# Patient Record
Sex: MALE | Race: WHITE | NOT HISPANIC OR LATINO | Employment: OTHER | URBAN - METROPOLITAN AREA
[De-identification: names, ages, dates, MRNs, and addresses within clinical notes are randomized per-mention and may not be internally consistent; named-entity substitution may affect disease eponyms.]

---

## 2024-07-12 ENCOUNTER — APPOINTMENT (INPATIENT)
Dept: RADIOLOGY | Facility: HOSPITAL | Age: 69
DRG: 020 | End: 2024-07-12
Payer: MEDICARE

## 2024-07-12 ENCOUNTER — APPOINTMENT (EMERGENCY)
Dept: CT IMAGING | Facility: HOSPITAL | Age: 69
End: 2024-07-12
Payer: MEDICARE

## 2024-07-12 ENCOUNTER — HOSPITAL ENCOUNTER (EMERGENCY)
Facility: HOSPITAL | Age: 69
End: 2024-07-12
Attending: EMERGENCY MEDICINE
Payer: MEDICARE

## 2024-07-12 ENCOUNTER — HOSPITAL ENCOUNTER (INPATIENT)
Facility: HOSPITAL | Age: 69
LOS: 29 days | DRG: 020 | End: 2024-08-10
Attending: INTERNAL MEDICINE | Admitting: INTERNAL MEDICINE
Payer: MEDICARE

## 2024-07-12 VITALS
RESPIRATION RATE: 14 BRPM | DIASTOLIC BLOOD PRESSURE: 75 MMHG | SYSTOLIC BLOOD PRESSURE: 124 MMHG | WEIGHT: 119.27 LBS | OXYGEN SATURATION: 100 % | HEART RATE: 102 BPM

## 2024-07-12 DIAGNOSIS — E43 SEVERE PROTEIN-CALORIE MALNUTRITION (HCC): ICD-10-CM

## 2024-07-12 DIAGNOSIS — I60.31: ICD-10-CM

## 2024-07-12 DIAGNOSIS — R29.3 DECORTICATE POSTURING: ICD-10-CM

## 2024-07-12 DIAGNOSIS — I69.991 DYSPHAGIA AS LATE EFFECT OF CEREBROVASCULAR DISEASE: ICD-10-CM

## 2024-07-12 DIAGNOSIS — I60.9 SUBARACHNOID HEMORRHAGE (HCC): Primary | ICD-10-CM

## 2024-07-12 DIAGNOSIS — R56.9 SEIZURES (HCC): ICD-10-CM

## 2024-07-12 DIAGNOSIS — I60.9 SAH (SUBARACHNOID HEMORRHAGE) (HCC): Primary | ICD-10-CM

## 2024-07-12 DIAGNOSIS — J96.01 ACUTE RESPIRATORY FAILURE WITH HYPOXIA (HCC): ICD-10-CM

## 2024-07-12 PROBLEM — R29.90 STROKE-LIKE SYMPTOMS: Status: ACTIVE | Noted: 2024-07-12

## 2024-07-12 LAB
2HR DELTA HS TROPONIN: 0 NG/L
ABO GROUP BLD: NORMAL
AMPHETAMINES SERPL QL SCN: NEGATIVE
ANION GAP SERPL CALCULATED.3IONS-SCNC: 12 MMOL/L (ref 4–13)
APPEARANCE CSF: NORMAL
APTT PPP: 23 SECONDS (ref 23–37)
BARBITURATES UR QL: NEGATIVE
BASE EXCESS BLDA CALC-SCNC: -4.2 MMOL/L
BASOPHILS NFR CSF MANUAL: 3 %
BENZODIAZ UR QL: POSITIVE
BLD GP AB SCN SERPL QL: NEGATIVE
BUN SERPL-MCNC: 10 MG/DL (ref 5–25)
CALCIUM SERPL-MCNC: 8.5 MG/DL (ref 8.4–10.2)
CARDIAC TROPONIN I PNL SERPL HS: 10 NG/L
CARDIAC TROPONIN I PNL SERPL HS: 10 NG/L
CHLORIDE SERPL-SCNC: 103 MMOL/L (ref 96–108)
CO2 SERPL-SCNC: 21 MMOL/L (ref 21–32)
COCAINE UR QL: NEGATIVE
CREAT SERPL-MCNC: 0.88 MG/DL (ref 0.6–1.3)
ERYTHROCYTE [DISTWIDTH] IN BLOOD BY AUTOMATED COUNT: 13.3 % (ref 11.6–15.1)
EST. AVERAGE GLUCOSE BLD GHB EST-MCNC: 123 MG/DL
ETHANOL SERPL-MCNC: <10 MG/DL
FENTANYL UR QL SCN: NEGATIVE
FLUAV RNA RESP QL NAA+PROBE: NEGATIVE
FLUBV RNA RESP QL NAA+PROBE: NEGATIVE
GFR SERPL CREATININE-BSD FRML MDRD: 87 ML/MIN/1.73SQ M
GLUCOSE CSF-MCNC: 102 MG/DL (ref 40–70)
GLUCOSE SERPL-MCNC: 125 MG/DL (ref 65–140)
GLUCOSE SERPL-MCNC: 131 MG/DL (ref 65–140)
GLUCOSE SERPL-MCNC: 222 MG/DL (ref 65–140)
HBA1C MFR BLD: 5.9 %
HCO3 BLDA-SCNC: 21.9 MMOL/L (ref 22–28)
HCT VFR BLD AUTO: 38.6 % (ref 36.5–49.3)
HGB BLD-MCNC: 12.9 G/DL (ref 12–17)
HYDROCODONE UR QL SCN: NEGATIVE
INR PPP: 1.07 (ref 0.84–1.19)
LYMPHOCYTES NFR CSF MANUAL: 49 %
MCH RBC QN AUTO: 31.3 PG (ref 26.8–34.3)
MCHC RBC AUTO-ENTMCNC: 33.4 G/DL (ref 31.4–37.4)
MCV RBC AUTO: 94 FL (ref 82–98)
METHADONE UR QL: NEGATIVE
MONOS+MACROS CSF MANUAL: 4 %
NEUTROPHILS NFR CSF MANUAL: 31 %
NEUTS BAND NFR CSF MANUAL: 13 %
O2 CT BLDA-SCNC: 18.3 ML/DL (ref 16–23)
OPIATES UR QL SCN: NEGATIVE
OXYCODONE+OXYMORPHONE UR QL SCN: NEGATIVE
OXYHGB MFR BLDA: 97.6 % (ref 94–97)
PCO2 BLDA: 44.1 MM HG (ref 36–44)
PCP UR QL: NEGATIVE
PH BLDA: 7.31 [PH] (ref 7.35–7.45)
PLATELET # BLD AUTO: 226 THOUSANDS/UL (ref 149–390)
PMV BLD AUTO: 9.3 FL (ref 8.9–12.7)
PO2 BLDA: 138.2 MM HG (ref 75–129)
POTASSIUM SERPL-SCNC: 4 MMOL/L (ref 3.5–5.3)
PROTHROMBIN TIME: 14.5 SECONDS (ref 11.6–14.5)
RBC # BLD AUTO: 4.12 MILLION/UL (ref 3.88–5.62)
RH BLD: POSITIVE
RSV RNA RESP QL NAA+PROBE: NEGATIVE
SARS-COV-2 RNA RESP QL NAA+PROBE: NEGATIVE
SODIUM SERPL-SCNC: 136 MMOL/L (ref 135–147)
SPECIMEN EXPIRATION DATE: NORMAL
SPECIMEN SOURCE: ABNORMAL
THC UR QL: NEGATIVE
TOTAL CELLS COUNTED BLD: NO
TOTAL CELLS COUNTED SPEC: 100
TUBE # CSF: 1
WBC # BLD AUTO: 17.17 THOUSAND/UL (ref 4.31–10.16)
WBC # CSF AUTO: 1 /UL (ref 0–5)

## 2024-07-12 PROCEDURE — 80307 DRUG TEST PRSMV CHEM ANLYZR: CPT | Performed by: NURSE PRACTITIONER

## 2024-07-12 PROCEDURE — 99291 CRITICAL CARE FIRST HOUR: CPT | Performed by: PSYCHIATRY & NEUROLOGY

## 2024-07-12 PROCEDURE — 84439 ASSAY OF FREE THYROXINE: CPT | Performed by: PSYCHIATRY & NEUROLOGY

## 2024-07-12 PROCEDURE — NC001 PR NO CHARGE: Performed by: NURSE PRACTITIONER

## 2024-07-12 PROCEDURE — 86900 BLOOD TYPING SEROLOGIC ABO: CPT | Performed by: EMERGENCY MEDICINE

## 2024-07-12 PROCEDURE — 82948 REAGENT STRIP/BLOOD GLUCOSE: CPT

## 2024-07-12 PROCEDURE — 96368 THER/DIAG CONCURRENT INF: CPT

## 2024-07-12 PROCEDURE — 02HV33Z INSERTION OF INFUSION DEVICE INTO SUPERIOR VENA CAVA, PERCUTANEOUS APPROACH: ICD-10-PCS | Performed by: PSYCHIATRY & NEUROLOGY

## 2024-07-12 PROCEDURE — 70450 CT HEAD/BRAIN W/O DYE: CPT

## 2024-07-12 PROCEDURE — 80048 BASIC METABOLIC PNL TOTAL CA: CPT | Performed by: NURSE PRACTITIONER

## 2024-07-12 PROCEDURE — 36556 INSERT NON-TUNNEL CV CATH: CPT | Performed by: NURSE PRACTITIONER

## 2024-07-12 PROCEDURE — 03HY32Z INSERTION OF MONITORING DEVICE INTO UPPER ARTERY, PERCUTANEOUS APPROACH: ICD-10-PCS | Performed by: INTERNAL MEDICINE

## 2024-07-12 PROCEDURE — 4A133B1 MONITORING OF ARTERIAL PRESSURE, PERIPHERAL, PERCUTANEOUS APPROACH: ICD-10-PCS | Performed by: INTERNAL MEDICINE

## 2024-07-12 PROCEDURE — 86901 BLOOD TYPING SEROLOGIC RH(D): CPT | Performed by: EMERGENCY MEDICINE

## 2024-07-12 PROCEDURE — 96376 TX/PRO/DX INJ SAME DRUG ADON: CPT

## 2024-07-12 PROCEDURE — 84443 ASSAY THYROID STIM HORMONE: CPT | Performed by: PSYCHIATRY & NEUROLOGY

## 2024-07-12 PROCEDURE — 99285 EMERGENCY DEPT VISIT HI MDM: CPT | Performed by: EMERGENCY MEDICINE

## 2024-07-12 PROCEDURE — 80048 BASIC METABOLIC PNL TOTAL CA: CPT | Performed by: EMERGENCY MEDICINE

## 2024-07-12 PROCEDURE — 70496 CT ANGIOGRAPHY HEAD: CPT

## 2024-07-12 PROCEDURE — 99291 CRITICAL CARE FIRST HOUR: CPT

## 2024-07-12 PROCEDURE — 94002 VENT MGMT INPAT INIT DAY: CPT

## 2024-07-12 PROCEDURE — 96367 TX/PROPH/DG ADDL SEQ IV INF: CPT

## 2024-07-12 PROCEDURE — 82077 ASSAY SPEC XCP UR&BREATH IA: CPT | Performed by: PSYCHIATRY & NEUROLOGY

## 2024-07-12 PROCEDURE — 83036 HEMOGLOBIN GLYCOSYLATED A1C: CPT | Performed by: NURSE PRACTITIONER

## 2024-07-12 PROCEDURE — 83930 ASSAY OF BLOOD OSMOLALITY: CPT | Performed by: NURSE PRACTITIONER

## 2024-07-12 PROCEDURE — 70498 CT ANGIOGRAPHY NECK: CPT

## 2024-07-12 PROCEDURE — 85730 THROMBOPLASTIN TIME PARTIAL: CPT | Performed by: EMERGENCY MEDICINE

## 2024-07-12 PROCEDURE — 5A1945Z RESPIRATORY VENTILATION, 24-96 CONSECUTIVE HOURS: ICD-10-PCS | Performed by: PSYCHIATRY & NEUROLOGY

## 2024-07-12 PROCEDURE — 0241U HB NFCT DS VIR RESP RNA 4 TRGT: CPT | Performed by: EMERGENCY MEDICINE

## 2024-07-12 PROCEDURE — 86850 RBC ANTIBODY SCREEN: CPT | Performed by: EMERGENCY MEDICINE

## 2024-07-12 PROCEDURE — 94760 N-INVAS EAR/PLS OXIMETRY 1: CPT

## 2024-07-12 PROCEDURE — 36415 COLL VENOUS BLD VENIPUNCTURE: CPT | Performed by: EMERGENCY MEDICINE

## 2024-07-12 PROCEDURE — 82805 BLOOD GASES W/O2 SATURATION: CPT | Performed by: NURSE PRACTITIONER

## 2024-07-12 PROCEDURE — 87070 CULTURE OTHR SPECIMN AEROBIC: CPT | Performed by: NURSE PRACTITIONER

## 2024-07-12 PROCEDURE — 85610 PROTHROMBIN TIME: CPT | Performed by: EMERGENCY MEDICINE

## 2024-07-12 PROCEDURE — 96375 TX/PRO/DX INJ NEW DRUG ADDON: CPT

## 2024-07-12 PROCEDURE — 31500 INSERT EMERGENCY AIRWAY: CPT

## 2024-07-12 PROCEDURE — 4A133J1 MONITORING OF ARTERIAL PULSE, PERIPHERAL, PERCUTANEOUS APPROACH: ICD-10-PCS | Performed by: INTERNAL MEDICINE

## 2024-07-12 PROCEDURE — 96365 THER/PROPH/DIAG IV INF INIT: CPT

## 2024-07-12 PROCEDURE — NC001 PR NO CHARGE: Performed by: PSYCHIATRY & NEUROLOGY

## 2024-07-12 PROCEDURE — 85027 COMPLETE CBC AUTOMATED: CPT | Performed by: EMERGENCY MEDICINE

## 2024-07-12 PROCEDURE — 89051 BODY FLUID CELL COUNT: CPT | Performed by: NURSE PRACTITIONER

## 2024-07-12 PROCEDURE — 99223 1ST HOSP IP/OBS HIGH 75: CPT | Performed by: RADIOLOGY

## 2024-07-12 PROCEDURE — 71045 X-RAY EXAM CHEST 1 VIEW: CPT

## 2024-07-12 PROCEDURE — 31500 INSERT EMERGENCY AIRWAY: CPT | Performed by: EMERGENCY MEDICINE

## 2024-07-12 PROCEDURE — 36620 INSERTION CATHETER ARTERY: CPT | Performed by: INTERNAL MEDICINE

## 2024-07-12 PROCEDURE — 84484 ASSAY OF TROPONIN QUANT: CPT | Performed by: EMERGENCY MEDICINE

## 2024-07-12 PROCEDURE — 82945 GLUCOSE OTHER FLUID: CPT | Performed by: NURSE PRACTITIONER

## 2024-07-12 RX ORDER — FENTANYL CITRATE 50 UG/ML
50 INJECTION, SOLUTION INTRAMUSCULAR; INTRAVENOUS ONCE
Status: COMPLETED | OUTPATIENT
Start: 2024-07-12 | End: 2024-07-12

## 2024-07-12 RX ORDER — NIMODIPINE 30 MG/1
60 CAPSULE, LIQUID FILLED ORAL
Status: CANCELLED | OUTPATIENT
Start: 2024-07-12 | End: 2024-08-02

## 2024-07-12 RX ORDER — SUCCINYLCHOLINE/SOD CL,ISO/PF 100 MG/5ML
SYRINGE (ML) INTRAVENOUS CODE/TRAUMA/SEDATION MEDICATION
Status: COMPLETED | OUTPATIENT
Start: 2024-07-12 | End: 2024-07-12

## 2024-07-12 RX ORDER — ETOMIDATE 2 MG/ML
INJECTION INTRAVENOUS CODE/TRAUMA/SEDATION MEDICATION
Status: COMPLETED | OUTPATIENT
Start: 2024-07-12 | End: 2024-07-12

## 2024-07-12 RX ORDER — ONDANSETRON 2 MG/ML
4 INJECTION INTRAMUSCULAR; INTRAVENOUS EVERY 6 HOURS PRN
Status: CANCELLED | OUTPATIENT
Start: 2024-07-12

## 2024-07-12 RX ORDER — SENNOSIDES 8.6 MG
1 TABLET ORAL DAILY
Status: CANCELLED | OUTPATIENT
Start: 2024-07-13

## 2024-07-12 RX ORDER — INSULIN LISPRO 100 [IU]/ML
1-5 INJECTION, SOLUTION INTRAVENOUS; SUBCUTANEOUS EVERY 6 HOURS SCHEDULED
Status: DISCONTINUED | OUTPATIENT
Start: 2024-07-12 | End: 2024-07-13

## 2024-07-12 RX ORDER — CHLORHEXIDINE GLUCONATE ORAL RINSE 1.2 MG/ML
15 SOLUTION DENTAL EVERY 12 HOURS SCHEDULED
Status: DISCONTINUED | OUTPATIENT
Start: 2024-07-12 | End: 2024-07-12

## 2024-07-12 RX ORDER — SODIUM CHLORIDE 9 MG/ML
125 INJECTION, SOLUTION INTRAVENOUS CONTINUOUS
Status: CANCELLED | OUTPATIENT
Start: 2024-07-12

## 2024-07-12 RX ORDER — ACETAMINOPHEN 160 MG/5ML
650 SUSPENSION ORAL EVERY 4 HOURS PRN
Status: DISCONTINUED | OUTPATIENT
Start: 2024-07-12 | End: 2024-07-15

## 2024-07-12 RX ORDER — NALOXONE HYDROCHLORIDE 0.4 MG/ML
0.2 INJECTION, SOLUTION INTRAMUSCULAR; INTRAVENOUS; SUBCUTANEOUS
Status: DISCONTINUED | OUTPATIENT
Start: 2024-07-12 | End: 2024-07-12

## 2024-07-12 RX ORDER — NIMODIPINE 30 MG/1
60 CAPSULE, LIQUID FILLED ORAL
Status: DISCONTINUED | OUTPATIENT
Start: 2024-07-12 | End: 2024-07-12

## 2024-07-12 RX ORDER — LEVETIRACETAM 500 MG/5ML
750 INJECTION, SOLUTION, CONCENTRATE INTRAVENOUS EVERY 12 HOURS SCHEDULED
Status: DISCONTINUED | OUTPATIENT
Start: 2024-07-13 | End: 2024-07-12

## 2024-07-12 RX ORDER — LEVETIRACETAM 500 MG/5ML
1000 INJECTION, SOLUTION, CONCENTRATE INTRAVENOUS EVERY 12 HOURS SCHEDULED
Status: DISCONTINUED | OUTPATIENT
Start: 2024-07-13 | End: 2024-07-14

## 2024-07-12 RX ORDER — FENTANYL CITRATE 50 UG/ML
50 INJECTION, SOLUTION INTRAMUSCULAR; INTRAVENOUS EVERY 2 HOUR PRN
Status: DISCONTINUED | OUTPATIENT
Start: 2024-07-12 | End: 2024-07-13

## 2024-07-12 RX ORDER — LORAZEPAM 2 MG/ML
INJECTION INTRAMUSCULAR
Status: COMPLETED
Start: 2024-07-12 | End: 2024-07-12

## 2024-07-12 RX ORDER — POLYETHYLENE GLYCOL 3350 17 G/17G
17 POWDER, FOR SOLUTION ORAL DAILY
Status: CANCELLED | OUTPATIENT
Start: 2024-07-13

## 2024-07-12 RX ORDER — FENTANYL CITRATE-0.9 % NACL/PF 10 MCG/ML
25 PLASTIC BAG, INJECTION (ML) INTRAVENOUS CONTINUOUS
Status: DISCONTINUED | OUTPATIENT
Start: 2024-07-12 | End: 2024-07-13

## 2024-07-12 RX ORDER — CHLORHEXIDINE GLUCONATE ORAL RINSE 1.2 MG/ML
15 SOLUTION DENTAL EVERY 12 HOURS SCHEDULED
Status: DISCONTINUED | OUTPATIENT
Start: 2024-07-12 | End: 2024-08-01

## 2024-07-12 RX ORDER — VECURONIUM BROMIDE 1 MG/ML
10 INJECTION, POWDER, LYOPHILIZED, FOR SOLUTION INTRAVENOUS ONCE
Status: DISCONTINUED | OUTPATIENT
Start: 2024-07-12 | End: 2024-07-12

## 2024-07-12 RX ORDER — LABETALOL HYDROCHLORIDE 5 MG/ML
10 INJECTION, SOLUTION INTRAVENOUS ONCE
Status: COMPLETED | OUTPATIENT
Start: 2024-07-12 | End: 2024-07-12

## 2024-07-12 RX ORDER — CEFAZOLIN SODIUM 2 G/50ML
2000 SOLUTION INTRAVENOUS ONCE
Status: COMPLETED | OUTPATIENT
Start: 2024-07-12 | End: 2024-07-12

## 2024-07-12 RX ORDER — SODIUM CHLORIDE 9 MG/ML
125 INJECTION, SOLUTION INTRAVENOUS CONTINUOUS
Status: DISCONTINUED | OUTPATIENT
Start: 2024-07-12 | End: 2024-07-12 | Stop reason: HOSPADM

## 2024-07-12 RX ORDER — FENTANYL CITRATE 50 UG/ML
25 INJECTION, SOLUTION INTRAMUSCULAR; INTRAVENOUS ONCE
Status: COMPLETED | OUTPATIENT
Start: 2024-07-12 | End: 2024-07-12

## 2024-07-12 RX ORDER — LEVETIRACETAM 500 MG/5ML
2000 INJECTION, SOLUTION, CONCENTRATE INTRAVENOUS ONCE
Status: COMPLETED | OUTPATIENT
Start: 2024-07-12 | End: 2024-07-12

## 2024-07-12 RX ORDER — 3% SODIUM CHLORIDE 3 G/100ML
30 INJECTION, SOLUTION INTRAVENOUS CONTINUOUS
Status: CANCELLED | OUTPATIENT
Start: 2024-07-12

## 2024-07-12 RX ORDER — SODIUM CHLORIDE 9 MG/ML
75 INJECTION, SOLUTION INTRAVENOUS CONTINUOUS
Status: DISCONTINUED | OUTPATIENT
Start: 2024-07-12 | End: 2024-07-16

## 2024-07-12 RX ORDER — FENTANYL CITRATE-0.9 % NACL/PF 10 MCG/ML
75 PLASTIC BAG, INJECTION (ML) INTRAVENOUS CONTINUOUS
Status: DISCONTINUED | OUTPATIENT
Start: 2024-07-12 | End: 2024-07-12 | Stop reason: HOSPADM

## 2024-07-12 RX ORDER — MANNITOL 250 MG/ML
25 INJECTION, SOLUTION INTRAVENOUS ONCE
Status: COMPLETED | OUTPATIENT
Start: 2024-07-12 | End: 2024-07-12

## 2024-07-12 RX ADMIN — SODIUM CHLORIDE 125 ML/HR: 0.9 INJECTION, SOLUTION INTRAVENOUS at 18:13

## 2024-07-12 RX ADMIN — FENTANYL CITRATE 50 MCG: 50 INJECTION INTRAMUSCULAR; INTRAVENOUS at 19:47

## 2024-07-12 RX ADMIN — Medication 25 MCG/HR: at 17:43

## 2024-07-12 RX ADMIN — Medication 75 MCG/HR: at 19:50

## 2024-07-12 RX ADMIN — LABETALOL HYDROCHLORIDE 10 MG: 5 INJECTION, SOLUTION INTRAVENOUS at 16:45

## 2024-07-12 RX ADMIN — FENTANYL CITRATE 25 MCG: 50 INJECTION INTRAMUSCULAR; INTRAVENOUS at 16:45

## 2024-07-12 RX ADMIN — FENTANYL CITRATE 50 MCG: 50 INJECTION INTRAMUSCULAR; INTRAVENOUS at 20:45

## 2024-07-12 RX ADMIN — LEVETIRACETAM 2000 MG: 100 INJECTION, SOLUTION INTRAVENOUS at 17:02

## 2024-07-12 RX ADMIN — ETOMIDATE 30 MG: 2 INJECTION, SOLUTION INTRAVENOUS at 16:06

## 2024-07-12 RX ADMIN — DESMOPRESSIN ACETATE 16.4 MCG: 4 INJECTION, SOLUTION INTRAVENOUS; SUBCUTANEOUS at 17:07

## 2024-07-12 RX ADMIN — IOHEXOL 85 ML: 350 INJECTION, SOLUTION INTRAVENOUS at 16:27

## 2024-07-12 RX ADMIN — FENTANYL CITRATE 50 MCG: 50 INJECTION INTRAMUSCULAR; INTRAVENOUS at 18:13

## 2024-07-12 RX ADMIN — Medication 100 MG: at 16:07

## 2024-07-12 RX ADMIN — SODIUM CHLORIDE 125 ML/HR: 0.9 INJECTION, SOLUTION INTRAVENOUS at 21:00

## 2024-07-12 RX ADMIN — SODIUM CHLORIDE 3 MG/HR: 0.9 INJECTION, SOLUTION INTRAVENOUS at 17:11

## 2024-07-12 RX ADMIN — SODIUM CHLORIDE 1000 ML: 0.9 INJECTION, SOLUTION INTRAVENOUS at 19:00

## 2024-07-12 RX ADMIN — CHLORHEXIDINE GLUCONATE 0.12% ORAL RINSE 15 ML: 1.2 LIQUID ORAL at 21:26

## 2024-07-12 RX ADMIN — CEFAZOLIN SODIUM 2000 MG: 2 SOLUTION INTRAVENOUS at 21:27

## 2024-07-12 RX ADMIN — MANNITOL 25 G: 12.5 INJECTION, SOLUTION INTRAVENOUS at 17:03

## 2024-07-12 RX ADMIN — LORAZEPAM 2 MG: 2 INJECTION INTRAMUSCULAR; INTRAVENOUS at 16:15

## 2024-07-12 RX ADMIN — FENTANYL CITRATE 50 MCG: 50 INJECTION INTRAMUSCULAR; INTRAVENOUS at 21:10

## 2024-07-12 NOTE — ED PROVIDER NOTES
History  Chief Complaint   Patient presents with    Seizure - New Onset     Pt arrived via EMS from home. EMS was called for right sided body pain. When EMS arrived, pt was c/o of headache and right leg pain. Hx of htn and migraines. In EMS pt had 2 witnessed seizures. No hx of seizures. 1st seizure 30 sec, 2nd seizure 6 seconds. Pt is unresponsive at this time and RT is being called.      HPI    None       No past medical history on file.    No past surgical history on file.    No family history on file.  I have reviewed and agree with the history as documented.    No existing history information found.  No existing history information found.       Review of Systems   Unable to perform ROS: Patient unresponsive       Physical Exam  Physical Exam  Vitals and nursing note reviewed.   Constitutional:       General: He is in acute distress.      Appearance: He is ill-appearing.      Comments: Patient unresponsive.   HENT:      Head: Normocephalic and atraumatic.   Eyes:      Comments: No corneal or pupillary reflexes.   Cardiovascular:      Rate and Rhythm: Normal rate and regular rhythm.   Pulmonary:      Comments: Patient appears to have Cheyne-Chacon respirations.  Musculoskeletal:      Cervical back: Full passive range of motion without pain and neck supple.   Skin:     General: Skin is warm and dry.   Neurological:      General: No focal deficit present.      Mental Status: He is unresponsive.      GCS: GCS eye subscore is 1. GCS verbal subscore is 1. GCS motor subscore is 1.      Comments: Patient appears to have decorticate posturing and is contracted in bilateral upper extremities. No response to painful stimulus.          Vital Signs  ED Triage Vitals   Temp Pulse Respirations Blood Pressure SpO2   -- 07/12/24 1600 07/12/24 1600 07/12/24 1600 07/12/24 1600    101 (!) 25 (!) 176/87 (!) 74 %      Temp src Heart Rate Source Patient Position - Orthostatic VS BP Location FiO2 (%)   -- -- -- -- --             Pain  Score       07/12/24 1645       Med Not Given for Pain - for MAR use only           Vitals:    07/12/24 1600 07/12/24 1604   BP: (!) 176/87 (!) 176/87   Pulse: 101 (!) 108         Visual Acuity  Visual Acuity      Flowsheet Row Most Recent Value   L Pupil Size (mm) 3   R Pupil Size (mm) 3            ED Medications  Medications   etomidate (AMIDATE) 2 mg/mL injection (30 mg Intravenous Given 7/12/24 1606)   Succinylcholine Chloride 100 mg/5 mL syringe (100 mg Intravenous Given 7/12/24 1607)   niCARdipine (CARDENE) 25 mg (STANDARD CONCENTRATION) in sodium chloride 0.9% 250 mL (3 mg/hr Intravenous New Bag 7/12/24 1711)   sodium chloride 0.9 % infusion (125 mL/hr Intravenous New Bag 7/12/24 1813)   fentaNYL 1000 mcg in sodium chloride 0.9% 100mL infusion (50 mcg/hr Intravenous Rate/Dose Change 7/12/24 1813)   LORazepam (ATIVAN) 2 mg/mL injection **ADS Override Pull** (2 mg Intravenous Given 7/12/24 1615)   iohexol (OMNIPAQUE) 350 MG/ML injection (MULTI-DOSE) 85 mL (85 mL Intravenous Given 7/12/24 1627)   labetalol (NORMODYNE) injection 10 mg (10 mg Intravenous Given 7/12/24 1645)   mannitol 25 % injection 25 g (25 g Intravenous Given 7/12/24 1703)   fentaNYL injection 25 mcg (25 mcg Intravenous Given 7/12/24 1645)   levETIRAcetam (KEPPRA) injection 2,000 mg (2,000 mg Intravenous Given 7/12/24 1702)   desmopressin (DDAVP) 16.4 mcg in sodium chloride 0.9 % 50 mL IVPB (0 mcg Intravenous Stopped 7/12/24 1746)   fentaNYL injection 50 mcg (50 mcg Intravenous Given 7/12/24 1813)       Diagnostic Studies  Results Reviewed       Procedure Component Value Units Date/Time    HS Troponin I 2hr [[id number]]  (Normal) Collected: 07/12/24 1734    Lab Status: Final result Specimen: Blood from Arm, Left Updated: 07/12/24 1809     hs TnI 2hr 10 ng/L      Delta 2hr hsTnI 0 ng/L     FLU/RSV/COVID - if FLU/RSV clinically relevant [[id number]]  (Normal) Collected: 07/12/24 1768    Lab Status: Final result Specimen: Nares from Nose Updated:  07/12/24 1704     SARS-CoV-2 Negative     INFLUENZA A PCR Negative     INFLUENZA B PCR Negative     RSV PCR Negative    Narrative:      FOR PEDIATRIC PATIENTS - copy/paste COVID Guidelines URL to browser: [email or web address]    SARS-CoV-2 assay is a Nucleic Acid Amplification assay intended for the  qualitative detection of nucleic acid from SARS-CoV-2 in nasopharyngeal  swabs. Results are for the presumptive identification of SARS-CoV-2 RNA.    Positive results are indicative of infection with SARS-CoV-2, the virus  causing COVID-19, but do not rule out bacterial infection or co-infection  with other viruses. Laboratories within the [place] and its  territories are required to report all positive results to the appropriate  public health authorities. Negative results do not preclude SARS-CoV-2  infection and should not be used as the sole basis for treatment or other  patient management decisions. Negative results must be combined with  clinical observations, patient history, and epidemiological information.  This test has not been FDA cleared or approved.    This test has been authorized by FDA under an Emergency Use Authorization  (EUA). This test is only authorized for the duration of time the  declaration that circumstances exist justifying the authorization of the  emergency use of an in vitro diagnostic tests for detection of SARS-CoV-2  virus and/or diagnosis of COVID-19 infection under section 564(b)(1) of  the Act, 21 U.S.C. 360bbb-3(b)(1), unless the authorization is terminated  or revoked sooner. The test has been validated but independent review by FDA  and CLIA is pending.    Test performed using [employer]: This RT-PCR assay targets N2,  a region unique to SARS-CoV-2. A conserved region in the E-gene was chosen  for pan-Sarbecovirus detection which includes SARS-CoV-2.    According to CMS-2020-01-R, this platform meets the definition  of highUpstream technology.    HS Troponin 0hr (reflex protocol) [[id number]]  (Normal) Collected: 07/12/24 1618    Lab Status: Final result Specimen: Blood from Arm, Right Updated: 07/12/24 1652     hs TnI 0hr 10 ng/L     Basic metabolic panel [[id number]]  (Abnormal) Collected: 07/12/24 1618    Lab Status: Final result Specimen: Blood from Arm, Right Updated: 07/12/24 1645     Sodium 136 mmol/L      Potassium 4.0 mmol/L      Chloride 103 mmol/L      CO2 21 mmol/L      ANION GAP 12 mmol/L      BUN 10 mg/dL      Creatinine 0.88 mg/dL      Glucose 222 mg/dL      Calcium 8.5 mg/dL      eGFR 87 ml/min/1.73sq m     Narrative:      National Kidney Disease Foundation guidelines for Chronic Kidney Disease (CKD):     Stage 1 with normal or high GFR (GFR > 90 mL/min/1.73 square meters)    Stage 2 Mild CKD (GFR = 60-89 mL/min/1.73 square meters)    Stage 3A Moderate CKD (GFR = 45-59 mL/min/1.73 square meters)    Stage 3B Moderate CKD (GFR = 30-44 mL/min/1.73 square meters)    Stage 4 Severe CKD (GFR = 15-29 mL/min/1.73 square meters)    Stage 5 End Stage CKD (GFR <15 mL/min/1.73 square meters)  Note: GFR calculation is accurate only with a steady state creatinine    Protime-INR [[id number]]  (Normal) Collected: 07/12/24 1618    Lab Status: Final result Specimen: Blood from Arm, Right Updated: 07/12/24 1644     Protime 14.5 seconds      INR 1.07    APTT [[id number]]  (Normal) Collected: 07/12/24 1618    Lab Status: Final result Specimen: Blood from Arm, Right Updated: 07/12/24 1644     PTT 23 seconds     CBC and Platelet [[id number]]  (Abnormal) Collected: 07/12/24 1618    Lab Status: Final result Specimen: Blood from Arm, Right Updated: 07/12/24 1626     WBC 17.17 Thousand/uL      RBC 4.12 Million/uL      Hemoglobin 12.9 g/dL      Hematocrit 38.6 %      MCV 94 fL      MCH 31.3 pg      MCHC 33.4 g/dL      RDW 13.3 %      Platelets 226 Thousands/uL      MPV 9.3 fL                    CTA stroke alert (head/neck)   Final Result by  George Hu MD (07/12 1651)      0.3 cm aneurysm in expected origin of right posterior communicating artery. Possible tiny aneurysm in left anterior communicating artery region. These could be potential sources of diffuse subarachnoid hemorrhage. Recommend neurovascular surgery    consultation for further evaluation.      Mild narrowing of bilateral MCA M1 and bilateral M2 segmental branch vessels, likely due to vasospasm.      Patent stent in left proximal subclavian artery.      Endotracheal tube in trachea.      Additional chronic/incidental findings as detailed above.      Findings were directly discussed with Emmanuel Lion at approximately 4:34 p.m. on 7/12/2024.      Workstation performed: [id number]         CT stroke alert brain   Final Result by George Hu MD (07/12 1633)      Acute diffuse thickened large-volume subarachnoid hemorrhage throughout the bilateral parafalcine regions, bilateral cerebral sulci (right worse than left), basilar cisterns, prepontine cistern, premedullary cistern, and visualized upper cervical spinal    canal.      Acute small volume intraventricular hemorrhage with mild hydrocephalus.      No acute infarction.         Findings were directly discussed with Emmanuel Lion at approximately 4:34 p.m. on 7/12/2024.      Workstation performed: [id number]                    Procedures  Procedures         ED Course                                               Medical Decision Making  Amount and/or Complexity of Data Reviewed  Labs: ordered.  Radiology: ordered.    Risk  Prescription drug management.                 Disposition  Final diagnoses:   Decorticate posturing   SAH (subarachnoid hemorrhage) (HCC)   Acute respiratory failure with hypoxia (HCC)     Time reflects when diagnosis was documented in both MDM as applicable and the Disposition within this note       Time User Action Codes Description Comment    7/12/2024  4:13 PM Laquita Flores Add [R29.3] Decorticate  posturing     7/12/2024  5:49 PM Laquita Flores Add [I60.9] SAH (subarachnoid hemorrhage) (HCC)     7/12/2024  5:49 PM Laquita Flores Modify [R29.3] Decorticate posturing     7/12/2024  5:49 PM Laquita Flores Modify [I60.9] SAH (subarachnoid hemorrhage) (HCC)     7/12/2024  5:49 PM Laquita Flores Add [J96.01] Acute respiratory failure with hypoxia ([facility])           ED Disposition       ED Disposition   Transfer to Another Facility-In Network    Condition   --    Date/Time   Fri Jul 12, 2024  5:49 PM    Comment   Federico Bowen should be transferred out to [facility].               MD Documentation      Flowsheet Row Most Recent Value   Patient Condition The patient has been stabilized such that within reasonable medical probability, no material deterioration of the patient condition or the condition of the unborn child(vickie) is likely to result from the transfer   Reason for Transfer Level of Care needed not available at this facility   Benefits of Transfer Specialized equipment and/or services available at the receiving facility (Include comment)________________________  [Neurosurgery]   Risks of Transfer Potential for delay in receiving treatment, Potential deterioration of medical condition, Increased discomfort during transfer, Possible worsening of condition or death during transfer   Accepting Physician Dr. Timmons   Accepting Facility Name, [facility]    (Name & Tel number) [facility]   Sending MD Dr. Flores   Provider Certification General risk, such as traffic hazards, adverse weather conditions, rough terrain or turbulence, possible failure of equipment (including vehicle or aircraft), or consequences of actions of persons outside the control of the transport personnel          RN Documentation      Flowsheet Row Most Recent Value   Accepting Facility Name, [facility]    (Name & Tel number) [facility]          Follow-up Information    None         Patient's Medications     No medications on file       No discharge procedures on file.    PDMP Review       None            ED Provider  Electronically Signed by           the unborn child(vickie) is likely to result from the transfer   Reason for Transfer Level of Care needed not available at this facility   Benefits of Transfer Specialized equipment and/or services available at the receiving facility (Include comment)________________________  [Neurosurgery]   Risks of Transfer Potential for delay in receiving treatment, Potential deterioration of medical condition, Increased discomfort during transfer, Possible worsening of condition or death during transfer   Accepting Physician Dr. Timmons   Accepting Facility Name, [facility]    (Name & Tel number) [facility]   Sending MD Dr. Flores   Provider Certification General risk, such as traffic hazards, adverse weather conditions, rough terrain or turbulence, possible failure of equipment (including vehicle or aircraft), or consequences of actions of persons outside the control of the transport personnel          RN Documentation      Flowsheet Row Most Recent Value   Accepting Facility Name, [facility]    (Name & Tel number) [facility]          Follow-up Information    None         Patient's Medications    No medications on file       No discharge procedures on file.    PDMP Review       None            ED Provider  Electronically Signed by             Laquita Flores DO  07/15/24 0602

## 2024-07-12 NOTE — CONSULTS
Consultation - Neurosurgery   Federico Bowen 68 y.o. 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 He requires urgent ventriculostomy insertion for symptomatic hydrocephalus, to be inserted by Dr. Goldberg. He will likely require endovascular embolization depending on his neurological exam given report of posturing and absent corneal reflexes however likely suspected to be due to seizure.    Keep SBP less than 140.  CT head in AM.      History of Present Illness     HPI: Federico Bowen is a 68 y.o. year old male who presents with subarachnoid hemorrhage. History provided by the EMR and daughter. He has a past medical history notable for CAD, HTN, HLD, tobacco use, unstable angina, COPD, history of cocaine use, left subclavian stent. EMS was called to his home for complaints of headache and right leg pain. Per EMS, patient had two witnessed seizures and ultimately required intubation. Upon arrival patient was reported to be posturing. CT/CTA revealed a large amount of SAH and a right PCOM aneurysm. He was given Keppra for seizures and DDAVP for aspirin use. Per his daughter he is independent at baseline with ADLs and is driving.           Inpatient consult to Neurosurgery  Consult performed by: Ruslan Mcneal MD  Consult ordered by: CHRISTINA Peterson          Review of Systems   Unable to perform ROS: Acuity of condition       Historical Information   No past medical history on file.  No past surgical history on file.  Social History     Substance and Sexual Activity   Alcohol Use Not on file     Social History     Substance and Sexual Activity   Drug Use Not on file     No existing history information found.  No existing history information found.  Social History     Tobacco Use   Smoking Status Not on file   Smokeless Tobacco Not on  file     No family history on file.    Meds/Allergies   all current active meds have been reviewed  Not on File    Objective   No intake or output data in the 24 hours ending 07/12/24 1923    Physical Exam  Constitutional:       Appearance: He is ill-appearing.   HENT:      Head: Normocephalic.   Cardiovascular:      Rate and Rhythm: Regular rhythm.      Pulses: Normal pulses.   Skin:     General: Skin is warm and dry.   Neurological:      Comments: Intubated, sedated  Right greater than left constricted pupils.  Not reactive.  No response to painful stimuli.       Neurologic Exam    Vitals:There were no vitals taken for this visit.,There is no height or weight on file to calculate BMI.     Lab Results: I have personally reviewed pertinent results.      Imaging Studies: I have personally reviewed pertinent reports.      EKG, Pathology, and Other Studies: I have personally reviewed pertinent reports.      VTE Prophylaxis: Sequential compression device (Venodyne)     Code Status: Level 1 - Full Code  Advance Directive and Living Will:      Power of :    POLST:      Counseling / Coordination of Care  None

## 2024-07-12 NOTE — ASSESSMENT & PLAN NOTE
"68 y.o.  male with CAD, HTN, HLD, tobacco use, unstable angina, COPD, history of cocaine use, who presents with stroke-like symptoms.    Workup:  -CT head 7/12/24:  \"Acute diffuse thickened large-volume subarachnoid hemorrhage throughout the bilateral parafalcine regions, bilateral cerebral sulci (right worse than left), basilar cisterns, prepontine cistern, premedullary cistern, and visualized upper cervical spinal canal. Acute small volume intraventricular hemorrhage with mild hydrocephalus. No acute infarction.\"  - CTA H/N wwo contrast 7/12/24:  \"0.3 cm aneurysm in expected origin of right posterior communicating artery. Possible tiny aneurysm in left anterior communicating artery region. These could be potential sources of diffuse subarachnoid hemorrhage. Recommend neurovascular surgery consultation for further evaluation. Mild narrowing of bilateral MCA M1 and bilateral M2 segmental branch vessels, likely due to vasospasm. Patent stent in left proximal subclavian artery. Endotracheal tube in trachea.\"    Plan:  -recommend emergent transfer to [facility]  - Recommend contacting neurosurgery and neurocritical care at [facility] for further recommendations.       "

## 2024-07-12 NOTE — EMTALA/ACUTE CARE TRANSFER
[facility] EMERGENCY DEPARTMENT  [address]  [place] [address]  Dept: [phone]      EMTALA TRANSFER CONSENT    NAME Federico Bowen                                         1955                              MRN [id number]    I have been informed of my rights regarding examination, treatment, and transfer   by Dr. Laquita Flores DO    Benefits: Specialized equipment and/or services available at the receiving facility (Include comment)________________________ (Neurosurgery)    Risks: Potential for delay in receiving treatment, Potential deterioration of medical condition, Increased discomfort during transfer, Possible worsening of condition or death during transfer      Consent for Transfer:  I acknowledge that my medical condition has been evaluated and explained to me by the emergency department physician or other qualified medical person and/or my attending physician, who has recommended that I be transferred to the service of  Accepting Physician: Dr. Timmons at Accepting Facility Name, City & State : [facility]. The above potential benefits of such transfer, the potential risks associated with such transfer, and the probable risks of not being transferred have been explained to me, and I fully understand them.  The doctor has explained that, in my case, the benefits of transfer outweigh the risks.  I agree to be transferred.    I authorize the performance of emergency medical procedures and treatments upon me in both transit and upon arrival at the receiving facility.  Additionally, I authorize the release of any and all medical records to the receiving facility and request they be transported with me, if possible.  I understand that the safest mode of transportation during a medical emergency is an ambulance and that the Hospital advocates the use of this mode of transport. Risks of traveling to the receiving facility by car, including absence of medical control, life  sustaining equipment, such as oxygen, and medical personnel has been explained to me and I fully understand them.    (MAR CORRECT BOX BELOW)  [  ]  I consent to the stated transfer and to be transported by ambulance/helicopter.  [  ]  I consent to the stated transfer, but refuse transportation by ambulance and accept full responsibility for my transportation by car.  I understand the risks of non-ambulance transfers and I exonerate the Hospital and its staff from any deterioration in my condition that results from this refusal.    X___________________________________________    DATE  24  TIME________  Signature of patient or legally responsible individual signing on patient behalf           RELATIONSHIP TO PATIENT_________________________          Provider Certification    NAME Federico Bowen                                         1955                              MRN [id number]    A medical screening exam was performed on the above named patient.  Based on the examination:    Condition Necessitating Transfer The primary encounter diagnosis was SAH (subarachnoid hemorrhage) (HCC). Diagnoses of Decorticate posturing and Acute respiratory failure with hypoxia (HCC) were also pertinent to this visit.    Patient Condition: The patient has been stabilized such that within reasonable medical probability, no material deterioration of the patient condition or the condition of the unborn child(vickie) is likely to result from the transfer    Reason for Transfer: Level of Care needed not available at this facility    Transfer Requirements: Facility [facility]   Space available and qualified personnel available for treatment as acknowledged by [facility]  Agreed to accept transfer and to provide appropriate medical treatment as acknowledged by       Dr. Timmons  Appropriate medical records of the examination and treatment of the patient are provided at the time of transfer   STAFF INITIAL WHEN COMPLETED  _______  Transfer will be performed by qualified personnel from    and appropriate transfer equipment as required, including the use of necessary and appropriate life support measures.    Provider Certification: I have examined the patient and explained the following risks and benefits of being transferred/refusing transfer to the patient/family:  General risk, such as traffic hazards, adverse weather conditions, rough terrain or turbulence, possible failure of equipment (including vehicle or aircraft), or consequences of actions of persons outside the control of the transport personnel      Based on these reasonable risks and benefits to the patient and/or the unborn child(vickie), and based upon the information available at the time of the patient’s examination, I certify that the medical benefits reasonably to be expected from the provision of appropriate medical treatments at another medical facility outweigh the increasing risks, if any, to the individual’s medical condition, and in the case of labor to the unborn child, from effecting the transfer.    X____________________________________________ DATE 07/12/24        TIME_______      ORIGINAL - SEND TO MEDICAL RECORDS   COPY - SEND WITH PATIENT DURING TRANSFER

## 2024-07-12 NOTE — ED NOTES
FROM: [facility]   ED 10-ED 10 (MO CT SCAN)  TO: [facility]--  --  DX: Decorticate posturing/  SAH  EMS Tx Reason: Neurosurgery  Transfer Priority Level (1,2,3): 1  Referring: Laquita Flores DO  Accepting: Dr Timmons  Transport (ALS/BLS, etc): CCT  - 54 kg  Reason for ALS/CCT if applicable (O2, tele, IVF, meds): vent/  cardene gtt/  fentanyl gtt/ cardiac monitor  P/U Time:  Number for Report: [phone]     Fozia Rincon, RN  07/12/24 3676

## 2024-07-12 NOTE — H&P
[facility]  H&P: Critical Care  Name: Federico Bowen 68 y.o. 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  Plan:   SAH Pathway   Nimidopine 60mg q4  Neurosurgery consulted emergently   Needs emergent EVD   Needs to go IR emergently aneurysm securing   SBP goal 110-160  S/p mannitol 07/12  Q6h BMP and Serum Osm   Na goal 145-155  Osm goal: 310-320  Consider HTS     Diagnosis: Seizure  Suspected from SAH   Plan   S/p 2g keppra at [place] 07/12  Continue maintenance 750mg BID   Start vEEG to ensure no further seizures     CV:   Diagnosis: Hypertension   Plan:   -160  Prn hydralazine and labetolol  Diagnosis: Hyperlipidemia   Continue home statin    Pulm:  Diagnosis: Acute Respiratory Failure  Secondary due to SAH    AC//16/50/6  Plan   SAT/SBT as able   Wean as able       Diagnosis: COPD   Plan: ***    GI:   No active issues    :   Diagnosis: ***  Plan: ***    F/E/N:    F:   E: K> 4.0, mag greater than 2 3, maintain magnesium > 2.0, maintain phosphate > 3.5  N: NPO at this time     Heme/Onc:   Diagnosis: Leukocytosis   WBC 17k  Suspected in setting of SAH   Plan:   Monitor fever curves   Am CB     Endo:   No active issues    ID:   No active issues    MSK/Skin:   PT/OT/Speech/PMR  Skin check     Disposition: Critical care       History of Present Illness     HPI: Federico Bowen is a 68 y.o. w/ a PMH of smoking, HTN, CAD, HLD, Unstable Angina and migraines coming in for R sided pain which EMS was called for. Patient was complaining of headache and R leg pain. En route, patient had 2 witnessed seizures with first being 30 seconds and 2nd seizure being 6 seconds. Patient was unresponsive when arrived to the ED and was a stroke alert for  unresponsiveness, headache, and decorticate posturing . CTH showed a large SAH thru bilateral parafalcine, basilar, prepontine, and premedullary cisterns. Patient was intubated and given DDAVP for home ASA usage and also given mannitol. He was started on cardene gtt.   Given keppra 2g at [place] and transferred to [facility] for SAH management.     History obtained from chart review.  Review of Systems: See HPI for Review of Systems    Historical Information   No past medical history on file. No past surgical history on file.   No current outpatient medications Not on File     No family history on file.       Objective                            Vitals I/O      Most Recent Min/Max in 24hrs   Temp   No data recorded   Pulse   Pulse  Min: 101  Max: 108   Resp   Resp  Min: 20  Max: 25   BP   BP  Min: 176/87  Max: 176/87   O2 Sat   SpO2  Min: 74 %  Max: 100 %    No intake or output data in the 24 hours ending 07/12/24 1723    No diet orders on file    Invasive Monitoring   {Invasive Device (Optional):25141}        Physical Exam   Critical Care Physical Exam *** (fill out SmartBlock)       Diagnostic Studies    {IDisappearing Data Review I RadiologyI Cardiology:78854}  EKG: ***  Imaging: *** {Results Review Statement:96343}     Medications:  Scheduled PRN        Continuous    No current facility-administered medications for this encounter.       Labs:  { I Disappearing Data Review I Results Review I Micro :79826}  CBC    Recent Labs     07/12/24  1618   WBC 17.17*   HGB 12.9   HCT 38.6        BMP    Recent Labs     07/12/24  1618   SODIUM 136   K 4.0      CO2 21   AGAP 12   BUN 10   CREATININE 0.88   CALCIUM 8.5       Coags    Recent Labs     07/12/24  1618   INR 1.07   PTT 23        Additional Electrolytes  No recent results       Blood Gas    No recent results  No recent results LFTs  No recent results    Infectious  No recent results  Glucose  Recent Labs     07/12/24  1618   GLUC 222*               Hadley Nunes  [place], DO

## 2024-07-12 NOTE — CONSULTS
"Consultation - Stroke   Federico Bowen 68 y.o. male MRN: [id number]  Unit/Bed#: ED 10 Encounter: [id number]      Assessment & Plan     Subarachnoid hemorrhage (HCC)  Assessment & Plan  68 y.o.  male with CAD, HTN, HLD, tobacco use, unstable angina, COPD, history of cocaine use, who presents with stroke-like symptoms.    Workup:  -CT head 7/12/24:  \"Acute diffuse thickened large-volume subarachnoid hemorrhage throughout the bilateral parafalcine regions, bilateral cerebral sulci (right worse than left), basilar cisterns, prepontine cistern, premedullary cistern, and visualized upper cervical spinal canal. Acute small volume intraventricular hemorrhage with mild hydrocephalus. No acute infarction.\"  - CTA H/N wwo contrast 7/12/24:  \"0.3 cm aneurysm in expected origin of right posterior communicating artery. Possible tiny aneurysm in left anterior communicating artery region. These could be potential sources of diffuse subarachnoid hemorrhage. Recommend neurovascular surgery consultation for further evaluation. Mild narrowing of bilateral MCA M1 and bilateral M2 segmental branch vessels, likely due to vasospasm. Patent stent in left proximal subclavian artery. Endotracheal tube in trachea.\"    Plan:  -recommend emergent transfer to [facility]  - Recommend contacting neurosurgery and neurocritical care at [facility] for further recommendations.             Thrombolytic Decision: Patient not a candidate. SAH on CTH    Case and plan discussed with attending neurologist, Dr. Lion, throughout entire duration of stroke alert.  Please see attending attestation for any further recommendations and/or changes to plan.      Recommendations for outpatient neurological follow up have yet to be determined.    History of Present Illness     Reason for Consult / Principal Problem: Stroke-like symptoms  Hx and PE limited by: pt intubated, family not in room  Patient last known well: unclear, at least prior to 2:34 PM 7/12/24  Stroke alert " "called: 1604 PM 7/12/24  Neurology time of arrival: 1604 PM 7/12/24  HPI: Federico Bowen is a 68 y.o.  male with CAD, HTN, HLD, tobacco use, unstable angina, COPD, history of cocaine use, who presents with stroke-like symptoms.    Patient presented to the ED on 7/12 as a stroke alert for unresponsiveness, headache, and decorticate posturing. BP on presentation 176/87. Per ED clinical summary, \"Patient arrived via EMS from home. EMS was called for right sided body pain. When EMS arrived, patient was complaining of headache and right leg pain. History of HTN and migraines. In EMS, patient had two witnessed seizures. No history of seizures. First seizure 30 seconds, second seizures 6 seconds. Patient unresponsive at this time\".    Patient unable to provide history due to intubation.  History obtained from EMS who was in room.  EMS reports that they were dispatched at 2:34 PM on 7/12/2024 due to a fall with right-sided pain.  This reports that when they presented to the patient, patient was screaming in pain and sitting on the side of the bed dry heaving.  Patient complained of headache at that time.  Patient reported that his right leg hurt from the fall but that he did not hit his head with the fall.  EMS reports that when they were about [facility] to the [facility], patient screamed that his head hurt, suddenly had decorticate posturing and became rigid.  Patient was unresponsive.  SBP at the time in the 140s-150s.  Patient was ultimately intubated.    On neurology arrival, patient had already received etomidate, succinylcholine chloride, and Ativan and was intubated; therefore, a true neurologic exam was unable to be performed and pt was unable to provide history.     Consult to Neurology  Consult performed by: Polina Lemus PA-C  Consult ordered by: Laquita Flores,           Review of Systems   Unable to perform ROS: Intubated       Historical Information   No past medical history on file.  No past surgical " history on file.  Social History   Social History     Substance and Sexual Activity   Alcohol Use Not on file     Social History     Substance and Sexual Activity   Drug Use Not on file     No existing history information found.  No existing history information found.  Social History     Tobacco Use   Smoking Status Not on file   Smokeless Tobacco Not on file     Family History: No family history on file.    Review of previous medical records was completed.     Meds/Allergies   all current active meds have been reviewed, current meds:   Current Facility-Administered Medications   Medication Dose Route Frequency    desmopressin (DDAVP) 16.4 mcg in sodium chloride 0.9 % 50 mL IVPB  0.3 mcg/kg Intravenous Once    etomidate (AMIDATE) 2 mg/mL injection    Code/Trauma/Sedation Med    fentaNYL 1000 mcg in sodium chloride 0.9% 100mL infusion  25 mcg/hr Intravenous Continuous    levETIRAcetam (KEPPRA) injection 2,000 mg  2,000 mg Intravenous Once    mannitol 25 % injection 25 g  25 g Intravenous Once    niCARdipine (CARDENE) 25 mg (STANDARD CONCENTRATION) in sodium chloride 0.9% 250 mL  1-15 mg/hr Intravenous Titrated    sodium chloride 0.9 % infusion  125 mL/hr Intravenous Continuous    Succinylcholine Chloride 100 mg/5 mL syringe    Code/Trauma/Sedation Med   , and PTA meds:   None       Not on File    Objective   Vitals:Blood pressure (!) 176/87, pulse (!) 108, resp. rate 20, weight 54.1 kg (119 lb 4.3 oz), SpO2 100%.,There is no height or weight on file to calculate BMI.  No intake or output data in the 24 hours ending 07/12/24 1700    Invasive Devices:   Invasive Devices       Peripheral Intravenous Line  Duration             Peripheral IV 07/12/24 Distal;Right;Upper;Ventral (anterior) Arm <1 day    Peripheral IV 07/12/24 Left Antecubital <1 day              Airway  Duration             ETT  Oral 8 mm <1 day                    Physical Exam  Vitals and nursing note reviewed.   Constitutional:       Comments: Frail  appearing older adult male lying in bed   Pulmonary:      Comments: Intubated  Neurological:      Coordination: Finger-nose-finger test: Unable to test, patient unable to follow commands. Heel-to-shin test: Unable to test, patient unable to follow commands.       Neurologic Exam     Mental Status   Level of consciousness: unresponsive to painful stimuli  - Unable to assess recent and remote memory, attention span and concentration, speech, language, orientation, knowledge due to sedation and intubation     Cranial Nerves     -Per ED attending, negative corneal reflexes on arrival  -Unable to reliably assess cranial nerves II through XII due to sedation, paralytic, and intubation     Motor Exam   -Unable to test strength to confrontation testing due to sedation, paralytic,  and intubation     Sensory Exam     -Unable to test sensation testing due to sedation, paralytic, and intubation     Gait, Coordination, and Reflexes     Coordination   Finger-nose-finger test: Unable to test, patient unable to follow commands.  Heel-to-shin test: Unable to test, patient unable to follow commands.      NIHSS:  1a.Level of Consciousness: 3 = Coma   1b. LOC Questions: 2 = Answers neither correctly   1c. LOC Commands: 2 = Obeys neither correctly   2. Best Gaze: 0 = Normal   3. Visual: 3 = Bilateral hemianopia   4. Facial Palsy: 0=Normal symmetric movement; unable to test due to pt unable to smile (paralytic, sedation)   5a. Motor Right Arm: 4=No movement   5b. Motor Left Arm: 4=No movement   6a. Motor Right Le=No movement   6b. Motor Left Le=No movement   7. Limb Ataxia:  0=Absent   8. Sensory: 2=Severe to total sensory loss; patient is not aware of being touched in face, arm, leg   9. Best Language:  3=Mute, global aphasia; no usable speech or auditory comprehension   10. Dysarthria: UN = Intubated or other physical barrier   11. Extinction and Inattention (formerly Neglect): 2=Profound nesha-inattention or nesha-inattention  "to more than one modality. Does not recognize own hand or orients only to one side of space   Total Score: 33     Time NIHSS was completed: 1620 PM 7/12/24    Modified Leonel Score:  Unable to determine currently, will gather additional data    Lab Results: I have personally reviewed pertinent reports.  , CBC:   Results from last 7 days   Lab Units 07/12/24  1618   WBC Thousand/uL 17.17*   RBC Million/uL 4.12   HEMOGLOBIN g/dL 12.9   HEMATOCRIT % 38.6   MCV fL 94   PLATELETS Thousands/uL 226   , BMP/CMP:   Results from last 7 days   Lab Units 07/12/24  1618   SODIUM mmol/L 136   POTASSIUM mmol/L 4.0   CHLORIDE mmol/L 103   CO2 mmol/L 21   BUN mg/dL 10   CREATININE mg/dL 0.88   CALCIUM mg/dL 8.5   EGFR ml/min/1.73sq m 87   , Vitamin B12:   , HgBA1C:   , TSH:   , Coagulation:   Results from last 7 days   Lab Units 07/12/24  1618   INR  1.07   , Lipid Profile:   , Ammonia:   , Urinalysis:       Invalid input(s): \"URIBILINOGEN\", Drug Screen:   , Medication Drug Levels:       Invalid input(s): \"CARBAMAZEPINE\", \"OXCARBAZEPINE\"    Imaging Studies: I have personally reviewed pertinent reports.   and I have personally reviewed pertinent films in PACS CTH wo contrast 7/12/24, CTA H/N wwo contrast 7/12/24  EKG, Pathology, and Other Studies: I have personally reviewed pertinent reports.        Code Status: No Order  Advance Directive and Living Will:      Power of :    POLST:      Counseling / Coordination of Care  Total Critical Care time spent 25 minutes excluding procedures, teaching and family updates.      This note was completed in part utilizing Dragon Software.  Grammatical errors, random word insertions, spelling mistakes, and incomplete sentences may be an occasional consequence of this system secondary to software limitations, ambient noise, and hardware issues.  If you have any questions or concerns about the content, text, or information contained within the body of this dictation, please contact the " provider for clarification.

## 2024-07-12 NOTE — LETTER
[facility] INTENSIVE CARE UNIT  [address] [address]  Dept: [phone]    July 19, 2024     Patient: Federico Bowen   YOB: 1955   Date of Visit: 7/12/2024       To Whom it May Concern:    Federico Bowen is under my professional care. He was seen in the hospital from 7/12/2024 to 07/19/24. His daughter Candice Bowen has been visiting for this week and should be excused from work    If you have any questions or concerns, please don't hesitate to call.         Sincerely,          Hadley Eric, DO

## 2024-07-12 NOTE — LETTER
[facility] INTENSIVE CARE UNIT  [address] [address]  Dept: [phone]    July 17, 2024     Patient: Federico Bowen   YOB: 1955   Date of Visit: 7/12/2024       To Whom it May Concern:    Federico Bowen is under my professional care. He has been in the hospital from 7/12/2024 and still undergoing care in our intensive care unit. Please excuse his family, Maria Esther Magana to visit. Please do not devote her residential accommodations to another while she assists with his recovery.     If you have any questions or concerns, please don't hesitate to call.         Sincerely,          Clarice Stein MD

## 2024-07-12 NOTE — RESPIRATORY THERAPY NOTE
RT Ventilator Management Note  Federico Bowen 68 y.o. male MRN: [id number]  Unit/Bed#: ED 10 Encounter: [id number]      Daily Screen         7/12/2024  1643             Patient safety screen outcome:: Failed    Not Ready for Weaning due to:: Underline problem not resolved              Physical Exam:   Assessment Type: Assess only  General Appearance: Unresponsive  Respiratory Pattern: Cheyne-Chacon, Assisted  O2 Device: vent  Subjective Data: unresponsive      Resp Comments: pt intubated for airway protection s/p stroke  ETT placement verified by cxr, chest auscaltatin and end tidal  Pt is currently on full mechanical support  skin integrity intact       07/12/24 1643   Respiratory Assessment   Assessment Type Assess only   General Appearance Unresponsive   Respiratory Pattern Cheyne-Chacon;Assisted   Resp Comments pt intubated for airway protection s/p stroke  ETT placement verified by cxr, chest auscaltatin and end tidal  Pt is currently on full mechanical support  skin integrity intact   O2 Device vent   Subjective Data unresponsive   Vent Information   Vent ID Moore   Vent type Drager   Drager Vent Mode AC/VC+   $ Vent Charge-INITIAL Yes   Ventilator Start Yes   $ Pulse Oximetry Spot Check Charge Completed   SpO2 100 %   AC/VC+ Settings   Resp Rate (BPM) 16 BPM   VT (mL) 400 mL   Insp Time (S) 1 S   FIO2 (%) 50 %   PEEP (cmH2O) 6 cmH2O   Rise Time (%) 20 %   AC/VC+ Actuals   Resp Rate (BPM) 18 BPM   VT (mL) 1262 mL   MV (Obs) 15.4   MAP (cmH2O) 7.1 cmH2O   Peak Pressure (cmH2O) 7.7 cmH2O   I:E Ratio (Obs) 1:2.8   AC/VC+ ALARMS   High Peak Pressure (cmH2O) 45 cmH2O   High Resp Rate (BPM) 30 BPM   High MV (L/min) 17 L/min   Low MV (L/min) 3 L/min   High VT (mL) 1500 mL   Maintenance   Alarm (pink) cable attached No   Resuscitation bag with peep valve at bedside Yes   Water bag changed No   Daily Screen   Patient safety screen outcome: Failed   Not Ready for Weaning due to: Underline problem not resolved    IHI Ventilator Associated Pneumonia Bundle   Daily Assessment of Readiness to Extubate Yes   Head of Bed Elevated HOB Flat   ETT  Oral 8 mm   Placement Date/Time: 07/12/24 1608   Mask Ventilation: Ventilated by mask (1)  Preoxygenated: Yes  Technique: guided  Type: Oral  Tube Size: 8 mm  Laryngoscope: Chicho  Location: Oral  Insertion attempts: 1  Secured at (cm): 24 cm at lip  Placed By: ...   Secured at (cm) 23   Measured from Teeth   Secured Location Center   Secured by Commercial tube campbell   Site Condition Cool   Cuff Pressure (color) Green   Respiratory Charges    $ Intubation/Intubation Assist Yes

## 2024-07-13 ENCOUNTER — APPOINTMENT (INPATIENT)
Dept: NON INVASIVE DIAGNOSTICS | Facility: HOSPITAL | Age: 69
DRG: 020 | End: 2024-07-13
Payer: MEDICARE

## 2024-07-13 ENCOUNTER — ANESTHESIA (INPATIENT)
Dept: RADIOLOGY | Facility: HOSPITAL | Age: 69
End: 2024-07-13
Payer: MEDICARE

## 2024-07-13 ENCOUNTER — APPOINTMENT (INPATIENT)
Dept: RADIOLOGY | Facility: HOSPITAL | Age: 69
DRG: 020 | End: 2024-07-13
Payer: MEDICARE

## 2024-07-13 ENCOUNTER — APPOINTMENT (INPATIENT)
Dept: NEUROLOGY | Facility: CLINIC | Age: 69
DRG: 020 | End: 2024-07-13
Payer: MEDICARE

## 2024-07-13 ENCOUNTER — ANESTHESIA EVENT (INPATIENT)
Dept: RADIOLOGY | Facility: HOSPITAL | Age: 69
End: 2024-07-13
Payer: MEDICARE

## 2024-07-13 PROBLEM — I10 HTN (HYPERTENSION): Status: ACTIVE | Noted: 2024-07-13

## 2024-07-13 PROBLEM — E78.5 HYPERLIPIDEMIA: Status: ACTIVE | Noted: 2024-07-13

## 2024-07-13 PROBLEM — R56.9 SEIZURES (HCC): Status: ACTIVE | Noted: 2024-07-13

## 2024-07-13 LAB
ANION GAP SERPL CALCULATED.3IONS-SCNC: 6 MMOL/L (ref 4–13)
ANION GAP SERPL CALCULATED.3IONS-SCNC: 8 MMOL/L (ref 4–13)
AORTIC ROOT: 4.1 CM
APICAL FOUR CHAMBER EJECTION FRACTION: 58 %
BASOPHILS # BLD AUTO: 0.02 THOUSANDS/ÂΜL (ref 0–0.1)
BASOPHILS NFR BLD AUTO: 0 % (ref 0–1)
BUN SERPL-MCNC: 9 MG/DL (ref 5–25)
BUN SERPL-MCNC: 9 MG/DL (ref 5–25)
CALCIUM SERPL-MCNC: 7.7 MG/DL (ref 8.4–10.2)
CALCIUM SERPL-MCNC: 8.2 MG/DL (ref 8.4–10.2)
CHLORIDE SERPL-SCNC: 106 MMOL/L (ref 96–108)
CHLORIDE SERPL-SCNC: 108 MMOL/L (ref 96–108)
CO2 SERPL-SCNC: 21 MMOL/L (ref 21–32)
CO2 SERPL-SCNC: 22 MMOL/L (ref 21–32)
CREAT SERPL-MCNC: 0.67 MG/DL (ref 0.6–1.3)
CREAT SERPL-MCNC: 0.78 MG/DL (ref 0.6–1.3)
E WAVE DECELERATION TIME: 131 MS
E/A RATIO: 1.08
EOSINOPHIL # BLD AUTO: 0.01 THOUSAND/ÂΜL (ref 0–0.61)
EOSINOPHIL NFR BLD AUTO: 0 % (ref 0–6)
ERYTHROCYTE [DISTWIDTH] IN BLOOD BY AUTOMATED COUNT: 13.7 % (ref 11.6–15.1)
FRACTIONAL SHORTENING: 28 (ref 28–44)
GFR SERPL CREATININE-BSD FRML MDRD: 92 ML/MIN/1.73SQ M
GFR SERPL CREATININE-BSD FRML MDRD: 98 ML/MIN/1.73SQ M
GLUCOSE SERPL-MCNC: 126 MG/DL (ref 65–140)
GLUCOSE SERPL-MCNC: 128 MG/DL (ref 65–140)
GLUCOSE SERPL-MCNC: 131 MG/DL (ref 65–140)
GLUCOSE SERPL-MCNC: 137 MG/DL (ref 65–140)
GLUCOSE SERPL-MCNC: 139 MG/DL (ref 65–140)
GLUCOSE SERPL-MCNC: 178 MG/DL (ref 65–140)
HCT VFR BLD AUTO: 33.3 % (ref 36.5–49.3)
HGB BLD-MCNC: 11.4 G/DL (ref 12–17)
IMM GRANULOCYTES # BLD AUTO: 0.03 THOUSAND/UL (ref 0–0.2)
IMM GRANULOCYTES NFR BLD AUTO: 0 % (ref 0–2)
INTERVENTRICULAR SEPTUM IN DIASTOLE (PARASTERNAL SHORT AXIS VIEW): 1 CM
INTERVENTRICULAR SEPTUM: 1 CM (ref 0.6–1.1)
LAAS-AP2: 6.7 CM2
LAAS-AP4: 9.6 CM2
LEFT ATRIUM AREA SYSTOLE SINGLE PLANE A4C: 9.2 CM2
LEFT ATRIUM SIZE: 2.8 CM
LEFT ATRIUM VOLUME (MOD BIPLANE): 14 ML
LEFT INTERNAL DIMENSION IN SYSTOLE: 2.9 CM (ref 2.1–4)
LEFT VENTRICULAR INTERNAL DIMENSION IN DIASTOLE: 4 CM (ref 3.5–6)
LEFT VENTRICULAR POSTERIOR WALL IN END DIASTOLE: 0.9 CM
LEFT VENTRICULAR STROKE VOLUME: 37 ML
LVSV (TEICH): 37 ML
LYMPHOCYTES # BLD AUTO: 0.93 THOUSANDS/ÂΜL (ref 0.6–4.47)
LYMPHOCYTES NFR BLD AUTO: 10 % (ref 14–44)
MAGNESIUM SERPL-MCNC: 1.7 MG/DL (ref 1.9–2.7)
MCH RBC QN AUTO: 31.5 PG (ref 26.8–34.3)
MCHC RBC AUTO-ENTMCNC: 34.2 G/DL (ref 31.4–37.4)
MCV RBC AUTO: 92 FL (ref 82–98)
MONOCYTES # BLD AUTO: 0.53 THOUSAND/ÂΜL (ref 0.17–1.22)
MONOCYTES NFR BLD AUTO: 6 % (ref 4–12)
MV E'TISSUE VEL-LAT: 14 CM/S
MV E'TISSUE VEL-SEP: 10 CM/S
MV PEAK A VEL: 0.6 M/S
MV PEAK E VEL: 65 CM/S
MV STENOSIS PRESSURE HALF TIME: 38 MS
MV VALVE AREA P 1/2 METHOD: 5.79
NEUTROPHILS # BLD AUTO: 7.77 THOUSANDS/ÂΜL (ref 1.85–7.62)
NEUTS SEG NFR BLD AUTO: 84 % (ref 43–75)
NRBC BLD AUTO-RTO: 0 /100 WBCS
OSMOLALITY UR/SERPL-RTO: 297 MMOL/KG (ref 282–298)
PHOSPHATE SERPL-MCNC: 2.8 MG/DL (ref 2.3–4.1)
PLATELET # BLD AUTO: 174 THOUSANDS/UL (ref 149–390)
PMV BLD AUTO: 9.5 FL (ref 8.9–12.7)
POTASSIUM SERPL-SCNC: 3.8 MMOL/L (ref 3.5–5.3)
POTASSIUM SERPL-SCNC: 4.7 MMOL/L (ref 3.5–5.3)
RBC # BLD AUTO: 3.62 MILLION/UL (ref 3.88–5.62)
RIGHT ATRIUM AREA SYSTOLE A4C: 9.8 CM2
RIGHT VENTRICLE ID DIMENSION: 3.2 CM
SL CV LEFT ATRIUM LENGTH A2C: 3.3 CM
SL CV LV EF: 55
SL CV PED ECHO LEFT VENTRICLE DIASTOLIC VOLUME (MOD BIPLANE) 2D: 70 ML
SL CV PED ECHO LEFT VENTRICLE SYSTOLIC VOLUME (MOD BIPLANE) 2D: 33 ML
SODIUM SERPL-SCNC: 135 MMOL/L (ref 135–147)
SODIUM SERPL-SCNC: 136 MMOL/L (ref 135–147)
T4 FREE SERPL-MCNC: 0.74 NG/DL (ref 0.61–1.12)
TRICUSPID ANNULAR PLANE SYSTOLIC EXCURSION: 1.9 CM
TRICUSPID VALVE PEAK REGURGITATION VELOCITY: 2.26 M/S
TSH SERPL DL<=0.05 MIU/L-ACNC: 0.72 UIU/ML (ref 0.45–4.5)
WBC # BLD AUTO: 9.29 THOUSAND/UL (ref 4.31–10.16)

## 2024-07-13 PROCEDURE — C1887 CATHETER, GUIDING: HCPCS

## 2024-07-13 PROCEDURE — 03VG3DZ RESTRICTION OF INTRACRANIAL ARTERY WITH INTRALUMINAL DEVICE, PERCUTANEOUS APPROACH: ICD-10-PCS | Performed by: RADIOLOGY

## 2024-07-13 PROCEDURE — B315YZZ FLUOROSCOPY OF BILATERAL COMMON CAROTID ARTERIES USING OTHER CONTRAST: ICD-10-PCS | Performed by: RADIOLOGY

## 2024-07-13 PROCEDURE — 94664 DEMO&/EVAL PT USE INHALER: CPT

## 2024-07-13 PROCEDURE — 36224 PLACE CATH CAROTD ART: CPT

## 2024-07-13 PROCEDURE — 36226 PLACE CATH VERTEBRAL ART: CPT

## 2024-07-13 PROCEDURE — 95700 EEG CONT REC W/VID EEG TECH: CPT

## 2024-07-13 PROCEDURE — 99291 CRITICAL CARE FIRST HOUR: CPT | Performed by: INTERNAL MEDICINE

## 2024-07-13 PROCEDURE — C2628 CATHETER, OCCLUSION: HCPCS

## 2024-07-13 PROCEDURE — 4A103BD MONITORING OF INTRACRANIAL PRESSURE, PERCUTANEOUS APPROACH: ICD-10-PCS | Performed by: NEUROLOGICAL SURGERY

## 2024-07-13 PROCEDURE — 76937 US GUIDE VASCULAR ACCESS: CPT | Performed by: RADIOLOGY

## 2024-07-13 PROCEDURE — B316YZZ FLUOROSCOPY OF RIGHT INTERNAL CAROTID ARTERY USING OTHER CONTRAST: ICD-10-PCS | Performed by: RADIOLOGY

## 2024-07-13 PROCEDURE — B31HYZZ FLUOROSCOPY OF RIGHT UPPER EXTREMITY ARTERIES USING OTHER CONTRAST: ICD-10-PCS | Performed by: RADIOLOGY

## 2024-07-13 PROCEDURE — 99233 SBSQ HOSP IP/OBS HIGH 50: CPT | Performed by: PSYCHIATRY & NEUROLOGY

## 2024-07-13 PROCEDURE — 61107 TDH PNXR IMPLT VENTR CATH: CPT | Performed by: NEUROLOGICAL SURGERY

## 2024-07-13 PROCEDURE — 94003 VENT MGMT INPAT SUBQ DAY: CPT

## 2024-07-13 PROCEDURE — C1769 GUIDE WIRE: HCPCS

## 2024-07-13 PROCEDURE — NC001 PR NO CHARGE: Performed by: NURSE PRACTITIONER

## 2024-07-13 PROCEDURE — 76377 3D RENDER W/INTRP POSTPROCES: CPT | Performed by: RADIOLOGY

## 2024-07-13 PROCEDURE — 75898 FOLLOW-UP ANGIOGRAPHY: CPT | Performed by: RADIOLOGY

## 2024-07-13 PROCEDURE — 75894 X-RAYS TRANSCATH THERAPY: CPT | Performed by: RADIOLOGY

## 2024-07-13 PROCEDURE — 94640 AIRWAY INHALATION TREATMENT: CPT

## 2024-07-13 PROCEDURE — 009630Z DRAINAGE OF CEREBRAL VENTRICLE WITH DRAINAGE DEVICE, PERCUTANEOUS APPROACH: ICD-10-PCS | Performed by: NEUROLOGICAL SURGERY

## 2024-07-13 PROCEDURE — 36226 PLACE CATH VERTEBRAL ART: CPT | Performed by: RADIOLOGY

## 2024-07-13 PROCEDURE — 82948 REAGENT STRIP/BLOOD GLUCOSE: CPT

## 2024-07-13 PROCEDURE — 84100 ASSAY OF PHOSPHORUS: CPT | Performed by: NURSE PRACTITIONER

## 2024-07-13 PROCEDURE — 61624 TCAT PERM OCCLS/EMBOLJ CNS: CPT | Performed by: RADIOLOGY

## 2024-07-13 PROCEDURE — 93306 TTE W/DOPPLER COMPLETE: CPT

## 2024-07-13 PROCEDURE — 83735 ASSAY OF MAGNESIUM: CPT | Performed by: NURSE PRACTITIONER

## 2024-07-13 PROCEDURE — B312YZZ FLUOROSCOPY OF LEFT SUBCLAVIAN ARTERY USING OTHER CONTRAST: ICD-10-PCS | Performed by: RADIOLOGY

## 2024-07-13 PROCEDURE — 85025 COMPLETE CBC W/AUTO DIFF WBC: CPT | Performed by: NURSE PRACTITIONER

## 2024-07-13 PROCEDURE — 95715 VEEG EA 12-26HR INTMT MNTR: CPT

## 2024-07-13 PROCEDURE — 36225 PLACE CATH SUBCLAVIAN ART: CPT | Performed by: RADIOLOGY

## 2024-07-13 PROCEDURE — 93306 TTE W/DOPPLER COMPLETE: CPT | Performed by: INTERNAL MEDICINE

## 2024-07-13 PROCEDURE — 36224 PLACE CATH CAROTD ART: CPT | Performed by: RADIOLOGY

## 2024-07-13 PROCEDURE — 94760 N-INVAS EAR/PLS OXIMETRY 1: CPT

## 2024-07-13 PROCEDURE — 80048 BASIC METABOLIC PNL TOTAL CA: CPT

## 2024-07-13 RX ORDER — NITROGLYCERIN 20 MG/100ML
INJECTION INTRAVENOUS AS NEEDED
Status: COMPLETED | OUTPATIENT
Start: 2024-07-13 | End: 2024-07-13

## 2024-07-13 RX ORDER — VERAPAMIL HYDROCHLORIDE 2.5 MG/ML
INJECTION, SOLUTION INTRAVENOUS AS NEEDED
Status: COMPLETED | OUTPATIENT
Start: 2024-07-13 | End: 2024-07-13

## 2024-07-13 RX ORDER — HEPARIN SODIUM 1000 [USP'U]/ML
INJECTION, SOLUTION INTRAVENOUS; SUBCUTANEOUS AS NEEDED
Status: COMPLETED | OUTPATIENT
Start: 2024-07-13 | End: 2024-07-13

## 2024-07-13 RX ORDER — SODIUM CHLORIDE 9 MG/ML
INJECTION, SOLUTION INTRAVENOUS CONTINUOUS PRN
Status: DISCONTINUED | OUTPATIENT
Start: 2024-07-13 | End: 2024-07-13

## 2024-07-13 RX ORDER — HYDRALAZINE HYDROCHLORIDE 20 MG/ML
5 INJECTION INTRAMUSCULAR; INTRAVENOUS EVERY 6 HOURS PRN
Status: DISCONTINUED | OUTPATIENT
Start: 2024-07-13 | End: 2024-07-13

## 2024-07-13 RX ORDER — DEXMEDETOMIDINE HYDROCHLORIDE 4 UG/ML
.1-.7 INJECTION, SOLUTION INTRAVENOUS
Status: DISCONTINUED | OUTPATIENT
Start: 2024-07-13 | End: 2024-07-13

## 2024-07-13 RX ORDER — ROCURONIUM BROMIDE 10 MG/ML
INJECTION, SOLUTION INTRAVENOUS AS NEEDED
Status: DISCONTINUED | OUTPATIENT
Start: 2024-07-13 | End: 2024-07-13

## 2024-07-13 RX ORDER — SODIUM CHLORIDE, SODIUM LACTATE, POTASSIUM CHLORIDE, CALCIUM CHLORIDE 600; 310; 30; 20 MG/100ML; MG/100ML; MG/100ML; MG/100ML
INJECTION, SOLUTION INTRAVENOUS CONTINUOUS PRN
Status: DISCONTINUED | OUTPATIENT
Start: 2024-07-13 | End: 2024-07-13

## 2024-07-13 RX ORDER — MAGNESIUM SULFATE HEPTAHYDRATE 40 MG/ML
2 INJECTION, SOLUTION INTRAVENOUS ONCE
Status: COMPLETED | OUTPATIENT
Start: 2024-07-13 | End: 2024-07-13

## 2024-07-13 RX ORDER — FENTANYL CITRATE 50 UG/ML
INJECTION, SOLUTION INTRAMUSCULAR; INTRAVENOUS AS NEEDED
Status: DISCONTINUED | OUTPATIENT
Start: 2024-07-13 | End: 2024-07-13

## 2024-07-13 RX ORDER — HEPARIN SODIUM 1000 [USP'U]/ML
INJECTION, SOLUTION INTRAVENOUS; SUBCUTANEOUS AS NEEDED
Status: DISCONTINUED | OUTPATIENT
Start: 2024-07-13 | End: 2024-07-13

## 2024-07-13 RX ORDER — HYDRALAZINE HYDROCHLORIDE 20 MG/ML
10 INJECTION INTRAMUSCULAR; INTRAVENOUS EVERY 6 HOURS PRN
Status: DISCONTINUED | OUTPATIENT
Start: 2024-07-13 | End: 2024-07-14

## 2024-07-13 RX ORDER — PROPOFOL 10 MG/ML
INJECTION, EMULSION INTRAVENOUS AS NEEDED
Status: DISCONTINUED | OUTPATIENT
Start: 2024-07-13 | End: 2024-07-13

## 2024-07-13 RX ORDER — DEXAMETHASONE SODIUM PHOSPHATE 10 MG/ML
INJECTION, SOLUTION INTRAMUSCULAR; INTRAVENOUS AS NEEDED
Status: DISCONTINUED | OUTPATIENT
Start: 2024-07-13 | End: 2024-07-13

## 2024-07-13 RX ORDER — ASPIRIN 325 MG
325 TABLET ORAL ONCE
Status: COMPLETED | OUTPATIENT
Start: 2024-07-13 | End: 2024-07-13

## 2024-07-13 RX ORDER — LIDOCAINE HYDROCHLORIDE 10 MG/ML
INJECTION, SOLUTION EPIDURAL; INFILTRATION; INTRACAUDAL; PERINEURAL AS NEEDED
Status: COMPLETED | OUTPATIENT
Start: 2024-07-13 | End: 2024-07-13

## 2024-07-13 RX ORDER — ASPIRIN 81 MG/1
81 TABLET, CHEWABLE ORAL DAILY
Status: DISCONTINUED | OUTPATIENT
Start: 2024-07-14 | End: 2024-07-25

## 2024-07-13 RX ORDER — IODIXANOL 320 MG/ML
300 INJECTION, SOLUTION INTRAVASCULAR
Status: COMPLETED | OUTPATIENT
Start: 2024-07-13 | End: 2024-07-13

## 2024-07-13 RX ORDER — LABETALOL HYDROCHLORIDE 5 MG/ML
10 INJECTION, SOLUTION INTRAVENOUS EVERY 6 HOURS PRN
Status: DISCONTINUED | OUTPATIENT
Start: 2024-07-13 | End: 2024-07-13

## 2024-07-13 RX ORDER — LEVALBUTEROL INHALATION SOLUTION 1.25 MG/3ML
1.25 SOLUTION RESPIRATORY (INHALATION)
Status: DISCONTINUED | OUTPATIENT
Start: 2024-07-13 | End: 2024-07-13

## 2024-07-13 RX ORDER — POTASSIUM CHLORIDE 20MEQ/15ML
40 LIQUID (ML) ORAL ONCE
Status: COMPLETED | OUTPATIENT
Start: 2024-07-13 | End: 2024-07-13

## 2024-07-13 RX ORDER — CEFAZOLIN SODIUM 2 G/50ML
2000 SOLUTION INTRAVENOUS EVERY 8 HOURS
Status: DISCONTINUED | OUTPATIENT
Start: 2024-07-13 | End: 2024-07-13

## 2024-07-13 RX ORDER — LABETALOL HYDROCHLORIDE 5 MG/ML
10 INJECTION, SOLUTION INTRAVENOUS EVERY 6 HOURS PRN
Status: DISCONTINUED | OUTPATIENT
Start: 2024-07-13 | End: 2024-07-14

## 2024-07-13 RX ORDER — ONDANSETRON 2 MG/ML
INJECTION INTRAMUSCULAR; INTRAVENOUS AS NEEDED
Status: DISCONTINUED | OUTPATIENT
Start: 2024-07-13 | End: 2024-07-13

## 2024-07-13 RX ADMIN — Medication 75 MCG/HR: at 02:47

## 2024-07-13 RX ADMIN — CHLORHEXIDINE GLUCONATE 0.12% ORAL RINSE 15 ML: 1.2 LIQUID ORAL at 09:15

## 2024-07-13 RX ADMIN — FENTANYL CITRATE 50 MCG: 50 INJECTION INTRAMUSCULAR; INTRAVENOUS at 15:06

## 2024-07-13 RX ADMIN — Medication 60 MG: at 13:04

## 2024-07-13 RX ADMIN — Medication 60 MG: at 00:42

## 2024-07-13 RX ADMIN — ROCURONIUM BROMIDE 20 MG: 50 INJECTION, SOLUTION INTRAVENOUS at 11:54

## 2024-07-13 RX ADMIN — POTASSIUM CHLORIDE 40 MEQ: 1.5 SOLUTION ORAL at 07:35

## 2024-07-13 RX ADMIN — SODIUM CHLORIDE: 0.9 INJECTION, SOLUTION INTRAVENOUS at 09:46

## 2024-07-13 RX ADMIN — SODIUM CHLORIDE 125 ML/HR: 0.9 INJECTION, SOLUTION INTRAVENOUS at 05:58

## 2024-07-13 RX ADMIN — Medication 60 MG: at 04:02

## 2024-07-13 RX ADMIN — PHENYLEPHRINE HYDROCHLORIDE 200 MCG: 10 INJECTION INTRAVENOUS at 12:24

## 2024-07-13 RX ADMIN — PROPOFOL 100 MG: 10 INJECTION, EMULSION INTRAVENOUS at 09:41

## 2024-07-13 RX ADMIN — HYDRALAZINE HYDROCHLORIDE 10 MG: 20 INJECTION, SOLUTION INTRAMUSCULAR; INTRAVENOUS at 14:50

## 2024-07-13 RX ADMIN — IPRATROPIUM BROMIDE 0.5 MG: 0.5 SOLUTION RESPIRATORY (INHALATION) at 20:39

## 2024-07-13 RX ADMIN — ASPIRIN 325 MG ORAL TABLET 325 MG: 325 PILL ORAL at 14:28

## 2024-07-13 RX ADMIN — PROPOFOL 20 MCG/KG/MIN: 10 INJECTION, EMULSION INTRAVENOUS at 12:24

## 2024-07-13 RX ADMIN — SODIUM CHLORIDE 75 ML/HR: 0.9 INJECTION, SOLUTION INTRAVENOUS at 16:52

## 2024-07-13 RX ADMIN — LEVALBUTEROL HYDROCHLORIDE 1.25 MG: 1.25 SOLUTION RESPIRATORY (INHALATION) at 20:39

## 2024-07-13 RX ADMIN — PHENYLEPHRINE HYDROCHLORIDE 200 MCG: 10 INJECTION INTRAVENOUS at 11:43

## 2024-07-13 RX ADMIN — Medication 400 MCG: at 10:38

## 2024-07-13 RX ADMIN — PHENYLEPHRINE HYDROCHLORIDE 200 MCG: 10 INJECTION INTRAVENOUS at 09:50

## 2024-07-13 RX ADMIN — ONDANSETRON 4 MG: 2 INJECTION INTRAMUSCULAR; INTRAVENOUS at 12:15

## 2024-07-13 RX ADMIN — IPRATROPIUM BROMIDE 0.5 MG: 0.5 SOLUTION RESPIRATORY (INHALATION) at 07:18

## 2024-07-13 RX ADMIN — HEPARIN SODIUM 2000 UNITS: 1000 INJECTION INTRAVENOUS; SUBCUTANEOUS at 10:38

## 2024-07-13 RX ADMIN — FENTANYL CITRATE 50 MCG: 50 INJECTION INTRAMUSCULAR; INTRAVENOUS at 08:03

## 2024-07-13 RX ADMIN — Medication 60 MG: at 07:35

## 2024-07-13 RX ADMIN — LIDOCAINE HYDROCHLORIDE 1 ML: 10 INJECTION, SOLUTION EPIDURAL; INFILTRATION; INTRACAUDAL; PERINEURAL at 10:38

## 2024-07-13 RX ADMIN — CHLORHEXIDINE GLUCONATE 0.12% ORAL RINSE 15 ML: 1.2 LIQUID ORAL at 21:13

## 2024-07-13 RX ADMIN — ROCURONIUM BROMIDE 30 MG: 50 INJECTION, SOLUTION INTRAVENOUS at 10:35

## 2024-07-13 RX ADMIN — FENTANYL CITRATE 100 MCG: 50 INJECTION INTRAMUSCULAR; INTRAVENOUS at 09:41

## 2024-07-13 RX ADMIN — PHENYLEPHRINE HYDROCHLORIDE 200 MCG: 10 INJECTION INTRAVENOUS at 11:47

## 2024-07-13 RX ADMIN — IODIXANOL 140 ML: 320 INJECTION, SOLUTION INTRAVASCULAR at 13:50

## 2024-07-13 RX ADMIN — LEVETIRACETAM 1000 MG: 100 INJECTION, SOLUTION INTRAVENOUS at 21:13

## 2024-07-13 RX ADMIN — LEVALBUTEROL HYDROCHLORIDE 1.25 MG: 1.25 SOLUTION RESPIRATORY (INHALATION) at 07:18

## 2024-07-13 RX ADMIN — PHENYLEPHRINE HYDROCHLORIDE 100 MCG: 10 INJECTION INTRAVENOUS at 09:53

## 2024-07-13 RX ADMIN — VERAPAMIL HYDROCHLORIDE 5 MG: 2.5 INJECTION INTRAVENOUS at 10:38

## 2024-07-13 RX ADMIN — NOREPINEPHRINE BITARTRATE 2 MCG/MIN: 1 INJECTION, SOLUTION, CONCENTRATE INTRAVENOUS at 10:31

## 2024-07-13 RX ADMIN — ROCURONIUM BROMIDE 50 MG: 50 INJECTION, SOLUTION INTRAVENOUS at 09:41

## 2024-07-13 RX ADMIN — SODIUM CHLORIDE, SODIUM LACTATE, POTASSIUM CHLORIDE, AND CALCIUM CHLORIDE: .6; .31; .03; .02 INJECTION, SOLUTION INTRAVENOUS at 09:38

## 2024-07-13 RX ADMIN — PHENYLEPHRINE HYDROCHLORIDE 40 MCG/MIN: 10 INJECTION INTRAVENOUS at 09:51

## 2024-07-13 RX ADMIN — DEXAMETHASONE SODIUM PHOSPHATE 10 MG: 10 INJECTION, SOLUTION INTRAMUSCULAR; INTRAVENOUS at 10:10

## 2024-07-13 RX ADMIN — Medication 60 MG: at 21:13

## 2024-07-13 RX ADMIN — LEVETIRACETAM 1000 MG: 100 INJECTION, SOLUTION INTRAVENOUS at 05:57

## 2024-07-13 RX ADMIN — VERAPAMIL HYDROCHLORIDE 5 MG: 2.5 INJECTION INTRAVENOUS at 11:43

## 2024-07-13 RX ADMIN — FENTANYL CITRATE 50 MCG: 50 INJECTION INTRAMUSCULAR; INTRAVENOUS at 05:57

## 2024-07-13 RX ADMIN — IPRATROPIUM BROMIDE 0.5 MG: 0.5 SOLUTION RESPIRATORY (INHALATION) at 13:49

## 2024-07-13 RX ADMIN — LEVALBUTEROL HYDROCHLORIDE 1.25 MG: 1.25 SOLUTION RESPIRATORY (INHALATION) at 13:49

## 2024-07-13 RX ADMIN — PHENYLEPHRINE HYDROCHLORIDE 200 MCG: 10 INJECTION INTRAVENOUS at 12:28

## 2024-07-13 RX ADMIN — HEPARIN SODIUM 3000 UNITS: 1000 INJECTION INTRAVENOUS; SUBCUTANEOUS at 12:10

## 2024-07-13 RX ADMIN — Medication 60 MG: at 15:45

## 2024-07-13 RX ADMIN — MAGNESIUM SULFATE HEPTAHYDRATE 2 G: 40 INJECTION, SOLUTION INTRAVENOUS at 06:27

## 2024-07-13 RX ADMIN — PHENYLEPHRINE HYDROCHLORIDE 200 MCG: 10 INJECTION INTRAVENOUS at 10:41

## 2024-07-13 NOTE — PROGRESS NOTES
[facility]  Progress Note: Critical Care  Name: Federico Bowen 68 y.o. male I MRN: [id number]  Unit/Bed#: ICU 07 I Date of Admission: 7/12/2024   Date of Service: 7/13/2024 I Hospital Day: 1    Assessment & Plan   Neuro:   Diagnosis: SAH with IVH and mild hydro with R PCA aneurysm  BD1, HH5, MF 4  CTAH/N-0.3 cm aneurysm in expected origin of right posterior communicating artery. Possible tiny aneurysm in left anterior communicating artery region. Mild narrowing of bilateral MCA M1 and bilateral M2 segmental branch vessels, likely due to vasospasm.   CTH-Acute diffuse thickened large-volume SAH throughout the b/l parafalcine regions, b/l cerebral sulci (R worse than L), basilar cisterns, prepontine cistern, premedullary cistern, and visualized upper cervical spinal canal. Acute small volume IVH with mild hydrocephalus.   DDAVP to reverse ASA  7/11 EVD placed  7/11 CTH-CTH post EVD  Plan:   Neuro/Nsx consulted  EVD placed at bedside to 10. Goal ICP <20   SBP <140  Keppra ppx  Nimodipine   Daily TCD  No dvt ppx  Euvolemia  Q6 hr bmp/osm for goal 145-155 and 310-320  MRI  Will need angio  Stat CTH with any change in GCS > 2 pts     Diagnosis: Seizure  2/2 above  Loaded with 2gm keppra  Plan:   Neuro cx  vEEG  Keppra 1gm BID  CV:   Diagnosis: Hx CAD/HTN/HLD  Plan:   Cont home statin  Hold ASA     Pulm:  Diagnosis: Acute respiratory failure 2/2 mental status/Hx COPD without AE  Plan:   Maintain MV AC/VC 16/400/6/40  Xopenex/atrovent nebs  Holding home inhalers     GI:   No active issues NP. NSS 125cc/hr     :   No active issues maintain anderson     F/E/N:    No active issues     Heme/Onc:   No active issues     Endo:   Diagnosis: Hyperglycemia  Plan:   Check A1C  SSI     ID:   Diagnosis: Leukocytosis likely reactionary  Plan:   Monitor fever curve and cbc     MSK/Skin:   Diagnosis: Penile lesion  Plan: note from outpt derm inflamed seborrheic keratosis    Disposition: Critical  care    ICU Core Measures     Vented Patient  VAP Bundle  VAP bundle ordered     A: Assess, Prevent, and Manage Pain Has pain been assessed? Yes  Need for changes to pain regimen? No   B: Both Spontaneous Awakening Trials (SATs) and Spontaneous Breathing Trials (SBTs) Plan to perform spontaneous awakening trial today? Yes   Plan to perform spontaneous breathing trial today? Yes   Obvious barriers to extubation? Yes   C: Choice of Sedation RASS Goal: 0 Alert and Calm  Need for changes to sedation or analgesia regimen? No   D: Delirium CAM-ICU: Unable to perform secondary to Acute cognitive dysfunction   E: Early Mobility  Plan for early mobility? No   F: Family Engagement Plan for family engagement today? Yes       Review of Invasive Devices:    [place] Plan: Continue for accurate I/O monitoring for 48 hours  Central access plan: Medications requiring central line  [place] Plan: Keep arterial line for hemodynamic monitoring    Prophylaxis:  VTE Contraindicated secondary to: sah   Stress Ulcer  not ordered        Significant 24hr Events     24hr events: vEEG hooked up. EVD placed and CTH done following     Subjective     Review of Systems: Review of Systems   Unable to perform ROS: Intubated        Objective                            Vitals I/O      Most Recent Min/Max in 24hrs   Temp 97.8 °F (36.6 °C) Temp  Min: 97.8 °F (36.6 °C)  Max: 97.8 °F (36.6 °C)   Pulse 92 Pulse  Min: 49  Max: 121   Resp 16 Resp  Min: 13  Max: 28   /67 BP  Min: 105/67  Max: 176/87   O2 Sat 100 % SpO2  Min: 74 %  Max: 100 %      Intake/Output Summary (Last 24 hours) at 7/13/2024 0248  Last data filed at 7/12/2024 2200  Gross per 24 hour   Intake 1191.25 ml   Output 1050 ml   Net 141.25 ml       Diet NPO    Invasive Monitoring   Arterial Line  [place] /46  Arterial Line BP  Min: 116/44  Max: 146/60   MAP 66 mmHg  Arterial Line MAP (mmHg)  Min: 64 mmHg  Max: 88 mmHg           Physical Exam   Physical Exam  Eyes:      Comments: R 3 NR L 2  NR   Skin:     General: Skin is warm and dry.   HENT:      Head: Normocephalic and atraumatic.   Neck:      Vascular: Central line present.   Cardiovascular:      Rate and Rhythm: Normal rate and regular rhythm.      Pulses: Normal pulses.      Heart sounds: Normal heart sounds.   Musculoskeletal:         General: No swelling.      Right lower leg: No edema.      Left lower leg: No edema.   Abdominal: General: Bowel sounds are normal. There is no distension.      Palpations: Abdomen is soft.      Tenderness: There is no abdominal tenderness.   Constitutional:       General: He is not in acute distress.     Interventions: He is sedated, intubated and restrained.   Pulmonary:      Effort: Pulmonary effort is normal. No respiratory distress. He is intubated.      Breath sounds: Normal breath sounds.   Neurological:      GCS: GCS eye subscore is 1. GCS verbal subscore is 1. GCS motor subscore is 1.   Genitourinary/Anorectal:  Levine present.          Diagnostic Studies      EKG: pending  Imaging:  I have personally reviewed pertinent reports.   and I have personally reviewed pertinent films in PACS     Medications:  Scheduled PRN   chlorhexidine, 15 mL, Q12H MATTY  insulin lispro, 1-5 Units, Q6H MATTY  ipratropium, 0.5 mg, TID  levalbuterol, 1.25 mg, TID  levETIRAcetam, 1,000 mg, Q12H MATTY  niMODipine, 60 mg, Q4H MATTY      acetaminophen, 650 mg, Q4H PRN  fentaNYL, 50 mcg, Q2H PRN       Continuous    fentaNYL, 75 mcg/hr, Last Rate: 75 mcg/hr (07/13/24 0247)  niCARdipine, 1-15 mg/hr  sodium chloride, 125 mL/hr, Last Rate: 125 mL/hr (07/12/24 2100)         Labs:    CBC    Recent Labs     07/12/24  1618   WBC 17.17*   HGB 12.9   HCT 38.6        BMP    Recent Labs     07/12/24  1618 07/12/24  2350   SODIUM 136 136   K 4.0 3.8    106   CO2 21 22   AGAP 12 8   BUN 10 9   CREATININE 0.88 0.78   CALCIUM 8.5 8.2*       Coags    Recent Labs     07/12/24  1618   INR 1.07   PTT 23        Additional Electrolytes  No recent  results       Blood Gas    Recent Labs     07/12/24 2014   PHART 7.314*   [id number] 44.1*   PO2ART 138.2*   YQU7CAW 21.9*   BEART -4.2   SOURCE Line, Arterial     Recent Labs     07/12/24 2014   SOURCE Line, Arterial    LFTs  No recent results    Infectious  No recent results  Glucose  Recent Labs     07/12/24  1618 07/12/24  2350   GLUC 222* 126               CHRISTINA Peterson

## 2024-07-13 NOTE — CASE MANAGEMENT
Case Management Assessment & Discharge Planning Note    Patient name Federico Bowen  Location ICU /ICU 07 MRN [id number]  : 1955 Date 2024       Current Admission Date: 2024  Current Admission Diagnosis:HTN (hypertension)   Patient Active Problem List    Diagnosis Date Noted Date Diagnosed    HTN (hypertension) 2024     Hyperlipidemia 2024     Subarachnoid hemorrhage (HCC) 2024       LOS (days): 1  Geometric Mean LOS (GMLOS) (days):   Days to GMLOS:     OBJECTIVE:    Risk of Unplanned Readmission Score: 13.19         Current admission status: Inpatient       Preferred Pharmacy:   [id number] - [place] - [address]  [address]  [place] [address]  Phone: [phone] Fax: [phone]    Primary Care Provider: No primary care provider on file.    Primary Insurance:   Secondary Insurance:     ASSESSMENT:  Active Health Care Proxies    There are no active Health Care Proxies on file.       Readmission Root Cause  30 Day Readmission: No    Patient Information  Admitted from:: Home  Mental Status: Sedated, Intubated  During Assessment patient was accompanied by: Not accompanied during assessment  Assessment information provided by:: Daughter  Primary Caregiver: Self  Support Systems: Self, Daughter, Family members  County [place] Residence: [place]  What city do you live in?: [place]  Home entry access options. Select all that apply.: Stairs  Number of steps to enter home.: One Flight (20 steps)  Do the steps have railings?: Yes  Type of Current Residence: 2 story home  Upon entering residence, is there a bedroom on the main floor (no further steps)?: No  A bedroom is located on the following floor levels of residence (select all that apply):: 2nd Floor  Upon entering residence, is there a bathroom on the main floor (no further steps)?: No  Indicate which floors of current residence have a bathroom (select all the apply):: 2nd Floor  Number of steps  to 2nd floor from main floor: One Flight (20 steps)  Living Arrangements: Lives w/ Daughter  Is patient a ?: No    Activities of Daily Living Prior to Admission  Functional Status: Independent  Completes ADLs independently?: Yes  Ambulates independently?: Yes  Does patient use assisted devices?: No  Does patient currently own DME?: No  Does patient have a history of Outpatient Therapy (PT/OT)?: Yes  Does the patient have a history of Short-Term Rehab?: No  Does patient have a history of HHC?: No  Does patient currently have HHC?: No    Patient Information Continued  Income Source: SSI/SSD  Does patient have prescription coverage?: Yes  Does patient receive dialysis treatments?: No  Does patient have a history of substance abuse?: No  Does patient have a history of Mental Health Diagnosis?: No    Means of Transportation  Means of Transport to Appts:: Drives Self    Social Determinants of Health (SDOH)      Flowsheet Row Most Recent Value   Housing Stability    In the last 12 months, was there a time when you were not able to pay the mortgage or rent on time? N   In the past 12 months, how many times have you moved where you were living? 0   At any time in the past 12 months, were you homeless or living in a shelter (including now)? N   Transportation Needs    In the past 12 months, has lack of transportation kept you from medical appointments or from getting medications? no   In the past 12 months, has lack of transportation kept you from meetings, work, or from getting things needed for daily living? No   Food Insecurity    Within the past 12 months, you worried that your food would run out before you got the money to buy more. Never true   Within the past 12 months, the food you bought just didn't last and you didn't have money to get more. Never true   Utilities    In the past 12 months has the electric, gas, oil, or water company threatened to shut off services in your home? No          DISCHARGE  DETAILS:    Discharge planning discussed with:: Aleena Harding (Daughter) [phone]  Freedom of Choice: Yes     CM contacted family/caregiver?: Yes  Were Treatment Team discharge recommendations reviewed with patient/caregiver?: Yes  Did patient/caregiver verbalize understanding of patient care needs?: N/A- going to facility  Were patient/caregiver advised of the risks associated with not following Treatment Team discharge recommendations?: Yes    Contacts  Patient Contacts: Aleena Harding (Daughter) [phone]  Relationship to Patient:: Family  Contact Method: Phone  Phone Number: [phone]  Reason/Outcome: Discharge Planning, Emergency Contact, Continuity of Care    Requested Home Health Care         Is the patient interested in HHC at discharge?: No    DME Referral Provided  Referral made for DME?: No    CM spoke to pt's dtr, to discuss the role of CM  Pt lives with his dtr in a 2 story home which has 20STE and 20 steps inside.  Pt's bedroom and bathroom (tub/shower and standard toilet) are on the 2nd floor.   Pt drives. Pt is independent for all ADL/iADLs. Pt is retired.  Pt owns no DME. Pt's had 1 fall. Pt enjoys gardening and playing with his grandchildren.   Pt [facility]. Pt doesn't have a living will. Pt has no documented hx of mental health or substance abuse. Pt's used OP therapy  Pt's dtr states the pt has Medicare Primary and [employer] as secondary (nothing populating on the pt's chart).    CM reviewed d/c planning process including the following: identifying help at home, patient preference for d/c planning needs, Discharge Lounge, [facility] Meds to Bed program, availability of treatment team to discuss questions or concerns patient and/or family may have regarding understanding medications and recognizing signs and symptoms once discharged.  CM also encouraged patient to follow up with all recommended appointments after discharge. Patient advised of importance for patient and family  to participate in managing patient’s medical well being.

## 2024-07-13 NOTE — PROCEDURES
Clinical Note    The goals and alternatives to insertion of an external ventricular drain were discussed with family. The risks were discussed in detail.     1. Risk of neurological injury with new pain, weakness or numbness in the arms and legs, difficulties with speech, language and vision. The risk of seizures was described. Risk of hemorrhage requiring additional surgery was described.  2. Risk of ventriculostomy malfunction requiring replacement.  3. Risk of infection and meningitis.    Once all questions were answered to their satisfaction, they asked to proceed with surgery.    Procedure Note    A full surgical timeout was undertaken identifying the site, side and type of surgery. Care was taken to insure that the neck was in a neutral position. The right side of the head was shaved. An incision was planned in the mid pupillary line at the coronal suture. The skin was then prepped and draped in usual sterile fashion.The incision was infiltrated with 1% lidocaine with 100,000 of epinephrine.    The incision over the coronal suture was incised with a 15 blade. A twist drill was used to drill a birdie hole at the coronal suture. The underlying dura was perforated.  A ventriculostomy catheter was placed perpendicular to the outer table of the skull and advanced in the mid pupillary line to 6.5 cm from the inner table of the skull. There was egress of blood tinged CSF, which was collected for lab testing. The opening pressure was measured in the tubing and was found to be less than 20cm of H20.  The ventriculostomy catheter was then tunneled approximately 5 cm using a trocar to an exit site in the temporal parietal area.    The incision was irrigated copiously. Nylon sutures were used to approximate the skin edges and silk was used as a drain stitch at the exit site. The hub was attached to the distal end of the ventriculostomy catheter and secured with a tie. This was connected to a Buretrol and left open at 10  mmHg above the external auditory meatus. Dressings were applied.    All sharps were discarded at the end of the case and no complications. The patient will remain in ICU for ongoing observation.     I spoke with the family after the procedure.

## 2024-07-13 NOTE — ANESTHESIA PREPROCEDURE EVALUATION
Procedure:  IR CEREBRAL ANGIOGRAPHY / INTERVENTION    Relevant Problems   ANESTHESIA (within normal limits)      CARDIO   (+) HTN (hypertension)   (+) Hyperlipidemia      Neurology/Sleep   (+) Subarachnoid hemorrhage (HCC)        Physical Exam    Airway  Comment: Intubated from ICU           Dental       Cardiovascular  Rhythm: regular, Rate: normal, Cardiovascular exam normal    Pulmonary  Pulmonary exam normal Breath sounds clear to auscultation    Other Findings        Anesthesia Plan  ASA Score- 4     Anesthesia Type- general with ASA Monitors.         Additional Monitors:     Airway Plan: ETT.           Plan Factors-    Chart reviewed. EKG reviewed.  Existing labs reviewed. Patient summary reviewed.                  Induction- intravenous.    Postoperative Plan-     Perioperative Resuscitation Plan - Level 1 - Full Code.       Informed Consent- Anesthetic plan and risks discussed with healthcare power of  and daughter.  I personally reviewed this patient with the CRNA. Discussed and agreed on the Anesthesia Plan with the CRNA..

## 2024-07-13 NOTE — RESPIRATORY THERAPY NOTE
07/13/24 0326   Respiratory Assessment   Resp Comments FiO2 weaned to 30%   Vent Information   Vent type     Vent Mode AC/VC   $ Pulse Oximetry Spot Check Charge Completed   AC/VC Settings   Resp Rate (BPM) 16 BPM   Vt (mL) 400 mL   FIO2 (%) 30 %   PEEP (cmH2O) 6 cmH2O   Flow Pattern (LPM) 45 L/min   Trigger Sensitivity Flow (lpm) 3 %   Humidification Heater   Heater Temperature (Set) 98.6 °F (37 °C)   AC/VC Actuals   Resp Rate (BPM) 16 BPM   VT (mL) 401   MV 6.4   MAP (cmH2O) 9.1 cmH2O   Peak Pressure (cmH2O) 15 cmH2O   AC/VC Alarms   High Peak Pressure (cmH2O) 40   High Resp Rate (BPM) 40 BPM   High MV (L/min) 20 L/min   Low MV (L/min) 5 L/min   Vt Low (mL) 250 mL   AC/VC Apnea Settings   Resp Rate (BPM) 14 BPM   VT (mL) 400 mL   FIO2 (%) 100 %   Apnea Time (s) 20 S   Apnea Flow (L/min) 45 L/min

## 2024-07-13 NOTE — UTILIZATION REVIEW
Initial Clinical Review    TRANSFER FROM [place]  ED    Admission: Date/Time/Statement:   Admission Orders (From admission, onward)       Ordered        07/12/24 1844  INPATIENT ADMISSION  Once                          Orders Placed This Encounter   Procedures    INPATIENT ADMISSION     Standing Status:   Standing     Number of Occurrences:   1     Order Specific Question:   Level of Care     Answer:   Critical Care [15]     Order Specific Question:   Estimated length of stay     Answer:   More than 2 Midnights     Order Specific Question:   Certification     Answer:   I certify that inpatient services are medically necessary for this patient for a duration of greater than two midnights. See H&P and MD Progress Notes for additional information about the patient's course of treatment.       Initial Presentation: 68 y.o. male initially  presented to ED at  [facility]  with right leg pain  and  headache.  En route to ED had 2 seizures  per EMS report.   Intubated in ED.  Ct head showed SAH,  IVH and  hydrocephalus.  CTA showed  aneurysm.   Transferred to [facility] for further care.  PMH is  HTN,  CAD  and COPD.  Admit  IP ICU LOC  with  Acute  SAH,  Coma, Hydrocephalus.  Acute respiratory failure  and plan is  vent support, neuro/neurosurgery consults, angiogram,  IV  keppra, nebulizers, IVF, MRI  and close monitoring.    Neurosurgery  consult  Intubated/sedated.  Constricted pupils,  R >  L.  Not reactive.  No response to painful stimuli.   He requires urgent ventriculostomy insertion for symptomatic hydrocephalus.     7/13  Procedures   ICP MONITORING   VEEG  hooked up.       ED Triage Vitals   Temperature Pulse Respirations Blood Pressure SpO2 Pain Score   07/12/24 2000 07/12/24 1900 07/12/24 1900 07/12/24 1900 07/12/24 1900 07/13/24 0803   97.8 °F (36.6 °C) 94 13 129/74 98 % Med Not Given for Pain - for MAR use only         Vital Signs (last 3 days)       Date/Time Temp Pulse Resp BP MAP (mmHg) Arterial Line BP MAP  SpO2 FiO2 (%) O2 Device ICP Mean (mmHg) CPP Cuff-Calculated (mmHg) CPP Tatum-Calculated (mmHg) [place] Coma Scale Score Pain    07/13/24 0900 -- 70 16 -- -- 142/48 72 mmHg 96 % -- -- 11 mmHg -- 61 -- --    07/13/24 0803 -- -- -- -- -- -- -- -- -- -- -- -- -- -- Med Not Given for Pain - for MAR use only    07/13/24 0800 -- 84 13 -- -- 132/46 66 mmHg 98 % -- -- 7 mmHg -- 59 -- --    07/13/24 0735 -- 96 13 130/48 -- 142/52 76 mmHg 98 % -- -- 7 mmHg -- 69 -- --    07/13/24 0600 -- 88 12 -- -- 118/36 56 mmHg 97 % -- -- 6 mmHg -- 50 10 --    07/13/24 0500 -- 78 18 -- -- 106/34 52 mmHg 97 % -- -- 7 mmHg -- 45 6 --    07/13/24 0400 -- 88 16 -- -- 124/42 62 mmHg 98 % -- -- 7 mmHg -- 55 6 --    07/13/24 0300 -- 88 18 -- -- 118/40 60 mmHg 99 % -- -- 5 mmHg -- 55 6 --    07/13/24 0200 -- 92 16 -- -- 112/40 58 mmHg 99 % -- -- 7 mmHg -- 51 6 --    07/13/24 0100 -- 92 16 -- -- 122/46 66 mmHg 100 % -- -- 10 mmHg -- 56 6 --    07/13/24 0000 -- 92 16 -- -- 120/54 74 mmHg 100 % -- -- 11 mmHg -- 63 6 --    07/12/24 2324 -- 92 15 -- -- 124/56 74 mmHg 100 % -- -- 9 mmHg -- 65 -- --    07/12/24 2300 -- -- -- -- -- -- -- -- -- -- -- -- -- 7 --    07/12/24 2200 -- 94 16 105/67 80 116/44 64 mmHg 99 % -- -- 7 mmHg 73 57 7 --    07/12/24 2100 -- 96 14 121/72 88 144/60 84 mmHg 99 % -- -- 5 mmHg 83 79 6 --    07/12/24 2000 97.8 °F (36.6 °C) 92 13 133/74 93 146/60 88 mmHg 99 % -- -- -- -- -- 6 --    07/12/24 1904 -- -- -- -- -- -- -- -- 35 Ventilator -- -- -- -- --    07/12/24 1900 -- 94 13 129/74 89 -- -- 98 % -- -- -- -- -- 6 --              Pertinent Labs/Diagnostic Test Results:   Radiology:  CT head wo contrast   Final Interpretation by Vahid Rodriguez DO (07/13 0855)      Interval placement of right-sided shunt catheter. Stable ventricular size with slight interval increase in intraventricular hemorrhage layering in the occipital horns bilaterally. Diffuse subarachnoid hemorrhage again noted.                  Workstation performed:  [id number]         XR chest portable ICU   Final Interpretation by Mamta Jaeger MD (07/12 2217)      No acute cardiopulmonary disease.      ET tube 6 cm above the landon.      Workstation performed: [id number]         VAS transcranial doppler, complete study    (Results Pending)   VAS transcranial doppler, complete study    (Results Pending)   VAS transcranial doppler, complete study    (Results Pending)   IR cerebral angiography / intervention    (Results Pending)   VAS transcranial doppler, complete study    (Results Pending)         Results from last 7 days   Lab Units 07/12/24  1618   SARS-COV-2  Negative     Results from last 7 days   Lab Units 07/13/24  0534 07/12/24  1618   WBC Thousand/uL 9.29 17.17*   HEMOGLOBIN g/dL 11.4* 12.9   HEMATOCRIT % 33.3* 38.6   PLATELETS Thousands/uL 174 226   TOTAL NEUT ABS Thousands/µL 7.77*  --          Results from last 7 days   Lab Units 07/13/24  0534 07/12/24  2350 07/12/24  1618   SODIUM mmol/L  --  136 136   POTASSIUM mmol/L  --  3.8 4.0   CHLORIDE mmol/L  --  106 103   CO2 mmol/L  --  22 21   ANION GAP mmol/L  --  8 12   BUN mg/dL  --  9 10   CREATININE mg/dL  --  0.78 0.88   EGFR ml/min/1.73sq m  --  92 87   CALCIUM mg/dL  --  8.2* 8.5   MAGNESIUM mg/dL 1.7*  --   --    PHOSPHORUS mg/dL 2.8  --   --          Results from last 7 days   Lab Units 07/13/24  0605 07/12/24  2349 07/12/24  2132   POC GLUCOSE mg/dl 128 131 125     Results from last 7 days   Lab Units 07/12/24  2350 07/12/24  1618   GLUCOSE RANDOM mg/dL 126 222*     Results from last 7 days   Lab Units 07/12/24  2350   OSMOLALITY, SERUM mmol/     Results from last 7 days   Lab Units 07/12/24  1618   HEMOGLOBIN A1C % 5.9*   EAG mg/dl 123     Results from last 7 days   Lab Units 07/12/24 2014   PH ART  7.314*   PCO2 ART mm Hg 44.1*   PO2 ART mm Hg 138.2*   HCO3 ART mmol/L 21.9*   BASE EXC ART mmol/L -4.2   O2 CONTENT ART mL/dL 18.3   O2 HGB, ARTERIAL % 97.6*   ABG SOURCE  Line, Arterial                  Results from last 7 days   Lab Units 07/12/24  1734 07/12/24  1618   HS TNI 0HR ng/L  --  10   HS TNI 2HR ng/L 10  --    HSTNI D2 ng/L 0  --          Results from last 7 days   Lab Units 07/12/24  1618   PROTIME seconds 14.5   INR  1.07   PTT seconds 23     Results from last 7 days   Lab Units 07/12/24  2350   TSH 3RD GENERATON uIU/mL 0.718           Results from last 7 days   Lab Units 07/12/24  2350   OSMOLALITY, SERUM mmol/         Results from last 7 days   Lab Units 07/12/24  1618   INFLUENZA A PCR  Negative   INFLUENZA B PCR  Negative   RSV PCR  Negative         Results from last 7 days   Lab Units 07/12/24 2015   AMPH/METH  Negative   BARBITURATE UR  Negative   BENZODIAZEPINE UR  Positive*   COCAINE UR  Negative   METHADONE URINE  Negative   OPIATE UR  Negative   PCP UR  Negative   THC UR  Negative     Results from last 7 days   Lab Units 07/12/24  2145   ETHANOL LVL mg/dL <10                     Results from last 7 days   Lab Units 07/12/24 2015   TOTAL COUNTED  100     Results from last 7 days   Lab Units 07/12/24 2015   APPEARANCE CSF  Slightly bloody, Cloudy   TUBE NUM CSF  1   WBC CSF /uL 1   XANTHOCHROMIA  No   NEUTROPHILS % (CSF) % 31   LYMPHS % (CSF) % 49   MONOCYTES % (CSF) % 4   GLUCOSE CSF mg/dL 102*           Admitting Diagnosis: Subarachnoid hemorrhage (HCC) [I60.9]  Age/Sex: 68 y.o. male  Admission Orders:  Scheduled Medications:  chlorhexidine, 15 mL, Mouth/Throat, Q12H MATTY  insulin lispro, 1-5 Units, Subcutaneous, Q6H MATTY  ipratropium, 0.5 mg, Nebulization, TID  levalbuterol, 1.25 mg, Nebulization, TID  levETIRAcetam, 1,000 mg, Intravenous, Q12H MATTY  niMODipine, 60 mg, Per NG Tube, Q4H [facility]      Continuous IV Infusions:  fentaNYL, 50 mcg/hr, Intravenous, Continuous  sodium chloride, 125 mL/hr, Intravenous, Continuous      PRN Meds:  acetaminophen, 650 mg, Oral, Q4H PRN  fentaNYL, 50 mcg, Intravenous, Q2H PRN        IP CONSULT TO NEUROSURGERY  IP CONSULT TO NEUROLOGY  IP CONSULT  TO PHYSICAL MEDICINE REHAB  IP CONSULT TO CASE MANAGEMENT    Network Utilization Review Department  ATTENTION: Please call with any questions or concerns to [phone] and carefully listen to the prompts so that you are directed to the right person. All voicemails are confidential.   For Discharge needs, contact Care Management DC Support Team at [phone] opt. [phone]  Send all requests for admission clinical reviews, approved or denied determinations and any other requests to dedicated fax number below belonging to the campus where the patient is receiving treatment. List of dedicated fax numbers for the Facilities:  FACILITY NAME UR FAX NUMBER   ADMISSION DENIALS (Administrative/Medical Necessity) [phone]   DISCHARGE SUPPORT TEAM (NETWORK) [phone]   PARENT CHILD HEALTH (Maternity/NICU/Pediatrics) [phone]   [facility] [phone]   [facility] [phone]   [facility] [phone]   [facility] [phone]   [facility] [phone]   [facility] [phone]   [facility] [phone]   [facility] [phone]   [facility] [phone]   [facility] [phone]   [facility] [phone]   [facility] [phone]

## 2024-07-13 NOTE — DISCHARGE INSTRUCTIONS
Today, you underwent a cerebral angiogram under the care of Dr. Ruslan Mcneal for evaluation of SAH.  ?  The following instructions will help you care for yourself, or be cared for upon your return home today. These are guidelines for your care right after your surgery only.   ?  Notify Your Doctor or Nurse if you have any of the following:  ?  SYMPTOMS OF WOUND INFECTION--   Increased pain in or around the incision   Swelling around the incision  Any drainage from the incision  Incision separates or opens up  Warmth in the tissues around the incision  Redness or tenderness on the skin near the incision   Fever (temperature greater than 101 degrees F)   ?  NEUROLOGICAL CHANGES--  Change in alertness  Increased sleepiness   Nausea and vomiting   New onset of numbness or weakness in arms or legs   New problems with your bowels or bladder  New or worse problems with balance or walking  Seizures, new or worsening  ?  UNRELIEVED HEADACHE PAIN--  New or increased pain unrelieved with pain medications   Pain associated with nausea and vomiting   Pain associated with other symptoms  ?  QUESTIONS OR PROBLEMS--  Any questions or problems that you are unsure about  Wound Care:  Keep Incision Clean and Dry   You may shower daily, but do not soak incision. Pat dry after showering.   No tub baths, soaking, swimming for 1 week after angiogram.   You do not need to cover the incision. Mild to moderate bruising and tenderness to the site is expected and may last up to 1-2 weeks after your procedure.   ?  A closure device was placed at the catheter insertion site. This is MRI compatible. Remove the dressing 24 hours after your procedure.   If your groin site is bleeding, apply firm pressure for 10 minutes. Reinforce dressing rather than removing and checking frequently. If continues to bleed through the dressing after 1 hour, contact your neurosurgeon's office.   Anticipatory Education:  ?  PAIN MED W/ Acetaminophen (Tylenol)  --IF  a prescription for pain medicine has been sent home with you:  --Narcotic pain medication may cause constipation. Be sure to take stool softeners or laxatives while you are on narcotic pain medication.   --Do not drive after taking prescription pain medicine.   ?  If this medicine is too strong, or no longer necessary, or we did NOT recommend/prescribe oral narcotics, you may take:   - Tylenol Extra-strength/Acetaminophen, 2 tablets every 4-6 hours as needed for mild pain. DO NOT TAKE MORE THAN 4000MG PER DAY from combined sources. NOTE: Remember to eat when taking pain medicines in order to avoid nausea. Watch for constipation. Eat plenty of fruits, vegetables, juices, and drink 6-8 glasses of water each day.   Constipation: Stay active and drink at least 6-8 cups of fluid each day to prevent constipation. If you need a laxative or stool softener follow the package directions or consult with your local pharmacists if you have questions.  ?  After anesthesia, rest for 24 hours. Do not drive, drink alcohol beverages or make any important decisions during this time. General anesthesia may cause sore throat, jaw discomfort or muscle aches. These symptoms can last for one or two days.  Activity: Please follow these instructions:  Advance your activity as you can tolerate. You may do light house work; nothing strenuous   You may walk all you want. You may go up and down the steps. Use the railing for support  Do not do excessive bending, straining or heavy lifting for 48 hours after your procedure  Do not drive or return to work until you are instructed   It is normal for your energy level and sleep patterns to change after surgery.   Get extra sleep at night and take naps during the day to help you feel less tired.   Take rest periods during the day.   Complete recovery may take several weeks.  ?  You may resume driving after 24-48 hours recovery.   You may return to work after 48 hours of recovery.   ?  Diet:  Your  doctor has recommended that you follow these diet instructions at home. Refer to the patient education materials you received during your hospital stay. If you would like more nutrition counseling, ask your doctor about making an appointment with an outpatient dietitian.  Resume your home diet  ?  Medications:  Please resume your home medications as instructed.   ?  Home Supplies and Equipment:  none  Additional Contacts:  ?  CONTACTS FOR NEUROSURGERY: You may call your neurosurgeon’s office if you have questions between 8:30 am and 4:30 pm. You may request to speak to the nurse practitioner who is available Monday through Friday.   ?  For off hours or the weekend you may call your neurosurgeon's office to leave a message.

## 2024-07-13 NOTE — ANESTHESIA POSTPROCEDURE EVALUATION
Post-Op Assessment Note    CV Status:  Stable  Pain Score: 0    Pain management: adequate       Hydration Status:  Euvolemic   PONV Controlled:  Controlled   Airway Patency:  Patent and adequate     Post Op Vitals Reviewed: Yes    No anethesia notable event occurred.    Staff: Anesthesiologist, CRNA               BP   117/52   Temp      Pulse  76   Resp   12   SpO2   100%

## 2024-07-13 NOTE — PLAN OF CARE
Problem: Prexisting or High Potential for Compromised Skin Integrity  Goal: Skin integrity is maintained or improved  Description: INTERVENTIONS:  - Identify patients at risk for skin breakdown  - Assess and monitor skin integrity  - Assess and monitor nutrition and hydration status  - Monitor labs   - Assess for incontinence   - Turn and reposition patient  - Assist with mobility/ambulation  - Relieve pressure over bony prominences  - Avoid friction and shearing  - Provide appropriate hygiene as needed including keeping skin clean and dry  - Evaluate need for skin moisturizer/barrier cream  - Collaborate with interdisciplinary team   - Patient/family teaching  - Consider wound care consult   Outcome: Progressing     Problem: Neurological Deficit  Goal: Neurological status is stable or improving  Description: Interventions:  - Monitor and assess patient's level of consciousness, motor function, sensory function, and level of assistance needed for ADLs.   - Monitor and report changes from baseline. Collaborate with interdisciplinary team to initiate plan and implement interventions as ordered.   - Provide and maintain a safe environment.  - Consider seizure precautions.  - Consider fall precautions.  - Consider aspiration precautions.  - Consider bleeding precautions.  Outcome: Progressing     Problem: Activity Intolerance/Impaired Mobility  Goal: Mobility/activity is maintained at optimum level for patient  Description: Interventions:  - Assess and monitor patient  barriers to mobility and need for assistive/adaptive devices.  - Assess patient's emotional response to limitations.  - Collaborate with interdisciplinary team and initiate plans and interventions as ordered.  - Encourage independent activity per ability.  - Maintain proper body alignment.  - Perform active/passive rom as tolerated/ordered.  - Plan activities to conserve energy.  - Turn patient as appropriate  Outcome: Progressing     Problem:  Communication Impairment  Goal: Ability to express needs and understand communication  Description: Assess patient's communication skills and ability to understand information.  Patient will demonstrate use of effective communication techniques, alternative methods of communication and understanding even if not able to speak.     - Encourage communication and provide alternate methods of communication as needed.  - Collaborate with case management/ for discharge needs.  - Include patient/family/caregiver in decisions related to communication.  Outcome: Progressing     Problem: Potential for Aspiration  Goal: Non-ventilated patient's risk of aspiration is minimized  Description: Assess and monitor vital signs, respiratory status, and labs (WBC).  Monitor for signs of aspiration (tachypnea, cough, rales, wheezing, cyanosis, fever).    - Assess and monitor patient's ability to swallow.  - Place patient up in chair to eat if possible.  - HOB up at 90 degrees to eat if unable to get patient up into chair.  - Supervise patient during oral intake.   - Instruct patient/ family to take small bites.  - Instruct patient/ family to take small single sips when taking liquids.  - Follow patient-specific strategies generated by speech pathologist.  Outcome: Progressing  Goal: Ventilated patient's risk of aspiration is minimized  Description: Assess and monitor vital signs, respiratory status, airway cuff pressure, and labs (WBC).  Monitor for signs of aspiration (tachypnea, cough, rales, wheezing, cyanosis, fever).    - Elevate head of bed 30 degrees if patient has tube feeding.  - Monitor tube feeding.  Outcome: Progressing     Problem: Nutrition  Goal: Nutrition/Hydration status is improving  Description: Monitor and assess patient's nutrition/hydration status for malnutrition (ex- brittle hair, bruises, dry skin, pale skin and conjunctiva, muscle wasting, smooth red tongue, and disorientation). Collaborate with  interdisciplinary team and initiate plan and interventions as ordered.  Monitor patient's weight and dietary intake as ordered or per policy. Utilize nutrition screening tool and intervene per policy. Determine patient's food preferences and provide high-protein, high-caloric foods as appropriate.     - Assist patient with eating.  - Allow adequate time for meals.  - Encourage patient to take dietary supplement as ordered.  - Collaborate with clinical nutritionist.  - Include patient/family/caregiver in decisions related to nutrition.  Outcome: Progressing     Problem: PAIN - ADULT  Goal: Verbalizes/displays adequate comfort level or baseline comfort level  Description: Interventions:  - Encourage patient to monitor pain and request assistance  - Assess pain using appropriate pain scale  - Administer analgesics based on type and severity of pain and evaluate response  - Implement non-pharmacological measures as appropriate and evaluate response  - Consider cultural and social influences on pain and pain management  - Notify physician/advanced practitioner if interventions unsuccessful or patient reports new pain  Outcome: Progressing     Problem: INFECTION - ADULT  Goal: Absence or prevention of progression during hospitalization  Description: INTERVENTIONS:  - Assess and monitor for signs and symptoms of infection  - Monitor lab/diagnostic results  - Monitor all insertion sites, i.e. indwelling lines, tubes, and drains  - Monitor endotracheal if appropriate and nasal secretions for changes in amount and color  - [place] appropriate cooling/warming therapies per order  - Administer medications as ordered  - Instruct and encourage patient and family to use good hand hygiene technique  - Identify and instruct in appropriate isolation precautions for identified infection/condition  Outcome: Progressing  Goal: Absence of fever/infection during neutropenic period  Description: INTERVENTIONS:  - Monitor WBC    Outcome:  Progressing     Problem: SAFETY ADULT  Goal: Patient will remain free of falls  Description: INTERVENTIONS:  - Educate patient/family on patient safety including physical limitations  - Instruct patient to call for assistance with activity   - Consult OT/PT to assist with strengthening/mobility   - Keep Call bell within reach  - Keep bed low and locked with side rails adjusted as appropriate  - Keep care items and personal belongings within reach  - Initiate and maintain comfort rounds  - Make Fall Risk Sign visible to staff  - Offer Toileting every  Hours, in advance of need  - Initiate/Maintain alarm  - Obtain necessary fall risk management equipment:   - Apply yellow socks and bracelet for high fall risk patients  - Consider moving patient to room near nurses station  Outcome: Progressing  Goal: Maintain or return to baseline ADL function  Description: INTERVENTIONS:  -  Assess patient's ability to carry out ADLs; assess patient's baseline for ADL function and identify physical deficits which impact ability to perform ADLs (bathing, care of mouth/teeth, toileting, grooming, dressing, etc.)  - Assess/evaluate cause of self-care deficits   - Assess range of motion  - Assess patient's mobility; develop plan if impaired  - Assess patient's need for assistive devices and provide as appropriate  - Encourage maximum independence but intervene and supervise when necessary  - Involve family in performance of ADLs  - Assess for home care needs following discharge   - Consider OT consult to assist with ADL evaluation and planning for discharge  - Provide patient education as appropriate  Outcome: Progressing  Goal: Maintains/Returns to pre admission functional level  Description: INTERVENTIONS:  - Perform AM-PAC 6 Click Basic Mobility/ Daily Activity assessment daily.  - Set and communicate daily mobility goal to care team and patient/family/caregiver.   - Collaborate with rehabilitation services on mobility goals if  consulted  - Perform Range of Motion 3 times a day.  - Reposition patient every 2 hours.  - Dangle patient 3 times a day  - Stand patient 3 times a day  - Ambulate patient 3 times a day  - Out of bed to chair 3 times a day   - Out of bed for meals 3 times a day  - Out of bed for toileting  - Record patient progress and toleration of activity level   Outcome: Progressing     Problem: DISCHARGE PLANNING  Goal: Discharge to home or other facility with appropriate resources  Description: INTERVENTIONS:  - Identify barriers to discharge w/patient and caregiver  - Arrange for needed discharge resources and transportation as appropriate  - Identify discharge learning needs (meds, wound care, etc.)  - Arrange for interpretive services to assist at discharge as needed  - Refer to Case Management Department for coordinating discharge planning if the patient needs post-hospital services based on physician/advanced practitioner order or complex needs related to functional status, cognitive ability, or social support system  Outcome: Progressing     Problem: Knowledge Deficit  Goal: Patient/family/caregiver demonstrates understanding of disease process, treatment plan, medications, and discharge instructions  Description: Complete learning assessment and assess knowledge base.  Interventions:  - Provide teaching at level of understanding  - Provide teaching via preferred learning methods  Outcome: Progressing

## 2024-07-13 NOTE — BRIEF OP NOTE (RAD/CATH)
IR CEREBRAL ANGIOGRAPHY / INTERVENTION Procedure Note    PATIENT NAME: Federico Bowen  : 1955  MRN: [id number]    Pre-op Diagnosis:   1. Subarachnoid hemorrhage (HCC)      Post-op Diagnosis:   1. 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     Right radial sheath removed, closure device applied.     Specimens: none    Complications:  none    Anesthesia: general    Ruslan Mcneal MD     Date: 2024  Time: 12:15 PM

## 2024-07-13 NOTE — PROCEDURES
Central Line Insertion    Date/Time: 7/12/2024 8:48 PM    Performed by: CHRISTINA Peterson  Authorized by: CHRISTINA Peterson    Patient location:  ICU  Other Assisting Provider: No    Consent:     Consent obtained:  Emergent situation  Universal protocol:     Immediately prior to procedure, a time out was called: yes      Patient identity confirmed:  Arm band  Pre-procedure details:     Hand hygiene: Hand hygiene performed prior to insertion      Sterile barrier technique: All elements of maximal sterile technique followed      Skin preparation:  2% chlorhexidine    Skin preparation agent: Skin preparation agent completely dried prior to procedure    Indications:     Central line indications: medications requiring central line    Anesthesia (see MAR for exact dosages):     Anesthesia method:  Local infiltration    Local anesthetic:  Lidocaine 1% w/o epi  Procedure details:     Location:  Right internal jugular    Vessel type: vein      Laterality:  Right    Approach: percutaneous technique used      Patient position:  Reverse Trendelenburg    Catheter type:  Triple lumen 16cm    Catheter size:  7.5 Fr    Landmarks identified: yes      Ultrasound guidance: yes      Ultrasound image availability:  Images available in PACS    Sterile ultrasound techniques: Sterile gel and sterile probe covers were used      Manometry confirmation: yes      Number of attempts:  1    Successful placement: yes      Catheter tip vessel location: atriocaval junction    Post-procedure details:     Post-procedure:  Dressing applied and line sutured    Assessment:  Blood return through all ports, no pneumothorax on x-ray, placement verified by x-ray and free fluid flow    Post-procedure complications: none      Patient tolerance of procedure:  Tolerated well, no immediate complications    Observer: Yes      Observer name:  River MORTENSEN

## 2024-07-13 NOTE — PROGRESS NOTES
.Interventional Radiology Procedure Nursing Note    Procedure: Cerebral angiogram with intervention  Preforming Provider: Dr. Ruslan Mcneal  Anesthesia provider: Dr. Jojo Jones and  Tor Winn CRNA     Pre procedure medication given: n/a    Medication given: See Anesthesia MAR for drug administration.        Access site: Right Radial artery   Closure: TR band in place with 15 mL of air   Bedrest start time: 1213    Events:  Pt assisted to supine position with assistance.Report called to Nory Urias.

## 2024-07-13 NOTE — H&P
[facility]  H&P: Critical Care  Name: Federico Bowen 68 y.o. male I MRN: [id number]  Unit/Bed#: ICU 07 I Date of Admission: 7/12/2024   Date of Service: 7/12/2024 I Hospital Day: 0      Assessment & Plan   Neuro:   Diagnosis: SAH with IVH and mild hydro with R PCA aneurysm  BD1, HH5, MF 4  CTAH/N-0.3 cm aneurysm in expected origin of right posterior communicating artery. Possible tiny aneurysm in left anterior communicating artery region. Mild narrowing of bilateral MCA M1 and bilateral M2 segmental branch vessels, likely due to vasospasm.   CTH-Acute diffuse thickened large-volume SAH throughout the b/l parafalcine regions, b/l cerebral sulci (R worse than L), basilar cisterns, prepontine cistern, premedullary cistern, and visualized upper cervical spinal canal. Acute small volume IVH with mild hydrocephalus.   DDAVP to reverse ASA  Plan:   Neuro/Nsx consulted  EVD placed at bedside to 10. Goal ICP <20   SBP <140  Keppra ppx  Nimodipine   Daily TCD  No dvt ppx  Euvolemia  Q6 hr bmp/osm for goal 145-155 and 310-320  CTH post EVD  MRI  Will need angio  Stat CTH with any change in GCS > 2 pts    Diagnosis: Seizure  2/2 above  Loaded with 2gm keppra cont 1gm BID  Plan:   Neuro cx  vEEG  CV:   Diagnosis: Hx CAD/HTN/HLD  Plan:   Cont home statin  Hold ASA    Pulm:  Diagnosis: Acute respiratory failure 2/2 mental status/Hx COPD without AE  Plan:   Maintain MV AC/VC 14/400/6/40. Adjust RR for mild resp acidosis to 16  Xopenex/atrovent nebs  Holding home inhalers    GI:   No active issues NP. NSS 125cc/hr    :   No active issues maintain anderson    F/E/N:    No active issues    Heme/Onc:   No active issues    Endo:   Diagnosis: Hyperglycemia  Plan:   Check A1C  SSI    ID:   Diagnosis: Leukocytosis likely reactionary  Plan:   Monitor fever curve and cbc    MSK/Skin:   Diagnosis: Penile lesion  Plan: note from outpt derm inflamed seborrheic keratosis    Disposition: Critical  care       History of Present Illness     HPI: Federico Bowen is a 68 y.o. with PMH HTN, HLD, CAD, COPD, cocaine use who presents with H/A and R leg pain to [facility]. In EMS pt had 2 witnessed seizures (30 sec & 6 seconds).     On arrival to ER pt required intubation 2/2 mental status. Stroke alert called. CTH showed SAH with IVH and mild hydro with R PCA aneurysm. Given Ativan, Keppra, DDVAP. Tx to [facility] for Neuro surgery evaluation    History obtained from chart review and unobtainable from patient due to mental status.  Review of Systems: Review of Systems   Unable to perform ROS: Intubated       Historical Information   No past medical history on file. No past surgical history on file.   No current outpatient medications Not on File     No family history on file.       Objective                            Vitals I/O      Most Recent Min/Max in 24hrs   Temp   No data recorded   Pulse   Pulse  Min: 49  Max: 121   Resp   Resp  Min: 14  Max: 28   BP   BP  Min: 124/75  Max: 176/87   O2 Sat   SpO2  Min: 74 %  Max: 100 %    No intake or output data in the 24 hours ending 07/12/24 2054    Diet NPO    Invasive Monitoring           Physical Exam   Physical Exam  Eyes:      Comments: R3 NR L 2 NR   Skin:     General: Skin is warm, dry and mottled extremities.   HENT:      Head: Normocephalic and atraumatic.      Mouth/Throat:      Mouth: Mucous membranes are moist.   Cardiovascular:      Rate and Rhythm: Normal rate and regular rhythm.      Pulses: Normal pulses.      Heart sounds: Normal heart sounds.   Musculoskeletal:         General: No swelling.      Right lower leg: No edema.      Left lower leg: No edema.   Abdominal: General: Bowel sounds are normal. There is no distension.      Palpations: Abdomen is soft.   Constitutional:       General: He is not in acute distress.     Appearance: He is cachectic.      Interventions: He is sedated, intubated and restrained.   Pulmonary:      Effort: Pulmonary effort is normal. No  respiratory distress. He is intubated.      Breath sounds: Normal breath sounds.   Neurological:      GCS: GCS eye subscore is 1. GCS verbal subscore is 1. GCS motor subscore is 2.      Comments: LUE decerebrate posturing to b/l le painful stimuli. +cough            Diagnostic Studies      EKG: pending  Imaging:  I have personally reviewed pertinent reports.   and I have personally reviewed pertinent films in PACS     Medications:  Scheduled PRN   cefazolin, 2,000 mg, Once  chlorhexidine, 15 mL, Q12H MATTY  insulin lispro, 1-5 Units, Q6H MATTY  niMODipine, 60 mg, Q4H MATTY  sodium chloride, 1,000 mL, Once  vecuronium, 10 mg, Once      fentaNYL, 50 mcg, Q2H PRN       Continuous    fentaNYL, 75 mcg/hr  niCARdipine, 1-15 mg/hr  sodium chloride, 125 mL/hr         Labs:    CBC    Recent Labs     07/12/24  1618   WBC 17.17*   HGB 12.9   HCT 38.6        BMP    Recent Labs     07/12/24  1618   SODIUM 136   K 4.0      CO2 21   AGAP 12   BUN 10   CREATININE 0.88   CALCIUM 8.5       Coags    Recent Labs     07/12/24  1618   INR 1.07   PTT 23        Additional Electrolytes  No recent results       Blood Gas    Recent Labs     07/12/24 2014   PHART 7.314*   SBE9BVB 44.1*   PO2ART 138.2*   UYI8UHY 21.9*   BEART -4.2   SOURCE Line, Arterial     Recent Labs     07/12/24 2014   SOURCE Line, Arterial    LFTs  No recent results    Infectious  No recent results  Glucose  Recent Labs     07/12/24  1618   GLUC 222*               CHRISTINA Peterson

## 2024-07-13 NOTE — ASSESSMENT & PLAN NOTE
"68 y.o.  male with CAD, HTN, HLD, tobacco use, unstable angina, COPD, history of cocaine use, presented to [facility] on 7/12 with reported acute headache followed by unresponsiveness and 2 witnessed seizures.  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 He is also s/p EVD.  He was transferred to [facility] for video EEG monitoring and neurosurgical consultation.  He was bolused with Keppra 2 g IV x 1 and continued on 1 g twice daily.       Workup:  - CT head 7/12/24:  \"Acute diffuse thickened large-volume subarachnoid hemorrhage throughout the bilateral parafalcine regions, bilateral cerebral sulci (right worse than left), basilar cisterns, prepontine cistern, premedullary cistern, and visualized upper cervical spinal canal. Acute small volume intraventricular hemorrhage with mild hydrocephalus. No acute infarction.\"  - CTA H/N wwo contrast 7/12/24:  \"0.3 cm aneurysm in expected origin of right posterior communicating artery. Possible tiny aneurysm in left anterior communicating artery region. These could be potential sources of diffuse subarachnoid hemorrhage. Recommend neurovascular surgery consultation for further evaluation. Mild narrowing of bilateral MCA M1 and bilateral M2 segmental branch vessels, likely due to vasospasm. Patent stent in left proximal subclavian artery. Endotracheal tube in trachea.\"  - Repeat head CT on 7/14/2024 demonstrated stable appearing diffuse SAH/IVH.  - UDS (+) for benzos.  - No seizures or epileptiform discharges noted on vEEG.    Patient has been extubated and is alert and following commands, without lateralized findings.    Plan:  - EEG to be discontinued.  - Continue Keppra 1 g twice daily.  - Seizure precautions.  - Driving status will need to be clarified and [place] form completed and filed if patient is actively driving.  - Management of SAH as per " NSG/NCC. Plan is for continuing nimodipine, daily TCDs, and SBP goal less than 160 for now. Hold AP/AC.  - Medical management as per NCC.

## 2024-07-13 NOTE — PROGRESS NOTES
"Progress Note - Neurology   Federico Bowen 68 y.o. male MRN: [id number]  Unit/Bed#: ICU 07 Encounter: [id number]      Assessment & Plan     Seizures (HCC)  Assessment & Plan  68 y.o.  male with CAD, HTN, HLD, tobacco use, unstable angina, COPD, history of cocaine use, presented to [facility] on 7/12 with reported acute headache followed by unresponsiveness and 2 witnessed seizures.  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 He is also s/p EVD.  He was [facility] to [facility] for video EEG monitoring and neurosurgical consultation.  He was bolused with Keppra 2 g IV x 1 and continued on 1 g twice daily.       Workup:  - CT head 7/12/24:  \"Acute diffuse thickened large-volume subarachnoid hemorrhage throughout the bilateral parafalcine regions, bilateral cerebral sulci (right worse than left), basilar cisterns, prepontine cistern, premedullary cistern, and visualized upper cervical spinal canal. Acute small volume intraventricular hemorrhage with mild hydrocephalus. No acute infarction.\"  - CTA H/N wwo contrast 7/12/24:  \"0.3 cm aneurysm in expected origin of right posterior communicating artery. Possible tiny aneurysm in left anterior communicating artery region. These could be potential sources of diffuse subarachnoid hemorrhage. Recommend neurovascular surgery consultation for further evaluation. Mild narrowing of bilateral MCA M1 and bilateral M2 segmental branch vessels, likely due to vasospasm. Patent stent in left proximal subclavian artery. Endotracheal tube in trachea.\"  - UDS (+) for benzos.    Plan:  -Continue vEEG. Can consider d/c after 24 hours if no ictal discharges/seizures noted.  -Continue Keppra 1 g twice daily.  -Seizure precautions.  -Management of SAH as per NSG/NCC. Plan is for continuing nimodipine, daily TCDs, and SBP goal less than 160 for now. Hold " AP/AC.  -Medical management as per NCC.          Federico Bowen will need follow up in in 6 weeks with epilepsy attending. He will not require outpatient neurological testing.    Subjective:   Patient transferred from [facility] for neurosurgery consultation given diffuse subarachnoid hemorrhage in the setting of presumed P-comm aneurysm rupture.  Patient suffered 2 witnessed seizures en route to [facility] and was started on Keppra.  Currently on video EEG.  Per discussion with critical care team this morning, patient was following commands and attempting to sit up in bed prior to his cerebral angiogram.  Examined by myself and attending neurologist after angiogram while still sedated.    ROS: unable to query 2/2 sedation.    Vitals: Blood pressure (!) 130/48, pulse 70, temperature 97.8 °F (36.6 °C), temperature source Rectal, resp. rate 16, weight 54 kg (119 lb), SpO2 96%.,There is no height or weight on file to calculate BMI.    Physical Exam: AP acting as scribe for attending exam. Patient examined while on sedation after just returning from angio.  General:  Patient is well-developed, well-nourished, and in no acute distress.  HEENT:  Head normocephalic.  Eyes anicteric.  Evidence of BL lens replacements.  Cardiovascular:  With regular rhythm.  Lungs:  Normal effort. Nonlabored breathing.  Extremities:  With no significant edema.    Skin: No rashes.  Neurologic:  Patient is alert, pleasantly interactive, and appropriately conversational.  No obvious symbolic language difficulty or dysarthria, and the patient is fully oriented.  Correctly identified the current president.    Gait deferred for safety.    Cranial Nerves:   II: No blink to threat. R pupil 3mm irregular and sluggishly reactive. L pupil 1mm and reactive. Cannot visualize optic fundi.  III,IV,VI: no VORs.  VII: No obvious weakness within the confines of intubation.    No response to noxious stim in any extremity.    Toe responses bilaterally  upgoing.    Lab, Imaging and other studies: CBC:   Results from last 7 days   Lab Units 07/13/24  0534 07/12/24  1618   WBC Thousand/uL 9.29 17.17*   RBC Million/uL 3.62* 4.12   HEMOGLOBIN g/dL 11.4* 12.9   HEMATOCRIT % 33.3* 38.6   MCV fL 92 94   PLATELETS Thousands/uL 174 226   , BMP/CMP:   Results from last 7 days   Lab Units 07/13/24  1428 07/12/24  2350 07/12/24  1618   SODIUM mmol/L 135 136 136   POTASSIUM mmol/L 4.7 3.8 4.0   CHLORIDE mmol/L 108 106 103   CO2 mmol/L 21 22 21   BUN mg/dL 9 9 10   CREATININE mg/dL 0.67 0.78 0.88   CALCIUM mg/dL 7.7* 8.2* 8.5   EGFR ml/min/1.73sq m 98 92 87   , Coagulation:   Results from last 7 days   Lab Units 07/12/24  1618   INR  1.07     VTE Prophylaxis: Sequential compression device (Venodyne)     Counseling / Coordination of Care  I have spent a total time of 45 minutes in caring for this patient on the day of the visit/encounter including Diagnostic results, Risk factor reductions, Impressions, Counseling / Coordination of care, Documenting in the medical record, Reviewing / ordering tests, medicine, procedures  , Obtaining or reviewing history  , and Communicating with other healthcare professionals .

## 2024-07-14 ENCOUNTER — APPOINTMENT (INPATIENT)
Dept: RADIOLOGY | Facility: HOSPITAL | Age: 69
DRG: 020 | End: 2024-07-14
Payer: MEDICARE

## 2024-07-14 ENCOUNTER — APPOINTMENT (INPATIENT)
Dept: NON INVASIVE DIAGNOSTICS | Facility: HOSPITAL | Age: 69
DRG: 020 | End: 2024-07-14
Payer: MEDICARE

## 2024-07-14 ENCOUNTER — APPOINTMENT (INPATIENT)
Dept: NEUROLOGY | Facility: CLINIC | Age: 69
DRG: 020 | End: 2024-07-14
Payer: MEDICARE

## 2024-07-14 LAB
ANION GAP SERPL CALCULATED.3IONS-SCNC: 9 MMOL/L (ref 4–13)
APTT PPP: 34 SECONDS (ref 23–37)
BASOPHILS # BLD AUTO: 0.01 THOUSANDS/ÂΜL (ref 0–0.1)
BASOPHILS NFR BLD AUTO: 0 % (ref 0–1)
BUN SERPL-MCNC: 10 MG/DL (ref 5–25)
CA-I BLD-SCNC: 1.11 MMOL/L (ref 1.12–1.32)
CALCIUM SERPL-MCNC: 8.4 MG/DL (ref 8.4–10.2)
CHLORIDE SERPL-SCNC: 105 MMOL/L (ref 96–108)
CO2 SERPL-SCNC: 22 MMOL/L (ref 21–32)
CREAT SERPL-MCNC: 0.55 MG/DL (ref 0.6–1.3)
EOSINOPHIL # BLD AUTO: 0 THOUSAND/ÂΜL (ref 0–0.61)
EOSINOPHIL NFR BLD AUTO: 0 % (ref 0–6)
ERYTHROCYTE [DISTWIDTH] IN BLOOD BY AUTOMATED COUNT: 13.7 % (ref 11.6–15.1)
GFR SERPL CREATININE-BSD FRML MDRD: 106 ML/MIN/1.73SQ M
GLUCOSE SERPL-MCNC: 110 MG/DL (ref 65–140)
GLUCOSE SERPL-MCNC: 117 MG/DL (ref 65–140)
GLUCOSE SERPL-MCNC: 128 MG/DL (ref 65–140)
HCT VFR BLD AUTO: 30.5 % (ref 36.5–49.3)
HGB BLD-MCNC: 10.4 G/DL (ref 12–17)
IMM GRANULOCYTES # BLD AUTO: 0.07 THOUSAND/UL (ref 0–0.2)
IMM GRANULOCYTES NFR BLD AUTO: 1 % (ref 0–2)
INR PPP: 1.21 (ref 0.84–1.19)
LYMPHOCYTES # BLD AUTO: 1.1 THOUSANDS/ÂΜL (ref 0.6–4.47)
LYMPHOCYTES NFR BLD AUTO: 7 % (ref 14–44)
MAGNESIUM SERPL-MCNC: 1.9 MG/DL (ref 1.9–2.7)
MCH RBC QN AUTO: 31.5 PG (ref 26.8–34.3)
MCHC RBC AUTO-ENTMCNC: 34.1 G/DL (ref 31.4–37.4)
MCV RBC AUTO: 92 FL (ref 82–98)
MONOCYTES # BLD AUTO: 0.68 THOUSAND/ÂΜL (ref 0.17–1.22)
MONOCYTES NFR BLD AUTO: 5 % (ref 4–12)
NEUTROPHILS # BLD AUTO: 13.31 THOUSANDS/ÂΜL (ref 1.85–7.62)
NEUTS SEG NFR BLD AUTO: 87 % (ref 43–75)
NRBC BLD AUTO-RTO: 0 /100 WBCS
PHOSPHATE SERPL-MCNC: 1.8 MG/DL (ref 2.3–4.1)
PLATELET # BLD AUTO: 150 THOUSANDS/UL (ref 149–390)
PMV BLD AUTO: 9.4 FL (ref 8.9–12.7)
POTASSIUM SERPL-SCNC: 3.8 MMOL/L (ref 3.5–5.3)
PROTHROMBIN TIME: 15.2 SECONDS (ref 11.6–14.5)
RBC # BLD AUTO: 3.3 MILLION/UL (ref 3.88–5.62)
SODIUM SERPL-SCNC: 136 MMOL/L (ref 135–147)
WBC # BLD AUTO: 15.17 THOUSAND/UL (ref 4.31–10.16)

## 2024-07-14 PROCEDURE — 84100 ASSAY OF PHOSPHORUS: CPT

## 2024-07-14 PROCEDURE — 82948 REAGENT STRIP/BLOOD GLUCOSE: CPT

## 2024-07-14 PROCEDURE — 83735 ASSAY OF MAGNESIUM: CPT

## 2024-07-14 PROCEDURE — 80048 BASIC METABOLIC PNL TOTAL CA: CPT

## 2024-07-14 PROCEDURE — 95712 VEEG 2-12 HR INTMT MNTR: CPT

## 2024-07-14 PROCEDURE — 99232 SBSQ HOSP IP/OBS MODERATE 35: CPT | Performed by: RADIOLOGY

## 2024-07-14 PROCEDURE — 93886 INTRACRANIAL COMPLETE STUDY: CPT | Performed by: SURGERY

## 2024-07-14 PROCEDURE — 99233 SBSQ HOSP IP/OBS HIGH 50: CPT | Performed by: PSYCHIATRY & NEUROLOGY

## 2024-07-14 PROCEDURE — 93886 INTRACRANIAL COMPLETE STUDY: CPT

## 2024-07-14 PROCEDURE — 70450 CT HEAD/BRAIN W/O DYE: CPT

## 2024-07-14 PROCEDURE — 94760 N-INVAS EAR/PLS OXIMETRY 1: CPT

## 2024-07-14 PROCEDURE — 99291 CRITICAL CARE FIRST HOUR: CPT | Performed by: INTERNAL MEDICINE

## 2024-07-14 PROCEDURE — 82330 ASSAY OF CALCIUM: CPT

## 2024-07-14 PROCEDURE — 95720 EEG PHY/QHP EA INCR W/VEEG: CPT | Performed by: PSYCHIATRY & NEUROLOGY

## 2024-07-14 PROCEDURE — 85730 THROMBOPLASTIN TIME PARTIAL: CPT

## 2024-07-14 PROCEDURE — 92610 EVALUATE SWALLOWING FUNCTION: CPT

## 2024-07-14 PROCEDURE — 93005 ELECTROCARDIOGRAM TRACING: CPT

## 2024-07-14 PROCEDURE — 85025 COMPLETE CBC W/AUTO DIFF WBC: CPT

## 2024-07-14 PROCEDURE — 85610 PROTHROMBIN TIME: CPT

## 2024-07-14 RX ORDER — HEPARIN SODIUM 10000 [USP'U]/100ML
12 INJECTION, SOLUTION INTRAVENOUS
Status: DISPENSED | OUTPATIENT
Start: 2024-07-14 | End: 2024-07-26

## 2024-07-14 RX ORDER — HYDROMORPHONE HCL IN WATER/PF 6 MG/30 ML
0.2 PATIENT CONTROLLED ANALGESIA SYRINGE INTRAVENOUS EVERY 4 HOURS PRN
Status: DISCONTINUED | OUTPATIENT
Start: 2024-07-14 | End: 2024-07-14

## 2024-07-14 RX ORDER — OXYCODONE HCL 5 MG/5 ML
2.5 SOLUTION, ORAL ORAL EVERY 4 HOURS PRN
Status: DISCONTINUED | OUTPATIENT
Start: 2024-07-14 | End: 2024-07-24

## 2024-07-14 RX ORDER — LEVETIRACETAM 500 MG/5ML
1000 INJECTION, SOLUTION, CONCENTRATE INTRAVENOUS EVERY 12 HOURS SCHEDULED
Status: DISCONTINUED | OUTPATIENT
Start: 2024-07-14 | End: 2024-07-14

## 2024-07-14 RX ORDER — HYDROMORPHONE HCL/PF 1 MG/ML
0.5 SYRINGE (ML) INJECTION EVERY 4 HOURS PRN
Status: DISCONTINUED | OUTPATIENT
Start: 2024-07-14 | End: 2024-07-14

## 2024-07-14 RX ORDER — LEVETIRACETAM 100 MG/ML
1000 SOLUTION ORAL EVERY 12 HOURS SCHEDULED
Status: DISCONTINUED | OUTPATIENT
Start: 2024-07-14 | End: 2024-07-19

## 2024-07-14 RX ORDER — OXYCODONE HCL 5 MG/5 ML
5 SOLUTION, ORAL ORAL EVERY 4 HOURS PRN
Status: DISCONTINUED | OUTPATIENT
Start: 2024-07-14 | End: 2024-07-24

## 2024-07-14 RX ORDER — ONDANSETRON 2 MG/ML
4 INJECTION INTRAMUSCULAR; INTRAVENOUS EVERY 6 HOURS PRN
Status: DISCONTINUED | OUTPATIENT
Start: 2024-07-14 | End: 2024-08-10 | Stop reason: HOSPADM

## 2024-07-14 RX ORDER — HEPARIN SODIUM 10000 [USP'U]/100ML
8 INJECTION, SOLUTION INTRAVENOUS
Status: DISCONTINUED | OUTPATIENT
Start: 2024-07-14 | End: 2024-07-14

## 2024-07-14 RX ORDER — GABAPENTIN 100 MG/1
100 CAPSULE ORAL 3 TIMES DAILY
Status: DISCONTINUED | OUTPATIENT
Start: 2024-07-14 | End: 2024-07-15

## 2024-07-14 RX ORDER — LABETALOL HYDROCHLORIDE 5 MG/ML
10 INJECTION, SOLUTION INTRAVENOUS EVERY 6 HOURS PRN
Status: DISCONTINUED | OUTPATIENT
Start: 2024-07-14 | End: 2024-07-24

## 2024-07-14 RX ORDER — MAGNESIUM SULFATE 1 G/100ML
1 INJECTION INTRAVENOUS ONCE
Status: COMPLETED | OUTPATIENT
Start: 2024-07-14 | End: 2024-07-14

## 2024-07-14 RX ORDER — BUTALBITAL, ACETAMINOPHEN AND CAFFEINE 50; 325; 40 MG/1; MG/1; MG/1
1 TABLET ORAL EVERY 4 HOURS PRN
Status: DISCONTINUED | OUTPATIENT
Start: 2024-07-14 | End: 2024-07-17

## 2024-07-14 RX ORDER — CALCIUM GLUCONATE 20 MG/ML
2 INJECTION, SOLUTION INTRAVENOUS ONCE
Status: COMPLETED | OUTPATIENT
Start: 2024-07-14 | End: 2024-07-14

## 2024-07-14 RX ORDER — HYDRALAZINE HYDROCHLORIDE 20 MG/ML
10 INJECTION INTRAMUSCULAR; INTRAVENOUS EVERY 6 HOURS PRN
Status: DISCONTINUED | OUTPATIENT
Start: 2024-07-14 | End: 2024-07-24

## 2024-07-14 RX ORDER — HYDROMORPHONE HCL IN WATER/PF 6 MG/30 ML
0.2 PATIENT CONTROLLED ANALGESIA SYRINGE INTRAVENOUS EVERY 4 HOURS PRN
Status: DISCONTINUED | OUTPATIENT
Start: 2024-07-14 | End: 2024-07-24

## 2024-07-14 RX ORDER — OXYCODONE HCL 5 MG/5 ML
5 SOLUTION, ORAL ORAL EVERY 4 HOURS PRN
Status: DISCONTINUED | OUTPATIENT
Start: 2024-07-14 | End: 2024-07-14

## 2024-07-14 RX ORDER — SODIUM CHLORIDE 1 G/1
1 TABLET ORAL
Status: DISCONTINUED | OUTPATIENT
Start: 2024-07-14 | End: 2024-07-16

## 2024-07-14 RX ORDER — METHOCARBAMOL 500 MG/1
500 TABLET, FILM COATED ORAL EVERY 6 HOURS PRN
Status: DISCONTINUED | OUTPATIENT
Start: 2024-07-14 | End: 2024-07-24

## 2024-07-14 RX ORDER — LIDOCAINE 50 MG/G
1 PATCH TOPICAL DAILY
Status: DISCONTINUED | OUTPATIENT
Start: 2024-07-14 | End: 2024-08-10 | Stop reason: HOSPADM

## 2024-07-14 RX ADMIN — LEVETIRACETAM 1000 MG: 100 SOLUTION ORAL at 20:57

## 2024-07-14 RX ADMIN — ASPIRIN 81 MG CHEWABLE TABLET 81 MG: 81 TABLET CHEWABLE at 09:07

## 2024-07-14 RX ADMIN — MAGNESIUM SULFATE HEPTAHYDRATE 1 G: 1 INJECTION, SOLUTION INTRAVENOUS at 15:55

## 2024-07-14 RX ADMIN — CALCIUM GLUCONATE 2 G: 20 INJECTION, SOLUTION INTRAVENOUS at 07:56

## 2024-07-14 RX ADMIN — GABAPENTIN 100 MG: 100 CAPSULE ORAL at 15:56

## 2024-07-14 RX ADMIN — METHOCARBAMOL 500 MG: 500 TABLET ORAL at 15:56

## 2024-07-14 RX ADMIN — BUTALBITAL, ACETAMINOPHEN AND CAFFEINE 1 TABLET: 325; 50; 40 TABLET ORAL at 23:43

## 2024-07-14 RX ADMIN — HYDROMORPHONE HYDROCHLORIDE 0.5 MG: 1 INJECTION, SOLUTION INTRAMUSCULAR; INTRAVENOUS; SUBCUTANEOUS at 13:05

## 2024-07-14 RX ADMIN — OXYCODONE HYDROCHLORIDE 5 MG: 5 SOLUTION ORAL at 07:57

## 2024-07-14 RX ADMIN — Medication 60 MG: at 00:26

## 2024-07-14 RX ADMIN — HYDROMORPHONE HYDROCHLORIDE 0.5 MG: 1 INJECTION, SOLUTION INTRAMUSCULAR; INTRAVENOUS; SUBCUTANEOUS at 05:33

## 2024-07-14 RX ADMIN — SODIUM CHLORIDE 1 G: 1 TABLET ORAL at 11:41

## 2024-07-14 RX ADMIN — OXYCODONE HYDROCHLORIDE 5 MG: 5 SOLUTION ORAL at 15:55

## 2024-07-14 RX ADMIN — Medication 60 MG: at 20:57

## 2024-07-14 RX ADMIN — Medication 60 MG: at 04:07

## 2024-07-14 RX ADMIN — Medication 60 MG: at 11:42

## 2024-07-14 RX ADMIN — UMECLIDINIUM 1 PUFF: 62.5 AEROSOL, POWDER ORAL at 09:20

## 2024-07-14 RX ADMIN — GABAPENTIN 100 MG: 100 CAPSULE ORAL at 20:56

## 2024-07-14 RX ADMIN — ONDANSETRON 4 MG: 2 INJECTION INTRAMUSCULAR; INTRAVENOUS at 17:33

## 2024-07-14 RX ADMIN — OXYCODONE HYDROCHLORIDE 5 MG: 5 SOLUTION ORAL at 20:57

## 2024-07-14 RX ADMIN — Medication 60 MG: at 15:57

## 2024-07-14 RX ADMIN — POTASSIUM PHOSPHATE, MONOBASIC POTASSIUM PHOSPHATE, DIBASIC 21 MMOL: 224; 236 INJECTION, SOLUTION, CONCENTRATE INTRAVENOUS at 07:56

## 2024-07-14 RX ADMIN — Medication 60 MG: at 23:43

## 2024-07-14 RX ADMIN — SODIUM CHLORIDE 75 ML/HR: 0.9 INJECTION, SOLUTION INTRAVENOUS at 23:09

## 2024-07-14 RX ADMIN — Medication 60 MG: at 08:00

## 2024-07-14 RX ADMIN — CHLORHEXIDINE GLUCONATE 0.12% ORAL RINSE 15 ML: 1.2 LIQUID ORAL at 09:07

## 2024-07-14 RX ADMIN — METHOCARBAMOL 500 MG: 500 TABLET ORAL at 07:57

## 2024-07-14 RX ADMIN — BUTALBITAL, ACETAMINOPHEN AND CAFFEINE 1 TABLET: 325; 50; 40 TABLET ORAL at 17:35

## 2024-07-14 RX ADMIN — OXYCODONE HYDROCHLORIDE 5 MG: 5 SOLUTION ORAL at 11:41

## 2024-07-14 RX ADMIN — SODIUM CHLORIDE 75 ML/HR: 0.9 INJECTION, SOLUTION INTRAVENOUS at 08:00

## 2024-07-14 RX ADMIN — ACETAMINOPHEN 650 MG: 650 SUSPENSION ORAL at 05:09

## 2024-07-14 RX ADMIN — HYDROMORPHONE HYDROCHLORIDE 0.5 MG: 1 INJECTION, SOLUTION INTRAMUSCULAR; INTRAVENOUS; SUBCUTANEOUS at 09:07

## 2024-07-14 RX ADMIN — LIDOCAINE 5% 1 PATCH: 700 PATCH TOPICAL at 09:07

## 2024-07-14 RX ADMIN — CHLORHEXIDINE GLUCONATE 0.12% ORAL RINSE 15 ML: 1.2 LIQUID ORAL at 20:56

## 2024-07-14 RX ADMIN — HEPARIN SODIUM 8 UNITS/KG/HR: 10000 INJECTION, SOLUTION INTRAVENOUS at 13:11

## 2024-07-14 RX ADMIN — SODIUM CHLORIDE 1 G: 1 TABLET ORAL at 15:56

## 2024-07-14 RX ADMIN — ACETAMINOPHEN 650 MG: 650 SUSPENSION ORAL at 11:39

## 2024-07-14 RX ADMIN — HYDROMORPHONE HYDROCHLORIDE 0.5 MG: 1 INJECTION, SOLUTION INTRAMUSCULAR; INTRAVENOUS; SUBCUTANEOUS at 17:33

## 2024-07-14 RX ADMIN — ACETAMINOPHEN 650 MG: 650 SUSPENSION ORAL at 15:55

## 2024-07-14 RX ADMIN — LEVETIRACETAM 1000 MG: 100 INJECTION, SOLUTION INTRAVENOUS at 09:20

## 2024-07-14 NOTE — SPEECH THERAPY NOTE
Speech-Language Pathology Bedside Swallow Evaluation    Patient Name: Federico Bowen    Today's Date: 7/14/2024     Problem List  Active Problems:    HTN (hypertension)    Hyperlipidemia    Seizures (HCC)      Past Medical History  No past medical history on file.    Past Surgical History  No past surgical history on file.    Summary  Pt presented with s/s suggestive of mild-moderate oropharyngeal dysphagia. Symptoms or concerns included reduced coordination of bolus retrieval, manipulation, and transfer, suspected reduced control of thin liquids, mild pharyngeal swallow delay and suspected decreased hyolaryngeal elevation upon palpation. Multiple swallows noted with trials, as well as throat clearing and belching as trials progressed. Pt currently has NGT. Recommend meds and nutrition via NG now, and allow ice chips sparingly. ST will f/u tomorrow to re assess PO tolerance and determine potential need for MBS.     Risk/s for Aspiration: mod    Recommended Diet: NPO except ice chips   Recommended Form of Meds: non-oral if possible   Aspiration precautions and swallowing strategies: upright posture  Other Recommendations: Continue frequent oral care      Current Medical Status per H&P 7/12/24  Federico Bowen is a 67 y/o male with of HTN, HLD, CAD, COPD. Presented with complain of headache and right leg pain. ON route to the ED with EMS he has 2 seizures per report. Pt intubated in the ED. CT head showed large diffuse SAH, IVH, hydrocephalus. CTA  showed   0.3 cm aneurysm in expected origin of right posterior communicating artery. Possible tiny aneurysm in left anterior communicating artery region.  S/p EVD, post EVD CT head stable with pneumocephalus.     Special Studies:  CT head 7/14/24 IMPRESSION:  No significant interval change in extensive bilateral subarachnoid hemorrhage and intraventricular hemorrhage.  CXR 7/12/24 IMPRESSION:  No acute cardiopulmonary disease.  ET tube 6 cm above the  landon.    Social/Education/Vocational Hx:  Pt lives with family    Swallow Information   Current Risks for Dysphagia & Aspiration: CVA and recent surgery  Current Diet: NPO   Baseline Diet: regular diet and thin liquids      Baseline Assessment   Behavior/Cognition: waxing and waning arousal level and decreased attention  Speech/Language Status: able to follow commands inconsistently, confused speech  Patient Positioning: upright in bed, head hyperextended  Pain Status/Interventions/Response to Interventions: No report of or nonverbal indications of pain.       Swallow Mechanism Exam  Facial: symmetrical  Labial: WFL  Lingual: decreased ROM and bilateral decreased strength  Velum: symmetrical  Mandible: adequate ROM  Dentition: adequate  Vocal quality:weak   Volitional Cough: weak   Respiratory Status: on 4L O2     Consistencies Assessed and Performance   Consistencies Administered: ice chips, thin liquids, puree, and hard solids    Oral Stage: mild-moderate, decreased bolus acceptance, decreased mastication, decreased bolus formation, suspected decreased control of liquids , and oral residue with zach cracker     Pharyngeal Stage: mild-moderate, suspected pharyngeal swallow delay, and suspected decreased hyolaryngeal elevation upon palpation    Esophageal Concerns:  s/o reports hx of gerd      Summary and Recommendations (see above)    Results Reviewed with: patient, RN, and MD     Treatment Recommended: yes     Frequency of treatment: 3-5x/week    Dysphagia LTG  -Patient will demonstrate safe and effective oral intake (without overt s/s significant oral/pharyngeal dysphagia including s/s penetration or aspiration) for the highest appropriate diet level.     Short Term Goals:  -Pt will tolerate the least restrictive diet with the least restrictive liquid consistency without s/s of aspiration x72hrs.    -Patient will tolerate trials of upgraded food and/or liquid texture with no significant s/s of oral or  pharyngeal dysphagia including aspiration across 1-3 diagnostic sessions.     -Patient will comply with a Video/Modified Barium Swallow study for more complete assessment of swallowing anatomy/physiology/aspiration risk and to assess efficacy of treatment techniques so as to best guide treatment plan.

## 2024-07-14 NOTE — PROGRESS NOTES
[facility]  Progress Note: Critical Care  Name: Federico Bowen 68 y.o. male I MRN: [id number]  Unit/Bed#: ICU 07 I Date of Admission: 7/12/2024   Date of Service: 7/14/2024 I Hospital Day: 2    Assessment & Plan   Neuro:   Diagnosis: SAH with IVH and mild hydro s/p coil embolization of R PCOM aneurysm  BD 2, HH5, MF 4  CTAH/N-0.3 cm aneurysm in expected origin of right posterior communicating artery. Possible tiny aneurysm in left anterior communicating artery region. Mild narrowing of bilateral MCA M1 and bilateral M2 segmental branch vessels, likely due to vasospasm.   CTH-Acute diffuse thickened large-volume SAH throughout the b/l parafalcine regions, b/l cerebral sulci (R worse than L), basilar cisterns, prepontine cistern, premedullary cistern, and visualized upper cervical spinal canal. Acute small volume IVH with mild hydrocephalus.   DDAVP to reverse ASA  7/11 EVD placed  7/11 CTH-CTH post EVD  7/13 Angio-coil embolization of a 3.5 mm right PCOM aneurysm.   Plan:   Neuro/Nsx consulted  EVD 10. Goal ICP <20   SBP <140  Keppra ppx x 7 days unless seizure on eeg  Nimodipine x 21days  Daily TCD  No dvt ppx until cleared by neurosx  Euvolemia  MRI  F/u am CTH  Stat CTH with any change in GCS > 2 pts     Diagnosis: Seizure  2/2 above  vEEG no seizures  Plan:   Neuro cx  vEEG  Keppra 1gm BID  CV:   Diagnosis: Hx CAD/HTN/HLD  Plan:   Cont home statin  Hold ASA     Pulm:  Diagnosis: Acute respiratory failure RESOLVED/Hx COPD without AE  Plan:   IS  Resume home inhalers     GI:   Diagnosis: Dysphagia  Plan:   Speech eval  If passes d/c IVF pending I/O for euvolemia     :   No active issues D/c anderson     F/E/N:    No active issues     Heme/Onc:   Diagnosis: Anemia  Plan:   Unclear baseline no s/s bleeding monitor     Endo:   Diagnosis: Hyperglycemia  Plan:   A1C 5.9  D/c SSI     ID:   Diagnosis: Leukocytosis RESOLVED       MSK/Skin:   Diagnosis: Penile lesion  Plan: note  from outpt derm inflamed seborrheic keratosis       Disposition: Critical care    ICU Core Measures     A: Assess, Prevent, and Manage Pain Has pain been assessed? Yes  Need for changes to pain regimen? No   B: Both SAT/SAT  N/A   C: Choice of Sedation RASS Goal: 0 Alert and Calm  Need for changes to sedation or analgesia regimen? No   D: Delirium CAM-ICU: Negative   E: Early Mobility  Plan for early mobility? Yes   F: Family Engagement Plan for family engagement today? Yes       Review of Invasive Devices:    Levine Plan: Levine to be removed. Order has been placed  Central access plan: No peripheral access able to be obtained.  Plan t/c midline  Esme Plan: Keep arterial line for hemodynamic monitoring    Prophylaxis:  VTE Contraindicated secondary to: sah   Stress Ulcer  not ordered        Significant 24hr Events     24hr events: extubated to RA.      Subjective     Review of Systems: Review of Systems   Eyes:  Negative for visual disturbance.   Respiratory: Negative.     Cardiovascular: Negative.    Neurological:  Negative for headaches.        Objective                            Vitals I/O      Most Recent Min/Max in 24hrs   Temp 97.9 °F (36.6 °C) Temp  Min: 97.6 °F (36.4 °C)  Max: 97.9 °F (36.6 °C)   Pulse 98 Pulse  Min: 66  Max: 98   Resp 16 Resp  Min: 6  Max: 22   /78 BP  Min: 94/62  Max: 155/85   O2 Sat 97 % SpO2  Min: 96 %  Max: 100 %      Intake/Output Summary (Last 24 hours) at 7/14/2024 0006  Last data filed at 7/13/2024 2200  Gross per 24 hour   Intake 3195.06 ml   Output 2316 ml   Net 879.06 ml       Diet NPO    Invasive Monitoring   Arterial Line  Esme /52  Arterial Line BP  Min: 106/34  Max: 204/64   MAP 82 mmHg  Arterial Line MAP (mmHg)  Min: 52 mmHg  Max: 104 mmHg           Physical Exam   Physical Exam  Eyes:      Comments: R 4S L 2S   Skin:     General: Skin is warm and dry.   HENT:      Head: Normocephalic and atraumatic.      Mouth/Throat:      Mouth: Mucous membranes are moist.    Cardiovascular:      Rate and Rhythm: Normal rate and regular rhythm.      Pulses: Normal pulses.      Heart sounds: Normal heart sounds.   Musculoskeletal:         General: No swelling. Normal range of motion.      Right lower leg: No edema.      Left lower leg: No edema.   Abdominal: General: Bowel sounds are normal. There is no distension.      Palpations: Abdomen is soft.      Tenderness: There is no abdominal tenderness.   Constitutional:       General: He is not in acute distress.  Pulmonary:      Effort: Pulmonary effort is normal. No respiratory distress.      Breath sounds: Normal breath sounds.   Neurological:      General: No focal deficit present.      Mental Status: He is lethargic, disoriented to place, disoriented to time and disoriented to situation.      GCS: GCS eye subscore is 4. GCS verbal subscore is 5. GCS motor subscore is 6.      Motor: Strength full and intact in all extremities. No motor deficit.            Diagnostic Studies      EKG: tele  Imaging:  I have personally reviewed pertinent reports.   and I have personally reviewed pertinent films in PACS     Medications:  Scheduled PRN   aspirin, 81 mg, Daily  chlorhexidine, 15 mL, Q12H MATTY  levETIRAcetam, 1,000 mg, Q12H MATTY  niMODipine, 60 mg, Q4H MATTY  umeclidinium, 1 puff, Daily      acetaminophen, 650 mg, Q4H PRN  hydrALAZINE, 10 mg, Q6H PRN  labetalol, 10 mg, Q6H PRN       Continuous    sodium chloride, 75 mL/hr, Last Rate: 75 mL/hr (07/13/24 1652)         Labs:    CBC    Recent Labs     07/12/24  1618 07/13/24  0534   WBC 17.17* 9.29   HGB 12.9 11.4*   HCT 38.6 33.3*    174     BMP    Recent Labs     07/12/24  2350 07/13/24  1428   SODIUM 136 135   K 3.8 4.7    108   CO2 22 21   AGAP 8 6   BUN 9 9   CREATININE 0.78 0.67   CALCIUM 8.2* 7.7*       Coags    Recent Labs     07/12/24  1618   INR 1.07   PTT 23        Additional Electrolytes  Recent Labs     07/13/24  0534   MG 1.7*   PHOS 2.8          Blood Gas    Recent Labs      07/12/24 2014   PHART 7.314*   BAO2YFT 44.1*   PO2ART 138.2*   PIJ7GRM 21.9*   BEART -4.2   SOURCE Line, Arterial     Recent Labs     07/12/24 2014   SOURCE Line, Arterial    LFTs  No recent results    Infectious  No recent results  Glucose  Recent Labs     07/12/24  1618 07/12/24  2350 07/13/24  1428   GLUC 222* 126 178*               CHRISTINA Peterson

## 2024-07-14 NOTE — PROGRESS NOTES
[facility]  Progress Note  Name: Federico Bowen I  MRN: [id number]  Unit/Bed#: ICU 07 I Date of Admission: 7/12/2024   Date of Service: 7/14/2024 I Hospital Day: 2    Assessment & Plan   * Subarachnoid hemorrhage (HCC)  Assessment & Plan  HH5 MF4 SAH. BD 3    Doing well. However impulsive and at risk of removing lines and drains. Consider 1:1 or utilize sedation.     May liberalize SBP to < 220.  EVD @ 10  Start heparin gtt @ 8  ASA 81 mg daily  SAH pathway.                Subjective/Objective     Subjective: Reports having severe headache. Wants to get out of bed.     Objective:         Invasive Devices       Central Venous Catheter Line  Duration             CVC Central Lines 07/12/24 Triple 16cm 1 day              Peripheral Intravenous Line  Duration             Peripheral IV 07/12/24 Left Antecubital 1 day    Peripheral IV 07/12/24 Left;Proximal;Ventral (anterior) Forearm 1 day              Arterial Line  Duration             Arterial Line 07/12/24 Radial 1 day              Drain  Duration             Ventriculostomy/Subdural Ventricular drainage catheter Occipital region 1 day    External Urinary Catheter Small <1 day    NG/OG/Enteral Tube Nasogastric 12 Fr Left nare <1 day                    Physical Exam:   In mittens, yelling about pain but not in severe distress. Answering appropriately otherwise.   AAO to person.   Appears to have full strength throughout.     Vitals: Blood pressure 122/68, pulse 74, temperature 97.9 °F (36.6 °C), temperature source Oral, resp. rate 12, weight 54 kg (119 lb), SpO2 98%.,There is no height or weight on file to calculate BMI.    Hemodynamic Monitoring: MAP: Arterial Line MAP (mmHg): 76 mmHg    Lab Results: I have personally reviewed pertinent results.      Imaging Studies: I have personally reviewed pertinent reports.      EKG, Pathology, and Other Studies: I have personally reviewed pertinent reports.      VTE Pharmacologic  Prophylaxis: Sequential compression device (Venodyne)     VTE Mechanical Prophylaxis: sequential compression device

## 2024-07-14 NOTE — RESPIRATORY THERAPY NOTE
07/13/24 1957   Respiratory Assessment   Resp Comments Patient extubated to low-flow oxygen without complications. Airway intact post extubation.   O2 Device NC   Oxygen Therapy/Pulse Ox   O2 Device Nasal cannula   O2 Therapy Oxygen   Nasal Cannula O2 Flow Rate (L/min) 4 L/min   Calculated FIO2 (%) - Nasal Cannula 36   SpO2 Activity At Rest   $ Pulse Oximetry Spot Check Charge Completed

## 2024-07-14 NOTE — PROGRESS NOTES
"Progress Note - Neurology   Federico Bowen 68 y.o. male MRN: [id number]  Unit/Bed#: ICU 07 Encounter: [id number]      Assessment & Plan     Seizures (HCC)  Assessment & Plan  68 y.o.  male with CAD, HTN, HLD, tobacco use, unstable angina, COPD, history of cocaine use, presented to [facility] on 7/12 with reported acute headache followed by unresponsiveness and 2 witnessed seizures.  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 He is also s/p EVD.  He was transferred to [facility] for video EEG monitoring and neurosurgical consultation.  He was bolused with Keppra 2 g IV x 1 and continued on 1 g twice daily.       Workup:  - CT head 7/12/24:  \"Acute diffuse thickened large-volume subarachnoid hemorrhage throughout the bilateral parafalcine regions, bilateral cerebral sulci (right worse than left), basilar cisterns, prepontine cistern, premedullary cistern, and visualized upper cervical spinal canal. Acute small volume intraventricular hemorrhage with mild hydrocephalus. No acute infarction.\"  - CTA H/N wwo contrast 7/12/24:  \"0.3 cm aneurysm in expected origin of right posterior communicating artery. Possible tiny aneurysm in left anterior communicating artery region. These could be potential sources of diffuse subarachnoid hemorrhage. Recommend neurovascular surgery consultation for further evaluation. Mild narrowing of bilateral MCA M1 and bilateral M2 segmental branch vessels, likely due to vasospasm. Patent stent in left proximal subclavian artery. Endotracheal tube in trachea.\"  - Repeat head CT on 7/14/2024 demonstrated stable appearing diffuse SAH/IVH.  - UDS (+) for benzos.  - No seizures or epileptiform discharges noted on vEEG.    Patient has been extubated and is alert and following commands, without lateralized findings.    Plan:  -EEG to be discontinued.  -Continue Keppra 1 g " "twice daily.  -Seizure precautions.  -Management of SAH as per NSG/NCC. Plan is for continuing nimodipine, daily TCDs, and SBP goal less than 160 for now. Hold AP/AC.  -Medical management as per NCC.          Federico Bowen will need follow up in in 6 weeks with epilepsy attending or advance practitioner. He will not require outpatient neurological testing.    Subjective:   Patient extubated.  Currently with bilateral mitts in place, which he is requesting to have removed.  Somewhat agitated.  Daughters at bedside pleased that patient is conversant and recognizes them, but state he does not understand what has happened to him.  Patient complaining of \"big pain\" in his left upper extremity and head.  No seizures on video EEG overnight per epileptologist.    ROS: 12 point review of systems performed and negative except as indicated above.    Vitals: Blood pressure 122/68, pulse 74, temperature 97.9 °F (36.6 °C), temperature source Oral, resp. rate 12, weight 54 kg (119 lb), SpO2 98%.,There is no height or weight on file to calculate BMI.    Physical Exam:   General:  Patient is well-developed, frail-appearing, and in no acute distress.  HEENT:  Head normocephalic.  Eyes anicteric.  Right EVD.  NG tube in place.  Bilateral lens replacements noted.  Cardiovascular:  With regular rhythm.  Lungs:  Normal effort. Nonlabored breathing.  Extremities:  With no significant edema.  Bilateral mitts in place.  Skin: No rashes.  Neurologic:  Patient is sleeping upon approach but easily alerts to voice.  Perhaps mildly dysarthric but no symbolic language difficulty.  Patient is able to follow simple midline and appendicular commands.    Gait deferred for safety.    Cranial Nerves:   II: Right pupil 2 mm and reactive; left pupil 1 mm and reactive.  Cannot visualize optic fundi.  III,IV,VI: extraocular movements intact with no nystagmus.   V: Sensation in the V1 through V3 distributions intact to light touch bilaterally.   VII: " Face is symmetric with no weakness noted.   XII: Tongue midline with no atrophy or fasciculations with appropriate movement.     Full strength to confrontation throughout.    Patient reports equal light touch appreciation of all 4 extremities.    Toes bilaterally upgoing.    Lab, Imaging and other studies: CBC:   Results from last 7 days   Lab Units 07/14/24  0517 07/13/24  0534 07/12/24  1618   WBC Thousand/uL 15.17* 9.29 17.17*   RBC Million/uL 3.30* 3.62* 4.12   HEMOGLOBIN g/dL 10.4* 11.4* 12.9   HEMATOCRIT % 30.5* 33.3* 38.6   MCV fL 92 92 94   PLATELETS Thousands/uL 150 174 226   , BMP/CMP:   Results from last 7 days   Lab Units 07/14/24  0517 07/13/24  1428 07/12/24  2350   SODIUM mmol/L 136 135 136   POTASSIUM mmol/L 3.8 4.7 3.8   CHLORIDE mmol/L 105 108 106   CO2 mmol/L 22 21 22   BUN mg/dL 10 9 9   CREATININE mg/dL 0.55* 0.67 0.78   CALCIUM mg/dL 8.4 7.7* 8.2*   EGFR ml/min/1.73sq m 106 98 92   , Coagulation:   Results from last 7 days   Lab Units 07/12/24  1618   INR  1.07     VTE Prophylaxis: Sequential compression device (Venodyne)     Counseling / Coordination of Care  I have spent a total time of 45 minutes in caring for this patient on the day of the visit/encounter including Diagnostic results, Risks and benefits of tx options, Instructions for management, Patient and family education, Importance of tx compliance, Risk factor reductions, Impressions, Counseling / Coordination of care, Documenting in the medical record, Reviewing / ordering tests, medicine, procedures  , Obtaining or reviewing history  , and Communicating with other healthcare professionals .

## 2024-07-14 NOTE — ASSESSMENT & PLAN NOTE
HH5 MF4 SAH. BD 3    Doing well. However impulsive and at risk of removing lines and drains. Consider 1:1 or utilize sedation.     May liberalize SBP to < 220.  EVD @ 10  Start heparin gtt @ 8  ASA 81 mg daily  SAH pathway.

## 2024-07-14 NOTE — PROGRESS NOTES
[facility]  Interval Progress Note: Critical Care  Name: Federico Bowen I  MRN: [id number]  Unit/Bed#: ICU 07 I Date of Admission: 7/12/2024   Date of Service: 7/13/2024 I Hospital Day: 1    Interval Events:        Called by RN pt sitting up in bed. On PS 6/6 40%. RSBI 16 -800 RR 10-12. Sats 97%. Fentanyl gtt d/c'd. Discussed with dr brown will extubate     Pertinent New Data:   blood pressure, pulse, temperature, respirations, and pulse oximetry      CBC:   Lab Results   Component Value Date    WBC 9.29 07/13/2024    HGB 11.4 (L) 07/13/2024    HCT 33.3 (L) 07/13/2024    MCV 92 07/13/2024     07/13/2024    RBC 3.62 (L) 07/13/2024    MCH 31.5 07/13/2024    MCHC 34.2 07/13/2024    RDW 13.7 07/13/2024    MPV 9.5 07/13/2024    NRBC 0 07/13/2024   , CMP:   Lab Results   Component Value Date    SODIUM 135 07/13/2024    K 4.7 07/13/2024     07/13/2024    CO2 21 07/13/2024    BUN 9 07/13/2024    CREATININE 0.67 07/13/2024    CALCIUM 7.7 (L) 07/13/2024    EGFR 98 07/13/2024     CT headI have personally reviewed pertinent reports.   and I have personally reviewed pertinent films in PACS      Assessment and Plan  Diagnosis: Acute respiratory failure 2/2 mental status  Plan: extubated to RA, IS respiratory protocol, speech eval in am, place NGT for oral meds    Billing Level:  Critical Care Time Statement: Upon my evaluation, this patient had a high probability of imminent or life-threatening deterioration due to resp failure, which required my direct attention, intervention, and personal management.  I spent a total of 10 minutes directly providing critical care services, including complex medical decision making (to support/prevent further life-threatening deterioration). and ventilator management. This time is exclusive of procedures, teaching, family meetings, and any prior time recorded by providers other than myself.      SIGNATURE: Kayleigh Stezenko, CRNP

## 2024-07-14 NOTE — PLAN OF CARE
Problem: Prexisting or High Potential for Compromised Skin Integrity  Goal: Skin integrity is maintained or improved  Description: INTERVENTIONS:  - Identify patients at risk for skin breakdown  - Assess and monitor skin integrity  - Assess and monitor nutrition and hydration status  - Monitor labs   - Assess for incontinence   - Turn and reposition patient  - Assist with mobility/ambulation  - Relieve pressure over bony prominences  - Avoid friction and shearing  - Provide appropriate hygiene as needed including keeping skin clean and dry  - Evaluate need for skin moisturizer/barrier cream  - Collaborate with interdisciplinary team   - Patient/family teaching  - Consider wound care consult   Outcome: Progressing     Problem: Neurological Deficit  Goal: Neurological status is stable or improving  Description: Interventions:  - Monitor and assess patient's level of consciousness, motor function, sensory function, and level of assistance needed for ADLs.   - Monitor and report changes from baseline. Collaborate with interdisciplinary team to initiate plan and implement interventions as ordered.   - Provide and maintain a safe environment.  - Consider seizure precautions.  - Consider fall precautions.  - Consider aspiration precautions.  - Consider bleeding precautions.  Outcome: Progressing     Problem: Activity Intolerance/Impaired Mobility  Goal: Mobility/activity is maintained at optimum level for patient  Description: Interventions:  - Assess and monitor patient  barriers to mobility and need for assistive/adaptive devices.  - Assess patient's emotional response to limitations.  - Collaborate with interdisciplinary team and initiate plans and interventions as ordered.  - Encourage independent activity per ability.  - Maintain proper body alignment.  - Perform active/passive rom as tolerated/ordered.  - Plan activities to conserve energy.  - Turn patient as appropriate  Outcome: Progressing     Problem:  Communication Impairment  Goal: Ability to express needs and understand communication  Description: Assess patient's communication skills and ability to understand information.  Patient will demonstrate use of effective communication techniques, alternative methods of communication and understanding even if not able to speak.     - Encourage communication and provide alternate methods of communication as needed.  - Collaborate with case management/ for discharge needs.  - Include patient/family/caregiver in decisions related to communication.  Outcome: Progressing     Problem: Potential for Aspiration  Goal: Non-ventilated patient's risk of aspiration is minimized  Description: Assess and monitor vital signs, respiratory status, and labs (WBC).  Monitor for signs of aspiration (tachypnea, cough, rales, wheezing, cyanosis, fever).    - Assess and monitor patient's ability to swallow.  - Place patient up in chair to eat if possible.  - HOB up at 90 degrees to eat if unable to get patient up into chair.  - Supervise patient during oral intake.   - Instruct patient/ family to take small bites.  - Instruct patient/ family to take small single sips when taking liquids.  - Follow patient-specific strategies generated by speech pathologist.  Outcome: Progressing  Goal: Ventilated patient's risk of aspiration is minimized  Description: Assess and monitor vital signs, respiratory status, airway cuff pressure, and labs (WBC).  Monitor for signs of aspiration (tachypnea, cough, rales, wheezing, cyanosis, fever).    - Elevate head of bed 30 degrees if patient has tube feeding.  - Monitor tube feeding.  Outcome: Progressing     Problem: Nutrition  Goal: Nutrition/Hydration status is improving  Description: Monitor and assess patient's nutrition/hydration status for malnutrition (ex- brittle hair, bruises, dry skin, pale skin and conjunctiva, muscle wasting, smooth red tongue, and disorientation). Collaborate with  interdisciplinary team and initiate plan and interventions as ordered.  Monitor patient's weight and dietary intake as ordered or per policy. Utilize nutrition screening tool and intervene per policy. Determine patient's food preferences and provide high-protein, high-caloric foods as appropriate.     - Assist patient with eating.  - Allow adequate time for meals.  - Encourage patient to take dietary supplement as ordered.  - Collaborate with clinical nutritionist.  - Include patient/family/caregiver in decisions related to nutrition.  Outcome: Progressing     Problem: PAIN - ADULT  Goal: Verbalizes/displays adequate comfort level or baseline comfort level  Description: Interventions:  - Encourage patient to monitor pain and request assistance  - Assess pain using appropriate pain scale  - Administer analgesics based on type and severity of pain and evaluate response  - Implement non-pharmacological measures as appropriate and evaluate response  - Consider cultural and social influences on pain and pain management  - Notify physician/advanced practitioner if interventions unsuccessful or patient reports new pain  Outcome: Progressing     Problem: INFECTION - ADULT  Goal: Absence or prevention of progression during hospitalization  Description: INTERVENTIONS:  - Assess and monitor for signs and symptoms of infection  - Monitor lab/diagnostic results  - Monitor all insertion sites, i.e. indwelling lines, tubes, and drains  - Monitor endotracheal if appropriate and nasal secretions for changes in amount and color  - [place] appropriate cooling/warming therapies per order  - Administer medications as ordered  - Instruct and encourage patient and family to use good hand hygiene technique  - Identify and instruct in appropriate isolation precautions for identified infection/condition  Outcome: Progressing  Goal: Absence of fever/infection during neutropenic period  Description: INTERVENTIONS:  - Monitor WBC    Outcome:  Progressing     Problem: SAFETY ADULT  Goal: Patient will remain free of falls  Description: INTERVENTIONS:  - Educate patient/family on patient safety including physical limitations  - Instruct patient to call for assistance with activity   - Consult OT/PT to assist with strengthening/mobility   - Keep Call bell within reach  - Keep bed low and locked with side rails adjusted as appropriate  - Keep care items and personal belongings within reach  - Initiate and maintain comfort rounds  - Make Fall Risk Sign visible to staff  - Offer Toileting every Hours, in advance of need  - Initiate/Maintain alarm  - Obtain necessary fall risk management equipment:   - Apply yellow socks and bracelet for high fall risk patients  - Consider moving patient to room near nurses station  Outcome: Progressing  Goal: Maintain or return to baseline ADL function  Description: INTERVENTIONS:  -  Assess patient's ability to carry out ADLs; assess patient's baseline for ADL function and identify physical deficits which impact ability to perform ADLs (bathing, care of mouth/teeth, toileting, grooming, dressing, etc.)  - Assess/evaluate cause of self-care deficits   - Assess range of motion  - Assess patient's mobility; develop plan if impaired  - Assess patient's need for assistive devices and provide as appropriate  - Encourage maximum independence but intervene and supervise when necessary  - Involve family in performance of ADLs  - Assess for home care needs following discharge   - Consider OT consult to assist with ADL evaluation and planning for discharge  - Provide patient education as appropriate  Outcome: Progressing  Goal: Maintains/Returns to pre admission functional level  Description: INTERVENTIONS:  - Perform AM-PAC 6 Click Basic Mobility/ Daily Activity assessment daily.  - Set and communicate daily mobility goal to care team and patient/family/caregiver.   - Collaborate with rehabilitation services on mobility goals if  consulted  - Perform Range of Motion 3 times a day.  - Reposition patient every 2 hours.  - Dangle patient 3 times a day  - Stand patient 3 times a day  - Ambulate patient 3 times a day  - Out of bed to chair 3 times a day   - Out of bed for meals 3 times a day  - Out of bed for toileting  - Record patient progress and toleration of activity level   Outcome: Progressing     Problem: DISCHARGE PLANNING  Goal: Discharge to home or other facility with appropriate resources  Description: INTERVENTIONS:  - Identify barriers to discharge w/patient and caregiver  - Arrange for needed discharge resources and transportation as appropriate  - Identify discharge learning needs (meds, wound care, etc.)  - Arrange for interpretive services to assist at discharge as needed  - Refer to Case Management Department for coordinating discharge planning if the patient needs post-hospital services based on physician/advanced practitioner order or complex needs related to functional status, cognitive ability, or social support system  Outcome: Progressing     Problem: Knowledge Deficit  Goal: Patient/family/caregiver demonstrates understanding of disease process, treatment plan, medications, and discharge instructions  Description: Complete learning assessment and assess knowledge base.  Interventions:  - Provide teaching at level of understanding  - Provide teaching via preferred learning methods  Outcome: Progressing     Problem: SAFETY,RESTRAINT: NV/NON-SELF DESTRUCTIVE BEHAVIOR  Goal: Remains free of harm/injury (restraint for non violent/non self-detsructive behavior)  Description: INTERVENTIONS:  - Instruct patient/family regarding restraint use   - Assess and monitor physiologic and psychological status   - Provide interventions and comfort measures to meet assessed patient needs   - Identify and implement measures to help patient regain control  - Assess readiness for release of restraint   Outcome: Progressing  Goal: Returns to  optimal restraint-free functioning  Description: INTERVENTIONS:  - Assess the patient's behavior and symptoms that indicate continued need for restraint  - Identify and implement measures to help patient regain control  - Assess readiness for release of restraint   Outcome: Progressing

## 2024-07-15 ENCOUNTER — APPOINTMENT (INPATIENT)
Dept: NON INVASIVE DIAGNOSTICS | Facility: HOSPITAL | Age: 69
DRG: 020 | End: 2024-07-15
Payer: MEDICARE

## 2024-07-15 LAB
ANION GAP SERPL CALCULATED.3IONS-SCNC: 7 MMOL/L (ref 4–13)
APTT PPP: 34 SECONDS (ref 23–37)
ATRIAL RATE: 72 BPM
BACTERIA CSF CULT: NO GROWTH
BASOPHILS # BLD AUTO: 0.02 THOUSANDS/ÂΜL (ref 0–0.1)
BASOPHILS NFR BLD AUTO: 0 % (ref 0–1)
BUN SERPL-MCNC: 9 MG/DL (ref 5–25)
CA-I BLD-SCNC: 1.16 MMOL/L (ref 1.12–1.32)
CALCIUM SERPL-MCNC: 8.5 MG/DL (ref 8.4–10.2)
CHLORIDE SERPL-SCNC: 107 MMOL/L (ref 96–108)
CO2 SERPL-SCNC: 25 MMOL/L (ref 21–32)
CREAT SERPL-MCNC: 0.55 MG/DL (ref 0.6–1.3)
EOSINOPHIL # BLD AUTO: 0.02 THOUSAND/ÂΜL (ref 0–0.61)
EOSINOPHIL NFR BLD AUTO: 0 % (ref 0–6)
ERYTHROCYTE [DISTWIDTH] IN BLOOD BY AUTOMATED COUNT: 13.6 % (ref 11.6–15.1)
GFR SERPL CREATININE-BSD FRML MDRD: 106 ML/MIN/1.73SQ M
GLUCOSE SERPL-MCNC: 107 MG/DL (ref 65–140)
GLUCOSE SERPL-MCNC: 110 MG/DL (ref 65–140)
GLUCOSE SERPL-MCNC: 118 MG/DL (ref 65–140)
GLUCOSE SERPL-MCNC: 99 MG/DL (ref 65–140)
HCT VFR BLD AUTO: 32.3 % (ref 36.5–49.3)
HGB BLD-MCNC: 10.8 G/DL (ref 12–17)
IMM GRANULOCYTES # BLD AUTO: 0.04 THOUSAND/UL (ref 0–0.2)
IMM GRANULOCYTES NFR BLD AUTO: 1 % (ref 0–2)
LYMPHOCYTES # BLD AUTO: 1.61 THOUSANDS/ÂΜL (ref 0.6–4.47)
LYMPHOCYTES NFR BLD AUTO: 18 % (ref 14–44)
MAGNESIUM SERPL-MCNC: 2 MG/DL (ref 1.9–2.7)
MCH RBC QN AUTO: 31.2 PG (ref 26.8–34.3)
MCHC RBC AUTO-ENTMCNC: 33.4 G/DL (ref 31.4–37.4)
MCV RBC AUTO: 93 FL (ref 82–98)
MONOCYTES # BLD AUTO: 0.55 THOUSAND/ÂΜL (ref 0.17–1.22)
MONOCYTES NFR BLD AUTO: 6 % (ref 4–12)
NEUTROPHILS # BLD AUTO: 6.54 THOUSANDS/ÂΜL (ref 1.85–7.62)
NEUTS SEG NFR BLD AUTO: 75 % (ref 43–75)
NRBC BLD AUTO-RTO: 0 /100 WBCS
P AXIS: 110 DEGREES
PHOSPHATE SERPL-MCNC: 1.5 MG/DL (ref 2.3–4.1)
PLATELET # BLD AUTO: 182 THOUSANDS/UL (ref 149–390)
PMV BLD AUTO: 10.1 FL (ref 8.9–12.7)
POTASSIUM SERPL-SCNC: 3.6 MMOL/L (ref 3.5–5.3)
PR INTERVAL: 171 MS
QRS AXIS: 57 DEGREES
QRSD INTERVAL: 83 MS
QT INTERVAL: 383 MS
QTC INTERVAL: 420 MS
RBC # BLD AUTO: 3.46 MILLION/UL (ref 3.88–5.62)
SODIUM SERPL-SCNC: 139 MMOL/L (ref 135–147)
T WAVE AXIS: 12 DEGREES
VENTRICULAR RATE: 72 BPM
WBC # BLD AUTO: 8.78 THOUSAND/UL (ref 4.31–10.16)

## 2024-07-15 PROCEDURE — 80048 BASIC METABOLIC PNL TOTAL CA: CPT

## 2024-07-15 PROCEDURE — 93886 INTRACRANIAL COMPLETE STUDY: CPT

## 2024-07-15 PROCEDURE — 83735 ASSAY OF MAGNESIUM: CPT

## 2024-07-15 PROCEDURE — 93010 ELECTROCARDIOGRAM REPORT: CPT | Performed by: INTERNAL MEDICINE

## 2024-07-15 PROCEDURE — 82330 ASSAY OF CALCIUM: CPT

## 2024-07-15 PROCEDURE — 82948 REAGENT STRIP/BLOOD GLUCOSE: CPT

## 2024-07-15 PROCEDURE — 97167 OT EVAL HIGH COMPLEX 60 MIN: CPT

## 2024-07-15 PROCEDURE — 99291 CRITICAL CARE FIRST HOUR: CPT | Performed by: INTERNAL MEDICINE

## 2024-07-15 PROCEDURE — 85730 THROMBOPLASTIN TIME PARTIAL: CPT

## 2024-07-15 PROCEDURE — 99233 SBSQ HOSP IP/OBS HIGH 50: CPT | Performed by: STUDENT IN AN ORGANIZED HEALTH CARE EDUCATION/TRAINING PROGRAM

## 2024-07-15 PROCEDURE — 99223 1ST HOSP IP/OBS HIGH 75: CPT | Performed by: NURSE PRACTITIONER

## 2024-07-15 PROCEDURE — 97163 PT EVAL HIGH COMPLEX 45 MIN: CPT

## 2024-07-15 PROCEDURE — 85025 COMPLETE CBC W/AUTO DIFF WBC: CPT

## 2024-07-15 PROCEDURE — 99232 SBSQ HOSP IP/OBS MODERATE 35: CPT | Performed by: NURSE PRACTITIONER

## 2024-07-15 PROCEDURE — 93886 INTRACRANIAL COMPLETE STUDY: CPT | Performed by: SURGERY

## 2024-07-15 PROCEDURE — 95718 EEG PHYS/QHP 2-12 HR W/VEEG: CPT | Performed by: PSYCHIATRY & NEUROLOGY

## 2024-07-15 PROCEDURE — 84100 ASSAY OF PHOSPHORUS: CPT

## 2024-07-15 RX ORDER — ACETAMINOPHEN 160 MG/5ML
650 SUSPENSION ORAL EVERY 6 HOURS
Status: DISCONTINUED | OUTPATIENT
Start: 2024-07-15 | End: 2024-07-24

## 2024-07-15 RX ORDER — GABAPENTIN 300 MG/1
300 CAPSULE ORAL 3 TIMES DAILY
Status: DISCONTINUED | OUTPATIENT
Start: 2024-07-15 | End: 2024-07-17

## 2024-07-15 RX ADMIN — CHLORHEXIDINE GLUCONATE 0.12% ORAL RINSE 15 ML: 1.2 LIQUID ORAL at 20:09

## 2024-07-15 RX ADMIN — OXYCODONE HYDROCHLORIDE 5 MG: 5 SOLUTION ORAL at 22:05

## 2024-07-15 RX ADMIN — ACETAMINOPHEN 650 MG: 650 SUSPENSION ORAL at 02:18

## 2024-07-15 RX ADMIN — SODIUM CHLORIDE 1 G: 1 TABLET ORAL at 08:10

## 2024-07-15 RX ADMIN — Medication 60 MG: at 12:07

## 2024-07-15 RX ADMIN — Medication 60 MG: at 04:25

## 2024-07-15 RX ADMIN — ACETAMINOPHEN 650 MG: 650 SUSPENSION ORAL at 16:00

## 2024-07-15 RX ADMIN — HYDROMORPHONE HYDROCHLORIDE 0.2 MG: 0.2 INJECTION, SOLUTION INTRAMUSCULAR; INTRAVENOUS; SUBCUTANEOUS at 02:35

## 2024-07-15 RX ADMIN — ACETAMINOPHEN 650 MG: 650 SUSPENSION ORAL at 22:05

## 2024-07-15 RX ADMIN — Medication 60 MG: at 16:00

## 2024-07-15 RX ADMIN — METHOCARBAMOL 500 MG: 500 TABLET ORAL at 12:07

## 2024-07-15 RX ADMIN — OXYCODONE HYDROCHLORIDE 5 MG: 5 SOLUTION ORAL at 09:05

## 2024-07-15 RX ADMIN — Medication 60 MG: at 08:10

## 2024-07-15 RX ADMIN — UMECLIDINIUM 1 PUFF: 62.5 AEROSOL, POWDER ORAL at 08:11

## 2024-07-15 RX ADMIN — SODIUM CHLORIDE 1 G: 1 TABLET ORAL at 16:01

## 2024-07-15 RX ADMIN — ACETAMINOPHEN 650 MG: 650 SUSPENSION ORAL at 12:07

## 2024-07-15 RX ADMIN — GABAPENTIN 100 MG: 100 CAPSULE ORAL at 08:10

## 2024-07-15 RX ADMIN — GABAPENTIN 300 MG: 300 CAPSULE ORAL at 20:14

## 2024-07-15 RX ADMIN — CHLORHEXIDINE GLUCONATE 0.12% ORAL RINSE 15 ML: 1.2 LIQUID ORAL at 08:10

## 2024-07-15 RX ADMIN — POTASSIUM PHOSPHATE, MONOBASIC POTASSIUM PHOSPHATE, DIBASIC 30 MMOL: 224; 236 INJECTION, SOLUTION, CONCENTRATE INTRAVENOUS at 08:10

## 2024-07-15 RX ADMIN — BUTALBITAL, ACETAMINOPHEN AND CAFFEINE 1 TABLET: 325; 50; 40 TABLET ORAL at 14:17

## 2024-07-15 RX ADMIN — BUTALBITAL, ACETAMINOPHEN AND CAFFEINE 1 TABLET: 325; 50; 40 TABLET ORAL at 22:05

## 2024-07-15 RX ADMIN — ASPIRIN 81 MG CHEWABLE TABLET 81 MG: 81 TABLET CHEWABLE at 08:10

## 2024-07-15 RX ADMIN — LEVETIRACETAM 1000 MG: 100 SOLUTION ORAL at 08:10

## 2024-07-15 RX ADMIN — LEVETIRACETAM 1000 MG: 100 SOLUTION ORAL at 20:05

## 2024-07-15 RX ADMIN — BUTALBITAL, ACETAMINOPHEN AND CAFFEINE 1 TABLET: 325; 50; 40 TABLET ORAL at 05:44

## 2024-07-15 RX ADMIN — Medication 60 MG: at 20:05

## 2024-07-15 RX ADMIN — LIDOCAINE 5% 1 PATCH: 700 PATCH TOPICAL at 08:11

## 2024-07-15 RX ADMIN — SODIUM CHLORIDE 1 G: 1 TABLET ORAL at 12:07

## 2024-07-15 RX ADMIN — GABAPENTIN 300 MG: 300 CAPSULE ORAL at 16:01

## 2024-07-15 RX ADMIN — OXYCODONE HYDROCHLORIDE 5 MG: 5 SOLUTION ORAL at 17:46

## 2024-07-15 NOTE — ASSESSMENT & PLAN NOTE
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  Upon arrival to the ED patient was posturing.  Imaging revealed a large amount of SAH and a right PCOM aneurysm.  Per chart review he was previously on aspirin reversed with DDAVP.    Imaging:    CT head without 7/14/2024:No significant interval change in extensive bilateral subarachnoid hemorrhage and intraventricular hemorrhage.     Plan:  Continue frequent neurological checks  Repeat CTA/CT head w/wo STAT if GCS declines more than 2 points in 1 hour  Continue SAH pathway/spasm watch:  Daily TCD's: R 2.26 L 1.33  Continue Nimodipine 60mg Q4hrs  Continue Keppra 1000 mg twice daily per neurology  Normonatremia (139 today) currently on salt tabs 1 g 3 times daily  Euvolemia (-744.7)  Hemoglobin >8 (10.8 today)  EVD placed on 7/13/2024  Continue EVD at 10mmhg  Monitor output and ICP  ICPs 1-10, in room 8  Output 62 ml/24hrs  Maintain ICP goal <20  SBP <220  Continue aspirin 81 mg daily  Will increase heparin drip to 10 units/kilogram/hour  Last PTT 34  Medical management and pain control per primary team  DVT PPX: SCDs and heparin drip    Neurosurgery will continue to follow closely, call with any further questions or concerns.

## 2024-07-15 NOTE — PLAN OF CARE
Problem: Prexisting or High Potential for Compromised Skin Integrity  Goal: Skin integrity is maintained or improved  Description: INTERVENTIONS:  - Identify patients at risk for skin breakdown  - Assess and monitor skin integrity  - Assess and monitor nutrition and hydration status  - Monitor labs   - Assess for incontinence   - Turn and reposition patient  - Assist with mobility/ambulation  - Relieve pressure over bony prominences  - Avoid friction and shearing  - Provide appropriate hygiene as needed including keeping skin clean and dry  - Evaluate need for skin moisturizer/barrier cream  - Collaborate with interdisciplinary team   - Patient/family teaching  - Consider wound care consult   Outcome: Progressing     Problem: Neurological Deficit  Goal: Neurological status is stable or improving  Description: Interventions:  - Monitor and assess patient's level of consciousness, motor function, sensory function, and level of assistance needed for ADLs.   - Monitor and report changes from baseline. Collaborate with interdisciplinary team to initiate plan and implement interventions as ordered.   - Provide and maintain a safe environment.  - Consider seizure precautions.  - Consider fall precautions.  - Consider aspiration precautions.  - Consider bleeding precautions.  Outcome: Progressing     Problem: Activity Intolerance/Impaired Mobility  Goal: Mobility/activity is maintained at optimum level for patient  Description: Interventions:  - Assess and monitor patient  barriers to mobility and need for assistive/adaptive devices.  - Assess patient's emotional response to limitations.  - Collaborate with interdisciplinary team and initiate plans and interventions as ordered.  - Encourage independent activity per ability.  - Maintain proper body alignment.  - Perform active/passive rom as tolerated/ordered.  - Plan activities to conserve energy.  - Turn patient as appropriate  Outcome: Progressing     Problem:  Communication Impairment  Goal: Ability to express needs and understand communication  Description: Assess patient's communication skills and ability to understand information.  Patient will demonstrate use of effective communication techniques, alternative methods of communication and understanding even if not able to speak.     - Encourage communication and provide alternate methods of communication as needed.  - Collaborate with case management/ for discharge needs.  - Include patient/family/caregiver in decisions related to communication.  Outcome: Progressing     Problem: Potential for Aspiration  Goal: Non-ventilated patient's risk of aspiration is minimized  Description: Assess and monitor vital signs, respiratory status, and labs (WBC).  Monitor for signs of aspiration (tachypnea, cough, rales, wheezing, cyanosis, fever).    - Assess and monitor patient's ability to swallow.  - Place patient up in chair to eat if possible.  - HOB up at 90 degrees to eat if unable to get patient up into chair.  - Supervise patient during oral intake.   - Instruct patient/ family to take small bites.  - Instruct patient/ family to take small single sips when taking liquids.  - Follow patient-specific strategies generated by speech pathologist.  Outcome: Progressing  Goal: Ventilated patient's risk of aspiration is minimized  Description: Assess and monitor vital signs, respiratory status, airway cuff pressure, and labs (WBC).  Monitor for signs of aspiration (tachypnea, cough, rales, wheezing, cyanosis, fever).    - Elevate head of bed 30 degrees if patient has tube feeding.  - Monitor tube feeding.  Outcome: Progressing     Problem: Nutrition  Goal: Nutrition/Hydration status is improving  Description: Monitor and assess patient's nutrition/hydration status for malnutrition (ex- brittle hair, bruises, dry skin, pale skin and conjunctiva, muscle wasting, smooth red tongue, and disorientation). Collaborate with  interdisciplinary team and initiate plan and interventions as ordered.  Monitor patient's weight and dietary intake as ordered or per policy. Utilize nutrition screening tool and intervene per policy. Determine patient's food preferences and provide high-protein, high-caloric foods as appropriate.     - Assist patient with eating.  - Allow adequate time for meals.  - Encourage patient to take dietary supplement as ordered.  - Collaborate with clinical nutritionist.  - Include patient/family/caregiver in decisions related to nutrition.  Outcome: Progressing     Problem: PAIN - ADULT  Goal: Verbalizes/displays adequate comfort level or baseline comfort level  Description: Interventions:  - Encourage patient to monitor pain and request assistance  - Assess pain using appropriate pain scale  - Administer analgesics based on type and severity of pain and evaluate response  - Implement non-pharmacological measures as appropriate and evaluate response  - Consider cultural and social influences on pain and pain management  - Notify physician/advanced practitioner if interventions unsuccessful or patient reports new pain  Outcome: Progressing     Problem: INFECTION - ADULT  Goal: Absence or prevention of progression during hospitalization  Description: INTERVENTIONS:  - Assess and monitor for signs and symptoms of infection  - Monitor lab/diagnostic results  - Monitor all insertion sites, i.e. indwelling lines, tubes, and drains  - Monitor endotracheal if appropriate and nasal secretions for changes in amount and color  - [place] appropriate cooling/warming therapies per order  - Administer medications as ordered  - Instruct and encourage patient and family to use good hand hygiene technique  - Identify and instruct in appropriate isolation precautions for identified infection/condition  Outcome: Progressing  Goal: Absence of fever/infection during neutropenic period  Description: INTERVENTIONS:  - Monitor WBC    Outcome:  Progressing     Problem: SAFETY ADULT  Goal: Patient will remain free of falls  Description: INTERVENTIONS:  - Educate patient/family on patient safety including physical limitations  - Instruct patient to call for assistance with activity   - Consult OT/PT to assist with strengthening/mobility   - Keep Call bell within reach  - Keep bed low and locked with side rails adjusted as appropriate  - Keep care items and personal belongings within reach  - Initiate and maintain comfort rounds  - Make Fall Risk Sign visible to staff  - Offer Toileting every  Hours, in advance of need  - Initiate/Maintain alarm  - Obtain necessary fall risk management equipment:   - Apply yellow socks and bracelet for high fall risk patients  - Consider moving patient to room near nurses station  Outcome: Progressing  Goal: Maintain or return to baseline ADL function  Description: INTERVENTIONS:  -  Assess patient's ability to carry out ADLs; assess patient's baseline for ADL function and identify physical deficits which impact ability to perform ADLs (bathing, care of mouth/teeth, toileting, grooming, dressing, etc.)  - Assess/evaluate cause of self-care deficits   - Assess range of motion  - Assess patient's mobility; develop plan if impaired  - Assess patient's need for assistive devices and provide as appropriate  - Encourage maximum independence but intervene and supervise when necessary  - Involve family in performance of ADLs  - Assess for home care needs following discharge   - Consider OT consult to assist with ADL evaluation and planning for discharge  - Provide patient education as appropriate  Outcome: Progressing  Goal: Maintains/Returns to pre admission functional level  Description: INTERVENTIONS:  - Perform AM-PAC 6 Click Basic Mobility/ Daily Activity assessment daily.  - Set and communicate daily mobility goal to care team and patient/family/caregiver.   - Collaborate with rehabilitation services on mobility goals if  consulted  - Perform Range of Motion  times a day.  - Reposition patient every  hours.  - Dangle patient  times a day  - Stand patient  times a day  - Ambulate patient  times a day  - Out of bed to chair times a day   - Out of bed for meals times a day  - Out of bed for toileting  - Record patient progress and toleration of activity level   Outcome: Progressing     Problem: DISCHARGE PLANNING  Goal: Discharge to home or other facility with appropriate resources  Description: INTERVENTIONS:  - Identify barriers to discharge w/patient and caregiver  - Arrange for needed discharge resources and transportation as appropriate  - Identify discharge learning needs (meds, wound care, etc.)  - Arrange for interpretive services to assist at discharge as needed  - Refer to Case Management Department for coordinating discharge planning if the patient needs post-hospital services based on physician/advanced practitioner order or complex needs related to functional status, cognitive ability, or social support system  Outcome: Progressing     Problem: Knowledge Deficit  Goal: Patient/family/caregiver demonstrates understanding of disease process, treatment plan, medications, and discharge instructions  Description: Complete learning assessment and assess knowledge base.  Interventions:  - Provide teaching at level of understanding  - Provide teaching via preferred learning methods  Outcome: Progressing     Problem: SAFETY,RESTRAINT: NV/NON-SELF DESTRUCTIVE BEHAVIOR  Goal: Remains free of harm/injury (restraint for non violent/non self-detsructive behavior)  Description: INTERVENTIONS:  - Instruct patient/family regarding restraint use   - Assess and monitor physiologic and psychological status   - Provide interventions and comfort measures to meet assessed patient needs   - Identify and implement measures to help patient regain control  - Assess readiness for release of restraint   Outcome: Progressing  Goal: Returns to optimal  restraint-free functioning  Description: INTERVENTIONS:  - Assess the patient's behavior and symptoms that indicate continued need for restraint  - Identify and implement measures to help patient regain control  - Assess readiness for release of restraint   Outcome: Progressing

## 2024-07-15 NOTE — PLAN OF CARE
Problem: Prexisting or High Potential for Compromised Skin Integrity  Goal: Skin integrity is maintained or improved  Description: INTERVENTIONS:  - Identify patients at risk for skin breakdown  - Assess and monitor skin integrity  - Assess and monitor nutrition and hydration status  - Monitor labs   - Assess for incontinence   - Turn and reposition patient  - Assist with mobility/ambulation  - Relieve pressure over bony prominences  - Avoid friction and shearing  - Provide appropriate hygiene as needed including keeping skin clean and dry  - Evaluate need for skin moisturizer/barrier cream  - Collaborate with interdisciplinary team   - Patient/family teaching  - Consider wound care consult   Outcome: Progressing     Problem: Neurological Deficit  Goal: Neurological status is stable or improving  Description: Interventions:  - Monitor and assess patient's level of consciousness, motor function, sensory function, and level of assistance needed for ADLs.   - Monitor and report changes from baseline. Collaborate with interdisciplinary team to initiate plan and implement interventions as ordered.   - Provide and maintain a safe environment.  - Consider seizure precautions.  - Consider fall precautions.  - Consider aspiration precautions.  - Consider bleeding precautions.  Outcome: Progressing     Problem: Activity Intolerance/Impaired Mobility  Goal: Mobility/activity is maintained at optimum level for patient  Description: Interventions:  - Assess and monitor patient  barriers to mobility and need for assistive/adaptive devices.  - Assess patient's emotional response to limitations.  - Collaborate with interdisciplinary team and initiate plans and interventions as ordered.  - Encourage independent activity per ability.  - Maintain proper body alignment.  - Perform active/passive rom as tolerated/ordered.  - Plan activities to conserve energy.  - Turn patient as appropriate  Outcome: Progressing     Problem:  Communication Impairment  Goal: Ability to express needs and understand communication  Description: Assess patient's communication skills and ability to understand information.  Patient will demonstrate use of effective communication techniques, alternative methods of communication and understanding even if not able to speak.     - Encourage communication and provide alternate methods of communication as needed.  - Collaborate with case management/ for discharge needs.  - Include patient/family/caregiver in decisions related to communication.  Outcome: Progressing     Problem: Potential for Aspiration  Goal: Non-ventilated patient's risk of aspiration is minimized  Description: Assess and monitor vital signs, respiratory status, and labs (WBC).  Monitor for signs of aspiration (tachypnea, cough, rales, wheezing, cyanosis, fever).    - Assess and monitor patient's ability to swallow.  - Place patient up in chair to eat if possible.  - HOB up at 90 degrees to eat if unable to get patient up into chair.  - Supervise patient during oral intake.   - Instruct patient/ family to take small bites.  - Instruct patient/ family to take small single sips when taking liquids.  - Follow patient-specific strategies generated by speech pathologist.  Outcome: Progressing  Goal: Ventilated patient's risk of aspiration is minimized  Description: Assess and monitor vital signs, respiratory status, airway cuff pressure, and labs (WBC).  Monitor for signs of aspiration (tachypnea, cough, rales, wheezing, cyanosis, fever).    - Elevate head of bed 30 degrees if patient has tube feeding.  - Monitor tube feeding.  Outcome: Progressing     Problem: Nutrition  Goal: Nutrition/Hydration status is improving  Description: Monitor and assess patient's nutrition/hydration status for malnutrition (ex- brittle hair, bruises, dry skin, pale skin and conjunctiva, muscle wasting, smooth red tongue, and disorientation). Collaborate with  interdisciplinary team and initiate plan and interventions as ordered.  Monitor patient's weight and dietary intake as ordered or per policy. Utilize nutrition screening tool and intervene per policy. Determine patient's food preferences and provide high-protein, high-caloric foods as appropriate.     - Assist patient with eating.  - Allow adequate time for meals.  - Encourage patient to take dietary supplement as ordered.  - Collaborate with clinical nutritionist.  - Include patient/family/caregiver in decisions related to nutrition.  Outcome: Progressing     Problem: PAIN - ADULT  Goal: Verbalizes/displays adequate comfort level or baseline comfort level  Description: Interventions:  - Encourage patient to monitor pain and request assistance  - Assess pain using appropriate pain scale  - Administer analgesics based on type and severity of pain and evaluate response  - Implement non-pharmacological measures as appropriate and evaluate response  - Consider cultural and social influences on pain and pain management  - Notify physician/advanced practitioner if interventions unsuccessful or patient reports new pain  Outcome: Progressing     Problem: INFECTION - ADULT  Goal: Absence or prevention of progression during hospitalization  Description: INTERVENTIONS:  - Assess and monitor for signs and symptoms of infection  - Monitor lab/diagnostic results  - Monitor all insertion sites, i.e. indwelling lines, tubes, and drains  - Monitor endotracheal if appropriate and nasal secretions for changes in amount and color  - [place] appropriate cooling/warming therapies per order  - Administer medications as ordered  - Instruct and encourage patient and family to use good hand hygiene technique  - Identify and instruct in appropriate isolation precautions for identified infection/condition  Outcome: Progressing  Goal: Absence of fever/infection during neutropenic period  Description: INTERVENTIONS:  - Monitor WBC    Outcome:  Progressing     Problem: SAFETY ADULT  Goal: Patient will remain free of falls  Description: INTERVENTIONS:  - Educate patient/family on patient safety including physical limitations  - Instruct patient to call for assistance with activity   - Consult OT/PT to assist with strengthening/mobility   - Keep Call bell within reach  - Keep bed low and locked with side rails adjusted as appropriate  - Keep care items and personal belongings within reach  - Initiate and maintain comfort rounds  - Make Fall Risk Sign visible to staff  - Offer Toileting every  Hours, in advance of need  - Initiate/Maintain alarm  - Obtain necessary fall risk management equipment:   - Apply yellow socks and bracelet for high fall risk patients  - Consider moving patient to room near nurses station  Outcome: Progressing  Goal: Maintain or return to baseline ADL function  Description: INTERVENTIONS:  -  Assess patient's ability to carry out ADLs; assess patient's baseline for ADL function and identify physical deficits which impact ability to perform ADLs (bathing, care of mouth/teeth, toileting, grooming, dressing, etc.)  - Assess/evaluate cause of self-care deficits   - Assess range of motion  - Assess patient's mobility; develop plan if impaired  - Assess patient's need for assistive devices and provide as appropriate  - Encourage maximum independence but intervene and supervise when necessary  - Involve family in performance of ADLs  - Assess for home care needs following discharge   - Consider OT consult to assist with ADL evaluation and planning for discharge  - Provide patient education as appropriate  Outcome: Progressing  Goal: Maintains/Returns to pre admission functional level  Description: INTERVENTIONS:  - Perform AM-PAC 6 Click Basic Mobility/ Daily Activity assessment daily.  - Set and communicate daily mobility goal to care team and patient/family/caregiver.   - Collaborate with rehabilitation services on mobility goals if  consulted  - Perform Range of Motion  times a day.  - Reposition patient every  hours.  - Dangle patient  times a day  - Stand patient  times a day  - Ambulate patient  times a day  - Out of bed to chair  times a day   - Out of bed for jr times a day  - Out of bed for toileting  - Record patient progress and toleration of activity level   Outcome: Progressing     Problem: DISCHARGE PLANNING  Goal: Discharge to home or other facility with appropriate resources  Description: INTERVENTIONS:  - Identify barriers to discharge w/patient and caregiver  - Arrange for needed discharge resources and transportation as appropriate  - Identify discharge learning needs (meds, wound care, etc.)  - Arrange for interpretive services to assist at discharge as needed  - Refer to Case Management Department for coordinating discharge planning if the patient needs post-hospital services based on physician/advanced practitioner order or complex needs related to functional status, cognitive ability, or social support system  Outcome: Progressing     Problem: Knowledge Deficit  Goal: Patient/family/caregiver demonstrates understanding of disease process, treatment plan, medications, and discharge instructions  Description: Complete learning assessment and assess knowledge base.  Interventions:  - Provide teaching at level of understanding  - Provide teaching via preferred learning methods  Outcome: Progressing     Problem: SAFETY,RESTRAINT: NV/NON-SELF DESTRUCTIVE BEHAVIOR  Goal: Remains free of harm/injury (restraint for non violent/non self-detsructive behavior)  Description: INTERVENTIONS:  - Instruct patient/family regarding restraint use   - Assess and monitor physiologic and psychological status   - Provide interventions and comfort measures to meet assessed patient needs   - Identify and implement measures to help patient regain control  - Assess readiness for release of restraint   Outcome: Progressing  Goal: Returns to optimal  restraint-free functioning  Description: INTERVENTIONS:  - Assess the patient's behavior and symptoms that indicate continued need for restraint  - Identify and implement measures to help patient regain control  - Assess readiness for release of restraint   Outcome: Progressing

## 2024-07-15 NOTE — PLAN OF CARE
Problem: PHYSICAL THERAPY ADULT  Goal: Performs mobility at highest level of function for planned discharge setting.  See evaluation for individualized goals.  Description: Treatment/Interventions: OT, Spoke to case management, Gait training, Bed mobility, Patient/family training, Endurance training, LE strengthening/ROM, Functional transfer training          See flowsheet documentation for full assessment, interventions and recommendations.  Note: Prognosis: Fair  Problem List: Decreased strength, Decreased range of motion, Decreased endurance, Decreased mobility, Decreased coordination, Decreased cognition, Impaired balance, Impaired judgement, Decreased safety awareness, Orthopedic restrictions, Pain, Impaired sensation  Assessment: Pt is 68 y.o. male seen for PT evaluation s/p admit to [facility] on 7/12/2024 w/ Subarachnoid hemorrhage (HCC). PT consulted to assess pt's functional mobility and d/c needs. Order placed for PT eval and tx, w/ up w/ A order. Comorbidities affecting pt's physical performance at time of assessment include: No past medical history on file. PTA, pt was requiring A for mobility, ambulates community distances and elevations, and lives in multi-level home. Personal factors affecting pt at time of IE include: stairs to enter home and inability to ambulate household distances. Please find objective findings from PT assessment regarding body systems outlined above with impairments and limitations including weakness, impaired balance, decreased endurance, impaired coordination, gait deviations, pain, decreased activity tolerance, decreased functional mobility tolerance, decreased safety awareness, impaired judgement, and fall risk. The following objective measures performed on IE also reveal limitations: The patient's AM-PAC Basic Mobility Inpatient Short Form Raw Score is 12, Standardized Score is 32.23. A standardized score less than 42.9 suggests the patient may benefit from  discharge to post-acute rehabilitation services. Please also refer to the recommendation of the Physical Therapist for safe discharge planning. Pt's clinical presentation is currently unstable/unpredictable seen in pt's presentation of critical care monitoring. Pt to benefit from continued PT tx to address deficits as defined above and maximize level of functional independent mobility and consistency. From PT/mobility standpoint, recommendation at time of d/c would be level I pending progress in order to facilitate return to PLOF.  Barriers to Discharge: Decreased caregiver support, Inaccessible home environment     Rehab Resource Intensity Level, PT: I (Maximum Resource Intensity)    See flowsheet documentation for full assessment.

## 2024-07-15 NOTE — OCCUPATIONAL THERAPY NOTE
Occupational Therapy Evaluation     Patient Name: Federico Bowen  Today's Date: 7/15/2024  Problem List  Principal Problem:    Subarachnoid hemorrhage (HCC)  Active Problems:    HTN (hypertension)    Hyperlipidemia    Seizures (HCC)    Past Medical History  No past medical history on file.  Past Surgical History  Past Surgical History:   Procedure Laterality Date    IR CEREBRAL ANGIOGRAPHY / INTERVENTION  7/13/2024           07/15/24 0948   OT Last Visit   OT Visit Date 07/15/24   Note Type   Note type Evaluation   Pain Assessment   Pain Assessment Tool FLACC   Pain Location/Orientation Location: Generalized   Pain Onset/Description Onset: Ongoing;Descriptor: Aching;Descriptor: Discomfort   Patient's Stated Pain Goal No pain   Hospital Pain Intervention(s) Repositioned;Ambulation/increased activity;Emotional support   Multiple Pain Sites Yes   Restrictions/Precautions   Weight Bearing Precautions Per Order No   Other Precautions Cognitive;Impulsive;Chair Alarm;Bed Alarm;Multiple lines;Telemetry;Fall Risk;Pain;Seizure  (poset belt; B/L hand mitts; EVD;  male purewick; NGT)   Home Living   Type of Home House   Home Layout Two level  (20 MARCUS)   Bathroom Shower/Tub Tub/shower unit   Bathroom Toilet Standard   Bathroom Equipment Other (Comment)  (denies any)   Home Equipment Other (Comment)  (denies any)   Additional Comments Pt reports living in 2 SH, 20 MARCUS; 20 steps to 2nd floor   Prior Function   Level of [place] Independent with ADLs;Independent with functional mobility;Independent with IADLS   Lives With Daughter   Receives Help From Family   IADLs Independent with driving;Independent with meal prep;Independent with medication management   Falls in the last 6 months 1 to 4   Vocational Retired   Comments (+)    Lifestyle   Autonomy I w/ ADLS/IADLS, transfers and functional mobility PTA   Reciprocal Relationships Pt lives w/ his dght   Service to Others retired   Intrinsic Gratification gardening  and spending time w/ his grandkids   General   Family/Caregiver Present   (spouse and 2 dghts)   ADL   Eating Assistance 4  Minimal Assistance   Grooming Assistance 4  Minimal Assistance   UB Bathing Assistance 4  Minimal Assistance   LB Bathing Assistance 3  Moderate Assistance   UB Dressing Assistance 4  Minimal Assistance   LB Dressing Assistance 3  Moderate Assistance   Toileting Assistance  3  Moderate Assistance   Functional Assistance 3  Moderate Assistance   Functional Deficit Steadying;Supervision/safety;Verbal cueing;Increased time to complete  (Ax2)   Bed Mobility   Supine to Sit 3  Moderate assistance   Additional items Assist x 1;HOB elevated;Increased time required;Verbal cues;LE management   Sit to Supine Unable to assess   Additional Comments pt sat EOB w/ Min-Mod A for sitting balance/trunk control   Transfers   Sit to Stand 3  Moderate assistance   Additional items Assist x 2;Increased time required;Verbal cues   Stand to Sit 3  Moderate assistance   Additional items Assist x 2;Increased time required;Verbal cues   Additional Comments w/B/L HHA used   Functional Mobility   Functional Mobility 3  Moderate assistance   Additional Comments pt took few small steps from EOB to recliner w/ Mod A x2; B/L HHA used   Additional items Hand hold assistance   Balance   Static Sitting Poor +   Dynamic Sitting Poor   Static Standing Poor   Dynamic Standing Poor -   Ambulatory Poor -   Activity Tolerance   Activity Tolerance Patient limited by fatigue;Patient limited by pain   Medical Staff Made Aware PT, RN   Nurse Made Aware yes, Emil WASHINGTON Assessment   RUE Assessment WFL   LUE Assessment   LUE Assessment WFL   Hand Function   Gross Motor Coordination Functional   Fine Motor Coordination Functional   Psychosocial   Psychosocial (WDL) X   Patient Behaviors/Mood Cooperative   Cognition   Overall Cognitive Status Impaired   Arousal/Participation Responsive;Cooperative   Attention Attends with cues to redirect    Orientation Level Oriented to person;Disoriented to place;Disoriented to time;Disoriented to situation   Memory Decreased recall of precautions;Decreased recall of recent events   Following Commands Follows one step commands with increased time or repetition   Comments pt is cooperative; impulsive; needs cues for safety; has decreased understanding of deficits; needs frequent redirection to task.   Assessment   Limitation Decreased ADL status;Decreased UE ROM;Decreased UE strength;Decreased Safe judgement during ADL;Decreased cognition;Decreased endurance;Decreased self-care trans;Decreased high-level ADLs   Prognosis Fair   Assessment Pt is a 67 y/o male seen for OT eval s/p adm to [facility] w/ H/A and R leg pain. Transferred from [facility], EMS witnessed 2 seizures. Stroke alert called; CTH revealed SAH w/ IVH w/ R PCA Pt  has no past medical history on file. Pt with active OT orders and activity as tolerated orders. Pt lives with his dght in 2 SH, 20 MARCUS, 20 steps inside. Pt was I w/  ADLS and IADLS, drove, & required no use of DME PTA. Pt is currently demonstrating the following occupational deficits: Min A UB ADLS, Mod A LB ADLS, Mod A bed mobility, Mod A x2 transfers and functional mobility w/ B/L HHA. These deficits that are impacting pt's baseline areas of occupation are a result of the following impairments: pain, endurance, activity tolerance, functional mobility, forward functional reach, balance, trunk control, functional standing tolerance, decreased I w/ ADLS/IADLS, strength, ROM, cognitive impairments, decreased safety awareness, and decreased insight into deficits.The following Occupational Performance Areas to address include: eating, grooming, bathing/shower, toilet hygiene, dressing, medication management, socialization, health maintenance, functional mobility, community mobility, clothing management, and household maintenance. Recommend inpatient rehab  upon D/C. Pt to continue to benefit from acute  immediate OT services to address the following goals 3-5x/week to  w/in 10-14 days:   Goals   Patient Goals to rest   LTG Time Frame 10-14   Long Term Goal #1 see below listed goals   Plan   Treatment Interventions ADL retraining;Functional transfer training;UE strengthening/ROM;Endurance training;Cognitive reorientation;Patient/family training;Equipment evaluation/education;Compensatory technique education;Continued evaluation;Energy conservation;Activityengagement   Goal Expiration Date 24   OT Frequency 3-5x/wk   Discharge Recommendation   Recommendation Physiatry Consult   Rehab Resource Intensity Level, OT I (Maximum Resource Intensity)   Additional Comments  The patient's raw score on the AM-PAC Daily Activity Inpatient Short Form is 14. A raw score of less than 19 suggests the patient may benefit from discharge to post-acute rehabilitation services. Please refer to the recommendation of the Occupational Therapist for safe discharge planning.   Additional Comments 2 Pt seen as a co-session due to the patient's co-morbidities, clinically unstable presentation, and present impairments which are a regression from the patient's baseline.   AM-PAC Daily Activity Inpatient   Lower Body Dressing 2   Bathing 2   Toileting 2   Upper Body Dressing 2   Grooming 3   Eating 3   Daily Activity Raw Score 14   Daily Activity Standardized Score (Calc for Raw Score >=11) 33.39   AM-PAC Applied Cognition Inpatient   Following a Speech/Presentation 2   Understanding Ordinary Conversation 2   Taking Medications 2   Remembering Where Things Are Placed or Put Away 2   Remembering List of 4-5 Errands 2   Taking Care of Complicated Tasks 1   Applied Cognition Raw Score 11   Applied Cognition Standardized Score 27.03   End of Consult   Education Provided Yes;Family or social support of family present for education by provider   Patient Position at End of Consult Bedside chair;Bed/Chair alarm activated;All needs within reach    Nurse Communication Nurse aware of consult        GOALS    1) Pt will improve activity tolerance to G for 30 min txment sessions for increase engagement in functional tasks  2) Pt will complete UB/LB dressing/self care w/ mod I using adaptive device and DME as needed  3) Pt will complete bathing w/ Mod I w/ use of AE and DME as needed  4) Pt will complete toileting w/ mod I w/ G hygiene/thoroughness using DME as needed  5) Pt will improve functional transfers to Mod I on/off all surfaces using DME as needed w/ G balance/safety   6) Pt will improve functional mobility during ADL/IADL/leisure tasks to Mod I using DME as needed w/ G balance/safety   7) Pt will improve bed mobility to Mod I and sit EOB w/ G balance/trunk control as a prerequisite for further engagement in meaningful tasks  8) Pt will be attentive 100% of the time during ongoing cognitive assessment w/ G participation to assist w/ safe d/c planning/recommendations  9) Pt will demonstrate G carryover of pt/caregiver education and training as appropriate w/o cues w/ good tolerance to increase safety during functional tasks  10) Pt will demonstrate 100% carryover of energy conservation techniques t/o functional I/ADL/leisure tasks w/o cues s/p skilled education to increase endurance during functional tasks  11) Pt will follow 100% simple one step verbal commands and be A/Ox4 consistently t/o use of external environmental cues to increase awareness for functional tasks     Missy Nunez MS, OTR/L

## 2024-07-15 NOTE — ASSESSMENT & PLAN NOTE
Neurology following, input appreciated  Patient currently on Keppra 1000 mg twice daily  vEEG pending

## 2024-07-15 NOTE — MALNUTRITION/BMI
This medical record reflects one or more clinical indicators suggestive of malnutrition and/or morbid obesity.    Malnutrition Findings:   Adult Malnutrition type: Acute illness  Adult Degree of Malnutrition: Malnutrition of moderate degree  Malnutrition Characteristics: Muscle loss, Fat loss, Inadequate energy                  360 Statement: Moderate pro, vinny malnutrition d/t condition, decreased appetite, as evidence by mild muscle/fat loss at clavicles, temples, orbitals, <75% energy intake 7 days, initially treated with TF and now transitioned to pureed diet, will add Ensure Plus BID to aid with pro, vinny intake.    BMI Findings:           There is no height or weight on file to calculate BMI.     See Nutrition note dated /15/24 for additional details.  Completed nutrition assessment is viewable in the nutrition documentation.

## 2024-07-15 NOTE — CONSULTS
PHYSICAL MEDICINE AND REHABILITATION CONSULT NOTE  Federico Bowen 68 y.o. 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 Monitor for urinary retention.   Therapy is pending however anticipate that the patient will require inpatient rehabilitation. He may require dedicated brain injury pending progression. Currently he is on spasm watch with daily TCDs. EVD remains in place at this time.     Medical Co-morbidities Plan:  CAD  Hypertension   Hyperlipidemia  COPD  Anemia   Leukocytosis   Bowel plan: LBM unknown   Bladder plan: condom catheter   DVT ppx:  heparin gtt and SCD    Thank you for this consultation.  Do not hesitate to contact service with further questions.      CHRISTINA Cali  PM&R    I have spent a total time of 30 minutes on 07/15/24 in caring for this patient including Patient and family education, Counseling / Coordination of care, Documenting in the medical record, Reviewing / ordering tests, medicine, procedures  , Obtaining or reviewing history  , and Communicating with other healthcare professionals .        History of Present Illness:  Federico Bowen is a 68 y.o. male with a PMH of HTN, HLD, CAD, COPD who [facility] the [facility] with headache and right leg pain. EMS reported two seizure en route and he was intubated in the ER. CT head showed a large diffuse SAH, IVH, hydrocephalus and CTA showed an aneurysm in right posterior communicating artery. Possible tiny aneurysm in left anterior communicating artery  region. EVD was placed. He was loaded with keppra and placed on vEEG. He underwent angio-coil embolization of the right PCOM aneurysm. UDS was positive for benzos. PM&R are consulted for rehabilitation recommendations.     The patient was seen in the ICU. He was OOB to the recliner. He was able to follow some simple commands. He reports significant headache currently and neck pain. He is requiring posey belt and bilateral mitts.     Review of Systems: 10 point ROS negative except for what is noted in HPI    Function:  Prior level of function and living situation: The patient lives in a two story home with his daughter. There are 20 steps to the 2nd floor. There are 20 MARCUS and he was independent.       Current level of function:  Physical Therapy: Pending   Occupational Therapy: Pending   Speech Therapy: NPO       Physical Exam:  /62   Pulse 68   Temp 98 °F (36.7 °C) (Oral)   Resp 14   Wt 54 kg (119 lb)   SpO2 96%        Intake/Output Summary (Last 24 hours) at 7/15/2024 0957  Last data filed at 7/15/2024 0800  Gross per 24 hour   Intake 2395.27 ml   Output 3562 ml   Net -1166.73 ml       There is no height or weight on file to calculate BMI.      Physical Exam  Constitutional:       General: He is not in acute distress.     Appearance: He is not toxic-appearing.      Interventions: He is restrained.      Comments: Falls asleep easily on exam    HENT:      Head:      Comments: EVD in place      Right Ear: External ear normal.      Left Ear: External ear normal.      Nose:      Comments: NG tube     Mouth/Throat:      Mouth: Mucous membranes are moist.      Pharynx: Oropharynx is clear.   Pulmonary:      Effort: Pulmonary effort is normal. No respiratory distress.   Abdominal:      General: There is no distension.   Musculoskeletal:         General: Normal range of motion.   Skin:     General: Skin is warm and dry.   Neurological:      Mental Status: He is alert. He is disoriented.   Psychiatric:          Cognition and Memory: Cognition is impaired. Memory is impaired.          Social History:    Social History     Socioeconomic History    Marital status: /Civil Union     Spouse name: Not on file    Number of children: Not on file    Years of education: Not on file    Highest education level: Not on file   Occupational History    Not on file   Tobacco Use    Smoking status: Not on file    Smokeless tobacco: Not on file   Substance and Sexual Activity    Alcohol use: Not on file    Drug use: Not on file    Sexual activity: Not on file   Other Topics Concern    Not on file   Social History Narrative    Not on file     Social Determinants of Health     Financial Resource Strain: Not on file   Food Insecurity: No Food Insecurity (7/13/2024)    Hunger Vital Sign     Worried About Running Out of Food in the Last Year: Never true     Ran Out of Food in the Last Year: Never true   Transportation Needs: No Transportation Needs (7/13/2024)    PRAPARE - Transportation     Lack of Transportation (Medical): No     Lack of Transportation (Non-Medical): No   Physical Activity: Not on file   Stress: Not on file   Social Connections: Not on file   Intimate Partner Violence: Not on file   Housing Stability: Low Risk  (7/13/2024)    Housing Stability Vital Sign     Unable to Pay for Housing in the Last Year: No     Number of Times Moved in the Last Year: 0     Homeless in the Last Year: No        Family History:    No family history on file.      Medications:     Current Facility-Administered Medications:     acetaminophen (TYLENOL) oral suspension 650 mg, 650 mg, Oral, Q4H PRN, CHRISTINA Peterson, 650 mg at 07/15/24 0218    aspirin chewable tablet 81 mg, 81 mg, Per NG Tube, Daily, Ruslan Mcneal MD, 81 mg at 07/15/24 0810    butalbital-acetaminophen-caffeine (FIORICET,ESGIC) -40 mg per tablet 1 tablet, 1 tablet, Oral, Q4H PRN, Hadley Eric DO, 1 tablet at 07/15/24 0544    chlorhexidine (PERIDEX) 0.12 % oral  rinse 15 mL, 15 mL, Mouth/Throat, Q12H MATTY, CHRISTINA Peterson, 15 mL at 07/15/24 0810    gabapentin (NEURONTIN) capsule 100 mg, 100 mg, Oral, TID, Hadley Eric DO, 100 mg at 07/15/24 0810    heparin (porcine) 25,000 units in 0.45% NaCl 250 mL infusion (premix), 8 Units/kg/hr (Order-Specific), Intravenous, Titrated, Hadley Eric DO, Last Rate: 4 mL/hr at 07/14/24 1311, 8 Units/kg/hr at 07/14/24 1311    hydrALAZINE (APRESOLINE) injection 10 mg, 10 mg, Intravenous, Q6H PRN, Vanesa Timmons MD    HYDROmorphone HCl (DILAUDID) injection 0.2 mg, 0.2 mg, Intravenous, Q4H PRN, 0.2 mg at 07/15/24 0235 **OR** [DISCONTINUED] HYDROmorphone (DILAUDID) injection 0.5 mg, 0.5 mg, Intravenous, Q4H PRN, CHRISTINA Peterson    labetalol (NORMODYNE) injection 10 mg, 10 mg, Intravenous, Q6H PRN, Vanesa Timmons MD    levETIRAcetam (KEPPRA) oral solution 1,000 mg, 1,000 mg, Oral, Q12H MATTY, Hadley Eric DO, 1,000 mg at 07/15/24 0810    lidocaine (LIDODERM) 5 % patch 1 patch, 1 patch, Topical, Daily, Hadley Eric DO, 1 patch at 07/15/24 0811    methocarbamol (ROBAXIN) tablet 500 mg, 500 mg, Oral, Q6H PRN, Hadley Eric DO, 500 mg at 07/14/24 1556    niMODipine (NIMOTOP) oral solution 60 mg, 60 mg, Per NG Tube, Q4H MATTY, CHRISTINA Peterson, 60 mg at 07/15/24 0810    ondansetron (ZOFRAN) injection 4 mg, 4 mg, Intravenous, Q6H PRN, Hadley Eric DO, 4 mg at 07/14/24 1733    oxyCODONE (ROXICODONE) oral solution 2.5 mg, 2.5 mg, Oral, Q4H PRN, CHRISTINA Peterson    oxyCODONE (ROXICODONE) oral solution 5 mg, 5 mg, Oral, Q4H PRN, CHRISTINA Peterson, 5 mg at 07/15/24 0905    potassium phosphates 30 mmol in sodium chloride 0.9 % 250 mL infusion, 30 mmol, Intravenous, Once, Oliva Zheng MD, Last Rate: 41.7 mL/hr at 07/15/24 0810, 30 mmol at 07/15/24 0810    sodium chloride 0.9 % infusion, 75 mL/hr, Intravenous, Continuous, Hadley Eric DO, Last Rate: 75 mL/hr  at 07/14/24 2309, 75 mL/hr at 07/14/24 2309    sodium chloride tablet 1 g, 1 g, Oral, TID With Meals, Adam Walker MD, 1 g at 07/15/24 0810    umeclidinium 62.5 mcg/actuation inhaler AEPB 1 puff, 1 puff, Inhalation, Daily, Kayleigh Mast, CHRISTINA, 1 puff at 07/15/24 0811    Past Medical History:     No past medical history on file.     Past Surgical History:     No past surgical history on file.      Allergies:     Not on File        LABORATORY RESULTS:      Lab Results   Component Value Date    HGB 10.8 (L) 07/15/2024    HCT 32.3 (L) 07/15/2024    WBC 8.78 07/15/2024     Lab Results   Component Value Date    BUN 9 07/15/2024    K 3.6 07/15/2024     07/15/2024    CREATININE 0.55 (L) 07/15/2024     Lab Results   Component Value Date    PROTIME 15.2 (H) 07/14/2024    INR 1.21 (H) 07/14/2024        DIAGNOSTIC STUDIES: Reviewed  CT head wo contrast    Result Date: 7/14/2024  Impression: No significant interval change in extensive bilateral subarachnoid hemorrhage and intraventricular hemorrhage. Workstation performed: [id number]     CT head wo contrast    Result Date: 7/13/2024  Impression: Interval placement of right-sided shunt catheter. Stable ventricular size with slight interval increase in intraventricular hemorrhage layering in the occipital horns bilaterally. Diffuse subarachnoid hemorrhage again noted. Workstation performed: [id number]     XR chest portable ICU    Result Date: 7/12/2024  Impression: No acute cardiopulmonary disease. ET tube 6 cm above the landon. Workstation performed: [id number]     CTA stroke alert (head/neck)    Result Date: 7/12/2024  Impression: 0.3 cm aneurysm in expected origin of right posterior communicating artery. Possible tiny aneurysm in left anterior communicating artery region. These could be potential sources of diffuse subarachnoid hemorrhage. Recommend neurovascular surgery consultation for further evaluation. Mild narrowing of bilateral MCA M1 and bilateral M2  segmental branch vessels, likely due to vasospasm. Patent stent in left proximal subclavian artery. Endotracheal tube in trachea. Additional chronic/incidental findings as detailed above. Findings were directly discussed with Emmanuel Lion at approximately 4:34 p.m. on 7/12/2024. Workstation performed: [id number]     CT stroke alert brain    Result Date: 7/12/2024  Impression: Acute diffuse thickened large-volume subarachnoid hemorrhage throughout the bilateral parafalcine regions, bilateral cerebral sulci (right worse than left), basilar cisterns, prepontine cistern, premedullary cistern, and visualized upper cervical spinal canal. Acute small volume intraventricular hemorrhage with mild hydrocephalus. No acute infarction. Findings were directly discussed with Emmanuel Lion at approximately 4:34 p.m. on 7/12/2024. Workstation performed: [id number]

## 2024-07-15 NOTE — PROGRESS NOTES
"[facility]  Progress Note  Name: Federico Bowen I  MRN: [id number]  Unit/Bed#: ICU 07 I Date of Admission: 7/12/2024   Date of Service: 7/15/2024 I Hospital Day: 3    Assessment & Plan   * Subarachnoid hemorrhage (HCC)  Assessment & Plan  aSAH likely from R PCOM aneurysm rupture  BD 4 - HH 5, MF 4  PPD 2 right PCOM coil embolization  P/w headache and right leg pain, he had 2 witnessed seizures by EMS was ultimately intubated.  Upon arrival to the ED patient was posturing.  Imaging revealed a large amount of SAH and a right PCOM aneurysm.  Per chart review he was previously on aspirin reversed with DDAVP.    Imaging:    CT head without 7/14/2024:No significant interval change in extensive bilateral subarachnoid hemorrhage and intraventricular hemorrhage.     Plan:  Continue frequent neurological checks  Repeat CTA/CT head w/wo STAT if GCS declines more than 2 points in 1 hour  Continue SAH pathway/spasm watch:  Daily TCD's: R 2.26 L 1.33  Continue Nimodipine 60mg Q4hrs  Continue Keppra 1000 mg twice daily per neurology  Normonatremia (139 today) currently on salt tabs 1 g 3 times daily  Euvolemia (-744.7)  Hemoglobin >8 (10.8 today)  EVD placed on 7/13/2024  Continue EVD at 10mmhg  Monitor output and ICP  ICPs 1-10, in room 8  Output 62 ml/24hrs  Maintain ICP goal <20  SBP <220  Continue aspirin 81 mg daily  Will increase heparin drip to 10 units/kilogram/hour  Last PTT 34  Medical management and pain control per primary team  DVT PPX: SCDs and heparin drip    Neurosurgery will continue to follow closely, call with any further questions or concerns.    Seizures (HCC)  Assessment & Plan  Neurology following, input appreciated  Patient currently on Keppra 1000 mg twice daily  vEEG pending             Subjective/Objective     Chief Complaint: \"My head hurts\"    Subjective: Patient complaining of a 10/10 headache.  Otherwise he offers no complaints.  He has bilateral mitts in " place.    Objective: Patient comfortably laying in bed, NAD.    I/O         07/13 0701  07/14 0700 07/14 0701  07/15 0700 07/15 0701 07/16 0700    I.V. (mL/kg) 2900.1 (53.7) 1867.3 (34.6) 158 (2.9)    NG/ 50     IV Piggyback 50 300     Feedings   20    Total Intake(mL/kg) 3100.1 (57.4) 2217.3 (41.1) 178 (3.3)    Urine (mL/kg/hr) 2945 (2.3) 2900 (2.2) 600 (3.4)    Drains 71 62     Total Output 3016 2962 600    Net +84.1 -744.7 -422           Unmeasured Urine Occurrence  1 x             Invasive Devices       Central Venous Catheter Line  Duration             CVC Central Lines 07/12/24 Triple 16cm 2 days              Peripheral Intravenous Line  Duration             Peripheral IV 07/12/24 Left;Proximal;Ventral (anterior) Forearm 2 days    Peripheral IV 07/15/24 Right;Ventral (anterior) Forearm <1 day              Drain  Duration             Ventriculostomy/Subdural Ventricular drainage catheter Occipital region 2 days    External Urinary Catheter Small 1 day    NG/OG/Enteral Tube Nasogastric 12 Fr Left nare 1 day                    Physical Exam:  Vitals: Blood pressure 106/62, pulse 68, temperature 98 °F (36.7 °C), temperature source Oral, resp. rate 14, weight 54 kg (119 lb), SpO2 96%.,There is no height or weight on file to calculate BMI.    Hemodynamic Monitoring: MAP: Arterial Line MAP (mmHg): 104 mmHg, CPP: CPP Cuff-Calculated (mmHg): 64, CVP:  , ICP Mean: ICP Mean (mmHg): 10 mmHg    General appearance: alert, appears stated age, cooperative and no distress  Head: Normocephalic, right frontal EVD in place  Eyes: EOMI, conjugate gaze, right pupil 3 mm and irregular and left pupil 1 mm both reactive, prior cataract surgery  Neck: supple, symmetrical, trachea midline   Lungs: non labored breathing  Heart: regular heart rate  Neurologic:   Mental status: GCS 14, speech is clear, following commands  Cranial nerves: grossly intact (Cranial nerves II-XII) except as noted above  Sensory: normal to light touch in  "all extremities x 4  Motor: moving all extremities without focal weakness, strength 5/5 throughout  Reflexes: 2+ and symmetric, no Anabella's or clonus appreciated      Lab Results:  Results from last 7 days   Lab Units 07/15/24  0520 07/14/24  0517 07/13/24  0534   WBC Thousand/uL 8.78 15.17* 9.29   HEMOGLOBIN g/dL 10.8* 10.4* 11.4*   HEMATOCRIT % 32.3* 30.5* 33.3*   PLATELETS Thousands/uL 182 150 174   SEGS PCT % 75 87* 84*   MONO PCT % 6 5 6   EOS PCT % 0 0 0     Results from last 7 days   Lab Units 07/15/24  0520 07/14/24  0517 07/13/24  1428   SODIUM mmol/L 139 136 135   POTASSIUM mmol/L 3.6 3.8 4.7   CHLORIDE mmol/L 107 105 108   CO2 mmol/L 25 22 21   BUN mg/dL 9 10 9   CREATININE mg/dL 0.55* 0.55* 0.67   CALCIUM mg/dL 8.5 8.4 7.7*     Results from last 7 days   Lab Units 07/15/24  0520 07/14/24  0517 07/13/24  0534   MAGNESIUM mg/dL 2.0 1.9 1.7*     Results from last 7 days   Lab Units 07/15/24  0520 07/14/24  0517 07/13/24  0534   PHOSPHORUS mg/dL 1.5* 1.8* 2.8     Results from last 7 days   Lab Units 07/15/24  0520 07/14/24  1311 07/12/24  1618   INR   --  1.21* 1.07   PTT seconds 34 34 23     No results found for: \"TROPONINT\"  ABG:  Lab Results   Component Value Date    PHART 7.314 (L) 07/12/2024    DSE5QBQ 44.1 (H) 07/12/2024    PO2ART 138.2 (H) 07/12/2024    VWQ2MYL 21.9 (L) 07/12/2024    BEART -4.2 07/12/2024    SOURCE Line, Arterial 07/12/2024       Imaging Studies: I have personally reviewed pertinent reports.   and I have personally reviewed pertinent films in PACS    CT head wo contrast    Result Date: 7/14/2024  Impression: No significant interval change in extensive bilateral subarachnoid hemorrhage and intraventricular hemorrhage. Workstation performed: [id number]     CT head wo contrast    Result Date: 7/13/2024  Impression: Interval placement of right-sided shunt catheter. Stable ventricular size with slight interval increase in intraventricular hemorrhage layering in the occipital horns " bilaterally. Diffuse subarachnoid hemorrhage again noted. Workstation performed: [id number]     XR chest portable ICU    Result Date: 7/12/2024  Impression: No acute cardiopulmonary disease. ET tube 6 cm above the landon. Workstation performed: [id number]     CTA stroke alert (head/neck)    Result Date: 7/12/2024  Impression: 0.3 cm aneurysm in expected origin of right posterior communicating artery. Possible tiny aneurysm in left anterior communicating artery region. These could be potential sources of diffuse subarachnoid hemorrhage. Recommend neurovascular surgery consultation for further evaluation. Mild narrowing of bilateral MCA M1 and bilateral M2 segmental branch vessels, likely due to vasospasm. Patent stent in left proximal subclavian artery. Endotracheal tube in trachea. Additional chronic/incidental findings as detailed above. Findings were directly discussed with Emmanuel Lion at approximately 4:34 p.m. on 7/12/2024. Workstation performed: [id number]     CT stroke alert brain    Result Date: 7/12/2024  Impression: Acute diffuse thickened large-volume subarachnoid hemorrhage throughout the bilateral parafalcine regions, bilateral cerebral sulci (right worse than left), basilar cisterns, prepontine cistern, premedullary cistern, and visualized upper cervical spinal canal. Acute small volume intraventricular hemorrhage with mild hydrocephalus. No acute infarction. Findings were directly discussed with Emmanuel Lion at approximately 4:34 p.m. on 7/12/2024. Workstation performed: [id number]       EKG, Pathology, and Other Studies: I have personally reviewed pertinent reports.      VTE Pharmacologic Prophylaxis: Heparin    VTE Mechanical Prophylaxis: sequential compression device

## 2024-07-15 NOTE — PHYSICAL THERAPY NOTE
Physical Therapy Evaluation     Patient's Name: Federico Bowen    Admitting Diagnosis  Subarachnoid hemorrhage (HCC) [I60.9]    Problem List  Patient Active Problem List   Diagnosis    Subarachnoid hemorrhage (HCC)    HTN (hypertension)    Hyperlipidemia    Seizures (HCC)       Past Medical History  No past medical history on file.    Past Surgical History  Past Surgical History:   Procedure Laterality Date    IR CEREBRAL ANGIOGRAPHY / INTERVENTION  7/13/2024        07/15/24 0947   PT Last Visit   PT Visit Date 07/15/24   Note Type   Note type Evaluation   Pain Assessment   Pain Assessment Tool 0-10   Pain Score 8   Pain Location/Orientation Location: Head   Hospital Pain Intervention(s) Repositioned;Ambulation/increased activity   Restrictions/Precautions   Weight Bearing Precautions Per Order No   Other Precautions Pain;Fall Risk;Multiple lines;Bed Alarm;Chair Alarm;Cognitive;Impulsive;Telemetry   Home Living   Type of Home House   Home Layout Two level;Stairs to enter with rails  (20 MARCUS)   Bathroom Shower/Tub Tub/shower unit   Bathroom Toilet Standard   Prior Function   Level of [place] Independent with functional mobility   Lives With Daughter   Receives Help From Family   Falls in the last 6 months 1 to 4   Vocational Retired   Cognition   Orientation Level Oriented to person   Subjective   Subjective Pt willing and agreeable to PT session   RLE Assessment   RLE Assessment WFL   LLE Assessment   LLE Assessment WFL   Coordination   Movements are Fluid and Coordinated 0   Coordination and Movement Description slow and guarded   Bed Mobility   Supine to Sit 3  Moderate assistance   Additional items Assist x 1;HOB elevated   Sit to Supine Unable to assess   Transfers   Sit to Stand 3  Moderate assistance   Additional items Assist x 2;Increased time required   Stand to Sit 3  Moderate assistance   Additional items Assist x 2;Increased time required   Ambulation/Elevation   Gait pattern Short  stride;Excessively slow;Shuffling;Decreased foot clearance   Gait Assistance 3  Moderate assist   Additional items Assist x 2   Assistive Device Other (Comment)  (HHA)   Distance 3   Balance   Static Sitting Poor +   Dynamic Sitting Poor   Static Standing Poor   Dynamic Standing Poor -   Ambulatory Poor -   Endurance Deficit   Endurance Deficit Yes   Endurance Deficit Description limited by fatigue, weakness, pain, confusion   Activity Tolerance   Activity Tolerance Patient limited by fatigue;Patient limited by pain   Medical Staff Made Aware OT   Nurse Made Aware yes   Assessment   Prognosis Fair   Problem List Decreased strength;Decreased range of motion;Decreased endurance;Decreased mobility;Decreased coordination;Decreased cognition;Impaired balance;Impaired judgement;Decreased safety awareness;Orthopedic restrictions;Pain;Impaired sensation   Assessment Pt is 68 y.o. male seen for PT evaluation s/p admit to [facility] on 7/12/2024 w/ Subarachnoid hemorrhage (HCC). PT consulted to assess pt's functional mobility and d/c needs. Order placed for PT eval and tx, w/ up w/ A order. Comorbidities affecting pt's physical performance at time of assessment include: No past medical history on file. PTA, pt was requiring A for mobility, ambulates community distances and elevations, and lives in multi-level home. Personal factors affecting pt at time of IE include: stairs to enter home and inability to ambulate household distances. Please find objective findings from PT assessment regarding body systems outlined above with impairments and limitations including weakness, impaired balance, decreased endurance, impaired coordination, gait deviations, pain, decreased activity tolerance, decreased functional mobility tolerance, decreased safety awareness, impaired judgement, and fall risk. The following objective measures performed on IE also reveal limitations: The patient's AM-PAC Basic Mobility Inpatient Short Form  Raw Score is 12, Standardized Score is 32.23. A standardized score less than 42.9 suggests the patient may benefit from discharge to post-acute rehabilitation services. Please also refer to the recommendation of the Physical Therapist for safe discharge planning. Pt's clinical presentation is currently unstable/unpredictable seen in pt's presentation of critical care monitoring. Pt to benefit from continued PT tx to address deficits as defined above and maximize level of functional independent mobility and consistency. From PT/mobility standpoint, recommendation at time of d/c would be level I pending progress in order to facilitate return to PLOF.   Barriers to Discharge Decreased caregiver support;Inaccessible home environment   Goals   Patient Goals To decrease pain   STG Expiration Date 07/27/24   Short Term Goal #1 1. Complete bed mobility and transfers I to decrease need for caregiver in home. 2. Ambulate 300' I to complete household and community mobility without A. 3. Improve dynamic balance to good to decrease need for UE support during ambulation. 4. Be educated & demonstate 20 steps to be able to enter home without A.   Plan   Treatment/Interventions OT;Spoke to case management;Gait training;Bed mobility;Patient/family training;Endurance training;LE strengthening/ROM;Functional transfer training   PT Frequency 3-5x/wk   Discharge Recommendation   Rehab Resource Intensity Level, PT I (Maximum Resource Intensity)   AM-PAC Basic Mobility Inpatient   Turning in Flat Bed Without Bedrails 2   Lying on Back to Sitting on Edge of Flat Bed Without Bedrails 2   Moving Bed to Chair 2   Standing Up From Chair Using Arms 2   Walk in Room 2   Climb 3-5 Stairs With Railing 2   Basic Mobility Inpatient Raw Score 12   Basic Mobility Standardized Score 32.23   [place] Highest Level Of Mobility   -M Goal 4: Move to chair/commode   -HLM Achieved 4: Move to chair/commode           Alexandra Fishman, PT

## 2024-07-15 NOTE — PROGRESS NOTES
"Progress Note - Neurology   Federico Bowen 68 y.o. male MRN: [id number]  Unit/Bed#: ICU 07 Encounter: [id number]      Assessment & Plan     Seizures (HCC)  Assessment & Plan  68 y.o.  male with CAD, HTN, HLD, tobacco use, unstable angina, COPD, history of cocaine use, presented to [facility] on 7/12 with reported acute headache followed by unresponsiveness and 2 witnessed seizures.  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 He is also s/p EVD.  He was transferred to [facility] for video EEG monitoring and neurosurgical consultation.  He was bolused with Keppra 2 g IV x 1 and continued on 1 g twice daily.       Workup:  - CT head 7/12/24:  \"Acute diffuse thickened large-volume subarachnoid hemorrhage throughout the bilateral parafalcine regions, bilateral cerebral sulci (right worse than left), basilar cisterns, prepontine cistern, premedullary cistern, and visualized upper cervical spinal canal. Acute small volume intraventricular hemorrhage with mild hydrocephalus. No acute infarction.\"  - CTA H/N wwo contrast 7/12/24:  \"0.3 cm aneurysm in expected origin of right posterior communicating artery. Possible tiny aneurysm in left anterior communicating artery region. These could be potential sources of diffuse subarachnoid hemorrhage. Recommend neurovascular surgery consultation for further evaluation. Mild narrowing of bilateral MCA M1 and bilateral M2 segmental branch vessels, likely due to vasospasm. Patent stent in left proximal subclavian artery. Endotracheal tube in trachea.\"  - Repeat head CT on 7/14/2024 demonstrated stable appearing diffuse SAH/IVH.  - UDS (+) for benzos.  - No seizures or epileptiform discharges noted on vEEG.    Patient has been extubated and is alert and following commands, without lateralized findings.    Plan:  - EEG to be discontinued.  - Continue Keppra 1 g " twice daily.  - Seizure precautions.  - Driving status will need to be clarified and [employer] form completed and filed if patient is actively driving.  - Management of SAH as per NSG/NCC. Plan is for continuing nimodipine, daily TCDs, and SBP goal less than 160 for now. Hold AP/AC.  - Medical management as per NCC.    * Subarachnoid hemorrhage (HCC)  Assessment & Plan  - s/p coil-embolization of R PCOM aneursym  - Management as per critical care and neurosurgery teams.          Federico Bowen will need follow up in in 6 weeks with epilepsy attending. He will not require outpatient neurological testing.    Subjective:   Patient seen and examined at bedside. He is awake and alert, remains impulsive and requiring B/L hand mitts for safety as he was pulling at lines. Requesting frequently for mitts to be removed. He is not oriented to situation and is not able to state why he is in the hospital.    ROS:  Unable to reliably assess secondary to mental status    Medications  Scheduled Meds:  Current Facility-Administered Medications   Medication Dose Route Frequency Provider Last Rate    acetaminophen  650 mg Oral Q4H PRN CHRISTINA Peterson      aspirin  81 mg Per NG Tube Daily Ruslan Mcneal MD      butalbital-acetaminophen-caffeine  1 tablet Oral Q4H PRN Hadley Eric, DO      chlorhexidine  15 mL Mouth/Throat Q12H [facility] CHRISTINA Peterson      gabapentin  100 mg Oral TID Hadley Eric DO      heparin (porcine)  8 Units/kg/hr (Order-Specific) Intravenous Titrated Hadley Eric DO 8 Units/kg/hr (07/14/24 1311)    hydrALAZINE  10 mg Intravenous Q6H PRN Vanesa Timmons MD      HYDROmorphone  0.2 mg Intravenous Q4H PRN CHRISTINA Peterson      labetalol  10 mg Intravenous Q6H PRN Vanesa Timmons MD      levETIRAcetam  1,000 mg Oral Q12H [facility] Hadley Eric DO      lidocaine  1 patch Topical Daily Hadley Eric DO      methocarbamol  500 mg Oral Q6H PRN Hadley Nunes  [place], DO      niMODipine  60 mg Per NG Tube Q4H [facility] CHRISTINA Peterson      ondansetron  4 mg Intravenous Q6H PRN Hadley Eric, DO      oxyCODONE  2.5 mg Oral Q4H PRN Kayleigh Gillespiezenreyna, CRNP      oxyCODONE  5 mg Oral Q4H PRN Kayleigh Mast, MARY ANNNP      potassium phosphate  30 mmol Intravenous Once Oliva Zheng MD      sodium chloride  75 mL/hr Intravenous Continuous Hadley Eric, DO 75 mL/hr (07/14/24 2309)    sodium chloride  1 g Oral TID With Meals Adam Walker MD      umeclidinium  1 puff Inhalation Daily CHRISTINA Peterson       Continuous Infusions:heparin (porcine), 8 Units/kg/hr (Order-Specific), Last Rate: 8 Units/kg/hr (07/14/24 1311)  sodium chloride, 75 mL/hr, Last Rate: 75 mL/hr (07/14/24 2309)      PRN Meds:.  acetaminophen    butalbital-acetaminophen-caffeine    hydrALAZINE    HYDROmorphone **OR** [DISCONTINUED] HYDROmorphone    labetalol    methocarbamol    ondansetron    oxyCODONE    oxyCODONE    Vitals: Blood pressure 119/67, pulse 68, temperature 98 °F (36.7 °C), temperature source Oral, resp. rate 13, weight 54 kg (119 lb), SpO2 96%.,There is no height or weight on file to calculate BMI.    Physical Exam: /67   Pulse 68   Temp 98 °F (36.7 °C) (Oral)   Resp 13   Wt 54 kg (119 lb)   SpO2 96%   General appearance:  Awake and alert, oriented to person, place, month. Not oriented to year. Speech is clear without evidence of dysarthria.  Head: Normocephalic, without obvious abnormality, EVD in place  Eyes: negative findings: lids and lashes normal, conjunctivae and sclerae normal, and R pupil 3 mm and reactive, L pupil 2 mm and reactive, blink to threat intact B/L  Lungs: clear to auscultation bilaterally  Heart: regular rate and rhythm  Abdomen:  Soft, not distended  Extremities: extremities normal, warm and well-perfused; no cyanosis, clubbing, or edema  Skin: Skin color, texture, turgor normal. No rashes or lesions  Neurologic: Mental status: Awake and  alert, oriented to person, place, month. Not oriented to year or situation.  Cranial nerves: II: pupils R pupil 3 mm and L pupil 2 mm and reactive B/L, III,VII: ptosis no ptosis noted, III,IV,VI: extraocular muscles extra-ocular motions intact, VII: upper facial muscle function normal bilaterally, VII: lower facial muscle function normal bilaterally, XII: tongue strength normal  Sensory: Grossly intact to crude touch B/L  Motor: Moving all extremities antigravity and following commands x 4.     Labs  Recent Results (from the past 24 hour(s))   Fingerstick Glucose (POCT)    Collection Time: 07/14/24 11:48 AM   Result Value Ref Range    POC Glucose 117 65 - 140 mg/dl   APTT    Collection Time: 07/14/24  1:11 PM   Result Value Ref Range    PTT 34 23 - 37 seconds   Protime-INR    Collection Time: 07/14/24  1:11 PM   Result Value Ref Range    Protime 15.2 (H) 11.6 - 14.5 seconds    INR 1.21 (H) 0.84 - 1.19   Fingerstick Glucose (POCT)    Collection Time: 07/14/24  5:47 PM   Result Value Ref Range    POC Glucose 128 65 - 140 mg/dl   ECG 12 lead    Collection Time: 07/14/24  5:50 PM   Result Value Ref Range    Ventricular Rate 72 BPM    Atrial Rate 72 BPM    MA Interval 171 ms    QRSD Interval 83 ms    QT Interval 383 ms    QTC Interval 420 ms    P Axis 110 degrees    QRS Axis 57 degrees    T Wave Axis 12 degrees   Fingerstick Glucose (POCT)    Collection Time: 07/15/24 12:09 AM   Result Value Ref Range    POC Glucose 107 65 - 140 mg/dl   Phosphorus    Collection Time: 07/15/24  5:20 AM   Result Value Ref Range    Phosphorus 1.5 (L) 2.3 - 4.1 mg/dL   Magnesium    Collection Time: 07/15/24  5:20 AM   Result Value Ref Range    Magnesium 2.0 1.9 - 2.7 mg/dL   Calcium, ionized    Collection Time: 07/15/24  5:20 AM   Result Value Ref Range    Calcium, Ionized 1.16 1.12 - 1.32 mmol/L   CBC and differential    Collection Time: 07/15/24  5:20 AM   Result Value Ref Range    WBC 8.78 4.31 - 10.16 Thousand/uL    RBC 3.46 (L) 3.88 -  5.62 Million/uL    Hemoglobin 10.8 (L) 12.0 - 17.0 g/dL    Hematocrit 32.3 (L) 36.5 - 49.3 %    MCV 93 82 - 98 fL    MCH 31.2 26.8 - 34.3 pg    MCHC 33.4 31.4 - 37.4 g/dL    RDW 13.6 11.6 - 15.1 %    MPV 10.1 8.9 - 12.7 fL    Platelets 182 149 - 390 Thousands/uL    nRBC 0 /100 WBCs    Segmented % 75 43 - 75 %    Immature Grans % 1 0 - 2 %    Lymphocytes % 18 14 - 44 %    Monocytes % 6 4 - 12 %    Eosinophils Relative 0 0 - 6 %    Basophils Relative 0 0 - 1 %    Absolute Neutrophils 6.54 1.85 - 7.62 Thousands/µL    Absolute Immature Grans 0.04 0.00 - 0.20 Thousand/uL    Absolute Lymphocytes 1.61 0.60 - 4.47 Thousands/µL    Absolute Monocytes 0.55 0.17 - 1.22 Thousand/µL    Eosinophils Absolute 0.02 0.00 - 0.61 Thousand/µL    Basophils Absolute 0.02 0.00 - 0.10 Thousands/µL   Basic metabolic panel    Collection Time: 07/15/24  5:20 AM   Result Value Ref Range    Sodium 139 135 - 147 mmol/L    Potassium 3.6 3.5 - 5.3 mmol/L    Chloride 107 96 - 108 mmol/L    CO2 25 21 - 32 mmol/L    ANION GAP 7 4 - 13 mmol/L    BUN 9 5 - 25 mg/dL    Creatinine 0.55 (L) 0.60 - 1.30 mg/dL    Glucose 99 65 - 140 mg/dL    Calcium 8.5 8.4 - 10.2 mg/dL    eGFR 106 ml/min/1.73sq m   APTT    Collection Time: 07/15/24  5:20 AM   Result Value Ref Range    PTT 34 23 - 37 seconds   Fingerstick Glucose (POCT)    Collection Time: 07/15/24  5:40 AM   Result Value Ref Range    POC Glucose 110 65 - 140 mg/dl     Imaging   Personally reviewed images and reports in PACs. CTH- No significant interval change in extensive B/L SAH and IVH.     VTE Prophylaxis: Heparin

## 2024-07-15 NOTE — SPEECH THERAPY NOTE
Speech-Language Pathology Progress Note    Patient Name: Federico Bowen    Today's Date: 7/15/2024    Subjective:  Pt was awake and alert. He was sitting up in chair at bedside.     Objective:  Pt was seen today for dysphagia therapy. He is currently NPO with NGT. Pt was on room air. Focus of today's session was to maximize PO intake safety and determine potential to begin a diet . Textures offered today included puree, ice chips via tsp, and thin liquid via straw.  Swallow function:   Bolus retrieval was adequate. Manipulation and AP transfer were reduced but functional. Pharyngeal swallow initiation was min delayed. Hyolaryngeal excursion was adequate. No overt s/s aspiration occurred during session today. Occasional mild belch/throat clear after swallow.    Assessment:  Swallow function is improving with current diet.   Okay to begin oral diet.    Plan:  Begin  puree and thin liquids. Continue ST follow up. Subsequent sessions to focus on maximizing PO intake safety and determining potential for diet texture advancement. Close supervision for meals.  Meds crushed in puree.

## 2024-07-15 NOTE — ASSESSMENT & PLAN NOTE
- s/p coil-embolization of R PCOM aneursym  - Management as per critical care and neurosurgery teams.

## 2024-07-15 NOTE — PROGRESS NOTES
[facility] [place] [facility]  Progress Note: Critical Care  Name: Federico Bowen 68 y.o. male I MRN: [id number]  Unit/Bed#: ICU 07 I Date of Admission: 7/12/2024   Date of Service: 7/15/2024 I Hospital Day: 3    Assessment & Plan   Neuro:   Diagnosis: SAH with IVH and mild hydro s/p coil embolization of R PCOM aneurysm  BD 3 HH5, MF 4  7/11 CTAH/N-0.3 cm aneurysm in expected origin of right posterior communicating artery. Possible tiny aneurysm in left anterior communicating artery region. Mild narrowing of bilateral MCA M1 and bilateral M2 segmental branch vessels, likely due to vasospasm.   7/11 CTH-Acute diffuse thickened large-volume SAH throughout the b/l parafalcine regions, b/l cerebral sulci (R worse than L), basilar cisterns, prepontine cistern, premedullary cistern, and visualized upper cervical spinal canal. Acute small volume IVH with mild hydrocephalus.   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   7/14 CTH -No significant interval change in extensive bilateral SAH and IVH.   7/14 vEEG no seizures  Plan:   Neuro/Nsx consulted  EVD 10. Goal ICP <20   ICP 1-9  35cc/24hrs--consider raising 15  SBP <220  Keppra ppx x 7 days unless seizure on eeg  Nimodipine x 21days  ASA s/p coil  Daily TCD  SR Hep gtt   Euvolemia (-132)  Normonatremia (136)  MRI  Stat CTH with any change in GCS > 2 pts     Diagnosis: Seizure  2/2 above  vEEG no seizures  Plan:   Neuro cx  Keppra 1gm BID--t/c decreasing to 500mg  CV:   Diagnosis: Hx CAD/HTN/HLD  Plan:   Cont home statin  Hold ASA     Pulm:  Diagnosis: Hx COPD without AE  Plan:   home inhalers     GI:   Diagnosis: Dysphagia  Plan:   Speech eval  If passes d/c IVF pending I/O for euvolemia     :   No active issues D/c anderson     F/E/N:    No active issues     Heme/Onc:   Diagnosis: Anemia  Plan:   Unclear baseline no s/s bleeding monitor     Endo:   No issues     ID:   Diagnosis: Leukocytosis  Afebrile monitor         MSK/Skin:   Diagnosis: Penile lesion  Plan: note from outpt derm inflamed seborrheic keratosis          Disposition: Critical care    ICU Core Measures     A: Assess, Prevent, and Manage Pain Has pain been assessed? Yes  Need for changes to pain regimen? No   B: Both SAT/SAT  N/A   C: Choice of Sedation RASS Goal: 0 Alert and Calm  Need for changes to sedation or analgesia regimen? No   D: Delirium CAM-ICU: Negative   E: Early Mobility  Plan for early mobility? Yes   F: Family Engagement Plan for family engagement today? Yes       Review of Invasive Devices:      Central access plan: Will obtain peripheral access and discontinue prior to transfer  [place] Plan: Keep arterial line for hemodynamic monitoring    Prophylaxis:  VTE VTE covered by:  heparin (porcine), Intravenous, 8 Units/kg/hr at 07/14/24 1311       Stress Ulcer  not ordered        Significant 24hr Events     24hr events: SR hep gtt started     Subjective     Review of Systems: Review of Systems   Respiratory: Negative.     Cardiovascular: Negative.    Neurological:  Positive for headaches.        Objective                            Vitals I/O      Most Recent Min/Max in 24hrs   Temp 98 °F (36.7 °C) Temp  Min: 97.7 °F (36.5 °C)  Max: 98 °F (36.7 °C)   Pulse 98 Pulse  Min: 64  Max: 98   Resp 22 Resp  Min: 11  Max: 22   /68 BP  Min: 119/72  Max: 127/70   O2 Sat 98 % SpO2  Min: 92 %  Max: 100 %      Intake/Output Summary (Last 24 hours) at 7/15/2024 0207  Last data filed at 7/14/2024 2000  Gross per 24 hour   Intake 1827.27 ml   Output 1990 ml   Net -162.73 ml       Diet Enteral/Parenteral; Tube Feeding No Oral Diet; Jevity 1.2 Josh; Continuous; 10    Invasive Monitoring   Arterial Line  [place] /66  Arterial Line BP  Min: 136/52  Max: 160/60    mmHg  Arterial Line MAP (mmHg)  Min: 74 mmHg  Max: 100 mmHg           Physical Exam   Physical Exam  Eyes:      Comments: R4B L 3B   Skin:     General: Skin is warm and dry.   HENT:      Head:  Normocephalic and atraumatic.      Comments: EVD     Mouth/Throat:      Mouth: Mucous membranes are moist.   Cardiovascular:      Rate and Rhythm: Normal rate and regular rhythm.      Pulses: Normal pulses.      Heart sounds: Normal heart sounds.   Musculoskeletal:         General: No swelling. Normal range of motion.      Right lower leg: No edema.      Left lower leg: No edema.   Abdominal: General: Bowel sounds are normal. There is no distension.      Palpations: Abdomen is soft.      Tenderness: There is no abdominal tenderness.   Constitutional:       General: He is not in acute distress.  Pulmonary:      Effort: Pulmonary effort is normal. No respiratory distress.      Breath sounds: Normal breath sounds.   Neurological:      General: No focal deficit present.      Mental Status: He is disoriented to place, disoriented to time and disoriented to situation.      GCS: GCS eye subscore is 4. GCS verbal subscore is 5. GCS motor subscore is 6.      Motor: Strength full and intact in all extremities. No motor deficit.            Diagnostic Studies      EKG: tele  Imaging:  I have personally reviewed pertinent reports.   and I have personally reviewed pertinent films in PACS     Medications:  Scheduled PRN   aspirin, 81 mg, Daily  chlorhexidine, 15 mL, Q12H MATTY  gabapentin, 100 mg, TID  levETIRAcetam, 1,000 mg, Q12H MATTY  lidocaine, 1 patch, Daily  niMODipine, 60 mg, Q4H MATTY  sodium chloride, 1 g, TID With Meals  umeclidinium, 1 puff, Daily      acetaminophen, 650 mg, Q4H PRN  butalbital-acetaminophen-caffeine, 1 tablet, Q4H PRN  hydrALAZINE, 10 mg, Q6H PRN  HYDROmorphone, 0.2 mg, Q4H PRN  labetalol, 10 mg, Q6H PRN  methocarbamol, 500 mg, Q6H PRN  ondansetron, 4 mg, Q6H PRN  oxyCODONE, 2.5 mg, Q4H PRN  oxyCODONE, 5 mg, Q4H PRN       Continuous    heparin (porcine), 8 Units/kg/hr (Order-Specific), Last Rate: 8 Units/kg/hr (07/14/24 1311)  sodium chloride, 75 mL/hr, Last Rate: 75 mL/hr (07/14/24 6359)          Labs:    CBC    Recent Labs     07/13/24  0534 07/14/24  0517   WBC 9.29 15.17*   HGB 11.4* 10.4*   HCT 33.3* 30.5*    150     BMP    Recent Labs     07/13/24  1428 07/14/24  0517   SODIUM 135 136   K 4.7 3.8    105   CO2 21 22   AGAP 6 9   BUN 9 10   CREATININE 0.67 0.55*   CALCIUM 7.7* 8.4       Coags    Recent Labs     07/14/24  1311   INR 1.21*   PTT 34        Additional Electrolytes  Recent Labs     07/13/24  0534 07/14/24  0517   MG 1.7* 1.9   PHOS 2.8 1.8*   CAIONIZED  --  1.11*          Blood Gas    No recent results  No recent results LFTs  No recent results    Infectious  No recent results  Glucose  Recent Labs     07/13/24  1428 07/14/24  0517   GLUC 178* 110               CHRISTINA Peterson

## 2024-07-15 NOTE — PLAN OF CARE
Problem: OCCUPATIONAL THERAPY ADULT  Goal: Performs self-care activities at highest level of function for planned discharge setting.  See evaluation for individualized goals.  Description: Treatment Interventions: ADL retraining, Functional transfer training, UE strengthening/ROM, Endurance training, Cognitive reorientation, Patient/family training, Equipment evaluation/education, Compensatory technique education, Continued evaluation, Energy conservation, Activityengagement          See flowsheet documentation for full assessment, interventions and recommendations.   Note: Limitation: Decreased ADL status, Decreased UE ROM, Decreased UE strength, Decreased Safe judgement during ADL, Decreased cognition, Decreased endurance, Decreased self-care trans, Decreased high-level ADLs  Prognosis: Fair  Assessment: Pt is a 69 y/o male seen for OT eval s/p adm to [facility] w/ H/A and R leg pain. Transferred from [facility], EMS witnessed 2 seizures. Stroke alert called; CTH revealed SAH w/ IVH w/ R PCA Pt  has no past medical history on file. Pt with active OT orders and activity as tolerated orders. Pt lives with his dght in 2 SH, 20 MARCUS, 20 steps inside. Pt was I w/  ADLS and IADLS, drove, & required no use of DME PTA. Pt is currently demonstrating the following occupational deficits: Min A UB ADLS, Mod A LB ADLS, Mod A bed mobility, Mod A x2 transfers and functional mobility w/ B/L HHA. These deficits that are impacting pt's baseline areas of occupation are a result of the following impairments: pain, endurance, activity tolerance, functional mobility, forward functional reach, balance, trunk control, functional standing tolerance, decreased I w/ ADLS/IADLS, strength, ROM, cognitive impairments, decreased safety awareness, and decreased insight into deficits.The following Occupational Performance Areas to address include: eating, grooming, bathing/shower, toilet hygiene, dressing, medication management, socialization, health  maintenance, functional mobility, community mobility, clothing management, and household maintenance. Recommend inpatient rehab  upon D/C. Pt to continue to benefit from acute immediate OT services to address the following goals 3-5x/week to  w/in 10-14 days:  Recommendation: Physiatry Consult  Rehab Resource Intensity Level, OT: I (Maximum Resource Intensity)     Missy Nunez MS, OTR/L

## 2024-07-15 NOTE — PLAN OF CARE
Problem: Prexisting or High Potential for Compromised Skin Integrity  Goal: Skin integrity is maintained or improved  Description: INTERVENTIONS:  - Identify patients at risk for skin breakdown  - Assess and monitor skin integrity  - Assess and monitor nutrition and hydration status  - Monitor labs   - Assess for incontinence   - Turn and reposition patient  - Assist with mobility/ambulation  - Relieve pressure over bony prominences  - Avoid friction and shearing  - Provide appropriate hygiene as needed including keeping skin clean and dry  - Evaluate need for skin moisturizer/barrier cream  - Collaborate with interdisciplinary team   - Patient/family teaching  - Consider wound care consult   7/14/2024 2008 by Nory Oneill RN  Outcome: Progressing  Problem: Activity Intolerance/Impaired Mobility  Goal: Mobility/activity is maintained at optimum level for patient  Description: Interventions:  - Assess and monitor patient  barriers to mobility and need for assistive/adaptive devices.  - Assess patient's emotional response to limitations.  - Collaborate with interdisciplinary team and initiate plans and interventions as ordered.  - Encourage independent activity per ability.  - Maintain proper body alignment.  - Perform active/passive rom as tolerated/ordered.  - Plan activities to conserve energy.  - Turn patient as appropriate  7/14/2024 2008 by Nory Oneill RN  Outcome: Progressing  7/14/2024 2007 by Nory Oneill RN  Outcome: Progressing     Problem: Communication Impairment  Goal: Ability to express needs and understand communication  Description: Assess patient's communication skills and ability to understand information.  Patient will demonstrate use of effective communication techniques, alternative methods of communication and understanding even if not able to speak.     - Encourage communication and provide alternate methods of communication as needed.  - Collaborate with case management/social  services for discharge needs.  - Include patient/family/caregiver in decisions related to communication.  7/14/2024 2008 by Nory Oneill RN  Outcome: Progressing  7/14/2024 2007 by Nory Oneill RN  Outcome: Progressing     Problem: Potential for Aspiration  Goal: Non-ventilated patient's risk of aspiration is minimized  Description: Assess and monitor vital signs, respiratory status, and labs (WBC).  Monitor for signs of aspiration (tachypnea, cough, rales, wheezing, cyanosis, fever).    - Assess and monitor patient's ability to swallow.  - Place patient up in chair to eat if possible.  - HOB up at 90 degrees to eat if unable to get patient up into chair.  - Supervise patient during oral intake.   - Instruct patient/ family to take small bites.  - Instruct patient/ family to take small single sips when taking liquids.  - Follow patient-specific strategies generated by speech pathologist.  7/14/2024 2008 by Nory Oneill RN  Outcome: Progressing  7/14/2024 2007 by Nory Oneill RN  Outcome: Progressing  Goal: Ventilated patient's risk of aspiration is minimized  Description: Assess and monitor vital signs, respiratory status, airway cuff pressure, and labs (WBC).  Monitor for signs of aspiration (tachypnea, cough, rales, wheezing, cyanosis, fever).    - Elevate head of bed 30 degrees if patient has tube feeding.  - Monitor tube feeding.  7/14/2024 2008 by Nory Oneill RN  Outcome: Progressing  7/14/2024 2007 by Nory Oneill RN  Outcome: Progressing     Problem: Nutrition  Goal: Nutrition/Hydration status is improving  Description: Monitor and assess patient's nutrition/hydration status for malnutrition (ex- brittle hair, bruises, dry skin, pale skin and conjunctiva, muscle wasting, smooth red tongue, and disorientation). Collaborate with interdisciplinary team and initiate plan and interventions as ordered.  Monitor patient's weight and dietary intake as ordered or per policy. Utilize nutrition screening  tool and intervene per policy. Determine patient's food preferences and provide high-protein, high-caloric foods as appropriate.     - Assist patient with eating.  - Allow adequate time for meals.  - Encourage patient to take dietary supplement as ordered.  - Collaborate with clinical nutritionist.  - Include patient/family/caregiver in decisions related to nutrition.  7/14/2024 2008 by Nory Oneill RN  Outcome: Progressing  7/14/2024 2007 by Nory Oneill RN  Outcome: Progressing     Problem: PAIN - ADULT  Goal: Verbalizes/displays adequate comfort level or baseline comfort level  Description: Interventions:  - Encourage patient to monitor pain and request assistance  - Assess pain using appropriate pain scale  - Administer analgesics based on type and severity of pain and evaluate response  - Implement non-pharmacological measures as appropriate and evaluate response  - Consider cultural and social influences on pain and pain management  - Notify physician/advanced practitioner if interventions unsuccessful or patient reports new pain  7/14/2024 2008 by Nory Oneill RN  Outcome: Progressing  7/14/2024 2007 by Nory Oneill RN  Outcome: Progressing     Problem: INFECTION - ADULT  Goal: Absence or prevention of progression during hospitalization  Description: INTERVENTIONS:  - Assess and monitor for signs and symptoms of infection  - Monitor lab/diagnostic results  - Monitor all insertion sites, i.e. indwelling lines, tubes, and drains  - Monitor endotracheal if appropriate and nasal secretions for changes in amount and color  - [place] appropriate cooling/warming therapies per order  - Administer medications as ordered  - Instruct and encourage patient and family to use good hand hygiene technique  - Identify and instruct in appropriate isolation precautions for identified infection/condition  7/14/2024 2008 by Nory Oneill RN  Outcome: Progressing  7/14/2024 2007 by Nory Oneill RN  Outcome:  Progressing  Goal: Absence of fever/infection during neutropenic period  Description: INTERVENTIONS:  - Monitor WBC    7/14/2024 2008 by Nory Oneill RN  Outcome: Progressing  7/14/2024 2007 by Nory Oneill RN  Outcome: Progressing     Problem: SAFETY ADULT  Goal: Patient will remain free of falls  Description: INTERVENTIONS:  - Educate patient/family on patient safety including physical limitations  - Instruct patient to call for assistance with activity   - Consult OT/PT to assist with strengthening/mobility   - Keep Call bell within reach  - Keep bed low and locked with side rails adjusted as appropriate  - Keep care items and personal belongings within reach  - Initiate and maintain comfort rounds  - Make Fall Risk Sign visible to staff  - Offer Toileting every  Hours, in advance of need  - Initiate/Maintain alarm  - Obtain necessary fall risk management equipment:   - Apply yellow socks and bracelet for high fall risk patients  - Consider moving patient to room near nurses station  7/14/2024 2008 by Nory Oneill RN  Outcome: Progressing  7/14/2024 2007 by Nory Oneill RN  Outcome: Progressing  Goal: Maintain or return to baseline ADL function  Description: INTERVENTIONS:  -  Assess patient's ability to carry out ADLs; assess patient's baseline for ADL function and identify physical deficits which impact ability to perform ADLs (bathing, care of mouth/teeth, toileting, grooming, dressing, etc.)  - Assess/evaluate cause of self-care deficits   - Assess range of motion  - Assess patient's mobility; develop plan if impaired  - Assess patient's need for assistive devices and provide as appropriate  - Encourage maximum independence but intervene and supervise when necessary  - Involve family in performance of ADLs  - Assess for home care needs following discharge   - Consider OT consult to assist with ADL evaluation and planning for discharge  - Provide patient education as appropriate  7/14/2024 2008 by Nory  FESTUS Oneill  Outcome: Progressing  7/14/2024 2007 by Nory Oneill RN  Outcome: Progressing  Goal: Maintains/Returns to pre admission functional level  Description: INTERVENTIONS:  - Perform AM-PAC 6 Click Basic Mobility/ Daily Activity assessment daily.  - Set and communicate daily mobility goal to care team and patient/family/caregiver.   - Collaborate with rehabilitation services on mobility goals if consulted  - Perform Range of Motion  times a day.  - Reposition patient every  hours.  - Dangle patient  times a day  - Stand patient  times a day  - Ambulate patient  times a day  - Out of bed to chair  times a day   - Out of bed for m times a day  - Out of bed for toileting  - Record patient progress and toleration of activity level   7/14/2024 2008 by Nory Oneill RN  Outcome: Progressing  7/14/2024 2007 by Nory Oneill RN  Outcome: Progressing     Problem: DISCHARGE PLANNING  Goal: Discharge to home or other facility with appropriate resources  Description: INTERVENTIONS:  - Identify barriers to discharge w/patient and caregiver  - Arrange for needed discharge resources and transportation as appropriate  - Identify discharge learning needs (meds, wound care, etc.)  - Arrange for interpretive services to assist at discharge as needed  - Refer to Case Management Department for coordinating discharge planning if the patient needs post-hospital services based on physician/advanced practitioner order or complex needs related to functional status, cognitive ability, or social support system  7/14/2024 2008 by Nory Oneill RN  Outcome: Progressing  7/14/2024 2007 by Nory Oneill RN  Outcome: Progressing     Problem: Knowledge Deficit  Goal: Patient/family/caregiver demonstrates understanding of disease process, treatment plan, medications, and discharge instructions  Description: Complete learning assessment and assess knowledge base.  Interventions:  - Provide teaching at level of understanding  - Provide  teaching via preferred learning methods  7/14/2024 2008 by Nory Oneill RN  Outcome: Progressing  7/14/2024 2007 by Nory Oneill RN  Outcome: Progressing     Problem: SAFETY,RESTRAINT: NV/NON-SELF DESTRUCTIVE BEHAVIOR  Goal: Remains free of harm/injury (restraint for non violent/non self-detsructive behavior)  Description: INTERVENTIONS:  - Instruct patient/family regarding restraint use   - Assess and monitor physiologic and psychological status   - Provide interventions and comfort measures to meet assessed patient needs   - Identify and implement measures to help patient regain control  - Assess readiness for release of restraint   7/14/2024 2008 by Nory Oneill RN  Outcome: Progressing  7/14/2024 2007 by Nory Oneill RN  Outcome: Progressing  Goal: Returns to optimal restraint-free functioning  Description: INTERVENTIONS:  - Assess the patient's behavior and symptoms that indicate continued need for restraint  - Identify and implement measures to help patient regain control  - Assess readiness for release of restraint   7/14/2024 2008 by Nory Oneill RN  Outcome: Progressing  7/14/2024 2007 by Nory Oneill RN  Outcome: Progressing    7/14/2024 2007 by Nory Oneill RN  Outcome: Progressing     Problem: Activity Intolerance/Impaired Mobility  Goal: Mobility/activity is maintained at optimum level for patient  Description: Interventions:  - Assess and monitor patient  barriers to mobility and need for assistive/adaptive devices.  - Assess patient's emotional response to limitations.  - Collaborate with interdisciplinary team and initiate plans and interventions as ordered.  - Encourage independent activity per ability.  - Maintain proper body alignment.  - Perform active/passive rom as tolerated/ordered.  - Plan activities to conserve energy.  - Turn patient as appropriate  7/14/2024 2008 by Nory Oneill RN  Outcome: Progressing  7/14/2024 2007 by Nory Oneill RN  Outcome: Progressing

## 2024-07-16 ENCOUNTER — APPOINTMENT (INPATIENT)
Dept: RADIOLOGY | Facility: HOSPITAL | Age: 69
DRG: 020 | End: 2024-07-16
Payer: MEDICARE

## 2024-07-16 ENCOUNTER — APPOINTMENT (INPATIENT)
Dept: NON INVASIVE DIAGNOSTICS | Facility: HOSPITAL | Age: 69
DRG: 020 | End: 2024-07-16
Payer: MEDICARE

## 2024-07-16 PROBLEM — E44.0 MODERATE PROTEIN-CALORIE MALNUTRITION (HCC): Status: ACTIVE | Noted: 2024-07-16

## 2024-07-16 LAB
ANION GAP SERPL CALCULATED.3IONS-SCNC: 10 MMOL/L (ref 4–13)
APTT PPP: 32 SECONDS (ref 23–37)
BUN SERPL-MCNC: 9 MG/DL (ref 5–25)
CALCIUM SERPL-MCNC: 9.1 MG/DL (ref 8.4–10.2)
CHLORIDE SERPL-SCNC: 106 MMOL/L (ref 96–108)
CO2 SERPL-SCNC: 21 MMOL/L (ref 21–32)
CREAT SERPL-MCNC: 0.62 MG/DL (ref 0.6–1.3)
ERYTHROCYTE [DISTWIDTH] IN BLOOD BY AUTOMATED COUNT: 13.3 % (ref 11.6–15.1)
GFR SERPL CREATININE-BSD FRML MDRD: 101 ML/MIN/1.73SQ M
GLUCOSE SERPL-MCNC: 124 MG/DL (ref 65–140)
GLUCOSE SERPL-MCNC: 128 MG/DL (ref 65–140)
GLUCOSE SERPL-MCNC: 134 MG/DL (ref 65–140)
GLUCOSE SERPL-MCNC: 138 MG/DL (ref 65–140)
GLUCOSE SERPL-MCNC: 144 MG/DL (ref 65–140)
GLUCOSE SERPL-MCNC: 161 MG/DL (ref 65–140)
HCT VFR BLD AUTO: 36.6 % (ref 36.5–49.3)
HGB BLD-MCNC: 12.7 G/DL (ref 12–17)
MAGNESIUM SERPL-MCNC: 1.9 MG/DL (ref 1.9–2.7)
MCH RBC QN AUTO: 31.2 PG (ref 26.8–34.3)
MCHC RBC AUTO-ENTMCNC: 34.7 G/DL (ref 31.4–37.4)
MCV RBC AUTO: 90 FL (ref 82–98)
PHOSPHATE SERPL-MCNC: 2.4 MG/DL (ref 2.3–4.1)
PLATELET # BLD AUTO: 222 THOUSANDS/UL (ref 149–390)
PMV BLD AUTO: 9.4 FL (ref 8.9–12.7)
POTASSIUM SERPL-SCNC: 3.4 MMOL/L (ref 3.5–5.3)
RBC # BLD AUTO: 4.07 MILLION/UL (ref 3.88–5.62)
SODIUM SERPL-SCNC: 137 MMOL/L (ref 135–147)
WBC # BLD AUTO: 6.36 THOUSAND/UL (ref 4.31–10.16)

## 2024-07-16 PROCEDURE — 83735 ASSAY OF MAGNESIUM: CPT | Performed by: INTERNAL MEDICINE

## 2024-07-16 PROCEDURE — NC001 PR NO CHARGE

## 2024-07-16 PROCEDURE — 70498 CT ANGIOGRAPHY NECK: CPT

## 2024-07-16 PROCEDURE — 80048 BASIC METABOLIC PNL TOTAL CA: CPT | Performed by: INTERNAL MEDICINE

## 2024-07-16 PROCEDURE — 85730 THROMBOPLASTIN TIME PARTIAL: CPT | Performed by: INTERNAL MEDICINE

## 2024-07-16 PROCEDURE — 93886 INTRACRANIAL COMPLETE STUDY: CPT

## 2024-07-16 PROCEDURE — 85027 COMPLETE CBC AUTOMATED: CPT | Performed by: INTERNAL MEDICINE

## 2024-07-16 PROCEDURE — 99291 CRITICAL CARE FIRST HOUR: CPT | Performed by: INTERNAL MEDICINE

## 2024-07-16 PROCEDURE — 92526 ORAL FUNCTION THERAPY: CPT

## 2024-07-16 PROCEDURE — 70496 CT ANGIOGRAPHY HEAD: CPT

## 2024-07-16 PROCEDURE — 99232 SBSQ HOSP IP/OBS MODERATE 35: CPT | Performed by: NURSE PRACTITIONER

## 2024-07-16 PROCEDURE — 93886 INTRACRANIAL COMPLETE STUDY: CPT | Performed by: SURGERY

## 2024-07-16 PROCEDURE — NC001 PR NO CHARGE: Performed by: ANESTHESIOLOGY

## 2024-07-16 PROCEDURE — 82948 REAGENT STRIP/BLOOD GLUCOSE: CPT

## 2024-07-16 PROCEDURE — 84100 ASSAY OF PHOSPHORUS: CPT | Performed by: INTERNAL MEDICINE

## 2024-07-16 RX ORDER — SODIUM CHLORIDE 1 G/1
1 TABLET ORAL
Status: DISCONTINUED | OUTPATIENT
Start: 2024-07-16 | End: 2024-07-19

## 2024-07-16 RX ORDER — SENNOSIDES 8.6 MG
1 TABLET ORAL
Status: DISCONTINUED | OUTPATIENT
Start: 2024-07-16 | End: 2024-07-23

## 2024-07-16 RX ORDER — BISACODYL 10 MG
10 SUPPOSITORY, RECTAL RECTAL DAILY PRN
Status: DISCONTINUED | OUTPATIENT
Start: 2024-07-16 | End: 2024-08-10 | Stop reason: HOSPADM

## 2024-07-16 RX ORDER — POTASSIUM CHLORIDE 20MEQ/15ML
40 LIQUID (ML) ORAL
Status: COMPLETED | OUTPATIENT
Start: 2024-07-16 | End: 2024-07-16

## 2024-07-16 RX ORDER — POTASSIUM CHLORIDE 1500 MG/1
40 TABLET, EXTENDED RELEASE ORAL ONCE
Status: DISCONTINUED | OUTPATIENT
Start: 2024-07-16 | End: 2024-07-16

## 2024-07-16 RX ORDER — SODIUM CHLORIDE 9 MG/ML
75 INJECTION, SOLUTION INTRAVENOUS CONTINUOUS
Status: DISCONTINUED | OUTPATIENT
Start: 2024-07-16 | End: 2024-07-16

## 2024-07-16 RX ORDER — POTASSIUM CHLORIDE 20MEQ/15ML
40 LIQUID (ML) ORAL ONCE
Status: DISCONTINUED | OUTPATIENT
Start: 2024-07-16 | End: 2024-07-16

## 2024-07-16 RX ORDER — POLYETHYLENE GLYCOL 3350 17 G/17G
17 POWDER, FOR SOLUTION ORAL DAILY
Status: DISCONTINUED | OUTPATIENT
Start: 2024-07-16 | End: 2024-07-23

## 2024-07-16 RX ADMIN — POLYETHYLENE GLYCOL 3350 17 G: 17 POWDER, FOR SOLUTION ORAL at 08:07

## 2024-07-16 RX ADMIN — Medication 60 MG: at 23:49

## 2024-07-16 RX ADMIN — ACETAMINOPHEN 650 MG: 650 SUSPENSION ORAL at 21:49

## 2024-07-16 RX ADMIN — POTASSIUM CHLORIDE 40 MEQ: 1.5 SOLUTION ORAL at 08:07

## 2024-07-16 RX ADMIN — Medication 60 MG: at 19:56

## 2024-07-16 RX ADMIN — IOHEXOL 85 ML: 350 INJECTION, SOLUTION INTRAVENOUS at 21:39

## 2024-07-16 RX ADMIN — SODIUM CHLORIDE 1 G: 1 TABLET ORAL at 15:55

## 2024-07-16 RX ADMIN — UMECLIDINIUM 1 PUFF: 62.5 AEROSOL, POWDER ORAL at 08:08

## 2024-07-16 RX ADMIN — SODIUM CHLORIDE 75 ML/HR: 0.9 INJECTION, SOLUTION INTRAVENOUS at 21:48

## 2024-07-16 RX ADMIN — Medication 60 MG: at 12:05

## 2024-07-16 RX ADMIN — GABAPENTIN 300 MG: 300 CAPSULE ORAL at 20:05

## 2024-07-16 RX ADMIN — CHLORHEXIDINE GLUCONATE 0.12% ORAL RINSE 15 ML: 1.2 LIQUID ORAL at 08:07

## 2024-07-16 RX ADMIN — LEVETIRACETAM 1000 MG: 100 SOLUTION ORAL at 08:08

## 2024-07-16 RX ADMIN — ACETAMINOPHEN 650 MG: 650 SUSPENSION ORAL at 11:00

## 2024-07-16 RX ADMIN — BUTALBITAL, ACETAMINOPHEN AND CAFFEINE 1 TABLET: 325; 50; 40 TABLET ORAL at 08:07

## 2024-07-16 RX ADMIN — Medication 60 MG: at 00:11

## 2024-07-16 RX ADMIN — GABAPENTIN 300 MG: 300 CAPSULE ORAL at 15:55

## 2024-07-16 RX ADMIN — LEVETIRACETAM 1000 MG: 100 SOLUTION ORAL at 20:05

## 2024-07-16 RX ADMIN — ACETAMINOPHEN 650 MG: 650 SUSPENSION ORAL at 03:59

## 2024-07-16 RX ADMIN — Medication 60 MG: at 15:55

## 2024-07-16 RX ADMIN — GABAPENTIN 300 MG: 300 CAPSULE ORAL at 08:07

## 2024-07-16 RX ADMIN — SENNOSIDES 8.6 MG: 8.6 TABLET, FILM COATED ORAL at 08:07

## 2024-07-16 RX ADMIN — Medication 60 MG: at 03:58

## 2024-07-16 RX ADMIN — LIDOCAINE 5% 1 PATCH: 700 PATCH TOPICAL at 08:07

## 2024-07-16 RX ADMIN — HEPARIN SODIUM 12 UNITS/KG/HR: 10000 INJECTION, SOLUTION INTRAVENOUS at 18:32

## 2024-07-16 RX ADMIN — Medication 60 MG: at 08:08

## 2024-07-16 RX ADMIN — POTASSIUM CHLORIDE 40 MEQ: 1.5 SOLUTION ORAL at 11:00

## 2024-07-16 RX ADMIN — ASPIRIN 81 MG CHEWABLE TABLET 81 MG: 81 TABLET CHEWABLE at 08:07

## 2024-07-16 RX ADMIN — SODIUM CHLORIDE 1 G: 1 TABLET ORAL at 08:07

## 2024-07-16 RX ADMIN — SODIUM CHLORIDE 75 ML/HR: 0.9 INJECTION, SOLUTION INTRAVENOUS at 00:10

## 2024-07-16 RX ADMIN — SODIUM CHLORIDE 500 ML: 0.9 INJECTION, SOLUTION INTRAVENOUS at 21:49

## 2024-07-16 RX ADMIN — SODIUM CHLORIDE 1 G: 1 TABLET ORAL at 12:04

## 2024-07-16 RX ADMIN — CHLORHEXIDINE GLUCONATE 0.12% ORAL RINSE 15 ML: 1.2 LIQUID ORAL at 20:05

## 2024-07-16 RX ADMIN — ACETAMINOPHEN 650 MG: 650 SUSPENSION ORAL at 15:55

## 2024-07-16 NOTE — PROGRESS NOTES
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  Upon arrival to the ED patient was posturing.  Imaging revealed a large amount of SAH and a right PCOM aneurysm.  Per chart review he was previously on aspirin reversed with DDAVP.    Imaging:    CT head without 7/14/2024:No significant interval change in extensive bilateral subarachnoid hemorrhage and intraventricular hemorrhage.     Plan:  Continue frequent neurological checks  Repeat CTA/CT head w/wo STAT if GCS declines more than 2 points in 1 hour  Continue SAH pathway/spasm watch:  Daily TCD's: R 1.31 L 1.59  Continue Nimodipine 60mg Q4hrs  Continue Keppra 1000 mg twice daily per neurology  Normonatremia (137 today) currently on salt tabs 1 g 3 times daily  Euvolemia (-613)  Hemoglobin >8 (12.7 today)  EVD placed on 7/13/2024  Continue EVD at 10mmhg  Monitor output and ICP  ICPs 4-10, in room 6  Output 49 ml/24hrs  Maintain ICP goal <20  SBP <220  Continue aspirin 81 mg daily  Will increase heparin drip to 12 units/kilogram/hour  Last PTT 32  Medical management and pain control per primary team  DVT PPX: SCDs and heparin drip    Neurosurgery will continue to follow closely, call with any further questions or concerns.    Seizures (HCC)  Assessment & Plan  Neurology following, input appreciated  Patient currently on Keppra 1000 mg twice daily  vEEG no seizure activity           Subjective/Objective     Chief Complaint: \"Hi\"    Subjective: Patient complaining of a headache but otherwise offers no complaints.    Objective: Comfortably laying in bed, " NAD.    I/O         07/14 0701  07/15 0700 07/15 0701  07/16 0700 07/16 0701  07/17 0700    I.V. (mL/kg) 1867.3 (34.6) 1916 (35.5) 160 (3)    NG/GT 50      IV Piggyback 300      Feedings  20     Total Intake(mL/kg) 2217.3 (41.1) 1936 (35.9) 160 (3)    Urine (mL/kg/hr) 2900 (2.2) 2500 (1.9)     Drains 62 49 10    Total Output 2962 2549 10    Net -744.7 -613 +150           Unmeasured Urine Occurrence 1 x 1 x             Invasive Devices       Central Venous Catheter Line  Duration             CVC Central Lines 07/12/24 Triple 16cm 3 days              Peripheral Intravenous Line  Duration             Peripheral IV 07/12/24 Left;Proximal;Ventral (anterior) Forearm 3 days    Peripheral IV 07/15/24 Right;Ventral (anterior) Forearm 1 day              Drain  Duration             Ventriculostomy/Subdural Ventricular drainage catheter Occipital region 3 days    External Urinary Catheter Small 2 days              Airway  Duration             ETT  Cuffed 8 mm 3 days                    Physical Exam:  Vitals: Blood pressure 141/86, pulse 102, temperature 98.5 °F (36.9 °C), temperature source Oral, resp. rate 18, weight 54 kg (119 lb), SpO2 98%.,There is no height or weight on file to calculate BMI.    Hemodynamic Monitoring: MAP: Arterial Line MAP (mmHg): 104 mmHg, CPP: CPP Cuff-Calculated (mmHg): 100, CVP:  , ICP Mean: ICP Mean (mmHg): 6 mmHg    General appearance: alert, appears stated age, cooperative and no distress  Head: Normocephalic, right frontal EVD in place  Eyes: EOMI, conjugate gaze, right pupil 3 mm and irregular and left pupil 2 mm both reactive, prior cataract surgery  Neck: supple, symmetrical, trachea midline   Lungs: non labored breathing  Heart: regular heart rate  Neurologic:   Mental status: GCS 15, speech is clear, following commands  Cranial nerves: grossly intact (Cranial nerves II-XII) except as noted above  Sensory: normal to light touch in all extremities x 4  Motor: moving all extremities without  "focal weakness, strength 5/5 throughout  Reflexes: 2+ and symmetric, no Anabella's or clonus appreciated      Lab Results:  Results from last 7 days   Lab Units 07/16/24  0455 07/15/24  0520 07/14/24  0517 07/13/24  0534   WBC Thousand/uL 6.36 8.78 15.17* 9.29   HEMOGLOBIN g/dL 12.7 10.8* 10.4* 11.4*   HEMATOCRIT % 36.6 32.3* 30.5* 33.3*   PLATELETS Thousands/uL 222 182 150 174   SEGS PCT %  --  75 87* 84*   MONO PCT %  --  6 5 6   EOS PCT %  --  0 0 0     Results from last 7 days   Lab Units 07/16/24  0455 07/15/24  0520 07/14/24  0517   SODIUM mmol/L 137 139 136   POTASSIUM mmol/L 3.4* 3.6 3.8   CHLORIDE mmol/L 106 107 105   CO2 mmol/L 21 25 22   BUN mg/dL 9 9 10   CREATININE mg/dL 0.62 0.55* 0.55*   CALCIUM mg/dL 9.1 8.5 8.4     Results from last 7 days   Lab Units 07/16/24  0455 07/15/24  0520 07/14/24  0517   MAGNESIUM mg/dL 1.9 2.0 1.9     Results from last 7 days   Lab Units 07/16/24  0455 07/15/24  0520 07/14/24  0517   PHOSPHORUS mg/dL 2.4 1.5* 1.8*     Results from last 7 days   Lab Units 07/16/24  0458 07/15/24  0520 07/14/24  1311 07/12/24  1618   INR   --   --  1.21* 1.07   PTT seconds 32 34 34 23     No results found for: \"TROPONINT\"  ABG:  Lab Results   Component Value Date    PHART 7.314 (L) 07/12/2024    TWS4SAA 44.1 (H) 07/12/2024    PO2ART 138.2 (H) 07/12/2024    TSF2KWG 21.9 (L) 07/12/2024    BEART -4.2 07/12/2024    SOURCE Line, Arterial 07/12/2024       Imaging Studies: I have personally reviewed pertinent reports.   and I have personally reviewed pertinent films in PACS    IR cerebral angiography / intervention    Result Date: 7/15/2024  Impression: Successful balloon assisted coil embolization of a 4.5 mm right P-comm aneurysm. Workstation performed: [id number]     CT head wo contrast    Result Date: 7/14/2024  Impression: No significant interval change in extensive bilateral subarachnoid hemorrhage and intraventricular hemorrhage. Workstation performed: [id number]     CT head wo " contrast    Result Date: 7/13/2024  Impression: Interval placement of right-sided shunt catheter. Stable ventricular size with slight interval increase in intraventricular hemorrhage layering in the occipital horns bilaterally. Diffuse subarachnoid hemorrhage again noted. Workstation performed: [id number]     XR chest portable ICU    Result Date: 7/12/2024  Impression: No acute cardiopulmonary disease. ET tube 6 cm above the landon. Workstation performed: [id number]     CTA stroke alert (head/neck)    Result Date: 7/12/2024  Impression: 0.3 cm aneurysm in expected origin of right posterior communicating artery. Possible tiny aneurysm in left anterior communicating artery region. These could be potential sources of diffuse subarachnoid hemorrhage. Recommend neurovascular surgery consultation for further evaluation. Mild narrowing of bilateral MCA M1 and bilateral M2 segmental branch vessels, likely due to vasospasm. Patent stent in left proximal subclavian artery. Endotracheal tube in trachea. Additional chronic/incidental findings as detailed above. Findings were directly discussed with Emmanuel Lion at approximately 4:34 p.m. on 7/12/2024. Workstation performed: [id number]     CT stroke alert brain    Result Date: 7/12/2024  Impression: Acute diffuse thickened large-volume subarachnoid hemorrhage throughout the bilateral parafalcine regions, bilateral cerebral sulci (right worse than left), basilar cisterns, prepontine cistern, premedullary cistern, and visualized upper cervical spinal canal. Acute small volume intraventricular hemorrhage with mild hydrocephalus. No acute infarction. Findings were directly discussed with Emmanuel Lion at approximately 4:34 p.m. on 7/12/2024. Workstation performed: [id number]       EKG, Pathology, and Other Studies: I have personally reviewed pertinent reports.      VTE Pharmacologic Prophylaxis: Heparin    VTE Mechanical Prophylaxis: sequential compression device

## 2024-07-16 NOTE — PLAN OF CARE
Problem: Prexisting or High Potential for Compromised Skin Integrity  Goal: Skin integrity is maintained or improved  Description: INTERVENTIONS:  - Identify patients at risk for skin breakdown  - Assess and monitor skin integrity  - Assess and monitor nutrition and hydration status  - Monitor labs   - Assess for incontinence   - Turn and reposition patient  - Assist with mobility/ambulation  - Relieve pressure over bony prominences  - Avoid friction and shearing  - Provide appropriate hygiene as needed including keeping skin clean and dry  - Evaluate need for skin moisturizer/barrier cream  - Collaborate with interdisciplinary team   - Patient/family teaching  - Consider wound care consult   Outcome: Progressing     Problem: Neurological Deficit  Goal: Neurological status is stable or improving  Description: Interventions:  - Monitor and assess patient's level of consciousness, motor function, sensory function, and level of assistance needed for ADLs.   - Monitor and report changes from baseline. Collaborate with interdisciplinary team to initiate plan and implement interventions as ordered.   - Provide and maintain a safe environment.  - Consider seizure precautions.  - Consider fall precautions.  - Consider aspiration precautions.  - Consider bleeding precautions.  Outcome: Progressing     Problem: Activity Intolerance/Impaired Mobility  Goal: Mobility/activity is maintained at optimum level for patient  Description: Interventions:  - Assess and monitor patient  barriers to mobility and need for assistive/adaptive devices.  - Assess patient's emotional response to limitations.  - Collaborate with interdisciplinary team and initiate plans and interventions as ordered.  - Encourage independent activity per ability.  - Maintain proper body alignment.  - Perform active/passive rom as tolerated/ordered.  - Plan activities to conserve energy.  - Turn patient as appropriate  Outcome: Progressing     Problem:  Communication Impairment  Goal: Ability to express needs and understand communication  Description: Assess patient's communication skills and ability to understand information.  Patient will demonstrate use of effective communication techniques, alternative methods of communication and understanding even if not able to speak.     - Encourage communication and provide alternate methods of communication as needed.  - Collaborate with case management/ for discharge needs.  - Include patient/family/caregiver in decisions related to communication.  Outcome: Progressing     Problem: Potential for Aspiration  Goal: Non-ventilated patient's risk of aspiration is minimized  Description: Assess and monitor vital signs, respiratory status, and labs (WBC).  Monitor for signs of aspiration (tachypnea, cough, rales, wheezing, cyanosis, fever).    - Assess and monitor patient's ability to swallow.  - Place patient up in chair to eat if possible.  - HOB up at 90 degrees to eat if unable to get patient up into chair.  - Supervise patient during oral intake.   - Instruct patient/ family to take small bites.  - Instruct patient/ family to take small single sips when taking liquids.  - Follow patient-specific strategies generated by speech pathologist.  Outcome: Progressing  Goal: Ventilated patient's risk of aspiration is minimized  Description: Assess and monitor vital signs, respiratory status, airway cuff pressure, and labs (WBC).  Monitor for signs of aspiration (tachypnea, cough, rales, wheezing, cyanosis, fever).    - Elevate head of bed 30 degrees if patient has tube feeding.  - Monitor tube feeding.  Outcome: Progressing     Problem: Nutrition  Goal: Nutrition/Hydration status is improving  Description: Monitor and assess patient's nutrition/hydration status for malnutrition (ex- brittle hair, bruises, dry skin, pale skin and conjunctiva, muscle wasting, smooth red tongue, and disorientation). Collaborate with  interdisciplinary team and initiate plan and interventions as ordered.  Monitor patient's weight and dietary intake as ordered or per policy. Utilize nutrition screening tool and intervene per policy. Determine patient's food preferences and provide high-protein, high-caloric foods as appropriate.     - Assist patient with eating.  - Allow adequate time for meals.  - Encourage patient to take dietary supplement as ordered.  - Collaborate with clinical nutritionist.  - Include patient/family/caregiver in decisions related to nutrition.  Outcome: Progressing     Problem: PAIN - ADULT  Goal: Verbalizes/displays adequate comfort level or baseline comfort level  Description: Interventions:  - Encourage patient to monitor pain and request assistance  - Assess pain using appropriate pain scale  - Administer analgesics based on type and severity of pain and evaluate response  - Implement non-pharmacological measures as appropriate and evaluate response  - Consider cultural and social influences on pain and pain management  - Notify physician/advanced practitioner if interventions unsuccessful or patient reports new pain  Outcome: Progressing     Problem: INFECTION - ADULT  Goal: Absence or prevention of progression during hospitalization  Description: INTERVENTIONS:  - Assess and monitor for signs and symptoms of infection  - Monitor lab/diagnostic results  - Monitor all insertion sites, i.e. indwelling lines, tubes, and drains  - Monitor endotracheal if appropriate and nasal secretions for changes in amount and color  - [place] appropriate cooling/warming therapies per order  - Administer medications as ordered  - Instruct and encourage patient and family to use good hand hygiene technique  - Identify and instruct in appropriate isolation precautions for identified infection/condition  Outcome: Progressing  Goal: Absence of fever/infection during neutropenic period  Description: INTERVENTIONS:  - Monitor WBC    Outcome:  Progressing     Problem: SAFETY ADULT  Goal: Patient will remain free of falls  Description: INTERVENTIONS:  - Educate patient/family on patient safety including physical limitations  - Instruct patient to call for assistance with activity   - Consult OT/PT to assist with strengthening/mobility   - Keep Call bell within reach  - Keep bed low and locked with side rails adjusted as appropriate  - Keep care items and personal belongings within reach  - Initiate and maintain comfort rounds  - Make Fall Risk Sign visible to staff  - Offer Toileting every  Hours, in advance of need  - Initiate/Maintain alarm  - Obtain necessary fall risk management equipment:   - Apply yellow socks and bracelet for high fall risk patients  - Consider moving patient to room near nurses station  Outcome: Progressing  Goal: Maintain or return to baseline ADL function  Description: INTERVENTIONS:  -  Assess patient's ability to carry out ADLs; assess patient's baseline for ADL function and identify physical deficits which impact ability to perform ADLs (bathing, care of mouth/teeth, toileting, grooming, dressing, etc.)  - Assess/evaluate cause of self-care deficits   - Assess range of motion  - Assess patient's mobility; develop plan if impaired  - Assess patient's need for assistive devices and provide as appropriate  - Encourage maximum independence but intervene and supervise when necessary  - Involve family in performance of ADLs  - Assess for home care needs following discharge   - Consider OT consult to assist with ADL evaluation and planning for discharge  - Provide patient education as appropriate  Outcome: Progressing  Goal: Maintains/Returns to pre admission functional level  Description: INTERVENTIONS:  - Perform AM-PAC 6 Click Basic Mobility/ Daily Activity assessment daily.  - Set and communicate daily mobility goal to care team and patient/family/caregiver.   - Collaborate with rehabilitation services on mobility goals if  consulted  - Perform Range of Motion    times a day.  - Reposition patient every hours.  - Dangle patient  times a day  - Stand patient Times a day  - Ambulate patient  times a day  - Out of bed to chair  times a day   - Out of bed for meals    times a day  - Out of bed for toileting  - Record patient progress and toleration of activity level   Outcome: Progressing     Problem: DISCHARGE PLANNING  Goal: Discharge to home or other facility with appropriate resources  Description: INTERVENTIONS:  - Identify barriers to discharge w/patient and caregiver  - Arrange for needed discharge resources and transportation as appropriate  - Identify discharge learning needs (meds, wound care, etc.)  - Arrange for interpretive services to assist at discharge as needed  - Refer to Case Management Department for coordinating discharge planning if the patient needs post-hospital services based on physician/advanced practitioner order or complex needs related to functional status, cognitive ability, or social support system  Outcome: Progressing     Problem: Knowledge Deficit  Goal: Patient/family/caregiver demonstrates understanding of disease process, treatment plan, medications, and discharge instructions  Description: Complete learning assessment and assess knowledge base.  Interventions:  - Provide teaching at level of understanding  - Provide teaching via preferred learning methods  Outcome: Progressing     Problem: SAFETY,RESTRAINT: NV/NON-SELF DESTRUCTIVE BEHAVIOR  Goal: Remains free of harm/injury (restraint for non violent/non self-detsructive behavior)  Description: INTERVENTIONS:  - Instruct patient/family regarding restraint use   - Assess and monitor physiologic and psychological status   - Provide interventions and comfort measures to meet assessed patient needs   - Identify and implement measures to help patient regain control  - Assess readiness for release of restraint   Outcome: Progressing  Goal: Returns to  optimal restraint-free functioning  Description: INTERVENTIONS:  - Assess the patient's behavior and symptoms that indicate continued need for restraint  - Identify and implement measures to help patient regain control  - Assess readiness for release of restraint   Outcome: Progressing     Problem: Nutrition/Hydration-ADULT  Goal: Nutrient/Hydration intake appropriate for improving, restoring or maintaining nutritional needs  Description: Monitor and assess patient's nutrition/hydration status for malnutrition. Collaborate with interdisciplinary team and initiate plan and interventions as ordered.  Monitor patient's weight and dietary intake as ordered or per policy. Utilize nutrition screening tool and intervene as necessary. Determine patient's food preferences and provide high-protein, high-caloric foods as appropriate.     INTERVENTIONS:  - Monitor oral intake, urinary output, labs, and treatment plans  - Assess nutrition and hydration status and recommend course of action  - Evaluate amount of meals eaten  - Assist patient with eating if necessary   - Allow adequate time for meals  - Recommend/ encourage appropriate diets, oral nutritional supplements, and vitamin/mineral supplements  - Order, calculate, and assess calorie counts as needed  - Recommend, monitor, and adjust tube feedings and TPN/PPN based on assessed needs  - Assess need for intravenous fluids  - Provide specific nutrition/hydration education as appropriate  - Include patient/family/caregiver in decisions related to nutrition  Outcome: Progressing

## 2024-07-16 NOTE — UTILIZATION REVIEW
SEE INITIAL REVIEW AT BOTTOM    Continued Stay Review    Date: 7/13, 7/14                          Current Patient Class: Inpatient  Current Level of Care: Critical Care    HPI:68 y.o. male initially admitted on 7/12     Assessment/Plan:   7/13  Remains critically ill in ICU. Still Intubated, on Mechanical vent. SBT when more awake.  Plan for Angio for Coil Embolization of right PCOM aneurysm  Nimodipine. Keppra given seizures. Continue vEEG  EVD@ 10 above. Daily TCD. Watch volume status. Continue bronchodilators.  Keep SBP < 160 today with plan to liberalize in am. CT head in am. Echo.    7/13 S/p IR; Findings:   Successful coil embolization of a 3.5 mm right PCOM aneurysm.        Date: 7/14  Day 3: Has surpassed a 2nd midnight with active treatments and services.  Remains critically ill in ICU. Extubated overnight. NGT. EVD @ 10 above. Contin ue Nimodipine. Daily TCD. Start LDIVH protocol.  Keppra. Start salt tabs. Headache management. Permissive hypertension up to 220 systolic. Bronchodilators  Supplemental O2 as needed. Start tube feeds. Video EEG d/c,   Per Neurosurgery; Start Iv Heparin drip. Aspirin. SAH treatment. EVD @ 10. May liberalize SBP to <220.   Pt impulsive and at risk of removing lines and drains. Consider 1:1 or utilize sedation.       Vital Signs (last 3 days)       Date/Time Temp Pulse Resp BP MAP (mmHg) Arterial Line BP MAP SpO2 Calculated FIO2 (%) - Nasal Cannula Nasal Cannula O2 Flow Rate (L/min) O2 Device ICP Mean (mmHg) CPP Cuff-Calculated (mmHg) CPP Lowpoint-Calculated (mmHg) [place] Coma Scale Score Pain    07/14/24 2343 -- 64 14 -- -- 146/58 86 mmHg 97 % -- -- -- 8 mmHg -- 78 -- 8    07/14/24 2300 -- 74 20 -- -- 158/56 88 mmHg -- -- -- -- 7 mmHg -- 81 14 --    07/14/24 2200 -- 92 18 -- -- 158/64 96 mmHg 96 % -- -- -- 7 mmHg -- 89 14 --    07/14/24 2100 -- 68 12 -- -- 142/56 84 mmHg 97 % -- -- -- 3 mmHg -- 81 14 --    07/14/24 2057 -- -- -- -- -- -- -- -- -- -- -- -- -- -- -- 8    07/14/24  2000 98 °F (36.7 °C) 86 12 -- -- 156/54 86 mmHg 92 % -- -- -- 4 mmHg -- 82 14 --    07/14/24 1953 -- -- -- -- -- -- -- -- -- -- None (Room air) -- -- -- -- --    07/14/24 1900 -- 64 12 -- -- 136/52 78 mmHg 94 % -- -- -- 4 mmHg -- 74 -- --    07/14/24 1800 -- 64 12 -- -- 140/52 80 mmHg 95 % -- -- -- 4 mmHg -- 76 14 --    07/14/24 1700 -- 66 12 -- -- 140/46 74 mmHg 95 % -- -- -- 10 mmHg -- 64 -- --    07/14/24 1600 -- 64 13 -- -- 140/46 74 mmHg 99 % -- -- -- 5 mmHg -- 69 14 --    07/14/24 1500 -- 84 12 -- -- 144/50 82 mmHg 99 % -- -- -- 1 mmHg -- 81 -- --    07/14/24 1400 -- 68 12 -- -- 138/46 74 mmHg 99 % -- -- -- 1 mmHg -- 73 14 --    07/14/24 1300 -- 74 18 -- -- 146/52 80 mmHg 99 % -- -- -- 10 mmHg -- 70 -- --    07/14/24 1200 -- 70 12 -- -- 138/48 76 mmHg 100 % -- -- -- 12 mmHg -- 64 14 --    07/14/24 1100 -- 74 12 -- -- 148/54 84 mmHg -- -- -- -- 7 mmHg -- 77 -- --    07/14/24 1030 -- 76 15 -- -- 146/52 82 mmHg -- -- -- -- 7 mmHg -- 75 -- --    07/14/24 1000 -- 88 12 -- -- 160/60 92 mmHg -- -- -- -- 7 mmHg -- 85 14 --    07/14/24 0930 -- 64 11 -- -- 142/48 76 mmHg -- -- -- -- 7 mmHg -- 69 -- --    07/14/24 0900 -- 64 12 -- -- 150/54 82 mmHg -- -- -- -- 7 mmHg -- 75 -- --    07/14/24 0830 -- 70 13 -- -- 146/48 76 mmHg -- -- -- -- 8 mmHg -- 68 -- --    07/14/24 0800 97.7 °F (36.5 °C) 70 12 -- -- 144/50 78 mmHg -- -- -- -- 9 mmHg -- 69 14 --    07/14/24 0757 -- -- -- -- -- -- -- -- -- -- -- -- -- -- -- 10 - Worst Possible Pain    07/14/24 0730 -- 82 17 -- -- 144/52 80 mmHg -- -- -- -- 8 mmHg -- 72 -- --    07/14/24 0700 -- 74 12 -- -- 142/50 76 mmHg 98 % -- -- -- 8 mmHg -- 68 14 --    07/14/24 0600 -- 78 12 -- -- 144/48 76 mmHg 98 % -- -- -- 8 mmHg -- 68 14 --    07/14/24 0533 -- 76 15 -- -- 146/48 76 mmHg 98 % -- -- -- 11 mmHg -- 65 -- 7    07/14/24 0509 -- -- -- -- -- -- -- -- -- -- -- -- -- -- -- 6    07/14/24 0500 -- 88 20 122/68 85 -- -- -- -- -- -- -- -- -- 14 --    07/14/24 0400 -- 94 20 119/72 87 142/50 82  mmHg -- -- -- -- 11 mmHg 76 71 14 --    07/14/24 0300 -- 92 19 127/70 89 148/52 84 mmHg 98 % -- -- -- 7 mmHg 82 77 13 --    07/14/24 0200 -- 82 18 120/70 86 148/48 78 mmHg 99 % -- -- -- 6 mmHg 80 72 13 --    07/14/24 0100 -- 88 21 132/68 93 138/50 78 mmHg 94 % -- -- -- 4 mmHg 89 74 13 --    07/14/24 0000 -- 84 16 119/72 90 142/50 78 mmHg 97 % -- -- -- 7 mmHg 83 71 13 --    07/13/24 2300 -- 80 13 121/68 82 150/50 76 mmHg 97 % -- -- -- 5 mmHg 77 71 14 --    07/13/24 2200 -- 98 16 132/78 94 156/52 82 mmHg 97 % -- -- -- 8 mmHg 86 74 14 --    07/13/24 2100 -- 82 12 128/69 90 162/52 82 mmHg 100 % -- -- -- 3 mmHg 87 79 14 --    07/13/24 2030 -- 90 19 -- -- 170/56 88 mmHg 100 % -- -- -- 2 mmHg -- 86 14 --    07/13/24 2000 97.9 °F (36.6 °C) 82 22 127/68 84 160/50 80 mmHg 99 % -- -- -- 4 mmHg 80 76 14 No Pain    07/13/24 1957 -- -- -- -- -- -- -- -- 36 4 L/min Nasal cannula -- -- -- -- --    07/13/24 1930 -- 80 12 -- -- 152/48 74 mmHg 97 % -- -- -- -- -- -- 10 --    07/13/24 1900 -- 92 15 128/75 97 150/52 78 mmHg -- -- -- -- -- -- -- 10 --    07/13/24 1830 -- 78 12 -- -- 138/48 70 mmHg -- -- -- -- -- -- -- 10 --    07/13/24 1800 -- 96 16 120/70 87 150/52 82 mmHg -- -- -- -- -- -- -- 10 --    07/13/24 1730 -- 78 10 -- -- 130/42 64 mmHg -- -- -- -- -- -- -- 10 --    07/13/24 1700 -- 78 11 94/62 69 132/44 66 mmHg -- -- -- -- -- -- -- 10 --    07/13/24 1630 -- 72 12 -- -- 152/48 74 mmHg -- -- -- -- -- -- -- 10 --    07/13/24 1600 -- 72 6 100/62 71 126/42 64 mmHg -- -- -- -- -- -- -- 10 --    07/13/24 1530 -- 72 15 -- -- 130/44 66 mmHg -- -- -- -- -- -- -- 10 --    07/13/24 1515 -- 72 16 128/67 90 142/44 70 mmHg -- -- -- -- -- -- -- -- --    07/13/24 1506 -- -- -- -- -- -- -- -- -- -- -- -- -- -- -- Med Not Given for Pain - for MAR use only    07/13/24 1500 -- 92 19 155/85 117 204/64 104 mmHg -- -- -- -- -- -- -- 10 --    07/13/24 1430 -- 82 15 -- -- 186/62 98 mmHg -- -- -- -- -- -- -- 3 --    07/13/24 1415 -- 66 15 -- -- 152/48  78 mmHg -- -- -- -- -- -- -- 3 --    07/13/24 1400 -- 68 16 -- -- 152/52 80 mmHg -- -- -- -- -- -- -- 3 --    07/13/24 1345 -- 70 16 -- -- 146/50 78 mmHg -- -- -- -- -- -- -- 3 --    07/13/24 1331 -- 70 -- 130/48 -- -- -- -- -- -- -- -- -- -- -- --    07/13/24 1330 -- 74 16 -- -- 134/48 74 mmHg -- -- -- -- -- -- -- 3 Med Not Given for Pain - for MAR use only    07/13/24 1315 -- 72 15 -- -- 124/46 72 mmHg -- -- -- -- -- -- -- 3 --    07/13/24 1300 -- 72 16 -- -- 126/48 74 mmHg -- -- -- -- -- -- -- 3 --    07/13/24 1245 97.6 °F (36.4 °C) 70 15 -- -- 118/52 76 mmHg -- -- -- -- -- -- -- 3 --    07/13/24 0900 -- 70 16 -- -- 142/48 72 mmHg 96 % -- -- -- 11 mmHg -- 61 -- --    07/13/24 0803 -- -- -- -- -- -- -- -- -- -- -- -- -- -- -- Med Not Given for Pain - for MAR use only    07/13/24 0800 -- 84 13 -- -- 132/46 66 mmHg 98 % -- -- -- 7 mmHg -- 59 10 --    07/13/24 0735 -- 96 13 130/48 -- 142/52 76 mmHg 98 % -- -- -- 7 mmHg -- 69 -- --    07/13/24 0600 -- 88 12 -- -- 118/36 56 mmHg 97 % -- -- -- 6 mmHg -- 50 10 --    07/13/24 0500 -- 78 18 -- -- 106/34 52 mmHg 97 % -- -- -- 7 mmHg -- 45 6 --    07/13/24 0400 -- 88 16 -- -- 124/42 62 mmHg 98 % -- -- -- 7 mmHg -- 55 6 --    07/13/24 0300 -- 88 18 -- -- 118/40 60 mmHg 99 % -- -- -- 5 mmHg -- 55 6 --    07/13/24 0200 -- 92 16 -- -- 112/40 58 mmHg 99 % -- -- -- 7 mmHg -- 51 6 --    07/13/24 0100 -- 92 16 -- -- 122/46 66 mmHg 100 % -- -- -- 10 mmHg -- 56 6 --    07/13/24 0000 -- 92 16 -- -- 120/54 74 mmHg 100 % -- -- -- 11 mmHg -- 63 6 --          Weight (last 2 days)       None              Pertinent Labs/Diagnostic Results:   Radiology:  VAS transcranial doppler, complete study   Final Interpretation by Angelina Kennedy MD (07/15 9196)      VAS transcranial doppler, complete study   Final Interpretation by Mick Last MD (07/14 0264)      CT head wo contrast   Final Interpretation by Gm Huff MD (07/14 8790)      No significant interval change in  extensive bilateral subarachnoid hemorrhage and intraventricular hemorrhage.               Workstation performed: [id number]         IR cerebral angiography / intervention   Final Interpretation by Ruslan Mcneal MD (07/15 1436)      Successful balloon assisted coil embolization of a 4.5 mm right P-comm aneurysm.               Workstation performed: [id number]         CT head wo contrast   Final Interpretation by Vahid Rodriguez DO (07/13 0855)      Interval placement of right-sided shunt catheter. Stable ventricular size with slight interval increase in intraventricular hemorrhage layering in the occipital horns bilaterally. Diffuse subarachnoid hemorrhage again noted.                  Workstation performed: [id number]         XR chest portable ICU   Final Interpretation by Mamta Jaeger MD (07/12 2217)      No acute cardiopulmonary disease.      ET tube 6 cm above the landon.      Workstation performed: [id number]           Cardiology:  ECG 12 lead   Final Result by Mat Mobley MD (07/15 6003)   Normal sinus rhythm   Poor anterior R wave progression is noted   Abnormal ECG   No previous ECGs available   Confirmed by Mat Mobley ([id number]) on 7/15/2024 11:08:37 PM      Echo complete w/ contrast if indicated   Final Result by Dmitry Sanchez MD (07/13 2084)        Technically difficult study     Left Ventricle: Left ventricular cavity size is normal. Wall thickness    is normal. The left ventricular ejection fraction is 55% by visual    estimation. Systolic function is normal.     Right Ventricle: Right ventricular cavity size is normal. Systolic    function is normal.     Mitral Valve: There is mild regurgitation.           GI:  No orders to display       Results from last 7 days   Lab Units 07/12/24  1618   SARS-COV-2  Negative     Results from last 7 days   Lab Units 07/14/24  0517 07/13/24  0534 07/12/24  1618   WBC Thousand/uL 15.17* 9.29 17.17*   HEMOGLOBIN g/dL 10.4* 11.4* 12.9  "  HEMATOCRIT % 30.5* 33.3* 38.6   PLATELETS Thousands/uL 150 174 226   TOTAL NEUT ABS Thousands/µL 13.31* 7.77*  --          Results from last 7 days   Lab Units 07/14/24  0517 07/13/24  1428 07/13/24  0534 07/12/24  2350   SODIUM mmol/L 136 135  --  136   POTASSIUM mmol/L 3.8 4.7  --  3.8   CHLORIDE mmol/L 105 108  --  106   CO2 mmol/L 22 21  --  22   ANION GAP mmol/L 9 6  --  8   BUN mg/dL 10 9  --  9   CREATININE mg/dL 0.55* 0.67  --  0.78   EGFR ml/min/1.73sq m 106 98  --  92   CALCIUM mg/dL 8.4 7.7*  --  8.2*   CALCIUM, IONIZED mmol/L 1.11*  --   --   --    MAGNESIUM mg/dL 1.9  --  1.7*  --    PHOSPHORUS mg/dL 1.8*  --  2.8  --          Results from last 7 days   Lab Units 07/14/24  1747 07/14/24  1148 07/13/24  2342 07/13/24  1804 07/13/24  1309 07/13/24  0605 07/12/24  2349   POC GLUCOSE mg/dl 128 117 131 137 139 128 131     Results from last 7 days   Lab Units 07/14/24  0517 07/13/24  1428 07/12/24  2350 07/12/24  1618   GLUCOSE RANDOM mg/dL 110 178* 126 222*     Results from last 7 days   Lab Units 07/12/24  2350   OSMOLALITY, SERUM mmol/     Results from last 7 days   Lab Units 07/12/24  1618   HEMOGLOBIN A1C % 5.9*   EAG mg/dl 123     No results found for: \"BETA-HYDROXYBUTYRATE\"   Results from last 7 days   Lab Units 07/12/24 2014   PH ART  7.314*   PCO2 ART mm Hg 44.1*   PO2 ART mm Hg 138.2*   HCO3 ART mmol/L 21.9*   BASE EXC ART mmol/L -4.2   O2 CONTENT ART mL/dL 18.3   O2 HGB, ARTERIAL % 97.6*   ABG SOURCE  Line, Arterial                 Results from last 7 days   Lab Units 07/12/24  1734 07/12/24  1618   HS TNI 0HR ng/L  --  10   HS TNI 2HR ng/L 10  --    HSTNI D2 ng/L 0  --          Results from last 7 days   Lab Units 07/14/24  1311 07/12/24  1618   PROTIME seconds 15.2* 14.5   INR  1.21* 1.07   PTT seconds 34 23     Results from last 7 days   Lab Units 07/12/24  2350   TSH 3RD GENERATON uIU/mL 0.718       Results from last 7 days   Lab Units 07/12/24  2350   OSMOLALITY, SERUM mmol/ "         Results from last 7 days   Lab Units 07/12/24  1618   INFLUENZA A PCR  Negative   INFLUENZA B PCR  Negative   RSV PCR  Negative         Results from last 7 days   Lab Units 07/12/24 2015   AMPH/METH  Negative   BARBITURATE UR  Negative   BENZODIAZEPINE UR  Positive*   COCAINE UR  Negative   METHADONE URINE  Negative   OPIATE UR  Negative   PCP UR  Negative   THC UR  Negative     Results from last 7 days   Lab Units 07/12/24  2145   ETHANOL LVL mg/dL <10                     Results from last 7 days   Lab Units 07/12/24 2015   TOTAL COUNTED  100     Results from last 7 days   Lab Units 07/12/24 2016 07/12/24 2015   APPEARANCE CSF   --  Slightly bloody, Cloudy   TUBE NUM CSF   --  1   WBC CSF /uL  --  1   XANTHOCHROMIA   --  No   NEUTROPHILS % (CSF) %  --  31   LYMPHS % (CSF) %  --  49   MONOCYTES % (CSF) %  --  4   GLUCOSE CSF mg/dL  --  102*   CSF CULTURE  No growth  --            Medications:   Scheduled Medications:  acetaminophen, 650 mg, Oral, Q6H  aspirin, 81 mg, Per NG Tube, Daily  chlorhexidine, 15 mL, Mouth/Throat, Q12H MATTY  gabapentin, 300 mg, Oral, TID  levETIRAcetam, 1,000 mg, Oral, Q12H MATTY  lidocaine, 1 patch, Topical, Daily  niMODipine, 60 mg, Per NG Tube, Q4H MATTY  polyethylene glycol, 17 g, Oral, Daily  potassium chloride, 40 mEq, Oral, Q2H  senna, 1 tablet, Oral, HS  sodium chloride, 1 g, Oral, TID With Meals  umeclidinium, 1 puff, Inhalation, Daily      Continuous IV Infusions:  heparin (porcine), 12 Units/kg/hr (Order-Specific), Intravenous, Titrated      PRN Meds:  bisacodyl, 10 mg, Rectal, Daily PRN  butalbital-acetaminophen-caffeine, 1 tablet, Oral, Q4H PRN  hydrALAZINE, 10 mg, Intravenous, Q6H PRN  HYDROmorphone, 0.2 mg, Intravenous, Q4H PRN  labetalol, 10 mg, Intravenous, Q6H PRN  methocarbamol, 500 mg, Oral, Q6H PRN  ondansetron, 4 mg, Intravenous, Q6H PRN  oxyCODONE, 2.5 mg, Oral, Q4H PRN  oxyCODONE, 5 mg, Oral, Q4H PRN        Discharge Plan: TBD    Network Utilization Review  Department  ATTENTION: Please call with any questions or concerns to [phone] and carefully listen to the prompts so that you are directed to the right person. All voicemails are confidential.   For Discharge needs, contact Care Management DC Support Team at [phone] opt. 2  Send all requests for admission clinical reviews, approved or denied determinations and any other requests to dedicated fax number below belonging to the campus where the patient is receiving treatment. List of dedicated fax numbers for the Facilities:  FACILITY NAME UR FAX NUMBER   ADMISSION DENIALS (Administrative/Medical Necessity) [phone]   DISCHARGE SUPPORT TEAM (NETWORK) [phone]   PARENT CHILD HEALTH (Maternity/NICU/Pediatrics) [phone]   [facility] [phone]   [facility] [phone]   [facility] [phone]   [facility] [phone]   [facility] [phone]   [facility] [phone]   [facility] [phone]   [facility] [phone]   [facility] [phone]   [facility] [phone]   [facility] [phone]   [facility] [phone]

## 2024-07-16 NOTE — CASE MANAGEMENT
Case Management Discharge Planning Note    Patient name Federico Bowen  Location ICU 07/ICU 07 MRN [id number]  : 1955 Date 2024       Current Admission Date: 2024  Current Admission Diagnosis:Subarachnoid hemorrhage (HCC)   Patient Active Problem List    Diagnosis Date Noted Date Diagnosed    Moderate protein-calorie malnutrition (HCC) 2024     HTN (hypertension) 2024     Hyperlipidemia 2024     Seizures (HCC) 2024     Subarachnoid hemorrhage (HCC) 2024       LOS (days): 4  Geometric Mean LOS (GMLOS) (days):   Days to GMLOS:     OBJECTIVE:  Risk of Unplanned Readmission Score: 12.65         Current admission status: Inpatient   Preferred Pharmacy:   [id number] - [place] - [address]  [address]  [place] [address]  Phone: [phone] Fax: [phone]    Primary Care Provider: No primary care provider on file.    Primary Insurance: MEDICARE  Secondary Insurance:     DISCHARGE DETAILS:    Discharge planning discussed with:: Aleena Troncoso  Freedom of Choice: Yes  Comments - Freedom of Choice: Discussed FOC  CM contacted family/caregiver?: Yes  Were Treatment Team discharge recommendations reviewed with patient/caregiver?: Yes  Did patient/caregiver verbalize understanding of patient care needs?: N/A- going to facility  Were patient/caregiver advised of the risks associated with not following Treatment Team discharge recommendations?: Yes    Other Referral/Resources/Interventions Provided:  Interventions: Short Term Rehab  Referral Comments: This CM discussed levels of STR with Aleena troncoso Melody agreeable to referrals to [facility] (in the event patient would be recommended for brain injury program), and requesting referral to  [facility] (as this location is closer to where family resides), both referrals entered in [facility]

## 2024-07-16 NOTE — PROGRESS NOTES
[facility]  Progress Note: Critical Care  Name: Federico Bowen 68 y.o. male I MRN: [id number]  Unit/Bed#: ICU 07 I Date of Admission: 7/12/2024   Date of Service: 7/16/2024 I Hospital Day: 4    Assessment & Plan     Neuro:   Diagnosis: SAH with IVH and mild hydro s/p coil embolization of R PCOM aneurysm  BD 5 HH5, MF 4  7/11 CTAH/N-0.3 cm aneurysm in expected origin of right posterior communicating artery. Possible tiny aneurysm in left anterior communicating artery region. Mild narrowing of bilateral MCA M1 and bilateral M2 segmental branch vessels, likely due to vasospasm.   7/11 CTH-Acute diffuse thickened large-volume SAH throughout the b/l parafalcine regions, b/l cerebral sulci (R worse than L), basilar cisterns, prepontine cistern, premedullary cistern, and visualized upper cervical spinal canal. Acute small volume IVH with mild hydrocephalus.   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   7/14 CTH -No significant interval change in extensive bilateral SAH and IVH.   7/14 vEEG no seizures  Plan:   Nsx following  ASA 81mg   EVD 10. Goal ICP <20   ICP 1-10  49cc/24hrs  -220  Keppra ppx x 7 days unless seizure on eeg  Nimodipine x 21days  ASA s/p coil  Low dose IV Heparin protocol @ 10 units  Increase 12 units  PTT < 45  Daily TCD  Euvolemia   I/Os: +150cc  Normonatremia, Na goal > 140  Na: 137  Continue salt tabs to 1g TID  Encourage increase salt intake in diet   Increase gatorade intake  Stat CTH with any change in GCS > 2 pts     Diagnosis: Seizure  2/2 above  vEEG no seizures  Plan:   Neuro following   Keppra 1gm BID  - Diagnosis: Headache    Plan    Continue 300mg TID gabapentin   Lidoderm patch for neck   Can give IV mag and reglan if needed   PRN fioricet, robaxin, oxy      CV:   Diagnosis: Hx CAD/HTN/HLD  Plan:   Cont home statin  Continue ASA 81mg     Pulm:  Diagnosis: Hx COPD without AE  Plan:   IS  Imecldinum  inhaler   Respiratory protocol     GI:   Diagnosis: Dysphagia  Plan:   Passed speech an on Dysphagia 1 pureed/thin liquids     BM: none noted  Bowel regimen: sennokot and miralax   Give relistor tomorrow if no bowel movement      :   Euvolemia   I/Os: +150cc  If doesn't normalize, will give additional NS bolus          F/E/N:    F: none  E: K> 4.0,  maintain magnesium > 2.0, maintain phosphate > 3.5  N: dysphagia diet     Heme/Onc:   Diagnosis: Anemia  Plan:   Unclear baseline no s/s bleeding monitor  Possibly macrocytic anemia      Endo:   No issues     ID:   Diagnosis: Leukocytosis (resolved)  Afebrile monitor        MSK/Skin:   PT/OT/PMR  Diagnosis: Penile lesion  Plan: note from outpt derm inflamed seborrheic keratosis        Line: R IJ      Disposition: Critical care    ICU Core Measures     Vented Patient  VAP Bundle  VAP bundle ordered     A: Assess, Prevent, and Manage Pain Has pain been assessed? Yes  Need for changes to pain regimen? No   B: Both Spontaneous Awakening Trials (SATs) and Spontaneous Breathing Trials (SBTs) Plan to perform spontaneous awakening trial today? Yes   Plan to perform spontaneous breathing trial today? Yes   Obvious barriers to extubation? No   C: Choice of Sedation RASS Goal: 0 Alert and Calm  Need for changes to sedation or analgesia regimen? Yes   D: Delirium CAM-ICU: Negative   E: Early Mobility  Plan for early mobility? Yes   F: Family Engagement Plan for family engagement today? Yes       Review of Invasive Devices:      Central access plan: Patient has multiple central venous catheters.  Medications requiring central line Hemodynamic monitoring      Prophylaxis:  VTE VTE covered by:  heparin (porcine), Intravenous, 12 Units/kg/hr at 07/16/24 1047       Stress Ulcer  not ordered        Significant 24hr Events     24hr events: Patient was still noted to be weak on left side but more alert and oriented x 3, urine output was a 600 cc     Subjective     Review of Systems: See  [facility] for Review of Systems     Objective                            Vitals I/O      Most Recent Min/Max in 24hrs   Temp 98.5 °F (36.9 °C) Temp  Min: 97.6 °F (36.4 °C)  Max: 98.5 °F (36.9 °C)   Pulse 102 Pulse  Min: 70  Max: 108   Resp 18 Resp  Min: 12  Max: 73   /86 BP  Min: 118/69  Max: 154/85   O2 Sat 98 % SpO2  Min: 98 %  Max: 100 %      Intake/Output Summary (Last 24 hours) at 7/16/2024 1301  Last data filed at 7/16/2024 0800  Gross per 24 hour   Intake 1600 ml   Output 1459 ml   Net 141 ml       Diet Dysphagia/Modified Consistency; Dysphagia 1-Pureed; Thin Liquid    Invasive Monitoring   Arterial Line  Esme /72  No data recorded    mmHg  No data recorded           Physical Exam   Physical Exam  Vitals reviewed.   Eyes:      General: No visual field deficit.  Cardiovascular:      Rate and Rhythm: Normal rate.   Abdominal:      Palpations: Abdomen is soft.   Constitutional:       Appearance: He is well-developed and well-nourished.   Pulmonary:      Effort: Pulmonary effort is normal.   Neurological:      Mental Status: He is alert and oriented to person, place, and time.      Cranial Nerves: Cranial nerve deficit present. No dysarthria or facial asymmetry.      Sensory: Sensation is intact.      Motor: Weakness and pronator drift. No tremor or abnormal muscle tone.      Coordination: Coordination is intact.      Comments:   Pupils reactive (R more dilated, L more pinpoint)       LUE weakness appreciated 4/5 in biceps, triceps, and deltoids            Diagnostic Studies      EKG: reviewed  Imaging:  I have personally reviewed pertinent reports.   and I have personally reviewed pertinent films in PACS     Medications:  Scheduled PRN   acetaminophen, 650 mg, Q6H  aspirin, 81 mg, Daily  chlorhexidine, 15 mL, Q12H MATTY  gabapentin, 300 mg, TID  levETIRAcetam, 1,000 mg, Q12H MATTY  lidocaine, 1 patch, Daily  niMODipine, 60 mg, Q4H MATTY  polyethylene glycol, 17 g, Daily  senna, 1 tablet, HS  sodium  chloride, 1 g, TID With Meals  umeclidinium, 1 puff, Daily      bisacodyl, 10 mg, Daily PRN  butalbital-acetaminophen-caffeine, 1 tablet, Q4H PRN  hydrALAZINE, 10 mg, Q6H PRN  HYDROmorphone, 0.2 mg, Q4H PRN  labetalol, 10 mg, Q6H PRN  methocarbamol, 500 mg, Q6H PRN  ondansetron, 4 mg, Q6H PRN  oxyCODONE, 2.5 mg, Q4H PRN  oxyCODONE, 5 mg, Q4H PRN       Continuous    heparin (porcine), 12 Units/kg/hr (Order-Specific), Last Rate: 12 Units/kg/hr (07/16/24 1047)         Labs:    CBC    Recent Labs     07/15/24  0520 07/16/24  0455   WBC 8.78 6.36   HGB 10.8* 12.7   HCT 32.3* 36.6    222     BMP    Recent Labs     07/15/24  0520 07/16/24  0455   SODIUM 139 137   K 3.6 3.4*    106   CO2 25 21   AGAP 7 10   BUN 9 9   CREATININE 0.55* 0.62   CALCIUM 8.5 9.1       Coags    Recent Labs     07/14/24  1311 07/15/24  0520 07/16/24  0458   INR 1.21*  --   --    PTT 34 34 32        Additional Electrolytes  Recent Labs     07/15/24  0520 07/16/24  0455   MG 2.0 1.9   PHOS 1.5* 2.4   CAIONIZED 1.16  --           Blood Gas    No recent results  No recent results LFTs  No recent results    Infectious  No recent results  Glucose  Recent Labs     07/15/24  0520 07/16/24  0455   GLUC 99 144*               Hadley Eric, DO

## 2024-07-16 NOTE — SPEECH THERAPY NOTE
"Speech-Language Pathology Progress Note    Patient Name: Federico Bowen    Today's Date: 7/16/2024    Subjective:  Pt was lethargic and slow to respond. He was sitting up in chair at bedside. Patient stated \"My head hurts.\"    Objective:  Pt was seen today for dysphagia therapy. Current diet is puree with thin liquids, however NGT was placed and is being used d/t reports of increased coughing today. Pt was on room air. Oral care had already been completed. Focus of today's session was to maximize PO intake safety and re assess PO tolerance . Textures offered today included puree and honey thick liquid via tsp.  Swallow function:   Bolus retrieval and manipulation were  weak . Formation and AP transfer were slow. Pharyngeal swallow initiation was sluggish and delayed. Hyolaryngeal excursion was reduced. No s/s aspiration initially, however weak cough developed with progression of puree (pudding trials).    Assessment:  Concern for aspiration based on increased coughing with PO intake today. Will f/u with MBS tomorrow for further assessment.     Plan:  NPO now, use NGT. Continue ST follow up. Subsequent sessions to focus on maximizing PO intake safety, determining potential for diet texture advancement, determining potential for liquid consistency advancement, improving use of strategies to minimize risk of aspiration, and educating pt/family on results of MBS.  MBS tentatively tomorrow.     "

## 2024-07-16 NOTE — ASSESSMENT & PLAN NOTE
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  Upon arrival to the ED patient was posturing.  Imaging revealed a large amount of SAH and a right PCOM aneurysm.  Per chart review he was previously on aspirin reversed with DDAVP.    Imaging:    CT head without 7/14/2024:No significant interval change in extensive bilateral subarachnoid hemorrhage and intraventricular hemorrhage.     Plan:  Continue frequent neurological checks  Repeat CTA/CT head w/wo STAT if GCS declines more than 2 points in 1 hour  Continue SAH pathway/spasm watch:  Daily TCD's: R 1.31 L 1.59  Continue Nimodipine 60mg Q4hrs  Continue Keppra 1000 mg twice daily per neurology  Normonatremia (137 today) currently on salt tabs 1 g 3 times daily  Euvolemia (-613)  Hemoglobin >8 (12.7 today)  EVD placed on 7/13/2024  Continue EVD at 10mmhg  Monitor output and ICP  ICPs 4-10, in room 6  Output 49 ml/24hrs  Maintain ICP goal <20  SBP <220  Continue aspirin 81 mg daily  Will increase heparin drip to 12 units/kilogram/hour  Last PTT 32  Medical management and pain control per primary team  DVT PPX: SCDs and heparin drip    Neurosurgery will continue to follow closely, call with any further questions or concerns.

## 2024-07-16 NOTE — RESPIRATORY THERAPY NOTE
07/16/24 0751   Respiratory Protocol   Protocol Initiated? Yes   Protocol Selection Respiratory   Language Barrier? No   Medical & Social History Reviewed? Yes   Diagnostic Studies Reviewed? Yes   Physical Assessment Performed? Yes   Respiratory Plan No distress/Pulmonary history;Discontinue Protocol   Respiratory Assessment   Resp Comments No prior hx of pulmonary disease. D/C protocol due to pt not needing intervention at this time, pt on RA SpO2 99%.

## 2024-07-16 NOTE — UTILIZATION REVIEW
NOTIFICATION OF INPATIENT ADMISSION   AUTHORIZATION REQUEST   SERVICING FACILITY:   [facility]  Address: [address] [place] [address]  Tax ID: [id number]  NPI: [id number] ATTENDING PROVIDER:  Attending Name and NPI#: Vanesa Timmons Md [[id number]]  Address: [address] [place] [address]  Phone: [phone]   ADMISSION INFORMATION:  Place of Service: Inpatient [facility]  Place of Service Code: 21  Inpatient Admission Date/Time: 7/12/24  6:43 PM  Discharge Date/Time: No discharge date for patient encounter.  Admitting Diagnosis Code/Description:  Subarachnoid hemorrhage (HCC) [I60.9]     UTILIZATION REVIEW CONTACT:  Logan Cutler Utilization   Network Utilization Review Department  Phone: [phone]  Fax: [phone]  Email: [email or web address]  Contact for approvals/pending authorizations, clinical reviews, and discharge.     PHYSICIAN ADVISORY SERVICES:  Medical Necessity Denial & Fypd-dd-Zxsi Review  Phone: [phone]  Fax: [phone]  Email: [email or web address]     DISCHARGE SUPPORT TEAM:  For Patients Discharge Needs & Updates  Phone: [phone] opt. 2 Fax: [phone]  Email: [email or web address]

## 2024-07-16 NOTE — ASSESSMENT & PLAN NOTE
Neurology following, input appreciated  Patient currently on Keppra 1000 mg twice daily  vEEG no seizure activity

## 2024-07-17 ENCOUNTER — APPOINTMENT (INPATIENT)
Dept: RADIOLOGY | Facility: HOSPITAL | Age: 69
DRG: 020 | End: 2024-07-17
Payer: MEDICARE

## 2024-07-17 ENCOUNTER — APPOINTMENT (INPATIENT)
Dept: NON INVASIVE DIAGNOSTICS | Facility: HOSPITAL | Age: 69
DRG: 020 | End: 2024-07-17
Payer: MEDICARE

## 2024-07-17 LAB
ALBUMIN SERPL BCG-MCNC: 4.1 G/DL (ref 3.5–5)
ALP SERPL-CCNC: 128 U/L (ref 34–104)
ALT SERPL W P-5'-P-CCNC: 13 U/L (ref 7–52)
ANION GAP SERPL CALCULATED.3IONS-SCNC: 10 MMOL/L (ref 4–13)
APTT PPP: 31 SECONDS (ref 23–37)
AST SERPL W P-5'-P-CCNC: 13 U/L (ref 13–39)
BACTERIA UR QL AUTO: ABNORMAL /HPF
BASOPHILS # BLD AUTO: 0.02 THOUSANDS/ÂΜL (ref 0–0.1)
BASOPHILS NFR BLD AUTO: 0 % (ref 0–1)
BILIRUB SERPL-MCNC: 0.83 MG/DL (ref 0.2–1)
BILIRUB UR QL STRIP: NEGATIVE
BUN SERPL-MCNC: 13 MG/DL (ref 5–25)
CA-I BLD-SCNC: 1.23 MMOL/L (ref 1.12–1.32)
CALCIUM SERPL-MCNC: 9.7 MG/DL (ref 8.4–10.2)
CHLORIDE SERPL-SCNC: 105 MMOL/L (ref 96–108)
CLARITY UR: CLEAR
CO2 SERPL-SCNC: 24 MMOL/L (ref 21–32)
COLOR UR: ABNORMAL
CREAT SERPL-MCNC: 0.68 MG/DL (ref 0.6–1.3)
EOSINOPHIL # BLD AUTO: 0.01 THOUSAND/ÂΜL (ref 0–0.61)
EOSINOPHIL NFR BLD AUTO: 0 % (ref 0–6)
ERYTHROCYTE [DISTWIDTH] IN BLOOD BY AUTOMATED COUNT: 13.4 % (ref 11.6–15.1)
GFR SERPL CREATININE-BSD FRML MDRD: 97 ML/MIN/1.73SQ M
GLUCOSE SERPL-MCNC: 146 MG/DL (ref 65–140)
GLUCOSE SERPL-MCNC: 161 MG/DL (ref 65–140)
GLUCOSE SERPL-MCNC: 162 MG/DL (ref 65–140)
GLUCOSE SERPL-MCNC: 162 MG/DL (ref 65–140)
GLUCOSE SERPL-MCNC: 208 MG/DL (ref 65–140)
GLUCOSE UR STRIP-MCNC: NEGATIVE MG/DL
HCT VFR BLD AUTO: 38.8 % (ref 36.5–49.3)
HGB BLD-MCNC: 13.6 G/DL (ref 12–17)
HGB UR QL STRIP.AUTO: ABNORMAL
IMM GRANULOCYTES # BLD AUTO: 0.08 THOUSAND/UL (ref 0–0.2)
IMM GRANULOCYTES NFR BLD AUTO: 1 % (ref 0–2)
KETONES UR STRIP-MCNC: NEGATIVE MG/DL
LEUKOCYTE ESTERASE UR QL STRIP: NEGATIVE
LYMPHOCYTES # BLD AUTO: 1.38 THOUSANDS/ÂΜL (ref 0.6–4.47)
LYMPHOCYTES NFR BLD AUTO: 10 % (ref 14–44)
MAGNESIUM SERPL-MCNC: 2 MG/DL (ref 1.9–2.7)
MCH RBC QN AUTO: 31.2 PG (ref 26.8–34.3)
MCHC RBC AUTO-ENTMCNC: 35.1 G/DL (ref 31.4–37.4)
MCV RBC AUTO: 89 FL (ref 82–98)
MONOCYTES # BLD AUTO: 0.67 THOUSAND/ÂΜL (ref 0.17–1.22)
MONOCYTES NFR BLD AUTO: 5 % (ref 4–12)
NEUTROPHILS # BLD AUTO: 11.31 THOUSANDS/ÂΜL (ref 1.85–7.62)
NEUTS SEG NFR BLD AUTO: 84 % (ref 43–75)
NITRITE UR QL STRIP: NEGATIVE
NON-SQ EPI CELLS URNS QL MICRO: ABNORMAL /HPF
NRBC BLD AUTO-RTO: 0 /100 WBCS
PH UR STRIP.AUTO: 6.5 [PH]
PHOSPHATE SERPL-MCNC: 2.4 MG/DL (ref 2.3–4.1)
PLATELET # BLD AUTO: 263 THOUSANDS/UL (ref 149–390)
PMV BLD AUTO: 9.2 FL (ref 8.9–12.7)
POTASSIUM SERPL-SCNC: 3.6 MMOL/L (ref 3.5–5.3)
PROT SERPL-MCNC: 7.3 G/DL (ref 6.4–8.4)
PROT UR STRIP-MCNC: ABNORMAL MG/DL
RBC # BLD AUTO: 4.36 MILLION/UL (ref 3.88–5.62)
RBC #/AREA URNS AUTO: ABNORMAL /HPF
SODIUM SERPL-SCNC: 139 MMOL/L (ref 135–147)
SP GR UR STRIP.AUTO: 1.02 (ref 1–1.03)
UROBILINOGEN UR STRIP-ACNC: <2 MG/DL
WBC # BLD AUTO: 13.47 THOUSAND/UL (ref 4.31–10.16)
WBC #/AREA URNS AUTO: ABNORMAL /HPF

## 2024-07-17 PROCEDURE — 83735 ASSAY OF MAGNESIUM: CPT

## 2024-07-17 PROCEDURE — 97535 SELF CARE MNGMENT TRAINING: CPT

## 2024-07-17 PROCEDURE — 82330 ASSAY OF CALCIUM: CPT

## 2024-07-17 PROCEDURE — 87040 BLOOD CULTURE FOR BACTERIA: CPT

## 2024-07-17 PROCEDURE — 97530 THERAPEUTIC ACTIVITIES: CPT

## 2024-07-17 PROCEDURE — 81001 URINALYSIS AUTO W/SCOPE: CPT

## 2024-07-17 PROCEDURE — 99291 CRITICAL CARE FIRST HOUR: CPT | Performed by: PSYCHIATRY & NEUROLOGY

## 2024-07-17 PROCEDURE — 82948 REAGENT STRIP/BLOOD GLUCOSE: CPT

## 2024-07-17 PROCEDURE — 85730 THROMBOPLASTIN TIME PARTIAL: CPT | Performed by: INTERNAL MEDICINE

## 2024-07-17 PROCEDURE — 99232 SBSQ HOSP IP/OBS MODERATE 35: CPT | Performed by: NURSE PRACTITIONER

## 2024-07-17 PROCEDURE — 85025 COMPLETE CBC W/AUTO DIFF WBC: CPT

## 2024-07-17 PROCEDURE — 80053 COMPREHEN METABOLIC PANEL: CPT

## 2024-07-17 PROCEDURE — 93886 INTRACRANIAL COMPLETE STUDY: CPT

## 2024-07-17 PROCEDURE — 74230 X-RAY XM SWLNG FUNCJ C+: CPT

## 2024-07-17 PROCEDURE — 84100 ASSAY OF PHOSPHORUS: CPT

## 2024-07-17 PROCEDURE — 97112 NEUROMUSCULAR REEDUCATION: CPT

## 2024-07-17 PROCEDURE — 92611 MOTION FLUOROSCOPY/SWALLOW: CPT

## 2024-07-17 RX ORDER — POTASSIUM CHLORIDE 20MEQ/15ML
40 LIQUID (ML) ORAL ONCE
Status: COMPLETED | OUTPATIENT
Start: 2024-07-17 | End: 2024-07-17

## 2024-07-17 RX ORDER — IBUPROFEN 100 MG/5ML
400 SUSPENSION, ORAL (FINAL DOSE FORM) ORAL ONCE
Status: COMPLETED | OUTPATIENT
Start: 2024-07-17 | End: 2024-07-17

## 2024-07-17 RX ORDER — INSULIN LISPRO 100 [IU]/ML
1-5 INJECTION, SOLUTION INTRAVENOUS; SUBCUTANEOUS EVERY 6 HOURS SCHEDULED
Status: DISCONTINUED | OUTPATIENT
Start: 2024-07-17 | End: 2024-08-03

## 2024-07-17 RX ORDER — GABAPENTIN 300 MG/1
600 CAPSULE ORAL 3 TIMES DAILY
Status: DISCONTINUED | OUTPATIENT
Start: 2024-07-17 | End: 2024-07-31

## 2024-07-17 RX ORDER — GABAPENTIN 400 MG/1
400 CAPSULE ORAL 3 TIMES DAILY
Status: DISCONTINUED | OUTPATIENT
Start: 2024-07-17 | End: 2024-07-17

## 2024-07-17 RX ADMIN — SODIUM CHLORIDE 500 ML: 0.9 INJECTION, SOLUTION INTRAVENOUS at 08:49

## 2024-07-17 RX ADMIN — ACETAMINOPHEN 650 MG: 650 SUSPENSION ORAL at 10:52

## 2024-07-17 RX ADMIN — POTASSIUM CHLORIDE 40 MEQ: 1.5 SOLUTION ORAL at 09:21

## 2024-07-17 RX ADMIN — OXYCODONE HYDROCHLORIDE 2.5 MG: 5 SOLUTION ORAL at 00:10

## 2024-07-17 RX ADMIN — IBUPROFEN 400 MG: 100 SUSPENSION ORAL at 23:44

## 2024-07-17 RX ADMIN — Medication 60 MG: at 16:50

## 2024-07-17 RX ADMIN — GABAPENTIN 300 MG: 300 CAPSULE ORAL at 09:22

## 2024-07-17 RX ADMIN — SODIUM CHLORIDE 1 G: 1 TABLET ORAL at 16:51

## 2024-07-17 RX ADMIN — ACETAMINOPHEN 650 MG: 650 SUSPENSION ORAL at 22:14

## 2024-07-17 RX ADMIN — GABAPENTIN 600 MG: 300 CAPSULE ORAL at 22:14

## 2024-07-17 RX ADMIN — Medication 60 MG: at 03:48

## 2024-07-17 RX ADMIN — UMECLIDINIUM 1 PUFF: 62.5 AEROSOL, POWDER ORAL at 10:49

## 2024-07-17 RX ADMIN — CHLORHEXIDINE GLUCONATE 0.12% ORAL RINSE 15 ML: 1.2 LIQUID ORAL at 09:42

## 2024-07-17 RX ADMIN — LEVETIRACETAM 1000 MG: 100 SOLUTION ORAL at 09:22

## 2024-07-17 RX ADMIN — Medication 60 MG: at 23:44

## 2024-07-17 RX ADMIN — ASPIRIN 81 MG CHEWABLE TABLET 81 MG: 81 TABLET CHEWABLE at 09:22

## 2024-07-17 RX ADMIN — OXYCODONE HYDROCHLORIDE 5 MG: 5 SOLUTION ORAL at 13:34

## 2024-07-17 RX ADMIN — Medication 60 MG: at 08:52

## 2024-07-17 RX ADMIN — Medication 60 MG: at 20:01

## 2024-07-17 RX ADMIN — LIDOCAINE 5% 1 PATCH: 700 PATCH TOPICAL at 09:22

## 2024-07-17 RX ADMIN — ACETAMINOPHEN 650 MG: 650 SUSPENSION ORAL at 16:51

## 2024-07-17 RX ADMIN — GABAPENTIN 600 MG: 300 CAPSULE ORAL at 16:51

## 2024-07-17 RX ADMIN — INSULIN LISPRO 1 UNITS: 100 INJECTION, SOLUTION INTRAVENOUS; SUBCUTANEOUS at 20:09

## 2024-07-17 RX ADMIN — SODIUM CHLORIDE 1 G: 1 TABLET ORAL at 13:34

## 2024-07-17 RX ADMIN — POLYETHYLENE GLYCOL 3350 17 G: 17 POWDER, FOR SOLUTION ORAL at 09:22

## 2024-07-17 RX ADMIN — ACETAMINOPHEN 650 MG: 650 SUSPENSION ORAL at 03:56

## 2024-07-17 RX ADMIN — SODIUM CHLORIDE 1 G: 1 TABLET ORAL at 08:52

## 2024-07-17 RX ADMIN — METHOCARBAMOL 500 MG: 500 TABLET ORAL at 13:34

## 2024-07-17 RX ADMIN — METHOCARBAMOL 500 MG: 500 TABLET ORAL at 00:10

## 2024-07-17 RX ADMIN — CHLORHEXIDINE GLUCONATE 0.12% ORAL RINSE 15 ML: 1.2 LIQUID ORAL at 20:07

## 2024-07-17 RX ADMIN — LEVETIRACETAM 1000 MG: 100 SOLUTION ORAL at 20:08

## 2024-07-17 RX ADMIN — Medication 60 MG: at 13:34

## 2024-07-17 NOTE — PROGRESS NOTES
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  Upon arrival to the ED patient was posturing.  Imaging revealed a large amount of SAH and a right PCOM aneurysm.  Per chart review he was previously on aspirin reversed with DDAVP.  Overnight on 7/16, patient noted to be more lethargic complaining of severe headache was sent down for CTA.    Imaging:    CT head without 7/14/2024:No significant interval change in extensive bilateral subarachnoid hemorrhage and intraventricular hemorrhage.     CTA head w/wo 7/16/24:CT Brain: Redemonstrated subarachnoid hemorrhage overlying the right greater than left convexities and concentrated within the right sylvian cistern and fissure. 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    Plan:  Continue frequent neurological checks  Repeat CTA/CT head w/wo STAT if GCS declines more than 2 points in 1 hour  Continue SAH pathway/spasm watch:  Daily TCD's: Pending today  Continue Nimodipine 60mg Q4hrs  Continue Keppra 1000 mg twice daily per neurology  Normonatremia (139 today) currently on salt tabs 1 g 3 times daily  Euvolemia (-875.8) was given 500 mL NSS bolus x 2  Hemoglobin >8 (13.6 today)  EVD placed on 7/13/2024  Continue EVD at 10mmhg  Monitor output and ICP  ICPs -5-15, in room 3  Output 64 ml/24hrs  Maintain ICP goal <20  SBP <220  Continue aspirin 81 mg  "daily  Will increase heparin drip to 12 units/kilogram/hour  Last PTT 31  Medical management and pain control per primary team  DVT PPX: SCDs and heparin drip    Neurosurgery will continue to follow closely, call with any further questions or concerns.    Seizures (HCC)  Assessment & Plan  Neurology following, input appreciated  Patient currently on Keppra 1000 mg twice daily  vEEG no seizure activity             Subjective/Objective     Chief Complaint: \"I want coffee\"    Subjective: Patient complains of neck pain.  He offers no other complaints.    Objective: Patient comfortably lying in bed on his left side, NAD.    I/O         07/15 0701  07/16 0700 07/16 0701  07/17 0700 07/17 0701  07/18 0700    P.O.  480     I.V. (mL/kg) 1916 (35.5) 339.2 (6.3)     NG/GT  90     IV Piggyback       Feedings 20 200     Total Intake(mL/kg) 1936 (35.9) 1109.2 (20.5)     Urine (mL/kg/hr) 2500 (1.9) 1921 (1.5)     Drains 49 64 6    Stool  0     Total Output 2549 1985 6    Net -613 -875.8 -6           Unmeasured Urine Occurrence 1 x 1 x     Unmeasured Stool Occurrence  1 x             Invasive Devices       Central Venous Catheter Line  Duration             CVC Central Lines 07/12/24 Triple 16cm 4 days              Peripheral Intravenous Line  Duration             Peripheral IV 07/12/24 Left;Proximal;Ventral (anterior) Forearm 4 days    Peripheral IV 07/15/24 Right;Ventral (anterior) Forearm 2 days              Drain  Duration             Ventriculostomy/Subdural Ventricular drainage catheter Occipital region 4 days    External Urinary Catheter Small 3 days    NG/OG/Enteral Tube 18 Fr Right nare <1 day              Airway  Duration             ETT  Cuffed 8 mm 4 days                    Physical Exam:  Vitals: Blood pressure 157/95, pulse 104, temperature 99.7 °F (37.6 °C), temperature source Oral, resp. rate 21, weight 54 kg (119 lb), SpO2 99%.,There is no height or weight on file to calculate BMI.    Hemodynamic Monitoring: MAP: " Arterial Line MAP (mmHg): 104 mmHg, CPP: CPP Cuff-Calculated (mmHg): 110, CVP:  , ICP Mean: ICP Mean (mmHg): 6 mmHg    General appearance: alert, appears stated age, cooperative and no distress  Head: Normocephalic, right frontal EVD in place  Eyes: EOMI, conjugate gaze, right pupil 4 mm and irregular and left pupil 2 mm both reactive, prior cataract surgery  Neck: supple, symmetrical, trachea midline   Lungs: non labored breathing  Heart: regular heart rate  Neurologic:   Mental status: GCS 15, speech is clear, following commands  Cranial nerves: grossly intact (Cranial nerves II-XII) except as noted above  Sensory: normal to light touch in all extremities x 4  Motor: moving all extremities without focal weakness, strength 5/5 throughout  Reflexes: 2+ and symmetric, no Anabella's or clonus appreciated      Lab Results:  Results from last 7 days   Lab Units 07/17/24  0550 07/16/24  0455 07/15/24  0520 07/14/24  0517   WBC Thousand/uL 13.47* 6.36 8.78 15.17*   HEMOGLOBIN g/dL 13.6 12.7 10.8* 10.4*   HEMATOCRIT % 38.8 36.6 32.3* 30.5*   PLATELETS Thousands/uL 263 222 182 150   SEGS PCT % 84*  --  75 87*   MONO PCT % 5  --  6 5   EOS PCT % 0  --  0 0     Results from last 7 days   Lab Units 07/17/24  0550 07/16/24  0455 07/15/24  0520   SODIUM mmol/L 139 137 139   POTASSIUM mmol/L 3.6 3.4* 3.6   CHLORIDE mmol/L 105 106 107   CO2 mmol/L 24 21 25   BUN mg/dL 13 9 9   CREATININE mg/dL 0.68 0.62 0.55*   CALCIUM mg/dL 9.7 9.1 8.5   ALK PHOS U/L 128*  --   --    ALT U/L 13  --   --    AST U/L 13  --   --      Results from last 7 days   Lab Units 07/17/24  0550 07/16/24  0455 07/15/24  0520   MAGNESIUM mg/dL 2.0 1.9 2.0     Results from last 7 days   Lab Units 07/17/24  0550 07/16/24  0455 07/15/24  0520   PHOSPHORUS mg/dL 2.4 2.4 1.5*     Results from last 7 days   Lab Units 07/17/24  0550 07/16/24  0458 07/15/24  0520 07/14/24  1311 07/12/24  1618   INR   --   --   --  1.21* 1.07   PTT seconds 31 32 34 34 23     No results  "found for: \"TROPONINT\"  ABG:  Lab Results   Component Value Date    PHART 7.314 (L) 07/12/2024    LPH7FXG 44.1 (H) 07/12/2024    PO2ART 138.2 (H) 07/12/2024    ACG5MMA 21.9 (L) 07/12/2024    BEART -4.2 07/12/2024    SOURCE Line, Arterial 07/12/2024       Imaging Studies: I have personally reviewed pertinent reports.   and I have personally reviewed pertinent films in PACS    CTA head and neck w wo contrast    Result Date: 7/16/2024  Impression: CT Brain: Redemonstrated subarachnoid hemorrhage overlying the right greater than left convexities and concentrated within the right sylvian cistern and fissure. . Subacute infarct involving the right anterior temporal region and medial occipital region.. 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 Workstation performed: [id number]     IR cerebral angiography / intervention    Result Date: 7/15/2024  Impression: Successful balloon assisted coil embolization of a 4.5 mm right P-comm aneurysm. Workstation performed: [id number]     CT head wo contrast    Result Date: 7/14/2024  Impression: No significant interval change in extensive bilateral subarachnoid hemorrhage and intraventricular hemorrhage. Workstation performed: [id number]     CT head wo contrast    Result Date: 7/13/2024  Impression: Interval placement of right-sided shunt catheter. Stable ventricular size with slight interval increase in intraventricular hemorrhage layering in the occipital horns bilaterally. Diffuse subarachnoid hemorrhage again noted. Workstation performed: [id number]     XR chest portable ICU    Result Date: 7/12/2024  Impression: No acute cardiopulmonary disease. ET tube 6 cm above the landon. Workstation performed: [id number]     CTA stroke alert (head/neck)    Result Date: 7/12/2024  Impression: 0.3 cm aneurysm in expected origin of right posterior communicating artery. " Possible tiny aneurysm in left anterior communicating artery region. These could be potential sources of diffuse subarachnoid hemorrhage. Recommend neurovascular surgery consultation for further evaluation. Mild narrowing of bilateral MCA M1 and bilateral M2 segmental branch vessels, likely due to vasospasm. Patent stent in left proximal subclavian artery. Endotracheal tube in trachea. Additional chronic/incidental findings as detailed above. Findings were directly discussed with Emmanuel Lion at approximately 4:34 p.m. on 7/12/2024. Workstation performed: [id number]     CT stroke alert brain    Result Date: 7/12/2024  Impression: Acute diffuse thickened large-volume subarachnoid hemorrhage throughout the bilateral parafalcine regions, bilateral cerebral sulci (right worse than left), basilar cisterns, prepontine cistern, premedullary cistern, and visualized upper cervical spinal canal. Acute small volume intraventricular hemorrhage with mild hydrocephalus. No acute infarction. Findings were directly discussed with Emmanuel Lion at approximately 4:34 p.m. on 7/12/2024. Workstation performed: [id number]       EKG, Pathology, and Other Studies: I have personally reviewed pertinent reports.      VTE Pharmacologic Prophylaxis: Heparin    VTE Mechanical Prophylaxis: sequential compression device

## 2024-07-17 NOTE — PLAN OF CARE
Problem: PHYSICAL THERAPY ADULT  Goal: Performs mobility at highest level of function for planned discharge setting.  See evaluation for individualized goals.  Description: Treatment/Interventions: OT, Spoke to case management, Gait training, Bed mobility, Patient/family training, Endurance training, LE strengthening/ROM, Functional transfer training          See flowsheet documentation for full assessment, interventions and recommendations.  Outcome: Progressing  Note: Prognosis: Fair  Problem List: Decreased strength, Decreased range of motion, Decreased endurance, Impaired balance, Decreased mobility, Decreased coordination, Decreased cognition, Impaired judgement, Decreased safety awareness, Decreased skin integrity, Pain  Assessment: ptcontinues to require modA x1-2 for safety  and completion of all phases of mobility+ VC and TC for postural control during functional tasks at EOB. limtied tolerance to 12/13 mins w/ pain in neck and back limiting. Pt continues to requrie skilled PT to address deficits. pt functioning well below baseline.  Barriers to Discharge: Inaccessible home environment, Decreased caregiver support     Rehab Resource Intensity Level, PT: I (Maximum Resource Intensity)    See flowsheet documentation for full assessment.

## 2024-07-17 NOTE — CONSULTS
Nutrition Follow-Up/Recommendations:    Recommend advancing Jevity 1.2 by 10mL/hr q4 hrs as tolerated to goal rate of 60mL/hr continuous.   This goal rate would provide 1728 kcals, 80g protein, 1162mL water in TF formula.     Additional free water flushes per critical care; suggest at least 30mL q4 hrs to help maintain tube patency.     Recommend to discontinue oral nutrition supplements for now.     Please obtain/document height as able

## 2024-07-17 NOTE — PROGRESS NOTES
I saw and examined Federico at about 8:45 pm     When I entered room he was laying in bed curled up on his left side. He did open his eyes when I said his name. He complained of a 10/10 HA he was aware of person, place and time but spoke only in short phrases. This was different form the level of interaction I had with him last night as he was more interactive. He was briskly moving the right on commands but on the left he was slightly delayed to follow commands and was not quite as brisk.     He was sent for a stat CTA head. I reviewed images and there was no areas of focal stenosis suggestive of vasospasm. I also discussed with Dr. Cottrell who also reviewed scans.     At this time we will continue supportive care. He was slightly negative today will give saline bolus and due to lack of PO intake from failing SLP will also be placed on maintenance NS. Can place NG/Keofeed for enteral access. Ideally Na closer to 140 but will give NS at this time and monitor response.     I spent 25 minutes examining, reviewing imaging and discussing plan of care.

## 2024-07-17 NOTE — PROGRESS NOTES
[facility]  Interval Progress Note: Critical Care  Name: Federico Bowen I  MRN: [id number]  Unit/Bed#: ICU 07 I Date of Admission: 7/12/2024   Date of Service: 7/16/2024 I Hospital Day: 4    Interval Events:  68 y.o SAH POD #3 coiling right PCOM aneurysm  Patient with waxing/waning lethargy, 10/10/headache  Last CTH 07/14 - No significant interval change in extensive bilateral subarachnoid hemorrhage and intraventricular hemorrhage     Pertinent New Data:   Vitals:   HR - 80, O2 98% on RA  Arterial Line  McDonald /72  No data recorded    mmHg  No data recorded   Physical Exam  Vitals and nursing note reviewed.   Eyes:      Pupils: Pupils are equal, round, and reactive to light.   Skin:     General: Skin is warm and dry.      Capillary Refill: Capillary refill takes less than 2 seconds.   HENT:      Head:      Comments: EVD      Nose: No rhinorrhea.      Mouth/Throat:      Mouth: Mucous membranes are dry.   Neck:      Vascular: Central line present.   Cardiovascular:      Rate and Rhythm: Normal rate and regular rhythm.      Pulses: Normal pulses.      Heart sounds: Normal heart sounds.   Musculoskeletal:      Right lower leg: No edema.      Left lower leg: No edema.   Abdominal:      Palpations: Abdomen is soft.   Constitutional:       General: He is not in acute distress.     Appearance: He is underweight.   Pulmonary:      Effort: Pulmonary effort is normal.      Breath sounds: Normal breath sounds.   Neurological:      General: No focal deficit present.      Mental Status: He is alert, easily aroused and oriented to person, place and time. He is calm.      Cranial Nerves: No facial asymmetry.      Sensory: No sensory deficit.      Motor: Strength full and intact in all extremities.      Comments: Slower to engage on the LUE        I have personally reviewed pertinent lab results., CBC:   Lab Results   Component Value Date    WBC 6.36 07/16/2024    HGB 12.7  "07/16/2024    HCT 36.6 07/16/2024    MCV 90 07/16/2024     07/16/2024    RBC 4.07 07/16/2024    MCH 31.2 07/16/2024    MCHC 34.7 07/16/2024    RDW 13.3 07/16/2024    MPV 9.4 07/16/2024   , CMP:   Lab Results   Component Value Date    SODIUM 137 07/16/2024    K 3.4 (L) 07/16/2024     07/16/2024    CO2 21 07/16/2024    BUN 9 07/16/2024    CREATININE 0.62 07/16/2024    CALCIUM 9.1 07/16/2024    EGFR 101 07/16/2024   , Magnesium: No components found for: \"MAG\", Phosphorous:   Lab Results   Component Value Date    PHOS 2.4 07/16/2024     Assessment and Plan  Diagnosis: New onset headache, increasing lethargy   Plan:   STAT CTA - Redemonstrated SAH overlying the right > left convexities and concentrated within the right sylvian cistern and fissure,subacute infarct involving the right anterior temporal region and medial occipital region.CT Angiography:  Expected postoperative appearance following embolization of R PCOM otherwise unremarkable CTA neck and brain  I/O goal euvolemic, fluid resuscitation as indicated  Normonatremia   SBP goal <220  Frequent neuro exams  Daily TCDs  Nimodipine 60mg Q4 hrs  Keppra 1g BID per neurology   On call neurosurgeon notified    Billing Level:  Critical Care Time Statement: Upon my evaluation, this patient had a high probability of imminent or life-threatening deterioration due to SAH POD #3 coiling right PCOM aneurysm, which required my direct attention, intervention, and personal management.  I spent a total of 15 minutes directly providing critical care services, including interpretation of complex medical databases, evaluating for the presence of life-threatening injuries or illnesses, complex medical decision making (to support/prevent further life-threatening deterioration)., and interpretation of hemodynamic data. This time is exclusive of procedures, teaching, family meetings, and any prior time recorded by providers other than myself.      SIGNATURE: Bret Ho " CHECO Gray

## 2024-07-17 NOTE — PROGRESS NOTES
The patient is now being transported to the Radiology department to have a Video Barium Swallowing test done.

## 2024-07-17 NOTE — SPEECH THERAPY NOTE
Speech Pathology - Modified Barium Swallow Study    Patient Name: Federico Bowen    Today's Date: 7/17/2024     Problem List  Principal Problem:    Subarachnoid hemorrhage (HCC)  Active Problems:    HTN (hypertension)    Hyperlipidemia    Seizures (HCC)    Moderate protein-calorie malnutrition (HCC)    Past Medical History  No past medical history on file.    Past Surgical History  Past Surgical History:   Procedure Laterality Date    IR CEREBRAL ANGIOGRAPHY / INTERVENTION  7/13/2024       Assessment Summary:    Pt presents with moderate-severe oropharyngeal dysphagia characterized by weak bolus manipulation, control, and transfer, delayed/mistimed swallow initiation, reduced base of tongue retraction and pharyngeal stripping wave, and  insufficiency. There is significant narrowing through the PES, likely exacerbated by presence of NGT. Airway closure/protection is reduced with trace aspiration of thin, honey thick, and puree, as well as deep penetration of NTL. Overall aspiration was mild, however pt is at increased risk d/t minimal pharyngeal clearance. Note: Images are available for review in PACS as desired.    Recommendations:   Recommended Diet: NPO with tube feeds   Recommended Form of Medications: non-oral if possible   Aspiration precautions and compensatory swallowing strategies: PO trials with ST only  SLP Dysphagia therapy recommended: Continue ST f/u with repeat MBS when appropriate    Results Reviewed with: patient and RN       General Information;  Pt is a 68 y.o. male with a PMH remarkable for SAH, seizures, and dysphagia. MBS was recommended to assess oropharyngeal stage swallowing skills at this time.     Pt was viewed sitting upright in the lateral and AP positions. Trials administered were consistent with MBSImP Validated Protocol, except for solid trial which was not given: Pt was given 5-mL thin liquid x2, 20-mL cup sip thin, 40-mL sequential swallow thin, 5-mL nectar thick, 20-mL cup sip  nectar thick, 40-mL sequential swallow nectar thick, 5-mL honey thick, 5-mL pudding, 5-mL nectar thick in the AP position, and 5-mL pudding in the AP position. Pt was also given thin liquids by straw.     Initial view observations/comments: Clear view of the upper airway and NGT in place, head in hyperextension      8-Point Penetration-Aspiration Scale   Thin liquid 7 - Material enters the airway, passes below the vocal folds, and is not ejected from the trachea despite effort     Nectar thick liquid 4 - Material enters the airway, contacts the vocal folds, and is ejected from the airway    Honey thick liquid 6 - Material enters the airway, passes below the vocal folds and is ejected into the larynx or out of the airway     Puree (pudding) 6 - Material enters the airway, passes below the vocal folds and is ejected into the larynx or out of the airway     Solid N/a     Aspiration Response and Efficacy: Positive cough response to aspiration. Cough is weak.    MBS IMP Rating    ORAL Impairment  Compinent 1--Lip Closure  Judged at any point during the swallow.  2 - Escape from interlabial space or lateral juncture; no extension beyond vermilion border    Component 2--Tongue Control During Bolus Hold  Judged on held liquid boluses only and prior to productive tongue movement.   2 - Posterior escape of less than half of bolus    Component 3--Bolus Preparation/Mastication  Judged only during presentation of 1/2 shortbread cookie coated in pudding.   N/a    Component 4--Bolus Transport/Lingual Motion  Judged after first productive tongue movement for oral bolus transport.  1 - Delayed initiation of tongue motion    Component 5--Oral Residue  Judged after first swallow or after the last swallow of the sequential swallow task.  2 - Residue collection on oral structures   Location   B - Palate and C - Tongue    Component 6--Initiation of Pharyngeal Swallow  Judged at first movement of the brisk superior-anterior hyoid  trajectory.  3 - Bolus head in pyriforms      PHARYNGEAL Impairment  Component 7--Soft Palate Elevation  Judged during maximum displacement of soft palate.  3 - Escape to the nasal cavity    Component 8--Laryngeal Elevation  Judged when epiglottis is in its most horizontal position.  0 - Complete superior movement of thyroid cartilage with complete approximation of arytenoids to epiglottic petiole    Component 9--Anterior Hyoid Excursion  Judged at height of swallow/maximal anterior hyoid displacement.  0 - Complete anterior movement    Component 10--Epiglottic Movement  Judged at height of swallow/maximal anterior hyoid displacement.  1 - Partial inversion    Component 11--Laryngeal Vestibular Closure  Judged at height of swallow/maximal anterior hyoid displacement.  1 - Incomplete; narrow column air/contrast in laryngeal vestibule    Component 12--Pharyngeal Stripping Wave  Judged during the full duration of the pharyngeal swallow.  1 - Present - diminished    Component 13--Pharyngeal Contraction  Judged in AP view at rest and throughout maximum movement of structures.  2 - Unilateral Bulging    Component 14--Pharyngoesophageal Segment Opening  Judged during maximum distension of PES and throughout opening and closure.  2 - Minimal distension/minimal duration; marked obstruction of flow    Component 15--Tongue Base (TB) Retraction  Judged during maximum retraction of the tongue base.  3 - Wide column of contrast or air between TB and PW    Component 16--Pharyngeal Residue  Judged after first swallow or after the last swallow of the sequential swallow task.  3 - Majority of contrast within or on pharyngeal structures   Location   F - Diffuse (>3 areas)      ESOPHAGEAL Impairment  Component 17--Esophageal Clearance Upright Position  Judged in AP view during bolus transit through the oral cavity to the LES  1 - Esophageal retention

## 2024-07-17 NOTE — OCCUPATIONAL THERAPY NOTE
Occupational Therapy Treatment Note      Federico Bowen    7/17/2024    Principal Problem:    Subarachnoid hemorrhage (HCC)  Active Problems:    HTN (hypertension)    Hyperlipidemia    Seizures (HCC)    Moderate protein-calorie malnutrition (HCC)      No past medical history on file.    Past Surgical History:   Procedure Laterality Date    IR CEREBRAL ANGIOGRAPHY / INTERVENTION  7/13/2024 07/17/24 1037   OT Last Visit   OT Visit Date 07/17/24   Note Type   Note Type Treatment   Pain Assessment   Pain Assessment Tool FLACC   Pain Location/Orientation Location: Neck   Pain Onset/Description Onset: Ongoing;Descriptor: Aching;Descriptor: Discomfort   Patient's Stated Pain Goal No pain   Hospital Pain Intervention(s) Repositioned;Ambulation/increased activity;Emotional support   Restrictions/Precautions   Weight Bearing Precautions Per Order No   Other Precautions Cognitive;Chair Alarm;Bed Alarm;Multiple lines;Telemetry;Fall Risk;Pain  (EVD; anderson)   Lifestyle   Autonomy I w/ ADLS/IADLS, transfers and functional mobility PTA   Reciprocal Relationships Pt lives w/ his dght   Service to Others retired   Intrinsic Gratification gardening and spending time w/ his grandkids   ADL   LB Dressing Assistance 3  Moderate Assistance   LB Dressing Deficit Don/doff R sock;Don/doff L sock   LB Dressing Comments pt able to don R sock w/ CGA for forward functional reach. Needed assist to thread L sock over toes.   Bed Mobility   Supine to Sit 3  Moderate assistance   Additional items Assist x 1;Increased time required;Verbal cues;LE management   Sit to Supine Unable to assess   Additional Comments pt sat EOB w/ Min A for sitting balance/trunk control   Transfers   Sit to Stand 3  Moderate assistance   Additional items Assist x 2;Increased time required;Verbal cues   Stand to Sit 3  Moderate assistance   Additional items Assist x 2;Increased time required;Verbal cues   Additional Comments B/L HHA used   Functional Mobility    Functional Mobility 3  Moderate assistance   Additional Comments pt took few small steps from EOB to recliner w/ Mod A x2; B/L HHA used   Additional items Hand hold assistance   Cognition   Overall Cognitive Status Impaired   Arousal/Participation Responsive;Cooperative   Attention Attends with cues to redirect   Orientation Level Oriented to person;Oriented to time;Disoriented to situation   Memory Decreased recall of precautions;Decreased recall of recent events;Decreased short term memory   Following Commands Follows one step commands with increased time or repetition   Comments pt is cooperative; lethargic; needs cues for safety/redirection to task   Activity Tolerance   Activity Tolerance Patient limited by fatigue;Patient limited by pain   Medical Staff Made Aware PT, RN   Assessment   Assessment Patient participated in Skilled OT session 7/17/2024 with interventions consisting of ADL re training with the use of correct body mechnaics, Energy Conservation techniques, safety awareness and fall prevention techniques, therapeutic exercise to: increase functional use of BUEs, increase BUE muscle strength ,  therapeutic activities to: increase activity tolerance, increase postural control, increase trunk control, and increase OOB/ sitting tolerance . Patient agreeable to OT treatment session, upon arrival patient was found supine in bed.  In comparison to previous session, patient with improvements in EOB sitting tolerance, OOB transfers and LB ADLS. Patient requiring verbal cues for safety, verbal cues for correct technique, and one step directives. Patient continues to be functioning below baseline level, occupational performance remains limited secondary to factors listed above and increased risk for falls and injury.   From OT standpoint, recommendation at time of d/c would be inpt rehab, when medically stable.   Patient to benefit from continued Occupational Therapy treatment while in the hospital to address  deficits as defined above and maximize level of functional independence with ADLs and functional mobility.   Plan   Treatment Interventions ADL retraining;Functional transfer training;UE strengthening/ROM;Endurance training;Cognitive reorientation;Patient/family training;Equipment evaluation/education;Compensatory technique education;Continued evaluation;Energy conservation;Activityengagement   Goal Expiration Date 07/29/24   OT Treatment Day 1   OT Frequency 3-5x/wk   Discharge Recommendation   Rehab Resource Intensity Level, OT I (Maximum Resource Intensity)   Additional Comments  The patient's raw score on the AM-PAC Daily Activity Inpatient Short Form is 15. A raw score of less than 19 suggests the patient may benefit from discharge to post-acute rehabilitation services. Please refer to the recommendation of the Occupational Therapist for safe discharge planning.   Additional Comments 2 Pt seen as a co-session due to the patient's co-morbidities, clinically unstable presentation, and present impairments which are a regression from the patient's baseline.   AM-PAC Daily Activity Inpatient   Lower Body Dressing 2   Bathing 2   Toileting 2   Upper Body Dressing 3   Grooming 3   Eating 3   Daily Activity Raw Score 15   Daily Activity Standardized Score (Calc for Raw Score >=11) 34.69   AM-PAC Applied Cognition Inpatient   Following a Speech/Presentation 1   Understanding Ordinary Conversation 2   Taking Medications 2   Remembering Where Things Are Placed or Put Away 2   Remembering List of 4-5 Errands 2   Taking Care of Complicated Tasks 1   Applied Cognition Raw Score 10   Applied Cognition Standardized Score 24.98   End of Consult   Education Provided Yes;Family or social support of family present for education by provider   Patient Position at End of Consult Bedside chair;Bed/Chair alarm activated;All needs within reach   Nurse Communication Nurse aware of consult       Missy Nunez MS, OTR/L

## 2024-07-17 NOTE — PHYSICAL THERAPY NOTE
PHYSICAL THERAPY EVALUATION  NAME:  Federico Bowen  DATE: 07/17/24    AGE:   68 y.o.  Mrn:   [id number]  ADMIT DX:  Subarachnoid hemorrhage (HCC) [I60.9]    No past medical history on file.  Past Surgical History:   Procedure Laterality Date    IR CEREBRAL ANGIOGRAPHY / INTERVENTION  7/13/2024       Length Of Stay: 5  PHYSICAL THERAPY EVALUATION :    07/17/24 1036   PT Last Visit   PT Visit Date 07/17/24   Note Type   Note Type Treatment   Pain Assessment   Pain Assessment Tool FLACC   Pain Rating: FLACC (Rest) - Face 0   Pain Rating: FLACC (Rest) - Legs 0   Pain Rating: FLACC (Rest) - Activity 0   Pain Rating: FLACC (Rest) - Cry 0   Pain Rating: FLACC (Rest) - Consolability 0   Score: FLACC (Rest) 0   Pain Rating: FLACC (Activity) - Face 1   Pain Rating: FLACC (Activity) - Legs 0   Pain Rating: FLACC (Activity) - Activity 0   Pain Rating: FLACC (Activity) - Cry 1   Pain Rating: FLACC (Activity) - Consolability 1   Score: FLACC (Activity) 3   Restrictions/Precautions   Weight Bearing Precautions Per Order No   Other Precautions Cognitive;Chair Alarm;Bed Alarm;Multiple lines;Telemetry;Fall Risk;Pain  (PEPE anderson)   General   Chart Reviewed Yes   Response to Previous Treatment Patient unable to report, no changes reported from family or staff   Family/Caregiver Present No   Cognition   Overall Cognitive Status Impaired   Arousal/Participation Responsive;Cooperative   Attention Attends with cues to redirect   Orientation Level Oriented to person;Oriented to place   Memory Decreased recall of precautions;Decreased recall of recent events;Decreased short term memory   Following Commands Follows one step commands with increased time or repetition   Comments coopeartive w/ time for processing. required cues for safety. limited insight. impulsive   Subjective   Subjective pt agreeable to partiicpate in session and OOB to recliner   Bed Mobility   Supine to Sit 3  Moderate assistance   Additional items Assist x  1;Increased time required;Verbal cues   Additional Comments pt tolerates sitting EOB w/ varied level of assist min> occasional close S while performing functional tasks ~12-13 mins- limited by onset of LBP   Transfers   Sit to Stand 3  Moderate assistance   Additional items Assist x 2   Stand to Sit 3  Moderate assistance   Additional items Assist x 2   Ambulation/Elevation   Gait pattern Excessively slow;Short stride;Foward flexed;Shuffling;Decreased foot clearance   Gait Assistance 3  Moderate assist   Additional items Assist x 2   Assistive Device Other (Comment)  (HHA x2)   Distance 4' bed> chair w/ pt unable to tolerate further 2* c/o back pain and LE fatigue/ instability   Stair Management Assistance Not tested   Balance   Static Sitting Fair -   Dynamic Sitting Poor +   Static Standing Poor   Dynamic Standing Poor -   Ambulatory Poor -   Endurance Deficit   Endurance Deficit Yes   Endurance Deficit Description fatigue; pain adn LE instability   Activity Tolerance   Activity Tolerance Patient limited by fatigue;Patient limited by pain   Medical Staff Made Aware OT and RN  Co- tx was performed w/ OT today. This pt's participation in skilled therapies requires skilled Ax2 2*medical complexity; decreased activity tolerance; pain; limited endurance and for safety. For these reasons co- tx was indicated to ensure patient could safely and optimally participate in therapy services and maximize their rehabilitation during treatment time.  PT and OT disciplines addressed separate goals of patient's individualized care plans throughout session.     Nurse Made Aware yes- Rober   Exercises   Knee AROM Long Arc Quad Sitting;10 reps;Right;Left  (x2)   Ankle Pumps Sitting;25 reps   Marching Sitting;15 reps   Balance training  seated balance EOB 12-13 mins   Assessment   Prognosis Fair   Problem List Decreased strength;Decreased range of motion;Decreased endurance;Impaired balance;Decreased mobility;Decreased  coordination;Decreased cognition;Impaired judgement;Decreased safety awareness;Decreased skin integrity;Pain   Assessment ptcontinues to require modA x1-2 for safety  and completion of all phases of mobility+ VC and TC for postural control during functional tasks at EOB. limtied tolerance to 12/13 mins w/ pain in neck and back limiting. Pt continues to requrie skilled PT to address deficits. pt functioning well below baseline.   Barriers to Discharge Inaccessible home environment;Decreased caregiver support   Goals   Patient Goals to rest and have less pain   STG Expiration Date 07/27/24   PT Treatment Day 1   Plan   Treatment/Interventions ADL retraining;Functional transfer training;LE strengthening/ROM;Elevations;Therapeutic exercise;Endurance training;Cognitive reorientation;Patient/family training;Equipment eval/education;Bed mobility;Gait training;Compensatory technique education;Spoke to nursing;Spoke to case management;Spoke to advanced practitioner;OT   Progress Slow progress, decreased activity tolerance   PT Frequency 3-5x/wk   Discharge Recommendation   Rehab Resource Intensity Level, PT I (Maximum Resource Intensity)   AM-PAC Basic Mobility Inpatient   Turning in Flat Bed Without Bedrails 2   Lying on Back to Sitting on Edge of Flat Bed Without Bedrails 2   Moving Bed to Chair 2   Standing Up From Chair Using Arms 2   Walk in Room 2   Climb 3-5 Stairs With Railing 1   Basic Mobility Inpatient Raw Score 11   Basic Mobility Standardized Score 30.25   [facility] Highest Level Of Mobility   -[facility] Goal 4: Move to chair/commode   -[facility] Achieved 4: Move to chair/commode   End of Consult   Patient Position at End of Consult Bedside chair;Bed/Chair alarm activated;All needs within reach     The patient's AM-PAC Basic Mobility Inpatient Short Form Raw Score is 11. A Raw score of less than or equal to 16 suggests the patient may benefit from discharge to post-acute rehabilitation services. Please also refer to  the recommendation of the Physical Therapist for safe discharge planning.

## 2024-07-17 NOTE — PROGRESS NOTES
[facility]  Progress Note: Critical Care  Name: Federico Bowen 68 y.o. male I MRN: [id number]  Unit/Bed#: ICU 07 I Date of Admission: 7/12/2024   Date of Service: 7/17/2024 I Hospital Day: 5    Assessment & Plan     Neuro:   Diagnosis: SAH with IVH and mild hydro s/p coil embolization of R PCOM aneurysm  BD 6 HH5, MF 4  7/11 CTAH/N-0.3 cm aneurysm in expected origin of right posterior communicating artery. Possible tiny aneurysm in left anterior communicating artery region. Mild narrowing of bilateral MCA M1 and bilateral M2 segmental branch vessels, likely due to vasospasm.   7/11 CTH-Acute diffuse thickened large-volume SAH throughout the b/l parafalcine regions, b/l cerebral sulci (R worse than L), basilar cisterns, prepontine cistern, premedullary cistern, and visualized upper cervical spinal canal. Acute small volume IVH with mild hydrocephalus.   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   7/14 CTH -No significant interval change in extensive bilateral SAH and IVH.   7/14 vEEG no seizures  7/16 CTA/CTH: Expected postoperative appearance following embolization of right posterior communicating artery aneurysm . Redemonstrated subarachnoid hemorrhage overlying the right greater than left convexities and concentrated within the right sylvian cistern and fissure. . Subacute infarct involving the right anterior temporal region and medial occipital region.    Plan:   Nsx following  ASA 81mg   EVD 10. Goal ICP <20   ICP 1-15  64 cc/24hrs  -220  Keppra ppx x 7 days unless seizure on eeg  Nimodipine x 21days  ASA s/p coil  Low dose IV Heparin protocol @ 10 units  Continue 12 units  PTT < 45  Daily PTT  Daily TCD  Euvolemia   I/Os: -875cc  Was given 500cc NS bolus 07/16 9pm   Will give additional NS bolus this AM 500cc  Normonatremia, Na goal > 140  Na: 139  Continue salt tabs to 1g TID  Stat CTH with any change in GCS > 2 pts      Diagnosis: Seizure  2/2 above  vEEG no seizures  Plan:   Neuro following   Keppra 1gm BID    - Diagnosis: Headache               Plan               Continue 300mg TID gabapentin   Lidoderm patch for neck   Can give IV mag and reglan if needed   PRN fioricet, robaxin, oxy        CV:   Diagnosis: Hx CAD/HTN/HLD  Plan:   Cont home statin  Continue ASA 81mg     Pulm:  Diagnosis: Hx COPD without AE  Plan:   IS  Imecldinum inhaler   Respiratory protocol     GI:   Diagnosis: Dysphagia  Plan:   Was choking on feeds 07/16 and placed on TF   Expected MBS w/ speech 07/17     BM: 07/16  Bowel regimen: sennokot and miralax        :   Euvolemia   I/Os: -800cc this AM   Given additional 500cc NS bolus   Continue to monitor           F/E/N:    F: none  E: K> 4.0,  maintain magnesium > 2.0, maintain phosphate > 3.5  N: TF Jevity 1.2 vinny 50cc/hr    - expected VBS w/ speech today 07/17     Heme/Onc:   Diagnosis: Anemia  Plan:   Unclear baseline no s/s bleeding monitor  Possibly macrocytic anemia   Continue to monitor      Endo:   No issues     ID:   Diagnosis: Leukocytosis (resolved)  Afebrile monitor        MSK/Skin:   PT/OT/PMR  Diagnosis: Penile lesion  Plan: note from outpt derm inflamed seborrheic keratosis        Line: R IJ         Disposition: Critical care    ICU Core Measures     Vented Patient  VAP Bundle  VAP bundle ordered     A: Assess, Prevent, and Manage Pain Has pain been assessed? Yes  Need for changes to pain regimen? Yes   B: Both Spontaneous Awakening Trials (SATs) and Spontaneous Breathing Trials (SBTs) Plan to perform spontaneous awakening trial today? Yes   Plan to perform spontaneous breathing trial today? Yes   Obvious barriers to extubation? Yes   C: Choice of Sedation RASS Goal: 0 Alert and Calm  Need for changes to sedation or analgesia regimen? No   D: Delirium CAM-ICU: Positive   E: Early Mobility  Plan for early mobility? Yes   F: Family Engagement Plan for family engagement today? Yes       Review  of Invasive Devices:      Central access plan: Patient has multiple central venous catheters.  Medications requiring central line Hemodynamic monitoring      Prophylaxis:  VTE VTE covered by:  heparin (porcine), Intravenous, 12 Units/kg/hr at 07/16/24 1832       Stress Ulcer  not ordered        Significant 24hr Events     24hr events: was waxing and waning and 10/10 headache overnight and STAT CTA was done and showed a subacute evolving infarct in R temporal lobe near patient's SAH but no signs of vasospasm   Was given 500cc NS bolus      Subjective     Review of Systems: See HPI for Review of Systems     Objective                            Vitals I/O      Most Recent Min/Max in 24hrs   Temp 99.7 °F (37.6 °C) Temp  Min: 98.2 °F (36.8 °C)  Max: 99.7 °F (37.6 °C)   Pulse 100 Pulse  Min: 88  Max: 112   Resp 13 Resp  Min: 12  Max: 31   /85 BP  Min: 119/70  Max: 166/102   O2 Sat 98 % SpO2  Min: 97 %  Max: 99 %      Intake/Output Summary (Last 24 hours) at 7/17/2024 0651  Last data filed at 7/17/2024 0600  Gross per 24 hour   Intake 1109.22 ml   Output 1985 ml   Net -875.78 ml       Diet Enteral/Parenteral; Tube Feeding No Oral Diet; Jevity 1.2 Josh; Continuous; 50    Invasive Monitoring   Arterial Line  [place] /72  No data recorded    mmHg  No data recorded           Physical Exam   Physical Exam  Vitals and nursing note reviewed.   HENT:      Mouth/Throat:      Comments: NGT in place  Cardiovascular:      Rate and Rhythm: Normal rate.   Abdominal:      Palpations: Abdomen is soft.   Pulmonary:      Effort: Pulmonary effort is normal.   Neurological:      Mental Status: He is alert and oriented to person, place, and time.      Sensory: Sensation is intact.      Motor: Pronator drift.      Coordination: Coordination is intact.        Reflexes are normal and symmetric.      Comments:   Pupils reactive (R more dilated, L more pinpoint)      LUE weakness appreciated 4/5 in biceps, triceps, and deltoids                Diagnostic Studies      EKG: reviewed  Imaging:  I have personally reviewed pertinent reports.       Medications:  Scheduled PRN   acetaminophen, 650 mg, Q6H  aspirin, 81 mg, Daily  chlorhexidine, 15 mL, Q12H MATTY  gabapentin, 300 mg, TID  levETIRAcetam, 1,000 mg, Q12H MATTY  lidocaine, 1 patch, Daily  niMODipine, 60 mg, Q4H MATTY  polyethylene glycol, 17 g, Daily  potassium chloride, 40 mEq, Once  senna, 1 tablet, HS  sodium chloride, 1 g, TID With Meals  umeclidinium, 1 puff, Daily      bisacodyl, 10 mg, Daily PRN  butalbital-acetaminophen-caffeine, 1 tablet, Q4H PRN  hydrALAZINE, 10 mg, Q6H PRN  HYDROmorphone, 0.2 mg, Q4H PRN  labetalol, 10 mg, Q6H PRN  methocarbamol, 500 mg, Q6H PRN  ondansetron, 4 mg, Q6H PRN  oxyCODONE, 2.5 mg, Q4H PRN  oxyCODONE, 5 mg, Q4H PRN       Continuous    heparin (porcine), 12 Units/kg/hr (Order-Specific), Last Rate: 12 Units/kg/hr (07/16/24 1832)         Labs:    CBC    Recent Labs     07/16/24  0455 07/17/24  0550   WBC 6.36 13.47*   HGB 12.7 13.6   HCT 36.6 38.8    263     BMP    Recent Labs     07/16/24  0455 07/17/24  0550   SODIUM 137 139   K 3.4* 3.6    105   CO2 21 24   AGAP 10 10   BUN 9 13   CREATININE 0.62 0.68   CALCIUM 9.1 9.7       Coags    Recent Labs     07/16/24  0458 07/17/24  0550   PTT 32 31        Additional Electrolytes  Recent Labs     07/16/24  0455 07/17/24  0550   MG 1.9 2.0   PHOS 2.4 2.4   CAIONIZED  --  1.23          Blood Gas    No recent results  No recent results LFTs  Recent Labs     07/17/24  0550   ALT 13   AST 13   ALKPHOS 128*   ALB 4.1   TBILI 0.83       Infectious  No recent results  Glucose  Recent Labs     07/16/24  0455 07/17/24  0550   GLUC 144* 146*               Hadley Des [place], DO

## 2024-07-17 NOTE — ASSESSMENT & PLAN NOTE
aSAH from R PCOM aneurysm rupture  BD 6 - HH 5, MF 4  PPD 4 right PCOM coil embolization  P/w headache and right leg pain, he had 2 witnessed seizures by EMS was ultimately intubated.  Upon arrival to the ED patient was posturing.  Imaging revealed a large amount of SAH and a right PCOM aneurysm.  Per chart review he was previously on aspirin reversed with DDAVP.  Overnight on 7/16, patient noted to be more lethargic complaining of severe headache was sent down for CTA.    Imaging:    CT head without 7/14/2024:No significant interval change in extensive bilateral subarachnoid hemorrhage and intraventricular hemorrhage.     CTA head w/wo 7/16/24:CT Brain: Redemonstrated subarachnoid hemorrhage overlying the right greater than left convexities and concentrated within the right sylvian cistern and fissure. 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    Plan:  Continue frequent neurological checks  Repeat CTA/CT head w/wo STAT if GCS declines more than 2 points in 1 hour  Continue SAH pathway/spasm watch:  Daily TCD's: Pending today  Continue Nimodipine 60mg Q4hrs  Continue Keppra 1000 mg twice daily per neurology  Normonatremia (139 today) currently on salt tabs 1 g 3 times daily  Euvolemia (-875.8) was given 500 mL NSS bolus x 2  Hemoglobin >8 (13.6 today)  EVD placed on 7/13/2024  Continue EVD at 10mmhg  Monitor output and ICP  ICPs -5-15, in room 3  Output 64 ml/24hrs  Maintain ICP goal <20  SBP <220  Continue aspirin 81 mg daily  Will increase heparin drip to 12 units/kilogram/hour  Last PTT 31  Medical management and pain control per primary team  DVT PPX: SCDs and heparin drip    Neurosurgery will continue to follow closely, call with any further questions or concerns.

## 2024-07-17 NOTE — PLAN OF CARE
Problem: Prexisting or High Potential for Compromised Skin Integrity  Goal: Skin integrity is maintained or improved  Description: INTERVENTIONS:  - Identify patients at risk for skin breakdown  - Assess and monitor skin integrity  - Assess and monitor nutrition and hydration status  - Monitor labs   - Assess for incontinence   - Turn and reposition patient  - Assist with mobility/ambulation  - Relieve pressure over bony prominences  - Avoid friction and shearing  - Provide appropriate hygiene as needed including keeping skin clean and dry  - Evaluate need for skin moisturizer/barrier cream  - Collaborate with interdisciplinary team   - Patient/family teaching  - Consider wound care consult   Outcome: Progressing     Problem: Neurological Deficit  Goal: Neurological status is stable or improving  Description: Interventions:  - Monitor and assess patient's level of consciousness, motor function, sensory function, and level of assistance needed for ADLs.   - Monitor and report changes from baseline. Collaborate with interdisciplinary team to initiate plan and implement interventions as ordered.   - Provide and maintain a safe environment.  - Consider seizure precautions.  - Consider fall precautions.  - Consider aspiration precautions.  - Consider bleeding precautions.  Outcome: Progressing     Problem: Activity Intolerance/Impaired Mobility  Goal: Mobility/activity is maintained at optimum level for patient  Description: Interventions:  - Assess and monitor patient  barriers to mobility and need for assistive/adaptive devices.  - Assess patient's emotional response to limitations.  - Collaborate with interdisciplinary team and initiate plans and interventions as ordered.  - Encourage independent activity per ability.  - Maintain proper body alignment.  - Perform active/passive rom as tolerated/ordered.  - Plan activities to conserve energy.  - Turn patient as appropriate  Outcome: Progressing     Problem:  Communication Impairment  Goal: Ability to express needs and understand communication  Description: Assess patient's communication skills and ability to understand information.  Patient will demonstrate use of effective communication techniques, alternative methods of communication and understanding even if not able to speak.     - Encourage communication and provide alternate methods of communication as needed.  - Collaborate with case management/ for discharge needs.  - Include patient/family/caregiver in decisions related to communication.  Outcome: Progressing     Problem: Potential for Aspiration  Goal: Non-ventilated patient's risk of aspiration is minimized  Description: Assess and monitor vital signs, respiratory status, and labs (WBC).  Monitor for signs of aspiration (tachypnea, cough, rales, wheezing, cyanosis, fever).    - Assess and monitor patient's ability to swallow.  - Place patient up in chair to eat if possible.  - HOB up at 90 degrees to eat if unable to get patient up into chair.  - Supervise patient during oral intake.   - Instruct patient/ family to take small bites.  - Instruct patient/ family to take small single sips when taking liquids.  - Follow patient-specific strategies generated by speech pathologist.  Outcome: Progressing  Goal: Ventilated patient's risk of aspiration is minimized  Description: Assess and monitor vital signs, respiratory status, airway cuff pressure, and labs (WBC).  Monitor for signs of aspiration (tachypnea, cough, rales, wheezing, cyanosis, fever).    - Elevate head of bed 30 degrees if patient has tube feeding.  - Monitor tube feeding.  Outcome: Progressing     Problem: Nutrition  Goal: Nutrition/Hydration status is improving  Description: Monitor and assess patient's nutrition/hydration status for malnutrition (ex- brittle hair, bruises, dry skin, pale skin and conjunctiva, muscle wasting, smooth red tongue, and disorientation). Collaborate with  interdisciplinary team and initiate plan and interventions as ordered.  Monitor patient's weight and dietary intake as ordered or per policy. Utilize nutrition screening tool and intervene per policy. Determine patient's food preferences and provide high-protein, high-caloric foods as appropriate.     - Assist patient with eating.  - Allow adequate time for meals.  - Encourage patient to take dietary supplement as ordered.  - Collaborate with clinical nutritionist.  - Include patient/family/caregiver in decisions related to nutrition.  Outcome: Progressing     Problem: PAIN - ADULT  Goal: Verbalizes/displays adequate comfort level or baseline comfort level  Description: Interventions:  - Encourage patient to monitor pain and request assistance  - Assess pain using appropriate pain scale  - Administer analgesics based on type and severity of pain and evaluate response  - Implement non-pharmacological measures as appropriate and evaluate response  - Consider cultural and social influences on pain and pain management  - Notify physician/advanced practitioner if interventions unsuccessful or patient reports new pain  Outcome: Progressing     Problem: INFECTION - ADULT  Goal: Absence or prevention of progression during hospitalization  Description: INTERVENTIONS:  - Assess and monitor for signs and symptoms of infection  - Monitor lab/diagnostic results  - Monitor all insertion sites, i.e. indwelling lines, tubes, and drains  - Monitor endotracheal if appropriate and nasal secretions for changes in amount and color  - [place] appropriate cooling/warming therapies per order  - Administer medications as ordered  - Instruct and encourage patient and family to use good hand hygiene technique  - Identify and instruct in appropriate isolation precautions for identified infection/condition  Outcome: Progressing  Goal: Absence of fever/infection during neutropenic period  Description: INTERVENTIONS:  - Monitor WBC    Outcome:  Progressing     Problem: SAFETY ADULT  Goal: Patient will remain free of falls  Description: INTERVENTIONS:  - Educate patient/family on patient safety including physical limitations  - Instruct patient to call for assistance with activity   - Consult OT/PT to assist with strengthening/mobility   - Keep Call bell within reach  - Keep bed low and locked with side rails adjusted as appropriate  - Keep care items and personal belongings within reach  - Initiate and maintain comfort rounds  - Make Fall Risk Sign visible to staff  - Offer Toileting every  Hours, in advance of need  - Initiate/Maintain alarm  - Obtain necessary fall risk management equipment:   - Apply yellow socks and bracelet for high fall risk patients  - Consider moving patient to room near nurses station  Outcome: Progressing  Goal: Maintain or return to baseline ADL function  Description: INTERVENTIONS:  -  Assess patient's ability to carry out ADLs; assess patient's baseline for ADL function and identify physical deficits which impact ability to perform ADLs (bathing, care of mouth/teeth, toileting, grooming, dressing, etc.)  - Assess/evaluate cause of self-care deficits   - Assess range of motion  - Assess patient's mobility; develop plan if impaired  - Assess patient's need for assistive devices and provide as appropriate  - Encourage maximum independence but intervene and supervise when necessary  - Involve family in performance of ADLs  - Assess for home care needs following discharge   - Consider OT consult to assist with ADL evaluation and planning for discharge  - Provide patient education as appropriate  Outcome: Progressing  Goal: Maintains/Returns to pre admission functional level  Description: INTERVENTIONS:  - Perform AM-PAC 6 Click Basic Mobility/ Daily Activity assessment daily.  - Set and communicate daily mobility goal to care team and patient/family/caregiver.   - Collaborate with rehabilitation services on mobility goals if  consulted  - Perform Range of Motion  times a day.  - Reposition patient every  hours.  - Dangle patient  times a day  - Stand patient  times a day  - Ambulate patient  times a day  - Out of bed to chair  times a day   - Out of bed for meals  times a day  - Out of bed for toileting  - Record patient progress and toleration of activity level   Outcome: Progressing     Problem: DISCHARGE PLANNING  Goal: Discharge to home or other facility with appropriate resources  Description: INTERVENTIONS:  - Identify barriers to discharge w/patient and caregiver  - Arrange for needed discharge resources and transportation as appropriate  - Identify discharge learning needs (meds, wound care, etc.)  - Arrange for interpretive services to assist at discharge as needed  - Refer to Case Management Department for coordinating discharge planning if the patient needs post-hospital services based on physician/advanced practitioner order or complex needs related to functional status, cognitive ability, or social support system  Outcome: Progressing     Problem: Knowledge Deficit  Goal: Patient/family/caregiver demonstrates understanding of disease process, treatment plan, medications, and discharge instructions  Description: Complete learning assessment and assess knowledge base.  Interventions:  - Provide teaching at level of understanding  - Provide teaching via preferred learning methods  Outcome: Progressing     Problem: SAFETY,RESTRAINT: NV/NON-SELF DESTRUCTIVE BEHAVIOR  Goal: Remains free of harm/injury (restraint for non violent/non self-detsructive behavior)  Description: INTERVENTIONS:  - Instruct patient/family regarding restraint use   - Assess and monitor physiologic and psychological status   - Provide interventions and comfort measures to meet assessed patient needs   - Identify and implement measures to help patient regain control  - Assess readiness for release of restraint   Outcome: Progressing  Goal: Returns to optimal  restraint-free functioning  Description: INTERVENTIONS:  - Assess the patient's behavior and symptoms that indicate continued need for restraint  - Identify and implement measures to help patient regain control  - Assess readiness for release of restraint   Outcome: Progressing     Problem: Nutrition/Hydration-ADULT  Goal: Nutrient/Hydration intake appropriate for improving, restoring or maintaining nutritional needs  Description: Monitor and assess patient's nutrition/hydration status for malnutrition. Collaborate with interdisciplinary team and initiate plan and interventions as ordered.  Monitor patient's weight and dietary intake as ordered or per policy. Utilize nutrition screening tool and intervene as necessary. Determine patient's food preferences and provide high-protein, high-caloric foods as appropriate.     INTERVENTIONS:  - Monitor oral intake, urinary output, labs, and treatment plans  - Assess nutrition and hydration status and recommend course of action  - Evaluate amount of meals eaten  - Assist patient with eating if necessary   - Allow adequate time for meals  - Recommend/ encourage appropriate diets, oral nutritional supplements, and vitamin/mineral supplements  - Order, calculate, and assess calorie counts as needed  - Recommend, monitor, and adjust tube feedings and TPN/PPN based on assessed needs  - Assess need for intravenous fluids  - Provide specific nutrition/hydration education as appropriate  - Include patient/family/caregiver in decisions related to nutrition  Outcome: Progressing

## 2024-07-17 NOTE — PLAN OF CARE
Problem: OCCUPATIONAL THERAPY ADULT  Goal: Performs self-care activities at highest level of function for planned discharge setting.  See evaluation for individualized goals.  Description: Treatment Interventions: ADL retraining, Functional transfer training, UE strengthening/ROM, Endurance training, Cognitive reorientation, Patient/family training, Equipment evaluation/education, Compensatory technique education, Continued evaluation, Energy conservation, Activityengagement          See flowsheet documentation for full assessment, interventions and recommendations.   Outcome: Progressing  Note: Limitation: Decreased ADL status, Decreased UE ROM, Decreased UE strength, Decreased Safe judgement during ADL, Decreased cognition, Decreased endurance, Decreased self-care trans, Decreased high-level ADLs  Prognosis: Fair  Assessment: Patient participated in Skilled OT session 7/17/2024 with interventions consisting of ADL re training with the use of correct body mechnaics, Energy Conservation techniques, safety awareness and fall prevention techniques, therapeutic exercise to: increase functional use of BUEs, increase BUE muscle strength ,  therapeutic activities to: increase activity tolerance, increase postural control, increase trunk control, and increase OOB/ sitting tolerance . Patient agreeable to OT treatment session, upon arrival patient was found supine in bed.  In comparison to previous session, patient with improvements in EOB sitting tolerance, OOB transfers and LB ADLS. Patient requiring verbal cues for safety, verbal cues for correct technique, and one step directives. Patient continues to be functioning below baseline level, occupational performance remains limited secondary to factors listed above and increased risk for falls and injury.   From OT standpoint, recommendation at time of d/c would be inpt rehab, when medically stable.   Patient to benefit from continued Occupational Therapy treatment while  in the hospital to address deficits as defined above and maximize level of functional independence with ADLs and functional mobility.  Recommendation: Physiatry Consult  Rehab Resource Intensity Level, OT: I (Maximum Resource Intensity)        Missy Nunez MS, OTR/L

## 2024-07-17 NOTE — PLAN OF CARE
Problem: Prexisting or High Potential for Compromised Skin Integrity  Goal: Skin integrity is maintained or improved  Description: INTERVENTIONS:  - Identify patients at risk for skin breakdown  - Assess and monitor skin integrity  - Assess and monitor nutrition and hydration status  - Monitor labs   - Assess for incontinence   - Turn and reposition patient  - Assist with mobility/ambulation  - Relieve pressure over bony prominences  - Avoid friction and shearing  - Provide appropriate hygiene as needed including keeping skin clean and dry  - Evaluate need for skin moisturizer/barrier cream  - Collaborate with interdisciplinary team   - Patient/family teaching  - Consider wound care consult   Outcome: Progressing     Problem: Neurological Deficit  Goal: Neurological status is stable or improving  Description: Interventions:  - Monitor and assess patient's level of consciousness, motor function, sensory function, and level of assistance needed for ADLs.   - Monitor and report changes from baseline. Collaborate with interdisciplinary team to initiate plan and implement interventions as ordered.   - Provide and maintain a safe environment.  - Consider seizure precautions.  - Consider fall precautions.  - Consider aspiration precautions.  - Consider bleeding precautions.  Outcome: Progressing     Problem: Activity Intolerance/Impaired Mobility  Goal: Mobility/activity is maintained at optimum level for patient  Description: Interventions:  - Assess and monitor patient  barriers to mobility and need for assistive/adaptive devices.  - Assess patient's emotional response to limitations.  - Collaborate with interdisciplinary team and initiate plans and interventions as ordered.  - Encourage independent activity per ability.  - Maintain proper body alignment.  - Perform active/passive rom as tolerated/ordered.  - Plan activities to conserve energy.  - Turn patient as appropriate  Outcome: Progressing     Problem:  Communication Impairment  Goal: Ability to express needs and understand communication  Description: Assess patient's communication skills and ability to understand information.  Patient will demonstrate use of effective communication techniques, alternative methods of communication and understanding even if not able to speak.     - Encourage communication and provide alternate methods of communication as needed.  - Collaborate with case management/ for discharge needs.  - Include patient/family/caregiver in decisions related to communication.  Outcome: Progressing     Problem: Potential for Aspiration  Goal: Non-ventilated patient's risk of aspiration is minimized  Description: Assess and monitor vital signs, respiratory status, and labs (WBC).  Monitor for signs of aspiration (tachypnea, cough, rales, wheezing, cyanosis, fever).    - Assess and monitor patient's ability to swallow.  - Place patient up in chair to eat if possible.  - HOB up at 90 degrees to eat if unable to get patient up into chair.  - Supervise patient during oral intake.   - Instruct patient/ family to take small bites.  - Instruct patient/ family to take small single sips when taking liquids.  - Follow patient-specific strategies generated by speech pathologist.  Outcome: Progressing     Problem: Nutrition  Goal: Nutrition/Hydration status is improving  Description: Monitor and assess patient's nutrition/hydration status for malnutrition (ex- brittle hair, bruises, dry skin, pale skin and conjunctiva, muscle wasting, smooth red tongue, and disorientation). Collaborate with interdisciplinary team and initiate plan and interventions as ordered.  Monitor patient's weight and dietary intake as ordered or per policy. Utilize nutrition screening tool and intervene per policy. Determine patient's food preferences and provide high-protein, high-caloric foods as appropriate.     - Assist patient with eating.  - Allow adequate time for  meals.  - Encourage patient to take dietary supplement as ordered.  - Collaborate with clinical nutritionist.  - Include patient/family/caregiver in decisions related to nutrition.  Outcome: Progressing     Problem: PAIN - ADULT  Goal: Verbalizes/displays adequate comfort level or baseline comfort level  Description: Interventions:  - Encourage patient to monitor pain and request assistance  - Assess pain using appropriate pain scale  - Administer analgesics based on type and severity of pain and evaluate response  - Implement non-pharmacological measures as appropriate and evaluate response  - Consider cultural and social influences on pain and pain management  - Notify physician/advanced practitioner if interventions unsuccessful or patient reports new pain  Outcome: Progressing     Problem: INFECTION - ADULT  Goal: Absence or prevention of progression during hospitalization  Description: INTERVENTIONS:  - Assess and monitor for signs and symptoms of infection  - Monitor lab/diagnostic results  - Monitor all insertion sites, i.e. indwelling lines, tubes, and drains  - Monitor endotracheal if appropriate and nasal secretions for changes in amount and color  - [place] appropriate cooling/warming therapies per order  - Administer medications as ordered  - Instruct and encourage patient and family to use good hand hygiene technique  - Identify and instruct in appropriate isolation precautions for identified infection/condition  Outcome: Progressing  Goal: Absence of fever/infection during neutropenic period  Description: INTERVENTIONS:  - Monitor WBC    Outcome: Progressing     Problem: SAFETY ADULT  Goal: Patient will remain free of falls  Description: INTERVENTIONS:  - Educate patient/family on patient safety including physical limitations  - Instruct patient to call for assistance with activity   - Consult OT/PT to assist with strengthening/mobility   - Keep Call bell within reach  - Keep bed low and locked with  side rails adjusted as appropriate  - Keep care items and personal belongings within reach  - Initiate and maintain comfort rounds  - Make Fall Risk Sign visible to staff  - Offer Toileting every 2 Hours, in advance of need  - Initiate/Maintain Bed/Chair alarm  - Obtain necessary fall risk management equipment  - Apply yellow socks and bracelet for high fall risk patients  - Consider moving patient to room near nurses station  Outcome: Progressing  Goal: Maintain or return to baseline ADL function  Description: INTERVENTIONS:  -  Assess patient's ability to carry out ADLs; assess patient's baseline for ADL function and identify physical deficits which impact ability to perform ADLs (bathing, care of mouth/teeth, toileting, grooming, dressing, etc.)  - Assess/evaluate cause of self-care deficits   - Assess range of motion  - Assess patient's mobility; develop plan if impaired  - Assess patient's need for assistive devices and provide as appropriate  - Encourage maximum independence but intervene and supervise when necessary  - Involve family in performance of ADLs  - Assess for home care needs following discharge   - Consider OT consult to assist with ADL evaluation and planning for discharge  - Provide patient education as appropriate  Outcome: Progressing  Goal: Maintains/Returns to pre admission functional level  Description: INTERVENTIONS:  - Perform AM-PAC 6 Click Basic Mobility/ Daily Activity assessment daily.  - Set and communicate daily mobility goal to care team and patient/family/caregiver.   - Collaborate with rehabilitation services on mobility goals if consulted  - Perform Range of Motion 8 times a day.  - Reposition patient every 2 hours.  - Dangle patient 2 times a day  - Stand patient 2 times a day  - Ambulate patient 2 times a day  - Out of bed to chair 2 times a day   - Out of bed for meals 2 times a day  - Out of bed for toileting  - Record patient progress and toleration of activity level    Outcome: Progressing     Problem: DISCHARGE PLANNING  Goal: Discharge to home or other facility with appropriate resources  Description: INTERVENTIONS:  - Identify barriers to discharge w/patient and caregiver  - Arrange for needed discharge resources and transportation as appropriate  - Identify discharge learning needs (meds, wound care, etc.)  - Arrange for interpretive services to assist at discharge as needed  - Refer to Case Management Department for coordinating discharge planning if the patient needs post-hospital services based on physician/advanced practitioner order or complex needs related to functional status, cognitive ability, or social support system  Outcome: Progressing     Problem: Knowledge Deficit  Goal: Patient/family/caregiver demonstrates understanding of disease process, treatment plan, medications, and discharge instructions  Description: Complete learning assessment and assess knowledge base.  Interventions:  - Provide teaching at level of understanding  - Provide teaching via preferred learning methods  Outcome: Progressing     Problem: SAFETY,RESTRAINT: NV/NON-SELF DESTRUCTIVE BEHAVIOR  Goal: Remains free of harm/injury (restraint for non violent/non self-detsructive behavior)  Description: INTERVENTIONS:  - Instruct patient/family regarding restraint use   - Assess and monitor physiologic and psychological status   - Provide interventions and comfort measures to meet assessed patient needs   - Identify and implement measures to help patient regain control  - Assess readiness for release of restraint   Outcome: Progressing  Goal: Returns to optimal restraint-free functioning  Description: INTERVENTIONS:  - Assess the patient's behavior and symptoms that indicate continued need for restraint  - Identify and implement measures to help patient regain control  - Assess readiness for release of restraint   Outcome: Progressing     Problem: Nutrition/Hydration-ADULT  Goal: Nutrient/Hydration  intake appropriate for improving, restoring or maintaining nutritional needs  Description: Monitor and assess patient's nutrition/hydration status for malnutrition. Collaborate with interdisciplinary team and initiate plan and interventions as ordered.  Monitor patient's weight and dietary intake as ordered or per policy. Utilize nutrition screening tool and intervene as necessary. Determine patient's food preferences and provide high-protein, high-caloric foods as appropriate.     INTERVENTIONS:  - Monitor oral intake, urinary output, labs, and treatment plans  - Assess nutrition and hydration status and recommend course of action  - Evaluate amount of meals eaten  - Assist patient with eating if necessary   - Allow adequate time for meals  - Recommend/ encourage appropriate diets, oral nutritional supplements, and vitamin/mineral supplements  - Order, calculate, and assess calorie counts as needed  - Recommend, monitor, and adjust tube feedings and TPN/PPN based on assessed needs  - Assess need for intravenous fluids  - Provide specific nutrition/hydration education as appropriate  - Include patient/family/caregiver in decisions related to nutrition  Outcome: Progressing     Problem: NEUROSENSORY - ADULT  Goal: Achieves stable or improved neurological status  Description: INTERVENTIONS  - Monitor and report changes in neurological status  - Monitor vital signs such as temperature, blood pressure, glucose, and any other labs ordered   - Initiate measures to prevent increased intracranial pressure  - Monitor for seizure activity and implement precautions if appropriate      Outcome: Progressing  Goal: Remains free of injury related to seizures activity  Description: INTERVENTIONS  - Maintain airway, patient safety  and administer oxygen as ordered  - Monitor patient for seizure activity, document and report duration and description of seizure to physician/advanced practitioner  - If seizure occurs,  ensure patient  safety during seizure  - Reorient patient post seizure  - Seizure pads on all 4 side rails  - Instruct patient/family to notify RN of any seizure activity including if an aura is experienced  - Instruct patient/family to call for assistance with activity based on nursing assessment  - Administer anti-seizure medications if ordered    Outcome: Progressing  Goal: Achieves maximal functionality and self care  Description: INTERVENTIONS  - Monitor swallowing and airway patency with patient fatigue and changes in neurological status  - Encourage and assist patient to increase activity and self care.   - Encourage visually impaired, hearing impaired and aphasic patients to use assistive/communication devices  Outcome: Progressing     Problem: CARDIOVASCULAR - ADULT  Goal: Maintains optimal cardiac output and hemodynamic stability  Description: INTERVENTIONS:  - Monitor I/O, vital signs and rhythm  - Monitor for S/S and trends of decreased cardiac output  - Administer and titrate ordered vasoactive medications to optimize hemodynamic stability  - Assess quality of pulses, skin color and temperature  - Assess for signs of decreased coronary artery perfusion  - Instruct patient to report change in severity of symptoms  Outcome: Progressing  Goal: Absence of cardiac dysrhythmias or at baseline rhythm  Description: INTERVENTIONS:  - Continuous cardiac monitoring, vital signs, obtain 12 lead EKG if ordered  - Administer antiarrhythmic and heart rate control medications as ordered  - Monitor electrolytes and administer replacement therapy as ordered  Outcome: Progressing     Problem: RESPIRATORY - ADULT  Goal: Achieves optimal ventilation and oxygenation  Description: INTERVENTIONS:  - Assess for changes in respiratory status  - Assess for changes in mentation and behavior  - Position to facilitate oxygenation and minimize respiratory effort  - Oxygen administered by appropriate delivery if ordered  - Initiate smoking cessation  education as indicated  - Encourage broncho-pulmonary hygiene including cough, deep breathe, Incentive Spirometry  - Assess the need for suctioning and aspirate as needed  - Assess and instruct to report SOB or any respiratory difficulty  - Respiratory Therapy support as indicated  Outcome: Progressing     Problem: GASTROINTESTINAL - ADULT  Goal: Maintains or returns to baseline bowel function  Description: INTERVENTIONS:  - Assess bowel function  - Encourage oral fluids to ensure adequate hydration  - Administer IV fluids if ordered to ensure adequate hydration  - Administer ordered medications as needed  - Encourage mobilization and activity  - Consider nutritional services referral to assist patient with adequate nutrition and appropriate food choices  Outcome: Progressing  Goal: Maintains adequate nutritional intake  Description: INTERVENTIONS:  - Monitor percentage of each meal consumed  - Identify factors contributing to decreased intake, treat as appropriate  - Assist with meals as needed  - Monitor I&O, weight, and lab values if indicated  - Obtain nutrition services referral as needed  Outcome: Progressing     Problem: GENITOURINARY - ADULT  Goal: Maintains or returns to baseline urinary function  Description: INTERVENTIONS:  - Assess urinary function  - Encourage oral fluids to ensure adequate hydration if ordered  - Administer IV fluids as ordered to ensure adequate hydration  - Administer ordered medications as needed  - Offer frequent toileting  - Follow urinary retention protocol if ordered  Outcome: Progressing  Goal: Absence of urinary retention  Description: INTERVENTIONS:  - Assess patient’s ability to void and empty bladder  - Monitor I/O  - Bladder scan as needed  - Discuss with physician/AP medications to alleviate retention as needed  - Discuss catheterization for long term situations as appropriate  Outcome: Progressing     Problem: METABOLIC, FLUID AND ELECTROLYTES - ADULT  Goal: Electrolytes  maintained within normal limits  Description: INTERVENTIONS:  - Monitor labs and assess patient for signs and symptoms of electrolyte imbalances  - Administer electrolyte replacement as ordered  - Monitor response to electrolyte replacements, including repeat lab results as appropriate  - Instruct patient on fluid and nutrition as appropriate  Outcome: Progressing  Goal: Fluid balance maintained  Description: INTERVENTIONS:  - Monitor labs   - Monitor I/O and WT  - Instruct patient on fluid and nutrition as appropriate  - Assess for signs & symptoms of volume excess or deficit  Outcome: Progressing  Goal: Glucose maintained within target range  Description: INTERVENTIONS:  - Monitor Blood Glucose as ordered  - Assess for signs and symptoms of hyperglycemia and hypoglycemia  - Administer ordered medications to maintain glucose within target range  - Assess nutritional intake and initiate nutrition service referral as needed  Outcome: Progressing     Problem: SKIN/TISSUE INTEGRITY - ADULT  Goal: Skin Integrity remains intact(Skin Breakdown Prevention)  Description: Assess:  -Perform Martin assessment every shift  -Clean and moisturize skin every shift  -Inspect skin when repositioning, toileting, and assisting with ADLS  -Assess under medical devices every shift  -Assess extremities for adequate circulation and sensation     Bed Management:  -Have minimal linens on bed & keep smooth, unwrinkled  -Change linens as needed when moist or perspiring  -Avoid sitting or lying in one position for more than 2 hours while in bed  -Keep HOB at 30 degrees     Toileting:  -Offer bedside commode  -Assess for incontinence every 2 hours  -Use incontinent care products after each incontinent episode     Activity:  -Mobilize patient 2 times a day  -Encourage activity and walks on unit  -Encourage or provide ROM exercises   -Turn and reposition patient every 2 Hours  -Use appropriate equipment to lift or move patient in bed  -Instruct/  Assist with weight shifting every 30 minutes when out of bed in chair  -Consider limitation of chair time 3 hour intervals    Skin Care:  -Avoid use of baby powder, tape, friction and shearing, hot water or constrictive clothing  -Relieve pressure over bony prominences   -Do not massage red bony areas    Next Steps:  -Teach patient strategies to minimize risks    -Consider consults to  interdisciplinary teams   Outcome: Progressing  Goal: Incision(s), wounds(s) or drain site(s) healing without S/S of infection  Description: INTERVENTIONS  - Assess and document dressing, incision, wound bed, drain sites and surrounding tissue  - Provide patient and family education  - Perform skin care/dressing changes every shift  Outcome: Progressing     Problem: HEMATOLOGIC - ADULT  Goal: Maintains hematologic stability  Description: INTERVENTIONS  - Assess for signs and symptoms of bleeding or hemorrhage  - Monitor labs  - Administer supportive blood products/factors as ordered and appropriate  Outcome: Progressing     Problem: MUSCULOSKELETAL - ADULT  Goal: Maintain or return mobility to safest level of function  Description: INTERVENTIONS:  - Assess patient's ability to carry out ADLs; assess patient's baseline for ADL function and identify physical deficits which impact ability to perform ADLs (bathing, care of mouth/teeth, toileting, grooming, dressing, etc.)  - Assess/evaluate cause of self-care deficits   - Assess range of motion  - Assess patient's mobility  - Assess patient's need for assistive devices and provide as appropriate  - Encourage maximum independence but intervene and supervise when necessary  - Involve family in performance of ADLs  - Assess for home care needs following discharge   - Consider OT consult to assist with ADL evaluation and planning for discharge  - Provide patient education as appropriate  Outcome: Progressing     Problem: COPING  Goal: Pt/Family able to verbalize concerns and demonstrate  effective coping strategies  Description: INTERVENTIONS:  - Assist patient/family to identify coping skills, available support systems and cultural and spiritual values  - Provide emotional support, including active listening and acknowledgement of concerns of patient and caregivers  - Reduce environmental stimuli, as able  - Provide patient education  - Assess for spiritual pain/suffering and initiate spiritual care, including notification of Pastoral Care or halie based community as needed  - Assess effectiveness of coping strategies  Outcome: Progressing  Goal: Will report anxiety at manageable levels  Description: INTERVENTIONS:  - Administer medication as ordered  - Teach and encourage coping skills  - Provide emotional support  - Assess patient/family for anxiety and ability to cope  Outcome: Progressing     Problem: Potential for Falls  Goal: Patient will remain free of falls  Description: INTERVENTIONS:  - Educate patient/family on patient safety including physical limitations  - Instruct patient to call for assistance with activity   - Consult OT/PT to assist with strengthening/mobility   - Keep Call bell within reach  - Keep bed low and locked with side rails adjusted as appropriate  - Keep care items and personal belongings within reach  - Initiate and maintain comfort rounds  - Make Fall Risk Sign visible to staff  - Offer Toileting every 2 Hours, in advance of need  - Initiate/Maintain Bed/Chair alarm  - Obtain necessary fall risk management equipment  - Apply yellow socks and bracelet for high fall risk patients  - Consider moving patient to room near nurses station  Outcome: Progressing

## 2024-07-18 ENCOUNTER — APPOINTMENT (INPATIENT)
Dept: NON INVASIVE DIAGNOSTICS | Facility: HOSPITAL | Age: 69
DRG: 020 | End: 2024-07-18
Payer: MEDICARE

## 2024-07-18 PROBLEM — R50.9 FEVER: Status: ACTIVE | Noted: 2024-07-18

## 2024-07-18 LAB
2HR DELTA HS TROPONIN: 0 NG/L
ALBUMIN SERPL BCG-MCNC: 3.8 G/DL (ref 3.5–5)
ALP SERPL-CCNC: 123 U/L (ref 34–104)
ALT SERPL W P-5'-P-CCNC: 13 U/L (ref 7–52)
ANION GAP SERPL CALCULATED.3IONS-SCNC: 11 MMOL/L (ref 4–13)
APPEARANCE CSF: ABNORMAL
APTT PPP: 30 SECONDS (ref 23–37)
AST SERPL W P-5'-P-CCNC: 13 U/L (ref 13–39)
ATRIAL RATE: 102 BPM
BASOPHILS # BLD AUTO: 0.02 THOUSANDS/ÂΜL (ref 0–0.1)
BASOPHILS NFR BLD AUTO: 0 % (ref 0–1)
BILIRUB SERPL-MCNC: 0.63 MG/DL (ref 0.2–1)
BUN SERPL-MCNC: 20 MG/DL (ref 5–25)
CA-I BLD-SCNC: 1.25 MMOL/L (ref 1.12–1.32)
CALCIUM SERPL-MCNC: 9.9 MG/DL (ref 8.4–10.2)
CARDIAC TROPONIN I PNL SERPL HS: 13 NG/L
CARDIAC TROPONIN I PNL SERPL HS: 13 NG/L
CHLORIDE SERPL-SCNC: 106 MMOL/L (ref 96–108)
CO2 SERPL-SCNC: 24 MMOL/L (ref 21–32)
CREAT SERPL-MCNC: 0.82 MG/DL (ref 0.6–1.3)
EOSINOPHIL # BLD AUTO: 0.12 THOUSAND/ÂΜL (ref 0–0.61)
EOSINOPHIL NFR BLD AUTO: 1 % (ref 0–6)
ERYTHROCYTE [DISTWIDTH] IN BLOOD BY AUTOMATED COUNT: 13.5 % (ref 11.6–15.1)
GFR SERPL CREATININE-BSD FRML MDRD: 90 ML/MIN/1.73SQ M
GLUCOSE CSF-MCNC: 70 MG/DL (ref 40–70)
GLUCOSE SERPL-MCNC: 140 MG/DL (ref 65–140)
GLUCOSE SERPL-MCNC: 141 MG/DL (ref 65–140)
GLUCOSE SERPL-MCNC: 180 MG/DL (ref 65–140)
GLUCOSE SERPL-MCNC: 208 MG/DL (ref 65–140)
GRAM STN SPEC: NORMAL
GRAM STN SPEC: NORMAL
HCT VFR BLD AUTO: 37.2 % (ref 36.5–49.3)
HGB BLD-MCNC: 12.4 G/DL (ref 12–17)
IMM GRANULOCYTES # BLD AUTO: 0.04 THOUSAND/UL (ref 0–0.2)
IMM GRANULOCYTES NFR BLD AUTO: 0 % (ref 0–2)
LYMPHOCYTES # BLD AUTO: 1.41 THOUSANDS/ÂΜL (ref 0.6–4.47)
LYMPHOCYTES NFR BLD AUTO: 14 % (ref 14–44)
LYMPHOCYTES NFR CSF MANUAL: 12 %
MAGNESIUM SERPL-MCNC: 2 MG/DL (ref 1.9–2.7)
MCH RBC QN AUTO: 31.2 PG (ref 26.8–34.3)
MCHC RBC AUTO-ENTMCNC: 33.3 G/DL (ref 31.4–37.4)
MCV RBC AUTO: 94 FL (ref 82–98)
MONOCYTES # BLD AUTO: 0.87 THOUSAND/ÂΜL (ref 0.17–1.22)
MONOCYTES NFR BLD AUTO: 8 % (ref 4–12)
MONOS+MACROS CSF MANUAL: 7 %
NEUTROPHILS # BLD AUTO: 7.87 THOUSANDS/ÂΜL (ref 1.85–7.62)
NEUTROPHILS NFR CSF MANUAL: 81 %
NEUTS SEG NFR BLD AUTO: 77 % (ref 43–75)
NRBC BLD AUTO-RTO: 0 /100 WBCS
P AXIS: 80 DEGREES
PHOSPHATE SERPL-MCNC: 4.1 MG/DL (ref 2.3–4.1)
PLATELET # BLD AUTO: 257 THOUSANDS/UL (ref 149–390)
PMV BLD AUTO: 10 FL (ref 8.9–12.7)
POTASSIUM SERPL-SCNC: 4 MMOL/L (ref 3.5–5.3)
PR INTERVAL: 133 MS
PROT CSF-MCNC: 89 MG/DL (ref 15–45)
PROT SERPL-MCNC: 6.6 G/DL (ref 6.4–8.4)
QRS AXIS: 40 DEGREES
QRSD INTERVAL: 79 MS
QT INTERVAL: 313 MS
QTC INTERVAL: 408 MS
RBC # BLD AUTO: 3.98 MILLION/UL (ref 3.88–5.62)
RBC # CSF MANUAL: ABNORMAL UL (ref 0–10)
SODIUM SERPL-SCNC: 141 MMOL/L (ref 135–147)
T WAVE AXIS: 59 DEGREES
TOTAL CELLS COUNTED BLD: YES
TOTAL CELLS COUNTED SPEC: 100
VENTRICULAR RATE: 102 BPM
WBC # BLD AUTO: 10.33 THOUSAND/UL (ref 4.31–10.16)
WBC # CSF AUTO: 275 /UL (ref 0–5)

## 2024-07-18 PROCEDURE — 80053 COMPREHEN METABOLIC PANEL: CPT

## 2024-07-18 PROCEDURE — 84484 ASSAY OF TROPONIN QUANT: CPT

## 2024-07-18 PROCEDURE — 84157 ASSAY OF PROTEIN OTHER: CPT | Performed by: NURSE PRACTITIONER

## 2024-07-18 PROCEDURE — 93886 INTRACRANIAL COMPLETE STUDY: CPT

## 2024-07-18 PROCEDURE — 82945 GLUCOSE OTHER FLUID: CPT | Performed by: NURSE PRACTITIONER

## 2024-07-18 PROCEDURE — 89051 BODY FLUID CELL COUNT: CPT | Performed by: NURSE PRACTITIONER

## 2024-07-18 PROCEDURE — 93010 ELECTROCARDIOGRAM REPORT: CPT | Performed by: INTERNAL MEDICINE

## 2024-07-18 PROCEDURE — 93005 ELECTROCARDIOGRAM TRACING: CPT

## 2024-07-18 PROCEDURE — 85730 THROMBOPLASTIN TIME PARTIAL: CPT

## 2024-07-18 PROCEDURE — 82330 ASSAY OF CALCIUM: CPT

## 2024-07-18 PROCEDURE — 93886 INTRACRANIAL COMPLETE STUDY: CPT | Performed by: SURGERY

## 2024-07-18 PROCEDURE — 87070 CULTURE OTHR SPECIMN AEROBIC: CPT | Performed by: NURSE PRACTITIONER

## 2024-07-18 PROCEDURE — 85025 COMPLETE CBC W/AUTO DIFF WBC: CPT

## 2024-07-18 PROCEDURE — 89050 BODY FLUID CELL COUNT: CPT | Performed by: NURSE PRACTITIONER

## 2024-07-18 PROCEDURE — 99291 CRITICAL CARE FIRST HOUR: CPT | Performed by: PSYCHIATRY & NEUROLOGY

## 2024-07-18 PROCEDURE — 84100 ASSAY OF PHOSPHORUS: CPT

## 2024-07-18 PROCEDURE — 82948 REAGENT STRIP/BLOOD GLUCOSE: CPT

## 2024-07-18 PROCEDURE — 83735 ASSAY OF MAGNESIUM: CPT

## 2024-07-18 PROCEDURE — 99232 SBSQ HOSP IP/OBS MODERATE 35: CPT | Performed by: NURSE PRACTITIONER

## 2024-07-18 RX ORDER — METOPROLOL SUCCINATE 50 MG/1
50 TABLET, EXTENDED RELEASE ORAL
COMMUNITY
Start: 2024-06-25

## 2024-07-18 RX ORDER — ATORVASTATIN CALCIUM 80 MG/1
80 TABLET, FILM COATED ORAL DAILY
Status: DISCONTINUED | OUTPATIENT
Start: 2024-07-18 | End: 2024-08-10 | Stop reason: HOSPADM

## 2024-07-18 RX ORDER — LANOLIN ALCOHOL/MO/W.PET/CERES
100 CREAM (GRAM) TOPICAL DAILY
Status: DISCONTINUED | OUTPATIENT
Start: 2024-07-19 | End: 2024-07-31

## 2024-07-18 RX ORDER — EZETIMIBE 10 MG/1
10 TABLET ORAL EVERY MORNING
COMMUNITY
Start: 2024-06-25

## 2024-07-18 RX ORDER — ASPIRIN 81 MG/1
81 TABLET, CHEWABLE ORAL DAILY
COMMUNITY

## 2024-07-18 RX ORDER — CALCIUM CARB/VITAMIN D3/VIT K1 500-500-40
15 TABLET,CHEWABLE ORAL DAILY
Status: DISCONTINUED | OUTPATIENT
Start: 2024-07-18 | End: 2024-07-18

## 2024-07-18 RX ORDER — MIRTAZAPINE 15 MG/1
15 TABLET, FILM COATED ORAL
COMMUNITY
Start: 2024-06-03

## 2024-07-18 RX ORDER — LANOLIN ALCOHOL/MO/W.PET/CERES
100 CREAM (GRAM) TOPICAL DAILY
Status: DISCONTINUED | OUTPATIENT
Start: 2024-07-18 | End: 2024-07-18

## 2024-07-18 RX ORDER — EZETIMIBE 10 MG/1
10 TABLET ORAL EVERY MORNING
Status: DISCONTINUED | OUTPATIENT
Start: 2024-07-19 | End: 2024-08-10 | Stop reason: HOSPADM

## 2024-07-18 RX ORDER — ALBUTEROL SULFATE 90 UG/1
2 AEROSOL, METERED RESPIRATORY (INHALATION) EVERY 4 HOURS PRN
COMMUNITY
Start: 2024-06-25

## 2024-07-18 RX ORDER — OMEPRAZOLE 40 MG/1
40 CAPSULE, DELAYED RELEASE ORAL EVERY MORNING
COMMUNITY
Start: 2024-06-25

## 2024-07-18 RX ORDER — PANTOPRAZOLE SODIUM 40 MG/1
40 TABLET, DELAYED RELEASE ORAL
Status: DISCONTINUED | OUTPATIENT
Start: 2024-07-19 | End: 2024-07-18

## 2024-07-18 RX ORDER — LANOLIN ALCOHOL/MO/W.PET/CERES
400 CREAM (GRAM) TOPICAL DAILY
Status: DISCONTINUED | OUTPATIENT
Start: 2024-07-18 | End: 2024-08-10 | Stop reason: HOSPADM

## 2024-07-18 RX ORDER — PANTOPRAZOLE SODIUM 40 MG/10ML
40 INJECTION, POWDER, LYOPHILIZED, FOR SOLUTION INTRAVENOUS
Status: DISCONTINUED | OUTPATIENT
Start: 2024-07-19 | End: 2024-07-19

## 2024-07-18 RX ORDER — MIRTAZAPINE 15 MG/1
15 TABLET, FILM COATED ORAL
Status: DISCONTINUED | OUTPATIENT
Start: 2024-07-18 | End: 2024-07-19

## 2024-07-18 RX ORDER — ATORVASTATIN CALCIUM 80 MG/1
80 TABLET, FILM COATED ORAL DAILY
COMMUNITY
Start: 2024-06-25

## 2024-07-18 RX ORDER — NITROGLYCERIN 0.4 MG/1
0.4 TABLET SUBLINGUAL
COMMUNITY

## 2024-07-18 RX ADMIN — SODIUM CHLORIDE 500 ML: 0.9 INJECTION, SOLUTION INTRAVENOUS at 18:22

## 2024-07-18 RX ADMIN — Medication 60 MG: at 11:15

## 2024-07-18 RX ADMIN — LEVETIRACETAM 1000 MG: 100 SOLUTION ORAL at 09:13

## 2024-07-18 RX ADMIN — OXYCODONE HYDROCHLORIDE 5 MG: 5 SOLUTION ORAL at 04:10

## 2024-07-18 RX ADMIN — ACETAMINOPHEN 650 MG: 650 SUSPENSION ORAL at 23:54

## 2024-07-18 RX ADMIN — LEVETIRACETAM 1000 MG: 100 SOLUTION ORAL at 20:51

## 2024-07-18 RX ADMIN — METHOCARBAMOL 500 MG: 500 TABLET ORAL at 09:12

## 2024-07-18 RX ADMIN — SODIUM CHLORIDE 1 G: 1 TABLET ORAL at 06:53

## 2024-07-18 RX ADMIN — ACETAMINOPHEN 650 MG: 650 SUSPENSION ORAL at 03:58

## 2024-07-18 RX ADMIN — Medication 60 MG: at 16:51

## 2024-07-18 RX ADMIN — FOLIC ACID TAB 400 MCG 400 MCG: 400 TAB at 14:13

## 2024-07-18 RX ADMIN — Medication 60 MG: at 23:54

## 2024-07-18 RX ADMIN — UMECLIDINIUM 1 PUFF: 62.5 AEROSOL, POWDER ORAL at 14:49

## 2024-07-18 RX ADMIN — METHOCARBAMOL 500 MG: 500 TABLET ORAL at 18:20

## 2024-07-18 RX ADMIN — LIDOCAINE 5% 1 PATCH: 700 PATCH TOPICAL at 14:04

## 2024-07-18 RX ADMIN — CHLORHEXIDINE GLUCONATE 0.12% ORAL RINSE 15 ML: 1.2 LIQUID ORAL at 11:14

## 2024-07-18 RX ADMIN — Medication 60 MG: at 09:13

## 2024-07-18 RX ADMIN — GABAPENTIN 600 MG: 300 CAPSULE ORAL at 16:49

## 2024-07-18 RX ADMIN — SODIUM CHLORIDE 1 G: 1 TABLET ORAL at 11:15

## 2024-07-18 RX ADMIN — ATORVASTATIN CALCIUM 80 MG: 80 TABLET, FILM COATED ORAL at 16:51

## 2024-07-18 RX ADMIN — ACETAMINOPHEN 650 MG: 650 SUSPENSION ORAL at 11:15

## 2024-07-18 RX ADMIN — CHLORHEXIDINE GLUCONATE 0.12% ORAL RINSE 15 ML: 1.2 LIQUID ORAL at 20:51

## 2024-07-18 RX ADMIN — OXYCODONE HYDROCHLORIDE 5 MG: 5 SOLUTION ORAL at 20:52

## 2024-07-18 RX ADMIN — Medication 60 MG: at 03:58

## 2024-07-18 RX ADMIN — INSULIN LISPRO 1 UNITS: 100 INJECTION, SOLUTION INTRAVENOUS; SUBCUTANEOUS at 18:26

## 2024-07-18 RX ADMIN — INSULIN LISPRO 1 UNITS: 100 INJECTION, SOLUTION INTRAVENOUS; SUBCUTANEOUS at 13:28

## 2024-07-18 RX ADMIN — GABAPENTIN 600 MG: 300 CAPSULE ORAL at 20:51

## 2024-07-18 RX ADMIN — HEPARIN SODIUM 12 UNITS/KG/HR: 10000 INJECTION, SOLUTION INTRAVENOUS at 06:20

## 2024-07-18 RX ADMIN — INSULIN LISPRO 1 UNITS: 100 INJECTION, SOLUTION INTRAVENOUS; SUBCUTANEOUS at 00:17

## 2024-07-18 RX ADMIN — SODIUM CHLORIDE 1 G: 1 TABLET ORAL at 16:49

## 2024-07-18 RX ADMIN — Medication 60 MG: at 20:51

## 2024-07-18 RX ADMIN — OXYCODONE HYDROCHLORIDE 5 MG: 5 SOLUTION ORAL at 16:50

## 2024-07-18 RX ADMIN — GABAPENTIN 600 MG: 300 CAPSULE ORAL at 09:12

## 2024-07-18 RX ADMIN — SENNOSIDES 8.6 MG: 8.6 TABLET, FILM COATED ORAL at 23:54

## 2024-07-18 RX ADMIN — MIRTAZAPINE 15 MG: 15 TABLET, FILM COATED ORAL at 23:54

## 2024-07-18 RX ADMIN — OXYCODONE HYDROCHLORIDE 5 MG: 5 SOLUTION ORAL at 09:12

## 2024-07-18 RX ADMIN — ACETAMINOPHEN 650 MG: 650 SUSPENSION ORAL at 16:50

## 2024-07-18 RX ADMIN — ASPIRIN 81 MG CHEWABLE TABLET 81 MG: 81 TABLET CHEWABLE at 09:11

## 2024-07-18 NOTE — ASSESSMENT & PLAN NOTE
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  Upon arrival to the ED patient was posturing.  Imaging revealed a large amount of SAH and a right PCOM aneurysm.  Per chart review he was previously on aspirin reversed with DDAVP.  Overnight on 7/16, patient noted to be more lethargic complaining of severe headache was sent down for CTA.    Imaging:    CT head without 7/14/2024:No significant interval change in extensive bilateral subarachnoid hemorrhage and intraventricular hemorrhage.     CTA head w/wo 7/16/24:CT Brain: Redemonstrated subarachnoid hemorrhage overlying the right greater than left convexities and concentrated within the right sylvian cistern and fissure. 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    Plan:  Continue frequent neurological checks  Repeat CTA/CT head w/wo STAT if GCS declines more than 2 points in 1 hour  Continue SAH pathway/spasm watch:  Daily TCD's: Pending today  Continue Nimodipine 60mg Q4hrs  Continue Keppra 1000 mg twice daily per neurology  Normonatremia (141 today) currently on salt tabs 1 g 3 times daily  Euvolemia (+936)   Hemoglobin >8 (12.4 today)  EVD placed on 7/13/2024  Continue EVD at 10mmhg  Monitor output and ICP  ICPs 1-13, in room 4  Output 51 ml/24hrs  Maintain ICP goal <20  SBP <220  Continue aspirin 81 mg daily  Heparin drip at 12 units/kilogram/hour  Last PTT 30  Medical management and pain control per primary team  DVT PPX: SCDs and heparin drip    Neurosurgery will continue to follow closely, call with any further questions or concerns.

## 2024-07-18 NOTE — CONSULTS
Increase goal rate to Jevity 1.2 60mL/hr continuous via NGT.  Rec would provide 1728 kcals, 80g protein, 1162mL water in TF formula. Additional free water flushes per critical care; suggest at least 30mL q4 hrs to help maintain tube patency. Recommend to discontinue oral nutrition supplements for now.   Please obtain/document height and current weight as able.

## 2024-07-18 NOTE — PROGRESS NOTES
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  Upon arrival to the ED patient was posturing.  Imaging revealed a large amount of SAH and a right PCOM aneurysm.  Per chart review he was previously on aspirin reversed with DDAVP.  Overnight on 7/16, patient noted to be more lethargic complaining of severe headache was sent down for CTA.    Imaging:    CT head without 7/14/2024:No significant interval change in extensive bilateral subarachnoid hemorrhage and intraventricular hemorrhage.     CTA head w/wo 7/16/24:CT Brain: Redemonstrated subarachnoid hemorrhage overlying the right greater than left convexities and concentrated within the right sylvian cistern and fissure. 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    Plan:  Continue frequent neurological checks  Repeat CTA/CT head w/wo STAT if GCS declines more than 2 points in 1 hour  Continue SAH pathway/spasm watch:  Daily TCD's: Pending today  Continue Nimodipine 60mg Q4hrs  Continue Keppra 1000 mg twice daily per neurology  Normonatremia (141 today) currently on salt tabs 1 g 3 times daily  Euvolemia (+936)   Hemoglobin >8 (12.4 today)  EVD placed on 7/13/2024  Continue EVD at 10mmhg  Monitor output and ICP  ICPs 1-13, in room 4  Output 51 ml/24hrs  Maintain ICP goal <20  SBP <220  Continue aspirin 81 mg daily  Heparin drip at 12  "units/kilogram/hour  Last PTT 30  Medical management and pain control per primary team  DVT PPX: SCDs and heparin drip    Neurosurgery will continue to follow closely, call with any further questions or concerns.    Fever  Assessment & Plan  Tmax 100.9  WBCs 10.33  Blood cultures x 2 from 717 pending  CSF sent today    Seizures (HCC)  Assessment & Plan  Neurology following, input appreciated  Patient currently on Keppra 1000 mg twice daily  vEEG no seizure activity           Subjective/Objective     Chief Complaint: \"I want coffee\"    Subjective: Overnight patient complained of some chest pain, EKG and troponins done.  He also complains of a headache and ongoing chest pain, otherwise offers no complaints.    Objective: Patient comfortably laying in bed, NAD.    I/O         07/16 0701  07/17 0700 07/17 0701  07/18 0700 07/18 0701  07/19 0700    P.O. 480      I.V. (mL/kg) 339.2 (6.3) 144 (2.7)     NG/GT 90 685     IV Piggyback  500     Feedings 200 1058     Total Intake(mL/kg) 1109.2 (20.5) 2387 (44.2)     Urine (mL/kg/hr) 1921 (1.5) 1400 (1.1) 425 (2.4)    Drains 64 51 12    Stool 0 0     Total Output 1985 1451 437    Net -875.8 +936 -437           Unmeasured Urine Occurrence 1 x      Unmeasured Stool Occurrence 1 x 1 x             Invasive Devices       Central Venous Catheter Line  Duration             CVC Central Lines 07/12/24 Triple 16cm 5 days              Peripheral Intravenous Line  Duration             Peripheral IV 07/15/24 Right;Ventral (anterior) Forearm 3 days              Drain  Duration             Ventriculostomy/Subdural Ventricular drainage catheter Occipital region 5 days    External Urinary Catheter Small 4 days    NG/OG/Enteral Tube 18 Fr Right nare 1 day                    Physical Exam:  Vitals: Blood pressure 158/83, pulse 98, temperature 99.8 °F (37.7 °C), temperature source Rectal, resp. rate 20, weight 54 kg (119 lb), SpO2 98%.,There is no height or weight on file to calculate " BMI.    Hemodynamic Monitoring: MAP: Arterial Line MAP (mmHg): 104 mmHg, CPP: CPP Cuff-Calculated (mmHg): 105, CVP:  , ICP Mean: ICP Mean (mmHg): 6 mmHg    General appearance: alert, appears stated age, cooperative and no distress  Head: Normocephalic, right frontal EVD in place  Eyes: EOMI, conjugate gaze, right pupil 4 mm and irregular and left pupil 3 mm both reactive, prior cataract surgery  Neck: supple, symmetrical, trachea midline   Lungs: non labored breathing  Heart: regular heart rate  Neurologic:   Mental status: GCS 15, speech is clear, following commands  Cranial nerves: grossly intact (Cranial nerves II-XII) except as noted above  Sensory: normal to light touch in all extremities x 4  Motor: moving all extremities without focal weakness, strength 5/5 throughout  Reflexes: 2+ and symmetric, no Anabella's or clonus appreciated      Lab Results:  Results from last 7 days   Lab Units 07/18/24 0520 07/17/24  0550 07/16/24  0455 07/15/24  0520   WBC Thousand/uL 10.33* 13.47* 6.36 8.78   HEMOGLOBIN g/dL 12.4 13.6 12.7 10.8*   HEMATOCRIT % 37.2 38.8 36.6 32.3*   PLATELETS Thousands/uL 257 263 222 182   SEGS PCT % 77* 84*  --  75   MONO PCT % 8 5  --  6   EOS PCT % 1 0  --  0     Results from last 7 days   Lab Units 07/18/24 0520 07/17/24  0550 07/16/24  0455   SODIUM mmol/L 141 139 137   POTASSIUM mmol/L 4.0 3.6 3.4*   CHLORIDE mmol/L 106 105 106   CO2 mmol/L 24 24 21   BUN mg/dL 20 13 9   CREATININE mg/dL 0.82 0.68 0.62   CALCIUM mg/dL 9.9 9.7 9.1   ALK PHOS U/L 123* 128*  --    ALT U/L 13 13  --    AST U/L 13 13  --      Results from last 7 days   Lab Units 07/18/24 0520 07/17/24  0550 07/16/24  0455   MAGNESIUM mg/dL 2.0 2.0 1.9     Results from last 7 days   Lab Units 07/18/24 0520 07/17/24  0550 07/16/24  0455   PHOSPHORUS mg/dL 4.1 2.4 2.4     Results from last 7 days   Lab Units 07/18/24  0520 07/17/24  0550 07/16/24  0458 07/15/24  0520 07/14/24  1311 07/12/24  1618   INR   --   --   --   --  1.21*  "1.07   PTT seconds 30 31 32   < > 34 23    < > = values in this interval not displayed.     No results found for: \"TROPONINT\"  ABG:  Lab Results   Component Value Date    PHART 7.314 (L) 07/12/2024    BXA3MPK 44.1 (H) 07/12/2024    PO2ART 138.2 (H) 07/12/2024    BUT6JLY 21.9 (L) 07/12/2024    BEART -4.2 07/12/2024    SOURCE Line, Arterial 07/12/2024       Imaging Studies: I have personally reviewed pertinent reports.   and I have personally reviewed pertinent films in PACS    CTA head and neck w wo contrast    Result Date: 7/16/2024  Impression: CT Brain: Redemonstrated subarachnoid hemorrhage overlying the right greater than left convexities and concentrated within the right sylvian cistern and fissure. . Subacute infarct involving the right anterior temporal region and medial occipital region.. 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 Workstation performed: [id number]     IR cerebral angiography / intervention    Result Date: 7/15/2024  Impression: Successful balloon assisted coil embolization of a 4.5 mm right P-comm aneurysm. Workstation performed: [id number]     CT head wo contrast    Result Date: 7/14/2024  Impression: No significant interval change in extensive bilateral subarachnoid hemorrhage and intraventricular hemorrhage. Workstation performed: [id number]     CT head wo contrast    Result Date: 7/13/2024  Impression: Interval placement of right-sided shunt catheter. Stable ventricular size with slight interval increase in intraventricular hemorrhage layering in the occipital horns bilaterally. Diffuse subarachnoid hemorrhage again noted. Workstation performed: [id number]     XR chest portable ICU    Result Date: 7/12/2024  Impression: No acute cardiopulmonary disease. ET tube 6 cm above the landon. Workstation performed: [id number]     CTA stroke alert (head/neck)    Result " Date: 7/12/2024  Impression: 0.3 cm aneurysm in expected origin of right posterior communicating artery. Possible tiny aneurysm in left anterior communicating artery region. These could be potential sources of diffuse subarachnoid hemorrhage. Recommend neurovascular surgery consultation for further evaluation. Mild narrowing of bilateral MCA M1 and bilateral M2 segmental branch vessels, likely due to vasospasm. Patent stent in left proximal subclavian artery. Endotracheal tube in trachea. Additional chronic/incidental findings as detailed above. Findings were directly discussed with Emmanuel Lion at approximately 4:34 p.m. on 7/12/2024. Workstation performed: [id number]     CT stroke alert brain    Result Date: 7/12/2024  Impression: Acute diffuse thickened large-volume subarachnoid hemorrhage throughout the bilateral parafalcine regions, bilateral cerebral sulci (right worse than left), basilar cisterns, prepontine cistern, premedullary cistern, and visualized upper cervical spinal canal. Acute small volume intraventricular hemorrhage with mild hydrocephalus. No acute infarction. Findings were directly discussed with Emmanuel Lion at approximately 4:34 p.m. on 7/12/2024. Workstation performed: [id number]       EKG, Pathology, and Other Studies: I have personally reviewed pertinent reports.      VTE Pharmacologic Prophylaxis: Heparin    VTE Mechanical Prophylaxis: sequential compression device

## 2024-07-18 NOTE — PLAN OF CARE
Problem: Prexisting or High Potential for Compromised Skin Integrity  Goal: Skin integrity is maintained or improved  Description: INTERVENTIONS:  - Identify patients at risk for skin breakdown  - Assess and monitor skin integrity  - Assess and monitor nutrition and hydration status  - Monitor labs   - Assess for incontinence   - Turn and reposition patient  - Assist with mobility/ambulation  - Relieve pressure over bony prominences  - Avoid friction and shearing  - Provide appropriate hygiene as needed including keeping skin clean and dry  - Evaluate need for skin moisturizer/barrier cream  - Collaborate with interdisciplinary team   - Patient/family teaching  - Consider wound care consult   Outcome: Progressing     Problem: Neurological Deficit  Goal: Neurological status is stable or improving  Description: Interventions:  - Monitor and assess patient's level of consciousness, motor function, sensory function, and level of assistance needed for ADLs.   - Monitor and report changes from baseline. Collaborate with interdisciplinary team to initiate plan and implement interventions as ordered.   - Provide and maintain a safe environment.  - Consider seizure precautions.  - Consider fall precautions.  - Consider aspiration precautions.  - Consider bleeding precautions.  Outcome: Progressing     Problem: Activity Intolerance/Impaired Mobility  Goal: Mobility/activity is maintained at optimum level for patient  Description: Interventions:  - Assess and monitor patient  barriers to mobility and need for assistive/adaptive devices.  - Assess patient's emotional response to limitations.  - Collaborate with interdisciplinary team and initiate plans and interventions as ordered.  - Encourage independent activity per ability.  - Maintain proper body alignment.  - Perform active/passive rom as tolerated/ordered.  - Plan activities to conserve energy.  - Turn patient as appropriate  Outcome: Progressing     Problem:  Communication Impairment  Goal: Ability to express needs and understand communication  Description: Assess patient's communication skills and ability to understand information.  Patient will demonstrate use of effective communication techniques, alternative methods of communication and understanding even if not able to speak.     - Encourage communication and provide alternate methods of communication as needed.  - Collaborate with case management/ for discharge needs.  - Include patient/family/caregiver in decisions related to communication.  Outcome: Progressing     Problem: Potential for Aspiration  Goal: Non-ventilated patient's risk of aspiration is minimized  Description: Assess and monitor vital signs, respiratory status, and labs (WBC).  Monitor for signs of aspiration (tachypnea, cough, rales, wheezing, cyanosis, fever).    - Assess and monitor patient's ability to swallow.  - Place patient up in chair to eat if possible.  - HOB up at 90 degrees to eat if unable to get patient up into chair.  - Supervise patient during oral intake.   - Instruct patient/ family to take small bites.  - Instruct patient/ family to take small single sips when taking liquids.  - Follow patient-specific strategies generated by speech pathologist.  Outcome: Progressing     Problem: Nutrition  Goal: Nutrition/Hydration status is improving  Description: Monitor and assess patient's nutrition/hydration status for malnutrition (ex- brittle hair, bruises, dry skin, pale skin and conjunctiva, muscle wasting, smooth red tongue, and disorientation). Collaborate with interdisciplinary team and initiate plan and interventions as ordered.  Monitor patient's weight and dietary intake as ordered or per policy. Utilize nutrition screening tool and intervene per policy. Determine patient's food preferences and provide high-protein, high-caloric foods as appropriate.   - Monitor nutrition parameters, recommending adjustments to  enteral nutrition orders at indicated.   - Assist patient with eating.  - Allow adequate time for meals.  - Encourage patient to take dietary supplement as ordered.  - Collaborate with interdisciplinary team.   - Include patient/family/caregiver in decisions related to nutrition.  Outcome: Progressing     Problem: PAIN - ADULT  Goal: Verbalizes/displays adequate comfort level or baseline comfort level  Description: Interventions:  - Encourage patient to monitor pain and request assistance  - Assess pain using appropriate pain scale  - Administer analgesics based on type and severity of pain and evaluate response  - Implement non-pharmacological measures as appropriate and evaluate response  - Consider cultural and social influences on pain and pain management  - Notify physician/advanced practitioner if interventions unsuccessful or patient reports new pain  Outcome: Progressing     Problem: INFECTION - ADULT  Goal: Absence or prevention of progression during hospitalization  Description: INTERVENTIONS:  - Assess and monitor for signs and symptoms of infection  - Monitor lab/diagnostic results  - Monitor all insertion sites, i.e. indwelling lines, tubes, and drains  - Monitor endotracheal if appropriate and nasal secretions for changes in amount and color  - [place] appropriate cooling/warming therapies per order  - Administer medications as ordered  - Instruct and encourage patient and family to use good hand hygiene technique  - Identify and instruct in appropriate isolation precautions for identified infection/condition  Outcome: Progressing  Goal: Absence of fever/infection during neutropenic period  Description: INTERVENTIONS:  - Monitor WBC    Outcome: Progressing     Problem: SAFETY ADULT  Goal: Patient will remain free of falls  Description: INTERVENTIONS:  - Educate patient/family on patient safety including physical limitations  - Instruct patient to call for assistance with activity   - Consult OT/PT to  assist with strengthening/mobility   - Keep Call bell within reach  - Keep bed low and locked with side rails adjusted as appropriate  - Keep care items and personal belongings within reach  - Initiate and maintain comfort rounds  - Make Fall Risk Sign visible to staff  - Offer Toileting every  Hours, in advance of need  - Initiate/Maintain alarm  - Obtain necessary fall risk management equipment:   - Apply yellow socks and bracelet for high fall risk patients  - Consider moving patient to room near nurses station  Outcome: Progressing  Goal: Maintain or return to baseline ADL function  Description: INTERVENTIONS:  -  Assess patient's ability to carry out ADLs; assess patient's baseline for ADL function and identify physical deficits which impact ability to perform ADLs (bathing, care of mouth/teeth, toileting, grooming, dressing, etc.)  - Assess/evaluate cause of self-care deficits   - Assess range of motion  - Assess patient's mobility; develop plan if impaired  - Assess patient's need for assistive devices and provide as appropriate  - Encourage maximum independence but intervene and supervise when necessary  - Involve family in performance of ADLs  - Assess for home care needs following discharge   - Consider OT consult to assist with ADL evaluation and planning for discharge  - Provide patient education as appropriate  Outcome: Progressing  Goal: Maintains/Returns to pre admission functional level  Description: INTERVENTIONS:  - Perform AM-PAC 6 Click Basic Mobility/ Daily Activity assessment daily.  - Set and communicate daily mobility goal to care team and patient/family/caregiver.   - Collaborate with rehabilitation services on mobility goals if consulted  - Perform Range of Motion  times a day.  - Reposition patient every  hours.  - Dangle patient  times a day  - Stand patient  times a day  - Ambulate patient  times a day  - Out of bed to chair  times a day   - Out of bed for meals  times a day  - Out of  bed for toileting  - Record patient progress and toleration of activity level   Outcome: Progressing     Problem: DISCHARGE PLANNING  Goal: Discharge to home or other facility with appropriate resources  Description: INTERVENTIONS:  - Identify barriers to discharge w/patient and caregiver  - Arrange for needed discharge resources and transportation as appropriate  - Identify discharge learning needs (meds, wound care, etc.)  - Arrange for interpretive services to assist at discharge as needed  - Refer to Case Management Department for coordinating discharge planning if the patient needs post-hospital services based on physician/advanced practitioner order or complex needs related to functional status, cognitive ability, or social support system  Outcome: Progressing     Problem: Knowledge Deficit  Goal: Patient/family/caregiver demonstrates understanding of disease process, treatment plan, medications, and discharge instructions  Description: Complete learning assessment and assess knowledge base.  Interventions:  - Provide teaching at level of understanding  - Provide teaching via preferred learning methods  Outcome: Progressing     Problem: SAFETY,RESTRAINT: NV/NON-SELF DESTRUCTIVE BEHAVIOR  Goal: Remains free of harm/injury (restraint for non violent/non self-detsructive behavior)  Description: INTERVENTIONS:  - Instruct patient/family regarding restraint use   - Assess and monitor physiologic and psychological status   - Provide interventions and comfort measures to meet assessed patient needs   - Identify and implement measures to help patient regain control  - Assess readiness for release of restraint   Outcome: Progressing  Goal: Returns to optimal restraint-free functioning  Description: INTERVENTIONS:  - Assess the patient's behavior and symptoms that indicate continued need for restraint  - Identify and implement measures to help patient regain control  - Assess readiness for release of restraint    Outcome: Progressing     Problem: NEUROSENSORY - ADULT  Goal: Achieves stable or improved neurological status  Description: INTERVENTIONS  - Monitor and report changes in neurological status  - Monitor vital signs such as temperature, blood pressure, glucose, and any other labs ordered   - Initiate measures to prevent increased intracranial pressure  - Monitor for seizure activity and implement precautions if appropriate      Outcome: Progressing  Goal: Remains free of injury related to seizures activity  Description: INTERVENTIONS  - Maintain airway, patient safety  and administer oxygen as ordered  - Monitor patient for seizure activity, document and report duration and description of seizure to physician/advanced practitioner  - If seizure occurs,  ensure patient safety during seizure  - Reorient patient post seizure  - Seizure pads on all 4 side rails  - Instruct patient/family to notify RN of any seizure activity including if an aura is experienced  - Instruct patient/family to call for assistance with activity based on nursing assessment  - Administer anti-seizure medications if ordered    Outcome: Progressing  Goal: Achieves maximal functionality and self care  Description: INTERVENTIONS  - Monitor swallowing and airway patency with patient fatigue and changes in neurological status  - Encourage and assist patient to increase activity and self care.   - Encourage visually impaired, hearing impaired and aphasic patients to use assistive/communication devices  Outcome: Progressing     Problem: CARDIOVASCULAR - ADULT  Goal: Maintains optimal cardiac output and hemodynamic stability  Description: INTERVENTIONS:  - Monitor I/O, vital signs and rhythm  - Monitor for S/S and trends of decreased cardiac output  - Administer and titrate ordered vasoactive medications to optimize hemodynamic stability  - Assess quality of pulses, skin color and temperature  - Assess for signs of decreased coronary artery perfusion  -  Instruct patient to report change in severity of symptoms  Outcome: Progressing  Goal: Absence of cardiac dysrhythmias or at baseline rhythm  Description: INTERVENTIONS:  - Continuous cardiac monitoring, vital signs, obtain 12 lead EKG if ordered  - Administer antiarrhythmic and heart rate control medications as ordered  - Monitor electrolytes and administer replacement therapy as ordered  Outcome: Progressing     Problem: GASTROINTESTINAL - ADULT  Goal: Maintains or returns to baseline bowel function  Description: INTERVENTIONS:  - Assess bowel function  - Encourage oral fluids to ensure adequate hydration  - Administer IV fluids if ordered to ensure adequate hydration  - Administer ordered medications as needed  - Encourage mobilization and activity  - Consider nutritional services referral to assist patient with adequate nutrition and appropriate food choices  Outcome: Progressing  Goal: Maintains adequate nutritional intake  Description: INTERVENTIONS:  - Monitor percentage of each meal consumed  - Identify factors contributing to decreased intake, treat as appropriate  - Assist with meals as needed  - Monitor I&O, weight, and lab values if indicated  - Obtain nutrition services referral as needed  Outcome: Progressing     Problem: GENITOURINARY - ADULT  Goal: Maintains or returns to baseline urinary function  Description: INTERVENTIONS:  - Assess urinary function  - Encourage oral fluids to ensure adequate hydration if ordered  - Administer IV fluids as ordered to ensure adequate hydration  - Administer ordered medications as needed  - Offer frequent toileting  - Follow urinary retention protocol if ordered  Outcome: Progressing  Goal: Absence of urinary retention  Description: INTERVENTIONS:  - Assess patient’s ability to void and empty bladder  - Monitor I/O  - Bladder scan as needed  - Discuss with physician/AP medications to alleviate retention as needed  - Discuss catheterization for long term situations  as appropriate  Outcome: Progressing     Problem: METABOLIC, FLUID AND ELECTROLYTES - ADULT  Goal: Electrolytes maintained within normal limits  Description: INTERVENTIONS:  - Monitor labs and assess patient for signs and symptoms of electrolyte imbalances  - Administer electrolyte replacement as ordered  - Monitor response to electrolyte replacements, including repeat lab results as appropriate  - Instruct patient on fluid and nutrition as appropriate  Outcome: Progressing  Goal: Fluid balance maintained  Description: INTERVENTIONS:  - Monitor labs   - Monitor I/O and WT  - Instruct patient on fluid and nutrition as appropriate  - Assess for signs & symptoms of volume excess or deficit  Outcome: Progressing  Goal: Glucose maintained within target range  Description: INTERVENTIONS:  - Monitor Blood Glucose as ordered  - Assess for signs and symptoms of hyperglycemia and hypoglycemia  - Administer ordered medications to maintain glucose within target range  - Assess nutritional intake and initiate nutrition service referral as needed  Outcome: Progressing     Problem: SKIN/TISSUE INTEGRITY - ADULT  Goal: Skin Integrity remains intact(Skin Breakdown Prevention)  Description: Assess:  -Perform Martin assessment every   -Clean and moisturize skin every   -Inspect skin when repositioning, toileting, and assisting with ADLS  -Assess under medical devices such as  every   -Assess extremities for adequate circulation and sensation     Bed Management:  -Have minimal linens on bed & keep smooth, unwrinkled  -Change linens as needed when moist or perspiring  -Avoid sitting or lying in one position for more than  hours while in bed  -Keep HOB at degrees     Toileting:  -Offer bedside commode  -Assess for incontinence every   -Use incontinent care products after each incontinent episode such as     Activity:  -Mobilize patient  times a day  -Encourage activity and walks on unit  -Encourage or provide ROM exercises   -Turn and  reposition patient every  Hours  -Use appropriate equipment to lift or move patient in bed  -Instruct/ Assist with weight shifting every  when out of bed in chair  -Consider limitation of chair time  hour intervals    Skin Care:  -Avoid use of baby powder, tape, friction and shearing, hot water or constrictive clothing  -Relieve pressure over bony prominences using   -Do not massage red bony areas    Next Steps:  -Teach patient strategies to minimize risks such as    -Consider consults to  interdisciplinary teams such as   Outcome: Progressing  Goal: Incision(s), wounds(s) or drain site(s) healing without S/S of infection  Description: INTERVENTIONS  - Assess and document dressing, incision, wound bed, drain sites and surrounding tissue  - Provide patient and family education  - Perform skin care/dressing changes  Outcome: Progressing     Problem: HEMATOLOGIC - ADULT  Goal: Maintains hematologic stability  Description: INTERVENTIONS  - Assess for signs and symptoms of bleeding or hemorrhage  - Monitor labs  - Administer supportive blood products/factors as ordered and appropriate  Outcome: Progressing     Problem: MUSCULOSKELETAL - ADULT  Goal: Maintain or return mobility to safest level of function  Description: INTERVENTIONS:  - Assess patient's ability to carry out ADLs; assess patient's baseline for ADL function and identify physical deficits which impact ability to perform ADLs (bathing, care of mouth/teeth, toileting, grooming, dressing, etc.)  - Assess/evaluate cause of self-care deficits   - Assess range of motion  - Assess patient's mobility  - Assess patient's need for assistive devices and provide as appropriate  - Encourage maximum independence but intervene and supervise when necessary  - Involve family in performance of ADLs  - Assess for home care needs following discharge   - Consider OT consult to assist with ADL evaluation and planning for discharge  - Provide patient education as  appropriate  Outcome: Progressing

## 2024-07-18 NOTE — PROGRESS NOTES
[facility]  Progress Note: Critical Care  Name: Federico Bowen 68 y.o. male I MRN: [id number]  Unit/Bed#: ICU 07 I Date of Admission: 7/12/2024   Date of Service: 7/18/2024 I Hospital Day: 6    Assessment & Plan   Neuro:   Diagnosis: SAH with IVH and mild hydro s/p coil embolization of R PCOM aneurysm  BD 7 HH5, MF 4  7/11 CTAH/N-0.3 cm aneurysm in expected origin of right posterior communicating artery. Possible tiny aneurysm in left anterior communicating artery region. Mild narrowing of bilateral MCA M1 and bilateral M2 segmental branch vessels, likely due to vasospasm.   7/11 CTH-Acute diffuse thickened large-volume SAH throughout the b/l parafalcine regions, b/l cerebral sulci (R worse than L), basilar cisterns, prepontine cistern, premedullary cistern, and visualized upper cervical spinal canal. Acute small volume IVH with mild hydrocephalus.   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   7/14 CTH -No significant interval change in extensive bilateral SAH and IVH.   7/14 vEEG no seizures  7/16 CTA/CTH: Expected postoperative appearance following embolization of right posterior communicating artery aneurysm . Redemonstrated subarachnoid hemorrhage overlying the right greater than left convexities and concentrated within the right sylvian cistern and fissure. . Subacute infarct involving the right anterior temporal region and medial occipital region.     Plan:   Nsx following  ASA 81mg   EVD 10. Goal ICP <20   ICP 3-13  CPP   51 cc/24hrs  -220  Keppra 1000mg BID  Nimodipine x 21 days  Low dose IV Heparin protocol @ 10 units  Continue 12 units  PTT < 45  Daily PTT  Daily TCD  No appreciable vasopasm 07/17  Pending 07/18  Euvolemia   I/Os: +273cc  Was given 500cc NS bolus 07/16 9pm   Will give additional NS bolus this AM 500cc  Normonatremia, Na goal > 140  Na: 141  Continue salt tabs to 1g TID  Stat CTH with any change in  GCS > 2 pts     Diagnosis: Seizure  2/2 above  vEEG no seizures  Plan:   Neuro following   Keppra 1gm BID     - Diagnosis: Headache               Plan               Continue 600mg TID gabapentin   Lidoderm patch for neck   Coffee via NGT given drank several cups per day  Can give IV mag and reglan if needed   PRN fioricet, robaxin, oxy    - Diagnosis; Insomnia    Plan    Continue mirtazapine 15mg qhs             CV:   Diagnosis: Hx CAD HTN/HLD  Plan:     Continue ASA 81mg  Suspected only on monotherapy with ASA as had stent > 1 year ago and was on brilinta and asa for 1 year  Confirming with medical records   Would hold metoprolol 50mg qhs and Hold entresto 24-26 mg BID   Given allowing for increased -220  Takes prn nitroglycerin of his angina    - Diagnosis: HLD   Plan    Cont lipitor 80m daily and zetia 10mg qhs     Pulm:  Diagnosis: Hx COPD and  without AE  Plan:   IS  Imecldinum/albuterol inhaler   Respiratory protocol     GI:   Diagnosis: Dysphagia  Plan:   Failed MBS 07/17   Consider retrying in the future   Continue TF for now     - Diagnosis: GERD   Plan    Continue home omeprazoel 40mg     BM: 07/17  Bowel regimen: sennokot and miralax             :   Euvolemia   I/Os: +273 cc this AM   Continue to monitor           F/E/N:    F: none  E: K> 4.0,  maintain magnesium > 2.0, maintain phosphate > 3.5  N: TF Jevity 1.2 vinny 50cc/hr          - thiamine, folic acid, and multivitamin     Heme/Onc:   Diagnosis: Anemia  Plan:   Unclear baseline no s/s bleeding monitor  Possibly macrocytic anemia   Continue to monitor     Endo:   SSI started     ID:   Diagnosis: Leukocytosis  and febrile   Seems to be intermittent   Infectious vs central  CNS cause  Got UA that was bland  Plan   Blood cultures pending   Neurosurg to get CSF cultures from EVD   Monitor off antibx        MSK/Skin:   PT/OT/PMR  Diagnosis: Penile lesion  Plan: note from outpt derm inflamed seborrheic keratosis    Line: R IJ       Disposition:  Critical care    ICU Core Measures     A: Assess, Prevent, and Manage Pain Has pain been assessed? Yes  Need for changes to pain regimen? No   B: Both SAT/SAT  N/A   C: Choice of Sedation RASS Goal: 0 Alert and Calm  Need for changes to sedation or analgesia regimen? Yes   D: Delirium CAM-ICU: Positive   E: Early Mobility  Plan for early mobility? Yes   F: Family Engagement Plan for family engagement today? Yes       Review of Invasive Devices:      Central access plan: Patient has multiple central venous catheters.  Medications requiring central line Hemodynamic monitoring      Prophylaxis:  VTE VTE covered by:  heparin (porcine), Intravenous, 12 Units/kg/hr at 07/18/24 0620       Stress Ulcer  not ordered        Significant 24hr Events     24hr events: SSI started overnight  Noted some chest pain, got EKG and trops     Subjective     Review of Systems: See HPI for Review of Systems     Objective                            Vitals I/O      Most Recent Min/Max in 24hrs   Temp 98 °F (36.7 °C) Temp  Min: 98 °F (36.7 °C)  Max: 100.9 °F (38.3 °C)   Pulse 96 Pulse  Min: 96  Max: 120   Resp 20 Resp  Min: 14  Max: 34   /74 BP  Min: 101/70  Max: 163/91   O2 Sat 97 % SpO2  Min: 96 %  Max: 100 %      Intake/Output Summary (Last 24 hours) at 7/18/2024 0652  Last data filed at 7/18/2024 0600  Gross per 24 hour   Intake 2387 ml   Output 1451 ml   Net 936 ml       Diet Enteral/Parenteral; Tube Feeding No Oral Diet; Jevity 1.2 Josh; Continuous; 50; 40; Saline; Every 4 hours    Invasive Monitoring   Arterial Line  Esme /72  No data recorded    mmHg  No data recorded           Physical Exam   Physical Exam  Vitals and nursing note reviewed.   HENT:      Nose:      Comments: NGT in place  Cardiovascular:      Rate and Rhythm: Normal rate.   Abdominal:      Palpations: Abdomen is soft.   Pulmonary:      Effort: Pulmonary effort is normal.   Neurological:      Comments:   Pupils reactive (R more dilated, L more  pinpoint)      LUE weakness appreciated 4/5 in biceps, triceps, and deltoids             Diagnostic Studies      EKG: reviewed  Imaging:  I have personally reviewed pertinent reports.       Medications:  Scheduled PRN   acetaminophen, 650 mg, Q6H  aspirin, 81 mg, Daily  chlorhexidine, 15 mL, Q12H MATTY  gabapentin, 600 mg, TID  insulin lispro, 1-5 Units, Q6H MATTY  levETIRAcetam, 1,000 mg, Q12H MATTY  lidocaine, 1 patch, Daily  niMODipine, 60 mg, Q4H MATTY  polyethylene glycol, 17 g, Daily  senna, 1 tablet, HS  sodium chloride, 1 g, TID With Meals  umeclidinium, 1 puff, Daily      bisacodyl, 10 mg, Daily PRN  hydrALAZINE, 10 mg, Q6H PRN  HYDROmorphone, 0.2 mg, Q4H PRN  labetalol, 10 mg, Q6H PRN  methocarbamol, 500 mg, Q6H PRN  ondansetron, 4 mg, Q6H PRN  oxyCODONE, 2.5 mg, Q4H PRN  oxyCODONE, 5 mg, Q4H PRN       Continuous    heparin (porcine), 12 Units/kg/hr (Order-Specific), Last Rate: 12 Units/kg/hr (07/18/24 0620)         Labs:    CBC    Recent Labs     07/17/24  0550 07/18/24  0520   WBC 13.47* 10.33*   HGB 13.6 12.4   HCT 38.8 37.2    257     BMP    Recent Labs     07/17/24  0550 07/18/24  0520   SODIUM 139 141   K 3.6 4.0    106   CO2 24 24   AGAP 10 11   BUN 13 20   CREATININE 0.68 0.82   CALCIUM 9.7 9.9       Coags    Recent Labs     07/17/24  0550 07/18/24  0520   PTT 31 30        Additional Electrolytes  Recent Labs     07/17/24  0550 07/18/24  0520   MG 2.0 2.0   PHOS 2.4 4.1   CAIONIZED 1.23 1.25          Blood Gas    No recent results  No recent results LFTs  Recent Labs     07/17/24  0550 07/18/24  0520   ALT 13 13   AST 13 13   ALKPHOS 128* 123*   ALB 4.1 3.8   TBILI 0.83 0.63       Infectious  No recent results  Glucose  Recent Labs     07/17/24  0550 07/18/24  0520   GLUC 146* 140               Hadley Eric, DO

## 2024-07-19 ENCOUNTER — APPOINTMENT (INPATIENT)
Dept: RADIOLOGY | Facility: HOSPITAL | Age: 69
DRG: 020 | End: 2024-07-19
Payer: MEDICARE

## 2024-07-19 ENCOUNTER — APPOINTMENT (INPATIENT)
Dept: NON INVASIVE DIAGNOSTICS | Facility: HOSPITAL | Age: 69
DRG: 020 | End: 2024-07-19
Payer: MEDICARE

## 2024-07-19 LAB
ALBUMIN SERPL BCG-MCNC: 3.8 G/DL (ref 3.5–5)
ALP SERPL-CCNC: 119 U/L (ref 34–104)
ALT SERPL W P-5'-P-CCNC: 17 U/L (ref 7–52)
ANION GAP SERPL CALCULATED.3IONS-SCNC: 10 MMOL/L (ref 4–13)
APTT PPP: 29 SECONDS (ref 23–37)
AST SERPL W P-5'-P-CCNC: 17 U/L (ref 13–39)
ATRIAL RATE: 128 BPM
BASOPHILS # BLD AUTO: 0.02 THOUSANDS/ÂΜL (ref 0–0.1)
BASOPHILS NFR BLD AUTO: 0 % (ref 0–1)
BILIRUB SERPL-MCNC: 0.49 MG/DL (ref 0.2–1)
BUN SERPL-MCNC: 19 MG/DL (ref 5–25)
CA-I BLD-SCNC: 1.19 MMOL/L (ref 1.12–1.32)
CALCIUM SERPL-MCNC: 9.3 MG/DL (ref 8.4–10.2)
CHLORIDE SERPL-SCNC: 106 MMOL/L (ref 96–108)
CO2 SERPL-SCNC: 26 MMOL/L (ref 21–32)
CREAT SERPL-MCNC: 0.71 MG/DL (ref 0.6–1.3)
EOSINOPHIL # BLD AUTO: 0.26 THOUSAND/ÂΜL (ref 0–0.61)
EOSINOPHIL NFR BLD AUTO: 3 % (ref 0–6)
ERYTHROCYTE [DISTWIDTH] IN BLOOD BY AUTOMATED COUNT: 13.7 % (ref 11.6–15.1)
GFR SERPL CREATININE-BSD FRML MDRD: 95 ML/MIN/1.73SQ M
GLUCOSE SERPL-MCNC: 129 MG/DL (ref 65–140)
GLUCOSE SERPL-MCNC: 137 MG/DL (ref 65–140)
GLUCOSE SERPL-MCNC: 144 MG/DL (ref 65–140)
GLUCOSE SERPL-MCNC: 171 MG/DL (ref 65–140)
GLUCOSE SERPL-MCNC: 178 MG/DL (ref 65–140)
GLUCOSE SERPL-MCNC: 178 MG/DL (ref 65–140)
HCT VFR BLD AUTO: 36.8 % (ref 36.5–49.3)
HGB BLD-MCNC: 12.4 G/DL (ref 12–17)
IMM GRANULOCYTES # BLD AUTO: 0.05 THOUSAND/UL (ref 0–0.2)
IMM GRANULOCYTES NFR BLD AUTO: 1 % (ref 0–2)
LEVETIRACETAM SERPL-MCNC: 30.9 UG/ML (ref 12–46)
LYMPHOCYTES # BLD AUTO: 1.65 THOUSANDS/ÂΜL (ref 0.6–4.47)
LYMPHOCYTES NFR BLD AUTO: 16 % (ref 14–44)
MAGNESIUM SERPL-MCNC: 2.2 MG/DL (ref 1.9–2.7)
MCH RBC QN AUTO: 31.5 PG (ref 26.8–34.3)
MCHC RBC AUTO-ENTMCNC: 33.7 G/DL (ref 31.4–37.4)
MCV RBC AUTO: 93 FL (ref 82–98)
MONOCYTES # BLD AUTO: 0.8 THOUSAND/ÂΜL (ref 0.17–1.22)
MONOCYTES NFR BLD AUTO: 8 % (ref 4–12)
NEUTROPHILS # BLD AUTO: 7.76 THOUSANDS/ÂΜL (ref 1.85–7.62)
NEUTS SEG NFR BLD AUTO: 72 % (ref 43–75)
NRBC BLD AUTO-RTO: 0 /100 WBCS
P AXIS: 81 DEGREES
PHOSPHATE SERPL-MCNC: 3.9 MG/DL (ref 2.3–4.1)
PLATELET # BLD AUTO: 283 THOUSANDS/UL (ref 149–390)
PMV BLD AUTO: 9.8 FL (ref 8.9–12.7)
POTASSIUM SERPL-SCNC: 3.9 MMOL/L (ref 3.5–5.3)
PR INTERVAL: 129 MS
PROT SERPL-MCNC: 6.9 G/DL (ref 6.4–8.4)
QRS AXIS: 24 DEGREES
QRSD INTERVAL: 79 MS
QT INTERVAL: 283 MS
QTC INTERVAL: 413 MS
RBC # BLD AUTO: 3.94 MILLION/UL (ref 3.88–5.62)
SODIUM SERPL-SCNC: 142 MMOL/L (ref 135–147)
T WAVE AXIS: 70 DEGREES
VENTRICULAR RATE: 128 BPM
WBC # BLD AUTO: 10.54 THOUSAND/UL (ref 4.31–10.16)

## 2024-07-19 PROCEDURE — 94664 DEMO&/EVAL PT USE INHALER: CPT

## 2024-07-19 PROCEDURE — 92526 ORAL FUNCTION THERAPY: CPT

## 2024-07-19 PROCEDURE — 82330 ASSAY OF CALCIUM: CPT

## 2024-07-19 PROCEDURE — 94760 N-INVAS EAR/PLS OXIMETRY 1: CPT

## 2024-07-19 PROCEDURE — 99232 SBSQ HOSP IP/OBS MODERATE 35: CPT | Performed by: NURSE PRACTITIONER

## 2024-07-19 PROCEDURE — 93010 ELECTROCARDIOGRAM REPORT: CPT | Performed by: INTERNAL MEDICINE

## 2024-07-19 PROCEDURE — 85025 COMPLETE CBC W/AUTO DIFF WBC: CPT

## 2024-07-19 PROCEDURE — 83520 IMMUNOASSAY QUANT NOS NONAB: CPT | Performed by: PSYCHIATRY & NEUROLOGY

## 2024-07-19 PROCEDURE — 93886 INTRACRANIAL COMPLETE STUDY: CPT

## 2024-07-19 PROCEDURE — 94640 AIRWAY INHALATION TREATMENT: CPT

## 2024-07-19 PROCEDURE — 85730 THROMBOPLASTIN TIME PARTIAL: CPT

## 2024-07-19 PROCEDURE — 93005 ELECTROCARDIOGRAM TRACING: CPT

## 2024-07-19 PROCEDURE — 83735 ASSAY OF MAGNESIUM: CPT

## 2024-07-19 PROCEDURE — 71045 X-RAY EXAM CHEST 1 VIEW: CPT

## 2024-07-19 PROCEDURE — 82948 REAGENT STRIP/BLOOD GLUCOSE: CPT

## 2024-07-19 PROCEDURE — 99291 CRITICAL CARE FIRST HOUR: CPT | Performed by: PSYCHIATRY & NEUROLOGY

## 2024-07-19 PROCEDURE — 80053 COMPREHEN METABOLIC PANEL: CPT

## 2024-07-19 PROCEDURE — 84100 ASSAY OF PHOSPHORUS: CPT

## 2024-07-19 RX ORDER — BROMOCRIPTINE MESYLATE 2.5 MG/1
5 TABLET ORAL 3 TIMES DAILY
Status: DISCONTINUED | OUTPATIENT
Start: 2024-07-19 | End: 2024-07-19

## 2024-07-19 RX ORDER — XYLITOL/YERBA SANTA
5 AEROSOL, SPRAY WITH PUMP (ML) MUCOUS MEMBRANE 4 TIMES DAILY PRN
Status: DISCONTINUED | OUTPATIENT
Start: 2024-07-19 | End: 2024-08-10 | Stop reason: HOSPADM

## 2024-07-19 RX ORDER — LEVETIRACETAM 100 MG/ML
750 SOLUTION ORAL EVERY 12 HOURS SCHEDULED
Status: DISCONTINUED | OUTPATIENT
Start: 2024-07-19 | End: 2024-08-10 | Stop reason: HOSPADM

## 2024-07-19 RX ORDER — BROMOCRIPTINE MESYLATE 2.5 MG/1
10 TABLET ORAL 3 TIMES DAILY
Status: DISCONTINUED | OUTPATIENT
Start: 2024-07-19 | End: 2024-07-22

## 2024-07-19 RX ORDER — BROMOCRIPTINE MESYLATE 2.5 MG/1
5 TABLET ORAL DAILY
Status: CANCELLED | OUTPATIENT
Start: 2024-07-19

## 2024-07-19 RX ORDER — LEVALBUTEROL INHALATION SOLUTION 0.63 MG/3ML
0.31 SOLUTION RESPIRATORY (INHALATION)
Status: DISCONTINUED | OUTPATIENT
Start: 2024-07-19 | End: 2024-07-19

## 2024-07-19 RX ORDER — ALBUTEROL SULFATE 0.83 MG/ML
2.5 SOLUTION RESPIRATORY (INHALATION) EVERY 4 HOURS PRN
Status: DISCONTINUED | OUTPATIENT
Start: 2024-07-19 | End: 2024-07-25

## 2024-07-19 RX ORDER — METOPROLOL SUCCINATE 50 MG/1
50 TABLET, EXTENDED RELEASE ORAL
Status: DISCONTINUED | OUTPATIENT
Start: 2024-07-19 | End: 2024-07-24

## 2024-07-19 RX ADMIN — UMECLIDINIUM 1 PUFF: 62.5 AEROSOL, POWDER ORAL at 08:46

## 2024-07-19 RX ADMIN — LEVETIRACETAM 750 MG: 100 SOLUTION ORAL at 22:00

## 2024-07-19 RX ADMIN — THIAMINE HCL TAB 100 MG 100 MG: 100 TAB at 08:19

## 2024-07-19 RX ADMIN — Medication 5 SPRAY: at 11:50

## 2024-07-19 RX ADMIN — LIDOCAINE 5% 1 PATCH: 700 PATCH TOPICAL at 08:21

## 2024-07-19 RX ADMIN — ACETAMINOPHEN 650 MG: 650 SUSPENSION ORAL at 04:00

## 2024-07-19 RX ADMIN — ATORVASTATIN CALCIUM 80 MG: 80 TABLET, FILM COATED ORAL at 08:19

## 2024-07-19 RX ADMIN — BROMOCRIPTINE MESYLATE 5 MG: 2.5 TABLET ORAL at 16:10

## 2024-07-19 RX ADMIN — METOPROLOL SUCCINATE 50 MG: 50 TABLET, EXTENDED RELEASE ORAL at 22:00

## 2024-07-19 RX ADMIN — ALBUTEROL SULFATE 2.5 MG: 2.5 SOLUTION RESPIRATORY (INHALATION) at 17:00

## 2024-07-19 RX ADMIN — CHLORHEXIDINE GLUCONATE 0.12% ORAL RINSE 15 ML: 1.2 LIQUID ORAL at 08:19

## 2024-07-19 RX ADMIN — FOLIC ACID TAB 400 MCG 400 MCG: 400 TAB at 08:19

## 2024-07-19 RX ADMIN — Medication 20 MG: at 08:21

## 2024-07-19 RX ADMIN — Medication 60 MG: at 11:48

## 2024-07-19 RX ADMIN — ACETAMINOPHEN 650 MG: 650 SUSPENSION ORAL at 22:00

## 2024-07-19 RX ADMIN — GABAPENTIN 600 MG: 300 CAPSULE ORAL at 16:10

## 2024-07-19 RX ADMIN — Medication 60 MG: at 08:19

## 2024-07-19 RX ADMIN — SODIUM CHLORIDE 1 G: 1 TABLET ORAL at 06:33

## 2024-07-19 RX ADMIN — Medication 60 MG: at 19:49

## 2024-07-19 RX ADMIN — CHLORHEXIDINE GLUCONATE 0.12% ORAL RINSE 15 ML: 1.2 LIQUID ORAL at 22:00

## 2024-07-19 RX ADMIN — EZETIMIBE 10 MG: 10 TABLET ORAL at 08:21

## 2024-07-19 RX ADMIN — LEVETIRACETAM 1000 MG: 100 SOLUTION ORAL at 08:19

## 2024-07-19 RX ADMIN — Medication 60 MG: at 03:37

## 2024-07-19 RX ADMIN — GABAPENTIN 600 MG: 300 CAPSULE ORAL at 08:20

## 2024-07-19 RX ADMIN — ASPIRIN 81 MG CHEWABLE TABLET 81 MG: 81 TABLET CHEWABLE at 08:20

## 2024-07-19 RX ADMIN — INSULIN LISPRO 1 UNITS: 100 INJECTION, SOLUTION INTRAVENOUS; SUBCUTANEOUS at 17:58

## 2024-07-19 RX ADMIN — GABAPENTIN 600 MG: 300 CAPSULE ORAL at 22:00

## 2024-07-19 RX ADMIN — ACETAMINOPHEN 650 MG: 650 SUSPENSION ORAL at 16:10

## 2024-07-19 RX ADMIN — BROMOCRIPTINE MESYLATE 10 MG: 2.5 TABLET ORAL at 22:00

## 2024-07-19 RX ADMIN — INSULIN LISPRO 1 UNITS: 100 INJECTION, SOLUTION INTRAVENOUS; SUBCUTANEOUS at 11:48

## 2024-07-19 RX ADMIN — Medication 60 MG: at 16:10

## 2024-07-19 RX ADMIN — ACETAMINOPHEN 650 MG: 650 SUSPENSION ORAL at 10:25

## 2024-07-19 RX ADMIN — INSULIN LISPRO 1 UNITS: 100 INJECTION, SOLUTION INTRAVENOUS; SUBCUTANEOUS at 01:17

## 2024-07-19 RX ADMIN — BROMOCRIPTINE MESYLATE 5 MG: 2.5 TABLET ORAL at 11:48

## 2024-07-19 NOTE — RESPIRATORY THERAPY NOTE
RT Protocol Note  Federico Bowen 68 y.o. male MRN: [id number]  Unit/Bed#: ICU 07 Encounter: [id number]    Assessment    Principal Problem:    Subarachnoid hemorrhage (HCC)  Active Problems:    HTN (hypertension)    Hyperlipidemia    Seizures (HCC)    Moderate protein-calorie malnutrition (HCC)    Fever      Home Pulmonary Medications:  Albuterol mdi       No past medical history on file.  Social History     Socioeconomic History    Marital status: /Civil Union     Spouse name: Not on file    Number of children: Not on file    Years of education: Not on file    Highest education level: Not on file   Occupational History    Not on file   Tobacco Use    Smoking status: Not on file    Smokeless tobacco: Not on file   Substance and Sexual Activity    Alcohol use: Not on file    Drug use: Not on file    Sexual activity: Not on file   Other Topics Concern    Not on file   Social History Narrative    Not on file     Social Determinants of Health     Financial Resource Strain: Not on file   Food Insecurity: No Food Insecurity (7/13/2024)    Hunger Vital Sign     Worried About Running Out of Food in the Last Year: Never true     Ran Out of Food in the Last Year: Never true   Transportation Needs: No Transportation Needs (7/13/2024)    PRAPARE - Transportation     Lack of Transportation (Medical): No     Lack of Transportation (Non-Medical): No   Physical Activity: Not on file   Stress: Not on file   Social Connections: Not on file   Intimate Partner Violence: Not on file   Housing Stability: Low Risk  (7/13/2024)    Housing Stability Vital Sign     Unable to Pay for Housing in the Last Year: No     Number of Times Moved in the Last Year: 0     Homeless in the Last Year: No       Subjective         Objective    Physical Exam:   Assessment Type: Assess only  General Appearance: Sleeping  Respiratory Pattern: Normal  Chest Assessment: Chest expansion symmetrical  Bilateral Breath Sounds: Diminished, Clear  O2 Device:  room air    Vitals:  Blood pressure 143/81, pulse (!) 106, temperature 100 °F (37.8 °C), temperature source Rectal, resp. rate 14, weight 49 kg (108 lb 0.4 oz), SpO2 99%.    Results from last 7 days   Lab Units 07/12/24 2014   PH ART  7.314*   PCO2 ART mm Hg 44.1*   PO2 ART mm Hg 138.2*   HCO3 ART mmol/L 21.9*   BASE EXC ART mmol/L -4.2   O2 CONTENT ART mL/dL 18.3   O2 HGB, ARTERIAL % 97.6*   ABG SOURCE  Line, Arterial       Imaging and other studies:     O2 Device: room air     Plan    Respiratory Plan: (P) No distress/Pulmonary history, Home Bronchodilator Patient pathway        Resp Comments: Pt assessed per respiratory protocol. Pt admitted  with SAH. Pt currently on room air no distress noted at this time. BS bilat equal dim but clear. X-ray on 7/19/24 shows no active cardiopulmonary disease. Pt has hx of Copd and takes Albuterol inhaler at home. Will order Albuterol nebulizer Q4prn for SOB/wheezing.

## 2024-07-19 NOTE — ASSESSMENT & PLAN NOTE
Tmax 101.5  WBCs 10.54  Blood cultures x 2 from 7/17 no growth 24hrs  CSF sent 7/18: protein 89, STAT gram stain negative, culture pending, , gulcose 70, RBC 31,075

## 2024-07-19 NOTE — PROGRESS NOTES
[facility]  Progress Note  Name: Federico Bowen I  MRN: [id number]  Unit/Bed#: ICU 07 I Date of Admission: 7/12/2024   Date of Service: 7/19/2024 I Hospital Day: 7    Assessment & Plan   * Subarachnoid hemorrhage (HCC)  Assessment & Plan  aSAH from R PCOM aneurysm rupture  BD 8 - HH 5, MF 4  PPD 6 right PCOM coil embolization  P/w headache and right leg pain, he had 2 witnessed seizures by EMS was ultimately intubated.  Upon arrival to the ED patient was posturing.  Imaging revealed a large amount of SAH and a right PCOM aneurysm.  Per chart review he was previously on aspirin reversed with DDAVP.  Overnight on 7/16, patient noted to be more lethargic complaining of severe headache was sent down for CTA.    Imaging:    CT head without 7/14/2024:No significant interval change in extensive bilateral subarachnoid hemorrhage and intraventricular hemorrhage.     CTA head w/wo 7/16/24:CT Brain: Redemonstrated subarachnoid hemorrhage overlying the right greater than left convexities and concentrated within the right sylvian cistern and fissure. 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    Plan:  Continue frequent neurological checks  Repeat CTA/CT head w/wo STAT if GCS declines more than 2 points in 1 hour  Continue SAH pathway/spasm watch:  Daily TCD's: Pending today  Continue Nimodipine 60mg Q4hrs  Continue Keppra 1000 mg twice daily per neurology  Normonatremia (142 today) salt tabs stopped  Euvolemia (+812)   Hemoglobin >8 (12.4 today)  EVD placed on 7/13/2024  Continue EVD at 10mmhg  Monitor output and ICP  ICPs 4-22, 7 in room   Output 66 ml/24hrs  Maintain ICP goal <20  SBP <220  Continue aspirin 81 mg daily  Heparin drip at 12 units/kilogram/hour  Last PTT  "29  Medical management and pain control per primary team  DVT PPX: SCDs and heparin drip    Neurosurgery will continue to follow closely, call with any further questions or concerns.    Fever  Assessment & Plan  Tmax 101.5  WBCs 10.54  Blood cultures x 2 from 7/17 no growth 24hrs  CSF sent 7/18: protein 89, STAT gram stain negative, culture pending, , gulcose 70, RBC 31,075    Seizures (HCC)  Assessment & Plan  Neurology following, input appreciated  Patient currently on Keppra 1000 mg twice daily  vEEG no seizure activity           Subjective/Objective     Chief Complaint: \"Water\"    Subjective: Patient more drowsy today, but wakes up to voice, Ax3. Follows commends. Complains of sore throat otherwise no complaints.    Objective: Patient comfortably laying in bed, NAD     I/O         07/17 0701 07/18 0700 07/18 0701  07/19 0700 07/19 0701 07/20 0700    P.O.  0     I.V. (mL/kg) 144 (2.7) 144 (2.9) 12 (0.2)    NG/ 960     IV Piggyback 500 500     Feedings 1058 1292 120    Total Intake(mL/kg) 2387 (44.2) 2896 (59.1) 132 (2.7)    Urine (mL/kg/hr) 1400 (1.1) 2018 (1.7)     Emesis/NG output  0     Drains 51 66     Stool 0 0     Total Output 1451 2084     Net +936 +812 +132           Unmeasured Urine Occurrence  1 x     Unmeasured Stool Occurrence 1 x 1 x 1 x            Invasive Devices       Central Venous Catheter Line  Duration             CVC Central Lines 07/12/24 Triple 16cm Right Internal jugular 6 days              Drain  Duration             Ventriculostomy/Subdural Ventricular drainage catheter Occipital region 6 days    External Urinary Catheter Small 5 days    NG/OG/Enteral Tube 18 Fr Right nare 2 days                    Physical Exam:  Vitals: Blood pressure 150/85, pulse (!) 114, temperature (!) 101.2 °F (38.4 °C), temperature source Rectal, resp. rate 14, weight 49 kg (108 lb 0.4 oz), SpO2 98%.,There is no height or weight on file to calculate BMI.    Hemodynamic Monitoring: MAP: Arterial " Line MAP (mmHg): 104 mmHg, CPP: CPP Cuff-Calculated (mmHg): 98, CVP:  , ICP Mean: ICP Mean (mmHg): 9 mmHg      General appearance: more drowsy today, appears stated age, cooperative and no distress  Head: Normocephalic, right frontal EVD in place  Eyes: EOMI, conjugate gaze, right pupil 4 mm and irregular and left pupil 3 mm both reactive, prior cataract surgery  Neck: supple, symmetrical, trachea midline   Lungs: non labored breathing  Heart: regular heart rate  Neurologic:   Mental status: GCS 15, speech is clear, following commands  Cranial nerves: grossly intact (Cranial nerves II-XII) except as noted above  Sensory: normal to light touch in all extremities x 4  Motor: moving all extremities without focal weakness, strength 5/5 throughout except HF can lift legs off bed  Reflexes: 2+ and symmetric, no Anabella's or clonus appreciated      Lab Results:  Results from last 7 days   Lab Units 07/19/24 0611 07/18/24  0520 07/17/24  0550   WBC Thousand/uL 10.54* 10.33* 13.47*   HEMOGLOBIN g/dL 12.4 12.4 13.6   HEMATOCRIT % 36.8 37.2 38.8   PLATELETS Thousands/uL 283 257 263   SEGS PCT % 72 77* 84*   MONO PCT % 8 8 5   EOS PCT % 3 1 0     Results from last 7 days   Lab Units 07/19/24  0611 07/18/24  0520 07/17/24  0550   SODIUM mmol/L 142 141 139   POTASSIUM mmol/L 3.9 4.0 3.6   CHLORIDE mmol/L 106 106 105   CO2 mmol/L 26 24 24   BUN mg/dL 19 20 13   CREATININE mg/dL 0.71 0.82 0.68   CALCIUM mg/dL 9.3 9.9 9.7   ALK PHOS U/L 119* 123* 128*   ALT U/L 17 13 13   AST U/L 17 13 13     Results from last 7 days   Lab Units 07/19/24  0611 07/18/24  0520 07/17/24  0550   MAGNESIUM mg/dL 2.2 2.0 2.0     Results from last 7 days   Lab Units 07/19/24  0611 07/18/24  0520 07/17/24  0550   PHOSPHORUS mg/dL 3.9 4.1 2.4     Results from last 7 days   Lab Units 07/19/24  0611 07/18/24  0520 07/17/24  0550 07/15/24  0520 07/14/24  1311 07/12/24  1618   INR   --   --   --   --  1.21* 1.07   PTT seconds 29 30 31   < > 34 23    < > =  "values in this interval not displayed.     No results found for: \"TROPONINT\"  ABG:  Lab Results   Component Value Date    PHART 7.314 (L) 07/12/2024    SSS6TMG 44.1 (H) 07/12/2024    PO2ART 138.2 (H) 07/12/2024    CEU3SVF 21.9 (L) 07/12/2024    BEART -4.2 07/12/2024    SOURCE Line, Arterial 07/12/2024       Imaging Studies: I have personally reviewed pertinent reports.   and I have personally reviewed pertinent films in PACS    CTA head and neck w wo contrast    Result Date: 7/16/2024  Impression: CT Brain: Redemonstrated subarachnoid hemorrhage overlying the right greater than left convexities and concentrated within the right sylvian cistern and fissure. . Subacute infarct involving the right anterior temporal region and medial occipital region.. 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 Workstation performed: [id number]     IR cerebral angiography / intervention    Result Date: 7/15/2024  Impression: Successful balloon assisted coil embolization of a 4.5 mm right P-comm aneurysm. Workstation performed: [id number]     CT head wo contrast    Result Date: 7/14/2024  Impression: No significant interval change in extensive bilateral subarachnoid hemorrhage and intraventricular hemorrhage. Workstation performed: [id number]     CT head wo contrast    Result Date: 7/13/2024  Impression: Interval placement of right-sided shunt catheter. Stable ventricular size with slight interval increase in intraventricular hemorrhage layering in the occipital horns bilaterally. Diffuse subarachnoid hemorrhage again noted. Workstation performed: [id number]     XR chest portable ICU    Result Date: 7/12/2024  Impression: No acute cardiopulmonary disease. ET tube 6 cm above the landon. Workstation performed: [id number]     CTA stroke alert (head/neck)    Result Date: 7/12/2024  Impression: 0.3 cm aneurysm in " expected origin of right posterior communicating artery. Possible tiny aneurysm in left anterior communicating artery region. These could be potential sources of diffuse subarachnoid hemorrhage. Recommend neurovascular surgery consultation for further evaluation. Mild narrowing of bilateral MCA M1 and bilateral M2 segmental branch vessels, likely due to vasospasm. Patent stent in left proximal subclavian artery. Endotracheal tube in trachea. Additional chronic/incidental findings as detailed above. Findings were directly discussed with Emmanuel Lion at approximately 4:34 p.m. on 7/12/2024. Workstation performed: [id number]     CT stroke alert brain    Result Date: 7/12/2024  Impression: Acute diffuse thickened large-volume subarachnoid hemorrhage throughout the bilateral parafalcine regions, bilateral cerebral sulci (right worse than left), basilar cisterns, prepontine cistern, premedullary cistern, and visualized upper cervical spinal canal. Acute small volume intraventricular hemorrhage with mild hydrocephalus. No acute infarction. Findings were directly discussed with Emmanuel Lion at approximately 4:34 p.m. on 7/12/2024. Workstation performed: [id number]       EKG, Pathology, and Other Studies: I have personally reviewed pertinent reports.      VTE Pharmacologic Prophylaxis: Heparin    VTE Mechanical Prophylaxis: sequential compression device

## 2024-07-19 NOTE — PLAN OF CARE
Problem: Prexisting or High Potential for Compromised Skin Integrity  Goal: Skin integrity is maintained or improved  Description: INTERVENTIONS:  - Identify patients at risk for skin breakdown  - Assess and monitor skin integrity  - Assess and monitor nutrition and hydration status  - Monitor labs   - Assess for incontinence   - Turn and reposition patient  - Assist with mobility/ambulation  - Relieve pressure over bony prominences  - Avoid friction and shearing  - Provide appropriate hygiene as needed including keeping skin clean and dry  - Evaluate need for skin moisturizer/barrier cream  - Collaborate with interdisciplinary team   - Patient/family teaching  - Consider wound care consult   Outcome: Progressing     Problem: Neurological Deficit  Goal: Neurological status is stable or improving  Description: Interventions:  - Monitor and assess patient's level of consciousness, motor function, sensory function, and level of assistance needed for ADLs.   - Monitor and report changes from baseline. Collaborate with interdisciplinary team to initiate plan and implement interventions as ordered.   - Provide and maintain a safe environment.  - Consider seizure precautions.  - Consider fall precautions.  - Consider aspiration precautions.  - Consider bleeding precautions.  Outcome: Progressing     Problem: Activity Intolerance/Impaired Mobility  Goal: Mobility/activity is maintained at optimum level for patient  Description: Interventions:  - Assess and monitor patient  barriers to mobility and need for assistive/adaptive devices.  - Assess patient's emotional response to limitations.  - Collaborate with interdisciplinary team and initiate plans and interventions as ordered.  - Encourage independent activity per ability.  - Maintain proper body alignment.  - Perform active/passive rom as tolerated/ordered.  - Plan activities to conserve energy.  - Turn patient as appropriate  Outcome: Progressing     Problem:  Communication Impairment  Goal: Ability to express needs and understand communication  Description: Assess patient's communication skills and ability to understand information.  Patient will demonstrate use of effective communication techniques, alternative methods of communication and understanding even if not able to speak.     - Encourage communication and provide alternate methods of communication as needed.  - Collaborate with case management/ for discharge needs.  - Include patient/family/caregiver in decisions related to communication.  Outcome: Progressing     Problem: Potential for Aspiration  Goal: Non-ventilated patient's risk of aspiration is minimized  Description: Assess and monitor vital signs, respiratory status, and labs (WBC).  Monitor for signs of aspiration (tachypnea, cough, rales, wheezing, cyanosis, fever).    - Assess and monitor patient's ability to swallow.  - Place patient up in chair to eat if possible.  - HOB up at 90 degrees to eat if unable to get patient up into chair.  - Supervise patient during oral intake.   - Instruct patient/ family to take small bites.  - Instruct patient/ family to take small single sips when taking liquids.  - Follow patient-specific strategies generated by speech pathologist.  Outcome: Progressing     Problem: Nutrition  Goal: Nutrition/Hydration status is improving  Description: Monitor and assess patient's nutrition/hydration status for malnutrition (ex- brittle hair, bruises, dry skin, pale skin and conjunctiva, muscle wasting, smooth red tongue, and disorientation). Collaborate with interdisciplinary team and initiate plan and interventions as ordered.  Monitor patient's weight and dietary intake as ordered or per policy. Utilize nutrition screening tool and intervene per policy. Determine patient's food preferences and provide high-protein, high-caloric foods as appropriate.   - Monitor nutrition parameters, recommending adjustments to  enteral nutrition orders at indicated.   - Assist patient with eating.  - Allow adequate time for meals.  - Encourage patient to take dietary supplement as ordered.  - Collaborate with interdisciplinary team.   - Include patient/family/caregiver in decisions related to nutrition.  Outcome: Progressing     Problem: PAIN - ADULT  Goal: Verbalizes/displays adequate comfort level or baseline comfort level  Description: Interventions:  - Encourage patient to monitor pain and request assistance  - Assess pain using appropriate pain scale  - Administer analgesics based on type and severity of pain and evaluate response  - Implement non-pharmacological measures as appropriate and evaluate response  - Consider cultural and social influences on pain and pain management  - Notify physician/advanced practitioner if interventions unsuccessful or patient reports new pain  Outcome: Progressing     Problem: INFECTION - ADULT  Goal: Absence or prevention of progression during hospitalization  Description: INTERVENTIONS:  - Assess and monitor for signs and symptoms of infection  - Monitor lab/diagnostic results  - Monitor all insertion sites, i.e. indwelling lines, tubes, and drains  - Monitor endotracheal if appropriate and nasal secretions for changes in amount and color  - [place] appropriate cooling/warming therapies per order  - Administer medications as ordered  - Instruct and encourage patient and family to use good hand hygiene technique  - Identify and instruct in appropriate isolation precautions for identified infection/condition  Outcome: Progressing  Goal: Absence of fever/infection during neutropenic period  Description: INTERVENTIONS:  - Monitor WBC    Outcome: Progressing     Problem: SAFETY ADULT  Goal: Patient will remain free of falls  Description: INTERVENTIONS:  - Educate patient/family on patient safety including physical limitations  - Instruct patient to call for assistance with activity   - Consult OT/PT to  assist with strengthening/mobility   - Keep Call bell within reach  - Keep bed low and locked with side rails adjusted as appropriate  - Keep care items and personal belongings within reach  - Initiate and maintain comfort rounds  - Make Fall Risk Sign visible to staff  - Offer Toileting every  Hours, in advance of need  - Initiate/Maintain alarm  - Obtain necessary fall risk management equipment:   - Apply yellow socks and bracelet for high fall risk patients  - Consider moving patient to room near nurses station  Outcome: Progressing  Goal: Maintain or return to baseline ADL function  Description: INTERVENTIONS:  -  Assess patient's ability to carry out ADLs; assess patient's baseline for ADL function and identify physical deficits which impact ability to perform ADLs (bathing, care of mouth/teeth, toileting, grooming, dressing, etc.)  - Assess/evaluate cause of self-care deficits   - Assess range of motion  - Assess patient's mobility; develop plan if impaired  - Assess patient's need for assistive devices and provide as appropriate  - Encourage maximum independence but intervene and supervise when necessary  - Involve family in performance of ADLs  - Assess for home care needs following discharge   - Consider OT consult to assist with ADL evaluation and planning for discharge  - Provide patient education as appropriate  Outcome: Progressing  Goal: Maintains/Returns to pre admission functional level  Description: INTERVENTIONS:  - Perform AM-PAC 6 Click Basic Mobility/ Daily Activity assessment daily.  - Set and communicate daily mobility goal to care team and patient/family/caregiver.   - Collaborate with rehabilitation services on mobility goals if consulted  - Perform Range of Motion  times a day.  - Reposition patient every hours.  - Dangle patient  times a day  - Stand patient  times a day  - Ambulate patient  times a day  - Out of bed to chair  times a day   - Out of bed for meals  times a day  - Out of bed  for toileting  - Record patient progress and toleration of activity level   Outcome: Progressing     Problem: DISCHARGE PLANNING  Goal: Discharge to home or other facility with appropriate resources  Description: INTERVENTIONS:  - Identify barriers to discharge w/patient and caregiver  - Arrange for needed discharge resources and transportation as appropriate  - Identify discharge learning needs (meds, wound care, etc.)  - Arrange for interpretive services to assist at discharge as needed  - Refer to Case Management Department for coordinating discharge planning if the patient needs post-hospital services based on physician/advanced practitioner order or complex needs related to functional status, cognitive ability, or social support system  Outcome: Progressing     Problem: Knowledge Deficit  Goal: Patient/family/caregiver demonstrates understanding of disease process, treatment plan, medications, and discharge instructions  Description: Complete learning assessment and assess knowledge base.  Interventions:  - Provide teaching at level of understanding  - Provide teaching via preferred learning methods  Outcome: Progressing     Problem: Nutrition/Hydration-ADULT  Goal: Nutrient/Hydration intake appropriate for improving, restoring or maintaining nutritional needs  Description: Monitor and assess patient's nutrition/hydration status for malnutrition. Collaborate with interdisciplinary team and initiate plan and interventions as ordered.  Monitor patient's weight and dietary intake as ordered or per policy. Utilize nutrition screening tool and intervene as necessary. Determine patient's food preferences and provide high-protein, high-caloric foods as appropriate.     INTERVENTIONS:  - Monitor oral intake, urinary output, labs, and treatment plans  - Assess nutrition and hydration status and recommend course of action  - Evaluate amount of meals eaten  - Assist patient with eating if necessary   - Allow adequate time  for meals  - Recommend/ encourage appropriate diets, oral nutritional supplements, and vitamin/mineral supplements  - Order, calculate, and assess calorie counts as needed  - Recommend, monitor, and adjust tube feeding based on assessed needs  - Assess need for intravenous fluids  - Provide nutrition/hydration education as appropriate  - Include patient/family/caregiver in decisions related to nutrition  Outcome: Progressing     Problem: NEUROSENSORY - ADULT  Goal: Achieves stable or improved neurological status  Description: INTERVENTIONS  - Monitor and report changes in neurological status  - Monitor vital signs such as temperature, blood pressure, glucose, and any other labs ordered   - Initiate measures to prevent increased intracranial pressure  - Monitor for seizure activity and implement precautions if appropriate      Outcome: Progressing  Goal: Remains free of injury related to seizures activity  Description: INTERVENTIONS  - Maintain airway, patient safety  and administer oxygen as ordered  - Monitor patient for seizure activity, document and report duration and description of seizure to physician/advanced practitioner  - If seizure occurs,  ensure patient safety during seizure  - Reorient patient post seizure  - Seizure pads on all 4 side rails  - Instruct patient/family to notify RN of any seizure activity including if an aura is experienced  - Instruct patient/family to call for assistance with activity based on nursing assessment  - Administer anti-seizure medications if ordered    Outcome: Progressing  Goal: Achieves maximal functionality and self care  Description: INTERVENTIONS  - Monitor swallowing and airway patency with patient fatigue and changes in neurological status  - Encourage and assist patient to increase activity and self care.   - Encourage visually impaired, hearing impaired and aphasic patients to use assistive/communication devices  Outcome: Progressing     Problem: CARDIOVASCULAR -  ADULT  Goal: Maintains optimal cardiac output and hemodynamic stability  Description: INTERVENTIONS:  - Monitor I/O, vital signs and rhythm  - Monitor for S/S and trends of decreased cardiac output  - Administer and titrate ordered vasoactive medications to optimize hemodynamic stability  - Assess quality of pulses, skin color and temperature  - Assess for signs of decreased coronary artery perfusion  - Instruct patient to report change in severity of symptoms  Outcome: Progressing  Goal: Absence of cardiac dysrhythmias or at baseline rhythm  Description: INTERVENTIONS:  - Continuous cardiac monitoring, vital signs, obtain 12 lead EKG if ordered  - Administer antiarrhythmic and heart rate control medications as ordered  - Monitor electrolytes and administer replacement therapy as ordered  Outcome: Progressing     Problem: RESPIRATORY - ADULT  Goal: Achieves optimal ventilation and oxygenation  Description: INTERVENTIONS:  - Assess for changes in respiratory status  - Assess for changes in mentation and behavior  - Position to facilitate oxygenation and minimize respiratory effort  - Oxygen administered by appropriate delivery if ordered  - Initiate smoking cessation education as indicated  - Encourage broncho-pulmonary hygiene including cough, deep breathe, Incentive Spirometry  - Assess the need for suctioning and aspirate as needed  - Assess and instruct to report SOB or any respiratory difficulty  - Respiratory Therapy support as indicated  Outcome: Progressing     Problem: GASTROINTESTINAL - ADULT  Goal: Maintains or returns to baseline bowel function  Description: INTERVENTIONS:  - Assess bowel function  - Encourage oral fluids to ensure adequate hydration  - Administer IV fluids if ordered to ensure adequate hydration  - Administer ordered medications as needed  - Encourage mobilization and activity  - Consider nutritional services referral to assist patient with adequate nutrition and appropriate food  choices  Outcome: Progressing  Goal: Maintains adequate nutritional intake  Description: INTERVENTIONS:  - Monitor percentage of each meal consumed  - Identify factors contributing to decreased intake, treat as appropriate  - Assist with meals as needed  - Monitor I&O, weight, and lab values if indicated  - Obtain nutrition services referral as needed  Outcome: Progressing     Problem: GENITOURINARY - ADULT  Goal: Maintains or returns to baseline urinary function  Description: INTERVENTIONS:  - Assess urinary function  - Encourage oral fluids to ensure adequate hydration if ordered  - Administer IV fluids as ordered to ensure adequate hydration  - Administer ordered medications as needed  - Offer frequent toileting  - Follow urinary retention protocol if ordered  Outcome: Progressing  Goal: Absence of urinary retention  Description: INTERVENTIONS:  - Assess patient’s ability to void and empty bladder  - Monitor I/O  - Bladder scan as needed  - Discuss with physician/AP medications to alleviate retention as needed  - Discuss catheterization for long term situations as appropriate  Outcome: Progressing     Problem: METABOLIC, FLUID AND ELECTROLYTES - ADULT  Goal: Electrolytes maintained within normal limits  Description: INTERVENTIONS:  - Monitor labs and assess patient for signs and symptoms of electrolyte imbalances  - Administer electrolyte replacement as ordered  - Monitor response to electrolyte replacements, including repeat lab results as appropriate  - Instruct patient on fluid and nutrition as appropriate  Outcome: Progressing  Goal: Fluid balance maintained  Description: INTERVENTIONS:  - Monitor labs   - Monitor I/O and WT  - Instruct patient on fluid and nutrition as appropriate  - Assess for signs & symptoms of volume excess or deficit  Outcome: Progressing  Goal: Glucose maintained within target range  Description: INTERVENTIONS:  - Monitor Blood Glucose as ordered  - Assess for signs and symptoms of  hyperglycemia and hypoglycemia  - Administer ordered medications to maintain glucose within target range  - Assess nutritional intake and initiate nutrition service referral as needed  Outcome: Progressing     Problem: SKIN/TISSUE INTEGRITY - ADULT  Goal: Skin Integrity remains intact(Skin Breakdown Prevention)  Description: Assess:  -Perform [place] assessment every   -Clean and moisturize skin every   -Inspect skin when repositioning, toileting, and assisting with ADLS  -Assess under medical devices such as  every  -Assess extremities for adequate circulation and sensation     Bed Management:  -Have minimal linens on bed & keep smooth, unwrinkled  -Change linens as needed when moist or perspiring  -Avoid sitting or lying in one position for more than  hours while in bed  -Keep HOB at degrees     Toileting:  -Offer bedside commode  -Assess for incontinence every   -Use incontinent care products after each incontinent episode such as     Activity:  -Mobilize patient  times a day  -Encourage activity and walks on unit  -Encourage or provide ROM exercises   -Turn and reposition patient every  Hours  -Use appropriate equipment to lift or move patient in bed  -Instruct/ Assist with weight shifting every  when out of bed in chair  -Consider limitation of chair time  hour intervals    Skin Care:  -Avoid use of baby powder, tape, friction and shearing, hot water or constrictive clothing  -Relieve pressure over bony prominences using   -Do not massage red bony areas    Next Steps:  -Teach patient strategies to minimize risks such as    -Consider consults to  interdisciplinary teams such as   Outcome: Progressing  Goal: Incision(s), wounds(s) or drain site(s) healing without S/S of infection  Description: INTERVENTIONS  - Assess and document dressing, incision, wound bed, drain sites and surrounding tissue  - Provide patient and family education  - Perform skin care/dressing changes   Outcome: Progressing     Problem:  HEMATOLOGIC - ADULT  Goal: Maintains hematologic stability  Description: INTERVENTIONS  - Assess for signs and symptoms of bleeding or hemorrhage  - Monitor labs  - Administer supportive blood products/factors as ordered and appropriate  Outcome: Progressing     Problem: MUSCULOSKELETAL - ADULT  Goal: Maintain or return mobility to safest level of function  Description: INTERVENTIONS:  - Assess patient's ability to carry out ADLs; assess patient's baseline for ADL function and identify physical deficits which impact ability to perform ADLs (bathing, care of mouth/teeth, toileting, grooming, dressing, etc.)  - Assess/evaluate cause of self-care deficits   - Assess range of motion  - Assess patient's mobility  - Assess patient's need for assistive devices and provide as appropriate  - Encourage maximum independence but intervene and supervise when necessary  - Involve family in performance of ADLs  - Assess for home care needs following discharge   - Consider OT consult to assist with ADL evaluation and planning for discharge  - Provide patient education as appropriate  Outcome: Progressing     Problem: COPING  Goal: Pt/Family able to verbalize concerns and demonstrate effective coping strategies  Description: INTERVENTIONS:  - Assist patient/family to identify coping skills, available support systems and cultural and spiritual values  - Provide emotional support, including active listening and acknowledgement of concerns of patient and caregivers  - Reduce environmental stimuli, as able  - Provide patient education  - Assess for spiritual pain/suffering and initiate spiritual care, including notification of Pastoral Care or hal[place] based community as needed  - Assess effectiveness of coping strategies  Outcome: Progressing  Goal: Will report anxiety at manageable levels  Description: INTERVENTIONS:  - Administer medication as ordered  - Teach and encourage coping skills  - Provide emotional support  - Assess  patient/family for anxiety and ability to cope  Outcome: Progressing     Problem: Potential for Falls  Goal: Patient will remain free of falls  Description: INTERVENTIONS:  - Educate patient/family on patient safety including physical limitations  - Instruct patient to call for assistance with activity   - Consult OT/PT to assist with strengthening/mobility   - Keep Call bell within reach  - Keep bed low and locked with side rails adjusted as appropriate  - Keep care items and personal belongings within reach  - Initiate and maintain comfort rounds  - Make Fall Risk Sign visible to staff  - Offer Toileting every  Hours, in advance of need  - Initiate/Maintain alarm  - Obtain necessary fall risk management equipment:   - Apply yellow socks and bracelet for high fall risk patients  - Consider moving patient to room near nurses station  Outcome: Progressing

## 2024-07-19 NOTE — PROGRESS NOTES
[facility]  Progress Note: Critical Care  Name: Federico Bowen 68 y.o. male I MRN: [id number]  Unit/Bed#: ICU 07 I Date of Admission: 7/12/2024   Date of Service: 7/19/2024 I Hospital Day: 7    Assessment & Plan   Neuro:   Diagnosis: SAH with IVH and mild hydro s/p coil embolization of R PCOM aneurysm  BD 7 HH5, MF 4  7/11 CTAH/N-0.3 cm aneurysm in expected origin of right posterior communicating artery. Possible tiny aneurysm in left anterior communicating artery region. Mild narrowing of bilateral MCA M1 and bilateral M2 segmental branch vessels, likely due to vasospasm.   7/11 CTH-Acute diffuse thickened large-volume SAH throughout the b/l parafalcine regions, b/l cerebral sulci (R worse than L), basilar cisterns, prepontine cistern, premedullary cistern, and visualized upper cervical spinal canal. Acute small volume IVH with mild hydrocephalus.   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   7/14 CTH -No significant interval change in extensive bilateral SAH and IVH.   7/14 vEEG no seizures  7/16 CTA-Redemonstrated SAH overlying the right greater than left convexities and concentrated within the right sylvian cistern and fissure. Subacute infarct involving the right anterior temporal region and medial occipital region. Expected postoperative appearance following embolization of right posterior communicating artery aneurysm   Plan:   Neuro/Nsx consulted  EVD 10. Goal ICP <20   ICP 5-12  56cc/24hrs  SBP <220  Keppra ppx x 7 days   Nimodipine x 21days  ASA s/p coil  Daily TCD  R1.4 L1.29  SR Hep gtt   Euvolemia (+931)  Normonatremia (141)--salt tabs  Stat CTH with any change in GCS > 2 pts     Diagnosis: Seizure  2/2 above  vEEG no seizures  Plan:   Neuro cx  Keppra 1gm BID--t/c decreasing to 500mg  CV:   Diagnosis: Hx CAD/HTN/HLD  Plan:   Cont home statin/ASA  Holding home entresto/brilanta     Pulm:  Diagnosis: Hx COPD without  AE  Plan:   home inhalers     GI:   Diagnosis: Dysphagia/GERD  Plan:   Speech eval  NGT for now pending reeval  Cont home PPI     :   No active issues      F/E/N:    No active issues     Heme/Onc:   Diagnosis: Anemia  Plan:   Unclear baseline no s/s bleeding monitor     Endo:   Hyperglycemia  A1C 5.9  Cont ssi     ID:   Diagnosis: Leukocytosis  Afebrile monitor        MSK/Skin:   Diagnosis: Penile lesion  Plan: note from outpt derm inflamed seborrheic keratosis       Disposition: Critical care    ICU Core Measures     A: Assess, Prevent, and Manage Pain Has pain been assessed? Yes  Need for changes to pain regimen? No   B: Both SAT/SAT  N/A   C: Choice of Sedation RASS Goal: 0 Alert and Calm  Need for changes to sedation or analgesia regimen? No   D: Delirium CAM-ICU: Negative   E: Early Mobility  Plan for early mobility? Yes   F: Family Engagement Plan for family engagement today? Yes       Review of Invasive Devices:      Central access plan:  consult vasc access for midline      Prophylaxis:  VTE VTE covered by:  heparin (porcine), Intravenous, 12 Units/kg/hr at 07/18/24 0620       Stress Ulcer  covered byomeprazole (PriLOSEC) 40 MG capsule [[id number]] (Long-Term Med), pantoprazole (PROTONIX) injection 40 mg [[id number]]        Significant 24hr Events     24hr events: No events     Subjective     Review of Systems: Review of Systems   Constitutional:  Positive for fatigue.   Respiratory: Negative.     Cardiovascular: Negative.    Neurological:  Positive for headaches.        Objective                            Vitals I/O      Most Recent Min/Max in 24hrs   Temp 100.2 °F (37.9 °C) Temp  Min: 98 °F (36.7 °C)  Max: 101.5 °F (38.6 °C)   Pulse (!) 118 Pulse  Min: 96  Max: 124   Resp 14 Resp  Min: 13  Max: 25   BP (!) 151/111 BP  Min: 114/69  Max: 163/91   O2 Sat 98 % SpO2  Min: 97 %  Max: 99 %      Intake/Output Summary (Last 24 hours) at 7/19/2024 0358  Last data filed at 7/19/2024 0200  Gross per 24 hour   Intake  2910 ml   Output 1632 ml   Net 1278 ml       Diet Enteral/Parenteral; Tube Feeding No Oral Diet; Jevity 1.2 Josh; Continuous; 60; 40; Saline; Every 4 hours    Invasive Monitoring             Physical Exam   Physical Exam  Eyes:      Pupils: Pupils are equal, round, and reactive to light.   Skin:     General: Skin is warm and dry.   HENT:      Head: Normocephalic and atraumatic. Head contusion: EVD.      Mouth/Throat:      Mouth: Mucous membranes are moist.   Cardiovascular:      Rate and Rhythm: Normal rate and regular rhythm.      Pulses: Normal pulses.      Heart sounds: Normal heart sounds.   Musculoskeletal:         General: No swelling. Normal range of motion.      Right lower leg: No edema.      Left lower leg: No edema.   Abdominal: General: Bowel sounds are normal. There is no distension.      Palpations: Abdomen is soft.      Tenderness: There is no abdominal tenderness.   Constitutional:       General: He is not in acute distress.  Pulmonary:      Effort: Pulmonary effort is normal. No respiratory distress.      Breath sounds: Normal breath sounds.   Neurological:      General: No focal deficit present.      Mental Status: He is oriented to person, place and time. He is lethargic.      GCS: GCS eye subscore is 4. GCS verbal subscore is 5. GCS motor subscore is 6.      Motor: Strength full and intact in all extremities. No motor deficit.            Diagnostic Studies      EKG: tele  Imaging:  I have personally reviewed pertinent reports.   and I have personally reviewed pertinent films in PACS     Medications:  Scheduled PRN   acetaminophen, 650 mg, Q6H  aspirin, 81 mg, Daily  atorvastatin, 80 mg, Daily  chlorhexidine, 15 mL, Q12H MATTY  ezetimibe, 10 mg, QAM  folic acid, 400 mcg, Daily  gabapentin, 600 mg, TID  insulin lispro, 1-5 Units, Q6H MATTY  levETIRAcetam, 1,000 mg, Q12H MATTY  lidocaine, 1 patch, Daily  mirtazapine, 15 mg, HS  niMODipine, 60 mg, Q4H MATTY  omeprazole (PRILOSEC) suspension 2 mg/mL, 20 mg,  Daily  polyethylene glycol, 17 g, Daily  senna, 1 tablet, HS  sodium chloride, 1 g, TID With Meals  thiamine, 100 mg, Daily  umeclidinium, 1 puff, Daily      bisacodyl, 10 mg, Daily PRN  hydrALAZINE, 10 mg, Q6H PRN  HYDROmorphone, 0.2 mg, Q4H PRN  labetalol, 10 mg, Q6H PRN  methocarbamol, 500 mg, Q6H PRN  ondansetron, 4 mg, Q6H PRN  oxyCODONE, 2.5 mg, Q4H PRN  oxyCODONE, 5 mg, Q4H PRN       Continuous    heparin (porcine), 12 Units/kg/hr (Order-Specific), Last Rate: 12 Units/kg/hr (07/18/24 0620)         Labs:    CBC    Recent Labs     07/17/24  0550 07/18/24  0520   WBC 13.47* 10.33*   HGB 13.6 12.4   HCT 38.8 37.2    257     BMP    Recent Labs     07/17/24  0550 07/18/24  0520   SODIUM 139 141   K 3.6 4.0    106   CO2 24 24   AGAP 10 11   BUN 13 20   CREATININE 0.68 0.82   CALCIUM 9.7 9.9       Coags    Recent Labs     07/17/24  0550 07/18/24  0520   PTT 31 30        Additional Electrolytes  Recent Labs     07/17/24  0550 07/18/24  0520   MG 2.0 2.0   PHOS 2.4 4.1   CAIONIZED 1.23 1.25          Blood Gas    No recent results  No recent results LFTs  Recent Labs     07/17/24  0550 07/18/24  0520   ALT 13 13   AST 13 13   ALKPHOS 128* 123*   ALB 4.1 3.8   TBILI 0.83 0.63       Infectious  No recent results  Glucose  Recent Labs     07/17/24  0550 07/18/24  0520   GLUC 146* 140               CHRISTINA Peterson

## 2024-07-19 NOTE — ASSESSMENT & PLAN NOTE
aSAH from R PCOM aneurysm rupture  BD 8 - HH 5, MF 4  PPD 6 right PCOM coil embolization  P/w headache and right leg pain, he had 2 witnessed seizures by EMS was ultimately intubated.  Upon arrival to the ED patient was posturing.  Imaging revealed a large amount of SAH and a right PCOM aneurysm.  Per chart review he was previously on aspirin reversed with DDAVP.  Overnight on 7/16, patient noted to be more lethargic complaining of severe headache was sent down for CTA.    Imaging:    CT head without 7/14/2024:No significant interval change in extensive bilateral subarachnoid hemorrhage and intraventricular hemorrhage.     CTA head w/wo 7/16/24:CT Brain: Redemonstrated subarachnoid hemorrhage overlying the right greater than left convexities and concentrated within the right sylvian cistern and fissure. 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    Plan:  Continue frequent neurological checks  Repeat CTA/CT head w/wo STAT if GCS declines more than 2 points in 1 hour  Continue SAH pathway/spasm watch:  Daily TCD's: Pending today  Continue Nimodipine 60mg Q4hrs  Continue Keppra 1000 mg twice daily per neurology  Normonatremia (142 today) salt tabs stopped  Euvolemia (+812)   Hemoglobin >8 (12.4 today)  EVD placed on 7/13/2024  Continue EVD at 10mmhg  Monitor output and ICP  ICPs 4-22, 7 in room   Output 66 ml/24hrs  Maintain ICP goal <20  SBP <220  Continue aspirin 81 mg daily  Heparin drip at 12 units/kilogram/hour  Last PTT 29  Medical management and pain control per primary team  DVT PPX: SCDs and heparin drip    Neurosurgery will continue to follow closely, call with any further questions or concerns.

## 2024-07-19 NOTE — SPEECH THERAPY NOTE
Speech-Language Pathology Progress Note    Patient Name: Federico Bowen    Today's Date: 7/19/2024    Subjective:  Pt was lethargic. He was partially reclined. Limited responsiveness.    Objective:  Pt was seen today for dysphagia therapy. He is currently NPO with NGT feeds. Pt was on room air. Oral care was completed with use of oral care kits. Focus of today's session was to assess swallow function with conservative trials of ice chips.  Swallow function:   Bolus retrieval was  weak . Control and manipulation were  minimal . Pharyngeal swallow initiation was sluggish and delayed. Hyolaryngeal excursion was reduced. Cough occurred with ice chips. Cough was weak and non productive. Pt resisted oral suctioning.     Assessment:  Concern remains for aspiration, min trials offered today d/t weakness and s/s aspiration.     Plan:  Continue NPO with NGT, PO trials with SLP only. Continue ST follow up.

## 2024-07-20 ENCOUNTER — APPOINTMENT (INPATIENT)
Dept: NON INVASIVE DIAGNOSTICS | Facility: HOSPITAL | Age: 69
DRG: 020 | End: 2024-07-20
Payer: MEDICARE

## 2024-07-20 LAB
ANION GAP SERPL CALCULATED.3IONS-SCNC: 6 MMOL/L (ref 4–13)
APTT PPP: 29 SECONDS (ref 23–37)
BASOPHILS # BLD AUTO: 0.03 THOUSANDS/ÂΜL (ref 0–0.1)
BASOPHILS NFR BLD AUTO: 0 % (ref 0–1)
BUN SERPL-MCNC: 23 MG/DL (ref 5–25)
CA-I BLD-SCNC: 1.25 MMOL/L (ref 1.12–1.32)
CALCIUM SERPL-MCNC: 9.5 MG/DL (ref 8.4–10.2)
CHLORIDE SERPL-SCNC: 106 MMOL/L (ref 96–108)
CO2 SERPL-SCNC: 30 MMOL/L (ref 21–32)
CREAT SERPL-MCNC: 0.78 MG/DL (ref 0.6–1.3)
EOSINOPHIL # BLD AUTO: 0.13 THOUSAND/ÂΜL (ref 0–0.61)
EOSINOPHIL NFR BLD AUTO: 1 % (ref 0–6)
ERYTHROCYTE [DISTWIDTH] IN BLOOD BY AUTOMATED COUNT: 13.9 % (ref 11.6–15.1)
GFR SERPL CREATININE-BSD FRML MDRD: 92 ML/MIN/1.73SQ M
GLUCOSE SERPL-MCNC: 143 MG/DL (ref 65–140)
GLUCOSE SERPL-MCNC: 153 MG/DL (ref 65–140)
GLUCOSE SERPL-MCNC: 164 MG/DL (ref 65–140)
GLUCOSE SERPL-MCNC: 166 MG/DL (ref 65–140)
GLUCOSE SERPL-MCNC: 170 MG/DL (ref 65–140)
HCT VFR BLD AUTO: 34.2 % (ref 36.5–49.3)
HGB BLD-MCNC: 11.5 G/DL (ref 12–17)
IMM GRANULOCYTES # BLD AUTO: 0.04 THOUSAND/UL (ref 0–0.2)
IMM GRANULOCYTES NFR BLD AUTO: 0 % (ref 0–2)
LYMPHOCYTES # BLD AUTO: 1.39 THOUSANDS/ÂΜL (ref 0.6–4.47)
LYMPHOCYTES NFR BLD AUTO: 12 % (ref 14–44)
MAGNESIUM SERPL-MCNC: 2.1 MG/DL (ref 1.9–2.7)
MCH RBC QN AUTO: 31.5 PG (ref 26.8–34.3)
MCHC RBC AUTO-ENTMCNC: 33.6 G/DL (ref 31.4–37.4)
MCV RBC AUTO: 94 FL (ref 82–98)
MONOCYTES # BLD AUTO: 0.82 THOUSAND/ÂΜL (ref 0.17–1.22)
MONOCYTES NFR BLD AUTO: 7 % (ref 4–12)
NEUTROPHILS # BLD AUTO: 9.4 THOUSANDS/ÂΜL (ref 1.85–7.62)
NEUTS SEG NFR BLD AUTO: 80 % (ref 43–75)
NRBC BLD AUTO-RTO: 0 /100 WBCS
PHOSPHATE SERPL-MCNC: 3.6 MG/DL (ref 2.3–4.1)
PLATELET # BLD AUTO: 308 THOUSANDS/UL (ref 149–390)
PMV BLD AUTO: 9.9 FL (ref 8.9–12.7)
POTASSIUM SERPL-SCNC: 4.1 MMOL/L (ref 3.5–5.3)
RBC # BLD AUTO: 3.65 MILLION/UL (ref 3.88–5.62)
SODIUM SERPL-SCNC: 142 MMOL/L (ref 135–147)
WBC # BLD AUTO: 11.81 THOUSAND/UL (ref 4.31–10.16)

## 2024-07-20 PROCEDURE — 94640 AIRWAY INHALATION TREATMENT: CPT

## 2024-07-20 PROCEDURE — 94760 N-INVAS EAR/PLS OXIMETRY 1: CPT

## 2024-07-20 PROCEDURE — 93886 INTRACRANIAL COMPLETE STUDY: CPT | Performed by: SURGERY

## 2024-07-20 PROCEDURE — 93886 INTRACRANIAL COMPLETE STUDY: CPT

## 2024-07-20 PROCEDURE — 85025 COMPLETE CBC W/AUTO DIFF WBC: CPT

## 2024-07-20 PROCEDURE — 84100 ASSAY OF PHOSPHORUS: CPT

## 2024-07-20 PROCEDURE — 83735 ASSAY OF MAGNESIUM: CPT

## 2024-07-20 PROCEDURE — 82948 REAGENT STRIP/BLOOD GLUCOSE: CPT

## 2024-07-20 PROCEDURE — 94664 DEMO&/EVAL PT USE INHALER: CPT

## 2024-07-20 PROCEDURE — 82330 ASSAY OF CALCIUM: CPT

## 2024-07-20 PROCEDURE — 92526 ORAL FUNCTION THERAPY: CPT

## 2024-07-20 PROCEDURE — 85730 THROMBOPLASTIN TIME PARTIAL: CPT

## 2024-07-20 PROCEDURE — 99232 SBSQ HOSP IP/OBS MODERATE 35: CPT | Performed by: STUDENT IN AN ORGANIZED HEALTH CARE EDUCATION/TRAINING PROGRAM

## 2024-07-20 PROCEDURE — 99291 CRITICAL CARE FIRST HOUR: CPT | Performed by: PSYCHIATRY & NEUROLOGY

## 2024-07-20 PROCEDURE — 80048 BASIC METABOLIC PNL TOTAL CA: CPT

## 2024-07-20 RX ORDER — SODIUM CHLORIDE FOR INHALATION 3 %
4 VIAL, NEBULIZER (ML) INHALATION
Status: DISCONTINUED | OUTPATIENT
Start: 2024-07-20 | End: 2024-07-20

## 2024-07-20 RX ORDER — GUAIFENESIN 100 MG/5ML
400 SOLUTION ORAL EVERY 6 HOURS
Status: DISCONTINUED | OUTPATIENT
Start: 2024-07-20 | End: 2024-07-23

## 2024-07-20 RX ADMIN — Medication 20 MG: at 10:27

## 2024-07-20 RX ADMIN — CHLORHEXIDINE GLUCONATE 0.12% ORAL RINSE 15 ML: 1.2 LIQUID ORAL at 08:07

## 2024-07-20 RX ADMIN — METOPROLOL SUCCINATE 50 MG: 50 TABLET, EXTENDED RELEASE ORAL at 21:22

## 2024-07-20 RX ADMIN — FOLIC ACID TAB 400 MCG 400 MCG: 400 TAB at 08:07

## 2024-07-20 RX ADMIN — CHLORHEXIDINE GLUCONATE 0.12% ORAL RINSE 15 ML: 1.2 LIQUID ORAL at 20:12

## 2024-07-20 RX ADMIN — SODIUM CHLORIDE SOLN NEBU 3% 4 ML: 3 NEBU SOLN at 13:28

## 2024-07-20 RX ADMIN — INSULIN LISPRO 1 UNITS: 100 INJECTION, SOLUTION INTRAVENOUS; SUBCUTANEOUS at 18:09

## 2024-07-20 RX ADMIN — Medication 60 MG: at 23:31

## 2024-07-20 RX ADMIN — INSULIN LISPRO 1 UNITS: 100 INJECTION, SOLUTION INTRAVENOUS; SUBCUTANEOUS at 06:13

## 2024-07-20 RX ADMIN — GUAIFENESIN 400 MG: 200 SOLUTION ORAL at 12:02

## 2024-07-20 RX ADMIN — GABAPENTIN 600 MG: 300 CAPSULE ORAL at 21:22

## 2024-07-20 RX ADMIN — Medication 5 SPRAY: at 18:08

## 2024-07-20 RX ADMIN — LEVETIRACETAM 750 MG: 100 SOLUTION ORAL at 21:22

## 2024-07-20 RX ADMIN — GUAIFENESIN 400 MG: 200 SOLUTION ORAL at 18:09

## 2024-07-20 RX ADMIN — HEPARIN SODIUM 12 UNITS/KG/HR: 10000 INJECTION, SOLUTION INTRAVENOUS at 00:13

## 2024-07-20 RX ADMIN — ACETAMINOPHEN 650 MG: 650 SUSPENSION ORAL at 16:03

## 2024-07-20 RX ADMIN — LEVETIRACETAM 750 MG: 100 SOLUTION ORAL at 10:27

## 2024-07-20 RX ADMIN — ACETAMINOPHEN 650 MG: 650 SUSPENSION ORAL at 03:56

## 2024-07-20 RX ADMIN — ALBUTEROL SULFATE 2.5 MG: 2.5 SOLUTION RESPIRATORY (INHALATION) at 11:06

## 2024-07-20 RX ADMIN — GABAPENTIN 600 MG: 300 CAPSULE ORAL at 08:07

## 2024-07-20 RX ADMIN — GUAIFENESIN 400 MG: 200 SOLUTION ORAL at 23:31

## 2024-07-20 RX ADMIN — Medication 60 MG: at 12:03

## 2024-07-20 RX ADMIN — Medication 60 MG: at 00:13

## 2024-07-20 RX ADMIN — Medication 60 MG: at 03:56

## 2024-07-20 RX ADMIN — EZETIMIBE 10 MG: 10 TABLET ORAL at 08:07

## 2024-07-20 RX ADMIN — Medication 60 MG: at 20:13

## 2024-07-20 RX ADMIN — ASPIRIN 81 MG CHEWABLE TABLET 81 MG: 81 TABLET CHEWABLE at 08:07

## 2024-07-20 RX ADMIN — Medication 60 MG: at 16:03

## 2024-07-20 RX ADMIN — BROMOCRIPTINE MESYLATE 10 MG: 2.5 TABLET ORAL at 21:22

## 2024-07-20 RX ADMIN — ATORVASTATIN CALCIUM 80 MG: 80 TABLET, FILM COATED ORAL at 08:07

## 2024-07-20 RX ADMIN — ACETAMINOPHEN 650 MG: 650 SUSPENSION ORAL at 21:22

## 2024-07-20 RX ADMIN — GABAPENTIN 600 MG: 300 CAPSULE ORAL at 16:03

## 2024-07-20 RX ADMIN — BROMOCRIPTINE MESYLATE 10 MG: 2.5 TABLET ORAL at 16:03

## 2024-07-20 RX ADMIN — LIDOCAINE 5% 1 PATCH: 700 PATCH TOPICAL at 08:07

## 2024-07-20 RX ADMIN — THIAMINE HCL TAB 100 MG 100 MG: 100 TAB at 08:07

## 2024-07-20 RX ADMIN — ACETAMINOPHEN 650 MG: 650 SUSPENSION ORAL at 10:27

## 2024-07-20 RX ADMIN — BROMOCRIPTINE MESYLATE 10 MG: 2.5 TABLET ORAL at 08:07

## 2024-07-20 RX ADMIN — INSULIN LISPRO 1 UNITS: 100 INJECTION, SOLUTION INTRAVENOUS; SUBCUTANEOUS at 12:02

## 2024-07-20 RX ADMIN — UMECLIDINIUM 1 PUFF: 62.5 AEROSOL, POWDER ORAL at 08:08

## 2024-07-20 RX ADMIN — Medication 60 MG: at 08:07

## 2024-07-20 NOTE — SPEECH THERAPY NOTE
Speech-Language Pathology Progress Note    Patient Name: Federico Bowen    Today's Date: 7/20/2024    Subjective:  Pt was more awake today, still benefits from cues to remain alert. He was sitting up in chair at bedside. Family members present (daughters).     Objective:  Pt was seen today for dysphagia therapy. Pt is NPO with NGT at this time. Currently on room air. Focus of today's session was  to re assess PO tolerance . Textures offered today included ice chips via tsp.  Swallow function:   Bolus retrieval and manipulation were  weak and reduced . Suspect reduced oral control. Pharyngeal swallow initiation was delayed. Hyolaryngeal excursion was reduced and weak. Cough and throat clearing occurred with ice chips.  Reviewed images of MBS with family members to further educate on concerns for dysphagia. They are eager for pt to take more fluids, however cautioned against the risks of aspiration. Will continue to reinforce and educate.     Assessment:  Slight improvement in alertness, still sleepy overall. Pt remains symptomatic with ice chips. Will need to repeat MBS next week prior to initiating PO.     Plan:  Continue primarily NPO now. Ice chips at discretion or RN only, and to be given only by RN with close monitoring for s/s aspiration. Nutrition and meds via tube. Repeat MBS next week.

## 2024-07-20 NOTE — PROGRESS NOTES
Progress Note - Neurosurgery   Federico Bowen 68 y.o. male MRN: [id number]  Unit/Bed#: ICU 07 Encounter: [id number]    Assessment:  68M s/p R pcom aneurysm rupture, R pcomm aneurysm coil embo 7/13    Plan:  -neuro checks  -evd @ 10  -tcds  -sbp < 220  -keppra  -nimotop  -icu care    Subjective/Objective     Subjective: gaviota    Objective:     I/O         07/18 0701 07/19 0700 07/19 0701 07/20 0700 07/20 0701 07/21 0700    P.O. 0      I.V. (mL/kg) 144 (2.9) 144 (2.9) 12 (0.2)    NG/ 270     IV Piggyback 500      Feedings 1292 840 120    Total Intake(mL/kg) 2896 (59.1) 1254 (25.6) 132 (2.7)    Urine (mL/kg/hr) 2018 (1.7) 1075 (0.9)     Emesis/NG output 0      Drains 66 14     Stool 0 0     Total Output 2084 1089     Net +812 +165 +132           Unmeasured Urine Occurrence 1 x 1 x     Unmeasured Stool Occurrence 1 x 1 x             Invasive Devices       Central Venous Catheter Line  Duration             CVC Central Lines 07/12/24 Triple 16cm Right Internal jugular 7 days              Drain  Duration             Ventriculostomy/Subdural Ventricular drainage catheter Occipital region 7 days    External Urinary Catheter Small 6 days    NG/OG/Enteral Tube 18 Fr Right nare 3 days                    Physical Exam:  Vitals: Blood pressure 115/72, pulse 88, temperature 100.4 °F (38 °C), resp. rate 14, weight 49 kg (108 lb 0.4 oz), SpO2 96%.,There is no height or weight on file to calculate BMI.    Lying in bed  A&Ox3  Gaze conjugate  Eomi  Fs  Tm  Fcx4, squeezes hands, wiggles toes      Lab Results:  Results from last 7 days   Lab Units 07/20/24  0543 07/19/24  0611 07/18/24  0520   WBC Thousand/uL 11.81* 10.54* 10.33*   HEMOGLOBIN g/dL 11.5* 12.4 12.4   HEMATOCRIT % 34.2* 36.8 37.2   PLATELETS Thousands/uL 308 283 257   SEGS PCT % 80* 72 77*   MONO PCT % 7 8 8   EOS PCT % 1 3 1     Results from last 7 days   Lab Units 07/20/24  0543 07/19/24  0611 07/18/24  0520 07/17/24  0550   POTASSIUM mmol/L 4.1 3.9 4.0 3.6  "  CHLORIDE mmol/L 106 106 106 105   CO2 mmol/L 30 26 24 24   BUN mg/dL 23 19 20 13   CREATININE mg/dL 0.78 0.71 0.82 0.68   CALCIUM mg/dL 9.5 9.3 9.9 9.7   ALK PHOS U/L  --  119* 123* 128*   ALT U/L  --  17 13 13   AST U/L  --  17 13 13     Results from last 7 days   Lab Units 07/20/24  0543 07/19/24  0611 07/18/24  0520   MAGNESIUM mg/dL 2.1 2.2 2.0     Results from last 7 days   Lab Units 07/20/24  0543 07/19/24  0611 07/18/24  0520   PHOSPHORUS mg/dL 3.6 3.9 4.1     Results from last 7 days   Lab Units 07/20/24  0543 07/19/24  0611 07/18/24  0520 07/15/24  0520 07/14/24  1311   INR   --   --   --   --  1.21*   PTT seconds 29 29 30   < > 34    < > = values in this interval not displayed.     No results found for: \"TROPONINT\"  ABG:  Lab Results   Component Value Date    PHART 7.314 (L) 07/12/2024    HTB7FWI 44.1 (H) 07/12/2024    PO2ART 138.2 (H) 07/12/2024    JSX7BDV 21.9 (L) 07/12/2024    BEART -4.2 07/12/2024    SOURCE Line, Arterial 07/12/2024       Imaging Studies: I have personally reviewed pertinent reports.   and I have personally reviewed pertinent films in PACS    EKG, Pathology, and Other Studies: I have personally reviewed pertinent reports.            "

## 2024-07-20 NOTE — PLAN OF CARE
Problem: Prexisting or High Potential for Compromised Skin Integrity  Goal: Skin integrity is maintained or improved  Description: INTERVENTIONS:  - Identify patients at risk for skin breakdown  - Assess and monitor skin integrity  - Assess and monitor nutrition and hydration status  - Monitor labs   - Assess for incontinence   - Turn and reposition patient  - Assist with mobility/ambulation  - Relieve pressure over bony prominences  - Avoid friction and shearing  - Provide appropriate hygiene as needed including keeping skin clean and dry  - Evaluate need for skin moisturizer/barrier cream  - Collaborate with interdisciplinary team   - Patient/family teaching  - Consider wound care consult   Outcome: Progressing     Problem: Neurological Deficit  Goal: Neurological status is stable or improving  Description: Interventions:  - Monitor and assess patient's level of consciousness, motor function, sensory function, and level of assistance needed for ADLs.   - Monitor and report changes from baseline. Collaborate with interdisciplinary team to initiate plan and implement interventions as ordered.   - Provide and maintain a safe environment.  - Consider seizure precautions.  - Consider fall precautions.  - Consider aspiration precautions.  - Consider bleeding precautions.  Outcome: Progressing     Problem: Activity Intolerance/Impaired Mobility  Goal: Mobility/activity is maintained at optimum level for patient  Description: Interventions:  - Assess and monitor patient  barriers to mobility and need for assistive/adaptive devices.  - Assess patient's emotional response to limitations.  - Collaborate with interdisciplinary team and initiate plans and interventions as ordered.  - Encourage independent activity per ability.  - Maintain proper body alignment.  - Perform active/passive rom as tolerated/ordered.  - Plan activities to conserve energy.  - Turn patient as appropriate  Outcome: Progressing     Problem:  Communication Impairment  Goal: Ability to express needs and understand communication  Description: Assess patient's communication skills and ability to understand information.  Patient will demonstrate use of effective communication techniques, alternative methods of communication and understanding even if not able to speak.     - Encourage communication and provide alternate methods of communication as needed.  - Collaborate with case management/ for discharge needs.  - Include patient/family/caregiver in decisions related to communication.  Outcome: Progressing     Problem: Potential for Aspiration  Goal: Non-ventilated patient's risk of aspiration is minimized  Description: Assess and monitor vital signs, respiratory status, and labs (WBC).  Monitor for signs of aspiration (tachypnea, cough, rales, wheezing, cyanosis, fever).    - Assess and monitor patient's ability to swallow.  - Place patient up in chair to eat if possible.  - HOB up at 90 degrees to eat if unable to get patient up into chair.  - Supervise patient during oral intake.   - Instruct patient/ family to take small bites.  - Instruct patient/ family to take small single sips when taking liquids.  - Follow patient-specific strategies generated by speech pathologist.  Outcome: Progressing     Problem: Nutrition  Goal: Nutrition/Hydration status is improving  Description: Monitor and assess patient's nutrition/hydration status for malnutrition (ex- brittle hair, bruises, dry skin, pale skin and conjunctiva, muscle wasting, smooth red tongue, and disorientation). Collaborate with interdisciplinary team and initiate plan and interventions as ordered.  Monitor patient's weight and dietary intake as ordered or per policy. Utilize nutrition screening tool and intervene per policy. Determine patient's food preferences and provide high-protein, high-caloric foods as appropriate.   - Monitor nutrition parameters, recommending adjustments to  enteral nutrition orders at indicated.   - Assist patient with eating.  - Allow adequate time for meals.  - Encourage patient to take dietary supplement as ordered.  - Collaborate with interdisciplinary team.   - Include patient/family/caregiver in decisions related to nutrition.  Outcome: Progressing     Problem: PAIN - ADULT  Goal: Verbalizes/displays adequate comfort level or baseline comfort level  Description: Interventions:  - Encourage patient to monitor pain and request assistance  - Assess pain using appropriate pain scale  - Administer analgesics based on type and severity of pain and evaluate response  - Implement non-pharmacological measures as appropriate and evaluate response  - Consider cultural and social influences on pain and pain management  - Notify physician/advanced practitioner if interventions unsuccessful or patient reports new pain  Outcome: Progressing     Problem: INFECTION - ADULT  Goal: Absence or prevention of progression during hospitalization  Description: INTERVENTIONS:  - Assess and monitor for signs and symptoms of infection  - Monitor lab/diagnostic results  - Monitor all insertion sites, i.e. indwelling lines, tubes, and drains  - Monitor endotracheal if appropriate and nasal secretions for changes in amount and color  - [place] appropriate cooling/warming therapies per order  - Administer medications as ordered  - Instruct and encourage patient and family to use good hand hygiene technique  - Identify and instruct in appropriate isolation precautions for identified infection/condition  Outcome: Progressing  Goal: Absence of fever/infection during neutropenic period  Description: INTERVENTIONS:  - Monitor WBC    Outcome: Progressing     Problem: SAFETY ADULT  Goal: Patient will remain free of falls  Description: INTERVENTIONS:  - Educate patient/family on patient safety including physical limitations  - Instruct patient to call for assistance with activity   - Consult OT/PT to  assist with strengthening/mobility   - Keep Call bell within reach  - Keep bed low and locked with side rails adjusted as appropriate  - Keep care items and personal belongings within reach  - Initiate and maintain comfort rounds  - Make Fall Risk Sign visible to staff  - Offer Toileting every  Hours, in advance of need  - Initiate/Maintain alarm  - Obtain necessary fall risk management equipment:   - Apply yellow socks and bracelet for high fall risk patients  - Consider moving patient to room near nurses station  Outcome: Progressing  Goal: Maintain or return to baseline ADL function  Description: INTERVENTIONS:  -  Assess patient's ability to carry out ADLs; assess patient's baseline for ADL function and identify physical deficits which impact ability to perform ADLs (bathing, care of mouth/teeth, toileting, grooming, dressing, etc.)  - Assess/evaluate cause of self-care deficits   - Assess range of motion  - Assess patient's mobility; develop plan if impaired  - Assess patient's need for assistive devices and provide as appropriate  - Encourage maximum independence but intervene and supervise when necessary  - Involve family in performance of ADLs  - Assess for home care needs following discharge   - Consider OT consult to assist with ADL evaluation and planning for discharge  - Provide patient education as appropriate  Outcome: Progressing  Goal: Maintains/Returns to pre admission functional level  Description: INTERVENTIONS:  - Perform AM-PAC 6 Click Basic Mobility/ Daily Activity assessment daily.  - Set and communicate daily mobility goal to care team and patient/family/caregiver.   - Collaborate with rehabilitation services on mobility goals if consulted  - Perform Range of Motion 3 times a day.  - Reposition patient every 2 hours.  - Dangle patient 3 times a day  - Stand patient 3 times a day  - Ambulate patient 3 times a day  - Out of bed to chair 3 times a day   - Out of bed for meals 3 times a day  -  Out of bed for toileting  - Record patient progress and toleration of activity level   Outcome: Progressing     Problem: DISCHARGE PLANNING  Goal: Discharge to home or other facility with appropriate resources  Description: INTERVENTIONS:  - Identify barriers to discharge w/patient and caregiver  - Arrange for needed discharge resources and transportation as appropriate  - Identify discharge learning needs (meds, wound care, etc.)  - Arrange for interpretive services to assist at discharge as needed  - Refer to Case Management Department for coordinating discharge planning if the patient needs post-hospital services based on physician/advanced practitioner order or complex needs related to functional status, cognitive ability, or social support system  Outcome: Progressing     Problem: Knowledge Deficit  Goal: Patient/family/caregiver demonstrates understanding of disease process, treatment plan, medications, and discharge instructions  Description: Complete learning assessment and assess knowledge base.  Interventions:  - Provide teaching at level of understanding  - Provide teaching via preferred learning methods  Outcome: Progressing     Problem: Nutrition/Hydration-ADULT  Goal: Nutrient/Hydration intake appropriate for improving, restoring or maintaining nutritional needs  Description: Monitor and assess patient's nutrition/hydration status for malnutrition. Collaborate with interdisciplinary team and initiate plan and interventions as ordered.  Monitor patient's weight and dietary intake as ordered or per policy. Utilize nutrition screening tool and intervene as necessary. Determine patient's food preferences and provide high-protein, high-caloric foods as appropriate.     INTERVENTIONS:  - Monitor oral intake, urinary output, labs, and treatment plans  - Assess nutrition and hydration status and recommend course of action  - Evaluate amount of meals eaten  - Assist patient with eating if necessary   - Allow  adequate time for meals  - Recommend/ encourage appropriate diets, oral nutritional supplements, and vitamin/mineral supplements  - Order, calculate, and assess calorie counts as needed  - Recommend, monitor, and adjust tube feeding based on assessed needs  - Assess need for intravenous fluids  - Provide nutrition/hydration education as appropriate  - Include patient/family/caregiver in decisions related to nutrition  Outcome: Progressing     Problem: NEUROSENSORY - ADULT  Goal: Achieves stable or improved neurological status  Description: INTERVENTIONS  - Monitor and report changes in neurological status  - Monitor vital signs such as temperature, blood pressure, glucose, and any other labs ordered   - Initiate measures to prevent increased intracranial pressure  - Monitor for seizure activity and implement precautions if appropriate      Outcome: Progressing  Goal: Remains free of injury related to seizures activity  Description: INTERVENTIONS  - Maintain airway, patient safety  and administer oxygen as ordered  - Monitor patient for seizure activity, document and report duration and description of seizure to physician/advanced practitioner  - If seizure occurs,  ensure patient safety during seizure  - Reorient patient post seizure  - Seizure pads on all 4 side rails  - Instruct patient/family to notify RN of any seizure activity including if an aura is experienced  - Instruct patient/family to call for assistance with activity based on nursing assessment  - Administer anti-seizure medications if ordered    Outcome: Progressing  Goal: Achieves maximal functionality and self care  Description: INTERVENTIONS  - Monitor swallowing and airway patency with patient fatigue and changes in neurological status  - Encourage and assist patient to increase activity and self care.   - Encourage visually impaired, hearing impaired and aphasic patients to use assistive/communication devices  Outcome: Progressing     Problem:  CARDIOVASCULAR - ADULT  Goal: Maintains optimal cardiac output and hemodynamic stability  Description: INTERVENTIONS:  - Monitor I/O, vital signs and rhythm  - Monitor for S/S and trends of decreased cardiac output  - Administer and titrate ordered vasoactive medications to optimize hemodynamic stability  - Assess quality of pulses, skin color and temperature  - Assess for signs of decreased coronary artery perfusion  - Instruct patient to report change in severity of symptoms  Outcome: Progressing  Goal: Absence of cardiac dysrhythmias or at baseline rhythm  Description: INTERVENTIONS:  - Continuous cardiac monitoring, vital signs, obtain 12 lead EKG if ordered  - Administer antiarrhythmic and heart rate control medications as ordered  - Monitor electrolytes and administer replacement therapy as ordered  Outcome: Progressing     Problem: RESPIRATORY - ADULT  Goal: Achieves optimal ventilation and oxygenation  Description: INTERVENTIONS:  - Assess for changes in respiratory status  - Assess for changes in mentation and behavior  - Position to facilitate oxygenation and minimize respiratory effort  - Oxygen administered by appropriate delivery if ordered  - Initiate smoking cessation education as indicated  - Encourage broncho-pulmonary hygiene including cough, deep breathe, Incentive Spirometry  - Assess the need for suctioning and aspirate as needed  - Assess and instruct to report SOB or any respiratory difficulty  - Respiratory Therapy support as indicated  Outcome: Progressing     Problem: GASTROINTESTINAL - ADULT  Goal: Maintains or returns to baseline bowel function  Description: INTERVENTIONS:  - Assess bowel function  - Encourage oral fluids to ensure adequate hydration  - Administer IV fluids if ordered to ensure adequate hydration  - Administer ordered medications as needed  - Encourage mobilization and activity  - Consider nutritional services referral to assist patient with adequate nutrition and  appropriate food choices  Outcome: Progressing  Goal: Maintains adequate nutritional intake  Description: INTERVENTIONS:  - Monitor percentage of each meal consumed  - Identify factors contributing to decreased intake, treat as appropriate  - Assist with meals as needed  - Monitor I&O, weight, and lab values if indicated  - Obtain nutrition services referral as needed  Outcome: Progressing     Problem: GENITOURINARY - ADULT  Goal: Maintains or returns to baseline urinary function  Description: INTERVENTIONS:  - Assess urinary function  - Encourage oral fluids to ensure adequate hydration if ordered  - Administer IV fluids as ordered to ensure adequate hydration  - Administer ordered medications as needed  - Offer frequent toileting  - Follow urinary retention protocol if ordered  Outcome: Progressing  Goal: Absence of urinary retention  Description: INTERVENTIONS:  - Assess patient’s ability to void and empty bladder  - Monitor I/O  - Bladder scan as needed  - Discuss with physician/AP medications to alleviate retention as needed  - Discuss catheterization for long term situations as appropriate  Outcome: Progressing     Problem: METABOLIC, FLUID AND ELECTROLYTES - ADULT  Goal: Electrolytes maintained within normal limits  Description: INTERVENTIONS:  - Monitor labs and assess patient for signs and symptoms of electrolyte imbalances  - Administer electrolyte replacement as ordered  - Monitor response to electrolyte replacements, including repeat lab results as appropriate  - Instruct patient on fluid and nutrition as appropriate  Outcome: Progressing  Goal: Fluid balance maintained  Description: INTERVENTIONS:  - Monitor labs   - Monitor I/O and WT  - Instruct patient on fluid and nutrition as appropriate  - Assess for signs & symptoms of volume excess or deficit  Outcome: Progressing  Goal: Glucose maintained within target range  Description: INTERVENTIONS:  - Monitor Blood Glucose as ordered  - Assess for signs  and symptoms of hyperglycemia and hypoglycemia  - Administer ordered medications to maintain glucose within target range  - Assess nutritional intake and initiate nutrition service referral as needed  Outcome: Progressing     Problem: SKIN/TISSUE INTEGRITY - ADULT  Goal: Skin Integrity remains intact(Skin Breakdown Prevention)  Description: Assess:  -Perform Martin assessment every   -Clean and moisturize skin every   -Inspect skin when repositioning, toileting, and assisting with ADLS  -Assess under medical devices such as  every   -Assess extremities for adequate circulation and sensation     Bed Management:  -Have minimal linens on bed & keep smooth, unwrinkled  -Change linens as needed when moist or perspiring  -Avoid sitting or lying in one position for more than  hours while in bed  -Keep HOB at degrees     Toileting:  -Offer bedside commode  -Assess for incontinence every   -Use incontinent care products after each incontinent episode such as     Activity:  -Mobilize patient  times a day  -Encourage activity and walks on unit  -Encourage or provide ROM exercises   -Turn and reposition patient every  Hours  -Use appropriate equipment to lift or move patient in bed  -Instruct/ Assist with weight shifting every  when out of bed in chair  -Consider limitation of chair time  hour intervals    Skin Care:  -Avoid use of baby powder, tape, friction and shearing, hot water or constrictive clothing  -Relieve pressure over bony prominences using   -Do not massage red bony areas    Next Steps:  -Teach patient strategies to minimize risks such as    -Consider consults to  interdisciplinary teams such as   Outcome: Progressing  Goal: Incision(s), wounds(s) or drain site(s) healing without S/S of infection  Description: INTERVENTIONS  - Assess and document dressing, incision, wound bed, drain sites and surrounding tissue  - Provide patient and family education  - Perform skin care/dressing changes every   Outcome:  Progressing     Problem: HEMATOLOGIC - ADULT  Goal: Maintains hematologic stability  Description: INTERVENTIONS  - Assess for signs and symptoms of bleeding or hemorrhage  - Monitor labs  - Administer supportive blood products/factors as ordered and appropriate  Outcome: Progressing     Problem: MUSCULOSKELETAL - ADULT  Goal: Maintain or return mobility to safest level of function  Description: INTERVENTIONS:  - Assess patient's ability to carry out ADLs; assess patient's baseline for ADL function and identify physical deficits which impact ability to perform ADLs (bathing, care of mouth/teeth, toileting, grooming, dressing, etc.)  - Assess/evaluate cause of self-care deficits   - Assess range of motion  - Assess patient's mobility  - Assess patient's need for assistive devices and provide as appropriate  - Encourage maximum independence but intervene and supervise when necessary  - Involve family in performance of ADLs  - Assess for home care needs following discharge   - Consider OT consult to assist with ADL evaluation and planning for discharge  - Provide patient education as appropriate  Outcome: Progressing     Problem: COPING  Goal: Pt/Family able to verbalize concerns and demonstrate effective coping strategies  Description: INTERVENTIONS:  - Assist patient/family to identify coping skills, available support systems and cultural and spiritual values  - Provide emotional support, including active listening and acknowledgement of concerns of patient and caregivers  - Reduce environmental stimuli, as able  - Provide patient education  - Assess for spiritual pain/suffering and initiate spiritual care, including notification of Pastoral Care or halie based community as needed  - Assess effectiveness of coping strategies  Outcome: Progressing  Goal: Will report anxiety at manageable levels  Description: INTERVENTIONS:  - Administer medication as ordered  - Teach and encourage coping skills  - Provide emotional  support  - Assess patient/family for anxiety and ability to cope  Outcome: Progressing     Problem: Potential for Falls  Goal: Patient will remain free of falls  Description: INTERVENTIONS:  - Educate patient/family on patient safety including physical limitations  - Instruct patient to call for assistance with activity   - Consult OT/PT to assist with strengthening/mobility   - Keep Call bell within reach  - Keep bed low and locked with side rails adjusted as appropriate  - Keep care items and personal belongings within reach  - Initiate and maintain comfort rounds  - Make Fall Risk Sign visible to staff  - Offer Toileting every  Hours, in advance of need  - Initiate/Maintain alarm  - Obtain necessary fall risk management equipment:   - Apply yellow socks and bracelet for high fall risk patients  - Consider moving patient to room near nurses station  Outcome: Progressing

## 2024-07-20 NOTE — PROGRESS NOTES
[facility]  Progress Note: Critical Care  Name: Federico Bowen 68 y.o. 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 Possible tiny aneurysm in left anterior communicating artery region. Mild narrowing of bilateral MCA M1 and bilateral M2 segmental branch vessels, likely due to vasospasm.   7/11 CTH-Acute diffuse thickened large-volume SAH throughout the b/l parafalcine regions, b/l cerebral sulci (R worse than L), basilar cisterns, prepontine cistern, premedullary cistern, and visualized upper cervical spinal canal. Acute small volume IVH with mild hydrocephalus.   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   7/14 CTH -No significant interval change in extensive bilateral SAH and IVH.   7/14 vEEG no seizures  7/16 CTA-Redemonstrated SAH overlying the right greater than left convexities and concentrated within the right sylvian cistern and fissure. Subacute infarct involving the right anterior temporal region and medial occipital region. Expected postoperative appearance following embolization of right posterior communicating artery aneurysm   Plan:   Neuro/Nsx consulted  EVD 10. Goal ICP <20   ICP 5-16  SBP <220  Keppra ppx x 7 days   Nimodipine x 21days  ASA  Daily TCD  SR Hep gtt   Normonatremia (142)  Continue salt tabs  Goal Normothermia  Tylenol ATC  Started Bromocriptine  Stat CTH with any change in GCS > 2 pts     Diagnosis: Seizure  2/2 above  vEEG: no seizures  Plan:   Neuro consulted  Keppra decreased to 750 BID 7/19  DC'd Remeron   CV:   Diagnosis: Hx CAD/HTN/HLD  Plan:   Cont home statin/ASA  Toprol XL HS  PRN Hydralazine and Labetalol    Goal SBP <220  Holding home entresto/brilanta     Pulm:  Diagnosis: Hx COPD without AE  Plan:   Continue home inhalers     GI:   Diagnosis: Dysphagia  Plan:   SLP following   Continue NG until able to take PO   Diagnosis: GERD  Plan:   Continue home PPI via NG     :   No active issues      F/E/N:    F: No IVF  E: Trend and replete PRN   N: Continue TF via NG     Heme/Onc:   Diagnosis: Anemia  Plan:   Trend CBC     Endo:   Hyperglycemia  A1C 5.9  Cont ssi     ID:   Diagnosis: Leukocytosis  Afebrile monitor        MSK/Skin:   Diagnosis: Penile lesion  Plan: note from outpt derm inflamed seborrheic keratosis     Disposition: Critical care    ICU Core Measures     A: Assess, Prevent, and Manage Pain Has pain been assessed? Yes  Need for changes to pain regimen? No   B: Both SAT/SAT  N/A   C: Choice of Sedation RASS Goal: N/A patient not on sedation  Need for changes to sedation or analgesia regimen? NA   D: Delirium CAM-ICU: Negative   E: Early Mobility  Plan for early mobility? Yes   F: Family Engagement Plan for family engagement today? Yes       Review of Invasive Devices:      Central access plan: Patient has multiple central venous catheters.       Prophylaxis:  VTE VTE covered by:  heparin (porcine), Intravenous, 12 Units/kg/hr at 07/20/24 0013       Stress Ulcer  covered byomeprazole (PRILOSEC) suspension 2 mg/mL [[id number]], omeprazole (PriLOSEC) 40 MG capsule [[id number]] (Long-Term Med)        Significant 24hr Events     24hr events:  EVD remains at 10  Patient failed swallow evaluation- remains on TF     Subjective     Review of Systems: See HPI for Review of Systems     Objective                            Vitals I/O      Most Recent Min/Max in 24hrs   Temp 100.3 °F (37.9 °C) Temp  Min: 100 °F (37.8 °C)  Max: 101.8 °F (38.8 °C)   Pulse 90 Pulse  Min: 90  Max: 122   Resp 14 No data recorded   /76 BP  Min: 98/62  Max: 159/95   O2 Sat 97 % SpO2  Min: 94 %  Max: 100 %      Intake/Output Summary (Last 24  hours) at 7/20/2024 0055  Last data filed at 7/20/2024 0000  Gross per 24 hour   Intake 1277 ml   Output 1197 ml   Net 80 ml       Diet Enteral/Parenteral; Tube Feeding No Oral Diet; Jevity 1.2 Josh; Continuous; 60; 40; Saline; Every 4 hours    Invasive Monitoring           Physical Exam   Physical Exam  Vitals reviewed.   Eyes:      Comments: L pupil 2mm  R pupil 4    Skin:     General: Skin is warm.      Capillary Refill: Capillary refill takes less than 2 seconds.   HENT:      Head: Normocephalic.   Cardiovascular:      Rate and Rhythm: Normal rate.      Pulses: Normal pulses.   Abdominal:      Palpations: Abdomen is soft.   Constitutional:       General: He is not in acute distress.     Appearance: He is cachectic. He is ill-appearing.   Pulmonary:      Effort: Pulmonary effort is normal.   Neurological:      Comments: Awake  Follows commands             Diagnostic Studies      EKG: SR  Imaging: I have personally reviewed pertinent reports.       Medications:  Scheduled PRN   acetaminophen, 650 mg, Q6H  aspirin, 81 mg, Daily  atorvastatin, 80 mg, Daily  bromocriptine, 10 mg, TID  chlorhexidine, 15 mL, Q12H MATTY  ezetimibe, 10 mg, QAM  folic acid, 400 mcg, Daily  gabapentin, 600 mg, TID  insulin lispro, 1-5 Units, Q6H MATTY  levETIRAcetam, 750 mg, Q12H MATTY  lidocaine, 1 patch, Daily  metoprolol succinate, 50 mg, HS  niMODipine, 60 mg, Q4H MATTY  omeprazole (PRILOSEC) suspension 2 mg/mL, 20 mg, Daily  polyethylene glycol, 17 g, Daily  senna, 1 tablet, HS  thiamine, 100 mg, Daily  umeclidinium, 1 puff, Daily      albuterol, 2.5 mg, Q4H PRN  bisacodyl, 10 mg, Daily PRN  hydrALAZINE, 10 mg, Q6H PRN  HYDROmorphone, 0.2 mg, Q4H PRN  labetalol, 10 mg, Q6H PRN  methocarbamol, 500 mg, Q6H PRN  ondansetron, 4 mg, Q6H PRN  oxyCODONE, 2.5 mg, Q4H PRN  oxyCODONE, 5 mg, Q4H PRN  saliva substitute, 5 spray, 4x Daily PRN       Continuous    heparin (porcine), 12 Units/kg/hr (Order-Specific), Last Rate: 12 Units/kg/hr (07/20/24  0013)         Labs:    CBC    Recent Labs     07/18/24  0520 07/19/24  0611   WBC 10.33* 10.54*   HGB 12.4 12.4   HCT 37.2 36.8    283     BMP    Recent Labs     07/18/24  0520 07/19/24  0611   SODIUM 141 142   K 4.0 3.9    106   CO2 24 26   AGAP 11 10   BUN 20 19   CREATININE 0.82 0.71   CALCIUM 9.9 9.3       Coags    Recent Labs     07/18/24  0520 07/19/24  0611   PTT 30 29        Additional Electrolytes  Recent Labs     07/18/24  0520 07/19/24  0611   MG 2.0 2.2   PHOS 4.1 3.9   CAIONIZED 1.25 1.19          Blood Gas    No recent results  No recent results LFTs  Recent Labs     07/18/24  0520 07/19/24  0611   ALT 13 17   AST 13 17   ALKPHOS 123* 119*   ALB 3.8 3.8   TBILI 0.63 0.49       Infectious  No recent results  Glucose  Recent Labs     07/18/24  0520 07/19/24  0611   GLUC 140 137               John Edwardo Mckeon PA-C

## 2024-07-20 NOTE — PLAN OF CARE
Problem: Prexisting or High Potential for Compromised Skin Integrity  Goal: Skin integrity is maintained or improved  Description: INTERVENTIONS:  - Identify patients at risk for skin breakdown  - Assess and monitor skin integrity  - Assess and monitor nutrition and hydration status  - Monitor labs   - Assess for incontinence   - Turn and reposition patient  - Assist with mobility/ambulation  - Relieve pressure over bony prominences  - Avoid friction and shearing  - Provide appropriate hygiene as needed including keeping skin clean and dry  - Evaluate need for skin moisturizer/barrier cream  - Collaborate with interdisciplinary team   - Patient/family teaching  - Consider wound care consult   Outcome: Progressing     Problem: Neurological Deficit  Goal: Neurological status is stable or improving  Description: Interventions:  - Monitor and assess patient's level of consciousness, motor function, sensory function, and level of assistance needed for ADLs.   - Monitor and report changes from baseline. Collaborate with interdisciplinary team to initiate plan and implement interventions as ordered.   - Provide and maintain a safe environment.  - Consider seizure precautions.  - Consider fall precautions.  - Consider aspiration precautions.  - Consider bleeding precautions.  Outcome: Progressing     Problem: Activity Intolerance/Impaired Mobility  Goal: Mobility/activity is maintained at optimum level for patient  Description: Interventions:  - Assess and monitor patient  barriers to mobility and need for assistive/adaptive devices.  - Assess patient's emotional response to limitations.  - Collaborate with interdisciplinary team and initiate plans and interventions as ordered.  - Encourage independent activity per ability.  - Maintain proper body alignment.  - Perform active/passive rom as tolerated/ordered.  - Plan activities to conserve energy.  - Turn patient as appropriate  Outcome: Progressing     Problem:  Communication Impairment  Goal: Ability to express needs and understand communication  Description: Assess patient's communication skills and ability to understand information.  Patient will demonstrate use of effective communication techniques, alternative methods of communication and understanding even if not able to speak.     - Encourage communication and provide alternate methods of communication as needed.  - Collaborate with case management/ for discharge needs.  - Include patient/family/caregiver in decisions related to communication.  Outcome: Progressing     Problem: Potential for Aspiration  Goal: Non-ventilated patient's risk of aspiration is minimized  Description: Assess and monitor vital signs, respiratory status, and labs (WBC).  Monitor for signs of aspiration (tachypnea, cough, rales, wheezing, cyanosis, fever).    - Assess and monitor patient's ability to swallow.  - Place patient up in chair to eat if possible.  - HOB up at 90 degrees to eat if unable to get patient up into chair.  - Supervise patient during oral intake.   - Instruct patient/ family to take small bites.  - Instruct patient/ family to take small single sips when taking liquids.  - Follow patient-specific strategies generated by speech pathologist.  Outcome: Progressing     Problem: Nutrition  Goal: Nutrition/Hydration status is improving  Description: Monitor and assess patient's nutrition/hydration status for malnutrition (ex- brittle hair, bruises, dry skin, pale skin and conjunctiva, muscle wasting, smooth red tongue, and disorientation). Collaborate with interdisciplinary team and initiate plan and interventions as ordered.  Monitor patient's weight and dietary intake as ordered or per policy. Utilize nutrition screening tool and intervene per policy. Determine patient's food preferences and provide high-protein, high-caloric foods as appropriate.   - Monitor nutrition parameters, recommending adjustments to  enteral nutrition orders at indicated.   - Assist patient with eating.  - Allow adequate time for meals.  - Encourage patient to take dietary supplement as ordered.  - Collaborate with interdisciplinary team.   - Include patient/family/caregiver in decisions related to nutrition.  Outcome: Progressing     Problem: PAIN - ADULT  Goal: Verbalizes/displays adequate comfort level or baseline comfort level  Description: Interventions:  - Encourage patient to monitor pain and request assistance  - Assess pain using appropriate pain scale  - Administer analgesics based on type and severity of pain and evaluate response  - Implement non-pharmacological measures as appropriate and evaluate response  - Consider cultural and social influences on pain and pain management  - Notify physician/advanced practitioner if interventions unsuccessful or patient reports new pain  Outcome: Progressing     Problem: INFECTION - ADULT  Goal: Absence or prevention of progression during hospitalization  Description: INTERVENTIONS:  - Assess and monitor for signs and symptoms of infection  - Monitor lab/diagnostic results  - Monitor all insertion sites, i.e. indwelling lines, tubes, and drains  - Monitor endotracheal if appropriate and nasal secretions for changes in amount and color  - [place] appropriate cooling/warming therapies per order  - Administer medications as ordered  - Instruct and encourage patient and family to use good hand hygiene technique  - Identify and instruct in appropriate isolation precautions for identified infection/condition  Outcome: Progressing  Goal: Absence of fever/infection during neutropenic period  Description: INTERVENTIONS:  - Monitor WBC    Outcome: Progressing     Problem: SAFETY ADULT  Goal: Patient will remain free of falls  Description: INTERVENTIONS:  - Educate patient/family on patient safety including physical limitations  - Instruct patient to call for assistance with activity   - Consult OT/PT to  assist with strengthening/mobility   - Keep Call bell within reach  - Keep bed low and locked with side rails adjusted as appropriate  - Keep care items and personal belongings within reach  - Initiate and maintain comfort rounds  - Make Fall Risk Sign visible to staff  - Offer Toileting every  Hours, in advance of need  - Initiate/Maintain alarm  - Obtain necessary fall risk management equipment:   - Apply yellow socks and bracelet for high fall risk patients  - Consider moving patient to room near nurses station  Outcome: Progressing  Goal: Maintain or return to baseline ADL function  Description: INTERVENTIONS:  -  Assess patient's ability to carry out ADLs; assess patient's baseline for ADL function and identify physical deficits which impact ability to perform ADLs (bathing, care of mouth/teeth, toileting, grooming, dressing, etc.)  - Assess/evaluate cause of self-care deficits   - Assess range of motion  - Assess patient's mobility; develop plan if impaired  - Assess patient's need for assistive devices and provide as appropriate  - Encourage maximum independence but intervene and supervise when necessary  - Involve family in performance of ADLs  - Assess for home care needs following discharge   - Consider OT consult to assist with ADL evaluation and planning for discharge  - Provide patient education as appropriate  Outcome: Progressing  Goal: Maintains/Returns to pre admission functional level  Description: INTERVENTIONS:  - Perform AM-PAC 6 Click Basic Mobility/ Daily Activity assessment daily.  - Set and communicate daily mobility goal to care team and patient/family/caregiver.   - Collaborate with rehabilitation services on mobility goals if consulted  - Perform Range of Motion  times a day.  - Reposition patient every hours.  - Dangle patient  times a day  - Stand patient  times a day  - Ambulate patient  times a day  - Out of bed to chair  times a day   - Out of bed for meals  times a day  - Out of bed  for toileting  - Record patient progress and toleration of activity level   Outcome: Progressing     Problem: DISCHARGE PLANNING  Goal: Discharge to home or other facility with appropriate resources  Description: INTERVENTIONS:  - Identify barriers to discharge w/patient and caregiver  - Arrange for needed discharge resources and transportation as appropriate  - Identify discharge learning needs (meds, wound care, etc.)  - Arrange for interpretive services to assist at discharge as needed  - Refer to Case Management Department for coordinating discharge planning if the patient needs post-hospital services based on physician/advanced practitioner order or complex needs related to functional status, cognitive ability, or social support system  Outcome: Progressing     Problem: Knowledge Deficit  Goal: Patient/family/caregiver demonstrates understanding of disease process, treatment plan, medications, and discharge instructions  Description: Complete learning assessment and assess knowledge base.  Interventions:  - Provide teaching at level of understanding  - Provide teaching via preferred learning methods  Outcome: Progressing     Problem: Nutrition/Hydration-ADULT  Goal: Nutrient/Hydration intake appropriate for improving, restoring or maintaining nutritional needs  Description: Monitor and assess patient's nutrition/hydration status for malnutrition. Collaborate with interdisciplinary team and initiate plan and interventions as ordered.  Monitor patient's weight and dietary intake as ordered or per policy. Utilize nutrition screening tool and intervene as necessary. Determine patient's food preferences and provide high-protein, high-caloric foods as appropriate.     INTERVENTIONS:  - Monitor oral intake, urinary output, labs, and treatment plans  - Assess nutrition and hydration status and recommend course of action  - Evaluate amount of meals eaten  - Assist patient with eating if necessary   - Allow adequate time  for meals  - Recommend/ encourage appropriate diets, oral nutritional supplements, and vitamin/mineral supplements  - Order, calculate, and assess calorie counts as needed  - Recommend, monitor, and adjust tube feeding based on assessed needs  - Assess need for intravenous fluids  - Provide nutrition/hydration education as appropriate  - Include patient/family/caregiver in decisions related to nutrition  Outcome: Progressing     Problem: NEUROSENSORY - ADULT  Goal: Achieves stable or improved neurological status  Description: INTERVENTIONS  - Monitor and report changes in neurological status  - Monitor vital signs such as temperature, blood pressure, glucose, and any other labs ordered   - Initiate measures to prevent increased intracranial pressure  - Monitor for seizure activity and implement precautions if appropriate      Outcome: Progressing  Goal: Remains free of injury related to seizures activity  Description: INTERVENTIONS  - Maintain airway, patient safety  and administer oxygen as ordered  - Monitor patient for seizure activity, document and report duration and description of seizure to physician/advanced practitioner  - If seizure occurs,  ensure patient safety during seizure  - Reorient patient post seizure  - Seizure pads on all 4 side rails  - Instruct patient/family to notify RN of any seizure activity including if an aura is experienced  - Instruct patient/family to call for assistance with activity based on nursing assessment  - Administer anti-seizure medications if ordered    Outcome: Progressing  Goal: Achieves maximal functionality and self care  Description: INTERVENTIONS  - Monitor swallowing and airway patency with patient fatigue and changes in neurological status  - Encourage and assist patient to increase activity and self care.   - Encourage visually impaired, hearing impaired and aphasic patients to use assistive/communication devices  Outcome: Progressing     Problem: CARDIOVASCULAR -  ADULT  Goal: Maintains optimal cardiac output and hemodynamic stability  Description: INTERVENTIONS:  - Monitor I/O, vital signs and rhythm  - Monitor for S/S and trends of decreased cardiac output  - Administer and titrate ordered vasoactive medications to optimize hemodynamic stability  - Assess quality of pulses, skin color and temperature  - Assess for signs of decreased coronary artery perfusion  - Instruct patient to report change in severity of symptoms  Outcome: Progressing  Goal: Absence of cardiac dysrhythmias or at baseline rhythm  Description: INTERVENTIONS:  - Continuous cardiac monitoring, vital signs, obtain 12 lead EKG if ordered  - Administer antiarrhythmic and heart rate control medications as ordered  - Monitor electrolytes and administer replacement therapy as ordered  Outcome: Progressing     Problem: RESPIRATORY - ADULT  Goal: Achieves optimal ventilation and oxygenation  Description: INTERVENTIONS:  - Assess for changes in respiratory status  - Assess for changes in mentation and behavior  - Position to facilitate oxygenation and minimize respiratory effort  - Oxygen administered by appropriate delivery if ordered  - Initiate smoking cessation education as indicated  - Encourage broncho-pulmonary hygiene including cough, deep breathe, Incentive Spirometry  - Assess the need for suctioning and aspirate as needed  - Assess and instruct to report SOB or any respiratory difficulty  - Respiratory Therapy support as indicated  Outcome: Progressing     Problem: GASTROINTESTINAL - ADULT  Goal: Maintains or returns to baseline bowel function  Description: INTERVENTIONS:  - Assess bowel function  - Encourage oral fluids to ensure adequate hydration  - Administer IV fluids if ordered to ensure adequate hydration  - Administer ordered medications as needed  - Encourage mobilization and activity  - Consider nutritional services referral to assist patient with adequate nutrition and appropriate food  choices  Outcome: Progressing  Goal: Maintains adequate nutritional intake  Description: INTERVENTIONS:  - Monitor percentage of each meal consumed  - Identify factors contributing to decreased intake, treat as appropriate  - Assist with meals as needed  - Monitor I&O, weight, and lab values if indicated  - Obtain nutrition services referral as needed  Outcome: Progressing     Problem: GENITOURINARY - ADULT  Goal: Maintains or returns to baseline urinary function  Description: INTERVENTIONS:  - Assess urinary function  - Encourage oral fluids to ensure adequate hydration if ordered  - Administer IV fluids as ordered to ensure adequate hydration  - Administer ordered medications as needed  - Offer frequent toileting  - Follow urinary retention protocol if ordered  Outcome: Progressing  Goal: Absence of urinary retention  Description: INTERVENTIONS:  - Assess patient’s ability to void and empty bladder  - Monitor I/O  - Bladder scan as needed  - Discuss with physician/AP medications to alleviate retention as needed  - Discuss catheterization for long term situations as appropriate  Outcome: Progressing     Problem: METABOLIC, FLUID AND ELECTROLYTES - ADULT  Goal: Electrolytes maintained within normal limits  Description: INTERVENTIONS:  - Monitor labs and assess patient for signs and symptoms of electrolyte imbalances  - Administer electrolyte replacement as ordered  - Monitor response to electrolyte replacements, including repeat lab results as appropriate  - Instruct patient on fluid and nutrition as appropriate  Outcome: Progressing  Goal: Fluid balance maintained  Description: INTERVENTIONS:  - Monitor labs   - Monitor I/O and WT  - Instruct patient on fluid and nutrition as appropriate  - Assess for signs & symptoms of volume excess or deficit  Outcome: Progressing  Goal: Glucose maintained within target range  Description: INTERVENTIONS:  - Monitor Blood Glucose as ordered  - Assess for signs and symptoms of  hyperglycemia and hypoglycemia  - Administer ordered medications to maintain glucose within target range  - Assess nutritional intake and initiate nutrition service referral as needed  Outcome: Progressing     Problem: SKIN/TISSUE INTEGRITY - ADULT  Goal: Skin Integrity remains intact(Skin Breakdown Prevention)  Description: Assess:  -Perform Martin assessment every   -Clean and moisturize skin every   -Inspect skin when repositioning, toileting, and assisting with ADLS  -Assess under medical devices such as  every   -Assess extremities for adequate circulation and sensation     Bed Management:  -Have minimal linens on bed & keep smooth, unwrinkled  -Change linens as needed when moist or perspiring  -Avoid sitting or lying in one position for more than  hours while in bed  -Keep HOB atdegrees     Toileting:  -Offer bedside commode  -Assess for incontinence every   -Use incontinent care products after each incontinent episode such as     Activity:  -Mobilize patient  times a day  -Encourage activity and walks on unit  -Encourage or provide ROM exercises   -Turn and reposition patient every  Hours  -Use appropriate equipment to lift or move patient in bed  -Instruct/ Assist with weight shifting every  when out of bed in chair  -Consider limitation of chair time  hour intervals    Skin Care:  -Avoid use of baby powder, tape, friction and shearing, hot water or constrictive clothing  -Relieve pressure over bony prominences using  -Do not massage red bony areas    Next Steps:  -Teach patient strategies to minimize risks such as    -Consider consults to  interdisciplinary teams such as   Outcome: Progressing  Goal: Incision(s), wounds(s) or drain site(s) healing without S/S of infection  Description: INTERVENTIONS  - Assess and document dressing, incision, wound bed, drain sites and surrounding tissue  - Provide patient and family education  - Perform skin care/dressing changes ludy  Outcome: Progressing     Problem:  HEMATOLOGIC - ADULT  Goal: Maintains hematologic stability  Description: INTERVENTIONS  - Assess for signs and symptoms of bleeding or hemorrhage  - Monitor labs  - Administer supportive blood products/factors as ordered and appropriate  Outcome: Progressing     Problem: MUSCULOSKELETAL - ADULT  Goal: Maintain or return mobility to safest level of function  Description: INTERVENTIONS:  - Assess patient's ability to carry out ADLs; assess patient's baseline for ADL function and identify physical deficits which impact ability to perform ADLs (bathing, care of mouth/teeth, toileting, grooming, dressing, etc.)  - Assess/evaluate cause of self-care deficits   - Assess range of motion  - Assess patient's mobility  - Assess patient's need for assistive devices and provide as appropriate  - Encourage maximum independence but intervene and supervise when necessary  - Involve family in performance of ADLs  - Assess for home care needs following discharge   - Consider OT consult to assist with ADL evaluation and planning for discharge  - Provide patient education as appropriate  Outcome: Progressing     Problem: COPING  Goal: Pt/Family able to verbalize concerns and demonstrate effective coping strategies  Description: INTERVENTIONS:  - Assist patient/family to identify coping skills, available support systems and cultural and spiritual values  - Provide emotional support, including active listening and acknowledgement of concerns of patient and caregivers  - Reduce environmental stimuli, as able  - Provide patient education  - Assess for spiritual pain/suffering and initiate spiritual care, including notification of Pastoral Care or halie based community as needed  - Assess effectiveness of coping strategies  Outcome: Progressing  Goal: Will report anxiety at manageable levels  Description: INTERVENTIONS:  - Administer medication as ordered  - Teach and encourage coping skills  - Provide emotional support  - Assess  patient/family for anxiety and ability to cope  Outcome: Progressing     Problem: Potential for Falls  Goal: Patient will remain free of falls  Description: INTERVENTIONS:  - Educate patient/family on patient safety including physical limitations  - Instruct patient to call for assistance with activity   - Consult OT/PT to assist with strengthening/mobility   - Keep Call bell within reach  - Keep bed low and locked with side rails adjusted as appropriate  - Keep care items and personal belongings within reach  - Initiate and maintain comfort rounds  - Make Fall Risk Sign visible to staff  - Offer Toileting every  Hours, in advance of need  - Initiate/Maintain alarm  - Obtain necessary fall risk management equipment:   - Apply yellow socks and bracelet for high fall risk patients  - Consider moving patient to room near nurses station  Outcome: Progressing

## 2024-07-21 ENCOUNTER — APPOINTMENT (INPATIENT)
Dept: RADIOLOGY | Facility: HOSPITAL | Age: 69
DRG: 020 | End: 2024-07-21
Payer: MEDICARE

## 2024-07-21 ENCOUNTER — APPOINTMENT (INPATIENT)
Dept: NON INVASIVE DIAGNOSTICS | Facility: HOSPITAL | Age: 69
DRG: 020 | End: 2024-07-21
Payer: MEDICARE

## 2024-07-21 LAB
ALBUMIN SERPL BCG-MCNC: 3.6 G/DL (ref 3.5–5)
ALP SERPL-CCNC: 109 U/L (ref 34–104)
ALT SERPL W P-5'-P-CCNC: 17 U/L (ref 7–52)
ANION GAP SERPL CALCULATED.3IONS-SCNC: 11 MMOL/L (ref 4–13)
APTT PPP: 29 SECONDS (ref 23–37)
AST SERPL W P-5'-P-CCNC: 16 U/L (ref 13–39)
BACTERIA CSF CULT: NO GROWTH
BASOPHILS # BLD AUTO: 0.03 THOUSANDS/ÂΜL (ref 0–0.1)
BASOPHILS NFR BLD AUTO: 0 % (ref 0–1)
BILIRUB SERPL-MCNC: 0.46 MG/DL (ref 0.2–1)
BUN SERPL-MCNC: 25 MG/DL (ref 5–25)
CA-I BLD-SCNC: 1.2 MMOL/L (ref 1.12–1.32)
CALCIUM SERPL-MCNC: 9.6 MG/DL (ref 8.4–10.2)
CHLORIDE SERPL-SCNC: 106 MMOL/L (ref 96–108)
CO2 SERPL-SCNC: 28 MMOL/L (ref 21–32)
CREAT SERPL-MCNC: 0.79 MG/DL (ref 0.6–1.3)
EOSINOPHIL # BLD AUTO: 0.09 THOUSAND/ÂΜL (ref 0–0.61)
EOSINOPHIL NFR BLD AUTO: 1 % (ref 0–6)
ERYTHROCYTE [DISTWIDTH] IN BLOOD BY AUTOMATED COUNT: 13.9 % (ref 11.6–15.1)
GFR SERPL CREATININE-BSD FRML MDRD: 91 ML/MIN/1.73SQ M
GLUCOSE SERPL-MCNC: 144 MG/DL (ref 65–140)
GLUCOSE SERPL-MCNC: 149 MG/DL (ref 65–140)
GLUCOSE SERPL-MCNC: 154 MG/DL (ref 65–140)
GLUCOSE SERPL-MCNC: 171 MG/DL (ref 65–140)
GLUCOSE SERPL-MCNC: 179 MG/DL (ref 65–140)
HCT VFR BLD AUTO: 34 % (ref 36.5–49.3)
HGB BLD-MCNC: 11.2 G/DL (ref 12–17)
IMM GRANULOCYTES # BLD AUTO: 0.09 THOUSAND/UL (ref 0–0.2)
IMM GRANULOCYTES NFR BLD AUTO: 1 % (ref 0–2)
LYMPHOCYTES # BLD AUTO: 1.37 THOUSANDS/ÂΜL (ref 0.6–4.47)
LYMPHOCYTES NFR BLD AUTO: 10 % (ref 14–44)
MAGNESIUM SERPL-MCNC: 2.2 MG/DL (ref 1.9–2.7)
MCH RBC QN AUTO: 31.3 PG (ref 26.8–34.3)
MCHC RBC AUTO-ENTMCNC: 32.9 G/DL (ref 31.4–37.4)
MCV RBC AUTO: 95 FL (ref 82–98)
MONOCYTES # BLD AUTO: 0.82 THOUSAND/ÂΜL (ref 0.17–1.22)
MONOCYTES NFR BLD AUTO: 6 % (ref 4–12)
NEUTROPHILS # BLD AUTO: 11.16 THOUSANDS/ÂΜL (ref 1.85–7.62)
NEUTS SEG NFR BLD AUTO: 82 % (ref 43–75)
NRBC BLD AUTO-RTO: 0 /100 WBCS
PHOSPHATE SERPL-MCNC: 3.2 MG/DL (ref 2.3–4.1)
PLATELET # BLD AUTO: 337 THOUSANDS/UL (ref 149–390)
PMV BLD AUTO: 10 FL (ref 8.9–12.7)
POTASSIUM SERPL-SCNC: 4 MMOL/L (ref 3.5–5.3)
PROCALCITONIN SERPL-MCNC: 0.23 NG/ML
PROT SERPL-MCNC: 6.9 G/DL (ref 6.4–8.4)
RBC # BLD AUTO: 3.58 MILLION/UL (ref 3.88–5.62)
SODIUM SERPL-SCNC: 145 MMOL/L (ref 135–147)
WBC # BLD AUTO: 13.56 THOUSAND/UL (ref 4.31–10.16)

## 2024-07-21 PROCEDURE — 93886 INTRACRANIAL COMPLETE STUDY: CPT

## 2024-07-21 PROCEDURE — 94760 N-INVAS EAR/PLS OXIMETRY 1: CPT

## 2024-07-21 PROCEDURE — 71045 X-RAY EXAM CHEST 1 VIEW: CPT

## 2024-07-21 PROCEDURE — 85730 THROMBOPLASTIN TIME PARTIAL: CPT

## 2024-07-21 PROCEDURE — 82330 ASSAY OF CALCIUM: CPT

## 2024-07-21 PROCEDURE — 83735 ASSAY OF MAGNESIUM: CPT

## 2024-07-21 PROCEDURE — 80053 COMPREHEN METABOLIC PANEL: CPT

## 2024-07-21 PROCEDURE — 85025 COMPLETE CBC W/AUTO DIFF WBC: CPT

## 2024-07-21 PROCEDURE — 99291 CRITICAL CARE FIRST HOUR: CPT | Performed by: PSYCHIATRY & NEUROLOGY

## 2024-07-21 PROCEDURE — 94664 DEMO&/EVAL PT USE INHALER: CPT

## 2024-07-21 PROCEDURE — 99232 SBSQ HOSP IP/OBS MODERATE 35: CPT | Performed by: STUDENT IN AN ORGANIZED HEALTH CARE EDUCATION/TRAINING PROGRAM

## 2024-07-21 PROCEDURE — 84100 ASSAY OF PHOSPHORUS: CPT

## 2024-07-21 PROCEDURE — 94640 AIRWAY INHALATION TREATMENT: CPT

## 2024-07-21 PROCEDURE — 82948 REAGENT STRIP/BLOOD GLUCOSE: CPT

## 2024-07-21 PROCEDURE — 84145 PROCALCITONIN (PCT): CPT

## 2024-07-21 PROCEDURE — 93886 INTRACRANIAL COMPLETE STUDY: CPT | Performed by: SURGERY

## 2024-07-21 RX ORDER — IBUPROFEN 100 MG/5ML
400 SUSPENSION, ORAL (FINAL DOSE FORM) ORAL ONCE
Status: COMPLETED | OUTPATIENT
Start: 2024-07-21 | End: 2024-07-21

## 2024-07-21 RX ORDER — NYSTATIN 100000/ML
500000 SUSPENSION, ORAL (FINAL DOSE FORM) ORAL 4 TIMES DAILY
Status: DISCONTINUED | OUTPATIENT
Start: 2024-07-21 | End: 2024-07-21

## 2024-07-21 RX ORDER — NYSTATIN 100000/ML
500000 SUSPENSION, ORAL (FINAL DOSE FORM) ORAL 4 TIMES DAILY
Status: DISPENSED | OUTPATIENT
Start: 2024-07-21 | End: 2024-07-26

## 2024-07-21 RX ORDER — SODIUM CHLORIDE FOR INHALATION 3 %
4 VIAL, NEBULIZER (ML) INHALATION
Status: DISCONTINUED | OUTPATIENT
Start: 2024-07-21 | End: 2024-07-23

## 2024-07-21 RX ADMIN — GABAPENTIN 600 MG: 300 CAPSULE ORAL at 08:01

## 2024-07-21 RX ADMIN — BROMOCRIPTINE MESYLATE 10 MG: 2.5 TABLET ORAL at 08:01

## 2024-07-21 RX ADMIN — BROMOCRIPTINE MESYLATE 10 MG: 2.5 TABLET ORAL at 21:02

## 2024-07-21 RX ADMIN — METOPROLOL SUCCINATE 50 MG: 50 TABLET, EXTENDED RELEASE ORAL at 21:02

## 2024-07-21 RX ADMIN — Medication 60 MG: at 08:00

## 2024-07-21 RX ADMIN — ASPIRIN 81 MG CHEWABLE TABLET 81 MG: 81 TABLET CHEWABLE at 08:00

## 2024-07-21 RX ADMIN — INSULIN LISPRO 1 UNITS: 100 INJECTION, SOLUTION INTRAVENOUS; SUBCUTANEOUS at 18:00

## 2024-07-21 RX ADMIN — GABAPENTIN 600 MG: 300 CAPSULE ORAL at 21:02

## 2024-07-21 RX ADMIN — Medication 60 MG: at 23:17

## 2024-07-21 RX ADMIN — ATORVASTATIN CALCIUM 80 MG: 80 TABLET, FILM COATED ORAL at 08:00

## 2024-07-21 RX ADMIN — LEVETIRACETAM 750 MG: 100 SOLUTION ORAL at 08:01

## 2024-07-21 RX ADMIN — LIDOCAINE 5% 1 PATCH: 700 PATCH TOPICAL at 08:01

## 2024-07-21 RX ADMIN — GUAIFENESIN 400 MG: 200 SOLUTION ORAL at 05:53

## 2024-07-21 RX ADMIN — Medication 60 MG: at 19:55

## 2024-07-21 RX ADMIN — CHLORHEXIDINE GLUCONATE 0.12% ORAL RINSE 15 ML: 1.2 LIQUID ORAL at 21:02

## 2024-07-21 RX ADMIN — CHLORHEXIDINE GLUCONATE 0.12% ORAL RINSE 15 ML: 1.2 LIQUID ORAL at 08:00

## 2024-07-21 RX ADMIN — Medication 60 MG: at 11:59

## 2024-07-21 RX ADMIN — NYSTATIN 500000 UNITS: 100000 SUSPENSION ORAL at 21:02

## 2024-07-21 RX ADMIN — Medication 60 MG: at 04:13

## 2024-07-21 RX ADMIN — THIAMINE HCL TAB 100 MG 100 MG: 100 TAB at 08:01

## 2024-07-21 RX ADMIN — UMECLIDINIUM 1 PUFF: 62.5 AEROSOL, POWDER ORAL at 08:01

## 2024-07-21 RX ADMIN — GUAIFENESIN 400 MG: 200 SOLUTION ORAL at 17:12

## 2024-07-21 RX ADMIN — NYSTATIN 500000 UNITS: 100000 SUSPENSION ORAL at 17:11

## 2024-07-21 RX ADMIN — INSULIN LISPRO 1 UNITS: 100 INJECTION, SOLUTION INTRAVENOUS; SUBCUTANEOUS at 05:53

## 2024-07-21 RX ADMIN — HEPARIN SODIUM 12 UNITS/KG/HR: 10000 INJECTION, SOLUTION INTRAVENOUS at 18:00

## 2024-07-21 RX ADMIN — ACETAMINOPHEN 650 MG: 650 SUSPENSION ORAL at 04:13

## 2024-07-21 RX ADMIN — INSULIN LISPRO 1 UNITS: 100 INJECTION, SOLUTION INTRAVENOUS; SUBCUTANEOUS at 12:04

## 2024-07-21 RX ADMIN — FOLIC ACID TAB 400 MCG 400 MCG: 400 TAB at 08:00

## 2024-07-21 RX ADMIN — ACETAMINOPHEN 650 MG: 650 SUSPENSION ORAL at 11:07

## 2024-07-21 RX ADMIN — SODIUM CHLORIDE SOLN NEBU 3% 4 ML: 3 NEBU SOLN at 14:17

## 2024-07-21 RX ADMIN — GUAIFENESIN 400 MG: 200 SOLUTION ORAL at 11:07

## 2024-07-21 RX ADMIN — Medication 60 MG: at 15:57

## 2024-07-21 RX ADMIN — NYSTATIN 500000 UNITS: 100000 SUSPENSION ORAL at 11:59

## 2024-07-21 RX ADMIN — ACETAMINOPHEN 650 MG: 650 SUSPENSION ORAL at 21:02

## 2024-07-21 RX ADMIN — GABAPENTIN 600 MG: 300 CAPSULE ORAL at 15:58

## 2024-07-21 RX ADMIN — BROMOCRIPTINE MESYLATE 10 MG: 2.5 TABLET ORAL at 15:58

## 2024-07-21 RX ADMIN — ACETAMINOPHEN 650 MG: 650 SUSPENSION ORAL at 15:57

## 2024-07-21 RX ADMIN — IBUPROFEN 400 MG: 100 SUSPENSION ORAL at 17:11

## 2024-07-21 RX ADMIN — GUAIFENESIN 400 MG: 200 SOLUTION ORAL at 23:17

## 2024-07-21 RX ADMIN — Medication 20 MG: at 08:00

## 2024-07-21 RX ADMIN — SODIUM CHLORIDE SOLN NEBU 3% 4 ML: 3 NEBU SOLN at 19:42

## 2024-07-21 RX ADMIN — LEVETIRACETAM 750 MG: 100 SOLUTION ORAL at 21:02

## 2024-07-21 RX ADMIN — EZETIMIBE 10 MG: 10 TABLET ORAL at 08:00

## 2024-07-21 NOTE — RESPIRATORY THERAPY NOTE
RT Protocol Note  Federico Bowen 68 y.o. male MRN: [id number]  Unit/Bed#: ICU 07 Encounter: [id number]    Assessment    Principal Problem:    Subarachnoid hemorrhage (HCC)  Active Problems:    HTN (hypertension)    Hyperlipidemia    Seizures (HCC)    Moderate protein-calorie malnutrition (HCC)    Fever      Home Pulmonary Medications:  Albuterol MDI       No past medical history on file.  Social History     Socioeconomic History    Marital status: /Civil Union     Spouse name: Not on file    Number of children: Not on file    Years of education: Not on file    Highest education level: Not on file   Occupational History    Not on file   Tobacco Use    Smoking status: Not on file    Smokeless tobacco: Not on file   Substance and Sexual Activity    Alcohol use: Not on file    Drug use: Not on file    Sexual activity: Not on file   Other Topics Concern    Not on file   Social History Narrative    Not on file     Social Determinants of Health     Financial Resource Strain: Not on file   Food Insecurity: No Food Insecurity (7/13/2024)    Hunger Vital Sign     Worried About Running Out of Food in the Last Year: Never true     Ran Out of Food in the Last Year: Never true   Transportation Needs: No Transportation Needs (7/13/2024)    PRAPARE - Transportation     Lack of Transportation (Medical): No     Lack of Transportation (Non-Medical): No   Physical Activity: Not on file   Stress: Not on file   Social Connections: Not on file   Intimate Partner Violence: Not on file   Housing Stability: Low Risk  (7/13/2024)    Housing Stability Vital Sign     Unable to Pay for Housing in the Last Year: No     Number of Times Moved in the Last Year: 0     Homeless in the Last Year: No       Subjective         Objective    Physical Exam:   Assessment Type: Assess only  General Appearance: Sleeping  Respiratory Pattern: Normal  Chest Assessment: Chest expansion symmetrical  Bilateral Breath Sounds: Clear, Diminished,  Coarse    Vitals:  Blood pressure 123/73, pulse 98, temperature (!) 101.2 °F (38.4 °C), temperature source Rectal, resp. rate (P) 16, weight 49 kg (108 lb 0.4 oz), SpO2 97%.          Imaging and other studies: I have personally reviewed pertinent reports.      O2 Device: room air     Plan    Respiratory Plan: No distress/Pulmonary history, Home Bronchodilator Patient pathway        Resp Comments: (P) pt reassessed per resp protocol. Pt has diminished but clear bbs, sp02 98% on ra, and small amount of oral secretions able to be sx with yankauer. Will d/c 3% neb, and keep albuterol prn due to home MDI use for COPD and follow per protocol.

## 2024-07-21 NOTE — RESPIRATORY THERAPY NOTE
Resp Care   07/21/24 0731   Respiratory Assessment   Assessment Type Assess only   General Appearance Sleeping   Respiratory Pattern Normal   Chest Assessment Chest expansion symmetrical   Bilateral Breath Sounds Diminished   Cough None   Resp Comments pt found on 3L nc, spo2 is 95%, bs are diminished, no prn tx needed, will cont to monitor per resp protocol.   Oxygen Therapy/Pulse Ox   O2 Device Nasal cannula   Nasal Cannula O2 Flow Rate (L/min) 3 L/min   Calculated FIO2 (%) - Nasal Cannula 32   SpO2 Activity At Rest   $ Pulse Oximetry Spot Check Charge Completed

## 2024-07-21 NOTE — CASE MANAGEMENT
Case Management Discharge Planning Note    Patient name Federico Bowen  Location ICU 07/ICU 07 MRN [id number]  : 1955 Date 2024       Current Admission Date: 2024  Current Admission Diagnosis:Subarachnoid hemorrhage (HCC)   Patient Active Problem List    Diagnosis Date Noted Date Diagnosed    Fever 2024     Moderate protein-calorie malnutrition (HCC) 2024     HTN (hypertension) 2024     Hyperlipidemia 2024     Seizures (HCC) 2024     Subarachnoid hemorrhage (HCC) 2024       LOS (days): 9  Geometric Mean LOS (GMLOS) (days): 10.2  Days to GMLOS:1.4     OBJECTIVE:  Risk of Unplanned Readmission Score: 16.65         Current admission status: Inpatient   Preferred Pharmacy:   [id number] - [place] - [address]  [address]  [place] [address]  Phone: [phone] Fax: [phone]    Primary Care Provider: No primary care provider on file.    Primary Insurance: [employer] [facility]  Secondary Insurance: [facility]    DISCHARGE DETAILS:             Additional Comments: Pt requires continued ICU care, has EVD setting is 10. CM will continue to follow for DC planning , [facility] following patient for possible STR when medically stable.

## 2024-07-21 NOTE — PROGRESS NOTES
Progress Note - Neurosurgery   Federico Bowen 68 y.o. male MRN: [id number]  Unit/Bed#: ICU 07 Encounter: [id number]    Assessment:  68M s/p R pcom aneurysm rupture, R pcomm aneurysm coil embo 7/13    Plan:  -neuro checks  -evd @ 10  -tcds  -sbp < 220  -keppra  -nimotop  -icu care    Subjective/Objective     Subjective: gaviota    Objective:     I/O         07/18 0701 07/19 0700 07/19 0701 07/20 0700 07/20 0701 07/21 0700    P.O. 0      I.V. (mL/kg) 144 (2.9) 144 (2.9) 12 (0.2)    NG/ 270     IV Piggyback 500      Feedings 1292 840 120    Total Intake(mL/kg) 2896 (59.1) 1254 (25.6) 132 (2.7)    Urine (mL/kg/hr) 2018 (1.7) 1075 (0.9)     Emesis/NG output 0      Drains 66 14     Stool 0 0     Total Output 2084 1089     Net +812 +165 +132           Unmeasured Urine Occurrence 1 x 1 x     Unmeasured Stool Occurrence 1 x 1 x             Invasive Devices       Central Venous Catheter Line  Duration             CVC Central Lines 07/12/24 Triple 16cm Right Internal jugular 8 days              Drain  Duration             Ventriculostomy/Subdural Ventricular drainage catheter Occipital region 8 days    External Urinary Catheter Small 7 days    NG/OG/Enteral Tube 18 Fr Right nare 5 days                    Physical Exam:  Vitals: Blood pressure 147/82, pulse 100, temperature (!) 101 °F (38.3 °C), temperature source Rectal, resp. rate 16, weight 49 kg (108 lb 0.4 oz), SpO2 95%.,There is no height or weight on file to calculate BMI.    Lying in bed  A&Ox3  Gaze conjugate  Eomi  Fs  Tm  Fcx4, squeezes hands, wiggles toes      Lab Results:  Results from last 7 days   Lab Units 07/21/24  0523 07/20/24  0543 07/19/24  0611   WBC Thousand/uL 13.56* 11.81* 10.54*   HEMOGLOBIN g/dL 11.2* 11.5* 12.4   HEMATOCRIT % 34.0* 34.2* 36.8   PLATELETS Thousands/uL 337 308 283   SEGS PCT % 82* 80* 72   MONO PCT % 6 7 8   EOS PCT % 1 1 3     Results from last 7 days   Lab Units 07/21/24  0523 07/20/24  0543 07/19/24  0611 07/18/24  0520  "  POTASSIUM mmol/L 4.0 4.1 3.9 4.0   CHLORIDE mmol/L 106 106 106 106   CO2 mmol/L 28 30 26 24   BUN mg/dL 25 23 19 20   CREATININE mg/dL 0.79 0.78 0.71 0.82   CALCIUM mg/dL 9.6 9.5 9.3 9.9   ALK PHOS U/L 109*  --  119* 123*   ALT U/L 17  --  17 13   AST U/L 16  --  17 13     Results from last 7 days   Lab Units 07/21/24  0523 07/20/24  0543 07/19/24  0611   MAGNESIUM mg/dL 2.2 2.1 2.2     Results from last 7 days   Lab Units 07/21/24  0523 07/20/24  0543 07/19/24  0611   PHOSPHORUS mg/dL 3.2 3.6 3.9     Results from last 7 days   Lab Units 07/21/24  0523 07/20/24  0543 07/19/24  0611   PTT seconds 29 29 29     No results found for: \"TROPONINT\"  ABG:  Lab Results   Component Value Date    PHART 7.314 (L) 07/12/2024    EVJ7JLG 44.1 (H) 07/12/2024    PO2ART 138.2 (H) 07/12/2024    SJB3CDH 21.9 (L) 07/12/2024    BEART -4.2 07/12/2024    SOURCE Line, Arterial 07/12/2024       Imaging Studies: I have personally reviewed pertinent reports.   and I have personally reviewed pertinent films in PACS    EKG, Pathology, and Other Studies: I have personally reviewed pertinent reports.            "

## 2024-07-21 NOTE — PLAN OF CARE
Problem: Prexisting or High Potential for Compromised Skin Integrity  Goal: Skin integrity is maintained or improved  Description: INTERVENTIONS:  - Identify patients at risk for skin breakdown  - Assess and monitor skin integrity  - Assess and monitor nutrition and hydration status  - Monitor labs   - Assess for incontinence   - Turn and reposition patient  - Assist with mobility/ambulation  - Relieve pressure over bony prominences  - Avoid friction and shearing  - Provide appropriate hygiene as needed including keeping skin clean and dry  - Evaluate need for skin moisturizer/barrier cream  - Collaborate with interdisciplinary team   - Patient/family teaching  - Consider wound care consult   Outcome: Progressing     Problem: Neurological Deficit  Goal: Neurological status is stable or improving  Description: Interventions:  - Monitor and assess patient's level of consciousness, motor function, sensory function, and level of assistance needed for ADLs.   - Monitor and report changes from baseline. Collaborate with interdisciplinary team to initiate plan and implement interventions as ordered.   - Provide and maintain a safe environment.  - Consider seizure precautions.  - Consider fall precautions.  - Consider aspiration precautions.  - Consider bleeding precautions.  Outcome: Progressing     Problem: Activity Intolerance/Impaired Mobility  Goal: Mobility/activity is maintained at optimum level for patient  Description: Interventions:  - Assess and monitor patient  barriers to mobility and need for assistive/adaptive devices.  - Assess patient's emotional response to limitations.  - Collaborate with interdisciplinary team and initiate plans and interventions as ordered.  - Encourage independent activity per ability.  - Maintain proper body alignment.  - Perform active/passive rom as tolerated/ordered.  - Plan activities to conserve energy.  - Turn patient as appropriate  Outcome: Progressing     Problem:  Communication Impairment  Goal: Ability to express needs and understand communication  Description: Assess patient's communication skills and ability to understand information.  Patient will demonstrate use of effective communication techniques, alternative methods of communication and understanding even if not able to speak.     - Encourage communication and provide alternate methods of communication as needed.  - Collaborate with case management/ for discharge needs.  - Include patient/family/caregiver in decisions related to communication.  Outcome: Progressing     Problem: Potential for Aspiration  Goal: Non-ventilated patient's risk of aspiration is minimized  Description: Assess and monitor vital signs, respiratory status, and labs (WBC).  Monitor for signs of aspiration (tachypnea, cough, rales, wheezing, cyanosis, fever).    - Assess and monitor patient's ability to swallow.  - Place patient up in chair to eat if possible.  - HOB up at 90 degrees to eat if unable to get patient up into chair.  - Supervise patient during oral intake.   - Instruct patient/ family to take small bites.  - Instruct patient/ family to take small single sips when taking liquids.  - Follow patient-specific strategies generated by speech pathologist.  Outcome: Progressing     Problem: Nutrition  Goal: Nutrition/Hydration status is improving  Description: Monitor and assess patient's nutrition/hydration status for malnutrition (ex- brittle hair, bruises, dry skin, pale skin and conjunctiva, muscle wasting, smooth red tongue, and disorientation). Collaborate with interdisciplinary team and initiate plan and interventions as ordered.  Monitor patient's weight and dietary intake as ordered or per policy. Utilize nutrition screening tool and intervene per policy. Determine patient's food preferences and provide high-protein, high-caloric foods as appropriate.   - Monitor nutrition parameters, recommending adjustments to  enteral nutrition orders at indicated.   - Assist patient with eating.  - Allow adequate time for meals.  - Encourage patient to take dietary supplement as ordered.  - Collaborate with interdisciplinary team.   - Include patient/family/caregiver in decisions related to nutrition.  Outcome: Progressing     Problem: PAIN - ADULT  Goal: Verbalizes/displays adequate comfort level or baseline comfort level  Description: Interventions:  - Encourage patient to monitor pain and request assistance  - Assess pain using appropriate pain scale  - Administer analgesics based on type and severity of pain and evaluate response  - Implement non-pharmacological measures as appropriate and evaluate response  - Consider cultural and social influences on pain and pain management  - Notify physician/advanced practitioner if interventions unsuccessful or patient reports new pain  Outcome: Progressing     Problem: INFECTION - ADULT  Goal: Absence or prevention of progression during hospitalization  Description: INTERVENTIONS:  - Assess and monitor for signs and symptoms of infection  - Monitor lab/diagnostic results  - Monitor all insertion sites, i.e. indwelling lines, tubes, and drains  - Monitor endotracheal if appropriate and nasal secretions for changes in amount and color  - [place] appropriate cooling/warming therapies per order  - Administer medications as ordered  - Instruct and encourage patient and family to use good hand hygiene technique  - Identify and instruct in appropriate isolation precautions for identified infection/condition  Outcome: Progressing  Goal: Absence of fever/infection during neutropenic period  Description: INTERVENTIONS:  - Monitor WBC    Outcome: Progressing     Problem: SAFETY ADULT  Goal: Patient will remain free of falls  Description: INTERVENTIONS:  - Educate patient/family on patient safety including physical limitations  - Instruct patient to call for assistance with activity   - Consult OT/PT to  assist with strengthening/mobility   - Keep Call bell within reach  - Keep bed low and locked with side rails adjusted as appropriate  - Keep care items and personal belongings within reach  - Initiate and maintain comfort rounds  - Make Fall Risk Sign visible to staff  - Offer Toileting every  Hours, in advance of need  - Initiate/Maintain alarm  - Obtain necessary fall risk management equipment:   - Apply yellow socks and bracelet for high fall risk patients  - Consider moving patient to room near nurses station  Outcome: Progressing  Goal: Maintain or return to baseline ADL function  Description: INTERVENTIONS:  -  Assess patient's ability to carry out ADLs; assess patient's baseline for ADL function and identify physical deficits which impact ability to perform ADLs (bathing, care of mouth/teeth, toileting, grooming, dressing, etc.)  - Assess/evaluate cause of self-care deficits   - Assess range of motion  - Assess patient's mobility; develop plan if impaired  - Assess patient's need for assistive devices and provide as appropriate  - Encourage maximum independence but intervene and supervise when necessary  - Involve family in performance of ADLs  - Assess for home care needs following discharge   - Consider OT consult to assist with ADL evaluation and planning for discharge  - Provide patient education as appropriate  Outcome: Progressing  Goal: Maintains/Returns to pre admission functional level  Description: INTERVENTIONS:  - Perform AM-PAC 6 Click Basic Mobility/ Daily Activity assessment daily.  - Set and communicate daily mobility goal to care team and patient/family/caregiver.   - Collaborate with rehabilitation services on mobility goals if consulted  - Perform Range of Motion  times a day.  - Reposition patient every  hours.  - Dangle patie times a day  - Stand patient  times a day  - Ambulate patient times a day  - Out of bed to chair  times a day   - Out of bed for meals  times a day  - Out of bed  for toileting  - Record patient progress and toleration of activity level   Outcome: Progressing     Problem: DISCHARGE PLANNING  Goal: Discharge to home or other facility with appropriate resources  Description: INTERVENTIONS:  - Identify barriers to discharge w/patient and caregiver  - Arrange for needed discharge resources and transportation as appropriate  - Identify discharge learning needs (meds, wound care, etc.)  - Arrange for interpretive services to assist at discharge as needed  - Refer to Case Management Department for coordinating discharge planning if the patient needs post-hospital services based on physician/advanced practitioner order or complex needs related to functional status, cognitive ability, or social support system  Outcome: Progressing     Problem: Knowledge Deficit  Goal: Patient/family/caregiver demonstrates understanding of disease process, treatment plan, medications, and discharge instructions  Description: Complete learning assessment and assess knowledge base.  Interventions:  - Provide teaching at level of understanding  - Provide teaching via preferred learning methods  Outcome: Progressing     Problem: Nutrition/Hydration-ADULT  Goal: Nutrient/Hydration intake appropriate for improving, restoring or maintaining nutritional needs  Description: Monitor and assess patient's nutrition/hydration status for malnutrition. Collaborate with interdisciplinary team and initiate plan and interventions as ordered.  Monitor patient's weight and dietary intake as ordered or per policy. Utilize nutrition screening tool and intervene as necessary. Determine patient's food preferences and provide high-protein, high-caloric foods as appropriate.     INTERVENTIONS:  - Monitor oral intake, urinary output, labs, and treatment plans  - Assess nutrition and hydration status and recommend course of action  - Evaluate amount of meals eaten  - Assist patient with eating if necessary   - Allow adequate time  for meals  - Recommend/ encourage appropriate diets, oral nutritional supplements, and vitamin/mineral supplements  - Order, calculate, and assess calorie counts as needed  - Recommend, monitor, and adjust tube feeding based on assessed needs  - Assess need for intravenous fluids  - Provide nutrition/hydration education as appropriate  - Include patient/family/caregiver in decisions related to nutrition  Outcome: Progressing     Problem: NEUROSENSORY - ADULT  Goal: Achieves stable or improved neurological status  Description: INTERVENTIONS  - Monitor and report changes in neurological status  - Monitor vital signs such as temperature, blood pressure, glucose, and any other labs ordered   - Initiate measures to prevent increased intracranial pressure  - Monitor for seizure activity and implement precautions if appropriate      Outcome: Progressing  Goal: Remains free of injury related to seizures activity  Description: INTERVENTIONS  - Maintain airway, patient safety  and administer oxygen as ordered  - Monitor patient for seizure activity, document and report duration and description of seizure to physician/advanced practitioner  - If seizure occurs,  ensure patient safety during seizure  - Reorient patient post seizure  - Seizure pads on all 4 side rails  - Instruct patient/family to notify RN of any seizure activity including if an aura is experienced  - Instruct patient/family to call for assistance with activity based on nursing assessment  - Administer anti-seizure medications if ordered    Outcome: Progressing  Goal: Achieves maximal functionality and self care  Description: INTERVENTIONS  - Monitor swallowing and airway patency with patient fatigue and changes in neurological status  - Encourage and assist patient to increase activity and self care.   - Encourage visually impaired, hearing impaired and aphasic patients to use assistive/communication devices  Outcome: Progressing     Problem: CARDIOVASCULAR -  ADULT  Goal: Maintains optimal cardiac output and hemodynamic stability  Description: INTERVENTIONS:  - Monitor I/O, vital signs and rhythm  - Monitor for S/S and trends of decreased cardiac output  - Administer and titrate ordered vasoactive medications to optimize hemodynamic stability  - Assess quality of pulses, skin color and temperature  - Assess for signs of decreased coronary artery perfusion  - Instruct patient to report change in severity of symptoms  Outcome: Progressing  Goal: Absence of cardiac dysrhythmias or at baseline rhythm  Description: INTERVENTIONS:  - Continuous cardiac monitoring, vital signs, obtain 12 lead EKG if ordered  - Administer antiarrhythmic and heart rate control medications as ordered  - Monitor electrolytes and administer replacement therapy as ordered  Outcome: Progressing     Problem: RESPIRATORY - ADULT  Goal: Achieves optimal ventilation and oxygenation  Description: INTERVENTIONS:  - Assess for changes in respiratory status  - Assess for changes in mentation and behavior  - Position to facilitate oxygenation and minimize respiratory effort  - Oxygen administered by appropriate delivery if ordered  - Initiate smoking cessation education as indicated  - Encourage broncho-pulmonary hygiene including cough, deep breathe, Incentive Spirometry  - Assess the need for suctioning and aspirate as needed  - Assess and instruct to report SOB or any respiratory difficulty  - Respiratory Therapy support as indicated  Outcome: Progressing     Problem: GASTROINTESTINAL - ADULT  Goal: Maintains or returns to baseline bowel function  Description: INTERVENTIONS:  - Assess bowel function  - Encourage oral fluids to ensure adequate hydration  - Administer IV fluids if ordered to ensure adequate hydration  - Administer ordered medications as needed  - Encourage mobilization and activity  - Consider nutritional services referral to assist patient with adequate nutrition and appropriate food  choices  Outcome: Progressing  Goal: Maintains adequate nutritional intake  Description: INTERVENTIONS:  - Monitor percentage of each meal consumed  - Identify factors contributing to decreased intake, treat as appropriate  - Assist with meals as needed  - Monitor I&O, weight, and lab values if indicated  - Obtain nutrition services referral as needed  Outcome: Progressing     Problem: GENITOURINARY - ADULT  Goal: Maintains or returns to baseline urinary function  Description: INTERVENTIONS:  - Assess urinary function  - Encourage oral fluids to ensure adequate hydration if ordered  - Administer IV fluids as ordered to ensure adequate hydration  - Administer ordered medications as needed  - Offer frequent toileting  - Follow urinary retention protocol if ordered  Outcome: Progressing  Goal: Absence of urinary retention  Description: INTERVENTIONS:  - Assess patient’s ability to void and empty bladder  - Monitor I/O  - Bladder scan as needed  - Discuss with physician/AP medications to alleviate retention as needed  - Discuss catheterization for long term situations as appropriate  Outcome: Progressing     Problem: METABOLIC, FLUID AND ELECTROLYTES - ADULT  Goal: Electrolytes maintained within normal limits  Description: INTERVENTIONS:  - Monitor labs and assess patient for signs and symptoms of electrolyte imbalances  - Administer electrolyte replacement as ordered  - Monitor response to electrolyte replacements, including repeat lab results as appropriate  - Instruct patient on fluid and nutrition as appropriate  Outcome: Progressing  Goal: Fluid balance maintained  Description: INTERVENTIONS:  - Monitor labs   - Monitor I/O and WT  - Instruct patient on fluid and nutrition as appropriate  - Assess for signs & symptoms of volume excess or deficit  Outcome: Progressing  Goal: Glucose maintained within target range  Description: INTERVENTIONS:  - Monitor Blood Glucose as ordered  - Assess for signs and symptoms of  hyperglycemia and hypoglycemia  - Administer ordered medications to maintain glucose within target range  - Assess nutritional intake and initiate nutrition service referral as needed  Outcome: Progressing     Problem: SKIN/TISSUE INTEGRITY - ADULT  Goal: Skin Integrity remains intact(Skin Breakdown Prevention)  Description: Assess:  -Perform Martin assessment every   -Clean and moisturize skin every   -Inspect skin when repositioning, toileting, and assisting with ADLS  -Assess under medical devices such as  every   -Assess extremities for adequate circulation and sensation     Bed Management:  -Have minimal linens on bed & keep smooth, unwrinkled  -Change linens as needed when moist or perspiring  -Avoid sitting or lying in one position for more than  hours while in bed  -Keep HOB at degrees     Toileting:  -Offer bedside commode  -Assess for incontinence every   -Use incontinent care products after each incontinent episode such as     Activity:  -Mobilize patient  times a day  -Encourage activity and walks on unit  -Encourage or provide ROM exercises   -Turn and reposition patient every Hours  -Use appropriate equipment to lift or move patient in bed  -Instruct/ Assist with weight shifting every  when out of bed in chair  -Consider limitation of chair time  hour intervals    Skin Care:  -Avoid use of baby powder, tape, friction and shearing, hot water or constrictive clothing  -Relieve pressure over bony prominences using   -Do not massage red bony areas    Next Steps:  -Teach patient strategies to minimize risks such as    -Consider consults to  interdisciplinary teams such as   Outcome: Progressing  Goal: Incision(s), wounds(s) or drain site(s) healing without S/S of infection  Description: INTERVENTIONS  - Assess and document dressing, incision, wound bed, drain sites and surrounding tissue  - Provide patient and family education  - Perform skin care/dressing changes every   Outcome: Progressing     Problem:  HEMATOLOGIC - ADULT  Goal: Maintains hematologic stability  Description: INTERVENTIONS  - Assess for signs and symptoms of bleeding or hemorrhage  - Monitor labs  - Administer supportive blood products/factors as ordered and appropriate  Outcome: Progressing     Problem: MUSCULOSKELETAL - ADULT  Goal: Maintain or return mobility to safest level of function  Description: INTERVENTIONS:  - Assess patient's ability to carry out ADLs; assess patient's baseline for ADL function and identify physical deficits which impact ability to perform ADLs (bathing, care of mouth/teeth, toileting, grooming, dressing, etc.)  - Assess/evaluate cause of self-care deficits   - Assess range of motion  - Assess patient's mobility  - Assess patient's need for assistive devices and provide as appropriate  - Encourage maximum independence but intervene and supervise when necessary  - Involve family in performance of ADLs  - Assess for home care needs following discharge   - Consider OT consult to assist with ADL evaluation and planning for discharge  - Provide patient education as appropriate  Outcome: Progressing     Problem: COPING  Goal: Pt/Family able to verbalize concerns and demonstrate effective coping strategies  Description: INTERVENTIONS:  - Assist patient/family to identify coping skills, available support systems and cultural and spiritual values  - Provide emotional support, including active listening and acknowledgement of concerns of patient and caregivers  - Reduce environmental stimuli, as able  - Provide patient education  - Assess for spiritual pain/suffering and initiate spiritual care, including notification of Pastoral Care or halie based community as needed  - Assess effectiveness of coping strategies  Outcome: Progressing  Goal: Will report anxiety at manageable levels  Description: INTERVENTIONS:  - Administer medication as ordered  - Teach and encourage coping skills  - Provide emotional support  - Assess  patient/family for anxiety and ability to cope  Outcome: Progressing     Problem: Potential for Falls  Goal: Patient will remain free of falls  Description: INTERVENTIONS:  - Educate patient/family on patient safety including physical limitations  - Instruct patient to call for assistance with activity   - Consult OT/PT to assist with strengthening/mobility   - Keep Call bell within reach  - Keep bed low and locked with side rails adjusted as appropriate  - Keep care items and personal belongings within reach  - Initiate and maintain comfort rounds  - Make Fall Risk Sign visible to staff  - Offer Toileting every  Hours, in advance of need  - Initiate/Maintain alarm  - Obtain necessary fall risk management equipment:   - Apply yellow socks and bracelet for high fall risk patients  - Consider moving patient to room near nurses station  Outcome: Progressing

## 2024-07-21 NOTE — PLAN OF CARE
Problem: Prexisting or High Potential for Compromised Skin Integrity  Goal: Skin integrity is maintained or improved  Description: INTERVENTIONS:  - Identify patients at risk for skin breakdown  - Assess and monitor skin integrity  - Assess and monitor nutrition and hydration status  - Monitor labs   - Assess for incontinence   - Turn and reposition patient  - Assist with mobility/ambulation  - Relieve pressure over bony prominences  - Avoid friction and shearing  - Provide appropriate hygiene as needed including keeping skin clean and dry  - Evaluate need for skin moisturizer/barrier cream  - Collaborate with interdisciplinary team   - Patient/family teaching  - Consider wound care consult   Outcome: Progressing     Problem: Neurological Deficit  Goal: Neurological status is stable or improving  Description: Interventions:  - Monitor and assess patient's level of consciousness, motor function, sensory function, and level of assistance needed for ADLs.   - Monitor and report changes from baseline. Collaborate with interdisciplinary team to initiate plan and implement interventions as ordered.   - Provide and maintain a safe environment.  - Consider seizure precautions.  - Consider fall precautions.  - Consider aspiration precautions.  - Consider bleeding precautions.  Outcome: Progressing     Problem: Activity Intolerance/Impaired Mobility  Goal: Mobility/activity is maintained at optimum level for patient  Description: Interventions:  - Assess and monitor patient  barriers to mobility and need for assistive/adaptive devices.  - Assess patient's emotional response to limitations.  - Collaborate with interdisciplinary team and initiate plans and interventions as ordered.  - Encourage independent activity per ability.  - Maintain proper body alignment.  - Perform active/passive rom as tolerated/ordered.  - Plan activities to conserve energy.  - Turn patient as appropriate  Outcome: Progressing     Problem:  Communication Impairment  Goal: Ability to express needs and understand communication  Description: Assess patient's communication skills and ability to understand information.  Patient will demonstrate use of effective communication techniques, alternative methods of communication and understanding even if not able to speak.     - Encourage communication and provide alternate methods of communication as needed.  - Collaborate with case management/ for discharge needs.  - Include patient/family/caregiver in decisions related to communication.  Outcome: Progressing     Problem: Potential for Aspiration  Goal: Non-ventilated patient's risk of aspiration is minimized  Description: Assess and monitor vital signs, respiratory status, and labs (WBC).  Monitor for signs of aspiration (tachypnea, cough, rales, wheezing, cyanosis, fever).    - Assess and monitor patient's ability to swallow.  - Place patient up in chair to eat if possible.  - HOB up at 90 degrees to eat if unable to get patient up into chair.  - Supervise patient during oral intake.   - Instruct patient/ family to take small bites.  - Instruct patient/ family to take small single sips when taking liquids.  - Follow patient-specific strategies generated by speech pathologist.  Outcome: Progressing     Problem: Nutrition  Goal: Nutrition/Hydration status is improving  Description: Monitor and assess patient's nutrition/hydration status for malnutrition (ex- brittle hair, bruises, dry skin, pale skin and conjunctiva, muscle wasting, smooth red tongue, and disorientation). Collaborate with interdisciplinary team and initiate plan and interventions as ordered.  Monitor patient's weight and dietary intake as ordered or per policy. Utilize nutrition screening tool and intervene per policy. Determine patient's food preferences and provide high-protein, high-caloric foods as appropriate.   - Monitor nutrition parameters, recommending adjustments to  enteral nutrition orders at indicated.   - Assist patient with eating.  - Allow adequate time for meals.  - Encourage patient to take dietary supplement as ordered.  - Collaborate with interdisciplinary team.   - Include patient/family/caregiver in decisions related to nutrition.  Outcome: Progressing     Problem: PAIN - ADULT  Goal: Verbalizes/displays adequate comfort level or baseline comfort level  Description: Interventions:  - Encourage patient to monitor pain and request assistance  - Assess pain using appropriate pain scale  - Administer analgesics based on type and severity of pain and evaluate response  - Implement non-pharmacological measures as appropriate and evaluate response  - Consider cultural and social influences on pain and pain management  - Notify physician/advanced practitioner if interventions unsuccessful or patient reports new pain  Outcome: Progressing     Problem: INFECTION - ADULT  Goal: Absence or prevention of progression during hospitalization  Description: INTERVENTIONS:  - Assess and monitor for signs and symptoms of infection  - Monitor lab/diagnostic results  - Monitor all insertion sites, i.e. indwelling lines, tubes, and drains  - Monitor endotracheal if appropriate and nasal secretions for changes in amount and color  - [place] appropriate cooling/warming therapies per order  - Administer medications as ordered  - Instruct and encourage patient and family to use good hand hygiene technique  - Identify and instruct in appropriate isolation precautions for identified infection/condition  Outcome: Progressing  Goal: Absence of fever/infection during neutropenic period  Description: INTERVENTIONS:  - Monitor WBC    Outcome: Progressing     Problem: SAFETY ADULT  Goal: Patient will remain free of falls  Description: INTERVENTIONS:  - Educate patient/family on patient safety including physical limitations  - Instruct patient to call for assistance with activity   - Consult OT/PT to  assist with strengthening/mobility   - Keep Call bell within reach  - Keep bed low and locked with side rails adjusted as appropriate  - Keep care items and personal belongings within reach  - Initiate and maintain comfort rounds  - Make Fall Risk Sign visible to staff  - Offer Toileting every  Hours, in advance of need  - Initiate/Maintain alarm  - Obtain necessary fall risk management equipment:   - Apply yellow socks and bracelet for high fall risk patients  - Consider moving patient to room near nurses station  Outcome: Progressing  Goal: Maintain or return to baseline ADL function  Description: INTERVENTIONS:  -  Assess patient's ability to carry out ADLs; assess patient's baseline for ADL function and identify physical deficits which impact ability to perform ADLs (bathing, care of mouth/teeth, toileting, grooming, dressing, etc.)  - Assess/evaluate cause of self-care deficits   - Assess range of motion  - Assess patient's mobility; develop plan if impaired  - Assess patient's need for assistive devices and provide as appropriate  - Encourage maximum independence but intervene and supervise when necessary  - Involve family in performance of ADLs  - Assess for home care needs following discharge   - Consider OT consult to assist with ADL evaluation and planning for discharge  - Provide patient education as appropriate  Outcome: Progressing  Goal: Maintains/Returns to pre admission functional level  Description: INTERVENTIONS:  - Perform AM-PAC 6 Click Basic Mobility/ Daily Activity assessment daily.  - Set and communicate daily mobility goal to care team and patient/family/caregiver.   - Collaborate with rehabilitation services on mobility goals if consulted  - Perform Range of Motion 3 times a day.  - Reposition patient every 2 hours.  - Dangle patient 3 times a day  - Stand patient 3 times a day  - Ambulate patient 3 times a day  - Out of bed to chair 3 times a day   - Out of bed for meals 3 times a day  -  Out of bed for toileting  - Record patient progress and toleration of activity level   Outcome: Progressing     Problem: DISCHARGE PLANNING  Goal: Discharge to home or other facility with appropriate resources  Description: INTERVENTIONS:  - Identify barriers to discharge w/patient and caregiver  - Arrange for needed discharge resources and transportation as appropriate  - Identify discharge learning needs (meds, wound care, etc.)  - Arrange for interpretive services to assist at discharge as needed  - Refer to Case Management Department for coordinating discharge planning if the patient needs post-hospital services based on physician/advanced practitioner order or complex needs related to functional status, cognitive ability, or social support system  Outcome: Progressing     Problem: Knowledge Deficit  Goal: Patient/family/caregiver demonstrates understanding of disease process, treatment plan, medications, and discharge instructions  Description: Complete learning assessment and assess knowledge base.  Interventions:  - Provide teaching at level of understanding  - Provide teaching via preferred learning methods  Outcome: Progressing     Problem: Nutrition/Hydration-ADULT  Goal: Nutrient/Hydration intake appropriate for improving, restoring or maintaining nutritional needs  Description: Monitor and assess patient's nutrition/hydration status for malnutrition. Collaborate with interdisciplinary team and initiate plan and interventions as ordered.  Monitor patient's weight and dietary intake as ordered or per policy. Utilize nutrition screening tool and intervene as necessary. Determine patient's food preferences and provide high-protein, high-caloric foods as appropriate.     INTERVENTIONS:  - Monitor oral intake, urinary output, labs, and treatment plans  - Assess nutrition and hydration status and recommend course of action  - Evaluate amount of meals eaten  - Assist patient with eating if necessary   - Allow  adequate time for meals  - Recommend/ encourage appropriate diets, oral nutritional supplements, and vitamin/mineral supplements  - Order, calculate, and assess calorie counts as needed  - Recommend, monitor, and adjust tube feeding based on assessed needs  - Assess need for intravenous fluids  - Provide nutrition/hydration education as appropriate  - Include patient/family/caregiver in decisions related to nutrition  Outcome: Progressing     Problem: NEUROSENSORY - ADULT  Goal: Achieves stable or improved neurological status  Description: INTERVENTIONS  - Monitor and report changes in neurological status  - Monitor vital signs such as temperature, blood pressure, glucose, and any other labs ordered   - Initiate measures to prevent increased intracranial pressure  - Monitor for seizure activity and implement precautions if appropriate      Outcome: Progressing  Goal: Remains free of injury related to seizures activity  Description: INTERVENTIONS  - Maintain airway, patient safety  and administer oxygen as ordered  - Monitor patient for seizure activity, document and report duration and description of seizure to physician/advanced practitioner  - If seizure occurs,  ensure patient safety during seizure  - Reorient patient post seizure  - Seizure pads on all 4 side rails  - Instruct patient/family to notify RN of any seizure activity including if an aura is experienced  - Instruct patient/family to call for assistance with activity based on nursing assessment  - Administer anti-seizure medications if ordered    Outcome: Progressing  Goal: Achieves maximal functionality and self care  Description: INTERVENTIONS  - Monitor swallowing and airway patency with patient fatigue and changes in neurological status  - Encourage and assist patient to increase activity and self care.   - Encourage visually impaired, hearing impaired and aphasic patients to use assistive/communication devices  Outcome: Progressing     Problem:  CARDIOVASCULAR - ADULT  Goal: Maintains optimal cardiac output and hemodynamic stability  Description: INTERVENTIONS:  - Monitor I/O, vital signs and rhythm  - Monitor for S/S and trends of decreased cardiac output  - Administer and titrate ordered vasoactive medications to optimize hemodynamic stability  - Assess quality of pulses, skin color and temperature  - Assess for signs of decreased coronary artery perfusion  - Instruct patient to report change in severity of symptoms  Outcome: Progressing  Goal: Absence of cardiac dysrhythmias or at baseline rhythm  Description: INTERVENTIONS:  - Continuous cardiac monitoring, vital signs, obtain 12 lead EKG if ordered  - Administer antiarrhythmic and heart rate control medications as ordered  - Monitor electrolytes and administer replacement therapy as ordered  Outcome: Progressing     Problem: RESPIRATORY - ADULT  Goal: Achieves optimal ventilation and oxygenation  Description: INTERVENTIONS:  - Assess for changes in respiratory status  - Assess for changes in mentation and behavior  - Position to facilitate oxygenation and minimize respiratory effort  - Oxygen administered by appropriate delivery if ordered  - Initiate smoking cessation education as indicated  - Encourage broncho-pulmonary hygiene including cough, deep breathe, Incentive Spirometry  - Assess the need for suctioning and aspirate as needed  - Assess and instruct to report SOB or any respiratory difficulty  - Respiratory Therapy support as indicated  Outcome: Progressing     Problem: GASTROINTESTINAL - ADULT  Goal: Maintains or returns to baseline bowel function  Description: INTERVENTIONS:  - Assess bowel function  - Encourage oral fluids to ensure adequate hydration  - Administer IV fluids if ordered to ensure adequate hydration  - Administer ordered medications as needed  - Encourage mobilization and activity  - Consider nutritional services referral to assist patient with adequate nutrition and  appropriate food choices  Outcome: Progressing  Goal: Maintains adequate nutritional intake  Description: INTERVENTIONS:  - Monitor percentage of each meal consumed  - Identify factors contributing to decreased intake, treat as appropriate  - Assist with meals as needed  - Monitor I&O, weight, and lab values if indicated  - Obtain nutrition services referral as needed  Outcome: Progressing     Problem: GENITOURINARY - ADULT  Goal: Maintains or returns to baseline urinary function  Description: INTERVENTIONS:  - Assess urinary function  - Encourage oral fluids to ensure adequate hydration if ordered  - Administer IV fluids as ordered to ensure adequate hydration  - Administer ordered medications as needed  - Offer frequent toileting  - Follow urinary retention protocol if ordered  Outcome: Progressing  Goal: Absence of urinary retention  Description: INTERVENTIONS:  - Assess patient’s ability to void and empty bladder  - Monitor I/O  - Bladder scan as needed  - Discuss with physician/AP medications to alleviate retention as needed  - Discuss catheterization for long term situations as appropriate  Outcome: Progressing     Problem: METABOLIC, FLUID AND ELECTROLYTES - ADULT  Goal: Electrolytes maintained within normal limits  Description: INTERVENTIONS:  - Monitor labs and assess patient for signs and symptoms of electrolyte imbalances  - Administer electrolyte replacement as ordered  - Monitor response to electrolyte replacements, including repeat lab results as appropriate  - Instruct patient on fluid and nutrition as appropriate  Outcome: Progressing  Goal: Fluid balance maintained  Description: INTERVENTIONS:  - Monitor labs   - Monitor I/O and WT  - Instruct patient on fluid and nutrition as appropriate  - Assess for signs & symptoms of volume excess or deficit  Outcome: Progressing  Goal: Glucose maintained within target range  Description: INTERVENTIONS:  - Monitor Blood Glucose as ordered  - Assess for signs  and symptoms of hyperglycemia and hypoglycemia  - Administer ordered medications to maintain glucose within target range  - Assess nutritional intake and initiate nutrition service referral as needed  Outcome: Progressing     Problem: SKIN/TISSUE INTEGRITY - ADULT  Goal: Skin Integrity remains intact(Skin Breakdown Prevention)  Description: Assess:  -Perform Martin assessment every 4 hours  -Clean and moisturize skin every 4 hours  -Inspect skin when repositioning, toileting, and assisting with ADLS  -Assess under medical devices such as allevyns every 4 hours  -Assess extremities for adequate circulation and sensation     Bed Management:  -Have minimal linens on bed & keep smooth, unwrinkled  -Change linens as needed when moist or perspiring  -Avoid sitting or lying in one position for more than  hours while in bed  -Keep HOB at degrees     Toileting:  -Offer bedside commode  -Assess for incontinence every   -Use incontinent care products after each incontinent episode such as     Activity:  -Mobilize patient  times a day  -Encourage activity and walks on unit  -Encourage or provide ROM exercises   -Turn and reposition patient every  Hours  -Use appropriate equipment to lift or move patient in bed  -Instruct/ Assist with weight shifting every  when out of bed in chair  -Consider limitation of chair time  hour intervals    Skin Care:  -Avoid use of baby powder, tape, friction and shearing, hot water or constrictive clothing  -Relieve pressure over bony prominences using   -Do not massage red bony areas    Next Steps:  -Teach patient strategies to minimize risks such as    -Consider consults to  interdisciplinary teams such as   Outcome: Progressing  Goal: Incision(s), wounds(s) or drain site(s) healing without S/S of infection  Description: INTERVENTIONS  - Assess and document dressing, incision, wound bed, drain sites and surrounding tissue  - Provide patient and family education  - Perform skin care/dressing  changes every   Outcome: Progressing     Problem: HEMATOLOGIC - ADULT  Goal: Maintains hematologic stability  Description: INTERVENTIONS  - Assess for signs and symptoms of bleeding or hemorrhage  - Monitor labs  - Administer supportive blood products/factors as ordered and appropriate  Outcome: Progressing     Problem: MUSCULOSKELETAL - ADULT  Goal: Maintain or return mobility to safest level of function  Description: INTERVENTIONS:  - Assess patient's ability to carry out ADLs; assess patient's baseline for ADL function and identify physical deficits which impact ability to perform ADLs (bathing, care of mouth/teeth, toileting, grooming, dressing, etc.)  - Assess/evaluate cause of self-care deficits   - Assess range of motion  - Assess patient's mobility  - Assess patient's need for assistive devices and provide as appropriate  - Encourage maximum independence but intervene and supervise when necessary  - Involve family in performance of ADLs  - Assess for home care needs following discharge   - Consider OT consult to assist with ADL evaluation and planning for discharge  - Provide patient education as appropriate  Outcome: Progressing     Problem: COPING  Goal: Pt/Family able to verbalize concerns and demonstrate effective coping strategies  Description: INTERVENTIONS:  - Assist patient/family to identify coping skills, available support systems and cultural and spiritual values  - Provide emotional support, including active listening and acknowledgement of concerns of patient and caregivers  - Reduce environmental stimuli, as able  - Provide patient education  - Assess for spiritual pain/suffering and initiate spiritual care, including notification of Pastoral Care or halie based community as needed  - Assess effectiveness of coping strategies  Outcome: Progressing  Goal: Will report anxiety at manageable levels  Description: INTERVENTIONS:  - Administer medication as ordered  - Teach and encourage coping  skills  - Provide emotional support  - Assess patient/family for anxiety and ability to cope  Outcome: Progressing     Problem: Potential for Falls  Goal: Patient will remain free of falls  Description: INTERVENTIONS:  - Educate patient/family on patient safety including physical limitations  - Instruct patient to call for assistance with activity   - Consult OT/PT to assist with strengthening/mobility   - Keep Call bell within reach  - Keep bed low and locked with side rails adjusted as appropriate  - Keep care items and personal belongings within reach  - Initiate and maintain comfort rounds  - Make Fall Risk Sign visible to staff  - Offer Toileting every  Hours, in advance of need  - Initiate/Maintain alarm  - Obtain necessary fall risk management equipment:   - Apply yellow socks and bracelet for high fall risk patients  - Consider moving patient to room near nurses station  Outcome: Progressing

## 2024-07-21 NOTE — PROGRESS NOTES
[facility]  Progress Note: Critical Care  Name: Federico Bowen 68 y.o. male I MRN: [id number]  Unit/Bed#: ICU 07 I Date of Admission: 7/12/2024   Date of Service: 7/21/2024 I Hospital Day: 9    Assessment & Plan     Neuro:   Diagnosis: SAH with IVH and mild hydro s/p coil embolization of R PCOM aneurysm  BD 8 HH5, MF 4  7/11 CTAH/N-0.3 cm aneurysm in expected origin of right posterior communicating artery. Possible tiny aneurysm in left anterior communicating artery region. Mild narrowing of bilateral MCA M1 and bilateral M2 segmental branch vessels, likely due to vasospasm.   7/11 CTH-Acute diffuse thickened large-volume SAH throughout the b/l parafalcine regions, b/l cerebral sulci (R worse than L), basilar cisterns, prepontine cistern, premedullary cistern, and visualized upper cervical spinal canal. Acute small volume IVH with mild hydrocephalus.   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   7/14 CTH -No significant interval change in extensive bilateral SAH and IVH.   7/14 vEEG no seizures  7/16 CTA-Redemonstrated SAH overlying the right greater than left convexities and concentrated within the right sylvian cistern and fissure. Subacute infarct involving the right anterior temporal region and medial occipital region. Expected postoperative appearance following embolization of right posterior communicating artery aneurysm   Plan:   Neuro/Nsx consulted  EVD 10. Goal ICP <20   ICP 1-10  SBP <220  Keppra ppx x 7 days   Nimodipine x 21days  ASA  Daily TCD  SR Hep gtt   Normonatremia (142)  Continue salt tabs  Goal Normothermia  Tylenol ATC  Continue Bromocriptine  Stat CTH with any change in GCS > 2 pts     Diagnosis: Seizure  2/2 above  vEEG: no seizures  Plan:   Neuro consulted  Keppra decreased to 750 BID 7/19  DC'd Remeron   CV:   Diagnosis: Hx CAD/HTN/HLD  Plan:   Cont home statin/ASA  Toprol XL HS  PRN Hydralazine and Labetalol    Goal SBP <220  Holding home entresto/brilanta     Pulm:  Diagnosis: Hx COPD without AE  Plan:   Continue home inhalers     GI:   Diagnosis: Dysphagia  Plan:   SLP following   Continue NG until able to take PO   Diagnosis: GERD  Plan:   Continue home PPI via NG     :   No active issues      F/E/N:    F: No IVF  E: Trend and replete PRN   N: Continue TF via NG     Heme/Onc:   Diagnosis: Anemia  Plan:   Trend CBC     Endo:   Hyperglycemia  A1C 5.9  Cont ISS     ID:   Diagnosis: Leukocytosis  Afebrile monitor        MSK/Skin:   Diagnosis: Penile lesion  Plan: note from outpt derm inflamed seborrheic keratosis       Disposition: Critical care    ICU Core Measures     A: Assess, Prevent, and Manage Pain Has pain been assessed? Yes  Need for changes to pain regimen? No   B: Both SAT/SAT  N/A   C: Choice of Sedation RASS Goal: N/A patient not on sedation  Need for changes to sedation or analgesia regimen? NA   D: Delirium CAM-ICU: Negative   E: Early Mobility  Plan for early mobility? Yes   F: Family Engagement Plan for family engagement today? Yes       Review of Invasive Devices:      Central access plan: Patient has multiple central venous catheters.       Prophylaxis:  VTE VTE covered by:  heparin (porcine), Intravenous, 12 Units/kg/hr at 07/20/24 0013       Stress Ulcer  covered byomeprazole (PRILOSEC) suspension 2 mg/mL [[id number]], omeprazole (PriLOSEC) 40 MG capsule [[id number]] (Long-Term Med)        Significant 24hr Events     24hr events: EVD remains open at 10mmHG     Subjective          Objective                            Vitals I/O      Most Recent Min/Max in 24hrs   Temp 98.8 °F (37.1 °C) Temp  Min: 98.8 °F (37.1 °C)  Max: 101.2 °F (38.4 °C)   Pulse 94 Pulse  Min: 88  Max: 108   Resp 16 Resp  Min: 16  Max: 16   /75 BP  Min: 106/68  Max: 150/86   O2 Sat 96 % SpO2  Min: 93 %  Max: 99 %      Intake/Output Summary (Last 24 hours) at 7/21/2024 0149  Last data filed at 7/21/2024 0000  Gross per 24 hour    Intake 1794 ml   Output 932 ml   Net 862 ml       Diet Enteral/Parenteral; Tube Feeding No Oral Diet; Jevity 1.2 Josh; Continuous; 60; 40; Saline; Every 4 hours    Invasive Monitoring           Physical Exam   Physical Exam  Vitals and nursing note reviewed.   Eyes:      Pupils: Pupils are equal, round, and reactive to light.   Skin:     General: Skin is warm.   HENT:      Mouth/Throat:      Mouth: Mucous membranes are moist.   Cardiovascular:      Rate and Rhythm: Normal rate.   Abdominal:      Palpations: Abdomen is soft.   Constitutional:       Appearance: He is cachectic. He is ill-appearing.   Pulmonary:      Effort: Pulmonary effort is normal.   Neurological:      Comments: LUE 4/5  LLE 4/5  Left hand  3/5            Diagnostic Studies      EKG: SR  Imaging:  I have personally reviewed pertinent reports.       Medications:  Scheduled PRN   acetaminophen, 650 mg, Q6H  aspirin, 81 mg, Daily  atorvastatin, 80 mg, Daily  bromocriptine, 10 mg, TID  chlorhexidine, 15 mL, Q12H MATTY  ezetimibe, 10 mg, QAM  folic acid, 400 mcg, Daily  gabapentin, 600 mg, TID  guaiFENesin, 400 mg, Q6H  insulin lispro, 1-5 Units, Q6H MATTY  levETIRAcetam, 750 mg, Q12H MATTY  lidocaine, 1 patch, Daily  metoprolol succinate, 50 mg, HS  niMODipine, 60 mg, Q4H MATTY  omeprazole (PRILOSEC) suspension 2 mg/mL, 20 mg, Daily  polyethylene glycol, 17 g, Daily  senna, 1 tablet, HS  thiamine, 100 mg, Daily  umeclidinium, 1 puff, Daily      albuterol, 2.5 mg, Q4H PRN  bisacodyl, 10 mg, Daily PRN  hydrALAZINE, 10 mg, Q6H PRN  HYDROmorphone, 0.2 mg, Q4H PRN  labetalol, 10 mg, Q6H PRN  methocarbamol, 500 mg, Q6H PRN  ondansetron, 4 mg, Q6H PRN  oxyCODONE, 2.5 mg, Q4H PRN  oxyCODONE, 5 mg, Q4H PRN  saliva substitute, 5 spray, 4x Daily PRN       Continuous    heparin (porcine), 12 Units/kg/hr (Order-Specific), Last Rate: 12 Units/kg/hr (07/20/24 0013)         Labs:    CBC    Recent Labs     07/19/24  0611 07/20/24  0543   WBC 10.54* 11.81*   HGB 12.4 11.5*    HCT 36.8 34.2*    308     BMP    Recent Labs     07/19/24  0611 07/20/24  0543   SODIUM 142 142   K 3.9 4.1    106   CO2 26 30   AGAP 10 6   BUN 19 23   CREATININE 0.71 0.78   CALCIUM 9.3 9.5       Coags    Recent Labs     07/19/24  0611 07/20/24  0543   PTT 29 29        Additional Electrolytes  Recent Labs     07/19/24  0611 07/20/24  0543   MG 2.2 2.1   PHOS 3.9 3.6   CAIONIZED 1.19 1.25          Blood Gas    No recent results  No recent results LFTs  Recent Labs     07/19/24  0611   ALT 17   AST 17   ALKPHOS 119*   ALB 3.8   TBILI 0.49       Infectious  No recent results  Glucose  Recent Labs     07/19/24  0611 07/20/24  0543   GLUC 137 153*               John Mckeon PA-C

## 2024-07-22 ENCOUNTER — APPOINTMENT (INPATIENT)
Dept: RADIOLOGY | Facility: HOSPITAL | Age: 69
DRG: 020 | End: 2024-07-22
Payer: MEDICARE

## 2024-07-22 ENCOUNTER — APPOINTMENT (INPATIENT)
Dept: NON INVASIVE DIAGNOSTICS | Facility: HOSPITAL | Age: 69
DRG: 020 | End: 2024-07-22
Payer: MEDICARE

## 2024-07-22 PROBLEM — E43 SEVERE PROTEIN-CALORIE MALNUTRITION (HCC): Status: ACTIVE | Noted: 2024-07-22

## 2024-07-22 LAB
ANION GAP SERPL CALCULATED.3IONS-SCNC: 10 MMOL/L (ref 4–13)
ANISOCYTOSIS BLD QL SMEAR: PRESENT
APPEARANCE CSF: ABNORMAL
APTT PPP: 30 SECONDS (ref 23–37)
BACTERIA BLD CULT: NORMAL
BACTERIA BLD CULT: NORMAL
BASOPHILS # BLD MANUAL: 0.23 THOUSAND/UL (ref 0–0.1)
BASOPHILS NFR MAR MANUAL: 1 % (ref 0–1)
BUN SERPL-MCNC: 29 MG/DL (ref 5–25)
CA-I BLD-SCNC: 1.24 MMOL/L (ref 1.12–1.32)
CALCIUM SERPL-MCNC: 9.8 MG/DL (ref 8.4–10.2)
CHLORIDE SERPL-SCNC: 106 MMOL/L (ref 96–108)
CO2 SERPL-SCNC: 29 MMOL/L (ref 21–32)
CREAT SERPL-MCNC: 0.75 MG/DL (ref 0.6–1.3)
EOSINOPHIL # BLD MANUAL: 0 THOUSAND/UL (ref 0–0.4)
EOSINOPHIL NFR BLD MANUAL: 0 % (ref 0–6)
ERYTHROCYTE [DISTWIDTH] IN BLOOD BY AUTOMATED COUNT: 13.9 % (ref 11.6–15.1)
GFR SERPL CREATININE-BSD FRML MDRD: 93 ML/MIN/1.73SQ M
GLUCOSE CSF-MCNC: 75 MG/DL (ref 40–70)
GLUCOSE SERPL-MCNC: 129 MG/DL (ref 65–140)
GLUCOSE SERPL-MCNC: 134 MG/DL (ref 65–140)
GLUCOSE SERPL-MCNC: 149 MG/DL (ref 65–140)
GLUCOSE SERPL-MCNC: 153 MG/DL (ref 65–140)
GLUCOSE SERPL-MCNC: 195 MG/DL (ref 65–140)
GRAM STN SPEC: NORMAL
HCT VFR BLD AUTO: 32.6 % (ref 36.5–49.3)
HGB BLD-MCNC: 10.9 G/DL (ref 12–17)
LACTATE SERPL-SCNC: 1.3 MMOL/L (ref 0.5–2)
LYMPHOCYTES # BLD AUTO: 1.37 THOUSAND/UL (ref 0.6–4.47)
LYMPHOCYTES # BLD AUTO: 5 % (ref 14–44)
LYMPHOCYTES NFR CSF MANUAL: 56 %
MAGNESIUM SERPL-MCNC: 2.3 MG/DL (ref 1.9–2.7)
MCH RBC QN AUTO: 31.6 PG (ref 26.8–34.3)
MCHC RBC AUTO-ENTMCNC: 33.4 G/DL (ref 31.4–37.4)
MCV RBC AUTO: 95 FL (ref 82–98)
MONOCYTES # BLD AUTO: 1.14 THOUSAND/UL (ref 0–1.22)
MONOCYTES NFR BLD: 5 % (ref 4–12)
MONOS+MACROS CSF MANUAL: 6 %
NEUTROPHILS # BLD MANUAL: 20.03 THOUSAND/UL (ref 1.85–7.62)
NEUTROPHILS NFR CSF MANUAL: 38 %
NEUTS BAND NFR BLD MANUAL: 5 % (ref 0–8)
NEUTS SEG NFR BLD AUTO: 83 % (ref 43–75)
PHOSPHATE SERPL-MCNC: 3 MG/DL (ref 2.3–4.1)
PLATELET # BLD AUTO: 332 THOUSANDS/UL (ref 149–390)
PLATELET BLD QL SMEAR: ADEQUATE
PMV BLD AUTO: 10 FL (ref 8.9–12.7)
POTASSIUM SERPL-SCNC: 3.9 MMOL/L (ref 3.5–5.3)
PROCALCITONIN SERPL-MCNC: 0.96 NG/ML
RBC # BLD AUTO: 3.45 MILLION/UL (ref 3.88–5.62)
RBC # CSF MANUAL: 3275 UL (ref 0–10)
RBC MORPH BLD: PRESENT
SODIUM SERPL-SCNC: 145 MMOL/L (ref 135–147)
TOTAL CELLS COUNTED BLD: YES
TOTAL CELLS COUNTED SPEC: 100
VARIANT LYMPHS # BLD AUTO: 1 %
WBC # BLD AUTO: 22.76 THOUSAND/UL (ref 4.31–10.16)
WBC # CSF AUTO: 14 /UL (ref 0–5)

## 2024-07-22 PROCEDURE — 94640 AIRWAY INHALATION TREATMENT: CPT

## 2024-07-22 PROCEDURE — 93886 INTRACRANIAL COMPLETE STUDY: CPT | Performed by: SURGERY

## 2024-07-22 PROCEDURE — 87205 SMEAR GRAM STAIN: CPT

## 2024-07-22 PROCEDURE — 99291 CRITICAL CARE FIRST HOUR: CPT | Performed by: PSYCHIATRY & NEUROLOGY

## 2024-07-22 PROCEDURE — 93886 INTRACRANIAL COMPLETE STUDY: CPT

## 2024-07-22 PROCEDURE — 87040 BLOOD CULTURE FOR BACTERIA: CPT

## 2024-07-22 PROCEDURE — 82945 GLUCOSE OTHER FLUID: CPT | Performed by: NURSE PRACTITIONER

## 2024-07-22 PROCEDURE — 99232 SBSQ HOSP IP/OBS MODERATE 35: CPT | Performed by: NURSE PRACTITIONER

## 2024-07-22 PROCEDURE — 87081 CULTURE SCREEN ONLY: CPT | Performed by: PSYCHIATRY & NEUROLOGY

## 2024-07-22 PROCEDURE — 82948 REAGENT STRIP/BLOOD GLUCOSE: CPT

## 2024-07-22 PROCEDURE — 94760 N-INVAS EAR/PLS OXIMETRY 1: CPT

## 2024-07-22 PROCEDURE — 80048 BASIC METABOLIC PNL TOTAL CA: CPT

## 2024-07-22 PROCEDURE — 84145 PROCALCITONIN (PCT): CPT

## 2024-07-22 PROCEDURE — 87070 CULTURE OTHR SPECIMN AEROBIC: CPT

## 2024-07-22 PROCEDURE — 70498 CT ANGIOGRAPHY NECK: CPT

## 2024-07-22 PROCEDURE — 85027 COMPLETE CBC AUTOMATED: CPT

## 2024-07-22 PROCEDURE — 70496 CT ANGIOGRAPHY HEAD: CPT

## 2024-07-22 PROCEDURE — 85007 BL SMEAR W/DIFF WBC COUNT: CPT

## 2024-07-22 PROCEDURE — 83735 ASSAY OF MAGNESIUM: CPT

## 2024-07-22 PROCEDURE — 87070 CULTURE OTHR SPECIMN AEROBIC: CPT | Performed by: NURSE PRACTITIONER

## 2024-07-22 PROCEDURE — 83605 ASSAY OF LACTIC ACID: CPT

## 2024-07-22 PROCEDURE — 87186 SC STD MICRODIL/AGAR DIL: CPT

## 2024-07-22 PROCEDURE — 87147 CULTURE TYPE IMMUNOLOGIC: CPT

## 2024-07-22 PROCEDURE — 82330 ASSAY OF CALCIUM: CPT

## 2024-07-22 PROCEDURE — 85730 THROMBOPLASTIN TIME PARTIAL: CPT

## 2024-07-22 PROCEDURE — 89050 BODY FLUID CELL COUNT: CPT | Performed by: NURSE PRACTITIONER

## 2024-07-22 PROCEDURE — 94664 DEMO&/EVAL PT USE INHALER: CPT

## 2024-07-22 PROCEDURE — 89051 BODY FLUID CELL COUNT: CPT | Performed by: NURSE PRACTITIONER

## 2024-07-22 PROCEDURE — 84100 ASSAY OF PHOSPHORUS: CPT

## 2024-07-22 RX ORDER — ACETYLCYSTEINE 200 MG/ML
3 SOLUTION ORAL; RESPIRATORY (INHALATION)
Status: DISCONTINUED | OUTPATIENT
Start: 2024-07-22 | End: 2024-07-22

## 2024-07-22 RX ORDER — BUDESONIDE 0.5 MG/2ML
0.5 INHALANT ORAL
Status: DISCONTINUED | OUTPATIENT
Start: 2024-07-22 | End: 2024-07-24

## 2024-07-22 RX ORDER — ACETYLCYSTEINE 200 MG/ML
3 SOLUTION ORAL; RESPIRATORY (INHALATION)
Status: DISCONTINUED | OUTPATIENT
Start: 2024-07-22 | End: 2024-07-23

## 2024-07-22 RX ORDER — LEVALBUTEROL INHALATION SOLUTION 1.25 MG/3ML
1.25 SOLUTION RESPIRATORY (INHALATION) EVERY 8 HOURS
Status: DISCONTINUED | OUTPATIENT
Start: 2024-07-22 | End: 2024-07-22

## 2024-07-22 RX ORDER — LEVALBUTEROL INHALATION SOLUTION 1.25 MG/3ML
1.25 SOLUTION RESPIRATORY (INHALATION)
Status: DISCONTINUED | OUTPATIENT
Start: 2024-07-22 | End: 2024-07-23

## 2024-07-22 RX ORDER — BROMOCRIPTINE MESYLATE 2.5 MG/1
10 TABLET ORAL 3 TIMES DAILY
Status: DISCONTINUED | OUTPATIENT
Start: 2024-07-22 | End: 2024-07-26

## 2024-07-22 RX ORDER — BROMOCRIPTINE MESYLATE 2.5 MG/1
12.5 TABLET ORAL 3 TIMES DAILY
Status: DISCONTINUED | OUTPATIENT
Start: 2024-07-22 | End: 2024-07-22

## 2024-07-22 RX ORDER — POTASSIUM CHLORIDE 20MEQ/15ML
40 LIQUID (ML) ORAL ONCE
Status: COMPLETED | OUTPATIENT
Start: 2024-07-22 | End: 2024-07-22

## 2024-07-22 RX ORDER — BUDESONIDE 0.5 MG/2ML
0.5 INHALANT ORAL
Status: DISCONTINUED | OUTPATIENT
Start: 2024-07-22 | End: 2024-07-22

## 2024-07-22 RX ADMIN — BROMOCRIPTINE MESYLATE 10 MG: 2.5 TABLET ORAL at 08:43

## 2024-07-22 RX ADMIN — Medication 60 MG: at 12:11

## 2024-07-22 RX ADMIN — SODIUM CHLORIDE SOLN NEBU 3% 4 ML: 3 NEBU SOLN at 09:02

## 2024-07-22 RX ADMIN — ACETAMINOPHEN 650 MG: 650 SUSPENSION ORAL at 15:49

## 2024-07-22 RX ADMIN — NYSTATIN 500000 UNITS: 100000 SUSPENSION ORAL at 09:51

## 2024-07-22 RX ADMIN — POTASSIUM CHLORIDE 40 MEQ: 1.5 SOLUTION ORAL at 08:46

## 2024-07-22 RX ADMIN — EZETIMIBE 10 MG: 10 TABLET ORAL at 08:44

## 2024-07-22 RX ADMIN — CHLORHEXIDINE GLUCONATE 0.12% ORAL RINSE 15 ML: 1.2 LIQUID ORAL at 11:28

## 2024-07-22 RX ADMIN — GABAPENTIN 600 MG: 300 CAPSULE ORAL at 21:09

## 2024-07-22 RX ADMIN — BROMOCRIPTINE MESYLATE 10 MG: 2.5 TABLET ORAL at 15:51

## 2024-07-22 RX ADMIN — NYSTATIN 500000 UNITS: 100000 SUSPENSION ORAL at 22:12

## 2024-07-22 RX ADMIN — ACETYLCYSTEINE 600 MG: 200 SOLUTION ORAL; RESPIRATORY (INHALATION) at 13:36

## 2024-07-22 RX ADMIN — ATORVASTATIN CALCIUM 80 MG: 80 TABLET, FILM COATED ORAL at 08:44

## 2024-07-22 RX ADMIN — Medication 60 MG: at 21:09

## 2024-07-22 RX ADMIN — ASPIRIN 81 MG CHEWABLE TABLET 81 MG: 81 TABLET CHEWABLE at 08:43

## 2024-07-22 RX ADMIN — LEVALBUTEROL HYDROCHLORIDE 1.25 MG: 1.25 SOLUTION RESPIRATORY (INHALATION) at 19:47

## 2024-07-22 RX ADMIN — GABAPENTIN 600 MG: 300 CAPSULE ORAL at 08:44

## 2024-07-22 RX ADMIN — GABAPENTIN 600 MG: 300 CAPSULE ORAL at 15:49

## 2024-07-22 RX ADMIN — ACETYLCYSTEINE 3 ML: 200 SOLUTION ORAL; RESPIRATORY (INHALATION) at 19:47

## 2024-07-22 RX ADMIN — ACETAMINOPHEN 650 MG: 650 SUSPENSION ORAL at 03:56

## 2024-07-22 RX ADMIN — GUAIFENESIN 400 MG: 200 SOLUTION ORAL at 18:20

## 2024-07-22 RX ADMIN — NYSTATIN 500000 UNITS: 100000 SUSPENSION ORAL at 18:20

## 2024-07-22 RX ADMIN — THIAMINE HCL TAB 100 MG 100 MG: 100 TAB at 08:44

## 2024-07-22 RX ADMIN — ACETAMINOPHEN 650 MG: 650 SUSPENSION ORAL at 22:12

## 2024-07-22 RX ADMIN — SODIUM CHLORIDE SOLN NEBU 3% 4 ML: 3 NEBU SOLN at 19:47

## 2024-07-22 RX ADMIN — GUAIFENESIN 400 MG: 200 SOLUTION ORAL at 12:10

## 2024-07-22 RX ADMIN — INSULIN LISPRO 1 UNITS: 100 INJECTION, SOLUTION INTRAVENOUS; SUBCUTANEOUS at 06:02

## 2024-07-22 RX ADMIN — LEVETIRACETAM 750 MG: 100 SOLUTION ORAL at 08:47

## 2024-07-22 RX ADMIN — ACETAMINOPHEN 650 MG: 650 SUSPENSION ORAL at 11:01

## 2024-07-22 RX ADMIN — LEVETIRACETAM 750 MG: 100 SOLUTION ORAL at 21:09

## 2024-07-22 RX ADMIN — BROMOCRIPTINE MESYLATE 10 MG: 2.5 TABLET ORAL at 21:09

## 2024-07-22 RX ADMIN — GUAIFENESIN 400 MG: 200 SOLUTION ORAL at 05:29

## 2024-07-22 RX ADMIN — IOHEXOL 85 ML: 350 INJECTION, SOLUTION INTRAVENOUS at 17:16

## 2024-07-22 RX ADMIN — Medication 60 MG: at 15:50

## 2024-07-22 RX ADMIN — CHLORHEXIDINE GLUCONATE 0.12% ORAL RINSE 15 ML: 1.2 LIQUID ORAL at 21:09

## 2024-07-22 RX ADMIN — SODIUM CHLORIDE SOLN NEBU 3% 4 ML: 3 NEBU SOLN at 01:59

## 2024-07-22 RX ADMIN — FOLIC ACID TAB 400 MCG 400 MCG: 400 TAB at 08:44

## 2024-07-22 RX ADMIN — NYSTATIN 500000 UNITS: 100000 SUSPENSION ORAL at 15:46

## 2024-07-22 RX ADMIN — Medication 60 MG: at 03:56

## 2024-07-22 RX ADMIN — Medication 60 MG: at 08:46

## 2024-07-22 RX ADMIN — UMECLIDINIUM 1 PUFF: 62.5 AEROSOL, POWDER ORAL at 11:29

## 2024-07-22 RX ADMIN — Medication 20 MG: at 09:51

## 2024-07-22 RX ADMIN — BUDESONIDE 0.5 MG: 0.5 INHALANT RESPIRATORY (INHALATION) at 19:47

## 2024-07-22 RX ADMIN — SODIUM CHLORIDE SOLN NEBU 3% 4 ML: 3 NEBU SOLN at 13:36

## 2024-07-22 RX ADMIN — CEFTRIAXONE SODIUM 1000 MG: 10 INJECTION, POWDER, FOR SOLUTION INTRAVENOUS at 10:54

## 2024-07-22 RX ADMIN — METOPROLOL SUCCINATE 50 MG: 50 TABLET, EXTENDED RELEASE ORAL at 22:13

## 2024-07-22 NOTE — ASSESSMENT & PLAN NOTE
aSAH s/p R PCOM aneurysm rupture  BD 11, HH 5, MF 4  PPD 10 right PCOM coil embolization (Dr. Mcneal 7/13)  PPD 10 R frontal EVD placement (Dr. Goldberg, 7/13)  P/w headache and right leg pain, 2 witnessed seizures by EMS was ultimately intubated.  Upon arrival to the ED patient was posturing.  Imaging revealed a large amount of SAH and a right PCOM aneurysm.  Per chart review he was previously on aspirin reversed with DDAVP.    Imaging:  CTA head w/wo 7/23/2024: Improving subarachnoid and intraventricular hemorrhage. Unchanged mild hydrocephalus. EVD in stable position. Unchanged anterior right temporal edema, likely evolving infarct. CT Angiography: No evidence of residual or recurrent right P-comm aneurysm. No acute vascular pathology in the head or neck.     Plan:  Continue frequent neurological checks  Repeat CTA/CT head w/wo STAT if GCS declines more than 2 points in 1 hour  Continue SAH pathway/spasm watch:  Daily TCD's: R 1.53, L 2.25  Continue Nimodipine 60mg Q4hrs  Continue Keppra 750 mg BID per neurology  Normonatremia (148 today)  Euvolemia (+434)   Hemoglobin >8 (10.9 today)  EVD placed on 7/13/2024  Continue EVD at 10 mmhg - maintain  Monitor output and ICP  ICPs 2-12, 6 in room   Output 0 ml/24hrs, draining easily when dropped below tragus  Maintain ICP goal <20  SBP <220  Continue aspirin 81 mg daily  Heparin drip at 12 units/kg/hr  Last PTT 30 am of 7/23  Medical management and pain control per primary team  Ongoing fevers, Tmax 101.3  CSF sent 7/22  WBC 28  Started bromocriptine  Sputum 3+ cocci in clusters - on ceftriaxone  DVT PPX: SCDs and heparin drip    Neurosurgery will continue to follow closely, call with any further questions or concerns.

## 2024-07-22 NOTE — ASSESSMENT & PLAN NOTE
2 witnessed seizures on presentation at [facility]  Neurology following, input appreciated  Patient currently on Keppra 750 mg BID per neurology  vEEG 7/14 no seizure activity

## 2024-07-22 NOTE — PROGRESS NOTES
[facility]  Progress Note  Name: Federico Bowen I  MRN: [id number]  Unit/Bed#: ICU 07 I Date of Admission: 7/12/2024   Date of Service: 7/22/2024 I Hospital Day: 10    Assessment & Plan   * Subarachnoid hemorrhage (HCC)  Assessment & Plan  aSAH s/p R PCOM aneurysm rupture  BD 10, HH 5, MF 4  PPD 9 right PCOM coil embolization (Dr. Mcneal 7/13)  PPD 9 R frontal EVD placement (Dr. Goldberg, 7/13)  P/w headache and right leg pain, 2 witnessed seizures by EMS was ultimately intubated.  Upon arrival to the ED patient was posturing.  Imaging revealed a large amount of SAH and a right PCOM aneurysm.  Per chart review he was previously on aspirin reversed with DDAVP.    Imaging:  CTA head w/wo 7/16/24: CT Brain: Redemonstrated subarachnoid hemorrhage overlying the right greater than left convexities and concentrated within the right sylvian cistern and fissure. Subacute infarct involving the right anterior temporal region and medial occipital region. 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     Plan:  Continue frequent neurological checks  Repeat CTA/CT head w/wo STAT if GCS declines more than 2 points in 1 hour  Continue SAH pathway/spasm watch:  Daily TCD's: R 1.67, L 1.94  Continue Nimodipine 60mg Q4hrs  Continue Keppra 750 mg BID per neurology  Normonatremia (145 today)  Euvolemia (+404)   Hemoglobin >8 (10.9 today)  EVD placed on 7/13/2024  Continue EVD at 10 mmhg - maintain  Monitor output and ICP  ICPs 0-13, 6 in room   Output 0 ml/24hrs, draining easily when dropped below tragus  Maintain ICP goal <20  SBP <220  Continue aspirin 81 mg daily  Heparin drip at 12 units/kilogram/hour  Last PTT 29  Medical management and pain control per primary team  Ongoing fevers, Tmax 102.2  CSF sent 7/22  WBC 22  Started bromocriptine  DVT PPX: SCDs and heparin  "drip    Neurosurgery will continue to follow closely, call with any further questions or concerns.             Subjective/Objective   Chief Complaint: \"I'm doing so-so.\"    Subjective: Patient c/o headache and feeling cold. States he is not feeling that great. Denies specific symptoms besides feeling cold.    Objective: NAD. R frontal EVD. Exam non-focal. GCS 15.    I/O         07/20 0701  07/21 0700 07/21 0701 07/22 0700 07/22 0701 07/23 0700    I.V. (mL/kg) 144 (2.9) 144 (2.9) 12 (0.2)    NG/ 210 215    Feedings 1440 1200 120    Total Intake(mL/kg) 1744 (35.6) 1554 (31.7) 347 (7.1)    Urine (mL/kg/hr) 1525 (1.3) 1150 (1) 475 (2.1)    Drains 0 0 0    Stool  0     Total Output 1525 1150 475    Net +219 +404 -128           Unmeasured Urine Occurrence 1 x 1 x     Unmeasured Stool Occurrence  1 x             Invasive Devices       Central Venous Catheter Line  Duration             CVC Central Lines 07/12/24 Triple 16cm Right Internal jugular 9 days              Drain  Duration             Ventriculostomy/Subdural Ventricular drainage catheter Occipital region 9 days    External Urinary Catheter Small 8 days    NG/OG/Enteral Tube 18 Fr Right nare 5 days                    Physical Exam:  Vitals: Blood pressure 124/64, pulse 96, temperature (!) 101.3 °F (38.5 °C), temperature source Rectal, resp. rate 20, weight 49 kg (108 lb 0.4 oz), SpO2 96%.,There is no height or weight on file to calculate BMI.    Hemodynamic Monitoring: MAP: Arterial Line MAP (mmHg): 104 mmHg, CPP: CPP Cuff-Calculated (mmHg): 86, CVP:  , ICP Mean: ICP Mean (mmHg): 5 mmHg    General appearance: alert, appears critically ill  Head: R frontal EVD  Eyes: EOMI, PERRL  Neck: supple, symmetrical, trachea midline and NT  Back: no kyphosis present, no tenderness to percussion or palpation  Lungs: non labored breathing  Heart: regular heart rate  Neurologic:   Mental status: Alert, oriented x3, thought content appropriate  Cranial nerves: grossly " "intact (Cranial nerves II-XII)  Sensory: normal to LT  Motor: moving all extremities without focal weakness, strength grossly 5/5, patient frail  Coordination: finger to nose normal bilaterally, no drift bilaterally      Lab Results:  Results from last 7 days   Lab Units 07/22/24  0541 07/21/24  0523 07/20/24  0543 07/19/24  0611   WBC Thousand/uL 22.76* 13.56* 11.81* 10.54*   HEMOGLOBIN g/dL 10.9* 11.2* 11.5* 12.4   HEMATOCRIT % 32.6* 34.0* 34.2* 36.8   PLATELETS Thousands/uL 332 337 308 283   SEGS PCT %  --  82* 80* 72   MONO PCT % 5 6 7 8   EOS PCT % 0 1 1 3     Results from last 7 days   Lab Units 07/22/24  0541 07/21/24  0523 07/20/24  0543 07/19/24  0611 07/18/24  0520   SODIUM mmol/L 145 145 142 142 141   POTASSIUM mmol/L 3.9 4.0 4.1 3.9 4.0   CHLORIDE mmol/L 106 106 106 106 106   CO2 mmol/L 29 28 30 26 24   BUN mg/dL 29* 25 23 19 20   CREATININE mg/dL 0.75 0.79 0.78 0.71 0.82   CALCIUM mg/dL 9.8 9.6 9.5 9.3 9.9   ALK PHOS U/L  --  109*  --  119* 123*   ALT U/L  --  17  --  17 13   AST U/L  --  16  --  17 13     Results from last 7 days   Lab Units 07/22/24  0541 07/21/24  0523 07/20/24  0543   MAGNESIUM mg/dL 2.3 2.2 2.1     Results from last 7 days   Lab Units 07/22/24  0541 07/21/24  0523 07/20/24  0543   PHOSPHORUS mg/dL 3.0 3.2 3.6     Results from last 7 days   Lab Units 07/22/24  0541 07/21/24  0523 07/20/24  0543   PTT seconds 30 29 29     No results found for: \"TROPONINT\"  ABG:  Lab Results   Component Value Date    PHART 7.314 (L) 07/12/2024    KJM0XRZ 44.1 (H) 07/12/2024    PO2ART 138.2 (H) 07/12/2024    [id number] 21.9 (L) 07/12/2024    BEART -4.2 07/12/2024    SOURCE Line, Arterial 07/12/2024       Imaging Studies: I have personally reviewed pertinent reports.   and I have personally reviewed pertinent films in PACS    CTA head and neck w wo contrast    Result Date: 7/16/2024  Impression: CT Brain: Redemonstrated subarachnoid hemorrhage overlying the right greater than left convexities and " concentrated within the right sylvian cistern and fissure. . Subacute infarct involving the right anterior temporal region and medial occipital region.. 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 Workstation performed: [id number]     IR cerebral angiography / intervention    Result Date: 7/15/2024  Impression: Successful balloon assisted coil embolization of a 4.5 mm right P-comm aneurysm. Workstation performed: [id number]     CT head wo contrast    Result Date: 7/14/2024  Impression: No significant interval change in extensive bilateral subarachnoid hemorrhage and intraventricular hemorrhage. Workstation performed: [id number]     CT head wo contrast    Result Date: 7/13/2024  Impression: Interval placement of right-sided shunt catheter. Stable ventricular size with slight interval increase in intraventricular hemorrhage layering in the occipital horns bilaterally. Diffuse subarachnoid hemorrhage again noted. Workstation performed: [id number]     XR chest portable ICU    Result Date: 7/12/2024  Impression: No acute cardiopulmonary disease. ET tube 6 cm above the landon. Workstation performed: [id number]     CTA stroke alert (head/neck)    Result Date: 7/12/2024  Impression: 0.3 cm aneurysm in expected origin of right posterior communicating artery. Possible tiny aneurysm in left anterior communicating artery region. These could be potential sources of diffuse subarachnoid hemorrhage. Recommend neurovascular surgery consultation for further evaluation. Mild narrowing of bilateral MCA M1 and bilateral M2 segmental branch vessels, likely due to vasospasm. Patent stent in left proximal subclavian artery. Endotracheal tube in trachea. Additional chronic/incidental findings as detailed above. Findings were directly discussed with Emmanuel Lion at approximately 4:34 p.m. on 7/12/2024. Workstation  performed: [id number]     CT stroke alert brain    Result Date: 7/12/2024  Impression: Acute diffuse thickened large-volume subarachnoid hemorrhage throughout the bilateral parafalcine regions, bilateral cerebral sulci (right worse than left), basilar cisterns, prepontine cistern, premedullary cistern, and visualized upper cervical spinal canal. Acute small volume intraventricular hemorrhage with mild hydrocephalus. No acute infarction. Findings were directly discussed with Emmanuel Lion at approximately 4:34 p.m. on 7/12/2024. Workstation performed: [id number]       EKG, Pathology, and Other Studies: I have personally reviewed pertinent reports.      VTE Pharmacologic Prophylaxis: Sequential compression device (Venodyne)  and Heparin    VTE Mechanical Prophylaxis: sequential compression device

## 2024-07-22 NOTE — PROGRESS NOTES
[facility]  Progress Note: Critical Care  Name: Federico Bowen 68 y.o. 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 Possible tiny aneurysm in left anterior communicating artery region. Mild narrowing of bilateral MCA M1 and bilateral M2 segmental branch vessels, likely due to vasospasm.   7/11 CTH-Acute diffuse thickened large-volume SAH throughout the b/l parafalcine regions, b/l cerebral sulci (R worse than L), basilar cisterns, prepontine cistern, premedullary cistern, and visualized upper cervical spinal canal. Acute small volume IVH with mild hydrocephalus.   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   7/14 CTH -No significant interval change in extensive bilateral SAH and IVH.   7/14 vEEG no seizures  7/16 CTA/CTH: Expected postoperative appearance following embolization of right posterior communicating artery aneurysm . Redemonstrated subarachnoid hemorrhage overlying the right greater than left convexities and concentrated within the right sylvian cistern and fissure. . Subacute infarct involving the right anterior temporal region and medial occipital region.     Plan:   Nsx following  ASA 81mg   Ordered CTA H/N   EVD 10. Goal ICP <20   ICP 3-13  CPP 70-90  14 cc/24hrs  -220  Keppra 750mg BID  Nimodipine x 21 days  Low dose IV Heparin protocol @ 10 units  Continue 12 units  PTT < 45  Daily PTT: 29 this AM  Daily TCD  No appreciable vasopasm 07/32  Pending 07/22  Euvolemia   I/Os: +392cc  Normonatremia, Na goal > 140  Na: 145  Stat CTH with any change in GCS > 2 pts     Diagnosis: Seizure  2/2 above  vEEG no seizures  Plan:   Neuro  following   Keppra 750mg BID     - Diagnosis: Headache               Plan               Continue 600mg TID gabapentin   Lidoderm patch for neck   Coffee via NGT given drank several cups per day  IV mag and reglan if needed   PRN fioricet, robaxin, oxy        - Diagnosis; Insomnia               Plan              D/c mirtazapine 15mg qhs in setting of daytime lethargy            CV:   Diagnosis: Hx CAD HTN/HLD  Had vascular stent placed > 1 year ago   Plan:    continue home metoprolol 50mg daily   Continue ASA 81mg  Holding Hold entresto 24-26 mg BID   Given allowing for increased -220  Takes prn nitroglycerin of his angina        - Diagnosis: HLD              Plan               Cont lipitor 80m daily and zetia 10mg qhs        Pulm:  Diagnosis: Hx COPD and  without AE  Plan:   IS  Xopenex TID  Imecldinum/albuterol inhaler   Respiratory protocol  inhaled 3% saline solution           - Dx: Aspiration Pnx vs Tracheobronchitis    - got sputum culture, blood cultures, lactic acid    - concern for mucus plugging w/ noted leukocytosis    Plan    Started on empiric antibiotics ceftriaxone day 1   Continue w/ suctioning, Chest PT, nebulizers/respiratory protocol    Wean as able off Hiflo      GI:   Diagnosis: Dysphagia  Plan:   Failed MBS 07/17   Consider retrying in the future possible next week 7/22  Can get ice cups w/ nursing staff w/ close monitoring   Continue TF for now     - Diagnosis: GERD              Plan               Continue home omeprazole 40mg      BM: 07/20  Bowel regimen: sennokot and miralax               :   Euvolemia   I/Os: +392 cc this AM   Continue to monitor           F/E/N:    F: none  E: K> 4.0,  maintain magnesium > 2.0, maintain phosphate > 3.5  N: TF Jevity 1.2 vinny 50cc/hr               - thiamine, folic acid, and multivitamin     Heme/Onc:   Diagnosis: Anemia  Plan:   Unclear baseline no s/s bleeding monitor  Possibly macrocytic anemia   Continue to monitor      Endo:   SSI started      ID:   Diagnosis: Leukocytosis  and febrile   Seems to be intermittent   Infectious vs central  CNS cause  Got UA that was bland, CSF cx no growth, CXR showing signs of COPD,   Plan   Infectious w/u   Repeat blood cx, lactic acid, sputum culture,  Started empiric ceftriaxone 1g daily (day 1) until get more data  Blood cultures pending   bromocriptine 10mg TID  Neurosurg CSF cultures from EVD   CSF protein: 89  CSF   CSF RBC: 31,075          MSK/Skin:   PT/OT/PMR  Diagnosis: Penile lesion  Plan: note from outpt derm inflamed seborrheic keratosis     Line: R IJ         Disposition: Critical care    ICU Core Measures     A: Assess, Prevent, and Manage Pain Has pain been assessed? Yes  Need for changes to pain regimen? No   B: Both SAT/SAT  N/A   C: Choice of Sedation RASS Goal: 0 Alert and Calm  Need for changes to sedation or analgesia regimen? Yes   D: Delirium CAM-ICU: Negative   E: Early Mobility  Plan for early mobility? Yes   F: Family Engagement Plan for family engagement today? Yes       Review of Invasive Devices:      Central access plan: Patient has multiple central venous catheters.  Medications requiring central line Hemodynamic monitoring      Prophylaxis:  VTE VTE covered by:  heparin (porcine), Intravenous, 12 Units/kg/hr at 07/21/24 1800       Stress Ulcer  covered byomeprazole (PRILOSEC) suspension 2 mg/mL [[id number]], omeprazole (PriLOSEC) 40 MG capsule [[id number]] (Long-Term Med)        Significant 24hr Events     24hr events: NAON     Subjective     Review of Systems: See HPI for Review of Systems     Objective                            Vitals I/O      Most Recent Min/Max in 24hrs   Temp 98.9 °F (37.2 °C) Temp  Min: 98 °F (36.7 °C)  Max: 102.2 °F (39 °C)   Pulse 90 Pulse  Min: 84  Max: 120   Resp 16 No data recorded   /64 BP  Min: 101/64  Max: 164/86   O2 Sat 94 % SpO2  Min: 92 %  Max: 98 %      Intake/Output Summary (Last 24 hours) at 7/22/2024 0646  Last data filed at 7/22/2024  0400  Gross per 24 hour   Intake 1542 ml   Output 1150 ml   Net 392 ml       Diet Enteral/Parenteral; Tube Feeding No Oral Diet; Jevity 1.2 Josh; Continuous; 60; 40; Saline; Every 4 hours    Invasive Monitoring   Arterial Line  Esme /72  No data recorded    mmHg  No data recorded           Physical Exam   Physical Exam  Vitals and nursing note reviewed.   HENT:      Nose:      Comments: NGT in place  Cardiovascular:      Rate and Rhythm: Normal rate.      Pulses: Normal pulses.   Abdominal:      Palpations: Abdomen is soft.   Pulmonary:      Effort: Pulmonary effort is normal.   Neurological:      Comments:   Pupils reactive (R more dilated, L more pinpoint)      LUE weakness appreciated 4/5 in biceps, triceps, and deltoids                  Diagnostic Studies      EKG: reviewed  Imaging:  I have personally reviewed pertinent reports.   and I have personally reviewed pertinent films in PACS     Medications:  Scheduled PRN   acetaminophen, 650 mg, Q6H  aspirin, 81 mg, Daily  atorvastatin, 80 mg, Daily  bromocriptine, 10 mg, TID  chlorhexidine, 15 mL, Q12H MATTY  ezetimibe, 10 mg, QAM  folic acid, 400 mcg, Daily  gabapentin, 600 mg, TID  guaiFENesin, 400 mg, Q6H  insulin lispro, 1-5 Units, Q6H MATTY  levETIRAcetam, 750 mg, Q12H MATTY  lidocaine, 1 patch, Daily  metoprolol succinate, 50 mg, HS  niMODipine, 60 mg, Q4H MATTY  nystatin, 500,000 Units, 4x Daily  omeprazole (PRILOSEC) suspension 2 mg/mL, 20 mg, Daily  polyethylene glycol, 17 g, Daily  senna, 1 tablet, HS  sodium chloride, 4 mL, Q6H  thiamine, 100 mg, Daily  umeclidinium, 1 puff, Daily      albuterol, 2.5 mg, Q4H PRN  bisacodyl, 10 mg, Daily PRN  hydrALAZINE, 10 mg, Q6H PRN  HYDROmorphone, 0.2 mg, Q4H PRN  labetalol, 10 mg, Q6H PRN  methocarbamol, 500 mg, Q6H PRN  ondansetron, 4 mg, Q6H PRN  oxyCODONE, 2.5 mg, Q4H PRN  oxyCODONE, 5 mg, Q4H PRN  saliva substitute, 5 spray, 4x Daily PRN       Continuous    heparin (porcine), 12 Units/kg/hr  (Order-Specific), Last Rate: 12 Units/kg/hr (07/21/24 1800)         Labs:    CBC    Recent Labs     07/21/24  0523 07/22/24  0541   WBC 13.56* 22.76*   HGB 11.2* 10.9*   HCT 34.0* 32.6*    332     BMP    Recent Labs     07/21/24  0523 07/22/24  0541   SODIUM 145 145   K 4.0 3.9    106   CO2 28 29   AGAP 11 10   BUN 25 29*   CREATININE 0.79 0.75   CALCIUM 9.6 9.8       Coags    Recent Labs     07/21/24  0523 07/22/24  0541   PTT 29 30        Additional Electrolytes  Recent Labs     07/21/24  0523 07/22/24  0541   MG 2.2 2.3   PHOS 3.2 3.0   CAIONIZED 1.20 1.24          Blood Gas    No recent results  No recent results LFTs  Recent Labs     07/21/24  0523   ALT 17   AST 16   ALKPHOS 109*   ALB 3.6   TBILI 0.46       Infectious  Recent Labs     07/21/24  0523   PROCALCITONI 0.23     Glucose  Recent Labs     07/21/24  0523 07/22/24  0541   GLUC 144* 149*             TCDs 07/22  Unilateral    Mean Velocity  Pulsatility Index    Prox Basilar          28.00               1.62       Right            Mean Velocity  Pulsatility Index    Mid. ICA                 33.00               1.28    Mid Cerebral             55.00               1.39    Dist. Vertebral          30.00                       Ant. Cerebral            20.00               2.42    Post. Cerebral           15.00               1.89       Left             Mean Velocity  Pulsatility Index    Mid. ICA                 31.00               1.24    Mid Cerebral             60.00               1.24    Dist. Vertebral          31.00                       Ant. Cerebral            31.00               1.46    Post. Cerebral           21.00               2.04        TCDs 07/21  Right            Mean Velocity  Pulsatility Index    Mid. ICA                 30.00               1.47    Mid Cerebral             36.00               1.77    Dist. Vertebral          48.00                       Ant. Cerebral            32.00               1.53    Post. Cerebral            24.00               2.05       Left             Mean Velocity  Pulsatility Index    Mid. ICA                 33.00               1.58    Mid Cerebral             71.00               1.36    Dist. Vertebral          32.00                       Ant. Cerebral            42.00               1.70    Post. Cerebral           22.00               1.36          Hadley Eric, DO

## 2024-07-22 NOTE — RESPIRATORY THERAPY NOTE
RT Protocol Note  Federico Bowen 68 y.o. male MRN: [id number]  Unit/Bed#: ICU 07 Encounter: [id number]    Assessment    Principal Problem:    Subarachnoid hemorrhage (HCC)  Active Problems:    HTN (hypertension)    Hyperlipidemia    Seizures (HCC)    Moderate protein-calorie malnutrition (HCC)    Fever    Severe protein-calorie malnutrition (HCC)      Home Pulmonary Medications:  albuterol       No past medical history on file.  Social History     Socioeconomic History    Marital status: /Civil Union     Spouse name: Not on file    Number of children: Not on file    Years of education: Not on file    Highest education level: Not on file   Occupational History    Not on file   Tobacco Use    Smoking status: Not on file    Smokeless tobacco: Not on file   Substance and Sexual Activity    Alcohol use: Not on file    Drug use: Not on file    Sexual activity: Not on file   Other Topics Concern    Not on file   Social History Narrative    Not on file     Social Determinants of Health     Financial Resource Strain: Not on file   Food Insecurity: No Food Insecurity (7/13/2024)    Hunger Vital Sign     Worried About Running Out of Food in the Last Year: Never true     Ran Out of Food in the Last Year: Never true   Transportation Needs: No Transportation Needs (7/13/2024)    PRAPARE - Transportation     Lack of Transportation (Medical): No     Lack of Transportation (Non-Medical): No   Physical Activity: Not on file   Stress: Not on file   Social Connections: Not on file   Intimate Partner Violence: Not on file   Housing Stability: Low Risk  (7/13/2024)    Housing Stability Vital Sign     Unable to Pay for Housing in the Last Year: No     Number of Times Moved in the Last Year: 0     Homeless in the Last Year: No       Subjective         Objective    Physical Exam:   Assessment Type: During-treatment  General Appearance: Alert, Awake  Respiratory Pattern: Normal  Chest Assessment: Chest expansion  symmetrical  Bilateral Breath Sounds: Coarse  O2 Device: HFNC    Vitals:  Blood pressure 118/70, pulse 88, temperature 98 °F (36.7 °C), temperature source Rectal, resp. rate (!) 25, weight 49 kg (108 lb 0.4 oz), SpO2 100%.          Imaging and other studies:     O2 Device: HFNC     Plan    Respiratory Plan: (P) Mild Distress pathway  Airway Clearance Plan: Flutter     Resp Comments: (P) pt admitted for subaracnoid hemmorrhage, pt has hx of hyhpertension, pt takes albuterol home resp meds, pt placed on HFNC due to desaturation 50L /50% fio2, udn tx given via aerogen, Flutter therapy will be instructed and given for acp. spo2 is 99%, will cont to monitor per resp protocol.

## 2024-07-22 NOTE — PROGRESS NOTES
Adjusted TF goal to Jevity 1.2@65mL/hr x 22hrs (to account for hold time for prilosec administration) provides total volume 1430mL, 1716cal, 79g pro, 1154mL, will continue with current saline water flushes of 40mL every 4hrs or per critical care. Will continue to monitor bowels, if having watery diarrhea can consider adding banatrol plus BID to start (would provide additional 80cal). Please obtain height and current weight.

## 2024-07-22 NOTE — PLAN OF CARE
Problem: Nutrition/Hydration-ADULT  Goal: Nutrient/Hydration intake appropriate for improving, restoring or maintaining nutritional needs  Description: Monitor and assess patient's nutrition/hydration status for malnutrition. Collaborate with interdisciplinary team and initiate plan and interventions as ordered.  Monitor patient's weight and dietary intake as ordered or per policy. Utilize nutrition screening tool and intervene as necessary. Determine patient's food preferences and provide high-protein, high-caloric foods as appropriate.     INTERVENTIONS:  - Monitor oral intake, urinary output, labs, and treatment plans  - Assess nutrition and hydration status and recommend course of action  - Evaluate amount of meals eaten  - Assist patient with eating if necessary   - Allow adequate time for meals  - Recommend/ encourage appropriate diets, oral nutritional supplements, and vitamin/mineral supplements  - Order, calculate, and assess calorie counts as needed  - Recommend, monitor, and adjust tube feeding based on assessed needs  - Assess need for intravenous fluids  - Provide nutrition/hydration education as appropriate  - Include patient/family/caregiver in decisions related to nutrition  Outcome: Progressing

## 2024-07-22 NOTE — RESPIRATORY THERAPY NOTE
Resp care   07/22/24 0902   Inhalation Therapy Tx   $ Inhalation Therapy Performed Yes   $ Pulse Oximetry Spot Check Charge Completed   Pre-Treatment Pulse 103   Pre-Treatment Respirations 18   Duration 10   Breath Sounds Pre-Treatment Bilateral Diminished   Breath Sounds Post-Treatment Bilateral Diminished   Post-Treatment Pulse 103   Post-Treatment Respirations 18   Delivery Source UDN;Air   Position Semi Velasquez's   Treatment Tolerance Tolerated well   Resp Comments pt found on ra, spo2 is 94%, bs are diminished, udn tx given, will cont to monitor.

## 2024-07-22 NOTE — OCCUPATIONAL THERAPY NOTE
OT CANCEL NOTE    Pt chart reviewed. Pt is presenting w/ worsening respiratory status; not medically appropriate @ this time for skilled OT services. Will continue to follow pt on caseload and see pt when medically stable and as clinically appropriate.       07/22/24 1445   OT Last Visit   OT Visit Date 07/22/24   Note Type   Note Type Cancelled Session   Cancel Reasons Medical status       Missy Nunez MS, OTR/L

## 2024-07-22 NOTE — UTILIZATION REVIEW
Continued Stay Review    Date: 7/22/2024                          Current Patient Class:  Inpatient   Current Level of Care:  Critical care     HPI:68 y.o. male initially admitted on 7/12/2024  with SAH, IVH and hydrocephalus. CTA showed aneurysm.     Assessment/Plan: 7/22/2024 POD #9, R PCOM coil embolization, R frontal EVD placement  He c/o headache and feeling cold. He denies any specific symptoms, just not feeling great.   Continue frequent neuro checks, Continue Nimodipine, Keppra,  EVD continue at 10 mm/hg - maintain.     Vital Signs (last 3 days)       Date/Time Temp Pulse Resp BP MAP (mmHg) SpO2 Calculated FIO2 (%) - Nasal Cannula Nasal Cannula O2 Flow Rate (L/min) O2 Device ICP Mean (mmHg) CPP Cuff-Calculated (mmHg) [place] Coma Scale Score Pain    07/22/24 1324 -- 84 20 -- -- 92 % -- -- -- 6 mmHg -- -- --    07/22/24 1322 -- 88 27 -- -- 83 % 28 2 L/min Nasal cannula 6 mmHg -- -- --    07/22/24 1200 98.8 °F (37.1 °C) 94 18 94/63 72 97 % 28 2 L/min Nasal cannula 8 mmHg 64 -- --    07/22/24 1125 -- 96 20 124/64 91 96 % 28 2 L/min Nasal cannula 5 mmHg 86 -- --    07/22/24 1101 -- -- -- -- -- -- -- -- -- -- -- -- Med Not Given for Pain - for MAR use only    07/22/24 1000 -- 104 20 107/69 74 97 % 28 2 L/min Nasal cannula 8 mmHg 66 -- --    07/22/24 0935 101.3 °F (38.5 °C) 104 20 -- -- 97 % 28 2 L/min Nasal cannula 10 mmHg -- -- --    07/22/24 0902 -- -- -- -- -- -- 28 2 L/min Nasal cannula -- -- -- --    07/22/24 0819 98.8 °F (37.1 °C) 98 20 151/79 118 97 % 28 2 L/min Nasal cannula 8 mmHg 110 -- --    07/22/24 0800 -- -- -- -- -- -- -- -- -- -- -- 14 No Pain    07/22/24 0700 -- 90 20 123/70 87 93 % 32 3 L/min Nasal cannula 5 mmHg 82 -- --    07/22/24 0600 98.9 °F (37.2 °C) 90 -- 116/64 80 94 % -- -- -- 5 mmHg 75 -- --    07/22/24 0500 -- 94 -- 127/63 83 92 % -- -- -- 6 mmHg 77 -- --    07/22/24 0400 99 °F (37.2 °C) 108 -- 125/73 93 93 % -- -- -- 6 mmHg 87 15 --    07/22/24 0300 99.9 °F (37.7 °C) 92 -- 116/68 82  97 % -- -- -- 8 mmHg 74 -- --    07/22/24 0200 100.4 °F (38 °C) 90 -- 117/72 86 98 % -- -- -- 6 mmHg 80 -- --    07/22/24 0100 99.3 °F (37.4 °C) 86 -- 112/65 78 96 % -- -- -- 7 mmHg 71 -- --    07/22/24 0000 99 °F (37.2 °C) 84 -- 101/64 73 96 % -- -- -- 6 mmHg 67 15 --    07/21/24 2300 -- 84 -- 114/64 82 95 % -- -- -- 2 mmHg 80 -- --    07/21/24 2200 98.4 °F (36.9 °C) 84 -- 101/58 71 96 % -- -- -- 6 mmHg 65 -- --    07/21/24 2100 -- 88 -- 108/32 79 96 % -- -- -- 5 mmHg 74 -- --    07/21/24 2000 98 °F (36.7 °C) 90 -- 133/76 92 98 % -- -- -- 7 mmHg 85 15 No Pain    07/21/24 1943 -- -- -- -- -- 98 % -- -- -- -- -- -- --    07/21/24 1900 -- 90 -- 130/79 96 95 % -- -- -- 5 mmHg 91 -- --    07/21/24 1800 -- 102 -- 135/81 98 96 % -- -- -- 13 mmHg 85 -- --    07/21/24 1743 98.4 °F (36.9 °C) -- -- -- -- -- -- -- -- -- -- -- --    07/21/24 1700 -- 112 -- 113/70 81 94 % -- -- -- 0 mmHg 81 -- --    07/21/24 1605 102.2 °F (39 °C) 120 -- 164/86 102 93 % -- -- -- 4 mmHg 98 -- --    07/21/24 1600 -- -- -- -- -- -- -- -- -- -- -- 15 --    07/21/24 1500 -- 108 -- 154/83 113 95 % -- -- -- 5 mmHg 108 -- --    07/21/24 1400 -- 98 -- 139/79 100 98 % -- -- -- 5 mmHg 95 -- --    07/21/24 1300 -- 100 -- 120/72 87 97 % -- -- -- 6 mmHg 81 -- --    07/21/24 1200 100.8 °F (38.2 °C) 106 -- 130/82 94 93 % -- -- -- 11 mmHg 83 15 --    07/21/24 1100 -- 104 -- 155/84 107 95 % -- -- -- 2 mmHg 105 -- --    07/21/24 1000 -- 100 -- 147/82 101 95 % -- -- -- 4 mmHg 97 -- --    07/21/24 0905 -- 98 -- 105/71 89 93 % -- -- -- 6 mmHg 83 -- --    07/21/24 0800 101 °F (38.3 °C) 98 -- 137/77 94 95 % -- -- -- 4 mmHg 90 15 No Pain    07/21/24 0731 -- -- -- -- -- -- 32 3 L/min Nasal cannula -- -- -- --    07/21/24 0700 -- 92 -- 121/68 86 93 % -- -- -- 7 mmHg 79 -- --    07/21/24 0600 -- 98 -- 121/66 86 97 % 32 3 L/min Nasal cannula 6 mmHg 80 -- --    07/21/24 0500 -- 104 -- 119/70 79 96 % -- -- -- 5 mmHg 74 -- --    07/21/24 0400 99.5 °F (37.5 °C) 102 -- 138/81 98  95 % -- -- -- 3 mmHg 95 15 --    07/21/24 0300 -- 102 -- 127/87 106 98 % -- -- -- 3 mmHg 103 -- --    07/21/24 0200 -- 96 -- 119/77 90 97 % -- -- -- 1 mmHg 89 -- --    07/21/24 0100 -- 96 -- 127/79 96 96 % -- -- -- 1 mmHg 95 -- --    07/21/24 0000 -- 94 -- 123/75 87 96 % -- -- -- 1 mmHg 86 15 --    07/20/24 2300 -- 100 -- 121/77 90 96 % -- -- -- 2 mmHg 88 -- --    07/20/24 2200 -- 98 -- 122/79 90 94 % -- -- -- 0 mmHg 90 -- --    07/20/24 2100 -- 108 -- 150/86 104 93 % -- -- -- 2 mmHg 102 -- --    07/20/24 2000 98.8 °F (37.1 °C) 98 -- 131/76 96 98 % -- -- -- 5 mmHg 91 15 No Pain    07/20/24 1920 -- -- 16 -- -- -- -- -- -- -- -- -- --    07/20/24 1900 -- 98 -- 123/73 90 97 % -- -- -- 4 mmHg 86 -- --    07/20/24 1800 -- 98 -- 124/74 88 96 % -- -- -- 7 mmHg 81 -- --    07/20/24 1700 -- 104 -- 115/69 84 96 % -- -- -- 10 mmHg 74 -- --    07/20/24 1600 101.2 °F (38.4 °C) 98 -- 145/83 103 97 % -- -- -- 0 mmHg 103 15 --    07/20/24 1500 -- 94 -- 128/79 95 95 % -- -- -- 0 mmHg 95 -- --    07/20/24 1400 -- 92 -- 128/77 92 95 % -- -- -- 0 mmHg 92 -- --    07/20/24 1300 -- 90 -- 121/69 85 94 % -- -- -- 3 mmHg 82 -- --    07/20/24 1200 100.2 °F (37.9 °C) 92 -- 124/75 93 98 % -- -- -- 5 mmHg 88 15 --    07/20/24 1106 -- -- -- -- -- 96 % -- -- -- -- -- -- --    07/20/24 1100 -- 88 -- 115/72 86 96 % -- -- -- 5 mmHg 81 -- --    07/20/24 1000 -- 94 -- 141/70 97 94 % -- -- -- 5 mmHg 92 -- --    07/20/24 0905 -- 96 -- 106/68 80 96 % -- -- -- 2 mmHg 78 -- --    07/20/24 0822 -- -- -- -- -- -- -- -- None (Room air) -- -- -- --    07/20/24 0800 100.4 °F (38 °C) -- -- -- -- -- -- -- -- -- -- 15 --    07/20/24 0600 -- 88 -- 128/71 85 96 % -- -- -- 5 mmHg 80 -- --    07/20/24 0500 -- 92 -- 126/72 88 97 % -- -- -- 4 mmHg 84 -- --    07/20/24 0400 -- 90 -- 145/77 95 98 % -- -- -- 5 mmHg 90 15 --    07/20/24 0300 -- 94 -- 128/74 92 98 % -- -- -- 10 mmHg 82 -- --    07/20/24 0200 100.2 °F (37.9 °C) 90 -- 131/73 96 99 % -- -- -- 5 mmHg 91 -- --     07/20/24 0100 -- 88 -- 123/70 84 100 % -- -- -- 4 mmHg 80 -- --    07/20/24 0000 -- 90 -- 132/76 93 97 % -- -- None (Room air) 6 mmHg 87 15 No Pain    07/19/24 2300 -- 98 -- 116/73 85 97 % -- -- -- 4 mmHg 81 -- --    07/19/24 2200 -- 104 -- 132/75 93 99 % -- -- -- 6 mmHg 87 -- --    07/19/24 2100 100.3 °F (37.9 °C) 100 -- 133/75 95 99 % -- -- -- 4 mmHg 91 -- --    07/19/24 2000 -- 96 -- 129/71 88 99 % -- -- -- 2 mmHg 86 15 No Pain    07/19/24 1900 -- 98 -- 132/75 94 97 % -- -- -- 3 mmHg 91 -- --    07/19/24 1800 -- 106 -- 116/74 87 97 % -- -- -- 7 mmHg 80 -- --    07/19/24 1701 -- -- -- -- -- -- -- -- None (Room air) -- -- -- --    07/19/24 1700 -- 104 -- 128/73 86 97 % -- -- -- 5 mmHg 81 -- --    07/19/24 1641 -- -- -- -- -- -- -- -- None (Room air) -- -- -- --    07/19/24 1600 101.8 °F (38.8 °C) 106 -- 143/81 99 99 % -- -- -- 9 mmHg 90 15 --    07/19/24 1500 -- 98 -- 124/76 91 100 % -- -- -- 16 mmHg 75 -- --    07/19/24 1400 -- 98 -- 116/75 84 94 % -- -- -- 13 mmHg 71 -- --    07/19/24 1300 -- 100 -- 98/62 72 95 % -- -- -- 7 mmHg 65 -- --    07/19/24 1200 100 °F (37.8 °C) 112 -- 119/73 84 96 % -- -- -- 6 mmHg 78 15 --    07/19/24 1000 -- 114 -- 150/85 107 98 % -- -- -- 9 mmHg 98 -- --    07/19/24 0900 -- 114 -- 132/79 94 99 % -- -- -- 5 mmHg 89 -- --    07/19/24 0800 101.2 °F (38.4 °C) 106 -- 159/95 117 100 % -- -- -- 17 mmHg 100 15 7    07/19/24 0600 -- 98 -- 128/81 96 98 % -- -- -- 11 mmHg 85 15 --    07/19/24 0400 -- 98 -- 121/71 88 98 % -- -- None (Room air) 9 mmHg 79 15 --    07/19/24 0330 100.2 °F (37.9 °C) -- -- 151/111 125 -- -- -- -- 4 mmHg 121 -- --    07/19/24 0300 -- 118 -- 136/86 102 98 % -- -- -- -- -- -- --    07/19/24 0200 -- 112 -- 140/84 100 98 % -- -- None (Room air) 6 mmHg 94 15 --    07/19/24 0105 101.5 °F (38.6 °C) 122 -- 137/83 99 -- -- -- -- 5 mmHg 94 -- --    07/19/24 0100 -- 122 -- -- -- -- -- -- -- -- -- -- --    07/19/24 0030 -- 110 -- 146/85 103 -- -- -- -- -- -- -- --    07/19/24 0000  -- -- -- -- -- -- -- -- -- -- -- 15 --          Weight (last 2 days)       None              Pertinent Labs/Diagnostic Results:   Radiology:  VAS transcranial doppler, complete study   Final Interpretation by Angelina Kennedy MD (07/21 1712)      XR chest portable ICU   Final Interpretation by Collins Prater MD (07/22 5143)      No acute cardiopulmonary disease.            Workstation performed: [id number]         VAS transcranial doppler, complete study   Final Interpretation by Angelina Kennedy MD (07/20 1834)      VAS transcranial doppler, complete study   Final Interpretation by Angelina Kennedy MD (07/20 1834)      XR chest portable ICU   Final Interpretation by Mark Singh MD (07/19 1418)      No acute cardiopulmonary disease.      Endotracheal tube tip near the landon.  Consider retraction and repeat radiograph.         Workstation performed: [id number]         VAS transcranial doppler, complete study   Final Interpretation by Kameron Mistry MD (07/18 5734)      FL barium swallow video w speech   Final Interpretation by KARLI CORREA (07/17 1258)      VAS transcranial doppler, complete study   Final Interpretation by Rhoda Justin MD (07/18 0055)      CTA head and neck w wo contrast   Final Interpretation by Noa Batista MD (07/16 2208)      CT Brain: Redemonstrated subarachnoid hemorrhage overlying the right greater than left convexities and concentrated within the right sylvian cistern and fissure. .      Subacute infarct involving the right anterior temporal region and medial occipital region..      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                  Workstation performed: [id number]         VAS transcranial doppler, complete study   Final Interpretation by John Conner DO (07/16 2239)      VAS transcranial doppler, complete study   Final  Interpretation by Angelina Kennedy MD (07/15 1726)      VAS transcranial doppler, complete study   Final Interpretation by Mick Last MD (07/14 2866)      CT head wo contrast   Final Interpretation by Gm Huff MD (07/14 0578)      No significant interval change in extensive bilateral subarachnoid hemorrhage and intraventricular hemorrhage.               Workstation performed: [id number]         IR cerebral angiography / intervention   Final Interpretation by Ruslan Mcneal MD (07/15 1436)      Successful balloon assisted coil embolization of a 4.5 mm right P-comm aneurysm.               Workstation performed: [id number]         CT head wo contrast   Final Interpretation by Vahid Rodriguez DO (07/13 0855)      Interval placement of right-sided shunt catheter. Stable ventricular size with slight interval increase in intraventricular hemorrhage layering in the occipital horns bilaterally. Diffuse subarachnoid hemorrhage again noted.                  Workstation performed: [id number]         XR chest portable ICU   Final Interpretation by Mamta Jaeger MD (07/12 2217)      No acute cardiopulmonary disease.      ET tube 6 cm above the landon.      Workstation performed: [id number]         VAS transcranial doppler, complete study    (Results Pending)   VAS transcranial doppler, complete study    (Results Pending)   VAS transcranial doppler, complete study    (Results Pending)   CTA head and neck w wo contrast    (Results Pending)     Cardiology:  ECG 12 lead   Final Result by Mat Mobley MD (07/19 1634)   Sinus tachycardia   Poor anterior R wave progression is noted   Abnormal ECG   When compared with ECG of 18-JUL-2024 07:56,   Premature ectopic complexes are no longer Present   Confirmed by Mat Mobley ([id number]) on 7/19/2024 4:34:24 PM      ECG 12 lead   Final Result by Farnaz Chacon DO (07/18 1632)   Sinus tachycardia with occasional Premature ectopic complexes   Poor  anterior R wave progression is noted   When compared with ECG of 14-JUL-2024 17:50,   Premature ectopic complexes are now Present   Confirmed by Farnaz Chacon ([id number]) on 7/18/2024 4:32:33 PM      ECG 12 lead   Final Result by Mat Mobley MD (07/15 2308)   Normal sinus rhythm   Poor anterior R wave progression is noted   Abnormal ECG   No previous ECGs available   Confirmed by Mat Mobley ([id number]) on 7/15/2024 11:08:37 PM      Echo complete w/ contrast if indicated   Final Result by Dmitry Sanchez MD (07/13 4434)        Technically difficult study     Left Ventricle: Left ventricular cavity size is normal. Wall thickness    is normal. The left ventricular ejection fraction is 55% by visual    estimation. Systolic function is normal.     Right Ventricle: Right ventricular cavity size is normal. Systolic    function is normal.     Mitral Valve: There is mild regurgitation.           GI:  No orders to display           Results from last 7 days   Lab Units 07/22/24  0541 07/21/24  0523 07/20/24  0543 07/19/24  0611 07/18/24  0520   WBC Thousand/uL 22.76* 13.56* 11.81* 10.54* 10.33*   HEMOGLOBIN g/dL 10.9* 11.2* 11.5* 12.4 12.4   HEMATOCRIT % 32.6* 34.0* 34.2* 36.8 37.2   PLATELETS Thousands/uL 332 337 308 283 257   TOTAL NEUT ABS Thousands/µL  --  11.16* 9.40* 7.76* 7.87*   BANDS PCT % 5  --   --   --   --          Results from last 7 days   Lab Units 07/22/24  0541 07/21/24  0523 07/20/24  0543 07/19/24  0611 07/18/24  0520   SODIUM mmol/L 145 145 142 142 141   POTASSIUM mmol/L 3.9 4.0 4.1 3.9 4.0   CHLORIDE mmol/L 106 106 106 106 106   CO2 mmol/L 29 28 30 26 24   ANION GAP mmol/L 10 11 6 10 11   BUN mg/dL 29* 25 23 19 20   CREATININE mg/dL 0.75 0.79 0.78 0.71 0.82   EGFR ml/min/1.73sq m 93 91 92 95 90   CALCIUM mg/dL 9.8 9.6 9.5 9.3 9.9   CALCIUM, IONIZED mmol/L 1.24 1.20 1.25 1.19 1.25   MAGNESIUM mg/dL 2.3 2.2 2.1 2.2 2.0   PHOSPHORUS mg/dL 3.0 3.2 3.6 3.9 4.1     Results from last 7 days   Lab Units  07/21/24  0523 07/19/24  0611 07/18/24  0520 07/17/24  0550   AST U/L 16 17 13 13   ALT U/L 17 17 13 13   ALK PHOS U/L 109* 119* 123* 128*   TOTAL PROTEIN g/dL 6.9 6.9 6.6 7.3   ALBUMIN g/dL 3.6 3.8 3.8 4.1   TOTAL BILIRUBIN mg/dL 0.46 0.49 0.63 0.83     Results from last 7 days   Lab Units 07/22/24  1140 07/22/24  0548 07/21/24  2320 07/21/24  1755 07/21/24  1201 07/21/24  0552 07/20/24  2339 07/20/24  1755 07/20/24  1146 07/20/24  0547 07/19/24  2348 07/19/24  1703   POC GLUCOSE mg/dl 129 153* 149* 154* 179* 171* 143* 166* 170* 164* 129 178*     Results from last 7 days   Lab Units 07/22/24  0541 07/21/24  0523 07/20/24  0543 07/19/24  0611 07/18/24  0520 07/17/24  0550 07/16/24  0455   GLUCOSE RANDOM mg/dL 149* 144* 153* 137 140 146* 144*       Results from last 7 days   Lab Units 07/18/24  1122 07/18/24  0850   HS TNI 0HR ng/L  --  13   HS TNI 2HR ng/L 13  --    HSTNI D2 ng/L 0  --          Results from last 7 days   Lab Units 07/22/24  0541 07/21/24  0523 07/20/24  0543   PTT seconds 30 29 29         Results from last 7 days   Lab Units 07/21/24  0523   PROCALCITONIN ng/ml 0.23     Results from last 7 days   Lab Units 07/22/24  0943   LACTIC ACID mmol/L 1.3       Results from last 7 days   Lab Units 07/17/24  1720   CLARITY UA  Clear   COLOR UA  Light Yellow   SPEC GRAV UA  1.016   PH UA  6.5   GLUCOSE UA mg/dl Negative   KETONES UA mg/dl Negative   BLOOD UA  Moderate*   PROTEIN UA mg/dl Trace*   NITRITE UA  Negative   BILIRUBIN UA  Negative   UROBILINOGEN UA (BE) mg/dl <2.0   LEUKOCYTES UA  Negative   WBC UA /hpf 1-2   RBC UA /hpf 30-50*   BACTERIA UA /hpf None Seen   EPITHELIAL CELLS WET PREP /hpf None Seen       Results from last 7 days   Lab Units 07/22/24  0925 07/18/24  0957 07/17/24  1658   BLOOD CULTURE  Received in Microbiology Lab. Culture in Progress.  --  No Growth After 4 Days.  No Growth After 4 Days.   GRAM STAIN RESULT   --  3+ Polys  No No organisms seen  --      Results from last 7 days    Lab Units 07/22/24  1131 07/18/24  0957   TOTAL COUNTED  100 100     Results from last 7 days   Lab Units 07/22/24  1131 07/18/24  0957   APPEARANCE CSF  slight bloody bloody, cloudy   WBC CSF /uL 14* 275*   XANTHOCHROMIA  Yes* Yes*   NEUTROPHILS % (CSF) % 38 81   LYMPHS % (CSF) % 56 12   MONOCYTES % (CSF) % 6 7   GLUCOSE CSF mg/dL 75* 70   PROTEIN CSF mg/dL  --  89*   RBC CSF uL 3,275* 31,075*   CSF CULTURE   --  No growth           Medications:   Scheduled Medications:  acetaminophen, 650 mg, Oral, Q6H  acetylcysteine, 3 mL, Nebulization, Q8H  aspirin, 81 mg, Per NG Tube, Daily  atorvastatin, 80 mg, Oral, Daily  bromocriptine, 10 mg, Oral, TID  cefTRIAXone, 1,000 mg, Intravenous, Q24H- started 7/22  chlorhexidine, 15 mL, Mouth/Throat, Q12H MATTY  ezetimibe, 10 mg, Oral, QAM  folic acid, 400 mcg, Oral, Daily  gabapentin, 600 mg, Oral, TID  guaiFENesin, 400 mg, Per NG Tube, Q6H  insulin lispro, 1-5 Units, Subcutaneous, Q6H MATTY  levETIRAcetam, 750 mg, Oral, Q12H MATTY  lidocaine, 1 patch, Topical, Daily  metoprolol succinate, 50 mg, Oral, HS  niMODipine, 60 mg, Per NG Tube, Q4H MATTY  nystatin, 500,000 Units, Swish & Spit, 4x Daily  omeprazole (PRILOSEC) suspension 2 mg/mL, 20 mg, Per NG Tube, Daily  polyethylene glycol, 17 g, Oral, Daily  senna, 1 tablet, Oral, HS  sodium chloride, 4 mL, Nebulization, Q6H  thiamine, 100 mg, Per NG Tube, Daily  umeclidinium, 1 puff, Inhalation, Daily  Potassium Chloride 40 meq po once - 7/22 x 1      Continuous IV Infusions:  heparin (porcine), 12 Units/kg/hr (Order-Specific), Intravenous, Titrated      PRN Meds:  albuterol, 2.5 mg, Nebulization, Q4H PRN  bisacodyl, 10 mg, Rectal, Daily PRN  hydrALAZINE, 10 mg, Intravenous, Q6H PRN  HYDROmorphone, 0.2 mg, Intravenous, Q4H PRN  labetalol, 10 mg, Intravenous, Q6H PRN  methocarbamol, 500 mg, Oral, Q6H PRN  ondansetron, 4 mg, Intravenous, Q6H PRN  oxyCODONE, 2.5 mg, Oral, Q4H PRN  oxyCODONE, 5 mg, Oral, Q4H PRN  saliva substitute, 5 spray,  Mouth/Throat, 4x Daily PRN        Discharge Plan: TBD    Network Utilization Review Department  ATTENTION: Please call with any questions or concerns to [phone] and carefully listen to the prompts so that you are directed to the right person. All voicemails are confidential.   For Discharge needs, contact Care Management DC Support Team at [phone] opt. [phone]  Send all requests for admission clinical reviews, approved or denied determinations and any other requests to dedicated fax number below belonging to the [place] where the patient is receiving treatment. List of dedicated fax numbers for the Facilities:  FACILITY NAME UR FAX NUMBER   ADMISSION DENIALS (Administrative/Medical Necessity) [phone]   DISCHARGE SUPPORT TEAM (NETWORK) [phone]   PARENT CHILD HEALTH (Maternity/NICU/Pediatrics) [phone]   [facility] [phone]   [facility] [phone]   [facility] [phone]   [facility] [phone]   [facility] [phone]   [facility] [phone]   [facility] [phone]   [facility] [phone]   [facility] [phone]   [facility] [phone]   [facility] [phone]   [facility] [phone]

## 2024-07-22 NOTE — PLAN OF CARE
Problem: Prexisting or High Potential for Compromised Skin Integrity  Goal: Skin integrity is maintained or improved  Description: INTERVENTIONS:  - Identify patients at risk for skin breakdown  - Assess and monitor skin integrity  - Assess and monitor nutrition and hydration status  - Monitor labs   - Assess for incontinence   - Turn and reposition patient  - Assist with mobility/ambulation  - Relieve pressure over bony prominences  - Avoid friction and shearing  - Provide appropriate hygiene as needed including keeping skin clean and dry  - Evaluate need for skin moisturizer/barrier cream  - Collaborate with interdisciplinary team   - Patient/family teaching  - Consider wound care consult   Outcome: Progressing     Problem: Neurological Deficit  Goal: Neurological status is stable or improving  Description: Interventions:  - Monitor and assess patient's level of consciousness, motor function, sensory function, and level of assistance needed for ADLs.   - Monitor and report changes from baseline. Collaborate with interdisciplinary team to initiate plan and implement interventions as ordered.   - Provide and maintain a safe environment.  - Consider seizure precautions.  - Consider fall precautions.  - Consider aspiration precautions.  - Consider bleeding precautions.  Outcome: Progressing     Problem: Activity Intolerance/Impaired Mobility  Goal: Mobility/activity is maintained at optimum level for patient  Description: Interventions:  - Assess and monitor patient  barriers to mobility and need for assistive/adaptive devices.  - Assess patient's emotional response to limitations.  - Collaborate with interdisciplinary team and initiate plans and interventions as ordered.  - Encourage independent activity per ability.  - Maintain proper body alignment.  - Perform active/passive rom as tolerated/ordered.  - Plan activities to conserve energy.  - Turn patient as appropriate  Outcome: Progressing     Problem:  Communication Impairment  Goal: Ability to express needs and understand communication  Description: Assess patient's communication skills and ability to understand information.  Patient will demonstrate use of effective communication techniques, alternative methods of communication and understanding even if not able to speak.     - Encourage communication and provide alternate methods of communication as needed.  - Collaborate with case management/ for discharge needs.  - Include patient/family/caregiver in decisions related to communication.  Outcome: Progressing     Problem: Potential for Aspiration  Goal: Non-ventilated patient's risk of aspiration is minimized  Description: Assess and monitor vital signs, respiratory status, and labs (WBC).  Monitor for signs of aspiration (tachypnea, cough, rales, wheezing, cyanosis, fever).    - Assess and monitor patient's ability to swallow.  - Place patient up in chair to eat if possible.  - HOB up at 90 degrees to eat if unable to get patient up into chair.  - Supervise patient during oral intake.   - Instruct patient/ family to take small bites.  - Instruct patient/ family to take small single sips when taking liquids.  - Follow patient-specific strategies generated by speech pathologist.  Outcome: Progressing     Problem: Nutrition  Goal: Nutrition/Hydration status is improving  Description: Monitor and assess patient's nutrition/hydration status for malnutrition (ex- brittle hair, bruises, dry skin, pale skin and conjunctiva, muscle wasting, smooth red tongue, and disorientation). Collaborate with interdisciplinary team and initiate plan and interventions as ordered.  Monitor patient's weight and dietary intake as ordered or per policy. Utilize nutrition screening tool and intervene per policy. Determine patient's food preferences and provide high-protein, high-caloric foods as appropriate.   - Monitor nutrition parameters, recommending adjustments to  enteral nutrition orders at indicated.   - Assist patient with eating.  - Allow adequate time for meals.  - Encourage patient to take dietary supplement as ordered.  - Collaborate with interdisciplinary team.   - Include patient/family/caregiver in decisions related to nutrition.  Outcome: Progressing     Problem: PAIN - ADULT  Goal: Verbalizes/displays adequate comfort level or baseline comfort level  Description: Interventions:  - Encourage patient to monitor pain and request assistance  - Assess pain using appropriate pain scale  - Administer analgesics based on type and severity of pain and evaluate response  - Implement non-pharmacological measures as appropriate and evaluate response  - Consider cultural and social influences on pain and pain management  - Notify physician/advanced practitioner if interventions unsuccessful or patient reports new pain  Outcome: Progressing     Problem: INFECTION - ADULT  Goal: Absence or prevention of progression during hospitalization  Description: INTERVENTIONS:  - Assess and monitor for signs and symptoms of infection  - Monitor lab/diagnostic results  - Monitor all insertion sites, i.e. indwelling lines, tubes, and drains  - Monitor endotracheal if appropriate and nasal secretions for changes in amount and color  - [place] appropriate cooling/warming therapies per order  - Administer medications as ordered  - Instruct and encourage patient and family to use good hand hygiene technique  - Identify and instruct in appropriate isolation precautions for identified infection/condition  Outcome: Progressing  Goal: Absence of fever/infection during neutropenic period  Description: INTERVENTIONS:  - Monitor WBC    Outcome: Progressing     Problem: SAFETY ADULT  Goal: Patient will remain free of falls  Description: INTERVENTIONS:  - Educate patient/family on patient safety including physical limitations  - Instruct patient to call for assistance with activity   - Consult OT/PT to  assist with strengthening/mobility   - Keep Call bell within reach  - Keep bed low and locked with side rails adjusted as appropriate  - Keep care items and personal belongings within reach  - Initiate and maintain comfort rounds  - Make Fall Risk Sign visible to staff  - Offer Toileting every  Hours, in advance of need  - Initiate/Maintain alarm  - Obtain necessary fall risk management equipment:   - Apply yellow socks and bracelet for high fall risk patients  - Consider moving patient to room near nurses station  Outcome: Progressing  Goal: Maintain or return to baseline ADL function  Description: INTERVENTIONS:  -  Assess patient's ability to carry out ADLs; assess patient's baseline for ADL function and identify physical deficits which impact ability to perform ADLs (bathing, care of mouth/teeth, toileting, grooming, dressing, etc.)  - Assess/evaluate cause of self-care deficits   - Assess range of motion  - Assess patient's mobility; develop plan if impaired  - Assess patient's need for assistive devices and provide as appropriate  - Encourage maximum independence but intervene and supervise when necessary  - Involve family in performance of ADLs  - Assess for home care needs following discharge   - Consider OT consult to assist with ADL evaluation and planning for discharge  - Provide patient education as appropriate  Outcome: Progressing  Goal: Maintains/Returns to pre admission functional level  Description: INTERVENTIONS:  - Perform AM-PAC 6 Click Basic Mobility/ Daily Activity assessment daily.  - Set and communicate daily mobility goal to care team and patient/family/caregiver.   - Collaborate with rehabilitation services on mobility goals if consulted  - Perform Range of Motion 3 times a day.  - Reposition patient every 2 hours.  - Dangle patient 3 times a day  - Stand patient 3 times a day  - Ambulate patient 3 times a day  - Out of bed to chair 3 times a day   - Out of bed for meals 3 times a day  -  Out of bed for toileting  - Record patient progress and toleration of activity level   Outcome: Progressing     Problem: DISCHARGE PLANNING  Goal: Discharge to home or other facility with appropriate resources  Description: INTERVENTIONS:  - Identify barriers to discharge w/patient and caregiver  - Arrange for needed discharge resources and transportation as appropriate  - Identify discharge learning needs (meds, wound care, etc.)  - Arrange for interpretive services to assist at discharge as needed  - Refer to Case Management Department for coordinating discharge planning if the patient needs post-hospital services based on physician/advanced practitioner order or complex needs related to functional status, cognitive ability, or social support system  Outcome: Progressing     Problem: Knowledge Deficit  Goal: Patient/family/caregiver demonstrates understanding of disease process, treatment plan, medications, and discharge instructions  Description: Complete learning assessment and assess knowledge base.  Interventions:  - Provide teaching at level of understanding  - Provide teaching via preferred learning methods  Outcome: Progressing     Problem: Nutrition/Hydration-ADULT  Goal: Nutrient/Hydration intake appropriate for improving, restoring or maintaining nutritional needs  Description: Monitor and assess patient's nutrition/hydration status for malnutrition. Collaborate with interdisciplinary team and initiate plan and interventions as ordered.  Monitor patient's weight and dietary intake as ordered or per policy. Utilize nutrition screening tool and intervene as necessary. Determine patient's food preferences and provide high-protein, high-caloric foods as appropriate.     INTERVENTIONS:  - Monitor oral intake, urinary output, labs, and treatment plans  - Assess nutrition and hydration status and recommend course of action  - Evaluate amount of meals eaten  - Assist patient with eating if necessary   - Allow  adequate time for meals  - Recommend/ encourage appropriate diets, oral nutritional supplements, and vitamin/mineral supplements  - Order, calculate, and assess calorie counts as needed  - Recommend, monitor, and adjust tube feeding based on assessed needs  - Assess need for intravenous fluids  - Provide nutrition/hydration education as appropriate  - Include patient/family/caregiver in decisions related to nutrition  Outcome: Progressing     Problem: NEUROSENSORY - ADULT  Goal: Achieves stable or improved neurological status  Description: INTERVENTIONS  - Monitor and report changes in neurological status  - Monitor vital signs such as temperature, blood pressure, glucose, and any other labs ordered   - Initiate measures to prevent increased intracranial pressure  - Monitor for seizure activity and implement precautions if appropriate      Outcome: Progressing  Goal: Remains free of injury related to seizures activity  Description: INTERVENTIONS  - Maintain airway, patient safety  and administer oxygen as ordered  - Monitor patient for seizure activity, document and report duration and description of seizure to physician/advanced practitioner  - If seizure occurs,  ensure patient safety during seizure  - Reorient patient post seizure  - Seizure pads on all 4 side rails  - Instruct patient/family to notify RN of any seizure activity including if an aura is experienced  - Instruct patient/family to call for assistance with activity based on nursing assessment  - Administer anti-seizure medications if ordered    Outcome: Progressing  Goal: Achieves maximal functionality and self care  Description: INTERVENTIONS  - Monitor swallowing and airway patency with patient fatigue and changes in neurological status  - Encourage and assist patient to increase activity and self care.   - Encourage visually impaired, hearing impaired and aphasic patients to use assistive/communication devices  Outcome: Progressing     Problem:  CARDIOVASCULAR - ADULT  Goal: Maintains optimal cardiac output and hemodynamic stability  Description: INTERVENTIONS:  - Monitor I/O, vital signs and rhythm  - Monitor for S/S and trends of decreased cardiac output  - Administer and titrate ordered vasoactive medications to optimize hemodynamic stability  - Assess quality of pulses, skin color and temperature  - Assess for signs of decreased coronary artery perfusion  - Instruct patient to report change in severity of symptoms  Outcome: Progressing  Goal: Absence of cardiac dysrhythmias or at baseline rhythm  Description: INTERVENTIONS:  - Continuous cardiac monitoring, vital signs, obtain 12 lead EKG if ordered  - Administer antiarrhythmic and heart rate control medications as ordered  - Monitor electrolytes and administer replacement therapy as ordered  Outcome: Progressing     Problem: RESPIRATORY - ADULT  Goal: Achieves optimal ventilation and oxygenation  Description: INTERVENTIONS:  - Assess for changes in respiratory status  - Assess for changes in mentation and behavior  - Position to facilitate oxygenation and minimize respiratory effort  - Oxygen administered by appropriate delivery if ordered  - Initiate smoking cessation education as indicated  - Encourage broncho-pulmonary hygiene including cough, deep breathe, Incentive Spirometry  - Assess the need for suctioning and aspirate as needed  - Assess and instruct to report SOB or any respiratory difficulty  - Respiratory Therapy support as indicated  Outcome: Progressing     Problem: GASTROINTESTINAL - ADULT  Goal: Maintains or returns to baseline bowel function  Description: INTERVENTIONS:  - Assess bowel function  - Encourage oral fluids to ensure adequate hydration  - Administer IV fluids if ordered to ensure adequate hydration  - Administer ordered medications as needed  - Encourage mobilization and activity  - Consider nutritional services referral to assist patient with adequate nutrition and  appropriate food choices  Outcome: Progressing  Goal: Maintains adequate nutritional intake  Description: INTERVENTIONS:  - Monitor percentage of each meal consumed  - Identify factors contributing to decreased intake, treat as appropriate  - Assist with meals as needed  - Monitor I&O, weight, and lab values if indicated  - Obtain nutrition services referral as needed  Outcome: Progressing     Problem: GENITOURINARY - ADULT  Goal: Maintains or returns to baseline urinary function  Description: INTERVENTIONS:  - Assess urinary function  - Encourage oral fluids to ensure adequate hydration if ordered  - Administer IV fluids as ordered to ensure adequate hydration  - Administer ordered medications as needed  - Offer frequent toileting  - Follow urinary retention protocol if ordered  Outcome: Progressing  Goal: Absence of urinary retention  Description: INTERVENTIONS:  - Assess patient’s ability to void and empty bladder  - Monitor I/O  - Bladder scan as needed  - Discuss with physician/AP medications to alleviate retention as needed  - Discuss catheterization for long term situations as appropriate  Outcome: Progressing     Problem: METABOLIC, FLUID AND ELECTROLYTES - ADULT  Goal: Electrolytes maintained within normal limits  Description: INTERVENTIONS:  - Monitor labs and assess patient for signs and symptoms of electrolyte imbalances  - Administer electrolyte replacement as ordered  - Monitor response to electrolyte replacements, including repeat lab results as appropriate  - Instruct patient on fluid and nutrition as appropriate  Outcome: Progressing  Goal: Fluid balance maintained  Description: INTERVENTIONS:  - Monitor labs   - Monitor I/O and WT  - Instruct patient on fluid and nutrition as appropriate  - Assess for signs & symptoms of volume excess or deficit  Outcome: Progressing  Goal: Glucose maintained within target range  Description: INTERVENTIONS:  - Monitor Blood Glucose as ordered  - Assess for signs  and symptoms of hyperglycemia and hypoglycemia  - Administer ordered medications to maintain glucose within target range  - Assess nutritional intake and initiate nutrition service referral as needed  Outcome: Progressing     Problem: SKIN/TISSUE INTEGRITY - ADULT  Goal: Skin Integrity remains intact(Skin Breakdown Prevention)  Description: Assess:  -Perform Martin assessment every   -Clean and moisturize skin every   -Inspect skin when repositioning, toileting, and assisting with ADLS  -Assess under medical devices such as  every   -Assess extremities for adequate circulation and sensation     Bed Management:  -Have minimal linens on bed & keep smooth, unwrinkled  -Change linens as needed when moist or perspiring  -Avoid sitting or lying in one position for more than  hours while in bed  -Keep HOB at degrees     Toileting:  -Offer bedside commode  -Assess for incontinence every   -Use incontinent care products after each incontinent episode such as     Activity:  -Mobilize patient  times a day  -Encourage activity and walks on unit  -Encourage or provide ROM exercises   -Turn and reposition patient every  Hours  -Use appropriate equipment to lift or move patient in bed  -Instruct/ Assist with weight shifting every  when out of bed in chair  -Consider limitation of chair time  hour intervals    Skin Care:  -Avoid use of baby powder, tape, friction and shearing, hot water or constrictive clothing  -Relieve pressure over bony prominences using   -Do not massage red bony areas    Next Steps:  -Teach patient strategies to minimize risks such as    -Consider consults to  interdisciplinary teams such as   Outcome: Progressing  Goal: Incision(s), wounds(s) or drain site(s) healing without S/S of infection  Description: INTERVENTIONS  - Assess and document dressing, incision, wound bed, drain sites and surrounding tissue  - Provide patient and family education  - Perform skin care/dressing changes every   Outcome:  Progressing     Problem: HEMATOLOGIC - ADULT  Goal: Maintains hematologic stability  Description: INTERVENTIONS  - Assess for signs and symptoms of bleeding or hemorrhage  - Monitor labs  - Administer supportive blood products/factors as ordered and appropriate  Outcome: Progressing     Problem: MUSCULOSKELETAL - ADULT  Goal: Maintain or return mobility to safest level of function  Description: INTERVENTIONS:  - Assess patient's ability to carry out ADLs; assess patient's baseline for ADL function and identify physical deficits which impact ability to perform ADLs (bathing, care of mouth/teeth, toileting, grooming, dressing, etc.)  - Assess/evaluate cause of self-care deficits   - Assess range of motion  - Assess patient's mobility  - Assess patient's need for assistive devices and provide as appropriate  - Encourage maximum independence but intervene and supervise when necessary  - Involve family in performance of ADLs  - Assess for home care needs following discharge   - Consider OT consult to assist with ADL evaluation and planning for discharge  - Provide patient education as appropriate  Outcome: Progressing     Problem: COPING  Goal: Pt/Family able to verbalize concerns and demonstrate effective coping strategies  Description: INTERVENTIONS:  - Assist patient/family to identify coping skills, available support systems and cultural and spiritual values  - Provide emotional support, including active listening and acknowledgement of concerns of patient and caregivers  - Reduce environmental stimuli, as able  - Provide patient education  - Assess for spiritual pain/suffering and initiate spiritual care, including notification of Pastoral Care or halie based community as needed  - Assess effectiveness of coping strategies  Outcome: Progressing  Goal: Will report anxiety at manageable levels  Description: INTERVENTIONS:  - Administer medication as ordered  - Teach and encourage coping skills  - Provide emotional  support  - Assess patient/family for anxiety and ability to cope  Outcome: Progressing     Problem: Potential for Falls  Goal: Patient will remain free of falls  Description: INTERVENTIONS:  - Educate patient/family on patient safety including physical limitations  - Instruct patient to call for assistance with activity   - Consult OT/PT to assist with strengthening/mobility   - Keep Call bell within reach  - Keep bed low and locked with side rails adjusted as appropriate  - Keep care items and personal belongings within reach  - Initiate and maintain comfort rounds  - Make Fall Risk Sign visible to staff  - Offer Toileting every  Hours, in advance of need  - Initiate/Maintain alarm  - Obtain necessary fall risk management equipment:   - Apply yellow socks and bracelet for high fall risk patients  - Consider moving patient to room near nurses station  Outcome: Progressing

## 2024-07-22 NOTE — MALNUTRITION/BMI
This medical record reflects one or more clinical indicators suggestive of malnutrition and/or morbid obesity.    Malnutrition Findings:   Adult Malnutrition type: Acute illness  Adult Degree of Malnutrition: Other severe protein calorie malnutrition  Malnutrition Characteristics: Fat loss, Muscle loss, Inadequate energy, Weight loss                  360 Statement: Acute severe pro, vinny malnutrition d/t condition as evidence by signficant weight loss, 7/13/24 54kg, 7/19/24 49kg, 5kg/9% wt. loss x <1 week, moderate to severe signs of muscle/ fat loss at temples, orbitals, calvicles, triceps, shoulders, treated with TF.    BMI Findings:      Please obtain height and current weight     There is no height or weight on file to calculate BMI.     See Nutrition note dated 7/22/24 for additional details.  Completed nutrition assessment is viewable in the nutrition documentation.

## 2024-07-22 NOTE — ASSESSMENT & PLAN NOTE
aSAH s/p R PCOM aneurysm rupture  BD 10, HH 5, MF 4  PPD 9 right PCOM coil embolization (Dr. Mcneal 7/13)  PPD 9 R frontal EVD placement (Dr. Goldberg, 7/13)  P/w headache and right leg pain, 2 witnessed seizures by EMS was ultimately intubated.  Upon arrival to the ED patient was posturing.  Imaging revealed a large amount of SAH and a right PCOM aneurysm.  Per chart review he was previously on aspirin reversed with DDAVP.    Imaging:  CTA head w/wo 7/16/24: CT Brain: Redemonstrated subarachnoid hemorrhage overlying the right greater than left convexities and concentrated within the right sylvian cistern and fissure. Subacute infarct involving the right anterior temporal region and medial occipital region. 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     Plan:  Continue frequent neurological checks  Repeat CTA/CT head w/wo STAT if GCS declines more than 2 points in 1 hour  Continue SAH pathway/spasm watch:  Daily TCD's: Pending today  Continue Nimodipine 60mg Q4hrs  Continue Keppra 750 mg BID per neurology  Normonatremia (145 today)  Euvolemia (+404)   Hemoglobin >8 (10.9 today)  EVD placed on 7/13/2024  Continue EVD at 10 mmhg - maintain  Monitor output and ICP  ICPs 0-13, 6 in room   Output 0 ml/24hrs, draining easily when dropped below tragus  Maintain ICP goal <20  SBP <220  Continue aspirin 81 mg daily  Heparin drip at 12 units/kilogram/hour  Last PTT 29  Medical management and pain control per primary team  Ongoing fevers, Tmax 102.2  CSF sent 7/22  WBC 22  Started bromocriptine  DVT PPX: SCDs and heparin drip    Neurosurgery will continue to follow closely, call with any further questions or concerns.

## 2024-07-23 ENCOUNTER — APPOINTMENT (INPATIENT)
Dept: NON INVASIVE DIAGNOSTICS | Facility: HOSPITAL | Age: 69
DRG: 020 | End: 2024-07-23
Payer: MEDICARE

## 2024-07-23 ENCOUNTER — APPOINTMENT (INPATIENT)
Dept: RADIOLOGY | Facility: HOSPITAL | Age: 69
DRG: 020 | End: 2024-07-23
Payer: MEDICARE

## 2024-07-23 LAB
ALBUMIN SERPL BCG-MCNC: 3.6 G/DL (ref 3.5–5)
ALP SERPL-CCNC: 118 U/L (ref 34–104)
ALT SERPL W P-5'-P-CCNC: 16 U/L (ref 7–52)
ANION GAP SERPL CALCULATED.3IONS-SCNC: 12 MMOL/L (ref 4–13)
APTT PPP: 30 SECONDS (ref 23–37)
AST SERPL W P-5'-P-CCNC: 21 U/L (ref 13–39)
BILIRUB DIRECT SERPL-MCNC: 0.07 MG/DL (ref 0–0.2)
BILIRUB SERPL-MCNC: 0.43 MG/DL (ref 0.2–1)
BUN SERPL-MCNC: 32 MG/DL (ref 5–25)
CA-I BLD-SCNC: 1.22 MMOL/L (ref 1.12–1.32)
CALCIUM SERPL-MCNC: 9.7 MG/DL (ref 8.4–10.2)
CHLORIDE SERPL-SCNC: 109 MMOL/L (ref 96–108)
CO2 SERPL-SCNC: 27 MMOL/L (ref 21–32)
CREAT SERPL-MCNC: 0.76 MG/DL (ref 0.6–1.3)
ERYTHROCYTE [DISTWIDTH] IN BLOOD BY AUTOMATED COUNT: 14.3 % (ref 11.6–15.1)
GFR SERPL CREATININE-BSD FRML MDRD: 93 ML/MIN/1.73SQ M
GLUCOSE SERPL-MCNC: 127 MG/DL (ref 65–140)
GLUCOSE SERPL-MCNC: 156 MG/DL (ref 65–140)
GLUCOSE SERPL-MCNC: 166 MG/DL (ref 65–140)
GLUCOSE SERPL-MCNC: 176 MG/DL (ref 65–140)
GLUCOSE SERPL-MCNC: 194 MG/DL (ref 65–140)
HCT VFR BLD AUTO: 32.7 % (ref 36.5–49.3)
HGB BLD-MCNC: 10.9 G/DL (ref 12–17)
MAGNESIUM SERPL-MCNC: 2.4 MG/DL (ref 1.9–2.7)
MCH RBC QN AUTO: 31.8 PG (ref 26.8–34.3)
MCHC RBC AUTO-ENTMCNC: 33.3 G/DL (ref 31.4–37.4)
MCV RBC AUTO: 95 FL (ref 82–98)
MRSA NOSE QL CULT: NORMAL
PHOSPHATE SERPL-MCNC: 3.1 MG/DL (ref 2.3–4.1)
PLATELET # BLD AUTO: 402 THOUSANDS/UL (ref 149–390)
PMV BLD AUTO: 10.1 FL (ref 8.9–12.7)
POTASSIUM SERPL-SCNC: 4.3 MMOL/L (ref 3.5–5.3)
PROT SERPL-MCNC: 7.5 G/DL (ref 6.4–8.4)
RBC # BLD AUTO: 3.43 MILLION/UL (ref 3.88–5.62)
SODIUM SERPL-SCNC: 148 MMOL/L (ref 135–147)
WBC # BLD AUTO: 28.65 THOUSAND/UL (ref 4.31–10.16)

## 2024-07-23 PROCEDURE — 83735 ASSAY OF MAGNESIUM: CPT

## 2024-07-23 PROCEDURE — 97112 NEUROMUSCULAR REEDUCATION: CPT

## 2024-07-23 PROCEDURE — 94664 DEMO&/EVAL PT USE INHALER: CPT

## 2024-07-23 PROCEDURE — 97116 GAIT TRAINING THERAPY: CPT

## 2024-07-23 PROCEDURE — 94640 AIRWAY INHALATION TREATMENT: CPT

## 2024-07-23 PROCEDURE — 99291 CRITICAL CARE FIRST HOUR: CPT | Performed by: PSYCHIATRY & NEUROLOGY

## 2024-07-23 PROCEDURE — 80048 BASIC METABOLIC PNL TOTAL CA: CPT

## 2024-07-23 PROCEDURE — 82948 REAGENT STRIP/BLOOD GLUCOSE: CPT

## 2024-07-23 PROCEDURE — 97535 SELF CARE MNGMENT TRAINING: CPT

## 2024-07-23 PROCEDURE — 93886 INTRACRANIAL COMPLETE STUDY: CPT | Performed by: SURGERY

## 2024-07-23 PROCEDURE — 94760 N-INVAS EAR/PLS OXIMETRY 1: CPT

## 2024-07-23 PROCEDURE — 85730 THROMBOPLASTIN TIME PARTIAL: CPT

## 2024-07-23 PROCEDURE — 82330 ASSAY OF CALCIUM: CPT

## 2024-07-23 PROCEDURE — 84100 ASSAY OF PHOSPHORUS: CPT

## 2024-07-23 PROCEDURE — 85027 COMPLETE CBC AUTOMATED: CPT

## 2024-07-23 PROCEDURE — 74230 X-RAY XM SWLNG FUNCJ C+: CPT

## 2024-07-23 PROCEDURE — 93886 INTRACRANIAL COMPLETE STUDY: CPT

## 2024-07-23 PROCEDURE — 92611 MOTION FLUOROSCOPY/SWALLOW: CPT

## 2024-07-23 PROCEDURE — 99232 SBSQ HOSP IP/OBS MODERATE 35: CPT | Performed by: NURSE PRACTITIONER

## 2024-07-23 PROCEDURE — NC001 PR NO CHARGE: Performed by: EMERGENCY MEDICINE

## 2024-07-23 PROCEDURE — 97530 THERAPEUTIC ACTIVITIES: CPT

## 2024-07-23 PROCEDURE — 80076 HEPATIC FUNCTION PANEL: CPT

## 2024-07-23 RX ORDER — LEVALBUTEROL INHALATION SOLUTION 1.25 MG/3ML
1.25 SOLUTION RESPIRATORY (INHALATION)
Status: DISCONTINUED | OUTPATIENT
Start: 2024-07-23 | End: 2024-07-24

## 2024-07-23 RX ORDER — SODIUM CHLORIDE, SODIUM GLUCONATE, SODIUM ACETATE, POTASSIUM CHLORIDE, MAGNESIUM CHLORIDE, SODIUM PHOSPHATE, DIBASIC, AND POTASSIUM PHOSPHATE .53; .5; .37; .037; .03; .012; .00082 G/100ML; G/100ML; G/100ML; G/100ML; G/100ML; G/100ML; G/100ML
500 INJECTION, SOLUTION INTRAVENOUS ONCE
Status: COMPLETED | OUTPATIENT
Start: 2024-07-23 | End: 2024-07-23

## 2024-07-23 RX ORDER — ACETYLCYSTEINE 200 MG/ML
3 SOLUTION ORAL; RESPIRATORY (INHALATION)
Status: DISCONTINUED | OUTPATIENT
Start: 2024-07-23 | End: 2024-07-23

## 2024-07-23 RX ORDER — ACETYLCYSTEINE 200 MG/ML
3 SOLUTION ORAL; RESPIRATORY (INHALATION)
Status: DISCONTINUED | OUTPATIENT
Start: 2024-07-23 | End: 2024-07-24

## 2024-07-23 RX ORDER — ALBUMIN, HUMAN INJ 5% 5 %
25 SOLUTION INTRAVENOUS ONCE
Status: COMPLETED | OUTPATIENT
Start: 2024-07-23 | End: 2024-07-23

## 2024-07-23 RX ORDER — SODIUM CHLORIDE FOR INHALATION 3 %
4 VIAL, NEBULIZER (ML) INHALATION
Status: DISCONTINUED | OUTPATIENT
Start: 2024-07-23 | End: 2024-07-23

## 2024-07-23 RX ADMIN — ASPIRIN 81 MG CHEWABLE TABLET 81 MG: 81 TABLET CHEWABLE at 10:00

## 2024-07-23 RX ADMIN — HEPARIN SODIUM 12 UNITS/KG/HR: 10000 INJECTION, SOLUTION INTRAVENOUS at 10:24

## 2024-07-23 RX ADMIN — LEVALBUTEROL HYDROCHLORIDE 1.25 MG: 1.25 SOLUTION RESPIRATORY (INHALATION) at 08:25

## 2024-07-23 RX ADMIN — Medication 20 MG: at 10:43

## 2024-07-23 RX ADMIN — ATORVASTATIN CALCIUM 80 MG: 80 TABLET, FILM COATED ORAL at 10:00

## 2024-07-23 RX ADMIN — LEVETIRACETAM 750 MG: 100 SOLUTION ORAL at 10:02

## 2024-07-23 RX ADMIN — GABAPENTIN 600 MG: 300 CAPSULE ORAL at 16:37

## 2024-07-23 RX ADMIN — GABAPENTIN 600 MG: 300 CAPSULE ORAL at 10:00

## 2024-07-23 RX ADMIN — NYSTATIN 500000 UNITS: 100000 SUSPENSION ORAL at 12:59

## 2024-07-23 RX ADMIN — ACETAMINOPHEN 650 MG: 650 SUSPENSION ORAL at 21:12

## 2024-07-23 RX ADMIN — GABAPENTIN 600 MG: 300 CAPSULE ORAL at 21:09

## 2024-07-23 RX ADMIN — GUAIFENESIN 400 MG: 200 SOLUTION ORAL at 00:01

## 2024-07-23 RX ADMIN — SODIUM CHLORIDE SOLN NEBU 3% 4 ML: 3 NEBU SOLN at 08:26

## 2024-07-23 RX ADMIN — Medication 60 MG: at 09:10

## 2024-07-23 RX ADMIN — INSULIN LISPRO 1 UNITS: 100 INJECTION, SOLUTION INTRAVENOUS; SUBCUTANEOUS at 18:30

## 2024-07-23 RX ADMIN — INSULIN LISPRO 1 UNITS: 100 INJECTION, SOLUTION INTRAVENOUS; SUBCUTANEOUS at 06:02

## 2024-07-23 RX ADMIN — Medication 60 MG: at 21:08

## 2024-07-23 RX ADMIN — NYSTATIN 500000 UNITS: 100000 SUSPENSION ORAL at 21:12

## 2024-07-23 RX ADMIN — ACETYLCYSTEINE 600 MG: 200 SOLUTION ORAL; RESPIRATORY (INHALATION) at 13:34

## 2024-07-23 RX ADMIN — BROMOCRIPTINE MESYLATE 10 MG: 2.5 TABLET ORAL at 21:09

## 2024-07-23 RX ADMIN — SODIUM CHLORIDE, SODIUM GLUCONATE, SODIUM ACETATE, POTASSIUM CHLORIDE, MAGNESIUM CHLORIDE, SODIUM PHOSPHATE, DIBASIC, AND POTASSIUM PHOSPHATE 500 ML: .53; .5; .37; .037; .03; .012; .00082 INJECTION, SOLUTION INTRAVENOUS at 09:05

## 2024-07-23 RX ADMIN — CEFTRIAXONE SODIUM 1000 MG: 10 INJECTION, POWDER, FOR SOLUTION INTRAVENOUS at 09:19

## 2024-07-23 RX ADMIN — BUDESONIDE 0.5 MG: 0.5 INHALANT RESPIRATORY (INHALATION) at 08:25

## 2024-07-23 RX ADMIN — LEVETIRACETAM 750 MG: 100 SOLUTION ORAL at 21:08

## 2024-07-23 RX ADMIN — LEVALBUTEROL HYDROCHLORIDE 1.25 MG: 1.25 SOLUTION RESPIRATORY (INHALATION) at 13:34

## 2024-07-23 RX ADMIN — Medication 60 MG: at 16:38

## 2024-07-23 RX ADMIN — ACETAMINOPHEN 650 MG: 650 SUSPENSION ORAL at 10:01

## 2024-07-23 RX ADMIN — SODIUM CHLORIDE, SODIUM LACTATE, POTASSIUM CHLORIDE, AND CALCIUM CHLORIDE 500 ML: .6; .31; .03; .02 INJECTION, SOLUTION INTRAVENOUS at 23:23

## 2024-07-23 RX ADMIN — Medication 60 MG: at 04:40

## 2024-07-23 RX ADMIN — THIAMINE HCL TAB 100 MG 100 MG: 100 TAB at 10:00

## 2024-07-23 RX ADMIN — Medication 60 MG: at 12:58

## 2024-07-23 RX ADMIN — ACETAMINOPHEN 650 MG: 650 SUSPENSION ORAL at 04:40

## 2024-07-23 RX ADMIN — LIDOCAINE 5% 1 PATCH: 700 PATCH TOPICAL at 10:27

## 2024-07-23 RX ADMIN — LEVALBUTEROL HYDROCHLORIDE 1.25 MG: 1.25 SOLUTION RESPIRATORY (INHALATION) at 19:32

## 2024-07-23 RX ADMIN — SODIUM CHLORIDE, SODIUM GLUCONATE, SODIUM ACETATE, POTASSIUM CHLORIDE, MAGNESIUM CHLORIDE, SODIUM PHOSPHATE, DIBASIC, AND POTASSIUM PHOSPHATE 500 ML: .53; .5; .37; .037; .03; .012; .00082 INJECTION, SOLUTION INTRAVENOUS at 19:02

## 2024-07-23 RX ADMIN — INSULIN LISPRO 1 UNITS: 100 INJECTION, SOLUTION INTRAVENOUS; SUBCUTANEOUS at 00:49

## 2024-07-23 RX ADMIN — BROMOCRIPTINE MESYLATE 10 MG: 2.5 TABLET ORAL at 10:02

## 2024-07-23 RX ADMIN — BUDESONIDE 0.5 MG: 0.5 INHALANT RESPIRATORY (INHALATION) at 19:32

## 2024-07-23 RX ADMIN — UMECLIDINIUM 1 PUFF: 62.5 AEROSOL, POWDER ORAL at 12:58

## 2024-07-23 RX ADMIN — BROMOCRIPTINE MESYLATE 10 MG: 2.5 TABLET ORAL at 16:38

## 2024-07-23 RX ADMIN — ACETAMINOPHEN 650 MG: 650 SUSPENSION ORAL at 16:37

## 2024-07-23 RX ADMIN — METOPROLOL SUCCINATE 50 MG: 50 TABLET, EXTENDED RELEASE ORAL at 21:11

## 2024-07-23 RX ADMIN — NYSTATIN 500000 UNITS: 100000 SUSPENSION ORAL at 10:41

## 2024-07-23 RX ADMIN — ACETYLCYSTEINE 600 MG: 200 SOLUTION ORAL; RESPIRATORY (INHALATION) at 19:31

## 2024-07-23 RX ADMIN — FOLIC ACID TAB 400 MCG 400 MCG: 400 TAB at 10:00

## 2024-07-23 RX ADMIN — EZETIMIBE 10 MG: 10 TABLET ORAL at 10:00

## 2024-07-23 RX ADMIN — Medication 60 MG: at 00:49

## 2024-07-23 RX ADMIN — CHLORHEXIDINE GLUCONATE 0.12% ORAL RINSE 15 ML: 1.2 LIQUID ORAL at 21:09

## 2024-07-23 RX ADMIN — CHLORHEXIDINE GLUCONATE 0.12% ORAL RINSE 15 ML: 1.2 LIQUID ORAL at 10:40

## 2024-07-23 RX ADMIN — ALBUMIN (HUMAN) 25 G: 12.5 INJECTION, SOLUTION INTRAVENOUS at 11:05

## 2024-07-23 NOTE — OCCUPATIONAL THERAPY NOTE
Occupational Therapy Treatment Note      Federico Bowen    7/23/2024    Principal Problem:    Subarachnoid hemorrhage (HCC)  Active Problems:    HTN (hypertension)    Hyperlipidemia    Seizures (HCC)    Moderate protein-calorie malnutrition (HCC)    Fever    Severe protein-calorie malnutrition (HCC)      No past medical history on file.    Past Surgical History:   Procedure Laterality Date    IR CEREBRAL ANGIOGRAPHY / INTERVENTION  7/13/2024 07/23/24 1038   OT Last Visit   OT Visit Date 07/23/24   Note Type   Note Type Treatment   Pain Assessment   Pain Assessment Tool FLACC   Pain Location/Orientation Location: Head;Orientation: Upper;Location: Back   Pain Rating: FLACC (Rest) - Face 1   Pain Rating: FLACC (Rest) - Legs 0   Pain Rating: FLACC (Rest) - Activity 0   Pain Rating: FLACC (Rest) - Cry 1   Pain Rating: FLACC (Rest) - Consolability 1   Score: FLACC (Rest) 3   Pain Rating: FLACC (Activity) - Face 1   Pain Rating: FLACC (Activity) - Legs 0   Pain Rating: FLACC (Activity) - Activity 1   Pain Rating: FLACC (Activity) - Cry 1   Pain Rating: FLACC (Activity) - Consolability 1   Score: FLACC (Activity) 4   Restrictions/Precautions   Weight Bearing Precautions Per Order No   Other Precautions Cognitive;Impulsive;Chair Alarm;Bed Alarm;Multiple lines;Telemetry;Restraints;Fall Risk;Pain  (NGT, EVD, anderson, B/L wrist restraints; posey belt)   Lifestyle   Autonomy I w/ ADLS/IADLS, transfers and functional mobility PTA   Reciprocal Relationships Pt lives w/ his dght   Service to Others retired   Intrinsic Gratification gardening and spending time w/ his grandkids   ADL   LB Dressing Assistance 1  Total Assistance   LB Dressing Deficit Don/doff R sock;Don/doff L sock   Bed Mobility   Supine to Sit 3  Moderate assistance   Additional items Assist x 2;HOB elevated;Increased time required;Verbal cues;LE management   Sit to Supine Unable to assess   Additional Comments pt sat EOB w/ Min A for sitting  balance/trunk control   Transfers   Sit to Stand 3  Moderate assistance   Additional items Assist x 2;Increased time required;Verbal cues   Stand to Sit 3  Moderate assistance   Additional items Assist x 2;Increased time required;Verbal cues   Additional Comments B/L HHA used; VC for safety   Functional Mobility   Functional Mobility 3  Moderate assistance   Additional Comments pt took few small steps from EOB to recliner w/ Mod A x2; B/L HHA used. VC for safety   Additional items Hand hold assistance   Cognition   Overall Cognitive Status Impaired   Arousal/Participation Arousable;Lethargic   Attention Difficulty attending to directions   Orientation Level Oriented to person;Oriented to place;Other (Comment)  (oriented to general place and month only)   Memory Decreased recall of precautions;Decreased short term memory;Decreased recall of recent events   Following Commands Follows one step commands with increased time or repetition   Comments pt is cooperative; lethargic; difficult to understand. Impulsive at   times; has decreased safety awareness and understanding of deficits   Activity Tolerance   Activity Tolerance Patient limited by fatigue;Patient limited by pain   Medical Staff Made Aware PT, RN   Assessment   Assessment Patient participated in Skilled OT session 7/23/2024 with interventions consisting of ADL re training with the use of correct body mechnaics, Energy Conservation techniques, deep breathing technique, safety awareness and fall prevention techniques,  therapeutic activities to: increase activity tolerance, increase postural control, increase trunk control, and increase OOB/ sitting tolerance . Patient agreeable to OT treatment session, upon arrival patient was found supine in bed.  In comparison to previous session, patient with improvements in EOB sitting. Patient requiring frequent re direction, verbal cues for safety, verbal cues for correct technique, verbal cues for pacing thru activity  steps, and frequent rest periods. Patient continues to be functioning below baseline level, occupational performance remains limited secondary to factors listed above and increased risk for falls and injury.   From OT standpoint, recommendation at time of d/c would be inpt rehab, when medically stable.   Patient to benefit from continued Occupational Therapy treatment while in the hospital to address deficits as defined above and maximize level of functional independence with ADLs and functional mobility.   Plan   Treatment Interventions ADL retraining;Functional transfer training;UE strengthening/ROM;Endurance training;Cognitive reorientation;Compensatory technique education;Patient/family training;Equipment evaluation/education;Continued evaluation;Energy conservation;Activityengagement   Goal Expiration Date 07/29/24   OT Treatment Day 2   OT Frequency 3-5x/wk   Discharge Recommendation   Rehab Resource Intensity Level, OT I (Maximum Resource Intensity)   Additional Comments  The patient's raw score on the AM-PAC Daily Activity Inpatient Short Form is 12. A raw score of less than 19 suggests the patient may benefit from discharge to post-acute rehabilitation services. Please refer to the recommendation of the Occupational Therapist for safe discharge planning.   Additional Comments 2 Pt seen as a co-session due to the patient's co-morbidities, clinically unstable presentation, and present impairments which are a regression from the patient's baseline.   AM-PAC Daily Activity Inpatient   Lower Body Dressing 1   Bathing 2   Toileting 1   Upper Body Dressing 2   Grooming 3   Eating 3   Daily Activity Raw Score 12   Daily Activity Standardized Score (Calc for Raw Score >=11) 30.6   AM-PAC Applied Cognition Inpatient   Following a Speech/Presentation 2   Understanding Ordinary Conversation 3   Taking Medications 2   Remembering Where Things Are Placed or Put Away 2   Remembering List of 4-5 Errands 2   Taking Care of  Complicated Tasks 1   Applied Cognition Raw Score 12   Applied Cognition Standardized Score 28.82   End of Consult   Education Provided Yes   Patient Position at End of Consult Bedside chair;Bed/Chair alarm activated;All needs within reach   Nurse Communication Nurse aware of consult       Missy Nunez MS, OTR/L

## 2024-07-23 NOTE — PLAN OF CARE
Problem: OCCUPATIONAL THERAPY ADULT  Goal: Performs self-care activities at highest level of function for planned discharge setting.  See evaluation for individualized goals.  Description: Treatment Interventions: ADL retraining, Functional transfer training, UE strengthening/ROM, Endurance training, Cognitive reorientation, Patient/family training, Equipment evaluation/education, Compensatory technique education, Continued evaluation, Energy conservation, Activityengagement          See flowsheet documentation for full assessment, interventions and recommendations.   Outcome: Progressing  Note: Limitation: Decreased ADL status, Decreased UE ROM, Decreased UE strength, Decreased Safe judgement during ADL, Decreased cognition, Decreased endurance, Decreased self-care trans, Decreased high-level ADLs  Prognosis: Fair  Assessment: Patient participated in Skilled OT session 7/23/2024 with interventions consisting of ADL re training with the use of correct body mechnaics, Energy Conservation techniques, deep breathing technique, safety awareness and fall prevention techniques,  therapeutic activities to: increase activity tolerance, increase postural control, increase trunk control, and increase OOB/ sitting tolerance . Patient agreeable to OT treatment session, upon arrival patient was found supine in bed.  In comparison to previous session, patient with improvements in EOB sitting. Patient requiring frequent re direction, verbal cues for safety, verbal cues for correct technique, verbal cues for pacing thru activity steps, and frequent rest periods. Patient continues to be functioning below baseline level, occupational performance remains limited secondary to factors listed above and increased risk for falls and injury.   From OT standpoint, recommendation at time of d/c would be inpt rehab, when medically stable.   Patient to benefit from continued Occupational Therapy treatment while in the hospital to address  deficits as defined above and maximize level of functional independence with ADLs and functional mobility.  Recommendation: Physiatry Consult  Rehab Resource Intensity Level, OT: I (Maximum Resource Intensity)        Missy Nunez MS, OTR/L

## 2024-07-23 NOTE — PROCEDURES
"Speech Pathology - Modified Barium Swallow Study    Patient Name: Federico Bowen    Today's Date: 7/23/2024     Problem List  Principal Problem:    Subarachnoid hemorrhage (HCC)  Active Problems:    HTN (hypertension)    Hyperlipidemia    Seizures (HCC)    Moderate protein-calorie malnutrition (HCC)    Fever    Severe protein-calorie malnutrition (HCC)    Past Medical History  No past medical history on file.    Past Surgical History  Past Surgical History:   Procedure Laterality Date    IR CEREBRAL ANGIOGRAPHY / INTERVENTION  7/13/2024       Assessment Summary:    Pt presents with moderate-severe oropharyngeal dysphagia characterized by reduced bolus control, manipulation, and AP transfer, as well as delayed swallow initiation, reduced base of tongue retraction and pharyngeal stripping wave/contraction, and reduced airway closure with absent epiglottic inversion. Penetration was seen across all consistencies, with trace aspiration seen on puree bolus. Pt has significantly reduced pharyngeal constriction resulting in gross retention throughout the pharynx. Increased viscosity increases retention. May consider initiating thin liquids (full liquid diet vs thins in addition to TF). ST will continue to follow. Note: Images are available for review in PACS as desired. Se:14 should read \"Thin - AP\"    Recommendations:   Recommended Diet: thin liquids - consider continuing NGT for now for nutrition and meds  Recommended Form of Medications: non-oral if possible   Aspiration precautions and compensatory swallowing strategies: upright posture, only feed when fully alert, and small sips  SLP Dysphagia therapy recommended: yes    Results Reviewed with: patient, RN, and MD       General Information;  Pt is a 68 y.o. male with a PMH remarkable for SAH, seizures, and dysphagia. Fist MBS 7/17/24 recommended NPO with tube feeds. Repeat MBS now recommended to assess for potential improvement in oropharyngeal stage swallowing " skills at this time.     Prior MBS:  7/17/24 see report    Pt was viewed sitting upright in the lateral and AP positions. Trials administered were nearly consistent with MBSImP Validated Protocol: Pt was given 5-mL thin liquid x2, 20-mL cup sip thin, 40-mL sequential swallow thin, 5-mL nectar thick, 20-mL cup sip nectar thick, 40-mL sequential swallow nectar thick, 5-mL honey thick, 5-mL pudding, 5-mL nectar thick in the AP position, and 5-mL thin in the AP position. Pt was also given thin liquids by straw, as well as a barium tablet with (thin liquid/pudding).     Initial view observations/comments: Clear view of the upper airway and NGT in place, head in hyperextension      8-Point Penetration-Aspiration Scale   Thin liquid 3 - Material enters the airway, remains above the vocal folds, and is not ejected from the airway   Nectar thick liquid 3 - Material enters the airway, remains above the vocal folds, and is not ejected from the airway   Honey thick liquid 2 - Material enters the airway, remains above the vocal folds, and is ejected  from the airway   Puree (pudding) 8 - Material enters the airway, passes below the vocal folds, and no effort is made to eject     Solid N/a     Aspiration Response and Efficacy: Intermittent throat clearing and coughing,     MBS IMP Rating    ORAL Impairment  Compinent 1--Lip Closure  Judged at any point during the swallow.  1 - Interlabial escape; no progression to anterior lip    Component 2--Tongue Control During Bolus Hold  Judged on held liquid boluses only and prior to productive tongue movement.   2 - Posterior escape of less than half of bolus    Component 3--Bolus Preparation/Mastication  Judged only during presentation of 1/2 shortbread cookie coated in pudding.   N/a    Component 4--Bolus Transport/Lingual Motion  Judged after first productive tongue movement for oral bolus transport.  3 - Repetitive/disorganized tongue motion    Component 5--Oral Residue  Judged after  first swallow or after the last swallow of the sequential swallow task.  3 - Majority of bolus remaining   Location   A - Floor of Mouth, B - Palate, C - Tongue, and D - Lateral Sulci    Component 6--Initiation of Pharyngeal Swallow  Judged at first movement of the brisk superior-anterior hyoid trajectory.  3 - Bolus head in pyriforms      PHARYNGEAL Impairment  Component 7--Soft Palate Elevation  Judged during maximum displacement of soft palate.  1 - Trace column of contrast or air between the SP and PW    Component 8--Laryngeal Elevation  Judged when epiglottis is in its most horizontal position.  0 - Complete superior movement of thyroid cartilage with complete approximation of arytenoids to epiglottic petiole    Component 9--Anterior Hyoid Excursion  Judged at height of swallow/maximal anterior hyoid displacement.  0 - Complete anterior movement    Component 10--Epiglottic Movement  Judged at height of swallow/maximal anterior hyoid displacement.  2 - No inversion    Component 11--Laryngeal Vestibular Closure  Judged at height of swallow/maximal anterior hyoid displacement.  1 - Incomplete; narrow column air/contrast in laryngeal vestibule    Component 12--Pharyngeal Stripping Wave  Judged during the full duration of the pharyngeal swallow.  1 - Present - diminished    Component 13--Pharyngeal Contraction  Judged in AP view at rest and throughout maximum movement of structures.  3 - Bilateral Bulging    Component 14--Pharyngoesophageal Segment Opening  Judged during maximum distension of PES and throughout opening and closure.  2 - Minimal distension/minimal duration; marked obstruction of flow    Component 15--Tongue Base (TB) Retraction  Judged during maximum retraction of the tongue base.  3 - Wide column of contrast or air between TB and PW    Component 16--Pharyngeal Residue  Judged after first swallow or after the last swallow of the sequential swallow task.  3 - Majority of contrast within or on  pharyngeal structures   Location   F - Diffuse (>3 areas)      ESOPHAGEAL Impairment  Component 17--Esophageal Clearance Upright Position  Judged in AP view during bolus transit through the oral cavity to the LES  N/a

## 2024-07-23 NOTE — RESPIRATORY THERAPY NOTE
07/22/24 0825   Respiratory Assessment   Assessment Type Assess only   General Appearance Lethargic   Respiratory Pattern Normal   Chest Assessment Chest expansion symmetrical   Bilateral Breath Sounds Diminished   R Breath Sounds Diminished   L Breath Sounds Diminished;Clear   Cough None   Resp Comments Pt. placed on MFNC at this time per orders.   O2 Device MFNC   Additional Assessments   SpO2 99 %

## 2024-07-23 NOTE — ASSESSMENT & PLAN NOTE
CSF sent 7/22  BC NTD x 24 hours  WBC 28.6  CSF  Cx no growth  RBC 3,275  WBC 14  Glucose 75  Tmax 101.3

## 2024-07-23 NOTE — PROGRESS NOTES
[facility]  Progress Note: Critical Care  Name: Federico Bowen 68 y.o. 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 Possible tiny aneurysm in left anterior communicating artery region. Mild narrowing of bilateral MCA M1 and bilateral M2 segmental branch vessels, likely due to vasospasm.   7/11 CTH-Acute diffuse thickened large-volume SAH throughout the b/l parafalcine regions, b/l cerebral sulci (R worse than L), basilar cisterns, prepontine cistern, premedullary cistern, and visualized upper cervical spinal canal. Acute small volume IVH with mild hydrocephalus.   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   7/14 CTH -No significant interval change in extensive bilateral SAH and IVH.   7/14 vEEG no seizures  7/16 CTA/CTH: Expected postoperative appearance following embolization of right posterior communicating artery aneurysm . Redemonstrated subarachnoid hemorrhage overlying the right greater than left convexities and concentrated within the right sylvian cistern and fissure. . Subacute infarct involving the right anterior temporal region and medial occipital region.     Plan:   Nsx following  ASA 81mg   CTA H/N was stable and improving  EVD 10. Goal ICP <20   ICP 5-12  CPP 70-90  434 cc/24hrs  -220  Keppra 750mg BID  Nimodipine x 21 days  Low dose IV Heparin protocol @ 10 units  Continue 12 units  PTT < 45  Daily PTT: 30 this AM  Can likely d/c 07/25 as will be 14 days   Daily TCD  No appreciable vasopasm 07/22  Pending 07/23  Euvolemia   I/Os: +434cc  Normonatremia, Na goal > 140  Na: 148  Given IVF isolyte bolus 500cc given noted  infection and likely hypovolemic  Stat CTH with any change in GCS > 2 pts     Diagnosis: Seizure  2/2 above and presented w/ seizures   vEEG no seizures  Plan:   Neuro following   Keppra 750mg BID     - Diagnosis: Headache               Plan               Continue 600mg TID gabapentin   Lidoderm patch for neck   Coffee via NGT given drank several cups per day  IV mag and reglan if needed   PRN fioricet, robaxin, oxy        - Diagnosis; Insomnia               Plan              D/c mirtazapine 15mg qhs in setting of daytime lethargy            CV:   Diagnosis: Hx CAD HTN/HLD  Had vascular stent placed > 1 year ago   Plan:    continue home metoprolol 50mg daily   Continue ASA 81mg  Holding Hold entresto 24-26 mg BID   Given allowing for increased -220  prn nitroglycerin of his angina; hold for now in setting of SAH        - Diagnosis: HLD              Plan               Cont lipitor 80m daily and zetia 10mg qhs        Pulm:  Diagnosis: Hx COPD and  without AE  Plan:   IS  Xopenex TID  Imecldinum/albuterol inhaler   Respiratory protocol  Switched mucomyst              - Dx: Aspiration Pnx vs Tracheobronchitis               - got sputum culture, blood cultures, lactic acid               - concern for mucus plugging w/ noted leukocytosis  - trach aspirate growing: 3+gram positive cocci in chains and clusters               Plan               on empiric antibiotics ceftriaxone day 2    - pending final sensitivities               Continue w/ suctioning, Chest PT, nebulizers/respiratory protocol               Wean as able off midflo        GI:   Diagnosis: Dysphagia  Plan:   Failed MBS 07/17   Consider retrying in the future possible next week 7/22  Can get ice cups w/ nursing staff w/ close monitoring   Continue TF for now     - Diagnosis: GERD              Plan               Continue home omeprazole 40mg      BM: 07/22  Bowel regimen: hold bowel regimen    - there was concern for c.diff overnight         :    Euvolemia   I/Os: +434 cc this AM   Continue to monitor           F/E/N:    F: given 1 bolus 500cc isolyte bolus given soft Bps    Given additional Albumin 250cc this AM for BP  E: K> 4.0,  maintain magnesium > 2.0, maintain phosphate > 3.5  N: TF Jevity 1.2 vinny 50cc/hr               - thiamine, folic acid, and multivitamin   - TF q4 normal fluid flushes     Heme/Onc:   Diagnosis: Anemia  Plan:   Unclear baseline no s/s bleeding monitor  Possibly macrocytic anemia   Continue to monitor      Endo:   SSI started     ID:   Diagnosis: Leukocytosis  and febrile   Seems to be intermittent   Infectious vs central  CNS cause  Got UA that was bland, CSF cx no growth, CXR showing signs of COPD  - trach aspirate growing: 3+gram positive cocci in chains and clusters    Plan   Infectious w/u   Pending blood cx  on empiric antibiotics ceftriaxone day 2  pending final sensitivities   Blood cultures pending   bromocriptine 10mg TID  Neurosurg CSF cultures from EVD   CSF protein: 89  CSF   CSF RBC: 31,075           MSK/Skin:   PT/OT/PMR  Diagnosis: Penile lesion  Plan: note from outpt derm inflamed seborrheic keratosis      Line: R IJ (plan on removing today) once have good peripheral access    Disposition: Critical care    ICU Core Measures     A: Assess, Prevent, and Manage Pain Has pain been assessed? Yes  Need for changes to pain regimen? No   B: Both SAT/SAT  N/A   C: Choice of Sedation RASS Goal: 0 Alert and Calm  Need for changes to sedation or analgesia regimen? Yes   D: Delirium CAM-ICU: Negative   E: Early Mobility  Plan for early mobility? Yes   F: Family Engagement Plan for family engagement today? Yes       Antibiotic Review: Patient on appropriate coverage based on culture data. , Awaiting culture results. , and Continue broad spectrum secondary to severity of illness.     Review of Invasive Devices:      Central access plan: Will obtain peripheral access and discontinue prior to  transfer      Prophylaxis:  VTE VTE covered by:  heparin (porcine), Intravenous, 12 Units/kg/hr at 07/21/24 1800       Stress Ulcer  covered byomeprazole (PRILOSEC) suspension 2 mg/mL [[id number]], omeprazole (PriLOSEC) 40 MG capsule [[id number]] (Long-Term Med)        Significant 24hr Events     24hr events: Weaned off high flow down to midflo.  Patient noted to have sputum growing organism.  Patient was put on cooling blanket and had a noted bowel movement yesterday and there was concern for possible C. difficile         Subjective     Review of Systems: See HPI for Review of Systems     Objective                            Vitals I/O      Most Recent Min/Max in 24hrs   Temp 98.6 °F (37 °C) Temp  Min: 98 °F (36.7 °C)  Max: 101.3 °F (38.5 °C)   Pulse 96 Pulse  Min: 84  Max: 122   Resp (!) 32 Resp  Min: 18  Max: 32   /67 BP  Min: 94/63  Max: 151/79   O2 Sat 99 % SpO2  Min: 83 %  Max: 100 %      Intake/Output Summary (Last 24 hours) at 7/23/2024 0642  Last data filed at 7/23/2024 0600  Gross per 24 hour   Intake 2359 ml   Output 1925 ml   Net 434 ml       Diet Enteral/Parenteral; Tube Feeding No Oral Diet; Jevity 1.2 Josh; Cyclic; 65; 22 hours; 40; Saline; Every 4 hours    Invasive Monitoring   Arterial Line  Esme /72  No data recorded    mmHg  No data recorded           Physical Exam   Physical Exam  Vitals and nursing note reviewed.   HENT:      Nose:      Comments: NGT  Cardiovascular:      Rate and Rhythm: Normal rate.   Abdominal:      Palpations: Abdomen is soft.   Pulmonary:      Effort: Pulmonary effort is normal.   Neurological:      Mental Status: He is alert and oriented to person, place, and time.      Cranial Nerves: Cranial nerves 2-12 are intact.      Sensory: Sensation is intact.      Coordination: Coordination is intact.      Comments:     Pupils reactive (R more dilated, L more pinpoint)      LUE weakness appreciated 4/5 in biceps, triceps, and deltoids             Diagnostic  Studies      EKG: reviewed  Imaging:  I have personally reviewed pertinent reports.   and I have personally reviewed pertinent films in PACS     Medications:  Scheduled PRN   acetaminophen, 650 mg, Q6H  acetylcysteine, 3 mL, TID  aspirin, 81 mg, Daily  atorvastatin, 80 mg, Daily  bromocriptine, 10 mg, TID  budesonide, 0.5 mg, Q12H  cefTRIAXone, 1,000 mg, Q24H  chlorhexidine, 15 mL, Q12H MATTY  ezetimibe, 10 mg, QAM  folic acid, 400 mcg, Daily  gabapentin, 600 mg, TID  insulin lispro, 1-5 Units, Q6H MATTY  levalbuterol, 1.25 mg, TID  levETIRAcetam, 750 mg, Q12H MATTY  lidocaine, 1 patch, Daily  metoprolol succinate, 50 mg, HS  niMODipine, 60 mg, Q4H MATTY  nystatin, 500,000 Units, 4x Daily  omeprazole (PRILOSEC) suspension 2 mg/mL, 20 mg, Daily  sodium chloride, 4 mL, TID  thiamine, 100 mg, Daily  umeclidinium, 1 puff, Daily      albuterol, 2.5 mg, Q4H PRN  bisacodyl, 10 mg, Daily PRN  hydrALAZINE, 10 mg, Q6H PRN  HYDROmorphone, 0.2 mg, Q4H PRN  labetalol, 10 mg, Q6H PRN  methocarbamol, 500 mg, Q6H PRN  ondansetron, 4 mg, Q6H PRN  oxyCODONE, 2.5 mg, Q4H PRN  oxyCODONE, 5 mg, Q4H PRN  saliva substitute, 5 spray, 4x Daily PRN       Continuous    heparin (porcine), 12 Units/kg/hr (Order-Specific), Last Rate: 12 Units/kg/hr (07/21/24 1800)         Labs:    CBC    Recent Labs     07/22/24  0541 07/23/24  0454   WBC 22.76* 28.65*   HGB 10.9* 10.9*   HCT 32.6* 32.7*    402*   BANDSPCT 5  --      BMP    Recent Labs     07/22/24  0541 07/23/24  0454   SODIUM 145 148*   K 3.9 4.3    109*   CO2 29 27   AGAP 10 12   BUN 29* 32*   CREATININE 0.75 0.76   CALCIUM 9.8 9.7       Coags    Recent Labs     07/22/24  0541 07/23/24  0454   PTT 30 30        Additional Electrolytes  Recent Labs     07/22/24  0541 07/23/24  0454   MG 2.3 2.4   PHOS 3.0 3.1   CAIONIZED 1.24 1.22          Blood Gas    No recent results  No recent results LFTs  No recent results    Infectious  Recent Labs     07/22/24  0541   PROCALCITONI 0.96*      Glucose  Recent Labs     07/22/24  0541 07/23/24  0454   GLUC 149* 166*               TCD 07/22    Unilateral    Mean Velocity  Pulsatility Index    Prox Basilar          28.00               1.62       Right            Mean Velocity  Pulsatility Index    Mid. ICA                 33.00               1.28    Mid Cerebral             55.00               1.39    Dist. Vertebral          30.00                       Ant. Cerebral            20.00               2.42    Post. Cerebral           15.00               1.89       Left             Mean Velocity  Pulsatility Index    Mid. ICA                 31.00               1.24    Mid Cerebral             60.00               1.24    Dist. Vertebral          31.00                       Ant. Cerebral            31.00               1.46    Post. Cerebral           21.00               2.04          Hadley Eric, DO

## 2024-07-23 NOTE — ASSESSMENT & PLAN NOTE
2 witnessed seizures on presentation at [facility]  Neurology following, input appreciated  Patient currently on Keppra 750 mg BID per neurology  vEEG 7/14 no seizure activity

## 2024-07-23 NOTE — PROGRESS NOTES
Patient was transition from high flow nasal cannula to mid flow as he was satting well with his oxygenation and did not appear to have increased work of breathing.  Exam was stable moving all extremities.    CTA completed overnight.  Awaiting radiology final read.

## 2024-07-23 NOTE — PHYSICAL THERAPY NOTE
PHYSICAL THERAPY NOTE          Patient Name: Federico Bowen  Today's Date: 7/23/2024 07/23/24 1039   PT Last Visit   PT Visit Date 07/23/24   Note Type   Note Type Treatment   Pain Assessment   Pain Assessment Tool FLACC   Pain Rating: FLACC (Rest) - Face 1   Pain Rating: FLACC (Rest) - Legs 0   Pain Rating: FLACC (Rest) - Activity 0   Pain Rating: FLACC (Rest) - Cry 1   Pain Rating: FLACC (Rest) - Consolability 1   Score: FLACC (Rest) 3   Pain Rating: FLACC (Activity) - Face 1   Pain Rating: FLACC (Activity) - Legs 0   Pain Rating: FLACC (Activity) - Activity 1   Pain Rating: FLACC (Activity) - Cry 1   Pain Rating: FLACC (Activity) - Consolability 1   Score: FLACC (Activity) 4   Restrictions/Precautions   Weight Bearing Precautions Per Order No   Other Precautions Pain;Fall Risk;Telemetry;Multiple lines;O2;Bed Alarm;Chair Alarm;Cognitive;Impulsive   General   Chart Reviewed Yes   Family/Caregiver Present No   Cognition   Orientation Level Oriented to person;Oriented to situation   Subjective   Subjective Pt willing and agreeable to PT session   Bed Mobility   Supine to Sit 3  Moderate assistance   Additional items Assist x 2;HOB elevated;Increased time required   Sit to Supine Unable to assess   Transfers   Sit to Stand 3  Moderate assistance   Additional items Assist x 2;Increased time required   Stand to Sit 3  Moderate assistance   Additional items Assist x 2;Increased time required   Ambulation/Elevation   Gait pattern Excessively slow;Decreased foot clearance;Shuffling;Inconsistent caty   Gait Assistance 3  Moderate assist   Additional items Assist x 2   Assistive Device Other (Comment)   Distance 4'   Balance   Static Sitting Fair -   Dynamic Sitting Poor +   Static Standing Poor   Ambulatory Zero   Endurance Deficit   Endurance Deficit Yes   Endurance Deficit Description fatigue, pain, weakness, confusion, SpO2    Activity Tolerance   Activity Tolerance Patient limited by fatigue;Patient limited by pain   Medical Staff Made Aware OT for D/C planning   Nurse Made Aware yes, nsg gave clearance to work with pt   Assessment   Prognosis Fair   Problem List Decreased strength;Decreased range of motion;Decreased endurance;Impaired balance;Decreased mobility;Decreased safety awareness;Pain;Decreased coordination   Assessment Pt required increased verbal and tactile instruction to maintain alert throughout session. Pt required LE management and A to upright trunk during bed mobility. Required occasional tactile instruction for bed mobility. Pt required Ax2 for transfers and gait with significant strength and balance deficits. Pt will benefit from continued inpt skilled PT and rehab to maximize functional mobility & safety.   Barriers to Discharge Inaccessible home environment;Decreased caregiver support   Goals   Patient Goals To rest   STG Expiration Date 07/27/24   Plan   Treatment/Interventions OT;Spoke to case management;Spoke to nursing;Gait training;Bed mobility;Patient/family training;Endurance training;LE strengthening/ROM;Functional transfer training   Progress Slow progress, decreased activity tolerance   PT Frequency 3-5x/wk   Discharge Recommendation   Rehab Resource Intensity Level, PT I (Maximum Resource Intensity)     Alexandra Fishman DPT, PT

## 2024-07-23 NOTE — PLAN OF CARE
Problem: Neurological Deficit  Goal: Neurological status is stable or improving  Description: Interventions:  - Monitor and assess patient's level of consciousness, motor function, sensory function, and level of assistance needed for ADLs.   - Monitor and report changes from baseline. Collaborate with interdisciplinary team to initiate plan and implement interventions as ordered.   - Provide and maintain a safe environment.  - Consider seizure precautions.  - Consider fall precautions.  - Consider aspiration precautions.  - Consider bleeding precautions.  Outcome: Progressing     Problem: Communication Impairment  Goal: Ability to express needs and understand communication  Description: Assess patient's communication skills and ability to understand information.  Patient will demonstrate use of effective communication techniques, alternative methods of communication and understanding even if not able to speak.     - Encourage communication and provide alternate methods of communication as needed.  - Collaborate with case management/ for discharge needs.  - Include patient/family/caregiver in decisions related to communication.  Outcome: Progressing     Problem: INFECTION - ADULT  Goal: Absence or prevention of progression during hospitalization  Description: INTERVENTIONS:  - Assess and monitor for signs and symptoms of infection  - Monitor lab/diagnostic results  - Monitor all insertion sites, i.e. indwelling lines, tubes, and drains  - Monitor endotracheal if appropriate and nasal secretions for changes in amount and color  - [place] appropriate cooling/warming therapies per order  - Administer medications as ordered  - Instruct and encourage patient and family to use good hand hygiene technique  - Identify and instruct in appropriate isolation precautions for identified infection/condition  Outcome: Progressing

## 2024-07-23 NOTE — PROGRESS NOTES
[facility]  Progress Note  Name: Federico Bowen I  MRN: [id number]  Unit/Bed#: ICU 07 I Date of Admission: 7/12/2024   Date of Service: 7/23/2024 I Hospital Day: 11    Assessment & Plan   Fever  Assessment & Plan  CSF sent 7/22  BC NTD x 24 hours  WBC 28.6  CSF  Cx no growth  RBC 3,275  WBC 14  Glucose 75  Tmax 101.3    Seizures (HCC)  Assessment & Plan  2 witnessed seizures on presentation at [facility]  Neurology following, input appreciated  Patient currently on Keppra 750 mg BID per neurology  vEEG 7/14 no seizure activity    * Subarachnoid hemorrhage (HCC)  Assessment & Plan  aSAH s/p R PCOM aneurysm rupture  BD 11, HH 5, MF 4  PPD 10 right PCOM coil embolization (Dr. Mcneal 7/13)  PPD 10 R frontal EVD placement (Dr. Goldberg, 7/13)  P/w headache and right leg pain, 2 witnessed seizures by EMS was ultimately intubated.  Upon arrival to the ED patient was posturing.  Imaging revealed a large amount of SAH and a right PCOM aneurysm.  Per chart review he was previously on aspirin reversed with DDAVP.    Imaging:  CTA head w/wo 7/23/2024: Improving subarachnoid and intraventricular hemorrhage. Unchanged mild hydrocephalus. EVD in stable position. Unchanged anterior right temporal edema, likely evolving infarct. CT Angiography: No evidence of residual or recurrent right P-comm aneurysm. No acute vascular pathology in the head or neck.     Plan:  Continue frequent neurological checks  Repeat CTA/CT head w/wo STAT if GCS declines more than 2 points in 1 hour  Continue SAH pathway/spasm watch:  Daily TCD's: R 1.53, L 2.25  Continue Nimodipine 60mg Q4hrs  Continue Keppra 750 mg BID per neurology  Normonatremia (148 today)  Euvolemia (+434)   Hemoglobin >8 (10.9 today)  EVD placed on 7/13/2024  Continue EVD at 10 mmhg - maintain  Monitor output and ICP  ICPs 2-12, 6 in room   Output 0 ml/24hrs, draining easily when dropped below tragus  Maintain ICP goal <20  SBP <220  Continue  aspirin 81 mg daily  Heparin drip at 12 units/kg/hr  Last PTT 30 am of 7/23  Medical management and pain control per primary team  Ongoing fevers, Tmax 101.3  CSF sent 7/22  WBC 28  Started bromocriptine  Sputum 3+ cocci in clusters - on ceftriaxone  DVT PPX: SCDs and heparin drip    Neurosurgery will continue to follow closely, call with any further questions or concerns.           Subjective/Objective   Chief Complaint: N/A    Subjective: Not verbalizing much this morning. Nodding yes/no.     Objective: NAD. Appears critically ill. In general, more sedate than yesterday. Noted tachypneic. Not answering questions. Following some commands.    I/O         07/21 0701  07/22 0700 07/22 0701  07/23 0700 07/23 0701 07/24 0700    I.V. (mL/kg) 144 (2.9) 144 (2.9) 12 (0.2)    NG/ 790 170    IV Piggyback  50     Feedings 1200 1375 130    Total Intake(mL/kg) 1554 (31.7) 2359 (48.1) 312 (6.4)    Urine (mL/kg/hr) 1150 (1) 1925 (1.6) 250 (1.2)    Emesis/NG output  0     Drains 0 0 0    Stool 0 0     Total Output 1150 1925 250    Net +404 +434 +62           Unmeasured Urine Occurrence 1 x 1 x     Unmeasured Stool Occurrence 1 x 3 x             Invasive Devices       Central Venous Catheter Line  Duration             CVC Central Lines 07/12/24 Triple 16cm Right Internal jugular 10 days              Drain  Duration             Ventriculostomy/Subdural Ventricular drainage catheter Occipital region 10 days    External Urinary Catheter Small 9 days    NG/OG/Enteral Tube 18 Fr Right nare 6 days                    Physical Exam:  Vitals: Blood pressure (!) 74/47, pulse 96, temperature 99.4 °F (37.4 °C), temperature source Rectal, resp. rate (!) 23, weight 49 kg (108 lb 0.4 oz), SpO2 100%.,There is no height or weight on file to calculate BMI.    Hemodynamic Monitoring: MAP: Arterial Line MAP (mmHg): 104 mmHg, CPP: CPP Cuff-Calculated (mmHg): 54, CVP:  , ICP Mean: ICP Mean (mmHg): 12 mmHg    General appearance: critically  "ill  Head: R frontal EVD  Eyes: EOMI, PERRL  Back: no kyphosis present, no tenderness to percussion or palpation  Lungs: MFNC  Heart: regular heart rate  Neurologic:   Mental status: Alert, oriented x1,   Cranial nerves: grossly intact (Cranial nerves II-XII)  Sensory: normal to crude touch  Motor: moving all extremities without focal weakness, generalized weakness      Lab Results:  Results from last 7 days   Lab Units 07/23/24 0454 07/22/24  0541 07/21/24  0523 07/20/24  0543 07/19/24  0611   WBC Thousand/uL 28.65* 22.76* 13.56* 11.81* 10.54*   HEMOGLOBIN g/dL 10.9* 10.9* 11.2* 11.5* 12.4   HEMATOCRIT % 32.7* 32.6* 34.0* 34.2* 36.8   PLATELETS Thousands/uL 402* 332 337 308 283   SEGS PCT %  --   --  82* 80* 72   MONO PCT %  --  5 6 7 8   EOS PCT %  --  0 1 1 3     Results from last 7 days   Lab Units 07/23/24 0454 07/22/24  0541 07/21/24  0523 07/20/24  0543 07/19/24  0611   SODIUM mmol/L 148* 145 145   < > 142   POTASSIUM mmol/L 4.3 3.9 4.0   < > 3.9   CHLORIDE mmol/L 109* 106 106   < > 106   CO2 mmol/L 27 29 28   < > 26   BUN mg/dL 32* 29* 25   < > 19   CREATININE mg/dL 0.76 0.75 0.79   < > 0.71   CALCIUM mg/dL 9.7 9.8 9.6   < > 9.3   ALK PHOS U/L 118*  --  109*  --  119*   ALT U/L 16  --  17  --  17   AST U/L 21  --  16  --  17    < > = values in this interval not displayed.     Results from last 7 days   Lab Units 07/23/24 0454 07/22/24  0541 07/21/24  0523   MAGNESIUM mg/dL 2.4 2.3 2.2     Results from last 7 days   Lab Units 07/23/24 0454 07/22/24  0541 07/21/24  0523   PHOSPHORUS mg/dL 3.1 3.0 3.2     Results from last 7 days   Lab Units 07/23/24  0454 07/22/24  0541 07/21/24  0523   PTT seconds 30 30 29     No results found for: \"TROPONINT\"  ABG:  Lab Results   Component Value Date    PHART 7.314 (L) 07/12/2024    SQP9OEG 44.1 (H) 07/12/2024    PO2ART 138.2 (H) 07/12/2024    MVV4UZA 21.9 (L) 07/12/2024    BEART -4.2 07/12/2024    SOURCE Line, Arterial 07/12/2024       Imaging Studies: I have personally " reviewed pertinent reports.   and I have personally reviewed pertinent films in PACS    CTA head and neck w wo contrast    Result Date: 7/16/2024  Impression: CT Brain: Redemonstrated subarachnoid hemorrhage overlying the right greater than left convexities and concentrated within the right sylvian cistern and fissure. . Subacute infarct involving the right anterior temporal region and medial occipital region.. 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 Workstation performed: [id number]     IR cerebral angiography / intervention    Result Date: 7/15/2024  Impression: Successful balloon assisted coil embolization of a 4.5 mm right P-comm aneurysm. Workstation performed: [id number]     CT head wo contrast    Result Date: 7/14/2024  Impression: No significant interval change in extensive bilateral subarachnoid hemorrhage and intraventricular hemorrhage. Workstation performed: [id number]     CT head wo contrast    Result Date: 7/13/2024  Impression: Interval placement of right-sided shunt catheter. Stable ventricular size with slight interval increase in intraventricular hemorrhage layering in the occipital horns bilaterally. Diffuse subarachnoid hemorrhage again noted. Workstation performed: [id number]     XR chest portable ICU    Result Date: 7/12/2024  Impression: No acute cardiopulmonary disease. ET tube 6 cm above the landon. Workstation performed: [id number]     CTA stroke alert (head/neck)    Result Date: 7/12/2024  Impression: 0.3 cm aneurysm in expected origin of right posterior communicating artery. Possible tiny aneurysm in left anterior communicating artery region. These could be potential sources of diffuse subarachnoid hemorrhage. Recommend neurovascular surgery consultation for further evaluation. Mild narrowing of bilateral MCA M1 and bilateral M2 segmental branch vessels, likely  due to vasospasm. Patent stent in left proximal subclavian artery. Endotracheal tube in trachea. Additional chronic/incidental findings as detailed above. Findings were directly discussed with Emmanuel Lion at approximately 4:34 p.m. on 7/12/2024. Workstation performed: [id number]     CT stroke alert brain    Result Date: 7/12/2024  Impression: Acute diffuse thickened large-volume subarachnoid hemorrhage throughout the bilateral parafalcine regions, bilateral cerebral sulci (right worse than left), basilar cisterns, prepontine cistern, premedullary cistern, and visualized upper cervical spinal canal. Acute small volume intraventricular hemorrhage with mild hydrocephalus. No acute infarction. Findings were directly discussed with Emmanuel Lion at approximately 4:34 p.m. on 7/12/2024. Workstation performed: [id number]       EKG, Pathology, and Other Studies: I have personally reviewed pertinent reports.      VTE Pharmacologic Prophylaxis: Sequential compression device (Venodyne)  and Heparin    VTE Mechanical Prophylaxis: sequential compression device

## 2024-07-24 ENCOUNTER — APPOINTMENT (INPATIENT)
Dept: RADIOLOGY | Facility: HOSPITAL | Age: 69
DRG: 020 | End: 2024-07-24
Attending: EMERGENCY MEDICINE
Payer: MEDICARE

## 2024-07-24 ENCOUNTER — APPOINTMENT (INPATIENT)
Dept: NON INVASIVE DIAGNOSTICS | Facility: HOSPITAL | Age: 69
DRG: 020 | End: 2024-07-24
Payer: MEDICARE

## 2024-07-24 LAB
ANION GAP SERPL CALCULATED.3IONS-SCNC: 9 MMOL/L (ref 4–13)
APTT PPP: 32 SECONDS (ref 23–37)
BUN SERPL-MCNC: 29 MG/DL (ref 5–25)
CA-I BLD-SCNC: 1.18 MMOL/L (ref 1.12–1.32)
CALCIUM SERPL-MCNC: 8.9 MG/DL (ref 8.4–10.2)
CHLORIDE SERPL-SCNC: 112 MMOL/L (ref 96–108)
CO2 SERPL-SCNC: 26 MMOL/L (ref 21–32)
CREAT SERPL-MCNC: 0.8 MG/DL (ref 0.6–1.3)
ERYTHROCYTE [DISTWIDTH] IN BLOOD BY AUTOMATED COUNT: 14.6 % (ref 11.6–15.1)
FLUAV RNA RESP QL NAA+PROBE: NEGATIVE
FLUBV RNA RESP QL NAA+PROBE: NEGATIVE
GFR SERPL CREATININE-BSD FRML MDRD: 91 ML/MIN/1.73SQ M
GLUCOSE SERPL-MCNC: 122 MG/DL (ref 65–140)
GLUCOSE SERPL-MCNC: 142 MG/DL (ref 65–140)
GLUCOSE SERPL-MCNC: 161 MG/DL (ref 65–140)
GLUCOSE SERPL-MCNC: 183 MG/DL (ref 65–140)
GLUCOSE SERPL-MCNC: 195 MG/DL (ref 65–140)
HCT VFR BLD AUTO: 27.9 % (ref 36.5–49.3)
HGB BLD-MCNC: 9.4 G/DL (ref 12–17)
MAGNESIUM SERPL-MCNC: 2.2 MG/DL (ref 1.9–2.7)
MCH RBC QN AUTO: 32.4 PG (ref 26.8–34.3)
MCHC RBC AUTO-ENTMCNC: 33.7 G/DL (ref 31.4–37.4)
MCV RBC AUTO: 96 FL (ref 82–98)
NRBC BLD AUTO-RTO: 0 /100 WBCS
PHOSPHATE SERPL-MCNC: 2.3 MG/DL (ref 2.3–4.1)
PLATELET # BLD AUTO: 416 THOUSANDS/UL (ref 149–390)
PMV BLD AUTO: 10.2 FL (ref 8.9–12.7)
POTASSIUM SERPL-SCNC: 3.5 MMOL/L (ref 3.5–5.3)
RBC # BLD AUTO: 2.9 MILLION/UL (ref 3.88–5.62)
RSV RNA RESP QL NAA+PROBE: NEGATIVE
SARS-COV-2 RNA RESP QL NAA+PROBE: NEGATIVE
SODIUM SERPL-SCNC: 147 MMOL/L (ref 135–147)
WBC # BLD AUTO: 21.87 THOUSAND/UL (ref 4.31–10.16)

## 2024-07-24 PROCEDURE — 99291 CRITICAL CARE FIRST HOUR: CPT | Performed by: EMERGENCY MEDICINE

## 2024-07-24 PROCEDURE — 85027 COMPLETE CBC AUTOMATED: CPT

## 2024-07-24 PROCEDURE — 94760 N-INVAS EAR/PLS OXIMETRY 1: CPT

## 2024-07-24 PROCEDURE — 82948 REAGENT STRIP/BLOOD GLUCOSE: CPT

## 2024-07-24 PROCEDURE — 92526 ORAL FUNCTION THERAPY: CPT

## 2024-07-24 PROCEDURE — 83735 ASSAY OF MAGNESIUM: CPT

## 2024-07-24 PROCEDURE — 82330 ASSAY OF CALCIUM: CPT

## 2024-07-24 PROCEDURE — 99232 SBSQ HOSP IP/OBS MODERATE 35: CPT | Performed by: NURSE PRACTITIONER

## 2024-07-24 PROCEDURE — 94640 AIRWAY INHALATION TREATMENT: CPT

## 2024-07-24 PROCEDURE — 85730 THROMBOPLASTIN TIME PARTIAL: CPT

## 2024-07-24 PROCEDURE — 80048 BASIC METABOLIC PNL TOTAL CA: CPT

## 2024-07-24 PROCEDURE — 94664 DEMO&/EVAL PT USE INHALER: CPT

## 2024-07-24 PROCEDURE — 84100 ASSAY OF PHOSPHORUS: CPT

## 2024-07-24 PROCEDURE — 93886 INTRACRANIAL COMPLETE STUDY: CPT

## 2024-07-24 PROCEDURE — NC001 PR NO CHARGE: Performed by: EMERGENCY MEDICINE

## 2024-07-24 PROCEDURE — 0241U HB NFCT DS VIR RESP RNA 4 TRGT: CPT | Performed by: STUDENT IN AN ORGANIZED HEALTH CARE EDUCATION/TRAINING PROGRAM

## 2024-07-24 PROCEDURE — 71045 X-RAY EXAM CHEST 1 VIEW: CPT

## 2024-07-24 RX ORDER — POTASSIUM CHLORIDE 20MEQ/15ML
40 LIQUID (ML) ORAL ONCE
Status: COMPLETED | OUTPATIENT
Start: 2024-07-24 | End: 2024-07-24

## 2024-07-24 RX ORDER — CEFAZOLIN SODIUM 2 G/50ML
2000 SOLUTION INTRAVENOUS EVERY 8 HOURS
Status: COMPLETED | OUTPATIENT
Start: 2024-07-24 | End: 2024-07-28

## 2024-07-24 RX ORDER — VANCOMYCIN HYDROCHLORIDE 750 MG/150ML
15 INJECTION, SOLUTION INTRAVENOUS EVERY 12 HOURS
Status: DISCONTINUED | OUTPATIENT
Start: 2024-07-24 | End: 2024-07-24

## 2024-07-24 RX ORDER — MODAFINIL 100 MG/1
100 TABLET ORAL DAILY
Status: DISCONTINUED | OUTPATIENT
Start: 2024-07-24 | End: 2024-07-26

## 2024-07-24 RX ORDER — POTASSIUM CHLORIDE 20MEQ/15ML
20 LIQUID (ML) ORAL ONCE
Status: COMPLETED | OUTPATIENT
Start: 2024-07-24 | End: 2024-07-24

## 2024-07-24 RX ORDER — ACETAMINOPHEN 160 MG/5ML
975 SUSPENSION ORAL EVERY 8 HOURS
Status: DISCONTINUED | OUTPATIENT
Start: 2024-07-25 | End: 2024-07-25

## 2024-07-24 RX ORDER — VANCOMYCIN HYDROCHLORIDE 750 MG/150ML
15 INJECTION, SOLUTION INTRAVENOUS EVERY 12 HOURS
Status: DISCONTINUED | OUTPATIENT
Start: 2024-07-25 | End: 2024-07-24

## 2024-07-24 RX ADMIN — BROMOCRIPTINE MESYLATE 10 MG: 2.5 TABLET ORAL at 10:09

## 2024-07-24 RX ADMIN — BROMOCRIPTINE MESYLATE 10 MG: 2.5 TABLET ORAL at 20:14

## 2024-07-24 RX ADMIN — ACETAMINOPHEN 650 MG: 650 SUSPENSION ORAL at 10:06

## 2024-07-24 RX ADMIN — ASPIRIN 81 MG CHEWABLE TABLET 81 MG: 81 TABLET CHEWABLE at 10:08

## 2024-07-24 RX ADMIN — Medication 30 MG: at 07:51

## 2024-07-24 RX ADMIN — CHLORHEXIDINE GLUCONATE 0.12% ORAL RINSE 15 ML: 1.2 LIQUID ORAL at 20:13

## 2024-07-24 RX ADMIN — THIAMINE HCL TAB 100 MG 100 MG: 100 TAB at 10:08

## 2024-07-24 RX ADMIN — CEFAZOLIN SODIUM 2000 MG: 2 SOLUTION INTRAVENOUS at 22:05

## 2024-07-24 RX ADMIN — INSULIN LISPRO 1 UNITS: 100 INJECTION, SOLUTION INTRAVENOUS; SUBCUTANEOUS at 05:16

## 2024-07-24 RX ADMIN — ACETYLCYSTEINE 600 MG: 200 SOLUTION ORAL; RESPIRATORY (INHALATION) at 07:10

## 2024-07-24 RX ADMIN — EZETIMIBE 10 MG: 10 TABLET ORAL at 10:08

## 2024-07-24 RX ADMIN — CEFTRIAXONE SODIUM 1000 MG: 10 INJECTION, POWDER, FOR SOLUTION INTRAVENOUS at 07:45

## 2024-07-24 RX ADMIN — Medication 30 MG: at 14:26

## 2024-07-24 RX ADMIN — LIDOCAINE 5% 1 PATCH: 700 PATCH TOPICAL at 11:09

## 2024-07-24 RX ADMIN — MODAFINIL 100 MG: 100 TABLET ORAL at 12:21

## 2024-07-24 RX ADMIN — NYSTATIN 500000 UNITS: 100000 SUSPENSION ORAL at 22:05

## 2024-07-24 RX ADMIN — Medication 30 MG: at 22:05

## 2024-07-24 RX ADMIN — BROMOCRIPTINE MESYLATE 10 MG: 2.5 TABLET ORAL at 16:36

## 2024-07-24 RX ADMIN — FOLIC ACID TAB 400 MCG 400 MCG: 400 TAB at 10:08

## 2024-07-24 RX ADMIN — Medication 30 MG: at 04:00

## 2024-07-24 RX ADMIN — GABAPENTIN 600 MG: 300 CAPSULE ORAL at 20:14

## 2024-07-24 RX ADMIN — Medication 30 MG: at 16:35

## 2024-07-24 RX ADMIN — CHLORHEXIDINE GLUCONATE 0.12% ORAL RINSE 15 ML: 1.2 LIQUID ORAL at 10:10

## 2024-07-24 RX ADMIN — GABAPENTIN 600 MG: 300 CAPSULE ORAL at 10:08

## 2024-07-24 RX ADMIN — Medication 30 MG: at 20:13

## 2024-07-24 RX ADMIN — LEVETIRACETAM 750 MG: 100 SOLUTION ORAL at 20:13

## 2024-07-24 RX ADMIN — Medication 30 MG: at 10:10

## 2024-07-24 RX ADMIN — Medication 30 MG: at 12:20

## 2024-07-24 RX ADMIN — NOREPINEPHRINE BITARTRATE 9 MCG/MIN: 1 SOLUTION INTRAVENOUS at 10:20

## 2024-07-24 RX ADMIN — CEFAZOLIN SODIUM 2000 MG: 2 SOLUTION INTRAVENOUS at 14:22

## 2024-07-24 RX ADMIN — ACETAMINOPHEN 650 MG: 650 SUSPENSION ORAL at 04:00

## 2024-07-24 RX ADMIN — Medication 30 MG: at 18:36

## 2024-07-24 RX ADMIN — POTASSIUM CHLORIDE 40 MEQ: 1.5 SOLUTION ORAL at 12:21

## 2024-07-24 RX ADMIN — NOREPINEPHRINE BITARTRATE 1 MCG/MIN: 1 SOLUTION INTRAVENOUS at 00:06

## 2024-07-24 RX ADMIN — POTASSIUM CHLORIDE 20 MEQ: 1.5 SOLUTION ORAL at 14:22

## 2024-07-24 RX ADMIN — BUDESONIDE 0.5 MG: 0.5 INHALANT RESPIRATORY (INHALATION) at 07:10

## 2024-07-24 RX ADMIN — NYSTATIN 500000 UNITS: 100000 SUSPENSION ORAL at 12:31

## 2024-07-24 RX ADMIN — NOREPINEPHRINE BITARTRATE 14 MCG/MIN: 1 SOLUTION INTRAVENOUS at 17:06

## 2024-07-24 RX ADMIN — LEVETIRACETAM 750 MG: 100 SOLUTION ORAL at 10:09

## 2024-07-24 RX ADMIN — CEFEPIME 2000 MG: 2 INJECTION, POWDER, FOR SOLUTION INTRAVENOUS at 12:30

## 2024-07-24 RX ADMIN — LEVALBUTEROL HYDROCHLORIDE 1.25 MG: 1.25 SOLUTION RESPIRATORY (INHALATION) at 07:10

## 2024-07-24 RX ADMIN — Medication 20 MG: at 11:25

## 2024-07-24 RX ADMIN — NYSTATIN 500000 UNITS: 100000 SUSPENSION ORAL at 18:05

## 2024-07-24 RX ADMIN — NOREPINEPHRINE BITARTRATE 16 MCG/MIN: 1 SOLUTION INTRAVENOUS at 21:47

## 2024-07-24 RX ADMIN — ACETAMINOPHEN 650 MG: 650 SUSPENSION ORAL at 16:36

## 2024-07-24 RX ADMIN — ATORVASTATIN CALCIUM 80 MG: 80 TABLET, FILM COATED ORAL at 10:08

## 2024-07-24 RX ADMIN — UMECLIDINIUM 1 PUFF: 62.5 AEROSOL, POWDER ORAL at 10:31

## 2024-07-24 RX ADMIN — INSULIN LISPRO 1 UNITS: 100 INJECTION, SOLUTION INTRAVENOUS; SUBCUTANEOUS at 18:37

## 2024-07-24 RX ADMIN — GABAPENTIN 600 MG: 300 CAPSULE ORAL at 16:35

## 2024-07-24 RX ADMIN — Medication 30 MG: at 06:12

## 2024-07-24 RX ADMIN — Medication 60 MG: at 01:02

## 2024-07-24 NOTE — ASSESSMENT & PLAN NOTE
aSAH s/p R PCOM aneurysm rupture  BD 12, HH 5, MF 4  PPD 11 right PCOM coil embolization (Dr. Mcneal 7/13)  PPD 11 R frontal EVD placement (Dr. Goldberg, 7/13)  P/w headache and right leg pain, 2 witnessed seizures by EMS was ultimately intubated.  Upon arrival to the ED patient was posturing.  Imaging revealed a large amount of SAH and a right PCOM aneurysm.  Per chart review he was previously on aspirin reversed with DDAVP.    Imaging:  CTA head w/wo 7/23/2024: Improving subarachnoid and intraventricular hemorrhage. Unchanged mild hydrocephalus. EVD in stable position. Unchanged anterior right temporal edema, likely evolving infarct. CT Angiography: No evidence of residual or recurrent right P-comm aneurysm. No acute vascular pathology in the head or neck.     Plan:  Continue frequent neurological checks  Repeat CTA/CT head w/wo STAT if GCS declines more than 2 points in 1 hour  Continue SAH pathway/spasm watch:  Daily TCD's: R 1.11, basilar 1.97  Continue Nimodipine 30 mg Q2hrs  Continue Keppra 750 mg BID per neurology  Normonatremia (147 today)  Euvolemia (+2,540)   Hemoglobin >8 (9.4 today)  EVD placed on 7/13/2024  Continue EVD at 10 mmhg - maintain  Monitor output and ICP  ICPs -1-13, 3 in room   Output 0 ml/24hrs, draining easily when dropped below tragus  Maintain ICP goal <20  SBP <220, on Levo  Continue aspirin 81 mg daily  Heparin drip at 12 units/kg/hr  Last PTT 32 am of 7/23  Medical management and pain control per primary team  Ongoing fevers, Tmax 102.9  CSF sent 7/22  WBC 21  Started bromocriptine  Sputum +staph aureus  Blood cx NTD x 48 hours  DVT PPX: SCDs and heparin drip    Neurosurgery will continue to follow closely, call with any further questions or concerns.

## 2024-07-24 NOTE — SPEECH THERAPY NOTE
Speech-Language Pathology Progress Note    Patient Name: Federico Bowen    Today's Date: 7/24/2024    Subjective:  Pt was awake and alert but weak, intermittently lethargic. He was repositioned for optimal PO intake safety.     Objective:  Pt was seen today for dysphagia therapy. MBS yesterday recommended NGT with thin liquids as tolerated. Pt was on 6L O2 via nasal cannula, currently has a fever. Oral care was completed with use of oral care kits. Focus of today's session was  to continue swallowing therapy/rehab with ongoing assessment of PO tolerance . Textures offered today included thin liquid via cup- small med cup used for 5 mL sips.  Swallow function:   Bolus retrieval was adequate, suspect reduced bolus control d/t head in hyperextension. Pharyngeal swallow initiation was delayed and weak. Hyolaryngeal excursion was reduced, gulping. Immediate throat clearing and vocal wetness occurred with all trials.  SLP provided max cueing/feedback throughout session to keep head forward/neutral position. Pt demonstrated limited response to cueing.    Assessment:  MBS yesterday was suggestive of slight improvement in swallowing. Initiation of thin liquids was recommended in addition to tube feeds. Unfortunately today pt is showing s/s aspiration with all trials of thin, and has a fever. Due to clinical indications of poor PO tolerance, resume NPO status.    Plan:  Keep pt NPO now, NGT for nutrition and meds. Continue ST follow up, PO trials with SLP only.

## 2024-07-24 NOTE — PROGRESS NOTES
[facility]  Progress Note: Critical Care  Name: Federico Bowen 68 y.o. male I MRN: [id number]  Unit/Bed#: ICU 07 I Date of Admission: 7/12/2024   Date of Service: 7/24/2024 I Hospital Day: 12    Assessment & Plan   Neuro:   Diagnosis: SAH with IVH and mild hydro s/p coil embolization of R PCOM aneurysm  BD 14 HH5, MF 4  7/11 CTAH/N-0.3 cm aneurysm in expected origin of right posterior communicating artery. Possible tiny aneurysm in left anterior communicating artery region. Mild narrowing of bilateral MCA M1 and bilateral M2 segmental branch vessels, likely due to vasospasm.   7/11 CTH-Acute diffuse thickened large-volume SAH throughout the b/l parafalcine regions, b/l cerebral sulci (R worse than L), basilar cisterns, prepontine cistern, premedullary cistern, and visualized upper cervical spinal canal. Acute small volume IVH with mild hydrocephalus.   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   7/14 CTH -No significant interval change in extensive bilateral SAH and IVH.   7/14 vEEG no seizures  7/16 CTA/CTH: Expected postoperative appearance following embolization of right posterior communicating artery aneurysm . Redemonstrated subarachnoid hemorrhage overlying the right greater than left convexities and concentrated within the right sylvian cistern and fissure. . Subacute infarct involving the right anterior temporal region and medial occipital region.     Plan:   Nsx following  ASA 81mg   CTA H/N was stable and improving  EVD 10. Goal ICP <20   ICP 1-13  CPP 56-90  0 cc/24hrs  draining easily when dropped below tragus   -220  Keppra 750mg BID  Nimodipine x 21 days  Low dose IV Heparin protocol @ 10 units  Continue 12 units  PTT < 45  Daily PTT: 30 this AM  Daily TCD  No appreciable vasopasm 07/22  Pending 07/23  Euvolemia   I/Os: +390cc  Normonatremia, Na goal > 140  Na: 147  Given IVF LR bolus 500cc and on Free Water Flushes  60cc q4   Stat CTH with any change in GCS > 2 pts  Can gave GOC with family   Start provigil 100mg      Diagnosis: Seizure  2/2 above and presented w/ seizures   vEEG no seizures  Plan:   Neuro following   Keppra 750mg BID     - Diagnosis: Headache               Plan               Continue 600mg TID gabapentin   Lidoderm patch for neck   D/c prn oxycodone and robaxin         - Diagnosis; Insomnia               Plan              D/c mirtazapine 15mg qhs in setting of daytime lethargy            CV:   Diagnosis: Hx CAD HTN/HLD  Had vascular stent placed > 1 year ago   Plan:    continue home metoprolol 50mg daily   Continue ASA 81mg  Holding Hold entresto 24-26 mg BID   Given allowing for increased -220  prn nitroglycerin of his angina; hold for now in setting of SAH        - Diagnosis: HLD              Plan               Cont lipitor 80m daily and zetia 10mg qhs     - Diagnosis: Hypotensive episodes   Likely in setting of infection    Plan    Currently on levophed and wean as able     @ 7    Consider starting midodrine    Nimodipine changed to 30mg q2h   Prn IVF boluses    Continue antibx as per ID         Pulm:  Diagnosis: Hx COPD and  without AE  Plan:   IS  Xopenex TID  Imecldinum/albuterol inhaler   Respiratory protocol  on mucomyst               - Dx: Aspiration Pnx               - got sputum culture, blood cultures, lactic acid               - concern for mucus plugging w/ noted leukocytosis  - trach aspirate growing: 3+gram positive cocci in chains and clusters   - CXR: Retrocardiac consolidation and lobulated right lung density, both of which are new               Plan   on empiric antibiotics expanded to cefepime and added vanc  Want 7 day course (day 3/7 antibx)              Continue w/ suctioning, Chest PT, nebulizers/respiratory protocol    Continue aggressive suctioning               Wean as able off midflo        GI:   Diagnosis: Dysphagia  Plan:   VBS 07/23  Speech recommended:    thin liquids -  consider continuing NGT for now for nutrition and meds   Aspiration precautions: upright posture, only feed when fully alert, and small sips        - Diagnosis: GERD              Plan               Continue home omeprazole 40mg      BM: 07/23  Bowel regimen: hold bowel regimen               - had 3 bowel movements 07/23     :   Euvolemia   I/Os: +434 cc this AM   Continue to monitor           F/E/N:    F: given 1 bolus 500cc LR bolus given soft Bps   E: K> 4.0,  maintain magnesium > 2.0, maintain phosphate > 3.5  N: TF Jevity 1.2 vinny 50cc/hr               - thiamine, folic acid, and multivitamin              - TF q4 normal fluid flushes 60cc q4 free water flushes     Heme/Onc:   Diagnosis: Anemia  Plan:   Unclear baseline no s/s bleeding monitor  Continue to monitor      Endo:   SSI      ID:   Diagnosis: Leukocytosis  and febrile   Seems to be intermittent   Infectious vs central  CNS cause  Got UA that was bland, CSF cx no growth, CXR showing signs of COPD  - trach aspirate growing: 3+gram positive cocci in chains and clusters    Plan   Infectious w/u   negative blood cx at 48 hours  MRSA negative  on empiric antibiotics expanded to cefepime and added vanc  Want 7 day course (day 3/7 antibx)  pending final sensitivities   - WBC has gone down but has been more febrile and considering expanding coverage for possible cefepime  Blood cultures pending   bromocriptine 10mg TID  Tylenol 650mg q6h  Neurosurg CSF cultures from EVD   CSF protein: 89  CSF   CSF RBC: 31,075  Repeat CSF  CSF culture negative  RBC: 3275  WBC 14  Glucose: 75           MSK/Skin:   PT/OT/PMR  Diagnosis: Penile lesion  Plan: note from outpt derm inflamed seborrheic keratosis        Line/Drains/Foleys: NONE         Disposition: Critical care    ICU Core Measures     A: Assess, Prevent, and Manage Pain Has pain been assessed? Yes  Need for changes to pain regimen? No   B: Both SAT/SAT  N/A   C: Choice of Sedation RASS Goal: 0 Alert and  Calm  Need for changes to sedation or analgesia regimen? Yes   D: Delirium CAM-ICU: Negative   E: Early Mobility  Plan for early mobility? Yes   F: Family Engagement Plan for family engagement today? Yes       Antibiotic Review: Patient on appropriate coverage based on culture data. , Awaiting culture results. , and Continue broad spectrum secondary to severity of illness.     Review of Invasive Devices:      Central access plan: Will obtain peripheral access and discontinue prior to transfer      Prophylaxis:  VTE VTE covered by:  heparin (porcine), Intravenous, 12 Units/kg/hr at 07/23/24 1024       Stress Ulcer  covered byomeprazole (PRILOSEC) suspension 2 mg/mL [[id number]], omeprazole (PriLOSEC) 40 MG capsule [[id number]] (Long-Term Med)        Significant 24hr Events     24hr events: Was noted to be hypotensive and changed nimodipine to 30mg q2 and given 500cc NS Bolus. He was started on levophed at 7 and increased to maintain BP goals. He was weaned to 6L from 8L      Subjective     Review of Systems: See HPI for Review of Systems     Objective                            Vitals I/O      Most Recent Min/Max in 24hrs   Temp (!) 101.2 °F (38.4 °C) Temp  Min: 99.4 °F (37.4 °C)  Max: 102 °F (38.9 °C)   Pulse 86 Pulse  Min: 84  Max: 108   Resp (!) 42 Resp  Min: 14  Max: 45   /72 BP  Min: 70/42  Max: 147/77   O2 Sat 95 % SpO2  Min: 93 %  Max: 100 %      Intake/Output Summary (Last 24 hours) at 7/24/2024 0643  Last data filed at 7/24/2024 0600  Gross per 24 hour   Intake 4015.3 ml   Output 2025 ml   Net 1990.3 ml       Diet Enteral/Parenteral; Tube Feeding No Oral Diet; Jevity 1.2 Josh; Cyclic; 65; 22 hours; 60; Water; Every 4 hours    Invasive Monitoring   Arterial Line  Dawn /72  No data recorded    mmHg  No data recorded           Physical Exam   Physical Exam  Vitals and nursing note reviewed.   Cardiovascular:      Rate and Rhythm: Normal rate.      Pulses: Normal pulses.   Abdominal:       Palpations: Abdomen is soft.   Pulmonary:      Effort: Pulmonary effort is normal.   Neurological:      Sensory: Sensation is intact.      Coordination: Coordination is intact.        Reflexes are normal and symmetric.      Comments:       Pupils reactive (R more dilated, L more pinpoint)      LUE weakness appreciated 4/5 in biceps, triceps, and deltoids                  Diagnostic Studies      EKG: reviewed  Imaging:  I have personally reviewed pertinent reports.   and I have personally reviewed pertinent films in PACS     Medications:  Scheduled PRN   acetaminophen, 650 mg, Q6H  acetylcysteine, 3 mL, TID  aspirin, 81 mg, Daily  atorvastatin, 80 mg, Daily  bromocriptine, 10 mg, TID  budesonide, 0.5 mg, Q12H  cefTRIAXone, 1,000 mg, Q24H  chlorhexidine, 15 mL, Q12H MATTY  ezetimibe, 10 mg, QAM  folic acid, 400 mcg, Daily  gabapentin, 600 mg, TID  insulin lispro, 1-5 Units, Q6H MATTY  levalbuterol, 1.25 mg, TID  levETIRAcetam, 750 mg, Q12H MATTY  lidocaine, 1 patch, Daily  metoprolol succinate, 50 mg, HS  niMODipine, 30 mg, Q2H  nystatin, 500,000 Units, 4x Daily  omeprazole (PRILOSEC) suspension 2 mg/mL, 20 mg, Daily  thiamine, 100 mg, Daily  umeclidinium, 1 puff, Daily      albuterol, 2.5 mg, Q4H PRN  bisacodyl, 10 mg, Daily PRN  hydrALAZINE, 10 mg, Q6H PRN  HYDROmorphone, 0.2 mg, Q4H PRN  labetalol, 10 mg, Q6H PRN  methocarbamol, 500 mg, Q6H PRN  ondansetron, 4 mg, Q6H PRN  oxyCODONE, 2.5 mg, Q4H PRN  oxyCODONE, 5 mg, Q4H PRN  saliva substitute, 5 spray, 4x Daily PRN       Continuous    heparin (porcine), 12 Units/kg/hr (Order-Specific), Last Rate: 12 Units/kg/hr (07/23/24 1024)  norepinephrine, 1-30 mcg/min, Last Rate: 7 mcg/min (07/24/24 0613)         Labs:    CBC    Recent Labs     07/23/24  0454 07/24/24  0509   WBC 28.65* 21.87*   HGB 10.9* 9.4*   HCT 32.7* 27.9*   * 416*     BMP    Recent Labs     07/23/24  0454 07/24/24  0509   SODIUM 148* 147   K 4.3 3.5   * 112*   CO2 27 26   AGAP 12 9   BUN 32* 29*    CREATININE 0.76 0.80   CALCIUM 9.7 8.9       Coags    Recent Labs     07/23/24  0454 07/24/24  0509   PTT 30 32        Additional Electrolytes  Recent Labs     07/23/24  0454 07/24/24  0509   MG 2.4 2.2   PHOS 3.1 2.3   CAIONIZED 1.22 1.18          Blood Gas    No recent results  No recent results LFTs  Recent Labs     07/23/24  0454   ALT 16   AST 21   ALKPHOS 118*   ALB 3.6   TBILI 0.43       Infectious  No recent results  Glucose  Recent Labs     07/23/24  0454 07/24/24  0509   GLUC 166* 183*               07/23 TCD  Unilateral    Mean Velocity  Pulsatility Index    Prox Basilar          25.00               1.50       Right            Mean Velocity  Pulsatility Index    Mid. ICA                 30.00               2.38    Mid Cerebral             46.00               1.66    Dist. Vertebral          34.00                       Ant. Cerebral            28.00               1.92    Post. Cerebral           16.00               1.64       Left             Mean Velocity  Pulsatility Index    Mid. ICA                 32.00               1.92    Mid Cerebral             72.00               1.77    Dist. Vertebral          31.00                       Ant. Cerebral            24.00               2.53    Post. Cerebral           27.00               2.12          Hadley Eric, DO

## 2024-07-24 NOTE — PROGRESS NOTES
[facility]  Progress Note  Name: Federico Bowen I  MRN: [id number]  Unit/Bed#: ICU 07 I Date of Admission: 7/12/2024   Date of Service: 7/24/2024 I Hospital Day: 12    Assessment & Plan   Fever  Assessment & Plan  CSF sent 7/22  BC NTD x 24 hours  WBC 28.6 --> 21.87  CSF  Cx no growth  RBC 3,275  WBC 14  Glucose 75  Tmax 102.9    Seizures (HCC)  Assessment & Plan  2 witnessed seizures on presentation at [facility]  Neurology following, input appreciated  Patient currently on Keppra 750 mg BID per neurology  vEEG 7/14 no seizure activity    * Subarachnoid hemorrhage (HCC)  Assessment & Plan  aSAH s/p R PCOM aneurysm rupture  BD 12, HH 5, MF 4  PPD 11 right PCOM coil embolization (Dr. Mcneal 7/13)  PPD 11 R frontal EVD placement (Dr. Goldberg, 7/13)  P/w headache and right leg pain, 2 witnessed seizures by EMS was ultimately intubated.  Upon arrival to the ED patient was posturing.  Imaging revealed a large amount of SAH and a right PCOM aneurysm.  Per chart review he was previously on aspirin reversed with DDAVP.    Imaging:  CTA head w/wo 7/23/2024: Improving subarachnoid and intraventricular hemorrhage. Unchanged mild hydrocephalus. EVD in stable position. Unchanged anterior right temporal edema, likely evolving infarct. CT Angiography: No evidence of residual or recurrent right P-comm aneurysm. No acute vascular pathology in the head or neck.     Plan:  Continue frequent neurological checks  Repeat CTA/CT head w/wo STAT if GCS declines more than 2 points in 1 hour  Continue SAH pathway/spasm watch:  Daily TCD's: R 1.11, basilar 1.97  Continue Nimodipine 30 mg Q2hrs  Continue Keppra 750 mg BID per neurology  Normonatremia (147 today)  Euvolemia (+2,540)   Hemoglobin >8 (9.4 today)  EVD placed on 7/13/2024  Continue EVD at 10 mmhg - maintain  Monitor output and ICP  ICPs -1-13, 3 in room   Output 0 ml/24hrs, draining easily when dropped below tragus  Maintain ICP goal <20  SBP  <220, on Levo  Continue aspirin 81 mg daily  Heparin drip at 12 units/kg/hr  Last PTT 32 am of 7/23  Medical management and pain control per primary team  Ongoing fevers, Tmax 102.9  CSF sent 7/22  WBC 21  Started bromocriptine  Sputum +staph aureus  Blood cx NTD x 48 hours  DVT PPX: SCDs and heparin drip    Neurosurgery will continue to follow closely, call with any further questions or concerns.           Subjective/Objective   Chief Complaint: N/A    Subjective: Awake but minimally verbally responsive    Objective: NAD. Resting in bed. Tachypneic. Able to respond to questions but fatigues easily and/or falls asleep during response.    I/O         07/22 0701  07/23 0700 07/23 0701 07/24 0700 07/24 0701 07/25 0700    I.V. (mL/kg) 144 (2.9) 1228.3 (25.1) 140.3 (2.9)    NG/ 870 540    IV Piggyback 50 1050     Feedings 1375 1417 260    Total Intake(mL/kg) 2359 (48.1) 4565.3 (93.2) 940.3 (19.2)    Urine (mL/kg/hr) 1925 (1.6) 2025 (1.7) 550 (2.4)    Emesis/NG output 0 0     Drains 0 0     Stool 0 0     Total Output 1925 2025 550    Net +434 +2540.3 +390.3           Unmeasured Urine Occurrence 1 x      Unmeasured Stool Occurrence 3 x 3 x             Invasive Devices       Central Venous Catheter Line  Duration             CVC Central Lines 07/12/24 Triple 16cm Right Internal jugular 11 days              Peripheral Intravenous Line  Duration             Peripheral IV 07/23/24 Left;Ventral (anterior) Forearm <1 day    Peripheral IV 07/23/24 Right;Ventral (anterior) Forearm <1 day              Drain  Duration             Ventriculostomy/Subdural Ventricular drainage catheter Occipital region 11 days    External Urinary Catheter Small 10 days    NG/OG/Enteral Tube 18 Fr Right nare 7 days                    Physical Exam:  Vitals: Blood pressure 127/67, pulse 100, temperature (!) 101.1 °F (38.4 °C), temperature source Probe, resp. rate (!) 24, weight 49 kg (108 lb 0.4 oz), SpO2 96%.,There is no height or weight on  file to calculate BMI.    Hemodynamic Monitoring: MAP: Arterial Line MAP (mmHg): 104 mmHg, CPP: CPP Cuff-Calculated (mmHg): 83, CVP:  , ICP Mean: ICP Mean (mmHg): -1 mmHg    General appearance: critically ill, cachectic  Head: R frontal EVD  Eyes: EOMI, PERRL  Lungs: tachypneic, 6 L on NC  Heart: regular heart rate  Neurologic:   Mental status: Alert, oriented x 2, thought content appropriate  Cranial nerves: grossly intact (Cranial nerves II-XII)  Sensory: normal to LT  Motor: generalized weakness/frailty        Lab Results:  Results from last 7 days   Lab Units 07/24/24  0509 07/23/24  0454 07/22/24  0541 07/21/24  0523 07/20/24  0543 07/19/24  0611   WBC Thousand/uL 21.87* 28.65* 22.76* 13.56* 11.81* 10.54*   HEMOGLOBIN g/dL 9.4* 10.9* 10.9* 11.2* 11.5* 12.4   HEMATOCRIT % 27.9* 32.7* 32.6* 34.0* 34.2* 36.8   PLATELETS Thousands/uL 416* 402* 332 337 308 283   SEGS PCT %  --   --   --  82* 80* 72   MONO PCT %  --   --  5 6 7 8   EOS PCT %  --   --  0 1 1 3     Results from last 7 days   Lab Units 07/24/24  0509 07/23/24  0454 07/22/24  0541 07/21/24  0523 07/20/24  0543 07/19/24  0611   SODIUM mmol/L 147 148* 145 145   < > 142   POTASSIUM mmol/L 3.5 4.3 3.9 4.0   < > 3.9   CHLORIDE mmol/L 112* 109* 106 106   < > 106   CO2 mmol/L 26 27 29 28   < > 26   BUN mg/dL 29* 32* 29* 25   < > 19   CREATININE mg/dL 0.80 0.76 0.75 0.79   < > 0.71   CALCIUM mg/dL 8.9 9.7 9.8 9.6   < > 9.3   ALK PHOS U/L  --  118*  --  109*  --  119*   ALT U/L  --  16  --  17  --  17   AST U/L  --  21  --  16  --  17    < > = values in this interval not displayed.     Results from last 7 days   Lab Units 07/24/24  0509 07/23/24  0454 07/22/24  0541   MAGNESIUM mg/dL 2.2 2.4 2.3     Results from last 7 days   Lab Units 07/24/24  0509 07/23/24  0454 07/22/24  0541   PHOSPHORUS mg/dL 2.3 3.1 3.0     Results from last 7 days   Lab Units 07/24/24  0509 07/23/24  0454 07/22/24  0541   PTT seconds 32 30 30     No results found for:  "\"TROPONINT\"  ABG:  Lab Results   Component Value Date    PHART 7.314 (L) 07/12/2024    CMM9JTY 44.1 (H) 07/12/2024    PO2ART 138.2 (H) 07/12/2024    IRC4UVY 21.9 (L) 07/12/2024    BEART -4.2 07/12/2024    SOURCE Line, Arterial 07/12/2024       Imaging Studies: I have personally reviewed pertinent reports.   and I have personally reviewed pertinent films in PACS    CTA head and neck w wo contrast    Result Date: 7/16/2024  Impression: CT Brain: Redemonstrated subarachnoid hemorrhage overlying the right greater than left convexities and concentrated within the right sylvian cistern and fissure. . Subacute infarct involving the right anterior temporal region and medial occipital region.. 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 Workstation performed: [id number]     IR cerebral angiography / intervention    Result Date: 7/15/2024  Impression: Successful balloon assisted coil embolization of a 4.5 mm right P-comm aneurysm. Workstation performed: [id number]     CT head wo contrast    Result Date: 7/14/2024  Impression: No significant interval change in extensive bilateral subarachnoid hemorrhage and intraventricular hemorrhage. Workstation performed: [id number]     CT head wo contrast    Result Date: 7/13/2024  Impression: Interval placement of right-sided shunt catheter. Stable ventricular size with slight interval increase in intraventricular hemorrhage layering in the occipital horns bilaterally. Diffuse subarachnoid hemorrhage again noted. Workstation performed: [id number]     XR chest portable ICU    Result Date: 7/12/2024  Impression: No acute cardiopulmonary disease. ET tube 6 cm above the landon. Workstation performed: [id number]     CTA stroke alert (head/neck)    Result Date: 7/12/2024  Impression: 0.3 cm aneurysm in expected origin of right posterior communicating artery. Possible tiny " aneurysm in left anterior communicating artery region. These could be potential sources of diffuse subarachnoid hemorrhage. Recommend neurovascular surgery consultation for further evaluation. Mild narrowing of bilateral MCA M1 and bilateral M2 segmental branch vessels, likely due to vasospasm. Patent stent in left proximal subclavian artery. Endotracheal tube in trachea. Additional chronic/incidental findings as detailed above. Findings were directly discussed with Emmanuel Lion at approximately 4:34 p.m. on 7/12/2024. Workstation performed: [id number]     CT stroke alert brain    Result Date: 7/12/2024  Impression: Acute diffuse thickened large-volume subarachnoid hemorrhage throughout the bilateral parafalcine regions, bilateral cerebral sulci (right worse than left), basilar cisterns, prepontine cistern, premedullary cistern, and visualized upper cervical spinal canal. Acute small volume intraventricular hemorrhage with mild hydrocephalus. No acute infarction. Findings were directly discussed with Emmanuel Lion at approximately 4:34 p.m. on 7/12/2024. Workstation performed: [id number]       EKG, Pathology, and Other Studies: I have personally reviewed pertinent reports.      VTE Pharmacologic Prophylaxis: Sequential compression device (Venodyne)  and Heparin    VTE Mechanical Prophylaxis: sequential compression device

## 2024-07-24 NOTE — ASSESSMENT & PLAN NOTE
2 witnessed seizures on presentation at [facility]  Neurology following, input appreciated  Patient currently on Keppra 750 mg BID per neurology  vEEG 7/14 no seizure activity

## 2024-07-24 NOTE — ASSESSMENT & PLAN NOTE
CSF sent 7/22  BC NTD x 24 hours  WBC 28.6 --> 21.87  CSF  Cx no growth  RBC 3,275  WBC 14  Glucose 75  Tmax 102.9

## 2024-07-24 NOTE — PLAN OF CARE
Problem: Prexisting or High Potential for Compromised Skin Integrity  Goal: Skin integrity is maintained or improved  Description: INTERVENTIONS:  - Identify patients at risk for skin breakdown  - Assess and monitor skin integrity  - Assess and monitor nutrition and hydration status  - Monitor labs   - Assess for incontinence   - Turn and reposition patient  - Assist with mobility/ambulation  - Relieve pressure over bony prominences  - Avoid friction and shearing  - Provide appropriate hygiene as needed including keeping skin clean and dry  - Evaluate need for skin moisturizer/barrier cream  - Collaborate with interdisciplinary team   - Patient/family teaching  - Consider wound care consult   Outcome: Progressing     Problem: Neurological Deficit  Goal: Neurological status is stable or improving  Description: Interventions:  - Monitor and assess patient's level of consciousness, motor function, sensory function, and level of assistance needed for ADLs.   - Monitor and report changes from baseline. Collaborate with interdisciplinary team to initiate plan and implement interventions as ordered.   - Provide and maintain a safe environment.  - Consider seizure precautions.  - Consider fall precautions.  - Consider aspiration precautions.  - Consider bleeding precautions.  Outcome: Progressing     Problem: Activity Intolerance/Impaired Mobility  Goal: Mobility/activity is maintained at optimum level for patient  Description: Interventions:  - Assess and monitor patient  barriers to mobility and need for assistive/adaptive devices.  - Assess patient's emotional response to limitations.  - Collaborate with interdisciplinary team and initiate plans and interventions as ordered.  - Encourage independent activity per ability.  - Maintain proper body alignment.  - Perform active/passive rom as tolerated/ordered.  - Plan activities to conserve energy.  - Turn patient as appropriate  Outcome: Progressing     Problem:  Communication Impairment  Goal: Ability to express needs and understand communication  Description: Assess patient's communication skills and ability to understand information.  Patient will demonstrate use of effective communication techniques, alternative methods of communication and understanding even if not able to speak.     - Encourage communication and provide alternate methods of communication as needed.  - Collaborate with case management/ for discharge needs.  - Include patient/family/caregiver in decisions related to communication.  Outcome: Progressing     Problem: Potential for Aspiration  Goal: Non-ventilated patient's risk of aspiration is minimized  Description: Assess and monitor vital signs, respiratory status, and labs (WBC).  Monitor for signs of aspiration (tachypnea, cough, rales, wheezing, cyanosis, fever).    - Assess and monitor patient's ability to swallow.  - Place patient up in chair to eat if possible.  - HOB up at 90 degrees to eat if unable to get patient up into chair.  - Supervise patient during oral intake.   - Instruct patient/ family to take small bites.  - Instruct patient/ family to take small single sips when taking liquids.  - Follow patient-specific strategies generated by speech pathologist.  Outcome: Progressing     Problem: Nutrition  Goal: Nutrition/Hydration status is improving  Description: Monitor and assess patient's nutrition/hydration status for malnutrition (ex- brittle hair, bruises, dry skin, pale skin and conjunctiva, muscle wasting, smooth red tongue, and disorientation). Collaborate with interdisciplinary team and initiate plan and interventions as ordered.  Monitor patient's weight and dietary intake as ordered or per policy. Utilize nutrition screening tool and intervene per policy. Determine patient's food preferences and provide high-protein, high-caloric foods as appropriate.   - Monitor nutrition parameters, recommending adjustments to  enteral nutrition orders at indicated.   - Assist patient with eating.  - Allow adequate time for meals.  - Encourage patient to take dietary supplement as ordered.  - Collaborate with interdisciplinary team.   - Include patient/family/caregiver in decisions related to nutrition.  Outcome: Progressing     Problem: PAIN - ADULT  Goal: Verbalizes/displays adequate comfort level or baseline comfort level  Description: Interventions:  - Encourage patient to monitor pain and request assistance  - Assess pain using appropriate pain scale  - Administer analgesics based on type and severity of pain and evaluate response  - Implement non-pharmacological measures as appropriate and evaluate response  - Consider cultural and social influences on pain and pain management  - Notify physician/advanced practitioner if interventions unsuccessful or patient reports new pain  Outcome: Progressing     Problem: INFECTION - ADULT  Goal: Absence or prevention of progression during hospitalization  Description: INTERVENTIONS:  - Assess and monitor for signs and symptoms of infection  - Monitor lab/diagnostic results  - Monitor all insertion sites, i.e. indwelling lines, tubes, and drains  - Monitor endotracheal if appropriate and nasal secretions for changes in amount and color  - [place] appropriate cooling/warming therapies per order  - Administer medications as ordered  - Instruct and encourage patient and family to use good hand hygiene technique  - Identify and instruct in appropriate isolation precautions for identified infection/condition  Outcome: Progressing     Problem: SAFETY ADULT  Goal: Patient will remain free of falls  Description: INTERVENTIONS:  - Educate patient/family on patient safety including physical limitations  - Instruct patient to call for assistance with activity   - Consult OT/PT to assist with strengthening/mobility   - Keep Call bell within reach  - Keep bed low and locked with side rails adjusted as  appropriate  - Keep care items and personal belongings within reach  - Initiate and maintain comfort rounds  - Make Fall Risk Sign visible to staff  - Offer Toileting every 2 Hours, in advance of need  - Apply yellow socks and bracelet for high fall risk patients  - Consider moving patient to room near nurses station  Outcome: Progressing  Goal: Maintain or return to baseline ADL function  Description: INTERVENTIONS:  -  Assess patient's ability to carry out ADLs; assess patient's baseline for ADL function and identify physical deficits which impact ability to perform ADLs (bathing, care of mouth/teeth, toileting, grooming, dressing, etc.)  - Assess/evaluate cause of self-care deficits   - Assess range of motion  - Assess patient's mobility; develop plan if impaired  - Assess patient's need for assistive devices and provide as appropriate  - Encourage maximum independence but intervene and supervise when necessary  - Involve family in performance of ADLs  - Assess for home care needs following discharge   - Consider OT consult to assist with ADL evaluation and planning for discharge  - Provide patient education as appropriate  Outcome: Progressing  Goal: Maintains/Returns to pre admission functional level  Description: INTERVENTIONS:  - Perform AM-PAC 6 Click Basic Mobility/ Daily Activity assessment daily.  - Set and communicate daily mobility goal to care team and patient/family/caregiver.   - Collaborate with rehabilitation services on mobility goals if consulted  - Reposition patient every 2 hours.  - Out of bed for toileting  - Record patient progress and toleration of activity level   Outcome: Progressing     Problem: DISCHARGE PLANNING  Goal: Discharge to home or other facility with appropriate resources  Description: INTERVENTIONS:  - Identify barriers to discharge w/patient and caregiver  - Arrange for needed discharge resources and transportation as appropriate  - Identify discharge learning needs (meds,  wound care, etc.)  - Arrange for interpretive services to assist at discharge as needed  - Refer to Case Management Department for coordinating discharge planning if the patient needs post-hospital services based on physician/advanced practitioner order or complex needs related to functional status, cognitive ability, or social support system  Outcome: Progressing     Problem: Knowledge Deficit  Goal: Patient/family/caregiver demonstrates understanding of disease process, treatment plan, medications, and discharge instructions  Description: Complete learning assessment and assess knowledge base.  Interventions:  - Provide teaching at level of understanding  - Provide teaching via preferred learning methods  Outcome: Progressing     Problem: Nutrition/Hydration-ADULT  Goal: Nutrient/Hydration intake appropriate for improving, restoring or maintaining nutritional needs  Description: Monitor and assess patient's nutrition/hydration status for malnutrition. Collaborate with interdisciplinary team and initiate plan and interventions as ordered.  Monitor patient's weight and dietary intake as ordered or per policy. Utilize nutrition screening tool and intervene as necessary. Determine patient's food preferences and provide high-protein, high-caloric foods as appropriate.     INTERVENTIONS:  - Monitor oral intake, urinary output, labs, and treatment plans  - Assess nutrition and hydration status and recommend course of action  - Evaluate amount of meals eaten  - Assist patient with eating if necessary   - Allow adequate time for meals  - Recommend/ encourage appropriate diets, oral nutritional supplements, and vitamin/mineral supplements  - Order, calculate, and assess calorie counts as needed  - Recommend, monitor, and adjust tube feeding based on assessed needs  - Assess need for intravenous fluids  - Provide nutrition/hydration education as appropriate  - Include patient/family/caregiver in decisions related to  nutrition  Outcome: Progressing     Problem: RESPIRATORY - ADULT  Goal: Achieves optimal ventilation and oxygenation  Description: INTERVENTIONS:  - Assess for changes in respiratory status  - Assess for changes in mentation and behavior  - Position to facilitate oxygenation and minimize respiratory effort  - Oxygen administered by appropriate delivery if ordered  - Initiate smoking cessation education as indicated  - Encourage broncho-pulmonary hygiene including cough, deep breathe, Incentive Spirometry  - Assess the need for suctioning and aspirate as needed  - Assess and instruct to report SOB or any respiratory difficulty  - Respiratory Therapy support as indicated  Outcome: Progressing     Problem: Potential for Falls  Goal: Patient will remain free of falls  Description: INTERVENTIONS:  - Educate patient/family on patient safety including physical limitations  - Instruct patient to call for assistance with activity   - Consult OT/PT to assist with strengthening/mobility   - Keep Call bell within reach  - Keep bed low and locked with side rails adjusted as appropriate  - Keep care items and personal belongings within reach  - Initiate and maintain comfort rounds  - Make Fall Risk Sign visible to staff  - Offer Toileting every 2 Hours, in advance of need  - Apply yellow socks and bracelet for high fall risk patients  - Consider moving patient to room near nurses station  Outcome: Progressing     Problem: SAFETY,RESTRAINT: NV/NON-SELF DESTRUCTIVE BEHAVIOR  Goal: Remains free of harm/injury (restraint for non violent/non self-detsructive behavior)  Description: INTERVENTIONS:  - Instruct patient/family regarding restraint use   - Assess and monitor physiologic and psychological status   - Provide interventions and comfort measures to meet assessed patient needs   - Identify and implement measures to help patient regain control  - Assess readiness for release of restraint   Outcome: Progressing  Goal: Returns to  optimal restraint-free functioning  Description: INTERVENTIONS:  - Assess the patient's behavior and symptoms that indicate continued need for restraint  - Identify and implement measures to help patient regain control  - Assess readiness for release of restraint   Outcome: Progressing

## 2024-07-25 ENCOUNTER — APPOINTMENT (INPATIENT)
Dept: NON INVASIVE DIAGNOSTICS | Facility: HOSPITAL | Age: 69
DRG: 020 | End: 2024-07-25
Attending: NURSE PRACTITIONER
Payer: MEDICARE

## 2024-07-25 PROBLEM — Z51.5 PALLIATIVE CARE ENCOUNTER: Status: ACTIVE | Noted: 2024-07-25

## 2024-07-25 PROBLEM — Z71.89 GOALS OF CARE, COUNSELING/DISCUSSION: Status: ACTIVE | Noted: 2024-07-25

## 2024-07-25 LAB
ALBUMIN SERPL BCG-MCNC: 3.2 G/DL (ref 3.5–5)
ALP SERPL-CCNC: 115 U/L (ref 34–104)
ALT SERPL W P-5'-P-CCNC: 15 U/L (ref 7–52)
ANION GAP SERPL CALCULATED.3IONS-SCNC: 10 MMOL/L (ref 4–13)
APTT PPP: 38 SECONDS (ref 23–37)
AST SERPL W P-5'-P-CCNC: 21 U/L (ref 13–39)
BACTERIA CSF CULT: NO GROWTH
BACTERIA SPT RESP CULT: ABNORMAL
BACTERIA SPT RESP CULT: ABNORMAL
BASOPHILS # BLD AUTO: 0.06 THOUSANDS/ÂΜL (ref 0–0.1)
BASOPHILS NFR BLD AUTO: 0 % (ref 0–1)
BILIRUB SERPL-MCNC: 0.42 MG/DL (ref 0.2–1)
BUN SERPL-MCNC: 22 MG/DL (ref 5–25)
CA-I BLD-SCNC: 1.17 MMOL/L (ref 1.12–1.32)
CALCIUM ALBUM COR SERPL-MCNC: 9.4 MG/DL (ref 8.3–10.1)
CALCIUM SERPL-MCNC: 8.8 MG/DL (ref 8.4–10.2)
CHLORIDE SERPL-SCNC: 112 MMOL/L (ref 96–108)
CO2 SERPL-SCNC: 24 MMOL/L (ref 21–32)
CREAT SERPL-MCNC: 0.67 MG/DL (ref 0.6–1.3)
EOSINOPHIL # BLD AUTO: 0.3 THOUSAND/ÂΜL (ref 0–0.61)
EOSINOPHIL NFR BLD AUTO: 2 % (ref 0–6)
ERYTHROCYTE [DISTWIDTH] IN BLOOD BY AUTOMATED COUNT: 14.7 % (ref 11.6–15.1)
GFR SERPL CREATININE-BSD FRML MDRD: 98 ML/MIN/1.73SQ M
GLUCOSE SERPL-MCNC: 127 MG/DL (ref 65–140)
GLUCOSE SERPL-MCNC: 135 MG/DL (ref 65–140)
GLUCOSE SERPL-MCNC: 145 MG/DL (ref 65–140)
GLUCOSE SERPL-MCNC: 147 MG/DL (ref 65–140)
GLUCOSE SERPL-MCNC: 150 MG/DL (ref 65–140)
GLUCOSE SERPL-MCNC: 155 MG/DL (ref 65–140)
GRAM STN SPEC: ABNORMAL
HCT VFR BLD AUTO: 29.2 % (ref 36.5–49.3)
HGB BLD-MCNC: 9.6 G/DL (ref 12–17)
IMM GRANULOCYTES # BLD AUTO: 0.19 THOUSAND/UL (ref 0–0.2)
IMM GRANULOCYTES NFR BLD AUTO: 1 % (ref 0–2)
LYMPHOCYTES # BLD AUTO: 1.54 THOUSANDS/ÂΜL (ref 0.6–4.47)
LYMPHOCYTES NFR BLD AUTO: 9 % (ref 14–44)
MAGNESIUM SERPL-MCNC: 2.1 MG/DL (ref 1.9–2.7)
MCH RBC QN AUTO: 31.6 PG (ref 26.8–34.3)
MCHC RBC AUTO-ENTMCNC: 32.9 G/DL (ref 31.4–37.4)
MCV RBC AUTO: 96 FL (ref 82–98)
MONOCYTES # BLD AUTO: 0.92 THOUSAND/ÂΜL (ref 0.17–1.22)
MONOCYTES NFR BLD AUTO: 5 % (ref 4–12)
NEUTROPHILS # BLD AUTO: 15.19 THOUSANDS/ÂΜL (ref 1.85–7.62)
NEUTS SEG NFR BLD AUTO: 83 % (ref 43–75)
NRBC BLD AUTO-RTO: 0 /100 WBCS
PHOSPHATE SERPL-MCNC: 2.5 MG/DL (ref 2.3–4.1)
PHOSPHATE SERPL-MCNC: 2.7 MG/DL (ref 2.3–4.1)
PLATELET # BLD AUTO: 478 THOUSANDS/UL (ref 149–390)
PMV BLD AUTO: 10 FL (ref 8.9–12.7)
POTASSIUM SERPL-SCNC: 3.4 MMOL/L (ref 3.5–5.3)
POTASSIUM SERPL-SCNC: 4.2 MMOL/L (ref 3.5–5.3)
PROT SERPL-MCNC: 6.7 G/DL (ref 6.4–8.4)
RBC # BLD AUTO: 3.04 MILLION/UL (ref 3.88–5.62)
SODIUM SERPL-SCNC: 146 MMOL/L (ref 135–147)
WBC # BLD AUTO: 18.2 THOUSAND/UL (ref 4.31–10.16)

## 2024-07-25 PROCEDURE — 99232 SBSQ HOSP IP/OBS MODERATE 35: CPT | Performed by: NURSE PRACTITIONER

## 2024-07-25 PROCEDURE — 93886 INTRACRANIAL COMPLETE STUDY: CPT | Performed by: SURGERY

## 2024-07-25 PROCEDURE — 93886 INTRACRANIAL COMPLETE STUDY: CPT

## 2024-07-25 PROCEDURE — 83735 ASSAY OF MAGNESIUM: CPT

## 2024-07-25 PROCEDURE — 82948 REAGENT STRIP/BLOOD GLUCOSE: CPT

## 2024-07-25 PROCEDURE — 87493 C DIFF AMPLIFIED PROBE: CPT

## 2024-07-25 PROCEDURE — 94760 N-INVAS EAR/PLS OXIMETRY 1: CPT

## 2024-07-25 PROCEDURE — 84100 ASSAY OF PHOSPHORUS: CPT

## 2024-07-25 PROCEDURE — 85025 COMPLETE CBC W/AUTO DIFF WBC: CPT

## 2024-07-25 PROCEDURE — 82330 ASSAY OF CALCIUM: CPT

## 2024-07-25 PROCEDURE — 84132 ASSAY OF SERUM POTASSIUM: CPT

## 2024-07-25 PROCEDURE — 92526 ORAL FUNCTION THERAPY: CPT

## 2024-07-25 PROCEDURE — 80053 COMPREHEN METABOLIC PANEL: CPT

## 2024-07-25 PROCEDURE — 99291 CRITICAL CARE FIRST HOUR: CPT | Performed by: EMERGENCY MEDICINE

## 2024-07-25 PROCEDURE — 99223 1ST HOSP IP/OBS HIGH 75: CPT | Performed by: INTERNAL MEDICINE

## 2024-07-25 PROCEDURE — 85730 THROMBOPLASTIN TIME PARTIAL: CPT

## 2024-07-25 PROCEDURE — 94664 DEMO&/EVAL PT USE INHALER: CPT

## 2024-07-25 RX ORDER — WATER 10 ML/10ML
INJECTION INTRAMUSCULAR; INTRAVENOUS; SUBCUTANEOUS
Status: COMPLETED
Start: 2024-07-25 | End: 2024-07-25

## 2024-07-25 RX ORDER — KETOROLAC TROMETHAMINE 30 MG/ML
15 INJECTION, SOLUTION INTRAMUSCULAR; INTRAVENOUS ONCE
Status: COMPLETED | OUTPATIENT
Start: 2024-07-25 | End: 2024-07-25

## 2024-07-25 RX ORDER — IBUPROFEN 100 MG/5ML
400 SUSPENSION, ORAL (FINAL DOSE FORM) ORAL ONCE
Status: COMPLETED | OUTPATIENT
Start: 2024-07-25 | End: 2024-07-25

## 2024-07-25 RX ORDER — ACETAMINOPHEN 160 MG/5ML
975 SUSPENSION ORAL EVERY 8 HOURS PRN
Status: DISCONTINUED | OUTPATIENT
Start: 2024-07-25 | End: 2024-07-26

## 2024-07-25 RX ORDER — OXYCODONE HYDROCHLORIDE 5 MG/1
5 TABLET ORAL EVERY 6 HOURS PRN
Status: DISCONTINUED | OUTPATIENT
Start: 2024-07-25 | End: 2024-07-26

## 2024-07-25 RX ORDER — OLANZAPINE 10 MG/2ML
2.5 INJECTION, POWDER, FOR SOLUTION INTRAMUSCULAR ONCE
Status: COMPLETED | OUTPATIENT
Start: 2024-07-25 | End: 2024-07-25

## 2024-07-25 RX ORDER — POTASSIUM CHLORIDE 20MEQ/15ML
40 LIQUID (ML) ORAL ONCE
Status: COMPLETED | OUTPATIENT
Start: 2024-07-25 | End: 2024-07-25

## 2024-07-25 RX ORDER — BUTALBITAL, ACETAMINOPHEN AND CAFFEINE 50; 325; 40 MG/1; MG/1; MG/1
1 TABLET ORAL EVERY 4 HOURS PRN
Status: DISCONTINUED | OUTPATIENT
Start: 2024-07-25 | End: 2024-08-06

## 2024-07-25 RX ORDER — ALBUTEROL SULFATE 90 UG/1
2 AEROSOL, METERED RESPIRATORY (INHALATION) EVERY 4 HOURS PRN
Status: DISCONTINUED | OUTPATIENT
Start: 2024-07-25 | End: 2024-08-10 | Stop reason: HOSPADM

## 2024-07-25 RX ADMIN — Medication 30 MG: at 01:55

## 2024-07-25 RX ADMIN — IBUPROFEN 400 MG: 100 SUSPENSION ORAL at 00:21

## 2024-07-25 RX ADMIN — Medication 30 MG: at 18:03

## 2024-07-25 RX ADMIN — Medication 30 MG: at 14:02

## 2024-07-25 RX ADMIN — NYSTATIN 500000 UNITS: 100000 SUSPENSION ORAL at 08:11

## 2024-07-25 RX ADMIN — OXYCODONE HYDROCHLORIDE 5 MG: 5 TABLET ORAL at 22:06

## 2024-07-25 RX ADMIN — MODAFINIL 100 MG: 100 TABLET ORAL at 08:11

## 2024-07-25 RX ADMIN — EZETIMIBE 10 MG: 10 TABLET ORAL at 08:10

## 2024-07-25 RX ADMIN — BROMOCRIPTINE MESYLATE 10 MG: 2.5 TABLET ORAL at 15:29

## 2024-07-25 RX ADMIN — Medication 30 MG: at 08:13

## 2024-07-25 RX ADMIN — LIDOCAINE 5% 1 PATCH: 700 PATCH TOPICAL at 08:11

## 2024-07-25 RX ADMIN — HEPARIN SODIUM 12 UNITS/KG/HR: 10000 INJECTION, SOLUTION INTRAVENOUS at 04:00

## 2024-07-25 RX ADMIN — Medication 30 MG: at 22:07

## 2024-07-25 RX ADMIN — POTASSIUM & SODIUM PHOSPHATES POWDER PACK 280-160-250 MG 2 PACKET: 280-160-250 PACK at 14:01

## 2024-07-25 RX ADMIN — LEVETIRACETAM 750 MG: 100 SOLUTION ORAL at 08:13

## 2024-07-25 RX ADMIN — FOLIC ACID TAB 400 MCG 400 MCG: 400 TAB at 08:10

## 2024-07-25 RX ADMIN — Medication 30 MG: at 12:21

## 2024-07-25 RX ADMIN — NYSTATIN 500000 UNITS: 100000 SUSPENSION ORAL at 22:07

## 2024-07-25 RX ADMIN — Medication 30 MG: at 00:21

## 2024-07-25 RX ADMIN — CHLORHEXIDINE GLUCONATE 0.12% ORAL RINSE 15 ML: 1.2 LIQUID ORAL at 08:11

## 2024-07-25 RX ADMIN — LEVETIRACETAM 750 MG: 100 SOLUTION ORAL at 20:31

## 2024-07-25 RX ADMIN — OXYCODONE HYDROCHLORIDE 5 MG: 5 TABLET ORAL at 15:28

## 2024-07-25 RX ADMIN — Medication 30 MG: at 05:51

## 2024-07-25 RX ADMIN — INSULIN LISPRO 1 UNITS: 100 INJECTION, SOLUTION INTRAVENOUS; SUBCUTANEOUS at 23:49

## 2024-07-25 RX ADMIN — Medication 30 MG: at 15:28

## 2024-07-25 RX ADMIN — NOREPINEPHRINE BITARTRATE 16 MCG/MIN: 1 SOLUTION INTRAVENOUS at 06:02

## 2024-07-25 RX ADMIN — CEFAZOLIN SODIUM 2000 MG: 2 SOLUTION INTRAVENOUS at 14:01

## 2024-07-25 RX ADMIN — NYSTATIN 500000 UNITS: 100000 SUSPENSION ORAL at 12:21

## 2024-07-25 RX ADMIN — CEFAZOLIN SODIUM 2000 MG: 2 SOLUTION INTRAVENOUS at 05:51

## 2024-07-25 RX ADMIN — ATORVASTATIN CALCIUM 80 MG: 80 TABLET, FILM COATED ORAL at 08:10

## 2024-07-25 RX ADMIN — WATER 10 ML: 1 INJECTION INTRAMUSCULAR; INTRAVENOUS; SUBCUTANEOUS at 12:22

## 2024-07-25 RX ADMIN — CEFAZOLIN SODIUM 2000 MG: 2 SOLUTION INTRAVENOUS at 22:07

## 2024-07-25 RX ADMIN — POTASSIUM CHLORIDE 40 MEQ: 1.5 SOLUTION ORAL at 08:26

## 2024-07-25 RX ADMIN — ASPIRIN 81 MG CHEWABLE TABLET 81 MG: 81 TABLET CHEWABLE at 08:10

## 2024-07-25 RX ADMIN — Medication 30 MG: at 23:49

## 2024-07-25 RX ADMIN — NOREPINEPHRINE BITARTRATE 18 MCG/MIN: 1 SOLUTION INTRAVENOUS at 18:17

## 2024-07-25 RX ADMIN — BROMOCRIPTINE MESYLATE 10 MG: 2.5 TABLET ORAL at 20:34

## 2024-07-25 RX ADMIN — KETOROLAC TROMETHAMINE 15 MG: 30 INJECTION, SOLUTION INTRAMUSCULAR at 12:20

## 2024-07-25 RX ADMIN — THIAMINE HCL TAB 100 MG 100 MG: 100 TAB at 08:10

## 2024-07-25 RX ADMIN — Medication 30 MG: at 10:08

## 2024-07-25 RX ADMIN — Medication 30 MG: at 20:31

## 2024-07-25 RX ADMIN — NYSTATIN 500000 UNITS: 100000 SUSPENSION ORAL at 18:02

## 2024-07-25 RX ADMIN — Medication 30 MG: at 04:01

## 2024-07-25 RX ADMIN — GABAPENTIN 600 MG: 300 CAPSULE ORAL at 20:34

## 2024-07-25 RX ADMIN — NOREPINEPHRINE BITARTRATE 16 MCG/MIN: 1 SOLUTION INTRAVENOUS at 14:10

## 2024-07-25 RX ADMIN — Medication 20 MG: at 08:12

## 2024-07-25 RX ADMIN — GABAPENTIN 600 MG: 300 CAPSULE ORAL at 08:11

## 2024-07-25 RX ADMIN — POTASSIUM CHLORIDE 40 MEQ: 1.5 SOLUTION ORAL at 08:12

## 2024-07-25 RX ADMIN — NOREPINEPHRINE BITARTRATE 16 MCG/MIN: 1 SOLUTION INTRAVENOUS at 10:16

## 2024-07-25 RX ADMIN — OLANZAPINE 2.5 MG: 10 INJECTION, POWDER, FOR SOLUTION INTRAMUSCULAR at 12:21

## 2024-07-25 RX ADMIN — BROMOCRIPTINE MESYLATE 10 MG: 2.5 TABLET ORAL at 08:14

## 2024-07-25 RX ADMIN — CHLORHEXIDINE GLUCONATE 0.12% ORAL RINSE 15 ML: 1.2 LIQUID ORAL at 20:34

## 2024-07-25 RX ADMIN — NOREPINEPHRINE BITARTRATE 18 MCG/MIN: 1 SOLUTION INTRAVENOUS at 22:08

## 2024-07-25 RX ADMIN — NOREPINEPHRINE BITARTRATE 16 MCG/MIN: 1 SOLUTION INTRAVENOUS at 01:53

## 2024-07-25 RX ADMIN — GABAPENTIN 600 MG: 300 CAPSULE ORAL at 15:28

## 2024-07-25 NOTE — PROGRESS NOTES
Patient had a fever to 101.2 overnight.  He is currently on around-the-clock Tylenol as well as bromocriptine.  1 dose of Motrin 400 mg administered.  Patient's fever improved after dose of Motrin.  Patient is on antibiotics for MSSA and bronchial aspirate.  He has had multiple diarrhea events over the past several days.  Initially patient was on a bowel regimen which had been discontinued on Monday.  With patient's persistent fevers and bowel movements check stool studies including C. difficile.

## 2024-07-25 NOTE — ASSESSMENT & PLAN NOTE
Malnutrition Findings:   Adult Malnutrition type: Acute illness  Adult Degree of Malnutrition: Other severe protein calorie malnutrition  Malnutrition Characteristics: Fat loss, Muscle loss, Inadequate energy, Weight loss        360 Statement: Acute severe pro, vinny malnutrition d/t condition as evidence by signficant weight loss, 7/13/24 54kg, 7/19/24 49kg, 5kg/9% wt. loss x <1 week, moderate to severe signs of muscle/ fat loss at temples, orbitals, calvicles, triceps, shoulders, treated with TF.    BMI Findings:     There is no height or weight on file to calculate BMI.     SPeech therapy following.  MBS completed and patient recommended for think liquids however showed S/S of aspiration, remains NPO with tube feeding via NG tube.

## 2024-07-25 NOTE — ASSESSMENT & PLAN NOTE
CSF sent 7/22  BC NTD x 48 hours  WBC 21.87--> 18.20  CSF  Cx no growth  RBC 3,275  WBC 14  Glucose 75  Tmax 102.9

## 2024-07-25 NOTE — PLAN OF CARE
Problem: Prexisting or High Potential for Compromised Skin Integrity  Goal: Skin integrity is maintained or improved  Description: INTERVENTIONS:  - Identify patients at risk for skin breakdown  - Assess and monitor skin integrity  - Assess and monitor nutrition and hydration status  - Monitor labs   - Assess for incontinence   - Turn and reposition patient  - Assist with mobility/ambulation  - Relieve pressure over bony prominences  - Avoid friction and shearing  - Provide appropriate hygiene as needed including keeping skin clean and dry  - Evaluate need for skin moisturizer/barrier cream  - Collaborate with interdisciplinary team   - Patient/family teaching  - Consider wound care consult   Outcome: Progressing     Problem: Neurological Deficit  Goal: Neurological status is stable or improving  Description: Interventions:  - Monitor and assess patient's level of consciousness, motor function, sensory function, and level of assistance needed for ADLs.   - Monitor and report changes from baseline. Collaborate with interdisciplinary team to initiate plan and implement interventions as ordered.   - Provide and maintain a safe environment.  - Consider seizure precautions.  - Consider fall precautions.  - Consider aspiration precautions.  - Consider bleeding precautions.  Outcome: Progressing     Problem: Activity Intolerance/Impaired Mobility  Goal: Mobility/activity is maintained at optimum level for patient  Description: Interventions:  - Assess and monitor patient  barriers to mobility and need for assistive/adaptive devices.  - Assess patient's emotional response to limitations.  - Collaborate with interdisciplinary team and initiate plans and interventions as ordered.  - Encourage independent activity per ability.  - Maintain proper body alignment.  - Perform active/passive rom as tolerated/ordered.  - Plan activities to conserve energy.  - Turn patient as appropriate  Outcome: Progressing     Problem:  Communication Impairment  Goal: Ability to express needs and understand communication  Description: Assess patient's communication skills and ability to understand information.  Patient will demonstrate use of effective communication techniques, alternative methods of communication and understanding even if not able to speak.     - Encourage communication and provide alternate methods of communication as needed.  - Collaborate with case management/ for discharge needs.  - Include patient/family/caregiver in decisions related to communication.  Outcome: Progressing     Problem: Potential for Aspiration  Goal: Non-ventilated patient's risk of aspiration is minimized  Description: Assess and monitor vital signs, respiratory status, and labs (WBC).  Monitor for signs of aspiration (tachypnea, cough, rales, wheezing, cyanosis, fever).    - Assess and monitor patient's ability to swallow.  - Place patient up in chair to eat if possible.  - HOB up at 90 degrees to eat if unable to get patient up into chair.  - Supervise patient during oral intake.   - Instruct patient/ family to take small bites.  - Instruct patient/ family to take small single sips when taking liquids.  - Follow patient-specific strategies generated by speech pathologist.  Outcome: Progressing     Problem: Nutrition  Goal: Nutrition/Hydration status is improving  Description: Monitor and assess patient's nutrition/hydration status for malnutrition (ex- brittle hair, bruises, dry skin, pale skin and conjunctiva, muscle wasting, smooth red tongue, and disorientation). Collaborate with interdisciplinary team and initiate plan and interventions as ordered.  Monitor patient's weight and dietary intake as ordered or per policy. Utilize nutrition screening tool and intervene per policy. Determine patient's food preferences and provide high-protein, high-caloric foods as appropriate.   - Monitor nutrition parameters, recommending adjustments to  enteral nutrition orders at indicated.   - Assist patient with eating.  - Allow adequate time for meals.  - Encourage patient to take dietary supplement as ordered.  - Collaborate with interdisciplinary team.   - Include patient/family/caregiver in decisions related to nutrition.  Outcome: Progressing     Problem: PAIN - ADULT  Goal: Verbalizes/displays adequate comfort level or baseline comfort level  Description: Interventions:  - Encourage patient to monitor pain and request assistance  - Assess pain using appropriate pain scale  - Administer analgesics based on type and severity of pain and evaluate response  - Implement non-pharmacological measures as appropriate and evaluate response  - Consider cultural and social influences on pain and pain management  - Notify physician/advanced practitioner if interventions unsuccessful or patient reports new pain  Outcome: Progressing     Problem: INFECTION - ADULT  Goal: Absence or prevention of progression during hospitalization  Description: INTERVENTIONS:  - Assess and monitor for signs and symptoms of infection  - Monitor lab/diagnostic results  - Monitor all insertion sites, i.e. indwelling lines, tubes, and drains  - Monitor endotracheal if appropriate and nasal secretions for changes in amount and color  - [place] appropriate cooling/warming therapies per order  - Administer medications as ordered  - Instruct and encourage patient and family to use good hand hygiene technique  - Identify and instruct in appropriate isolation precautions for identified infection/condition  Outcome: Progressing  Goal: Absence of fever/infection during neutropenic period  Description: INTERVENTIONS:  - Monitor WBC    Outcome: Progressing     Problem: SAFETY ADULT  Goal: Patient will remain free of falls  Description: INTERVENTIONS:  - Educate patient/family on patient safety including physical limitations  - Instruct patient to call for assistance with activity   - Consult OT/PT to  assist with strengthening/mobility   - Keep Call bell within reach  - Keep bed low and locked with side rails adjusted as appropriate  - Keep care items and personal belongings within reach  - Initiate and maintain comfort rounds  - Make Fall Risk Sign visible to staff  - Offer Toileting every 2 Hours, in advance of need  - Initiate/Maintain Bed/Chair alarm  - Obtain necessary fall risk management equipment  - Apply yellow socks and bracelet for high fall risk patients  - Consider moving patient to room near nurses station  Outcome: Progressing  Goal: Maintain or return to baseline ADL function  Description: INTERVENTIONS:  -  Assess patient's ability to carry out ADLs; assess patient's baseline for ADL function and identify physical deficits which impact ability to perform ADLs (bathing, care of mouth/teeth, toileting, grooming, dressing, etc.)  - Assess/evaluate cause of self-care deficits   - Assess range of motion  - Assess patient's mobility; develop plan if impaired  - Assess patient's need for assistive devices and provide as appropriate  - Encourage maximum independence but intervene and supervise when necessary  - Involve family in performance of ADLs  - Assess for home care needs following discharge   - Consider OT consult to assist with ADL evaluation and planning for discharge  - Provide patient education as appropriate  Outcome: Progressing  Goal: Maintains/Returns to pre admission functional level  Description: INTERVENTIONS:  - Perform AM-PAC 6 Click Basic Mobility/ Daily Activity assessment daily.  - Set and communicate daily mobility goal to care team and patient/family/caregiver.   - Collaborate with rehabilitation services on mobility goals if consulted  - Perform Range of Motion 12 times a day.  - Reposition patient every 2 hours.  - Dangle patient 2 times a day  - Stand patient 2 times a day  - Ambulate patient 2 times a day  - Out of bed to chair 2 times a day   - Record patient progress and  toleration of activity level   Outcome: Progressing     Problem: DISCHARGE PLANNING  Goal: Discharge to home or other facility with appropriate resources  Description: INTERVENTIONS:  - Identify barriers to discharge w/patient and caregiver  - Arrange for needed discharge resources and transportation as appropriate  - Identify discharge learning needs (meds, wound care, etc.)  - Arrange for interpretive services to assist at discharge as needed  - Refer to Case Management Department for coordinating discharge planning if the patient needs post-hospital services based on physician/advanced practitioner order or complex needs related to functional status, cognitive ability, or social support system  Outcome: Progressing     Problem: Knowledge Deficit  Goal: Patient/family/caregiver demonstrates understanding of disease process, treatment plan, medications, and discharge instructions  Description: Complete learning assessment and assess knowledge base.  Interventions:  - Provide teaching at level of understanding  - Provide teaching via preferred learning methods  Outcome: Progressing     Problem: Nutrition/Hydration-ADULT  Goal: Nutrient/Hydration intake appropriate for improving, restoring or maintaining nutritional needs  Description: Monitor and assess patient's nutrition/hydration status for malnutrition. Collaborate with interdisciplinary team and initiate plan and interventions as ordered.  Monitor patient's weight and dietary intake as ordered or per policy. Utilize nutrition screening tool and intervene as necessary. Determine patient's food preferences and provide high-protein, high-caloric foods as appropriate.     INTERVENTIONS:  - Monitor oral intake, urinary output, labs, and treatment plans  - Assess nutrition and hydration status and recommend course of action  - Evaluate amount of meals eaten  - Assist patient with eating if necessary   - Allow adequate time for meals  - Recommend/ encourage  appropriate diets, oral nutritional supplements, and vitamin/mineral supplements  - Order, calculate, and assess calorie counts as needed  - Recommend, monitor, and adjust tube feeding based on assessed needs  - Assess need for intravenous fluids  - Provide nutrition/hydration education as appropriate  - Include patient/family/caregiver in decisions related to nutrition  Outcome: Progressing     Problem: NEUROSENSORY - ADULT  Goal: Achieves stable or improved neurological status  Description: INTERVENTIONS  - Monitor and report changes in neurological status  - Monitor vital signs such as temperature, blood pressure, glucose, and any other labs ordered   - Initiate measures to prevent increased intracranial pressure  - Monitor for seizure activity and implement precautions if appropriate      Outcome: Progressing  Goal: Remains free of injury related to seizures activity  Description: INTERVENTIONS  - Maintain airway, patient safety  and administer oxygen as ordered  - Monitor patient for seizure activity, document and report duration and description of seizure to physician/advanced practitioner  - If seizure occurs,  ensure patient safety during seizure  - Reorient patient post seizure  - Seizure pads on all 4 side rails  - Instruct patient/family to notify RN of any seizure activity including if an aura is experienced  - Instruct patient/family to call for assistance with activity based on nursing assessment  - Administer anti-seizure medications if ordered    Outcome: Progressing  Goal: Achieves maximal functionality and self care  Description: INTERVENTIONS  - Monitor swallowing and airway patency with patient fatigue and changes in neurological status  - Encourage and assist patient to increase activity and self care.   - Encourage visually impaired, hearing impaired and aphasic patients to use assistive/communication devices  Outcome: Progressing     Problem: CARDIOVASCULAR - ADULT  Goal: Maintains optimal  cardiac output and hemodynamic stability  Description: INTERVENTIONS:  - Monitor I/O, vital signs and rhythm  - Monitor for S/S and trends of decreased cardiac output  - Administer and titrate ordered vasoactive medications to optimize hemodynamic stability  - Assess quality of pulses, skin color and temperature  - Assess for signs of decreased coronary artery perfusion  - Instruct patient to report change in severity of symptoms  Outcome: Progressing  Goal: Absence of cardiac dysrhythmias or at baseline rhythm  Description: INTERVENTIONS:  - Continuous cardiac monitoring, vital signs, obtain 12 lead EKG if ordered  - Administer antiarrhythmic and heart rate control medications as ordered  - Monitor electrolytes and administer replacement therapy as ordered  Outcome: Progressing     Problem: RESPIRATORY - ADULT  Goal: Achieves optimal ventilation and oxygenation  Description: INTERVENTIONS:  - Assess for changes in respiratory status  - Assess for changes in mentation and behavior  - Position to facilitate oxygenation and minimize respiratory effort  - Oxygen administered by appropriate delivery if ordered  - Initiate smoking cessation education as indicated  - Encourage broncho-pulmonary hygiene including cough, deep breathe, Incentive Spirometry  - Assess the need for suctioning and aspirate as needed  - Assess and instruct to report SOB or any respiratory difficulty  - Respiratory Therapy support as indicated  Outcome: Progressing     Problem: GASTROINTESTINAL - ADULT  Goal: Maintains or returns to baseline bowel function  Description: INTERVENTIONS:  - Assess bowel function  - Encourage oral fluids to ensure adequate hydration  - Administer IV fluids if ordered to ensure adequate hydration  - Administer ordered medications as needed  - Encourage mobilization and activity  - Consider nutritional services referral to assist patient with adequate nutrition and appropriate food choices  Outcome: Progressing  Goal:  Maintains adequate nutritional intake  Description: INTERVENTIONS:  - Monitor percentage of each meal consumed  - Identify factors contributing to decreased intake, treat as appropriate  - Assist with meals as needed  - Monitor I&O, weight, and lab values if indicated  - Obtain nutrition services referral as needed  Outcome: Progressing     Problem: GENITOURINARY - ADULT  Goal: Maintains or returns to baseline urinary function  Description: INTERVENTIONS:  - Assess urinary function  - Encourage oral fluids to ensure adequate hydration if ordered  - Administer IV fluids as ordered to ensure adequate hydration  - Administer ordered medications as needed  - Offer frequent toileting  - Follow urinary retention protocol if ordered  Outcome: Progressing  Goal: Absence of urinary retention  Description: INTERVENTIONS:  - Assess patient’s ability to void and empty bladder  - Monitor I/O  - Bladder scan as needed  - Discuss with physician/AP medications to alleviate retention as needed  - Discuss catheterization for long term situations as appropriate  Outcome: Progressing     Problem: METABOLIC, FLUID AND ELECTROLYTES - ADULT  Goal: Electrolytes maintained within normal limits  Description: INTERVENTIONS:  - Monitor labs and assess patient for signs and symptoms of electrolyte imbalances  - Administer electrolyte replacement as ordered  - Monitor response to electrolyte replacements, including repeat lab results as appropriate  - Instruct patient on fluid and nutrition as appropriate  Outcome: Progressing  Goal: Fluid balance maintained  Description: INTERVENTIONS:  - Monitor labs   - Monitor I/O and WT  - Instruct patient on fluid and nutrition as appropriate  - Assess for signs & symptoms of volume excess or deficit  Outcome: Progressing  Goal: Glucose maintained within target range  Description: INTERVENTIONS:  - Monitor Blood Glucose as ordered  - Assess for signs and symptoms of hyperglycemia and hypoglycemia  -  Administer ordered medications to maintain glucose within target range  - Assess nutritional intake and initiate nutrition service referral as needed  Outcome: Progressing     Problem: SKIN/TISSUE INTEGRITY - ADULT  Goal: Skin Integrity remains intact(Skin Breakdown Prevention)  Description: Assess:  -Perform Martin assessment every shift  -Clean and moisturize skin every shift and PRN  -Inspect skin when repositioning, toileting, and assisting with ADLS  -Assess under medical devices every shift  -Assess extremities for adequate circulation and sensation     Bed Management:  -Have minimal linens on bed & keep smooth, unwrinkled  -Change linens as needed when moist or perspiring  -Avoid sitting or lying in one position for more than 2 hours while in bed  -Keep HOB at 30 degrees     Toileting:  -Offer bedside commode  -Assess for incontinence every 2 hours  -Use incontinent care products after each incontinent episode     Activity:  -Mobilize patient 2 times a day  -Encourage activity and walks on unit  -Encourage or provide ROM exercises   -Turn and reposition patient every 2 Hours  -Use appropriate equipment to lift or move patient in bed  -Instruct/ Assist with weight shifting every 30 minutes when out of bed in chair  -Consider limitation of chair time 3 hour intervals    Skin Care:  -Avoid use of baby powder, tape, friction and shearing, hot water or constrictive clothing  -Relieve pressure over bony prominences   -Do not massage red bony areas    Next Steps:  -Teach patient strategies to minimize risks    -Consider consults to  interdisciplinary teams   Outcome: Progressing  Goal: Incision(s), wounds(s) or drain site(s) healing without S/S of infection  Description: INTERVENTIONS  - Assess and document dressing, incision, wound bed, drain sites and surrounding tissue  - Provide patient and family education  - Perform skin care/dressing changes every shift and PRN  Outcome: Progressing     Problem: HEMATOLOGIC -  ADULT  Goal: Maintains hematologic stability  Description: INTERVENTIONS  - Assess for signs and symptoms of bleeding or hemorrhage  - Monitor labs  - Administer supportive blood products/factors as ordered and appropriate  Outcome: Progressing     Problem: MUSCULOSKELETAL - ADULT  Goal: Maintain or return mobility to safest level of function  Description: INTERVENTIONS:  - Assess patient's ability to carry out ADLs; assess patient's baseline for ADL function and identify physical deficits which impact ability to perform ADLs (bathing, care of mouth/teeth, toileting, grooming, dressing, etc.)  - Assess/evaluate cause of self-care deficits   - Assess range of motion  - Assess patient's mobility  - Assess patient's need for assistive devices and provide as appropriate  - Encourage maximum independence but intervene and supervise when necessary  - Involve family in performance of ADLs  - Assess for home care needs following discharge   - Consider OT consult to assist with ADL evaluation and planning for discharge  - Provide patient education as appropriate  Outcome: Progressing     Problem: COPING  Goal: Pt/Family able to verbalize concerns and demonstrate effective coping strategies  Description: INTERVENTIONS:  - Assist patient/family to identify coping skills, available support systems and cultural and spiritual values  - Provide emotional support, including active listening and acknowledgement of concerns of patient and caregivers  - Reduce environmental stimuli, as able  - Provide patient education  - Assess for spiritual pain/suffering and initiate spiritual care, including notification of Pastoral Care or halie based community as needed  - Assess effectiveness of coping strategies  Outcome: Progressing  Goal: Will report anxiety at manageable levels  Description: INTERVENTIONS:  - Administer medication as ordered  - Teach and encourage coping skills  - Provide emotional support  - Assess patient/family for  anxiety and ability to cope  Outcome: Progressing     Problem: Potential for Falls  Goal: Patient will remain free of falls  Description: INTERVENTIONS:  - Educate patient/family on patient safety including physical limitations  - Instruct patient to call for assistance with activity   - Consult OT/PT to assist with strengthening/mobility   - Keep Call bell within reach  - Keep bed low and locked with side rails adjusted as appropriate  - Keep care items and personal belongings within reach  - Initiate and maintain comfort rounds  - Make Fall Risk Sign visible to staff  - Offer Toileting every 2 Hours, in advance of need  - Initiate/Maintain Bed/Chair alarm  - Obtain necessary fall risk management equipment  - Apply yellow socks and bracelet for high fall risk patients  - Consider moving patient to room near nurses station  Outcome: Progressing     Problem: SAFETY,RESTRAINT: NV/NON-SELF DESTRUCTIVE BEHAVIOR  Goal: Remains free of harm/injury (restraint for non violent/non self-detsructive behavior)  Description: INTERVENTIONS:  - Instruct patient/family regarding restraint use   - Assess and monitor physiologic and psychological status   - Provide interventions and comfort measures to meet assessed patient needs   - Identify and implement measures to help patient regain control  - Assess readiness for release of restraint   Outcome: Progressing  Goal: Returns to optimal restraint-free functioning  Description: INTERVENTIONS:  - Assess the patient's behavior and symptoms that indicate continued need for restraint  - Identify and implement measures to help patient regain control  - Assess readiness for release of restraint   Outcome: Progressing

## 2024-07-25 NOTE — OCCUPATIONAL THERAPY NOTE
Occupational Therapy Cancel Note         Patient Name: Federico Bowen  Today's Date: 7/25/2024 07/25/24 1024   OT Last Visit   OT Visit Date 07/25/24   Note Type   Note type Cancelled Session   Cancel Reasons Other       OT orders received. Chart reviewed. Pt currently with vascular providers at bedside, unavailable for session at this time. Will continue to follow and see pt as appropriate and able.     Catrachita Zaragoza MS, OTR/L

## 2024-07-25 NOTE — ASSESSMENT & PLAN NOTE
Now GCS 13  Participating in PT/OT  CT July 22: improving subarachnoid and intraventricular hemorrhage

## 2024-07-25 NOTE — ASSESSMENT & PLAN NOTE
2 witnessed seizures on presentation at [facility]  Neurology following, input appreciated  Patient currently on Keppra 750 mg BID per neurology  vEEG 7/14 no seizure activity

## 2024-07-25 NOTE — PLAN OF CARE
Problem: Prexisting or High Potential for Compromised Skin Integrity  Goal: Skin integrity is maintained or improved  Description: INTERVENTIONS:  - Identify patients at risk for skin breakdown  - Assess and monitor skin integrity  - Assess and monitor nutrition and hydration status  - Monitor labs   - Assess for incontinence   - Turn and reposition patient  - Assist with mobility/ambulation  - Relieve pressure over bony prominences  - Avoid friction and shearing  - Provide appropriate hygiene as needed including keeping skin clean and dry  - Evaluate need for skin moisturizer/barrier cream  - Collaborate with interdisciplinary team   - Patient/family teaching  - Consider wound care consult   Outcome: Progressing     Problem: Neurological Deficit  Goal: Neurological status is stable or improving  Description: Interventions:  - Monitor and assess patient's level of consciousness, motor function, sensory function, and level of assistance needed for ADLs.   - Monitor and report changes from baseline. Collaborate with interdisciplinary team to initiate plan and implement interventions as ordered.   - Provide and maintain a safe environment.  - Consider seizure precautions.  - Consider fall precautions.  - Consider aspiration precautions.  - Consider bleeding precautions.  Outcome: Progressing     Problem: Activity Intolerance/Impaired Mobility  Goal: Mobility/activity is maintained at optimum level for patient  Description: Interventions:  - Assess and monitor patient  barriers to mobility and need for assistive/adaptive devices.  - Assess patient's emotional response to limitations.  - Collaborate with interdisciplinary team and initiate plans and interventions as ordered.  - Encourage independent activity per ability.  - Maintain proper body alignment.  - Perform active/passive rom as tolerated/ordered.  - Plan activities to conserve energy.  - Turn patient as appropriate  Outcome: Progressing     Problem:  Communication Impairment  Goal: Ability to express needs and understand communication  Description: Assess patient's communication skills and ability to understand information.  Patient will demonstrate use of effective communication techniques, alternative methods of communication and understanding even if not able to speak.     - Encourage communication and provide alternate methods of communication as needed.  - Collaborate with case management/ for discharge needs.  - Include patient/family/caregiver in decisions related to communication.  Outcome: Progressing     Problem: Potential for Aspiration  Goal: Non-ventilated patient's risk of aspiration is minimized  Description: Assess and monitor vital signs, respiratory status, and labs (WBC).  Monitor for signs of aspiration (tachypnea, cough, rales, wheezing, cyanosis, fever).    - Assess and monitor patient's ability to swallow.  - Place patient up in chair to eat if possible.  - HOB up at 90 degrees to eat if unable to get patient up into chair.  - Supervise patient during oral intake.   - Instruct patient/ family to take small bites.  - Instruct patient/ family to take small single sips when taking liquids.  - Follow patient-specific strategies generated by speech pathologist.  Outcome: Progressing     Problem: Nutrition  Goal: Nutrition/Hydration status is improving  Description: Monitor and assess patient's nutrition/hydration status for malnutrition (ex- brittle hair, bruises, dry skin, pale skin and conjunctiva, muscle wasting, smooth red tongue, and disorientation). Collaborate with interdisciplinary team and initiate plan and interventions as ordered.  Monitor patient's weight and dietary intake as ordered or per policy. Utilize nutrition screening tool and intervene per policy. Determine patient's food preferences and provide high-protein, high-caloric foods as appropriate.   - Monitor nutrition parameters, recommending adjustments to  enteral nutrition orders at indicated.   - Assist patient with eating.  - Allow adequate time for meals.  - Encourage patient to take dietary supplement as ordered.  - Collaborate with interdisciplinary team.   - Include patient/family/caregiver in decisions related to nutrition.  Outcome: Progressing     Problem: PAIN - ADULT  Goal: Verbalizes/displays adequate comfort level or baseline comfort level  Description: Interventions:  - Encourage patient to monitor pain and request assistance  - Assess pain using appropriate pain scale  - Administer analgesics based on type and severity of pain and evaluate response  - Implement non-pharmacological measures as appropriate and evaluate response  - Consider cultural and social influences on pain and pain management  - Notify physician/advanced practitioner if interventions unsuccessful or patient reports new pain  Outcome: Progressing     Problem: INFECTION - ADULT  Goal: Absence or prevention of progression during hospitalization  Description: INTERVENTIONS:  - Assess and monitor for signs and symptoms of infection  - Monitor lab/diagnostic results  - Monitor all insertion sites, i.e. indwelling lines, tubes, and drains  - Monitor endotracheal if appropriate and nasal secretions for changes in amount and color  - [place] appropriate cooling/warming therapies per order  - Administer medications as ordered  - Instruct and encourage patient and family to use good hand hygiene technique  - Identify and instruct in appropriate isolation precautions for identified infection/condition  Outcome: Progressing     Problem: SAFETY ADULT  Goal: Patient will remain free of falls  Description: INTERVENTIONS:  - Educate patient/family on patient safety including physical limitations  - Instruct patient to call for assistance with activity   - Consult OT/PT to assist with strengthening/mobility   - Keep Call bell within reach  - Keep bed low and locked with side rails adjusted as  appropriate  - Keep care items and personal belongings within reach  - Initiate and maintain comfort rounds  - Make Fall Risk Sign visible to staff  - Offer Toileting every 2 Hours, in advance of need  - Apply yellow socks and bracelet for high fall risk patients  - Consider moving patient to room near nurses station  Outcome: Progressing  Goal: Maintain or return to baseline ADL function  Description: INTERVENTIONS:  -  Assess patient's ability to carry out ADLs; assess patient's baseline for ADL function and identify physical deficits which impact ability to perform ADLs (bathing, care of mouth/teeth, toileting, grooming, dressing, etc.)  - Assess/evaluate cause of self-care deficits   - Assess range of motion  - Assess patient's mobility; develop plan if impaired  - Assess patient's need for assistive devices and provide as appropriate  - Encourage maximum independence but intervene and supervise when necessary  - Involve family in performance of ADLs  - Assess for home care needs following discharge   - Consider OT consult to assist with ADL evaluation and planning for discharge  - Provide patient education as appropriate  Outcome: Progressing  Goal: Maintains/Returns to pre admission functional level  Description: INTERVENTIONS:  - Perform AM-PAC 6 Click Basic Mobility/ Daily Activity assessment daily.  - Set and communicate daily mobility goal to care team and patient/family/caregiver.   - Collaborate with rehabilitation services on mobility goals if consulted  - Reposition patient every 2 hours.  - Out of bed for toileting  - Record patient progress and toleration of activity level   Outcome: Progressing     Problem: DISCHARGE PLANNING  Goal: Discharge to home or other facility with appropriate resources  Description: INTERVENTIONS:  - Identify barriers to discharge w/patient and caregiver  - Arrange for needed discharge resources and transportation as appropriate  - Identify discharge learning needs (meds,  wound care, etc.)  - Arrange for interpretive services to assist at discharge as needed  - Refer to Case Management Department for coordinating discharge planning if the patient needs post-hospital services based on physician/advanced practitioner order or complex needs related to functional status, cognitive ability, or social support system  Outcome: Progressing     Problem: Knowledge Deficit  Goal: Patient/family/caregiver demonstrates understanding of disease process, treatment plan, medications, and discharge instructions  Description: Complete learning assessment and assess knowledge base.  Interventions:  - Provide teaching at level of understanding  - Provide teaching via preferred learning methods  Outcome: Progressing     Problem: Nutrition/Hydration-ADULT  Goal: Nutrient/Hydration intake appropriate for improving, restoring or maintaining nutritional needs  Description: Monitor and assess patient's nutrition/hydration status for malnutrition. Collaborate with interdisciplinary team and initiate plan and interventions as ordered.  Monitor patient's weight and dietary intake as ordered or per policy. Utilize nutrition screening tool and intervene as necessary. Determine patient's food preferences and provide high-protein, high-caloric foods as appropriate.     INTERVENTIONS:  - Monitor oral intake, urinary output, labs, and treatment plans  - Assess nutrition and hydration status and recommend course of action  - Evaluate amount of meals eaten  - Assist patient with eating if necessary   - Allow adequate time for meals  - Recommend/ encourage appropriate diets, oral nutritional supplements, and vitamin/mineral supplements  - Order, calculate, and assess calorie counts as needed  - Recommend, monitor, and adjust tube feeding based on assessed needs  - Assess need for intravenous fluids  - Provide nutrition/hydration education as appropriate  - Include patient/family/caregiver in decisions related to  nutrition  Outcome: Progressing     Problem: RESPIRATORY - ADULT  Goal: Achieves optimal ventilation and oxygenation  Description: INTERVENTIONS:  - Assess for changes in respiratory status  - Assess for changes in mentation and behavior  - Position to facilitate oxygenation and minimize respiratory effort  - Oxygen administered by appropriate delivery if ordered  - Initiate smoking cessation education as indicated  - Encourage broncho-pulmonary hygiene including cough, deep breathe, Incentive Spirometry  - Assess the need for suctioning and aspirate as needed  - Assess and instruct to report SOB or any respiratory difficulty  - Respiratory Therapy support as indicated  Outcome: Progressing     Problem: SKIN/TISSUE INTEGRITY - ADULT  Goal: Skin Integrity remains intact(Skin Breakdown Prevention)  Description: Assess:  -Inspect skin when repositioning, toileting, and assisting with ADLS  -Assess extremities for adequate circulation and sensation     Bed Management:  -Have minimal linens on bed & keep smooth, unwrinkled  -Change linens as needed when moist or perspiring    Activity:  -Encourage activity and walks on unit  -Encourage or provide ROM exercises   -Turn and reposition patient every 2 Hours  -Use appropriate equipment to lift or move patient in bed    Skin Care:  -Avoid use of baby powder, tape, friction and shearing, hot water or constrictive clothing  -Do not massage red bony areas  Outcome: Progressing  Goal: Incision(s), wounds(s) or drain site(s) healing without S/S of infection  Description: INTERVENTIONS  - Assess and document dressing, incision, wound bed, drain sites and surrounding tissue  - Provide patient and family education  Outcome: Progressing     Problem: Potential for Falls  Goal: Patient will remain free of falls  Description: INTERVENTIONS:  - Educate patient/family on patient safety including physical limitations  - Instruct patient to call for assistance with activity   - Consult OT/PT  to assist with strengthening/mobility   - Keep Call bell within reach  - Keep bed low and locked with side rails adjusted as appropriate  - Keep care items and personal belongings within reach  - Initiate and maintain comfort rounds  - Make Fall Risk Sign visible to staff  - Offer Toileting every 2 Hours, in advance of need  - Apply yellow socks and bracelet for high fall risk patients  - Consider moving patient to room near nurses station  Outcome: Progressing     Problem: SAFETY,RESTRAINT: NV/NON-SELF DESTRUCTIVE BEHAVIOR  Goal: Remains free of harm/injury (restraint for non violent/non self-detsructive behavior)  Description: INTERVENTIONS:  - Instruct patient/family regarding restraint use   - Assess and monitor physiologic and psychological status   - Provide interventions and comfort measures to meet assessed patient needs   - Identify and implement measures to help patient regain control  - Assess readiness for release of restraint   Outcome: Progressing  Goal: Returns to optimal restraint-free functioning  Description: INTERVENTIONS:  - Assess the patient's behavior and symptoms that indicate continued need for restraint  - Identify and implement measures to help patient regain control  - Assess readiness for release of restraint   Outcome: Progressing

## 2024-07-25 NOTE — ASSESSMENT & PLAN NOTE
Patient with NG tube with tube feeding for nutrition.   Plan to see how he does in future with swallow function  Would consider PEG placement    Code Status: Full Code - Level 1   Decisional apparatus:  Patient is not competent on my exam today.  If competence is lost, patient's substitute decision maker would default to adult children by [place] Act 169.   Advance Directive / Living Will / POLST:  none on file

## 2024-07-25 NOTE — PLAN OF CARE
PT meeting estimated pro, vinny needs via current TF regimen. May need to consider increasing water flushes if Na continues to increase. Please obtain current weight and height.   Problem: Nutrition/Hydration-ADULT  Goal: Nutrient/Hydration intake appropriate for improving, restoring or maintaining nutritional needs  Description: Monitor and assess patient's nutrition/hydration status for malnutrition. Collaborate with interdisciplinary team and initiate plan and interventions as ordered.  Monitor patient's weight and dietary intake as ordered or per policy. Utilize nutrition screening tool and intervene as necessary. Determine patient's food preferences and provide high-protein, high-caloric foods as appropriate.     INTERVENTIONS:  - Monitor oral intake, urinary output, labs, and treatment plans  - Assess nutrition and hydration status and recommend course of action  - Evaluate amount of meals eaten  - Assist patient with eating if necessary   - Allow adequate time for meals  - Recommend/ encourage appropriate diets, oral nutritional supplements, and vitamin/mineral supplements  - Order, calculate, and assess calorie counts as needed  - Recommend, monitor, and adjust tube feeding based on assessed needs  - Assess need for intravenous fluids  - Provide nutrition/hydration education as appropriate  - Include patient/family/caregiver in decisions related to nutrition  Outcome: Progressing

## 2024-07-25 NOTE — SPEECH THERAPY NOTE
"Speech Language/Pathology    Speech/Language Pathology Progress Note    Patient Name: Federico Bowen  Today's Date: 7/25/2024     Problem List  Principal Problem:    Subarachnoid hemorrhage (HCC)  Active Problems:    HTN (hypertension)    Hyperlipidemia    Seizures (HCC)    Moderate protein-calorie malnutrition (HCC)    Fever    Severe protein-calorie malnutrition (HCC)    Goals of care, counseling/discussion    Palliative care encounter       Past Medical History  No past medical history on file.     Past Surgical History  Past Surgical History:   Procedure Laterality Date    IR CEREBRAL ANGIOGRAPHY / INTERVENTION  7/13/2024         Subjective:  Patient is awake and alert, but restless in bed. Family is at bedside.    Objective:  The patient is seen for dysphagia therapy. He continues with NGT for nutrition. The patient is assessed with 2 tsp nectar thick liquids. Oral closure for tsp is reduced, with bolus \"scraped\" into oral cavity. Transfer appears timely. Laryngeal rise is palpated and appears adequate. The patient does not present with any overt s/s aspiration. Patient immediately shifts out of ideal position and is c/o back discomfort. PO trials stopped at this time as patient refused better positioning or more trials. Family re-educated on results of VBS and reason for good position with trials.     Assessment:  Patient tolerated 2 tsp nectar thick liquids well.     Plan/Recommendations:  Continue NPO with NGT for nutrition. Continue ST for PO trials at bedside.      "

## 2024-07-25 NOTE — PROGRESS NOTES
[facility]  Progress Note  Name: Federico Bowen I  MRN: [id number]  Unit/Bed#: ICU 07 I Date of Admission: 7/12/2024   Date of Service: 7/25/2024 I Hospital Day: 13    Assessment & Plan   Fever  Assessment & Plan  CSF sent 7/22  BC NTD x 48 hours  WBC 21.87--> 18.20  CSF  Cx no growth  RBC 3,275  WBC 14  Glucose 75  Tmax 102.9    Seizures (HCC)  Assessment & Plan  2 witnessed seizures on presentation at [facility]  Neurology following, input appreciated  Patient currently on Keppra 750 mg BID per neurology  vEEG 7/14 no seizure activity    * Subarachnoid hemorrhage (HCC)  Assessment & Plan  aSAH s/p R PCOM aneurysm rupture  BD 13, HH 5, MF 4  PPD 12 right PCOM coil embolization (Dr. Mcneal 7/13)  PPD 12 R frontal EVD placement (Dr. Goldberg, 7/13)  P/w headache and right leg pain, 2 witnessed seizures by EMS was ultimately intubated.  Upon arrival to the ED patient was posturing.  Imaging revealed a large amount of SAH and a right PCOM aneurysm.  Per chart review he was previously on aspirin reversed with DDAVP.    Imaging:  CTA head w/wo 7/23/2024: Improving subarachnoid and intraventricular hemorrhage. Unchanged mild hydrocephalus. EVD in stable position. Unchanged anterior right temporal edema, likely evolving infarct. CT Angiography: No evidence of residual or recurrent right P-comm aneurysm. No acute vascular pathology in the head or neck.     Plan:  Continue frequent neurological checks  Repeat CTA/CT head w/wo STAT if GCS declines more than 2 points in 1 hour  Continue SAH pathway/spasm watch:  Daily TCD's: pending  Continue Nimodipine 30 mg Q2hrs  Continue Keppra 750 mg BID per neurology  Normonatremia (146 today)  Euvolemia (+801/24 hr)   Hemoglobin >8 (9.6 today)  EVD placed on 7/13/2024  Continue EVD at 10 mmhg -clamp trial today.   Monitor  and ICP  ICPs -1-13, 2 in room   Output 4 ml/24hrs, draining easily when dropped below tragus  Maintain ICP goal <20  SBP <220,  "on Levo currently at 16 mcg  Continue aspirin 81 mg daily  Heparin drip at 12 units/kg/hr  Last PTT 38 am of 7/25  Medical management and pain control per primary team  Ongoing fevers, improving with tmax 102.9  WBC 18.20, down from 21  Started bromocriptine  Sputum 3+ gram + cocci in chains and clusters  Blood cx NTD x 48 hours  CXR with RUL consolidation - on Ancef 2 g q8h through   DVT PPX: SCDs and heparin drip    Neurosurgery will continue to follow closely, call with any further questions or concerns.         Subjective/Objective   Chief Complaint: \"Where's my phone?\"    Subjective: Awake, more interactive and talkative today.     Objective: Resting in bed. NAD. Alert and interactive. Able to follow commands in all four extremities. Non labored breathing.     I/O         07/23 0701 07/24 0700 07/24 0701 07/25 0700 07/25 0701 07/26 0700    I.V. (mL/kg) 1228.3 (25.1) 1342.5 (27.4) 162 (3.3)    NG/ 1130 120    IV Piggyback 1050 150 50    Feedings 1417 1170 130    Total Intake(mL/kg) 4565.3 (93.2) 3792.5 (77.4) 462 (9.4)    Urine (mL/kg/hr) 2025 (1.7) 2987 (2.5) 300 (2.3)    Emesis/NG output 0 0 0    Drains 0 4 0    Stool 0 0     Total Output 2025 2991 300    Net +2540.3 +801.5 +162           Unmeasured Urine Occurrence  1 x     Unmeasured Stool Occurrence 3 x 3 x             Invasive Devices       Central Venous Catheter Line  Duration             CVC Central Lines 07/12/24 Triple 16cm Right Internal jugular 12 days              Peripheral Intravenous Line  Duration             Peripheral IV 07/23/24 Left;Ventral (anterior) Forearm 1 day    Peripheral IV 07/23/24 Right;Ventral (anterior) Forearm 1 day              Drain  Duration             Ventriculostomy/Subdural Ventricular drainage catheter Occipital region 12 days    External Urinary Catheter Small 11 days    NG/OG/Enteral Tube 18 Fr Right nare 8 days                    Physical Exam:  Vitals: Blood pressure 139/74, pulse 100, temperature 97.8 °F " (36.6 °C), temperature source Oral, resp. rate (!) 34, weight 49 kg (108 lb 0.4 oz), SpO2 98%.,There is no height or weight on file to calculate BMI.    Hemodynamic Monitoring: MAP: Arterial Line MAP (mmHg): 104 mmHg, CPP: CPP Cuff-Calculated (mmHg): 89, CVP:  , ICP Mean: ICP Mean (mmHg): 2 mmHg    General appearance: ill appearing, cachectic cooperative and no distress  Head: R frontal EVD intact   Eyes: EOMI, PERRL  Lungs: non labored breathing, on 4L NC  Heart: regular heart rate  Neurologic:   Mental status: Alert, oriented to person and place, thought content appropriate. Interactive   Cranial nerves: grossly intact (Cranial nerves II-XII)  Sensory: normal to light touch  Motor: generalized weakness noted        Lab Results:  Results from last 7 days   Lab Units 07/25/24  0544 07/24/24  0509 07/23/24  0454 07/22/24  0541 07/21/24  0523 07/20/24  0543   WBC Thousand/uL 18.20* 21.87* 28.65* 22.76* 13.56* 11.81*   HEMOGLOBIN g/dL 9.6* 9.4* 10.9* 10.9* 11.2* 11.5*   HEMATOCRIT % 29.2* 27.9* 32.7* 32.6* 34.0* 34.2*   PLATELETS Thousands/uL 478* 416* 402* 332 337 308   SEGS PCT % 83*  --   --   --  82* 80*   MONO PCT % 5  --   --  5 6 7   EOS PCT % 2  --   --  0 1 1     Results from last 7 days   Lab Units 07/25/24  0544 07/24/24  0509 07/23/24  0454 07/22/24  0541 07/21/24  0523   SODIUM mmol/L 146 147 148*   < > 145   POTASSIUM mmol/L 3.4* 3.5 4.3   < > 4.0   CHLORIDE mmol/L 112* 112* 109*   < > 106   CO2 mmol/L 24 26 27   < > 28   BUN mg/dL 22 29* 32*   < > 25   CREATININE mg/dL 0.67 0.80 0.76   < > 0.79   CALCIUM mg/dL 8.8 8.9 9.7   < > 9.6   ALK PHOS U/L 115*  --  118*  --  109*   ALT U/L 15  --  16  --  17   AST U/L 21  --  21  --  16    < > = values in this interval not displayed.     Results from last 7 days   Lab Units 07/25/24  0544 07/24/24  0509 07/23/24  0454   MAGNESIUM mg/dL 2.1 2.2 2.4     Results from last 7 days   Lab Units 07/25/24  0544 07/24/24  0509 07/23/24  0454   PHOSPHORUS mg/dL 2.7 2.3 3.1  "    Results from last 7 days   Lab Units 07/25/24  0544 07/24/24  0509 07/23/24  0454   PTT seconds 38* 32 30     No results found for: \"TROPONINT\"  ABG:  Lab Results   Component Value Date    PHART 7.314 (L) 07/12/2024    BCS8SPZ 44.1 (H) 07/12/2024    PO2ART 138.2 (H) 07/12/2024    SBH5TVR 21.9 (L) 07/12/2024    BEART -4.2 07/12/2024    SOURCE Line, Arterial 07/12/2024       Imaging Studies: I have personally reviewed pertinent reports.   and I have personally reviewed pertinent films in PACS    CTA head and neck w wo contrast    Result Date: 7/16/2024  Impression: CT Brain: Redemonstrated subarachnoid hemorrhage overlying the right greater than left convexities and concentrated within the right sylvian cistern and fissure. . Subacute infarct involving the right anterior temporal region and medial occipital region.. 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 Workstation performed: [id number]     IR cerebral angiography / intervention    Result Date: 7/15/2024  Impression: Successful balloon assisted coil embolization of a 4.5 mm right P-comm aneurysm. Workstation performed: [id number]     CT head wo contrast    Result Date: 7/14/2024  Impression: No significant interval change in extensive bilateral subarachnoid hemorrhage and intraventricular hemorrhage. Workstation performed: [id number]     CT head wo contrast    Result Date: 7/13/2024  Impression: Interval placement of right-sided shunt catheter. Stable ventricular size with slight interval increase in intraventricular hemorrhage layering in the occipital horns bilaterally. Diffuse subarachnoid hemorrhage again noted. Workstation performed: [id number]     XR chest portable ICU    Result Date: 7/12/2024  Impression: No acute cardiopulmonary disease. ET tube 6 cm above the landon. Workstation performed: [id number]     CTA stroke alert " (head/neck)    Result Date: 7/12/2024  Impression: 0.3 cm aneurysm in expected origin of right posterior communicating artery. Possible tiny aneurysm in left anterior communicating artery region. These could be potential sources of diffuse subarachnoid hemorrhage. Recommend neurovascular surgery consultation for further evaluation. Mild narrowing of bilateral MCA M1 and bilateral M2 segmental branch vessels, likely due to vasospasm. Patent stent in left proximal subclavian artery. Endotracheal tube in trachea. Additional chronic/incidental findings as detailed above. Findings were directly discussed with Emmanuel Lion at approximately 4:34 p.m. on 7/12/2024. Workstation performed: [id number]     CT stroke alert brain    Result Date: 7/12/2024  Impression: Acute diffuse thickened large-volume subarachnoid hemorrhage throughout the bilateral parafalcine regions, bilateral cerebral sulci (right worse than left), basilar cisterns, prepontine cistern, premedullary cistern, and visualized upper cervical spinal canal. Acute small volume intraventricular hemorrhage with mild hydrocephalus. No acute infarction. Findings were directly discussed with Emmanuel Lion at approximately 4:34 p.m. on 7/12/2024. Workstation performed: [id number]       EKG, Pathology, and Other Studies: I have personally reviewed pertinent reports.      VTE Pharmacologic Prophylaxis: Heparin gtt.     VTE Mechanical Prophylaxis: SCD's

## 2024-07-25 NOTE — PLAN OF CARE
Problem: Prexisting or High Potential for Compromised Skin Integrity  Goal: Skin integrity is maintained or improved  Description: INTERVENTIONS:  - Identify patients at risk for skin breakdown  - Assess and monitor skin integrity  - Assess and monitor nutrition and hydration status  - Monitor labs   - Assess for incontinence   - Turn and reposition patient  - Assist with mobility/ambulation  - Relieve pressure over bony prominences  - Avoid friction and shearing  - Provide appropriate hygiene as needed including keeping skin clean and dry  - Evaluate need for skin moisturizer/barrier cream  - Collaborate with interdisciplinary team   - Patient/family teaching  - Consider wound care consult   Outcome: Progressing     Problem: Neurological Deficit  Goal: Neurological status is stable or improving  Description: Interventions:  - Monitor and assess patient's level of consciousness, motor function, sensory function, and level of assistance needed for ADLs.   - Monitor and report changes from baseline. Collaborate with interdisciplinary team to initiate plan and implement interventions as ordered.   - Provide and maintain a safe environment.  - Consider seizure precautions.  - Consider fall precautions.  - Consider aspiration precautions.  - Consider bleeding precautions.  Outcome: Progressing     Problem: Activity Intolerance/Impaired Mobility  Goal: Mobility/activity is maintained at optimum level for patient  Description: Interventions:  - Assess and monitor patient  barriers to mobility and need for assistive/adaptive devices.  - Assess patient's emotional response to limitations.  - Collaborate with interdisciplinary team and initiate plans and interventions as ordered.  - Encourage independent activity per ability.  - Maintain proper body alignment.  - Perform active/passive rom as tolerated/ordered.  - Plan activities to conserve energy.  - Turn patient as appropriate  Outcome: Progressing     Problem:  Communication Impairment  Goal: Ability to express needs and understand communication  Description: Assess patient's communication skills and ability to understand information.  Patient will demonstrate use of effective communication techniques, alternative methods of communication and understanding even if not able to speak.     - Encourage communication and provide alternate methods of communication as needed.  - Collaborate with case management/ for discharge needs.  - Include patient/family/caregiver in decisions related to communication.  Outcome: Progressing     Problem: Potential for Aspiration  Goal: Non-ventilated patient's risk of aspiration is minimized  Description: Assess and monitor vital signs, respiratory status, and labs (WBC).  Monitor for signs of aspiration (tachypnea, cough, rales, wheezing, cyanosis, fever).    - Assess and monitor patient's ability to swallow.  - Place patient up in chair to eat if possible.  - HOB up at 90 degrees to eat if unable to get patient up into chair.  - Supervise patient during oral intake.   - Instruct patient/ family to take small bites.  - Instruct patient/ family to take small single sips when taking liquids.  - Follow patient-specific strategies generated by speech pathologist.  Outcome: Progressing     Problem: Nutrition  Goal: Nutrition/Hydration status is improving  Description: Monitor and assess patient's nutrition/hydration status for malnutrition (ex- brittle hair, bruises, dry skin, pale skin and conjunctiva, muscle wasting, smooth red tongue, and disorientation). Collaborate with interdisciplinary team and initiate plan and interventions as ordered.  Monitor patient's weight and dietary intake as ordered or per policy. Utilize nutrition screening tool and intervene per policy. Determine patient's food preferences and provide high-protein, high-caloric foods as appropriate.   - Monitor nutrition parameters, recommending adjustments to  enteral nutrition orders at indicated.   - Assist patient with eating.  - Allow adequate time for meals.  - Encourage patient to take dietary supplement as ordered.  - Collaborate with interdisciplinary team.   - Include patient/family/caregiver in decisions related to nutrition.  Outcome: Progressing     Problem: PAIN - ADULT  Goal: Verbalizes/displays adequate comfort level or baseline comfort level  Description: Interventions:  - Encourage patient to monitor pain and request assistance  - Assess pain using appropriate pain scale  - Administer analgesics based on type and severity of pain and evaluate response  - Implement non-pharmacological measures as appropriate and evaluate response  - Consider cultural and social influences on pain and pain management  - Notify physician/advanced practitioner if interventions unsuccessful or patient reports new pain  Outcome: Progressing     Problem: INFECTION - ADULT  Goal: Absence or prevention of progression during hospitalization  Description: INTERVENTIONS:  - Assess and monitor for signs and symptoms of infection  - Monitor lab/diagnostic results  - Monitor all insertion sites, i.e. indwelling lines, tubes, and drains  - Monitor endotracheal if appropriate and nasal secretions for changes in amount and color  - [place] appropriate cooling/warming therapies per order  - Administer medications as ordered  - Instruct and encourage patient and family to use good hand hygiene technique  - Identify and instruct in appropriate isolation precautions for identified infection/condition  Outcome: Progressing  Goal: Absence of fever/infection during neutropenic period  Description: INTERVENTIONS:  - Monitor WBC    Outcome: Progressing     Problem: SAFETY ADULT  Goal: Patient will remain free of falls  Description: INTERVENTIONS:  - Educate patient/family on patient safety including physical limitations  - Instruct patient to call for assistance with activity   - Consult OT/PT to  assist with strengthening/mobility   - Keep Call bell within reach  - Keep bed low and locked with side rails adjusted as appropriate  - Keep care items and personal belongings within reach  - Initiate and maintain comfort rounds  - Make Fall Risk Sign visible to staff  - Offer Toileting every 2 Hours, in advance of need  - Initiate/Maintain bed alarm  - Obtain necessary fall risk management equipment: bed alarm  - Apply yellow socks and bracelet for high fall risk patients  - Consider moving patient to room near nurses station  Outcome: Progressing  Goal: Maintain or return to baseline ADL function  Description: INTERVENTIONS:  -  Assess patient's ability to carry out ADLs; assess patient's baseline for ADL function and identify physical deficits which impact ability to perform ADLs (bathing, care of mouth/teeth, toileting, grooming, dressing, etc.)  - Assess/evaluate cause of self-care deficits   - Assess range of motion  - Assess patient's mobility; develop plan if impaired  - Assess patient's need for assistive devices and provide as appropriate  - Encourage maximum independence but intervene and supervise when necessary  - Involve family in performance of ADLs  - Assess for home care needs following discharge   - Consider OT consult to assist with ADL evaluation and planning for discharge  - Provide patient education as appropriate  Outcome: Progressing  Goal: Maintains/Returns to pre admission functional level  Description: INTERVENTIONS:  - Perform AM-PAC 6 Click Basic Mobility/ Daily Activity assessment daily.  - Set and communicate daily mobility goal to care team and patient/family/caregiver.   - Collaborate with rehabilitation services on mobility goals if consulted  - Perform Range of Motion 3 times a day.  - Reposition patient every 2 hours.  - Dangle patient 2 times a day  - Stand patient 2 times a day  - Ambulate patient 2 times a day  - Out of bed to chair 2 times a day   - Out of bed for meals 3  times a day  - Out of bed for toileting  - Record patient progress and toleration of activity level   Outcome: Progressing     Problem: DISCHARGE PLANNING  Goal: Discharge to home or other facility with appropriate resources  Description: INTERVENTIONS:  - Identify barriers to discharge w/patient and caregiver  - Arrange for needed discharge resources and transportation as appropriate  - Identify discharge learning needs (meds, wound care, etc.)  - Arrange for interpretive services to assist at discharge as needed  - Refer to Case Management Department for coordinating discharge planning if the patient needs post-hospital services based on physician/advanced practitioner order or complex needs related to functional status, cognitive ability, or social support system  Outcome: Progressing     Problem: Knowledge Deficit  Goal: Patient/family/caregiver demonstrates understanding of disease process, treatment plan, medications, and discharge instructions  Description: Complete learning assessment and assess knowledge base.  Interventions:  - Provide teaching at level of understanding  - Provide teaching via preferred learning methods  Outcome: Progressing     Problem: Nutrition/Hydration-ADULT  Goal: Nutrient/Hydration intake appropriate for improving, restoring or maintaining nutritional needs  Description: Monitor and assess patient's nutrition/hydration status for malnutrition. Collaborate with interdisciplinary team and initiate plan and interventions as ordered.  Monitor patient's weight and dietary intake as ordered or per policy. Utilize nutrition screening tool and intervene as necessary. Determine patient's food preferences and provide high-protein, high-caloric foods as appropriate.     INTERVENTIONS:  - Monitor oral intake, urinary output, labs, and treatment plans  - Assess nutrition and hydration status and recommend course of action  - Evaluate amount of meals eaten  - Assist patient with eating if  necessary   - Allow adequate time for meals  - Recommend/ encourage appropriate diets, oral nutritional supplements, and vitamin/mineral supplements  - Order, calculate, and assess calorie counts as needed  - Recommend, monitor, and adjust tube feeding based on assessed needs  - Assess need for intravenous fluids  - Provide nutrition/hydration education as appropriate  - Include patient/family/caregiver in decisions related to nutrition  Outcome: Progressing     Problem: NEUROSENSORY - ADULT  Goal: Achieves stable or improved neurological status  Description: INTERVENTIONS  - Monitor and report changes in neurological status  - Monitor vital signs such as temperature, blood pressure, glucose, and any other labs ordered   - Initiate measures to prevent increased intracranial pressure  - Monitor for seizure activity and implement precautions if appropriate      Outcome: Progressing  Goal: Remains free of injury related to seizures activity  Description: INTERVENTIONS  - Maintain airway, patient safety  and administer oxygen as ordered  - Monitor patient for seizure activity, document and report duration and description of seizure to physician/advanced practitioner  - If seizure occurs,  ensure patient safety during seizure  - Reorient patient post seizure  - Seizure pads on all 4 side rails  - Instruct patient/family to notify RN of any seizure activity including if an aura is experienced  - Instruct patient/family to call for assistance with activity based on nursing assessment  - Administer anti-seizure medications if ordered    Outcome: Progressing  Goal: Achieves maximal functionality and self care  Description: INTERVENTIONS  - Monitor swallowing and airway patency with patient fatigue and changes in neurological status  - Encourage and assist patient to increase activity and self care.   - Encourage visually impaired, hearing impaired and aphasic patients to use assistive/communication devices  Outcome:  Progressing     Problem: CARDIOVASCULAR - ADULT  Goal: Maintains optimal cardiac output and hemodynamic stability  Description: INTERVENTIONS:  - Monitor I/O, vital signs and rhythm  - Monitor for S/S and trends of decreased cardiac output  - Administer and titrate ordered vasoactive medications to optimize hemodynamic stability  - Assess quality of pulses, skin color and temperature  - Assess for signs of decreased coronary artery perfusion  - Instruct patient to report change in severity of symptoms  Outcome: Progressing  Goal: Absence of cardiac dysrhythmias or at baseline rhythm  Description: INTERVENTIONS:  - Continuous cardiac monitoring, vital signs, obtain 12 lead EKG if ordered  - Administer antiarrhythmic and heart rate control medications as ordered  - Monitor electrolytes and administer replacement therapy as ordered  Outcome: Progressing     Problem: RESPIRATORY - ADULT  Goal: Achieves optimal ventilation and oxygenation  Description: INTERVENTIONS:  - Assess for changes in respiratory status  - Assess for changes in mentation and behavior  - Position to facilitate oxygenation and minimize respiratory effort  - Oxygen administered by appropriate delivery if ordered  - Initiate smoking cessation education as indicated  - Encourage broncho-pulmonary hygiene including cough, deep breathe, Incentive Spirometry  - Assess the need for suctioning and aspirate as needed  - Assess and instruct to report SOB or any respiratory difficulty  - Respiratory Therapy support as indicated  Outcome: Progressing     Problem: GASTROINTESTINAL - ADULT  Goal: Maintains or returns to baseline bowel function  Description: INTERVENTIONS:  - Assess bowel function  - Encourage oral fluids to ensure adequate hydration  - Administer IV fluids if ordered to ensure adequate hydration  - Administer ordered medications as needed  - Encourage mobilization and activity  - Consider nutritional services referral to assist patient with  adequate nutrition and appropriate food choices  Outcome: Progressing  Goal: Maintains adequate nutritional intake  Description: INTERVENTIONS:  - Monitor percentage of each meal consumed  - Identify factors contributing to decreased intake, treat as appropriate  - Assist with meals as needed  - Monitor I&O, weight, and lab values if indicated  - Obtain nutrition services referral as needed  Outcome: Progressing     Problem: GENITOURINARY - ADULT  Goal: Maintains or returns to baseline urinary function  Description: INTERVENTIONS:  - Assess urinary function  - Encourage oral fluids to ensure adequate hydration if ordered  - Administer IV fluids as ordered to ensure adequate hydration  - Administer ordered medications as needed  - Offer frequent toileting  - Follow urinary retention protocol if ordered  Outcome: Progressing  Goal: Absence of urinary retention  Description: INTERVENTIONS:  - Assess patient’s ability to void and empty bladder  - Monitor I/O  - Bladder scan as needed  - Discuss with physician/AP medications to alleviate retention as needed  - Discuss catheterization for long term situations as appropriate  Outcome: Progressing     Problem: METABOLIC, FLUID AND ELECTROLYTES - ADULT  Goal: Electrolytes maintained within normal limits  Description: INTERVENTIONS:  - Monitor labs and assess patient for signs and symptoms of electrolyte imbalances  - Administer electrolyte replacement as ordered  - Monitor response to electrolyte replacements, including repeat lab results as appropriate  - Instruct patient on fluid and nutrition as appropriate  Outcome: Progressing  Goal: Fluid balance maintained  Description: INTERVENTIONS:  - Monitor labs   - Monitor I/O and WT  - Instruct patient on fluid and nutrition as appropriate  - Assess for signs & symptoms of volume excess or deficit  Outcome: Progressing  Goal: Glucose maintained within target range  Description: INTERVENTIONS:  - Monitor Blood Glucose as  ordered  - Assess for signs and symptoms of hyperglycemia and hypoglycemia  - Administer ordered medications to maintain glucose within target range  - Assess nutritional intake and initiate nutrition service referral as needed  Outcome: Progressing     Problem: SKIN/TISSUE INTEGRITY - ADULT  Goal: Skin Integrity remains intact(Skin Breakdown Prevention)  Description: Assess:  -Perform [place] assessment every shift  -Clean and moisturize skin every shift  -Inspect skin when repositioning, toileting, and assisting with ADLS  -Assess under medical devices such as bracelets every shift  -Assess extremities for adequate circulation and sensation     Bed Management:  -Have minimal linens on bed & keep smooth, unwrinkled  -Change linens as needed when moist or perspiring  -Avoid sitting or lying in one position for more than 2 hours while in bed  -Keep HOB at 30degrees     Toileting:  -Offer bedside commode  -Assess for incontinence every shift  -Use incontinent care products after each incontinent episode such as foaming skin cleanser    Activity:  -Mobilize patient 3 times a day  -Encourage activity and walks on unit  -Encourage or provide ROM exercises   -Turn and reposition patient every 2 Hours  -Use appropriate equipment to lift or move patient in bed  -Instruct/ Assist with weight shifting every 2 hours when out of bed in chair  -Consider limitation of chair time 4 hour intervals    Skin Care:  -Avoid use of baby powder, tape, friction and shearing, hot water or constrictive clothing  -Relieve pressure over bony prominences using allevyns  -Do not massage red bony areas      Outcome: Progressing  Goal: Incision(s), wounds(s) or drain site(s) healing without S/S of infection  Description: INTERVENTIONS  - Assess and document dressing, incision, wound bed, drain sites and surrounding tissue  - Provide patient and family education  - Perform skin care/dressing changes every shift  Outcome: Progressing     Problem:  HEMATOLOGIC - ADULT  Goal: Maintains hematologic stability  Description: INTERVENTIONS  - Assess for signs and symptoms of bleeding or hemorrhage  - Monitor labs  - Administer supportive blood products/factors as ordered and appropriate  Outcome: Progressing     Problem: MUSCULOSKELETAL - ADULT  Goal: Maintain or return mobility to safest level of function  Description: INTERVENTIONS:  - Assess patient's ability to carry out ADLs; assess patient's baseline for ADL function and identify physical deficits which impact ability to perform ADLs (bathing, care of mouth/teeth, toileting, grooming, dressing, etc.)  - Assess/evaluate cause of self-care deficits   - Assess range of motion  - Assess patient's mobility  - Assess patient's need for assistive devices and provide as appropriate  - Encourage maximum independence but intervene and supervise when necessary  - Involve family in performance of ADLs  - Assess for home care needs following discharge   - Consider OT consult to assist with ADL evaluation and planning for discharge  - Provide patient education as appropriate  Outcome: Progressing     Problem: COPING  Goal: Pt/Family able to verbalize concerns and demonstrate effective coping strategies  Description: INTERVENTIONS:  - Assist patient/family to identify coping skills, available support systems and cultural and spiritual values  - Provide emotional support, including active listening and acknowledgement of concerns of patient and caregivers  - Reduce environmental stimuli, as able  - Provide patient education  - Assess for spiritual pain/suffering and initiate spiritual care, including notification of Pastoral Care or halie based community as needed  - Assess effectiveness of coping strategies  Outcome: Progressing  Goal: Will report anxiety at manageable levels  Description: INTERVENTIONS:  - Administer medication as ordered  - Teach and encourage coping skills  - Provide emotional support  - Assess  patient/family for anxiety and ability to cope  Outcome: Progressing     Problem: Potential for Falls  Goal: Patient will remain free of falls  Description: INTERVENTIONS:  - Educate patient/family on patient safety including physical limitations  - Instruct patient to call for assistance with activity   - Consult OT/PT to assist with strengthening/mobility   - Keep Call bell within reach  - Keep bed low and locked with side rails adjusted as appropriate  - Keep care items and personal belongings within reach  - Initiate and maintain comfort rounds  - Make Fall Risk Sign visible to staff  - Offer Toileting every 2 Hours, in advance of need  - Initiate/Maintain bed alarm  - Obtain necessary fall risk management equipment: bed alarm  - Apply yellow socks and bracelet for high fall risk patients  - Consider moving patient to room near nurses station  Outcome: Progressing     Problem: SAFETY,RESTRAINT: NV/NON-SELF DESTRUCTIVE BEHAVIOR  Goal: Remains free of harm/injury (restraint for non violent/non self-detsructive behavior)  Description: INTERVENTIONS:  - Instruct patient/family regarding restraint use   - Assess and monitor physiologic and psychological status   - Provide interventions and comfort measures to meet assessed patient needs   - Identify and implement measures to help patient regain control  - Assess readiness for release of restraint   Outcome: Progressing  Goal: Returns to optimal restraint-free functioning  Description: INTERVENTIONS:  - Assess the patient's behavior and symptoms that indicate continued need for restraint  - Identify and implement measures to help patient regain control  - Assess readiness for release of restraint   Outcome: Progressing

## 2024-07-25 NOTE — PROGRESS NOTES
[facility]  Progress Note: Critical Care  Name: Federico Bowen 68 y.o. male I MRN: [id number]  Unit/Bed#: ICU 07 I Date of Admission: 7/12/2024   Date of Service: 7/25/2024 I Hospital Day: 13    Assessment & Plan   Neuro:   Diagnosis: SAH with IVH and mild hydro s/p coil embolization of R PCOM aneurysm  BD 15 HH5, MF 4  7/11 CTAH/N-0.3 cm aneurysm in expected origin of right posterior communicating artery. Possible tiny aneurysm in left anterior communicating artery region. Mild narrowing of bilateral MCA M1 and bilateral M2 segmental branch vessels, likely due to vasospasm.   7/11 CTH-Acute diffuse thickened large-volume SAH throughout the b/l parafalcine regions, b/l cerebral sulci (R worse than L), basilar cisterns, prepontine cistern, premedullary cistern, and visualized upper cervical spinal canal. Acute small volume IVH with mild hydrocephalus.   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   7/14 CTH -No significant interval change in extensive bilateral SAH and IVH.   7/14 vEEG no seizures  7/16 CTA/CTH: Expected postoperative appearance following embolization of right posterior communicating artery aneurysm . Redemonstrated subarachnoid hemorrhage overlying the right greater than left convexities and concentrated within the right sylvian cistern and fissure. . Subacute infarct involving the right anterior temporal region and medial occipital region.     Plan:   Nsx following  ASA 81mg   CTA H/N was stable and improving  EVD 10. Goal ICP <20   ICP 1-13  CPP 56-90  0 cc/24hrs  draining easily when dropped below tragus    - EVD clamped   - CTA 07/26 AM  -220  Keppra 750mg BID  Nimodipine x 21 days  Low dose IV Heparin protocol @ 10 units  Continue 12 units  PTT < 45  Daily PTT: 30 this AM  Daily TCD  No appreciable vasopasm 07/24  Pending 07/25  Euvolemia   I/Os: +801cc  Normonatremia, Na goal > 140  Na: 146  On Free Water  Flushes 60cc q4   Stat CTH with any change in GCS > 2 pts   GOC with family 07/25   Palliative consult placed  Continue provigil 100mg      Diagnosis: Seizure  2/2 above and presented w/ seizures   vEEG no seizures  Plan:   Neuro following   Keppra 750mg BID     - Diagnosis: Headache               Plan               Continue 600mg TID gabapentin   Lidoderm patch for neck   Prn fioricet  1 dose of oxycodone 10mg   15 mg of toradol 1 dose  D/c prn oxycodone and robaxin         - Diagnosis; Insomnia               Plan              D/c mirtazapine 15mg qhs in setting of daytime lethargy            CV:   Diagnosis: Hx CAD HTN/HLD  Had vascular stent placed > 1 year ago   Plan:    continue home metoprolol 50mg daily   Continue ASA 81mg  Holding Hold entresto 24-26 mg BID   Given allowing for increased -220  prn nitroglycerin of his angina; hold for now in setting of SAH        - Diagnosis: HLD              Plan               Cont lipitor 80m daily and zetia 10mg qhs     - Diagnosis: Hypotensive episodes              Likely in setting of infection               Plan               Currently on levophed and wean as able                           @ 16              Nimodipine changed to 30mg q2h              Prn IVF boluses               Continue antibx as per ID        Pulm:  Diagnosis: Hx COPD and  without AE  Plan:   IS  Xopenex TID  Imecldinum/albuterol inhaler   Respiratory protocol             - Dx: Aspiration Pnx               - got sputum culture, blood cultures, lactic acid               - concern for mucus plugging w/ noted leukocytosis  - trach aspirate growing: 3+gram positive cocci in chains and clusters   - CXR: Retrocardiac consolidation and lobulated right lung density, both of which are new               Plan   on empiric antibiotics expanded to cefepime and added vanc  Want 7 day course (day 4/7 antibx)              Continue w/ suctioning, Chest PT, nebulizers/respiratory protocol                Continue aggressive suctioning               Wean as able off oxygen as able         GI:   Diagnosis: Dysphagia  Plan:   VBS 07/23  Speech recommended:    thin liquids - consider continuing NGT for now for nutrition and meds   Aspiration precautions: upright posture, only feed when fully alert, and small sips   - Further GOC      - Diagnosis: GERD              Plan               Continue home omeprazole 40mg      BM: 07/25  Bowel regimen: hold bowel regimen               - had 3 bowel movements 07/24   - c.diff ordered   - added more banana flakes packets     :   Euvolemia   I/Os: +840 cc this AM   Continue to monitor           F/E/N:    F: none  E: K> 4.0,  maintain magnesium > 2.0, maintain phosphate > 3.5  N: TF Jevity 1.2 vinny 50cc/hr               - thiamine, folic acid, and multivitamin              - TF q4 normal fluid flushes 60cc q4 free water flushes     Heme/Onc:   Diagnosis: Anemia  Plan:   Unclear baseline no s/s bleeding monitor  Continue to monitor      Endo:   SSI      ID:   Diagnosis: Leukocytosis  and febrile   Seems to be intermittent   Infectious vs central  CNS cause  Got UA that was bland, CSF cx no growth, CXR showing signs of COPD  - trach aspirate growing: 3+gram positive cocci in chains and clusters    Plan   Infectious w/u   negative blood cx at 48 hours  MRSA negative  Switched to ancef 2000mg q8h based on sensitivities   Want 7 day course (day 4/7 antibx)  - WBC has gone down but still has been febrile  Blood cultures pending   bromocriptine 10mg TID  Tylenol 650mg prn  Neurosurg CSF cultures from EVD   CSF protein: 89  CSF   CSF RBC: 31,075  Repeat CSF  CSF culture negative  RBC: 3275  WBC 14  Glucose: 75           MSK/Skin:   PT/OT/PMR  Diagnosis: Penile lesion  Plan: note from outpt derm inflamed seborrheic keratosis        Line/Drains/Foleys: R IJ central line     Disposition: Critical care    ICU Core Measures     A: Assess, Prevent, and Manage Pain Has pain been assessed?  Yes  Need for changes to pain regimen? Yes   B: Both SAT/SAT  N/A   C: Choice of Sedation RASS Goal: 0 Alert and Calm  Need for changes to sedation or analgesia regimen? Yes   D: Delirium CAM-ICU: Negative   E: Early Mobility  Plan for early mobility? Yes   F: Family Engagement Plan for family engagement today? Yes       Antibiotic Review: Patient on appropriate coverage based on culture data.  and Awaiting culture results.     Review of Invasive Devices:      Central access plan: Patient has multiple central venous catheters.  Medications requiring central line Hemodynamic monitoring      Prophylaxis:  VTE VTE covered by:  heparin (porcine), Intravenous, 12 Units/kg/hr at 07/25/24 0400       Stress Ulcer  covered byomeprazole (PRILOSEC) suspension 2 mg/mL [[id number]], omeprazole (PriLOSEC) 40 MG capsule [[id number]] (Long-Term Med)        Significant 24hr Events     24hr events: patient weaned to 4L and levo and hep   Go 1 dose of 400mg motrin and c.diff ordered     Subjective     Review of Systems: See HPI for Review of Systems     Objective                            Vitals I/O      Most Recent Min/Max in 24hrs   Temp 98.2 °F (36.8 °C) Temp  Min: 97.2 °F (36.2 °C)  Max: 102.9 °F (39.4 °C)   Pulse 86 Pulse  Min: 78  Max: 114   Resp (!) 30 Resp  Min: 15  Max: 46   /59 BP  Min: 102/60  Max: 154/79   O2 Sat 100 % SpO2  Min: 92 %  Max: 100 %      Intake/Output Summary (Last 24 hours) at 7/25/2024 0727  Last data filed at 7/25/2024 0600  Gross per 24 hour   Intake 3792.48 ml   Output 2991 ml   Net 801.48 ml       Diet Enteral/Parenteral; Tube Feeding No Oral Diet; Jevity 1.2 Josh; Cyclic; 65; 22 hours; Banatrol Plus Banana Flakes - Two Packets; 60; Water; Every 4 hours    Invasive Monitoring   Arterial Line  [place] /72  No data recorded    mmHg  No data recorded           Physical Exam   Physical Exam  Vitals and nursing note reviewed.   HENT:      Nose:      Comments: NGT in place  Cardiovascular:       Rate and Rhythm: Normal rate.      Pulses: Normal pulses.   Abdominal:      Palpations: Abdomen is soft.   Constitutional:       Appearance: He is ill-appearing.   Pulmonary:      Effort: Pulmonary effort is normal.   Neurological:      Mental Status: He is alert and oriented to person, place, and time.      Comments:   Pupils reactive (R more dilated, L more pinpoint)      LUE weakness appreciated 4/5 in biceps, triceps, and deltoids                Diagnostic Studies      EKG: reviewed  Imaging:  I have personally reviewed pertinent reports.   and I have personally reviewed pertinent films in PACS     Medications:  Scheduled PRN   acetaminophen, 975 mg, Q8H  aspirin, 81 mg, Daily  atorvastatin, 80 mg, Daily  bromocriptine, 10 mg, TID  cefazolin, 2,000 mg, Q8H  chlorhexidine, 15 mL, Q12H MATTY  ezetimibe, 10 mg, QAM  folic acid, 400 mcg, Daily  gabapentin, 600 mg, TID  insulin lispro, 1-5 Units, Q6H MATTY  levETIRAcetam, 750 mg, Q12H MATTY  lidocaine, 1 patch, Daily  modafinil, 100 mg, Daily  niMODipine, 30 mg, Q2H  nystatin, 500,000 Units, 4x Daily  omeprazole (PRILOSEC) suspension 2 mg/mL, 20 mg, Daily  thiamine, 100 mg, Daily      albuterol, 2.5 mg, Q4H PRN  bisacodyl, 10 mg, Daily PRN  ondansetron, 4 mg, Q6H PRN  saliva substitute, 5 spray, 4x Daily PRN       Continuous    heparin (porcine), 12 Units/kg/hr (Order-Specific), Last Rate: 12 Units/kg/hr (07/25/24 0400)  norepinephrine, 1-30 mcg/min, Last Rate: 16 mcg/min (07/25/24 0602)         Labs:    CBC    Recent Labs     07/24/24  0509 07/25/24  0544   WBC 21.87* 18.20*   HGB 9.4* 9.6*   HCT 27.9* 29.2*   * 478*     BMP    Recent Labs     07/24/24  0509 07/25/24  0544   SODIUM 147 146   K 3.5 3.4*   * 112*   CO2 26 24   AGAP 9 10   BUN 29* 22   CREATININE 0.80 0.67   CALCIUM 8.9 8.8       Coags    Recent Labs     07/24/24  0509 07/25/24  0544   PTT 32 38*        Additional Electrolytes  Recent Labs     07/24/24  0509 07/25/24  0544   MG 2.2 2.1   PHOS  2.3 2.7   CAIONIZED 1.18 1.17          Blood Gas    No recent results  No recent results LFTs  Recent Labs     07/25/24  0544   ALT 15   AST 21   ALKPHOS 115*   ALB 3.2*   TBILI 0.42       Infectious  No recent results  Glucose  Recent Labs     07/24/24  0509 07/25/24  0544   GLUC 183* 150*             TCD 07/25   Unilateral    Mean Velocity  Pulsatility Index    Prox Basilar          32.00               1.26       Right            Mean Velocity  Pulsatility Index    Mid. ICA                 32.00               1.67    Mid Cerebral             36.00               2.02    Dist. Vertebral          27.00                       Ant. Cerebral            27.00               1.69    Post. Cerebral           16.00               1.72       Left             Mean Velocity  Pulsatility Index    Mid. ICA                 34.00               1.85    Mid Cerebral             44.00               2.15    Dist. Vertebral          24.00                       Ant. Cerebral            21.00               1.80    Post. Cerebral           19.00               2.16          TCD 07/24  Unilateral    Mean Velocity  Pulsatility Index    Prox Basilar          28.00               1.97       Right            Mean Velocity  Pulsatility Index    Mid. ICA                 27.00               1.81    Mid Cerebral             30.00               1.73    Dist. Vertebral          42.00                       Ant. Cerebral            33.00               1.79    Post. Cerebral           16.00               1.72       Left             Mean Velocity  Pulsatility Index    Mid. ICA                 29.00               2.15    Mid Cerebral             42.00               1.90    Dist. Vertebral          32.00                       Ant. Cerebral            22.00               2.33    Post. Cerebral           19.00               2.58        Hadley Eric DO

## 2024-07-25 NOTE — ASSESSMENT & PLAN NOTE
Palliative Diagnosis: SAH w/ PCOM aneurysm rupture    Goals:   Continue full cares at this time  Daughters Candice Flood and Mamta involved in care.  Resides with daughter Aleena and her children and grandchildren  Palliative will follow for ongoing goals of care discussions as situation evolves.    Social Support:  Supportive listening provided  Normalized experience of patient  Provided anxiety containment  Advocated for patient/family with interdisciplinary team  Mediated conflict    Care Coordination  Case discussed with critical care    Follow-up  We appreciate the opportunity to participate in this patient's care.   We will continue to follow while admitted.    Please do not hesitate to contact our on-call provider through [employer] Secure Chat or contact [phone] should there be an acute change or other symptom control concerns.

## 2024-07-25 NOTE — CONSULTS
[facility]  Consult  Name: Federico Bowen 68 y.o. male I MRN: [id number]  Unit/Bed#: ICU 07 I Date of Admission: 7/12/2024   Date of Service: 7/25/2024 I Hospital Day: 13        Assessment & Plan   Palliative care encounter  Assessment & Plan  Palliative Diagnosis: SAH w/ PCOM aneurysm rupture    Goals:   Continue full cares at this time  Daughters Candice Flood and Mamta involved in care.  Resides with daughter Aleena and her children and grandchildren  Palliative will follow for ongoing goals of care discussions as situation evolves.    Social Support:  Supportive listening provided  Normalized experience of patient  Provided anxiety containment  Advocated for patient/family with interdisciplinary team  Mediated conflict    Care Coordination  Case discussed with critical care    Follow-up  We appreciate the opportunity to participate in this patient's care.   We will continue to follow while admitted.    Please do not hesitate to contact our on-call provider through [employer] Secure Chat or contact [phone] should there be an acute change or other symptom control concerns.     Goals of care, counseling/discussion  Assessment & Plan  Patient with NG tube with tube feeding for nutrition.   Plan to see how he does in future with swallow function  Would consider PEG placement    Code Status: Full Code - Level 1   Decisional apparatus:  Patient is not competent on my exam today.  If competence is lost, patient's substitute decision maker would default to adult children by [place] Act 169.   Advance Directive / Living Will / POLST:  none on file     Severe protein-calorie malnutrition (HCC)  Assessment & Plan  Malnutrition Findings:   Adult Malnutrition type: Acute illness  Adult Degree of Malnutrition: Other severe protein calorie malnutrition  Malnutrition Characteristics: Fat loss, Muscle loss, Inadequate energy, Weight loss        360 Statement: Acute severe pro, vinny  "malnutrition d/t condition as evidence by signficant weight loss, 7/13/24 54kg, 7/19/24 49kg, 5kg/9% wt. loss x <1 week, moderate to severe signs of muscle/ fat loss at temples, orbitals, calvicles, triceps, shoulders, treated with TF.    BMI Findings:     There is no height or weight on file to calculate BMI.     SPeech therapy following.  MBS completed and patient recommended for think liquids however showed S/S of aspiration, remains NPO with tube feeding via NG tube.          * Subarachnoid hemorrhage (HCC)  Assessment & Plan  Now GCS 13  Participating in PT/OT  CT July 22: improving subarachnoid and intraventricular hemorrhage                We appreciate the invitation to be involved in this patient's care.  We will continue to follow.  Please do not hesitate to reach our on call provider through our clinic answering service at [phone] should you have acute symptom control concerns.    Alejandra Rodriguez DO  Palliative and Supportive Care  Clinic/Answering Service: [phone]  You can find me on [employer] Secure Chat!       IDENTIFICATION:  Inpatient consult to Palliative Care  Consult performed by: Alejandra Rodriguez DO  Consult ordered by: Hadley Eric DO        Physician Requesting Consult: Jose Carpenter DO  Reason for Consult / Principal Problem: goals of care  Hx and PE limited by: patient poor historian    NARRATIVE AND INTERVAL HISTORY:       Federico Bowen is a 68 y.o. male who  initially had complaints of headache and leg pain prior to having 2 seizures en route to [facility].  CT/CTA head revealed large diffuse SAH, IVH, hydrocephalus and a R PCA aneurysm.  He was transferred to [facility] on July 11.    Patient seen today with daughter Aleena at bedside.  Patient is actively moving in the bed and appears agitated but responds to questions appropriately and engages in conversation.  He knows he was in the hospital because of \"something in my head\" but is most " worried now about his chronic back pain.  His daughter is at bedside and states that he resides with her and her children and grandchildren.  They say the ultimate goal at this time is to continue with improvement in the ICU with plan for rehab with eventual discharge back home to her house.          No past medical history on file.  Past Surgical History:   Procedure Laterality Date    IR CEREBRAL ANGIOGRAPHY / INTERVENTION  7/13/2024     Social History     Socioeconomic History    Marital status: /Civil Union     Spouse name: Not on file    Number of children: Not on file    Years of education: Not on file    Highest education level: Not on file   Occupational History    Not on file   Tobacco Use    Smoking status: Not on file    Smokeless tobacco: Not on file   Substance and Sexual Activity    Alcohol use: Not on file    Drug use: Not on file    Sexual activity: Not on file   Other Topics Concern    Not on file   Social History Narrative    Not on file     Social Determinants of Health     Financial Resource Strain: Not on file   Food Insecurity: No Food Insecurity (7/13/2024)    Hunger Vital Sign     Worried About Running Out of Food in the Last Year: Never true     Ran Out of Food in the Last Year: Never true   Transportation Needs: No Transportation Needs (7/13/2024)    PRAPARE - Transportation     Lack of Transportation (Medical): No     Lack of Transportation (Non-Medical): No   Physical Activity: Not on file   Stress: Not on file   Social Connections: Not on file   Intimate Partner Violence: Not on file   Housing Stability: Low Risk  (7/13/2024)    Housing Stability Vital Sign     Unable to Pay for Housing in the Last Year: No     Number of Times Moved in the Last Year: 0     Homeless in the Last Year: No     No family history on file.    MEDICATIONS / ALLERGIES:    current meds:   Current Facility-Administered Medications   Medication Dose Route Frequency    acetaminophen (TYLENOL) oral suspension  975 mg  975 mg Oral Q8H PRN    albuterol (PROVENTIL HFA,VENTOLIN HFA) inhaler 2 puff  2 puff Inhalation Q4H PRN    aspirin chewable tablet 81 mg  81 mg Per NG Tube Daily    atorvastatin (LIPITOR) tablet 80 mg  80 mg Oral Daily    bisacodyl (DULCOLAX) rectal suppository 10 mg  10 mg Rectal Daily PRN    bromocriptine (PARLODEL) tablet 10 mg  10 mg Oral TID    butalbital-acetaminophen-caffeine (FIORICET,ESGIC) -40 mg per tablet 1 tablet  1 tablet Oral Q4H PRN    ceFAZolin (ANCEF) IVPB (premix in dextrose) 2,000 mg 50 mL  2,000 mg Intravenous Q8H    chlorhexidine (PERIDEX) 0.12 % oral rinse 15 mL  15 mL Mouth/Throat Q12H MATTY    ezetimibe (ZETIA) tablet 10 mg  10 mg Oral QAM    folic acid (FOLVITE) tablet 400 mcg  400 mcg Oral Daily    gabapentin (NEURONTIN) capsule 600 mg  600 mg Oral TID    heparin (porcine) 25,000 units in 0.45% NaCl 250 mL infusion (premix)  12 Units/kg/hr (Order-Specific) Intravenous Titrated    insulin lispro (HumALOG/ADMELOG) 100 units/mL subcutaneous injection 1-5 Units  1-5 Units Subcutaneous Q6H MATTY    levETIRAcetam (KEPPRA) oral solution 750 mg  750 mg Oral Q12H MATTY    lidocaine (LIDODERM) 5 % patch 1 patch  1 patch Topical Daily    modafinil (PROVIGIL) tablet 100 mg  100 mg Oral Daily    niMODipine (NIMOTOP) oral solution 30 mg  30 mg Per NG Tube Q2H    norepinephrine (LEVOPHED) 4 mg (STANDARD CONCENTRATION) IV in sodium chloride 0.9% 250 mL  1-30 mcg/min Intravenous Titrated    nystatin (MYCOSTATIN) oral suspension 500,000 Units  500,000 Units Swish & Spit 4x Daily    omeprazole (PRILOSEC) suspension 2 mg/mL  20 mg Per NG Tube Daily    ondansetron (ZOFRAN) injection 4 mg  4 mg Intravenous Q6H PRN    oxyCODONE (ROXICODONE) IR tablet 5 mg  5 mg Oral Q6H PRN    saliva substitute (MOUTH KOTE) mucosal solution 5 spray  5 spray Mouth/Throat 4x Daily PRN    thiamine tablet 100 mg  100 mg Per NG Tube Daily       No Known Allergies    OBJECTIVE:    Physical Exam  Physical Exam    Lab Results:  CBC:   Lab Results   Component Value Date    WBC 18.20 (H) 07/25/2024    HGB 9.6 (L) 07/25/2024    HCT 29.2 (L) 07/25/2024    MCV 96 07/25/2024     (H) 07/25/2024    RBC 3.04 (L) 07/25/2024    MCH 31.6 07/25/2024    MCHC 32.9 07/25/2024    RDW 14.7 07/25/2024    MPV 10.0 07/25/2024    NRBC 0 07/25/2024   , CMP:   Lab Results   Component Value Date    SODIUM 146 07/25/2024    K 3.4 (L) 07/25/2024     (H) 07/25/2024    CO2 24 07/25/2024    BUN 22 07/25/2024    CREATININE 0.67 07/25/2024    CALCIUM 8.8 07/25/2024    AST 21 07/25/2024    ALT 15 07/25/2024    ALKPHOS 115 (H) 07/25/2024    EGFR 98 07/25/2024   , BMP:  Lab Results   Component Value Date    SODIUM 146 07/25/2024    K 3.4 (L) 07/25/2024     (H) 07/25/2024    CO2 24 07/25/2024    BUN 22 07/25/2024    CREATININE 0.67 07/25/2024    GLUC 150 (H) 07/25/2024    CALCIUM 8.8 07/25/2024    AGAP 10 07/25/2024    EGFR 98 07/25/2024   , PT/PTT:  Lab Results   Component Value Date    PTT 38 (H) 07/25/2024     Imaging Studies: CTH- improving SAH  EKG, Pathology, and Other Studies: all pertinent studies reviewed    I have spent a total time of 35+ minutes in caring for this patient on the day of the visit/encounter including Risks and benefits of tx options, Instructions for management, Patient and family education, Importance of tx compliance, Risk factor reductions, Impressions, Counseling / Coordination of care, Documenting in the medical record, Reviewing / ordering tests, medicine, procedures  , Obtaining or reviewing history  , and Communicating with other healthcare professionals .

## 2024-07-25 NOTE — ASSESSMENT & PLAN NOTE
aSAH s/p R PCOM aneurysm rupture  BD 13, HH 5, MF 4  PPD 12 right PCOM coil embolization (Dr. Mcneal 7/13)  PPD 12 R frontal EVD placement (Dr. Goldberg, 7/13)  P/w headache and right leg pain, 2 witnessed seizures by EMS was ultimately intubated.  Upon arrival to the ED patient was posturing.  Imaging revealed a large amount of SAH and a right PCOM aneurysm.  Per chart review he was previously on aspirin reversed with DDAVP.    Imaging:  CTA head w/wo 7/23/2024: Improving subarachnoid and intraventricular hemorrhage. Unchanged mild hydrocephalus. EVD in stable position. Unchanged anterior right temporal edema, likely evolving infarct. CT Angiography: No evidence of residual or recurrent right P-comm aneurysm. No acute vascular pathology in the head or neck.     Plan:  Continue frequent neurological checks  Repeat CTA/CT head w/wo STAT if GCS declines more than 2 points in 1 hour  Continue SAH pathway/spasm watch:  Daily TCD's: pending  Continue Nimodipine 30 mg Q2hrs  Continue Keppra 750 mg BID per neurology  Normonatremia (146 today)  Euvolemia (+801/24 hr)   Hemoglobin >8 (9.6 today)  EVD placed on 7/13/2024  Continue EVD at 10 mmhg -clamp trial today.   Monitor  and ICP  ICPs -1-13, 2 in room   Output 4 ml/24hrs, draining easily when dropped below tragus  Maintain ICP goal <20  SBP <220, on Levo currently at 16 mcg  Continue aspirin 81 mg daily  Heparin drip at 12 units/kg/hr  Last PTT 38 am of 7/25  Medical management and pain control per primary team  Ongoing fevers, improving with tmax 102.9  WBC 18.20, down from 21  Started bromocriptine  Sputum 3+ gram + cocci in chains and clusters  Blood cx NTD x 48 hours  CXR with RUL consolidation - on Ancef 2 g q8h through   DVT PPX: SCDs and heparin drip    Neurosurgery will continue to follow closely, call with any further questions or concerns.

## 2024-07-26 ENCOUNTER — APPOINTMENT (INPATIENT)
Dept: RADIOLOGY | Facility: HOSPITAL | Age: 69
DRG: 020 | End: 2024-07-26
Payer: MEDICARE

## 2024-07-26 ENCOUNTER — TELEPHONE (OUTPATIENT)
Dept: NEUROSURGERY | Facility: CLINIC | Age: 69
End: 2024-07-26

## 2024-07-26 ENCOUNTER — APPOINTMENT (INPATIENT)
Dept: NON INVASIVE DIAGNOSTICS | Facility: HOSPITAL | Age: 69
DRG: 020 | End: 2024-07-26
Payer: MEDICARE

## 2024-07-26 LAB
ALBUMIN SERPL BCG-MCNC: 3.3 G/DL (ref 3.5–5)
ALP SERPL-CCNC: 128 U/L (ref 34–104)
ALT SERPL W P-5'-P-CCNC: 21 U/L (ref 7–52)
ANION GAP SERPL CALCULATED.3IONS-SCNC: 9 MMOL/L (ref 4–13)
APPEARANCE CSF: ABNORMAL
APTT PPP: 38 SECONDS (ref 23–37)
AST SERPL W P-5'-P-CCNC: 32 U/L (ref 13–39)
BASOPHILS # BLD AUTO: 0.05 THOUSANDS/ÂΜL (ref 0–0.1)
BASOPHILS NFR BLD AUTO: 0 % (ref 0–1)
BILIRUB SERPL-MCNC: 0.5 MG/DL (ref 0.2–1)
BUN SERPL-MCNC: 20 MG/DL (ref 5–25)
C DIFF TOX GENS STL QL NAA+PROBE: NEGATIVE
CA-I BLD-SCNC: 1.15 MMOL/L (ref 1.12–1.32)
CALCIUM ALBUM COR SERPL-MCNC: 9.3 MG/DL (ref 8.3–10.1)
CALCIUM SERPL-MCNC: 8.7 MG/DL (ref 8.4–10.2)
CHLORIDE SERPL-SCNC: 109 MMOL/L (ref 96–108)
CO2 SERPL-SCNC: 23 MMOL/L (ref 21–32)
CREAT SERPL-MCNC: 0.74 MG/DL (ref 0.6–1.3)
EOSINOPHIL # BLD AUTO: 0.31 THOUSAND/ÂΜL (ref 0–0.61)
EOSINOPHIL NFR BLD AUTO: 2 % (ref 0–6)
ERYTHROCYTE [DISTWIDTH] IN BLOOD BY AUTOMATED COUNT: 14.9 % (ref 11.6–15.1)
GFR SERPL CREATININE-BSD FRML MDRD: 94 ML/MIN/1.73SQ M
GLUCOSE CSF-MCNC: 56 MG/DL (ref 40–70)
GLUCOSE SERPL-MCNC: 117 MG/DL (ref 65–140)
GLUCOSE SERPL-MCNC: 126 MG/DL (ref 65–140)
GLUCOSE SERPL-MCNC: 148 MG/DL (ref 65–140)
GLUCOSE SERPL-MCNC: 158 MG/DL (ref 65–140)
GLUCOSE SERPL-MCNC: 163 MG/DL (ref 65–140)
GRAM STN SPEC: NORMAL
HCT VFR BLD AUTO: 30.1 % (ref 36.5–49.3)
HGB BLD-MCNC: 10 G/DL (ref 12–17)
IMM GRANULOCYTES # BLD AUTO: 0.15 THOUSAND/UL (ref 0–0.2)
IMM GRANULOCYTES NFR BLD AUTO: 1 % (ref 0–2)
LYMPHOCYTES # BLD AUTO: 1.33 THOUSANDS/ÂΜL (ref 0.6–4.47)
LYMPHOCYTES NFR BLD AUTO: 9 % (ref 14–44)
MAGNESIUM SERPL-MCNC: 2.2 MG/DL (ref 1.9–2.7)
MCH RBC QN AUTO: 31.3 PG (ref 26.8–34.3)
MCHC RBC AUTO-ENTMCNC: 33.2 G/DL (ref 31.4–37.4)
MCV RBC AUTO: 94 FL (ref 82–98)
MONOCYTES # BLD AUTO: 0.71 THOUSAND/ÂΜL (ref 0.17–1.22)
MONOCYTES NFR BLD AUTO: 5 % (ref 4–12)
NEUTROPHILS # BLD AUTO: 12.92 THOUSANDS/ÂΜL (ref 1.85–7.62)
NEUTS SEG NFR BLD AUTO: 83 % (ref 43–75)
NRBC BLD AUTO-RTO: 0 /100 WBCS
PHOSPHATE SERPL-MCNC: 3.1 MG/DL (ref 2.3–4.1)
PLATELET # BLD AUTO: 531 THOUSANDS/UL (ref 149–390)
PMV BLD AUTO: 9.8 FL (ref 8.9–12.7)
POTASSIUM SERPL-SCNC: 4.1 MMOL/L (ref 3.5–5.3)
PROT CSF-MCNC: 64 MG/DL (ref 15–45)
PROT SERPL-MCNC: 7.2 G/DL (ref 6.4–8.4)
RBC # BLD AUTO: 3.2 MILLION/UL (ref 3.88–5.62)
RBC # CSF MANUAL: 0 UL (ref 0–10)
SODIUM SERPL-SCNC: 141 MMOL/L (ref 135–147)
TOTAL CELLS COUNTED BLD: YES
WBC # BLD AUTO: 15.47 THOUSAND/UL (ref 4.31–10.16)
WBC # CSF AUTO: 0 /UL (ref 0–5)

## 2024-07-26 PROCEDURE — 70496 CT ANGIOGRAPHY HEAD: CPT

## 2024-07-26 PROCEDURE — 85730 THROMBOPLASTIN TIME PARTIAL: CPT

## 2024-07-26 PROCEDURE — 93886 INTRACRANIAL COMPLETE STUDY: CPT

## 2024-07-26 PROCEDURE — 82948 REAGENT STRIP/BLOOD GLUCOSE: CPT

## 2024-07-26 PROCEDURE — 82330 ASSAY OF CALCIUM: CPT

## 2024-07-26 PROCEDURE — 99232 SBSQ HOSP IP/OBS MODERATE 35: CPT | Performed by: PHYSICIAN ASSISTANT

## 2024-07-26 PROCEDURE — 89050 BODY FLUID CELL COUNT: CPT | Performed by: PHYSICIAN ASSISTANT

## 2024-07-26 PROCEDURE — 83735 ASSAY OF MAGNESIUM: CPT

## 2024-07-26 PROCEDURE — 93886 INTRACRANIAL COMPLETE STUDY: CPT | Performed by: SURGERY

## 2024-07-26 PROCEDURE — 99291 CRITICAL CARE FIRST HOUR: CPT | Performed by: EMERGENCY MEDICINE

## 2024-07-26 PROCEDURE — 89051 BODY FLUID CELL COUNT: CPT | Performed by: PHYSICIAN ASSISTANT

## 2024-07-26 PROCEDURE — 82945 GLUCOSE OTHER FLUID: CPT | Performed by: PHYSICIAN ASSISTANT

## 2024-07-26 PROCEDURE — 85025 COMPLETE CBC W/AUTO DIFF WBC: CPT

## 2024-07-26 PROCEDURE — 80053 COMPREHEN METABOLIC PANEL: CPT

## 2024-07-26 PROCEDURE — 92526 ORAL FUNCTION THERAPY: CPT

## 2024-07-26 PROCEDURE — 87070 CULTURE OTHR SPECIMN AEROBIC: CPT | Performed by: PHYSICIAN ASSISTANT

## 2024-07-26 PROCEDURE — 84100 ASSAY OF PHOSPHORUS: CPT

## 2024-07-26 PROCEDURE — 84157 ASSAY OF PROTEIN OTHER: CPT | Performed by: PHYSICIAN ASSISTANT

## 2024-07-26 RX ORDER — BROMOCRIPTINE MESYLATE 2.5 MG/1
5 TABLET ORAL 3 TIMES DAILY
Status: DISCONTINUED | OUTPATIENT
Start: 2024-07-26 | End: 2024-07-27

## 2024-07-26 RX ORDER — LIDOCAINE 50 MG/G
1 PATCH TOPICAL DAILY PRN
Status: DISCONTINUED | OUTPATIENT
Start: 2024-07-26 | End: 2024-08-10 | Stop reason: HOSPADM

## 2024-07-26 RX ORDER — DEXMEDETOMIDINE HYDROCHLORIDE 4 UG/ML
.1-.7 INJECTION, SOLUTION INTRAVENOUS
Status: DISCONTINUED | OUTPATIENT
Start: 2024-07-26 | End: 2024-07-27

## 2024-07-26 RX ORDER — ASPIRIN 81 MG/1
81 TABLET, CHEWABLE ORAL DAILY
Status: DISCONTINUED | OUTPATIENT
Start: 2024-07-26 | End: 2024-08-10 | Stop reason: HOSPADM

## 2024-07-26 RX ORDER — HEPARIN SODIUM 10000 [USP'U]/100ML
12 INJECTION, SOLUTION INTRAVENOUS
Status: DISCONTINUED | OUTPATIENT
Start: 2024-07-26 | End: 2024-07-27

## 2024-07-26 RX ORDER — OXYCODONE AND ACETAMINOPHEN 5; 325 MG/1; MG/1
1 TABLET ORAL EVERY 4 HOURS PRN
Status: DISCONTINUED | OUTPATIENT
Start: 2024-07-26 | End: 2024-08-06

## 2024-07-26 RX ORDER — ACETAMINOPHEN 160 MG/5ML
975 SUSPENSION ORAL EVERY 8 HOURS PRN
Status: DISCONTINUED | OUTPATIENT
Start: 2024-07-26 | End: 2024-08-06

## 2024-07-26 RX ADMIN — LEVETIRACETAM 750 MG: 100 SOLUTION ORAL at 21:52

## 2024-07-26 RX ADMIN — Medication 30 MG: at 10:10

## 2024-07-26 RX ADMIN — THIAMINE HCL TAB 100 MG 100 MG: 100 TAB at 08:09

## 2024-07-26 RX ADMIN — Medication 30 MG: at 23:32

## 2024-07-26 RX ADMIN — OXYCODONE HYDROCHLORIDE AND ACETAMINOPHEN 1 TABLET: 5; 325 TABLET ORAL at 11:18

## 2024-07-26 RX ADMIN — CHLORHEXIDINE GLUCONATE 0.12% ORAL RINSE 15 ML: 1.2 LIQUID ORAL at 21:51

## 2024-07-26 RX ADMIN — NOREPINEPHRINE BITARTRATE 18 MCG/MIN: 1 SOLUTION INTRAVENOUS at 05:43

## 2024-07-26 RX ADMIN — ATORVASTATIN CALCIUM 80 MG: 80 TABLET, FILM COATED ORAL at 08:08

## 2024-07-26 RX ADMIN — EZETIMIBE 10 MG: 10 TABLET ORAL at 08:08

## 2024-07-26 RX ADMIN — Medication 30 MG: at 05:45

## 2024-07-26 RX ADMIN — Medication 30 MG: at 14:09

## 2024-07-26 RX ADMIN — Medication 30 MG: at 01:41

## 2024-07-26 RX ADMIN — NOREPINEPHRINE BITARTRATE 16 MCG/MIN: 1 SOLUTION INTRAVENOUS at 09:41

## 2024-07-26 RX ADMIN — Medication 30 MG: at 17:57

## 2024-07-26 RX ADMIN — INSULIN LISPRO 1 UNITS: 100 INJECTION, SOLUTION INTRAVENOUS; SUBCUTANEOUS at 12:11

## 2024-07-26 RX ADMIN — ASPIRIN 81 MG CHEWABLE TABLET 81 MG: 81 TABLET CHEWABLE at 11:18

## 2024-07-26 RX ADMIN — BROMOCRIPTINE MESYLATE 5 MG: 2.5 TABLET ORAL at 08:09

## 2024-07-26 RX ADMIN — POTASSIUM PHOSPHATE, MONOBASIC POTASSIUM PHOSPHATE, DIBASIC 30 MMOL: 224; 236 INJECTION, SOLUTION, CONCENTRATE INTRAVENOUS at 15:40

## 2024-07-26 RX ADMIN — Medication 30 MG: at 22:10

## 2024-07-26 RX ADMIN — FOLIC ACID TAB 400 MCG 400 MCG: 400 TAB at 08:09

## 2024-07-26 RX ADMIN — POTASSIUM & SODIUM PHOSPHATES POWDER PACK 280-160-250 MG 1 PACKET: 280-160-250 PACK at 08:08

## 2024-07-26 RX ADMIN — BROMOCRIPTINE MESYLATE 5 MG: 2.5 TABLET ORAL at 21:53

## 2024-07-26 RX ADMIN — Medication 30 MG: at 16:14

## 2024-07-26 RX ADMIN — CEFAZOLIN SODIUM 2000 MG: 2 SOLUTION INTRAVENOUS at 05:45

## 2024-07-26 RX ADMIN — CHLORHEXIDINE GLUCONATE 0.12% ORAL RINSE 15 ML: 1.2 LIQUID ORAL at 08:08

## 2024-07-26 RX ADMIN — HEPARIN SODIUM 12 UNITS/KG/HR: 10000 INJECTION, SOLUTION INTRAVENOUS at 21:50

## 2024-07-26 RX ADMIN — IOHEXOL 85 ML: 350 INJECTION, SOLUTION INTRAVENOUS at 05:14

## 2024-07-26 RX ADMIN — Medication 30 MG: at 08:10

## 2024-07-26 RX ADMIN — NOREPINEPHRINE BITARTRATE 14 MCG/MIN: 1 SOLUTION INTRAVENOUS at 14:21

## 2024-07-26 RX ADMIN — OXYCODONE HYDROCHLORIDE AND ACETAMINOPHEN 1 TABLET: 5; 325 TABLET ORAL at 16:13

## 2024-07-26 RX ADMIN — Medication 30 MG: at 12:04

## 2024-07-26 RX ADMIN — Medication 20 MG: at 08:08

## 2024-07-26 RX ADMIN — LEVETIRACETAM 750 MG: 100 SOLUTION ORAL at 08:10

## 2024-07-26 RX ADMIN — OXYCODONE HYDROCHLORIDE 5 MG: 5 TABLET ORAL at 05:45

## 2024-07-26 RX ADMIN — HEPARIN SODIUM 12 UNITS/KG/HR: 10000 INJECTION, SOLUTION INTRAVENOUS at 01:39

## 2024-07-26 RX ADMIN — CEFAZOLIN SODIUM 2000 MG: 2 SOLUTION INTRAVENOUS at 21:52

## 2024-07-26 RX ADMIN — NYSTATIN 500000 UNITS: 100000 SUSPENSION ORAL at 08:08

## 2024-07-26 RX ADMIN — Medication 30 MG: at 04:06

## 2024-07-26 RX ADMIN — GABAPENTIN 600 MG: 300 CAPSULE ORAL at 21:51

## 2024-07-26 RX ADMIN — OXYCODONE HYDROCHLORIDE AND ACETAMINOPHEN 1 TABLET: 5; 325 TABLET ORAL at 21:52

## 2024-07-26 RX ADMIN — CEFAZOLIN SODIUM 2000 MG: 2 SOLUTION INTRAVENOUS at 14:05

## 2024-07-26 RX ADMIN — INSULIN LISPRO 1 UNITS: 100 INJECTION, SOLUTION INTRAVENOUS; SUBCUTANEOUS at 23:32

## 2024-07-26 RX ADMIN — Medication 30 MG: at 20:50

## 2024-07-26 RX ADMIN — GABAPENTIN 600 MG: 300 CAPSULE ORAL at 16:13

## 2024-07-26 RX ADMIN — LIDOCAINE 5% 1 PATCH: 700 PATCH TOPICAL at 08:09

## 2024-07-26 RX ADMIN — BROMOCRIPTINE MESYLATE 5 MG: 2.5 TABLET ORAL at 16:14

## 2024-07-26 RX ADMIN — GABAPENTIN 600 MG: 300 CAPSULE ORAL at 08:09

## 2024-07-26 RX ADMIN — NOREPINEPHRINE BITARTRATE 18 MCG/MIN: 1 SOLUTION INTRAVENOUS at 01:39

## 2024-07-26 NOTE — ASSESSMENT & PLAN NOTE
PPD#13 right PCOM coil embolization (Dr. Mcneal 7/13)  PPD#13 R frontal EVD placement (Dr. Goldberg, 7/13)  aSAH s/p R PCOM aneurysm rupture  Bleed day 14, HH 5, MF 4  P/w headache and right leg pain, 2 witnessed seizures by EMS was ultimately intubated.  Upon arrival to the ED patient was posturing.  Imaging revealed a large amount of SAH and a right PCOM aneurysm.  Per chart review he was previously on aspirin reversed with DDAVP.    Imaging:  CTA head w/wo contrast 7/26/2024: Improved subarachnoid hemorrhage and intraventricular hemorrhage as described above. Ventricles are unchanged in size. Stable areas of low-attenuation involving the right temporal lobe and right occipital lobe which represent evolving infarcts. Nondiagnostic CT angiogram of the head due to the extensive motion artifact. 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     Plan:  Continue frequent neurological checks  CTA poor quality due to patient movement, plan to repeat tomorrow - primary team to order medication to keep patient comfortable  Repeat CTA/CT head w/wo STAT if GCS declines more than 2 points in 1 hour  Continue SAH pathway/spasm watch:  Daily TCD's: R 1.16, L 1.66  Continue Nimodipine 30 mg Q2hrs  Continue Keppra 750 mg BID per neurology  Normonatremia, 141 today  Euvolemia, +1838.1/24 hr  Hemoglobin > 8, 10.0 today  EVD removed 7/26 and sutured closed with 3-0 ethilon  Exit CSF obtained 7/26, follow up results  SBP < 220   On levo for pressure support  Patient not currently on milrinone  Continue aspirin 81 mg daily, reordered for this morning  Continue to hold hep gtt for now, will see if need to resume tomorrow after repeat CTA  Medical management and pain control per primary team  DVT PPX: SCDs     Neurosurgery will continue to follow closely, call with any further questions or concerns.

## 2024-07-26 NOTE — PLAN OF CARE
Problem: Prexisting or High Potential for Compromised Skin Integrity  Goal: Skin integrity is maintained or improved  Description: INTERVENTIONS:  - Identify patients at risk for skin breakdown  - Assess and monitor skin integrity  - Assess and monitor nutrition and hydration status  - Monitor labs   - Assess for incontinence   - Turn and reposition patient  - Assist with mobility/ambulation  - Relieve pressure over bony prominences  - Avoid friction and shearing  - Provide appropriate hygiene as needed including keeping skin clean and dry  - Evaluate need for skin moisturizer/barrier cream  - Collaborate with interdisciplinary team   - Patient/family teaching  - Consider wound care consult   Outcome: Progressing     Problem: Neurological Deficit  Goal: Neurological status is stable or improving  Description: Interventions:  - Monitor and assess patient's level of consciousness, motor function, sensory function, and level of assistance needed for ADLs.   - Monitor and report changes from baseline. Collaborate with interdisciplinary team to initiate plan and implement interventions as ordered.   - Provide and maintain a safe environment.  - Consider seizure precautions.  - Consider fall precautions.  - Consider aspiration precautions.  - Consider bleeding precautions.  Outcome: Progressing     Problem: Activity Intolerance/Impaired Mobility  Goal: Mobility/activity is maintained at optimum level for patient  Description: Interventions:  - Assess and monitor patient  barriers to mobility and need for assistive/adaptive devices.  - Assess patient's emotional response to limitations.  - Collaborate with interdisciplinary team and initiate plans and interventions as ordered.  - Encourage independent activity per ability.  - Maintain proper body alignment.  - Perform active/passive rom as tolerated/ordered.  - Plan activities to conserve energy.  - Turn patient as appropriate  Outcome: Progressing     Problem:  Communication Impairment  Goal: Ability to express needs and understand communication  Description: Assess patient's communication skills and ability to understand information.  Patient will demonstrate use of effective communication techniques, alternative methods of communication and understanding even if not able to speak.     - Encourage communication and provide alternate methods of communication as needed.  - Collaborate with case management/ for discharge needs.  - Include patient/family/caregiver in decisions related to communication.  Outcome: Progressing     Problem: Potential for Aspiration  Goal: Non-ventilated patient's risk of aspiration is minimized  Description: Assess and monitor vital signs, respiratory status, and labs (WBC).  Monitor for signs of aspiration (tachypnea, cough, rales, wheezing, cyanosis, fever).    - Assess and monitor patient's ability to swallow.  - Place patient up in chair to eat if possible.  - HOB up at 90 degrees to eat if unable to get patient up into chair.  - Supervise patient during oral intake.   - Instruct patient/ family to take small bites.  - Instruct patient/ family to take small single sips when taking liquids.  - Follow patient-specific strategies generated by speech pathologist.  Outcome: Progressing     Problem: Nutrition  Goal: Nutrition/Hydration status is improving  Description: Monitor and assess patient's nutrition/hydration status for malnutrition (ex- brittle hair, bruises, dry skin, pale skin and conjunctiva, muscle wasting, smooth red tongue, and disorientation). Collaborate with interdisciplinary team and initiate plan and interventions as ordered.  Monitor patient's weight and dietary intake as ordered or per policy. Utilize nutrition screening tool and intervene per policy. Determine patient's food preferences and provide high-protein, high-caloric foods as appropriate.   - Monitor nutrition parameters, recommending adjustments to  enteral nutrition orders at indicated.   - Assist patient with eating.  - Allow adequate time for meals.  - Encourage patient to take dietary supplement as ordered.  - Collaborate with interdisciplinary team.   - Include patient/family/caregiver in decisions related to nutrition.  Outcome: Progressing     Problem: PAIN - ADULT  Goal: Verbalizes/displays adequate comfort level or baseline comfort level  Description: Interventions:  - Encourage patient to monitor pain and request assistance  - Assess pain using appropriate pain scale  - Administer analgesics based on type and severity of pain and evaluate response  - Implement non-pharmacological measures as appropriate and evaluate response  - Consider cultural and social influences on pain and pain management  - Notify physician/advanced practitioner if interventions unsuccessful or patient reports new pain  Outcome: Progressing     Problem: INFECTION - ADULT  Goal: Absence or prevention of progression during hospitalization  Description: INTERVENTIONS:  - Assess and monitor for signs and symptoms of infection  - Monitor lab/diagnostic results  - Monitor all insertion sites, i.e. indwelling lines, tubes, and drains  - Monitor endotracheal if appropriate and nasal secretions for changes in amount and color  - [place] appropriate cooling/warming therapies per order  - Administer medications as ordered  - Instruct and encourage patient and family to use good hand hygiene technique  - Identify and instruct in appropriate isolation precautions for identified infection/condition  Outcome: Progressing     Problem: SAFETY ADULT  Goal: Patient will remain free of falls  Description: INTERVENTIONS:  - Educate patient/family on patient safety including physical limitations  - Instruct patient to call for assistance with activity   - Consult OT/PT to assist with strengthening/mobility   - Keep Call bell within reach  - Keep bed low and locked with side rails adjusted as  appropriate  - Keep care items and personal belongings within reach  - Initiate and maintain comfort rounds  - Make Fall Risk Sign visible to staff  - Offer Toileting every 2 Hours, in advance of need  - Apply yellow socks and bracelet for high fall risk patients  - Consider moving patient to room near nurses station  Outcome: Progressing  Goal: Maintain or return to baseline ADL function  Description: INTERVENTIONS:  -  Assess patient's ability to carry out ADLs; assess patient's baseline for ADL function and identify physical deficits which impact ability to perform ADLs (bathing, care of mouth/teeth, toileting, grooming, dressing, etc.)  - Assess/evaluate cause of self-care deficits   - Assess range of motion  - Assess patient's mobility; develop plan if impaired  - Assess patient's need for assistive devices and provide as appropriate  - Encourage maximum independence but intervene and supervise when necessary  - Involve family in performance of ADLs  - Assess for home care needs following discharge   - Consider OT consult to assist with ADL evaluation and planning for discharge  - Provide patient education as appropriate  Outcome: Progressing  Goal: Maintains/Returns to pre admission functional level  Description: INTERVENTIONS:  - Perform AM-PAC 6 Click Basic Mobility/ Daily Activity assessment daily.  - Set and communicate daily mobility goal to care team and patient/family/caregiver.   - Collaborate with rehabilitation services on mobility goals if consulted  - Reposition patient every 2 hours.  - Out of bed for toileting  - Record patient progress and toleration of activity level   Outcome: Progressing     Problem: DISCHARGE PLANNING  Goal: Discharge to home or other facility with appropriate resources  Description: INTERVENTIONS:  - Identify barriers to discharge w/patient and caregiver  - Arrange for needed discharge resources and transportation as appropriate  - Identify discharge learning needs (meds,  wound care, etc.)  - Arrange for interpretive services to assist at discharge as needed  - Refer to Case Management Department for coordinating discharge planning if the patient needs post-hospital services based on physician/advanced practitioner order or complex needs related to functional status, cognitive ability, or social support system  Outcome: Progressing     Problem: Knowledge Deficit  Goal: Patient/family/caregiver demonstrates understanding of disease process, treatment plan, medications, and discharge instructions  Description: Complete learning assessment and assess knowledge base.  Interventions:  - Provide teaching at level of understanding  - Provide teaching via preferred learning methods  Outcome: Progressing     Problem: Nutrition/Hydration-ADULT  Goal: Nutrient/Hydration intake appropriate for improving, restoring or maintaining nutritional needs  Description: Monitor and assess patient's nutrition/hydration status for malnutrition. Collaborate with interdisciplinary team and initiate plan and interventions as ordered.  Monitor patient's weight and dietary intake as ordered or per policy. Utilize nutrition screening tool and intervene as necessary. Determine patient's food preferences and provide high-protein, high-caloric foods as appropriate.     INTERVENTIONS:  - Monitor oral intake, urinary output, labs, and treatment plans  - Assess nutrition and hydration status and recommend course of action  - Evaluate amount of meals eaten  - Assist patient with eating if necessary   - Allow adequate time for meals  - Recommend/ encourage appropriate diets, oral nutritional supplements, and vitamin/mineral supplements  - Order, calculate, and assess calorie counts as needed  - Recommend, monitor, and adjust tube feeding based on assessed needs  - Assess need for intravenous fluids  - Provide nutrition/hydration education as appropriate  - Include patient/family/caregiver in decisions related to  nutrition  Outcome: Progressing     Problem: RESPIRATORY - ADULT  Goal: Achieves optimal ventilation and oxygenation  Description: INTERVENTIONS:  - Assess for changes in respiratory status  - Assess for changes in mentation and behavior  - Position to facilitate oxygenation and minimize respiratory effort  - Oxygen administered by appropriate delivery if ordered  - Initiate smoking cessation education as indicated  - Encourage broncho-pulmonary hygiene including cough, deep breathe, Incentive Spirometry  - Assess the need for suctioning and aspirate as needed  - Assess and instruct to report SOB or any respiratory difficulty  - Respiratory Therapy support as indicated  Outcome: Progressing     Problem: SKIN/TISSUE INTEGRITY - ADULT  Goal: Skin Integrity remains intact(Skin Breakdown Prevention)  Description: Assess:  -Inspect skin when repositioning, toileting, and assisting with ADLS  -Assess extremities for adequate circulation and sensation     Bed Management:  -Have minimal linens on bed & keep smooth, unwrinkled  -Change linens as needed when moist or perspiring    Activity:  -Encourage activity and walks on unit  -Encourage or provide ROM exercises   -Turn and reposition patient every 2 Hours  -Use appropriate equipment to lift or move patient in bed    Skin Care:  -Avoid use of baby powder, tape, friction and shearing, hot water or constrictive clothing  -Do not massage red bony areas  Outcome: Progressing  Goal: Incision(s), wounds(s) or drain site(s) healing without S/S of infection  Description: INTERVENTIONS  - Assess and document dressing, incision, wound bed, drain sites and surrounding tissue  - Provide patient and family education  Outcome: Progressing     Problem: Potential for Falls  Goal: Patient will remain free of falls  Description: INTERVENTIONS:  - Educate patient/family on patient safety including physical limitations  - Instruct patient to call for assistance with activity   - Consult OT/PT  to assist with strengthening/mobility   - Keep Call bell within reach  - Keep bed low and locked with side rails adjusted as appropriate  - Keep care items and personal belongings within reach  - Initiate and maintain comfort rounds  - Make Fall Risk Sign visible to staff  - Offer Toileting every 2 Hours, in advance of need  - Apply yellow socks and bracelet for high fall risk patients  - Consider moving patient to room near nurses station  Outcome: Progressing     Problem: SAFETY,RESTRAINT: NV/NON-SELF DESTRUCTIVE BEHAVIOR  Goal: Remains free of harm/injury (restraint for non violent/non self-detsructive behavior)  Description: INTERVENTIONS:  - Instruct patient/family regarding restraint use   - Assess and monitor physiologic and psychological status   - Provide interventions and comfort measures to meet assessed patient needs   - Identify and implement measures to help patient regain control  - Assess readiness for release of restraint   Outcome: Progressing  Goal: Returns to optimal restraint-free functioning  Description: INTERVENTIONS:  - Assess the patient's behavior and symptoms that indicate continued need for restraint  - Identify and implement measures to help patient regain control  - Assess readiness for release of restraint   Outcome: Progressing

## 2024-07-26 NOTE — PLAN OF CARE
Problem: Prexisting or High Potential for Compromised Skin Integrity  Goal: Skin integrity is maintained or improved  Description: INTERVENTIONS:  - Identify patients at risk for skin breakdown  - Assess and monitor skin integrity  - Assess and monitor nutrition and hydration status  - Monitor labs   - Assess for incontinence   - Turn and reposition patient  - Assist with mobility/ambulation  - Relieve pressure over bony prominences  - Avoid friction and shearing  - Provide appropriate hygiene as needed including keeping skin clean and dry  - Evaluate need for skin moisturizer/barrier cream  - Collaborate with interdisciplinary team   - Patient/family teaching  - Consider wound care consult   Outcome: Progressing     Problem: Neurological Deficit  Goal: Neurological status is stable or improving  Description: Interventions:  - Monitor and assess patient's level of consciousness, motor function, sensory function, and level of assistance needed for ADLs.   - Monitor and report changes from baseline. Collaborate with interdisciplinary team to initiate plan and implement interventions as ordered.   - Provide and maintain a safe environment.  - Consider seizure precautions.  - Consider fall precautions.  - Consider aspiration precautions.  - Consider bleeding precautions.  Outcome: Progressing     Problem: Activity Intolerance/Impaired Mobility  Goal: Mobility/activity is maintained at optimum level for patient  Description: Interventions:  - Assess and monitor patient  barriers to mobility and need for assistive/adaptive devices.  - Assess patient's emotional response to limitations.  - Collaborate with interdisciplinary team and initiate plans and interventions as ordered.  - Encourage independent activity per ability.  - Maintain proper body alignment.  - Perform active/passive rom as tolerated/ordered.  - Plan activities to conserve energy.  - Turn patient as appropriate  Outcome: Progressing     Problem:  Communication Impairment  Goal: Ability to express needs and understand communication  Description: Assess patient's communication skills and ability to understand information.  Patient will demonstrate use of effective communication techniques, alternative methods of communication and understanding even if not able to speak.     - Encourage communication and provide alternate methods of communication as needed.  - Collaborate with case management/ for discharge needs.  - Include patient/family/caregiver in decisions related to communication.  Outcome: Progressing     Problem: Potential for Aspiration  Goal: Non-ventilated patient's risk of aspiration is minimized  Description: Assess and monitor vital signs, respiratory status, and labs (WBC).  Monitor for signs of aspiration (tachypnea, cough, rales, wheezing, cyanosis, fever).    - Assess and monitor patient's ability to swallow.  - Place patient up in chair to eat if possible.  - HOB up at 90 degrees to eat if unable to get patient up into chair.  - Supervise patient during oral intake.   - Instruct patient/ family to take small bites.  - Instruct patient/ family to take small single sips when taking liquids.  - Follow patient-specific strategies generated by speech pathologist.  Outcome: Progressing     Problem: Nutrition  Goal: Nutrition/Hydration status is improving  Description: Monitor and assess patient's nutrition/hydration status for malnutrition (ex- brittle hair, bruises, dry skin, pale skin and conjunctiva, muscle wasting, smooth red tongue, and disorientation). Collaborate with interdisciplinary team and initiate plan and interventions as ordered.  Monitor patient's weight and dietary intake as ordered or per policy. Utilize nutrition screening tool and intervene per policy. Determine patient's food preferences and provide high-protein, high-caloric foods as appropriate.   - Monitor nutrition parameters, recommending adjustments to  enteral nutrition orders at indicated.   - Assist patient with eating.  - Allow adequate time for meals.  - Encourage patient to take dietary supplement as ordered.  - Collaborate with interdisciplinary team.   - Include patient/family/caregiver in decisions related to nutrition.  Outcome: Progressing     Problem: PAIN - ADULT  Goal: Verbalizes/displays adequate comfort level or baseline comfort level  Description: Interventions:  - Encourage patient to monitor pain and request assistance  - Assess pain using appropriate pain scale  - Administer analgesics based on type and severity of pain and evaluate response  - Implement non-pharmacological measures as appropriate and evaluate response  - Consider cultural and social influences on pain and pain management  - Notify physician/advanced practitioner if interventions unsuccessful or patient reports new pain  Outcome: Progressing     Problem: INFECTION - ADULT  Goal: Absence or prevention of progression during hospitalization  Description: INTERVENTIONS:  - Assess and monitor for signs and symptoms of infection  - Monitor lab/diagnostic results  - Monitor all insertion sites, i.e. indwelling lines, tubes, and drains  - Monitor endotracheal if appropriate and nasal secretions for changes in amount and color  - Jackson appropriate cooling/warming therapies per order  - Administer medications as ordered  - Instruct and encourage patient and family to use good hand hygiene technique  - Identify and instruct in appropriate isolation precautions for identified infection/condition  Outcome: Progressing  Goal: Absence of fever/infection during neutropenic period  Description: INTERVENTIONS:  - Monitor WBC    Outcome: Progressing     Problem: SAFETY ADULT  Goal: Patient will remain free of falls  Description: INTERVENTIONS:  - Educate patient/family on patient safety including physical limitations  - Instruct patient to call for assistance with activity   - Consult OT/PT to  assist with strengthening/mobility   - Keep Call bell within reach  - Keep bed low and locked with side rails adjusted as appropriate  - Keep care items and personal belongings within reach  - Initiate and maintain comfort rounds  - Make Fall Risk Sign visible to staff  - Offer Toileting every 2 Hours, in advance of need  - Initiate/Maintain bed alarm  - Obtain necessary fall risk management equipment: bed alarm  - Apply yellow socks and bracelet for high fall risk patients  - Consider moving patient to room near nurses station  Outcome: Progressing  Goal: Maintain or return to baseline ADL function  Description: INTERVENTIONS:  -  Assess patient's ability to carry out ADLs; assess patient's baseline for ADL function and identify physical deficits which impact ability to perform ADLs (bathing, care of mouth/teeth, toileting, grooming, dressing, etc.)  - Assess/evaluate cause of self-care deficits   - Assess range of motion  - Assess patient's mobility; develop plan if impaired  - Assess patient's need for assistive devices and provide as appropriate  - Encourage maximum independence but intervene and supervise when necessary  - Involve family in performance of ADLs  - Assess for home care needs following discharge   - Consider OT consult to assist with ADL evaluation and planning for discharge  - Provide patient education as appropriate  Outcome: Progressing  Goal: Maintains/Returns to pre admission functional level  Description: INTERVENTIONS:  - Perform AM-PAC 6 Click Basic Mobility/ Daily Activity assessment daily.  - Set and communicate daily mobility goal to care team and patient/family/caregiver.   - Collaborate with rehabilitation services on mobility goals if consulted  - Perform Range of Motion 3 times a day.  - Reposition patient every 2 hours.  - Dangle patient 2 times a day  - Stand patient 2 times a day  - Ambulate patient 2 times a day  - Out of bed to chair 2 times a day   - Out of bed for meals 3  times a day  - Out of bed for toileting  - Record patient progress and toleration of activity level   Outcome: Progressing     Problem: DISCHARGE PLANNING  Goal: Discharge to home or other facility with appropriate resources  Description: INTERVENTIONS:  - Identify barriers to discharge w/patient and caregiver  - Arrange for needed discharge resources and transportation as appropriate  - Identify discharge learning needs (meds, wound care, etc.)  - Arrange for interpretive services to assist at discharge as needed  - Refer to Case Management Department for coordinating discharge planning if the patient needs post-hospital services based on physician/advanced practitioner order or complex needs related to functional status, cognitive ability, or social support system  Outcome: Progressing     Problem: Knowledge Deficit  Goal: Patient/family/caregiver demonstrates understanding of disease process, treatment plan, medications, and discharge instructions  Description: Complete learning assessment and assess knowledge base.  Interventions:  - Provide teaching at level of understanding  - Provide teaching via preferred learning methods  Outcome: Progressing     Problem: Nutrition/Hydration-ADULT  Goal: Nutrient/Hydration intake appropriate for improving, restoring or maintaining nutritional needs  Description: Monitor and assess patient's nutrition/hydration status for malnutrition. Collaborate with interdisciplinary team and initiate plan and interventions as ordered.  Monitor patient's weight and dietary intake as ordered or per policy. Utilize nutrition screening tool and intervene as necessary. Determine patient's food preferences and provide high-protein, high-caloric foods as appropriate.     INTERVENTIONS:  - Monitor oral intake, urinary output, labs, and treatment plans  - Assess nutrition and hydration status and recommend course of action  - Evaluate amount of meals eaten  - Assist patient with eating if  necessary   - Allow adequate time for meals  - Recommend/ encourage appropriate diets, oral nutritional supplements, and vitamin/mineral supplements  - Order, calculate, and assess calorie counts as needed  - Recommend, monitor, and adjust tube feeding based on assessed needs  - Assess need for intravenous fluids  - Provide nutrition/hydration education as appropriate  - Include patient/family/caregiver in decisions related to nutrition  Outcome: Progressing     Problem: NEUROSENSORY - ADULT  Goal: Achieves stable or improved neurological status  Description: INTERVENTIONS  - Monitor and report changes in neurological status  - Monitor vital signs such as temperature, blood pressure, glucose, and any other labs ordered   - Initiate measures to prevent increased intracranial pressure  - Monitor for seizure activity and implement precautions if appropriate      Outcome: Progressing  Goal: Remains free of injury related to seizures activity  Description: INTERVENTIONS  - Maintain airway, patient safety  and administer oxygen as ordered  - Monitor patient for seizure activity, document and report duration and description of seizure to physician/advanced practitioner  - If seizure occurs,  ensure patient safety during seizure  - Reorient patient post seizure  - Seizure pads on all 4 side rails  - Instruct patient/family to notify RN of any seizure activity including if an aura is experienced  - Instruct patient/family to call for assistance with activity based on nursing assessment  - Administer anti-seizure medications if ordered    Outcome: Progressing  Goal: Achieves maximal functionality and self care  Description: INTERVENTIONS  - Monitor swallowing and airway patency with patient fatigue and changes in neurological status  - Encourage and assist patient to increase activity and self care.   - Encourage visually impaired, hearing impaired and aphasic patients to use assistive/communication devices  Outcome:  Progressing     Problem: CARDIOVASCULAR - ADULT  Goal: Maintains optimal cardiac output and hemodynamic stability  Description: INTERVENTIONS:  - Monitor I/O, vital signs and rhythm  - Monitor for S/S and trends of decreased cardiac output  - Administer and titrate ordered vasoactive medications to optimize hemodynamic stability  - Assess quality of pulses, skin color and temperature  - Assess for signs of decreased coronary artery perfusion  - Instruct patient to report change in severity of symptoms  Outcome: Progressing  Goal: Absence of cardiac dysrhythmias or at baseline rhythm  Description: INTERVENTIONS:  - Continuous cardiac monitoring, vital signs, obtain 12 lead EKG if ordered  - Administer antiarrhythmic and heart rate control medications as ordered  - Monitor electrolytes and administer replacement therapy as ordered  Outcome: Progressing     Problem: RESPIRATORY - ADULT  Goal: Achieves optimal ventilation and oxygenation  Description: INTERVENTIONS:  - Assess for changes in respiratory status  - Assess for changes in mentation and behavior  - Position to facilitate oxygenation and minimize respiratory effort  - Oxygen administered by appropriate delivery if ordered  - Initiate smoking cessation education as indicated  - Encourage broncho-pulmonary hygiene including cough, deep breathe, Incentive Spirometry  - Assess the need for suctioning and aspirate as needed  - Assess and instruct to report SOB or any respiratory difficulty  - Respiratory Therapy support as indicated  Outcome: Progressing     Problem: GASTROINTESTINAL - ADULT  Goal: Maintains or returns to baseline bowel function  Description: INTERVENTIONS:  - Assess bowel function  - Encourage oral fluids to ensure adequate hydration  - Administer IV fluids if ordered to ensure adequate hydration  - Administer ordered medications as needed  - Encourage mobilization and activity  - Consider nutritional services referral to assist patient with  adequate nutrition and appropriate food choices  Outcome: Progressing  Goal: Maintains adequate nutritional intake  Description: INTERVENTIONS:  - Monitor percentage of each meal consumed  - Identify factors contributing to decreased intake, treat as appropriate  - Assist with meals as needed  - Monitor I&O, weight, and lab values if indicated  - Obtain nutrition services referral as needed  Outcome: Progressing     Problem: GENITOURINARY - ADULT  Goal: Maintains or returns to baseline urinary function  Description: INTERVENTIONS:  - Assess urinary function  - Encourage oral fluids to ensure adequate hydration if ordered  - Administer IV fluids as ordered to ensure adequate hydration  - Administer ordered medications as needed  - Offer frequent toileting  - Follow urinary retention protocol if ordered  Outcome: Progressing  Goal: Absence of urinary retention  Description: INTERVENTIONS:  - Assess patient’s ability to void and empty bladder  - Monitor I/O  - Bladder scan as needed  - Discuss with physician/AP medications to alleviate retention as needed  - Discuss catheterization for long term situations as appropriate  Outcome: Progressing     Problem: METABOLIC, FLUID AND ELECTROLYTES - ADULT  Goal: Electrolytes maintained within normal limits  Description: INTERVENTIONS:  - Monitor labs and assess patient for signs and symptoms of electrolyte imbalances  - Administer electrolyte replacement as ordered  - Monitor response to electrolyte replacements, including repeat lab results as appropriate  - Instruct patient on fluid and nutrition as appropriate  Outcome: Progressing  Goal: Fluid balance maintained  Description: INTERVENTIONS:  - Monitor labs   - Monitor I/O and WT  - Instruct patient on fluid and nutrition as appropriate  - Assess for signs & symptoms of volume excess or deficit  Outcome: Progressing  Goal: Glucose maintained within target range  Description: INTERVENTIONS:  - Monitor Blood Glucose as  ordered  - Assess for signs and symptoms of hyperglycemia and hypoglycemia  - Administer ordered medications to maintain glucose within target range  - Assess nutritional intake and initiate nutrition service referral as needed  Outcome: Progressing     Problem: SKIN/TISSUE INTEGRITY - ADULT  Goal: Skin Integrity remains intact(Skin Breakdown Prevention)  Description: Assess:  -Perform Martin assessment every shift  -Clean and moisturize skin every shift  -Inspect skin when repositioning, toileting, and assisting with ADLS  -Assess under medical devices such as bracelets every shift  -Assess extremities for adequate circulation and sensation     Bed Management:  -Have minimal linens on bed & keep smooth, unwrinkled  -Change linens as needed when moist or perspiring  -Avoid sitting or lying in one position for more than 2 hours while in bed  -Keep HOB at 30degrees     Toileting:  -Offer bedside commode  -Assess for incontinence every shift  -Use incontinent care products after each incontinent episode such as foaming skin cleanser    Activity:  -Mobilize patient 3 times a day  -Encourage activity and walks on unit  -Encourage or provide ROM exercises   -Turn and reposition patient every 2 Hours  -Use appropriate equipment to lift or move patient in bed  -Instruct/ Assist with weight shifting every 4 hours when out of bed in chair  -Consider limitation of chair time 4 hour intervals    Skin Care:  -Avoid use of baby powder, tape, friction and shearing, hot water or constrictive clothing  -Relieve pressure over bony prominences using allevyns  -Do not massage red bony areas      Outcome: Progressing  Goal: Incision(s), wounds(s) or drain site(s) healing without S/S of infection  Description: INTERVENTIONS  - Assess and document dressing, incision, wound bed, drain sites and surrounding tissue  - Provide patient and family education  - Perform skin care/dressing changes every shift  Outcome: Progressing     Problem:  HEMATOLOGIC - ADULT  Goal: Maintains hematologic stability  Description: INTERVENTIONS  - Assess for signs and symptoms of bleeding or hemorrhage  - Monitor labs  - Administer supportive blood products/factors as ordered and appropriate  Outcome: Progressing     Problem: MUSCULOSKELETAL - ADULT  Goal: Maintain or return mobility to safest level of function  Description: INTERVENTIONS:  - Assess patient's ability to carry out ADLs; assess patient's baseline for ADL function and identify physical deficits which impact ability to perform ADLs (bathing, care of mouth/teeth, toileting, grooming, dressing, etc.)  - Assess/evaluate cause of self-care deficits   - Assess range of motion  - Assess patient's mobility  - Assess patient's need for assistive devices and provide as appropriate  - Encourage maximum independence but intervene and supervise when necessary  - Involve family in performance of ADLs  - Assess for home care needs following discharge   - Consider OT consult to assist with ADL evaluation and planning for discharge  - Provide patient education as appropriate  Outcome: Progressing     Problem: COPING  Goal: Pt/Family able to verbalize concerns and demonstrate effective coping strategies  Description: INTERVENTIONS:  - Assist patient/family to identify coping skills, available support systems and cultural and spiritual values  - Provide emotional support, including active listening and acknowledgement of concerns of patient and caregivers  - Reduce environmental stimuli, as able  - Provide patient education  - Assess for spiritual pain/suffering and initiate spiritual care, including notification of Pastoral Care or halie based community as needed  - Assess effectiveness of coping strategies  Outcome: Progressing  Goal: Will report anxiety at manageable levels  Description: INTERVENTIONS:  - Administer medication as ordered  - Teach and encourage coping skills  - Provide emotional support  - Assess  patient/family for anxiety and ability to cope  Outcome: Progressing     Problem: Potential for Falls  Goal: Patient will remain free of falls  Description: INTERVENTIONS:  - Educate patient/family on patient safety including physical limitations  - Instruct patient to call for assistance with activity   - Consult OT/PT to assist with strengthening/mobility   - Keep Call bell within reach  - Keep bed low and locked with side rails adjusted as appropriate  - Keep care items and personal belongings within reach  - Initiate and maintain comfort rounds  - Make Fall Risk Sign visible to staff  - Offer Toileting every 2 Hours, in advance of need  - Initiate/Maintain bed alarm  - Obtain necessary fall risk management equipment: bed alarm  - Apply yellow socks and bracelet for high fall risk patients  - Consider moving patient to room near nurses station  Outcome: Progressing     Problem: SAFETY,RESTRAINT: NV/NON-SELF DESTRUCTIVE BEHAVIOR  Goal: Remains free of harm/injury (restraint for non violent/non self-detsructive behavior)  Description: INTERVENTIONS:  - Instruct patient/family regarding restraint use   - Assess and monitor physiologic and psychological status   - Provide interventions and comfort measures to meet assessed patient needs   - Identify and implement measures to help patient regain control  - Assess readiness for release of restraint   Outcome: Progressing  Goal: Returns to optimal restraint-free functioning  Description: INTERVENTIONS:  - Assess the patient's behavior and symptoms that indicate continued need for restraint  - Identify and implement measures to help patient regain control  - Assess readiness for release of restraint   Outcome: Progressing

## 2024-07-26 NOTE — UTILIZATION REVIEW
Continued Stay Review    Date: 7/26                          Current Patient Class: Inpatient  Current Level of Care: Critical Care    HPI:68 y.o. 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 Mnceal 7/13)  PPD#13 R frontal EVD placement (Dr. Goldberg, 7/13)  aSAH s/p R PCOM aneurysm rupture  Bleed day 14    CTA poor quality due to patient movement, plan to repeat tomorrow 7/27  EVD removed 7/26 and sutured closed. Exit CSF obtained 7/26, follow up results.  Continue on Levophed drip for pressure support. Aspirin reordered this am  Continue to hold Heparin drip for now.     Progress notes;   24hr events: LEVO @ 18, CTA was done but patient was shaking and unsure if diagnostic   Can give precedex if needs a repeat CTA   NGT placed on 7/13. Continue on O2 4L NC. Iv antibiotics.   Repeat CTA in am w/ Precedex. Restarted Iv Heparin. Wean Levophed as able.  Consider midodrine for hypotensive episodes.  D/c provigil given was getting agitated and req zyprexa 07/25  C.diff pending. Had 5 BM on 7/25.   Continue goals of care. Per previous conversations, family want feeding tube. Consider repeat VBS next wk.         Vital Signs (last 3 days)       Date/Time Temp Pulse Resp BP MAP (mmHg) SpO2 Calculated FIO2 (%) - Nasal Cannula O2 Flow Rate (L/min) Nasal Cannula O2 Flow Rate (L/min) O2 Device O2 Interface Device Patient Position - Orthostatic VS ICP Mean (mmHg) CPP Cuff-Calculated (mmHg) [place] Coma Scale Score Pain    07/26/24 1300 -- 96 13 135/75 90 99 % 36 -- 4 L/min Nasal cannula -- Lying -- -- -- --    07/26/24 1200 97.6 °F (36.4 °C) 100 15 126/74 88 99 % 36 -- 4 L/min Nasal cannula -- Lying -- -- 14 10 - Worst Possible Pain    07/26/24 1100 -- 108 19 148/80 100 97 % 36 -- 4 L/min Nasal cannula -- Lying -- -- -- --    07/26/24 1000 -- 106 18 158/89 118 99 % 36 -- 4 L/min Nasal cannula -- Lying -- -- 14 --    07/26/24 0900 -- 114 26 138/75 94 96 % 36  -- 4 L/min Nasal cannula -- Lying 4 mmHg 90 -- --    07/26/24 0800 99.5 °F (37.5 °C) 112 26 150/94 110 99 % 36 -- 4 L/min Nasal cannula -- Lying 3 mmHg 107 14 10 - Worst Possible Pain    07/26/24 0700 -- 110 16 172/89 115 99 % -- -- -- -- -- -- 0 mmHg 115 -- --    07/26/24 0630 -- 104 12 149/77 105 99 % -- -- -- -- -- -- 0 mmHg 105 -- --    07/26/24 0600 -- 114 16 139/73 92 99 % -- -- -- -- -- -- 1 mmHg 91 -- --    07/26/24 0545 -- 116 21 155/94 111 98 % -- -- -- -- -- -- 0 mmHg 111 -- 10 - Worst Possible Pain    07/26/24 0430 -- 114 28 140/67 83 99 % -- -- -- -- -- -- 4 mmHg 79 -- --    07/26/24 0400 98.3 °F (36.8 °C) 108 23 158/85 109 100 % 36 -- 4 L/min Nasal cannula -- -- 4 mmHg 105 15 No Pain    07/26/24 0330 -- 104 23 153/82 110 100 % -- -- -- -- -- -- 4 mmHg 106 -- --    07/26/24 0300 -- 110 23 154/82 108 99 % -- -- -- -- -- -- 4 mmHg 104 -- --    07/26/24 0230 -- 108 20 140/74 92 99 % -- -- -- -- -- -- 5 mmHg 87 -- --    07/26/24 0200 -- 108 17 124/64 80 98 % -- -- -- -- -- -- 6 mmHg 74 -- --    07/26/24 0130 -- 100 15 148/75 99 100 % -- -- -- -- -- -- 8 mmHg 91 -- --    07/26/24 0100 -- 102 14 134/71 92 100 % -- -- -- -- -- -- 6 mmHg 86 -- --    07/26/24 0030 -- 106 14 130/66 92 98 % -- -- -- -- -- -- 7 mmHg 85 -- --    07/26/24 0000 100 °F (37.8 °C) 102 14 135/75 94 100 % 36 -- 4 L/min Nasal cannula -- -- 8 mmHg 86 15 No Pain    07/25/24 2330 -- 100 14 144/82 100 99 % -- -- -- -- -- -- 9 mmHg 91 -- --    07/25/24 2300 -- 102 13 126/72 90 100 % -- -- -- -- -- -- 10 mmHg 80 -- --    07/25/24 2230 -- 110 22 127/62 78 98 % -- -- -- -- -- -- 3 mmHg 75 -- --    07/25/24 2206 -- -- -- -- -- -- -- -- -- -- -- -- -- -- -- 10 - Worst Possible Pain    07/25/24 2200 -- 98 18 154/84 102 100 % -- -- -- -- -- -- 0 mmHg 102 -- --    07/25/24 2130 -- 116 36 158/81 109 98 % -- -- -- -- -- -- 2 mmHg 107 -- --    07/25/24 2100 -- 102 20 120/68 83 98 % -- -- -- -- -- -- 5 mmHg 78 -- --    07/25/24 2030 -- 92 20 171/89 113 100  % -- -- -- -- -- -- 2 mmHg 111 -- --    07/25/24 2000 98.4 °F (36.9 °C) 94 19 166/86 108 100 % 36 -- 4 L/min Nasal cannula -- -- 3 mmHg 105 15 No Pain    07/25/24 1930 -- 88 15 142/85 104 100 % -- -- -- -- -- -- 5 mmHg 99 -- --    07/25/24 1900 -- 94 17 149/80 101 100 % -- -- -- -- -- -- 4 mmHg 97 -- --    07/25/24 1800 -- 90 22 160/82 102 100 % 36 -- 4 L/min Nasal cannula -- Lying 3 mmHg 99 -- --    07/25/24 1700 -- 86 20 149/79 104 100 % 36 -- 4 L/min Nasal cannula -- Lying 6 mmHg 98 -- --    07/25/24 1600 98.5 °F (36.9 °C) 90 21 104/61 77 99 % 36 -- 4 L/min Nasal cannula -- Lying 6 mmHg 71 15 10 - Worst Possible Pain    07/25/24 1528 -- -- -- -- -- -- -- -- -- -- -- -- -- -- -- 10 - Worst Possible Pain    07/25/24 1500 -- 84 40 133/71 88 97 % 36 -- 4 L/min Nasal cannula -- Lying 2 mmHg 86 -- --    07/25/24 1402 -- -- -- 146/68 -- -- -- -- -- -- -- -- -- -- -- --    07/25/24 1400 98.5 °F (36.9 °C) 96 42 146/68 96 94 % 36 -- 4 L/min Nasal cannula -- Lying 5 mmHg 91 15 --    07/25/24 1300 -- 100 22 125/64 79 97 % 36 -- 4 L/min Nasal cannula -- Lying 5 mmHg 74 -- --    07/25/24 1220 -- -- -- -- -- -- -- -- -- -- -- -- -- -- -- 10 - Worst Possible Pain    07/25/24 1200 98.8 °F (37.1 °C) 102 23 145/78 98 96 % 36 -- 4 L/min Nasal cannula -- Lying 6 mmHg 92 15 10 - Worst Possible Pain    07/25/24 1120 -- -- -- -- -- 97 % 36 -- 4 L/min Nasal cannula -- -- -- -- -- --    07/25/24 1100 -- 106 23 140/67 87 96 % 36 -- 4 L/min Nasal cannula -- Lying -7 mmHg 94 -- --    07/25/24 1008 -- -- -- 139/74 -- -- -- -- -- -- -- -- -- -- -- --    07/25/24 1000 98.2 °F (36.8 °C) 100 34 139/74 91 98 % 36 -- 4 L/min Nasal cannula -- Lying 2 mmHg 89 15 --    07/25/24 0900 -- 98 29 138/76 92 98 % 36 -- 4 L/min Nasal cannula -- Lying 1 mmHg 91 -- --    07/25/24 0813 -- -- -- 158/84 -- -- -- -- -- -- -- -- -- -- -- --    07/25/24 0800 97.8 °F (36.6 °C) 94 62 158/84 110 98 % 36 -- 4 L/min Nasal cannula -- Lying 6 mmHg 104 15 10 - Worst Possible  Pain    07/25/24 0700 -- 88 39 143/70 88 100 % 36 -- 4 L/min Nasal cannula -- Lying 1 mmHg 87 -- --    07/25/24 0600 -- 86 30 123/59 82 100 % -- -- -- -- -- -- 0 mmHg 82 -- --    07/25/24 0530 -- 84 20 146/75 102 100 % -- -- -- -- -- -- -- -- -- --    07/25/24 0500 -- 84 19 143/73 103 100 % -- -- -- -- -- -- 0 mmHg 103 -- --    07/25/24 0430 -- 86 24 121/64 81 99 % -- -- -- -- -- -- -- -- -- --    07/25/24 0400 98.2 °F (36.8 °C) 78 20 154/79 109 100 % 36 -- 4 L/min Nasal cannula -- -- 0 mmHg 109 14 --    07/25/24 0330 -- 80 38 146/74 96 100 % -- -- -- -- -- -- -- -- -- --    07/25/24 0300 -- 80 46 131/71 91 100 % -- -- -- -- -- -- 0 mmHg 91 -- --    07/25/24 0230 -- 82 35 120/65 82 99 % -- -- -- -- -- -- -- -- -- --    07/25/24 0200 98.3 °F (36.8 °C) 84 32 133/70 86 100 % -- -- -- -- -- -- 1 mmHg 85 -- --    07/25/24 0130 -- 92 31 149/75 97 97 % -- -- -- -- -- -- -- -- -- --    07/25/24 0100 -- 100 26 120/68 86 96 % -- -- -- -- -- -- 0 mmHg 86 -- --    07/25/24 0030 -- 108 27 139/68 89 94 % -- -- -- -- -- -- -- -- -- --    07/25/24 0021 -- -- -- -- -- -- -- -- -- -- -- -- -- -- -- Med Not Given for Pain - for MAR use only    07/25/24 0000 99.9 °F (37.7 °C) 108 27 129/72 106 94 % 36 -- 4 L/min Nasal cannula -- -- 0 mmHg 106 14 --    07/24/24 2330 -- 110 24 139/68 99 93 % -- -- -- -- -- -- -- -- -- --    07/24/24 2300 100.5 °F (38.1 °C) 110 27 133/64 92 92 % -- -- -- -- -- -- 0 mmHg 92 -- --    07/24/24 2230 -- 114 28 111/56 78 94 % -- -- -- -- -- -- -- -- -- --    07/24/24 2200 101.1 °F (38.4 °C) 110 33 142/75 98 96 % -- -- -- -- -- -- 1 mmHg 97 -- --    07/24/24 2130 -- 98 23 131/63 94 97 % -- -- -- -- -- -- -- -- -- --    07/24/24 2100 -- 98 19 112/63 77 96 % -- -- -- -- -- -- 3 mmHg 74 -- --    07/24/24 2030 -- 96 27 117/65 78 97 % -- -- -- -- -- -- -- -- -- --    07/24/24 2014 -- -- -- -- -- 94 % -- -- -- -- -- -- -- -- -- --    07/24/24 2000 99.8 °F (37.7 °C) 92 23 137/77 95 95 % 36 -- 4 L/min Nasal cannula --  -- 2 mmHg 93 14 --    07/24/24 1930 -- 94 20 115/69 83 98 % 36 -- 4 L/min Nasal cannula -- -- 6 mmHg 77 -- --    07/24/24 1900 -- 100 20 102/60 73 97 % 36 -- 4 L/min Nasal cannula -- -- 7 mmHg 66 -- --    07/24/24 1830 100.1 °F (37.8 °C) 90 22 119/73 88 98 % 36 -- 4 L/min Nasal cannula -- -- 1 mmHg 87 -- --    07/24/24 1800 100.6 °F (38.1 °C) 92 22 106/62 74 97 % 36 -- 4 L/min Nasal cannula -- -- -1 mmHg 75 14 --    07/24/24 1700 101 °F (38.3 °C) 96 24 116/64 80 96 % 36 -- 4 L/min Nasal cannula -- -- 6 mmHg 74 -- --    07/24/24 1636 -- -- -- -- -- -- -- -- -- -- -- -- -- -- -- Med Not Given for Pain - for MAR use only    07/24/24 1630 100.7 °F (38.2 °C) 94 20 143/78 97 97 % 36 -- 4 L/min Nasal cannula -- -- 8 mmHg 89 -- --    07/24/24 1600 -- -- -- -- -- -- -- -- -- -- -- -- -- -- 14 --    07/24/24 1520 -- 94 19 125/69 90 96 % 36 -- 4 L/min Nasal cannula -- -- 0 mmHg 90 -- --    07/24/24 1500 99.8 °F (37.7 °C) 94 17 111/63 78 93 % 36 -- 4 L/min Nasal cannula -- -- 0 mmHg 78 -- --    07/24/24 1400 -- 86 15 127/71 88 93 % 36 -- 4 L/min Nasal cannula -- -- 5 mmHg 83 14 --    07/24/24 1331 -- -- -- -- -- 96 % 36 -- 4 L/min Nasal cannula -- -- -- -- -- --    07/24/24 1300 97.2 °F (36.2 °C) 92 24 106/65 78 94 % -- -- -- -- -- -- 0 mmHg 78 -- --    07/24/24 1230 97.2 °F (36.2 °C) 80 22 134/74 92 97 % -- -- -- Mid flow nasal cannula -- -- 4 mmHg 88 -- --    07/24/24 1213 97.9 °F (36.6 °C) 82 24 133/74 88 97 % 44 -- 6 L/min Mid flow nasal cannula -- -- 2 mmHg 86 -- --    07/24/24 1200 -- -- -- -- -- -- -- -- -- -- -- -- -- -- 13 --    07/24/24 1100 101.1 °F (38.4 °C) 100 24 127/67 82 96 % -- -- -- -- -- -- -1 mmHg 83 -- --    07/24/24 1030 102.9 °F (39.4 °C) 102 24 117/62 79 95 % -- -- -- -- -- -- 1 mmHg 78 -- --    07/24/24 1006 -- -- -- -- -- -- -- -- -- -- -- -- -- -- -- Med Not Given for Pain - for MAR use only    07/24/24 0955 102.8 °F (39.3 °C) 104 24 148/72 92 95 % 44 -- 6 L/min Mid flow nasal cannula -- -- 3 mmHg 89  -- --    07/24/24 0900 -- 100 20 128/75 91 97 % -- -- -- -- -- -- 5 mmHg 86 -- --    07/24/24 0815 101.7 °F (38.7 °C) 100 22 112/59 73 97 % 44 -- 6 L/min Mid flow nasal cannula -- -- 2 mmHg 71 -- --    07/24/24 0800 -- -- -- -- -- -- -- -- -- -- -- -- -- -- 13 --    07/24/24 0745 101.5 °F (38.6 °C) 96 24 130/66 86 99 % -- -- -- -- -- -- 1 mmHg 85 -- --    07/24/24 0715 100.9 °F (38.3 °C) 90 22 122/66 85 96 % -- -- -- -- -- -- 5 mmHg 80 -- --    07/24/24 0710 -- -- -- -- -- 96 % 44 -- 6 L/min Mid flow nasal cannula -- -- -- -- -- --    07/24/24 0615 -- 86 42 131/72 90 95 % -- -- -- -- -- -- -- -- -- --    07/24/24 0600 101.2 °F (38.4 °C) 86 24 115/67 83 95 % -- -- -- -- -- -- 13 mmHg 70 -- --    07/24/24 0545 -- 90 45 109/67 82 95 % -- -- -- -- -- -- -- -- -- --    07/24/24 0530 -- 86 36 113/60 74 96 % -- -- -- -- -- -- -- -- -- --    07/24/24 0515 -- 92 45 109/60 78 95 % -- -- -- -- -- -- -- -- -- --    07/24/24 0500 -- 94 25 111/60 71 95 % 44 -- 6 L/min Mid flow nasal cannula -- -- 3 mmHg 68 -- --    07/24/24 0445 -- 96 26 106/55 71 94 % -- -- -- -- -- -- -- -- -- --    07/24/24 0430 -- 96 25 103/49 62 93 % -- -- -- -- -- -- -- -- -- --    07/24/24 0415 -- 98 26 107/52 65 95 % -- -- -- -- -- -- -- -- -- --    07/24/24 0400 102 °F (38.9 °C) 98 25 128/64 83 97 % 52 -- 8 L/min Mid flow nasal cannula -- -- 1 mmHg 82 13 Med Not Given for Pain - for MAR use only    07/24/24 0345 -- 98 23 124/63 83 96 % -- -- -- -- -- -- -- -- -- --    07/24/24 0330 -- 96 22 125/65 94 96 % -- -- -- -- -- -- -- -- -- --    07/24/24 0316 -- -- -- -- -- 95 % 52 -- 8 L/min Mid flow nasal cannula -- -- -- -- -- --    07/24/24 0315 -- 98 22 133/65 83 96 % -- -- -- -- -- -- -- -- -- --    07/24/24 0300 -- 98 21 115/61 75 96 % -- -- -- -- -- -- 2 mmHg 73 -- --    07/24/24 0245 -- 100 22 118/60 75 96 % -- -- -- -- -- -- -- -- -- --    07/24/24 0230 -- 102 23 115/59 72 93 % -- -- -- -- -- -- -- -- -- --    07/24/24 0215 -- 104 25 123/60 75 -- -- --  -- -- -- -- -- -- -- --    07/24/24 0200 -- 102 21 116/56 74 94 % -- -- -- -- -- -- 2 mmHg 72 -- --    07/24/24 0145 -- 102 25 111/53 77 93 % -- -- -- -- -- -- 1 mmHg 76 -- --    07/24/24 0100 -- 88 18 124/64 86 95 % -- -- -- -- -- -- 1 mmHg 85 -- --    07/24/24 0045 -- 86 17 114/61 89 -- -- -- -- -- -- -- -- -- -- --    07/24/24 0030 -- 86 18 118/61 82 95 % -- -- -- -- -- -- -- -- -- --    07/24/24 0000 100.4 °F (38 °C) 88 17 107/59 72 95 % 52 -- 8 L/min Mid flow nasal cannula -- -- 1 mmHg 71 13 --    07/23/24 2300 -- 84 17 98/57 69 94 % -- -- -- -- -- -- 5 mmHg 64 -- --    07/23/24 2200 -- 90 20 82/49 57 95 % -- -- -- -- -- -- 1 mmHg 56 -- --    07/23/24 2112 -- -- -- -- -- -- -- -- -- -- -- -- -- -- -- Med Not Given for Pain - for MAR use only    07/23/24 2100 -- 108 27 132/74 93 96 % -- -- -- -- -- -- 14 mmHg 79 -- --    07/23/24 2000 100.2 °F (37.9 °C) 94 16 113/61 75 100 % -- 8 L/min -- Mid flow nasal cannula -- -- 7 mmHg 68 13 --    07/23/24 1940 -- -- -- -- -- -- -- 8 L/min -- Mid flow nasal cannula -- -- -- -- -- --    07/23/24 1900 -- 94 20 100/55 68 95 % -- -- -- -- -- -- 10 mmHg 58 -- --    07/23/24 1835 -- 94 18 96/55 65 97 % -- -- -- -- -- -- 6 mmHg 59 -- --    07/23/24 1825 -- 94 18 92/54 64 97 % -- -- -- -- -- -- 9 mmHg 55 -- --    07/23/24 1800 -- 96 24 79/47 55 98 % -- -- -- -- -- -- 9 mmHg 46 -- --    07/23/24 1730 -- 102 23 70/42 48 96 % -- -- -- -- -- -- 10 mmHg 38 -- --    07/23/24 1700 -- 96 17 100/57 69 99 % -- -- -- -- -- -- 10 mmHg 59 -- --    07/23/24 1637 -- -- -- -- -- -- -- -- -- -- -- -- -- -- -- Med Not Given for Pain - for MAR use only    07/23/24 1630 100.1 °F (37.8 °C) 92 20 126/38 93 100 % 52 -- 8 L/min Mid flow nasal cannula -- -- 9 mmHg 84 -- --    07/23/24 1600 -- 90 17 117/66 82 99 % -- -- -- -- -- -- 13 mmHg 69 14 --    07/23/24 1530 -- 92 14 109/67 77 96 % -- -- -- -- -- -- 12 mmHg 65 -- --    07/23/24 1515 -- 94 15 115/65 75 97 % -- -- -- -- -- -- 10 mmHg 65 -- --     07/23/24 1450 -- 92 20 108/64 77 99 % -- -- -- -- -- -- -- -- -- --    07/23/24 1405 -- 92 16 94/55 66 97 % -- -- -- -- -- -- -- -- -- --    07/23/24 1335 -- -- -- -- -- -- 52 -- 8 L/min Mid flow nasal cannula -- -- -- -- -- --    07/23/24 1300 -- 86 22 107/66 76 100 % -- -- -- -- -- -- 13 mmHg 63 -- --    07/23/24 1200 99.8 °F (37.7 °C) 88 28 92/57 66 99 % -- -- -- -- -- -- 14 mmHg 52 14 --    07/23/24 1124 -- 88 28 94/58 66 100 % 52 -- 8 L/min Mid flow nasal cannula -- -- 12 mmHg 54 -- --    07/23/24 1100 -- 96 23 74/47 54 100 % 52 -- 8 L/min Mid flow nasal cannula -- -- 6 mmHg 48 -- --    07/23/24 1001 -- -- -- -- -- -- -- -- -- -- -- -- -- -- -- Med Not Given for Pain - for MAR use only    07/23/24 1000 -- 106 24 131/69 86 96 % -- -- -- -- -- -- 2 mmHg 84 -- --    07/23/24 0950 99.4 °F (37.4 °C) 106 24 118/66 88 97 % 52 -- 8 L/min Mid flow nasal cannula -- -- 8 mmHg 80 -- --    07/23/24 0900 -- 106 23 147/77 97 98 % 52 -- 8 L/min Mid flow nasal cannula -- -- 4 mmHg 93 -- --    07/23/24 0832 -- -- -- -- -- -- 52 -- 8 L/min Mid flow nasal cannula -- -- -- -- -- --    07/23/24 0800 -- 96 22 119/68 100 99 % -- -- -- -- -- -- 5 mmHg 95 14 --    07/23/24 0700 -- 92 24 112/65 81 97 % -- -- -- -- -- -- 5 mmHg 76 -- --    07/23/24 0600 -- 96 32 101/67 79 99 % -- -- -- -- -- -- 5 mmHg 74 -- --    07/23/24 0500 -- 100 25 102/51 73 99 % -- -- -- -- -- -- 5 mmHg 68 -- --    07/23/24 0440 -- -- -- -- -- -- -- -- -- -- -- -- -- -- -- Med Not Given for Pain - for MAR use only    07/23/24 0400 98.6 °F (37 °C) 102 23 138/68 94 99 % 68 -- 12 L/min Mid flow nasal cannula -- Lying 6 mmHg 88 14 No Pain    07/23/24 0300 -- 100 22 133/73 93 100 % -- -- -- -- -- -- 7 mmHg 86 -- --    07/23/24 0250 -- -- -- -- -- 99 % 68 -- 12 L/min Mid flow nasal cannula MFNC prongs -- -- -- -- --    07/23/24 0200 -- 104 24 107/63 73 96 % -- -- -- -- -- -- 6 mmHg 67 -- --    07/23/24 0100 -- 110 27 145/87 104 99 % -- -- -- -- -- -- 9 mmHg 95 -- --     07/23/24 0000 98.8 °F (37.1 °C) 102 21 143/77 98 98 % 80 -- 15 L/min Mid flow nasal cannula -- Lying 9 mmHg 89 14 No Pain          Weight (last 2 days)       None              Pertinent Labs/Diagnostic Results:   Radiology:  VAS transcranial doppler, complete study   Final Interpretation by Rhoda Justin MD (07/26 1005)      CTA head w wo contrast   Final Interpretation by Chrissy Moseley MD (07/26 0810)         1. Improved subarachnoid hemorrhage and intraventricular hemorrhage as described above. Ventricles are unchanged in size.   2. Stable areas of low-attenuation involving the right temporal lobe and right occipital lobe which represent evolving infarcts.   3. Nondiagnostic CT angiogram of the head due to the extensive motion artifact. Postoperative changes again administrated from prior coil embolization of a right posterior communicating artery aneurysm..      Workstation performed: [id number]         VAS transcranial doppler, complete study   Final Interpretation by Rhoda Justin MD (07/25 2049)      VAS transcranial doppler, complete study   Final Interpretation by Rhoda Justin MD (07/25 2025)      XR chest portable ICU   Final Interpretation by Mike Juárez MD (07/24 0848)      Retrocardiac consolidation and lobulated right lung density, both of which are new. Findings probably represent bilateral pneumonia. Some degree of atelectasis may also be present.      Suspected small left effusion            Workstation performed: [id number]         FL barium swallow video w speech   Final Interpretation by KARLI CORREA (07/23 1521)      VAS transcranial doppler, complete study   Final Interpretation by Monisha Sanford DO (07/23 1614)      CTA head and neck w wo contrast   Final Interpretation by Celia Daigle MD (07/23 0654)      CT Brain:   Improving subarachnoid and intraventricular hemorrhage.      Unchanged mild hydrocephalus. EVD in stable position.      Unchanged anterior right  temporal edema, likely evolving infarct.      CT Angiography: No evidence of residual or recurrent right P-comm aneurysm. No acute vascular pathology in the head or neck.                     Workstation performed: [id number]         VAS transcranial doppler, complete study   Final Interpretation by Monisha Sanford DO (07/22 1934)      VAS transcranial doppler, complete study   Final Interpretation by Angelina Kennedy MD (07/21 1712)      XR chest portable ICU   Final Interpretation by Collins Prater MD (07/22 0535)      No acute cardiopulmonary disease.            Workstation performed: [id number]         VAS transcranial doppler, complete study   Final Interpretation by Angelina Kennedy MD (07/20 1834)      VAS transcranial doppler, complete study   Final Interpretation by Angelina Kennedy MD (07/20 1834)      XR chest portable ICU   Final Interpretation by Mark Singh MD (07/19 1418)      No acute cardiopulmonary disease.      Endotracheal tube tip near the landon.  Consider retraction and repeat radiograph.         Workstation performed: [id number]         VAS transcranial doppler, complete study   Final Interpretation by Kameron Mistry MD (07/18 1634)      FL barium swallow video w speech   Final Interpretation by KARLI CORREA (07/17 1258)      VAS transcranial doppler, complete study   Final Interpretation by Rhoda Justin MD (07/18 0055)      CTA head and neck w wo contrast   Final Interpretation by Noa Batista MD (07/16 2208)      CT Brain: Redemonstrated subarachnoid hemorrhage overlying the right greater than left convexities and concentrated within the right sylvian cistern and fissure. .      Subacute infarct involving the right anterior temporal region and medial occipital region..      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                   Workstation performed: [id number]         VAS transcranial doppler, complete study   Final Interpretation by John Conner DO (07/16 2239)      VAS transcranial doppler, complete study   Final Interpretation by Angelina Kennedy MD (07/15 1726)      VAS transcranial doppler, complete study   Final Interpretation by Mick Last MD (07/14 2256)      CT head wo contrast   Final Interpretation by Gm Huff MD (07/14 0546)      No significant interval change in extensive bilateral subarachnoid hemorrhage and intraventricular hemorrhage.               Workstation performed: [id number]         IR cerebral angiography / intervention   Final Interpretation by Ruslan Mcneal MD (07/15 1436)      Successful balloon assisted coil embolization of a 4.5 mm right P-comm aneurysm.               Workstation performed: [id number]         CT head wo contrast   Final Interpretation by Vahid Rodriguez DO (07/13 0855)      Interval placement of right-sided shunt catheter. Stable ventricular size with slight interval increase in intraventricular hemorrhage layering in the occipital horns bilaterally. Diffuse subarachnoid hemorrhage again noted.                  Workstation performed: [id number]         XR chest portable ICU   Final Interpretation by Mamta Jaeger MD (07/12 2217)      No acute cardiopulmonary disease.      ET tube 6 cm above the landon.      Workstation performed: [id number]         VAS transcranial doppler, complete study    (Results Pending)   VAS transcranial doppler, complete study    (Results Pending)     Cardiology:  ECG 12 lead   Final Result by Mat Mobley MD (07/19 1634)   Sinus tachycardia   Poor anterior R wave progression is noted   Abnormal ECG   When compared with ECG of 18-JUL-2024 07:56,   Premature ectopic complexes are no longer Present   Confirmed by Mat Mobley ([id number]) on 7/19/2024 4:34:24 PM      ECG 12 lead   Final Result by Farnaz Chacon DO (07/18 1632)    Sinus tachycardia with occasional Premature ectopic complexes   Poor anterior R wave progression is noted   When compared with ECG of 14-JUL-2024 17:50,   Premature ectopic complexes are now Present   Confirmed by Farnaz Chacon ([id number]) on 7/18/2024 4:32:33 PM      ECG 12 lead   Final Result by Mat Mobley MD (07/15 2308)   Normal sinus rhythm   Poor anterior R wave progression is noted   Abnormal ECG   No previous ECGs available   Confirmed by Mat Mobley ([id number]) on 7/15/2024 11:08:37 PM      Echo complete w/ contrast if indicated   Final Result by Dmitry Sanchez MD (07/13 0384)        Technically difficult study     Left Ventricle: Left ventricular cavity size is normal. Wall thickness    is normal. The left ventricular ejection fraction is 55% by visual    estimation. Systolic function is normal.     Right Ventricle: Right ventricular cavity size is normal. Systolic    function is normal.     Mitral Valve: There is mild regurgitation.           GI:  No orders to display       Results from last 7 days   Lab Units 07/24/24  1238   SARS-COV-2  Negative     Results from last 7 days   Lab Units 07/26/24  0556 07/25/24  0544 07/24/24  0509 07/23/24  0454 07/22/24  0541 07/21/24  0523   WBC Thousand/uL 15.47* 18.20* 21.87* 28.65* 22.76* 13.56*   HEMOGLOBIN g/dL 10.0* 9.6* 9.4* 10.9* 10.9* 11.2*   HEMATOCRIT % 30.1* 29.2* 27.9* 32.7* 32.6* 34.0*   PLATELETS Thousands/uL 531* 478* 416* 402* 332 337   TOTAL NEUT ABS Thousands/µL 12.92* 15.19*  --   --   --  11.16*   BANDS PCT %  --   --   --   --  5  --          Results from last 7 days   Lab Units 07/26/24  0556 07/25/24  1532 07/25/24  0544 07/24/24  0509 07/23/24  0454 07/22/24  0541   SODIUM mmol/L 141  --  146 147 148* 145   POTASSIUM mmol/L 4.1 4.2 3.4* 3.5 4.3 3.9   CHLORIDE mmol/L 109*  --  112* 112* 109* 106   CO2 mmol/L 23  --  24 26 27 29   ANION GAP mmol/L 9  --  10 9 12 10   BUN mg/dL 20  --  22 29* 32* 29*   CREATININE mg/dL 0.74  --  0.67 0.80  0.76 0.75   EGFR ml/min/1.73sq m 94  --  98 91 93 93   CALCIUM mg/dL 8.7  --  8.8 8.9 9.7 9.8   CALCIUM, IONIZED mmol/L 1.15  --  1.17 1.18 1.22 1.24   MAGNESIUM mg/dL 2.2  --  2.1 2.2 2.4 2.3   PHOSPHORUS mg/dL 3.1 2.5 2.7 2.3 3.1 3.0     Results from last 7 days   Lab Units 07/26/24  0556 07/25/24  0544 07/23/24  0454 07/21/24  0523   AST U/L 32 21 21 16   ALT U/L 21 15 16 17   ALK PHOS U/L 128* 115* 118* 109*   TOTAL PROTEIN g/dL 7.2 6.7 7.5 6.9   ALBUMIN g/dL 3.3* 3.2* 3.6 3.6   TOTAL BILIRUBIN mg/dL 0.50 0.42 0.43 0.46   BILIRUBIN DIRECT mg/dL  --   --  0.07  --      Results from last 7 days   Lab Units 07/26/24  1155 07/26/24  0549 07/25/24  2349 07/25/24  1743 07/25/24  1242 07/25/24  0544 07/25/24  0015 07/24/24  1808 07/24/24  1202 07/24/24  0513 07/24/24  0007 07/23/24  1830   POC GLUCOSE mg/dl 163* 117 155* 145* 135 147* 127 195* 142* 161* 122 176*     Results from last 7 days   Lab Units 07/26/24  0556 07/25/24  0544 07/24/24  0509 07/23/24  0454 07/22/24  0541 07/21/24  0523 07/20/24  0543   GLUCOSE RANDOM mg/dL 126 150* 183* 166* 149* 144* 153*       Results from last 7 days   Lab Units 07/26/24  0556 07/25/24  0544 07/24/24  0509   PTT seconds 38* 38* 32         Results from last 7 days   Lab Units 07/22/24  0541 07/21/24  0523   PROCALCITONIN ng/ml 0.96* 0.23     Results from last 7 days   Lab Units 07/22/24  0943   LACTIC ACID mmol/L 1.3           Results from last 7 days   Lab Units 07/24/24  1238   INFLUENZA A PCR  Negative   INFLUENZA B PCR  Negative   RSV PCR  Negative                 Results from last 7 days   Lab Units 07/25/24  1000   C DIFF TOXIN B BY PCR  Negative             Results from last 7 days   Lab Units 07/26/24  1013 07/22/24  1233 07/22/24  1131 07/22/24  1031 07/22/24  0925   BLOOD CULTURE   --   --   --  No Growth at 72 hrs. No Growth After 4 Days.   SPUTUM CULTURE   --  4+ Growth of Staphylococcus aureus*  4+ Growth of  --   --   --    GRAM STAIN RESULT  No Polys or Bacteria  seen 3+ Gram positive cocci in chains and clusters*  2+ Gram variable rods*  2+ Polys* No Polys or Bacteria seen  --   --      Results from last 7 days   Lab Units 07/22/24  1131   TOTAL COUNTED  100     Results from last 7 days   Lab Units 07/26/24  1013 07/22/24  1131   APPEARANCE CSF  Cloudy slight bloody   WBC CSF /uL 0 14*   XANTHOCHROMIA  Yes* Yes*   NEUTROPHILS % (CSF) %  --  38   LYMPHS % (CSF) %  --  56   MONOCYTES % (CSF) %  --  6   GLUCOSE CSF mg/dL 56 75*   PROTEIN CSF mg/dL 64*  --    RBC CSF uL 0 3,275*   CSF CULTURE   --  No growth           Medications:   Scheduled Medications:  aspirin, 81 mg, Oral, Daily  atorvastatin, 80 mg, Oral, Daily  bromocriptine, 5 mg, Oral, TID  cefazolin, 2,000 mg, Intravenous, Q8H  chlorhexidine, 15 mL, Mouth/Throat, Q12H MATTY  ezetimibe, 10 mg, Oral, QAM  folic acid, 400 mcg, Oral, Daily  gabapentin, 600 mg, Oral, TID  insulin lispro, 1-5 Units, Subcutaneous, Q6H MATTY  levETIRAcetam, 750 mg, Oral, Q12H MATTY  lidocaine, 1 patch, Topical, Daily  niMODipine, 30 mg, Per NG Tube, Q2H  omeprazole (PRILOSEC) suspension 2 mg/mL, 20 mg, Per NG Tube, Daily  potassium phosphate, 30 mmol, Intravenous, Once  thiamine, 100 mg, Per NG Tube, Daily      Continuous IV Infusions:  dexmedetomidine, 0.1-0.7 mcg/kg/hr, Intravenous, Titrated  heparin (porcine), 12 Units/kg/hr (Order-Specific), Intravenous, Titrated  norepinephrine, 1-30 mcg/min, Intravenous, Titrated      PRN Meds:  acetaminophen, 975 mg, Oral, Q8H PRN  albuterol, 2 puff, Inhalation, Q4H PRN  bisacodyl, 10 mg, Rectal, Daily PRN  butalbital-acetaminophen-caffeine, 1 tablet, Oral, Q4H PRN  lidocaine, 1 patch, Topical, Daily PRN  ondansetron, 4 mg, Intravenous, Q6H PRN  oxyCODONE-acetaminophen, 1 tablet, Oral, Q4H PRN  saliva substitute, 5 spray, Mouth/Throat, 4x Daily PRN        Discharge Plan: TBD    Network Utilization Review Department  ATTENTION: Please call with any questions or concerns to [phone] and carefully listen  to the prompts so that you are directed to the right person. All voicemails are confidential.   For Discharge needs, contact Care Management DC Support Team at [phone] opt. 2  Send all requests for admission clinical reviews, approved or denied determinations and any other requests to dedicated fax number below belonging to the campus where the patient is receiving treatment. List of dedicated fax numbers for the Facilities:  FACILITY NAME UR FAX NUMBER   ADMISSION DENIALS (Administrative/Medical Necessity) [phone]   DISCHARGE SUPPORT TEAM (NETWORK) [phone]   PARENT CHILD HEALTH (Maternity/NICU/Pediatrics) [phone]   [facility] [phone]   [facility] [phone]   [facility] [phone]   [facility] [phone]   [facility] [phone]   [facility] [phone]   [facility] [phone]   [facility] [phone]   [facility] [phone]   [facility] [phone]   [facility] [phone]   [facility] [phone]

## 2024-07-26 NOTE — PROGRESS NOTES
[facility]  Progress Note  Name: Federico Bowen I  MRN: [id number]  Unit/Bed#: ICU 07 I Date of Admission: 7/12/2024   Date of Service: 7/26/2024 I Hospital Day: 14    Assessment & Plan   * Subarachnoid hemorrhage (HCC)  Assessment & Plan  PPD#13 right PCOM coil embolization (Dr. Mcneal 7/13)  PPD#13 R frontal EVD placement (Dr. Goldberg, 7/13)  aSAH s/p R PCOM aneurysm rupture  Bleed day 14, HH 5, MF 4  P/w headache and right leg pain, 2 witnessed seizures by EMS was ultimately intubated.  Upon arrival to the ED patient was posturing.  Imaging revealed a large amount of SAH and a right PCOM aneurysm.  Per chart review he was previously on aspirin reversed with DDAVP.    Imaging:  CTA head w/wo contrast 7/26/2024: Improved subarachnoid hemorrhage and intraventricular hemorrhage as described above. Ventricles are unchanged in size. Stable areas of low-attenuation involving the right temporal lobe and right occipital lobe which represent evolving infarcts. Nondiagnostic CT angiogram of the head due to the extensive motion artifact. 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     Plan:  Continue frequent neurological checks  CTA poor quality due to patient movement, plan to repeat tomorrow - primary team to order medication to keep patient comfortable  Repeat CTA/CT head w/wo STAT if GCS declines more than 2 points in 1 hour  Continue SAH pathway/spasm watch:  Daily TCD's: R 1.16, L 1.66  Continue Nimodipine 30 mg Q2hrs  Continue Keppra 750 mg BID per neurology  Normonatremia, 141 today  Euvolemia, +1838.1/24 hr  Hemoglobin > 8, 10.0 today  EVD removed 7/26 and sutured closed with 3-0 ethilon  Exit CSF obtained 7/26, follow up results  SBP < 220   On levo for pressure support  Patient not currently on milrinone  Continue aspirin 81 mg daily, reordered for this morning  Continue to hold hep gtt for  now, will see if need to resume tomorrow after repeat CTA  Medical management and pain control per primary team  DVT PPX: SCDs     Neurosurgery will continue to follow closely, call with any further questions or concerns.    Fever  Assessment & Plan  Tmax 102.9 7/24, currently afebrile  WBC continues to downtrend, now 15.47  On bromocriptine for possible storming  Sputum 3+ gram + cocci in chains and clusters  Blood cx NTD  CXR with RUL consolidation - on Ancef 2 g q8h through   Exit CSF sent 7/26 - follow up with results    Seizures (HCC)  Assessment & Plan  Neurology following  2 witnessed seizures on presentation at [facility]  Patient currently on Keppra 750 mg BID per neurology  vEEG 7/14 no seizure activity         Subjective/Objective   Chief Complaint:  SAH    Subjective:  patient reporting back pain at this time but overall feels better    Objective: laying in bed, NAD    I/O         07/24 0701  07/25 0700 07/25 0701  07/26 0700 07/26 0701  07/27 0700    P.O.  480     I.V. (mL/kg) 1342.5 (27.4) 1778.1 (36.3) 141.5 (2.9)    NG/GT 1130 900 180    IV Piggyback 150 150 50    Feedings 1170 1560 130    Total Intake(mL/kg) 3792.5 (77.4) 4868.1 (99.3) 501.5 (10.2)    Urine (mL/kg/hr) 2987 (2.5) 3030 (2.6) 805 (4.5)    Emesis/NG output 0 0 0    Drains 4 0 0    Stool 0 0     Total Output 2991 3030 805    Net +801.5 +1838.1 -303.5           Unmeasured Urine Occurrence 1 x      Unmeasured Stool Occurrence 3 x 5 x             Invasive Devices       Central Venous Catheter Line  Duration             CVC Central Lines 07/12/24 Triple 16cm Right Internal jugular 13 days              Peripheral Intravenous Line  Duration             Peripheral IV 07/23/24 Left;Ventral (anterior) Forearm 2 days    Peripheral IV 07/23/24 Right;Ventral (anterior) Forearm 2 days              Drain  Duration             Ventriculostomy/Subdural Ventricular drainage catheter Occipital region 13 days    External Urinary Catheter Small 12 days     NG/OG/Enteral Tube 18 Fr Right nare 9 days                    Physical Exam:  Vitals: Blood pressure 150/94, pulse (!) 112, temperature 99.5 °F (37.5 °C), temperature source Oral, resp. rate (!) 26, weight 49 kg (108 lb 0.4 oz), SpO2 99%.,There is no height or weight on file to calculate BMI.      General appearance: alert, appears stated age, cooperative and no distress  Head: Normocephalic, without obvious abnormality, atraumatic  Eyes: conjugate gaze  Neck: supple, symmetrical, trachea midline   Lungs: non labored breathing  Heart: tachycardia  Neurologic:   Mental status: Alert, oriented x3, follows commands, speech a bit muffled, thought content appropriate  Cranial nerves: grossly intact (Cranial nerves II-XII)  Motor: moving all extremities without focal weakness         Lab Results:  Results from last 7 days   Lab Units 07/26/24  0556 07/25/24  0544 07/24/24  0509 07/23/24  0454 07/22/24  0541 07/21/24  0523   WBC Thousand/uL 15.47* 18.20* 21.87*   < > 22.76* 13.56*   HEMOGLOBIN g/dL 10.0* 9.6* 9.4*   < > 10.9* 11.2*   HEMATOCRIT % 30.1* 29.2* 27.9*   < > 32.6* 34.0*   PLATELETS Thousands/uL 531* 478* 416*   < > 332 337   SEGS PCT % 83* 83*  --   --   --  82*   MONO PCT % 5 5  --   --  5 6   EOS PCT % 2 2  --   --  0 1    < > = values in this interval not displayed.     Results from last 7 days   Lab Units 07/26/24  0556 07/25/24  1532 07/25/24  0544 07/24/24  0509 07/23/24  0454   SODIUM mmol/L 141  --  146 147 148*   POTASSIUM mmol/L 4.1 4.2 3.4* 3.5 4.3   CHLORIDE mmol/L 109*  --  112* 112* 109*   CO2 mmol/L 23  --  24 26 27   BUN mg/dL 20  --  22 29* 32*   CREATININE mg/dL 0.74  --  0.67 0.80 0.76   CALCIUM mg/dL 8.7  --  8.8 8.9 9.7   ALK PHOS U/L 128*  --  115*  --  118*   ALT U/L 21  --  15  --  16   AST U/L 32  --  21  --  21     Results from last 7 days   Lab Units 07/26/24  0556 07/25/24  0544 07/24/24  0509   MAGNESIUM mg/dL 2.2 2.1 2.2     Results from last 7 days   Lab Units 07/26/24  0556  "07/25/24  1532 07/25/24  0544   PHOSPHORUS mg/dL 3.1 2.5 2.7     Results from last 7 days   Lab Units 07/26/24  0556 07/25/24  0544 07/24/24  0509   PTT seconds 38* 38* 32     No results found for: \"TROPONINT\"  ABG:  Lab Results   Component Value Date    PHART 7.314 (L) 07/12/2024    XCI1XCW 44.1 (H) 07/12/2024    PO2ART 138.2 (H) 07/12/2024    IYQ6AIC 21.9 (L) 07/12/2024    BEART -4.2 07/12/2024    SOURCE Line, Arterial 07/12/2024       Imaging Studies: I have personally reviewed pertinent reports.   and I have personally reviewed pertinent films in PACS    CTA head and neck w wo contrast    Result Date: 7/16/2024  Impression: CT Brain: Redemonstrated subarachnoid hemorrhage overlying the right greater than left convexities and concentrated within the right sylvian cistern and fissure. . Subacute infarct involving the right anterior temporal region and medial occipital region.. 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 Workstation performed: [id number]     IR cerebral angiography / intervention    Result Date: 7/15/2024  Impression: Successful balloon assisted coil embolization of a 4.5 mm right P-comm aneurysm. Workstation performed: [id number]     CT head wo contrast    Result Date: 7/14/2024  Impression: No significant interval change in extensive bilateral subarachnoid hemorrhage and intraventricular hemorrhage. Workstation performed: [id number]     CT head wo contrast    Result Date: 7/13/2024  Impression: Interval placement of right-sided shunt catheter. Stable ventricular size with slight interval increase in intraventricular hemorrhage layering in the occipital horns bilaterally. Diffuse subarachnoid hemorrhage again noted. Workstation performed: [id number]     XR chest portable ICU    Result Date: 7/12/2024  Impression: No acute cardiopulmonary disease. ET tube 6 cm above the " landon. Workstation performed: [id number]     CTA stroke alert (head/neck)    Result Date: 7/12/2024  Impression: 0.3 cm aneurysm in expected origin of right posterior communicating artery. Possible tiny aneurysm in left anterior communicating artery region. These could be potential sources of diffuse subarachnoid hemorrhage. Recommend neurovascular surgery consultation for further evaluation. Mild narrowing of bilateral MCA M1 and bilateral M2 segmental branch vessels, likely due to vasospasm. Patent stent in left proximal subclavian artery. Endotracheal tube in trachea. Additional chronic/incidental findings as detailed above. Findings were directly discussed with Emmanuel Lion at approximately 4:34 p.m. on 7/12/2024. Workstation performed: [id number]     CT stroke alert brain    Result Date: 7/12/2024  Impression: Acute diffuse thickened large-volume subarachnoid hemorrhage throughout the bilateral parafalcine regions, bilateral cerebral sulci (right worse than left), basilar cisterns, prepontine cistern, premedullary cistern, and visualized upper cervical spinal canal. Acute small volume intraventricular hemorrhage with mild hydrocephalus. No acute infarction. Findings were directly discussed with Emmanuel Lion at approximately 4:34 p.m. on 7/12/2024. Workstation performed: [id number]       EKG, Pathology, and Other Studies: I have personally reviewed pertinent reports.      VTE Pharmacologic Prophylaxis: Reason for no pharmacologic prophylaxis pending CTA tomorrow    VTE Mechanical Prophylaxis: sequential compression device

## 2024-07-26 NOTE — ASSESSMENT & PLAN NOTE
Neurology following  2 witnessed seizures on presentation at [facility]  Patient currently on Keppra 750 mg BID per neurology  vEEG 7/14 no seizure activity

## 2024-07-26 NOTE — CASE MANAGEMENT
Case Management Progress Note    Patient name Federico Bowen  Location ICU 07/ICU 07 MRN [id number]  : 1955 Date 2024       LOS (days): 14  Geometric Mean LOS (GMLOS) (days): 10.2  Days to GMLOS:-3.6        OBJECTIVE:        Current admission status: Inpatient  Preferred Pharmacy:   [id number] - [facility] [address] - [address]  [address]  [facility] [place] [address]  Phone: [phone] Fax: [phone]    Primary Care Provider: No primary care provider on file.    Primary Insurance: [employer] [facility]  Secondary Insurance: [facility]    PROGRESS NOTE: CM completed chart review, pt not medically cleared for discharge at this time.  CM will continue to follow for discharge planning needs.

## 2024-07-26 NOTE — SPEECH THERAPY NOTE
Speech Language/Pathology    Speech/Language Pathology Progress Note    Patient Name: Federico Bowen  Today's Date: 7/26/2024     Problem List  Principal Problem:    Subarachnoid hemorrhage (HCC)  Active Problems:    HTN (hypertension)    Hyperlipidemia    Seizures (HCC)    Moderate protein-calorie malnutrition (HCC)    Fever    Severe protein-calorie malnutrition (HCC)    Goals of care, counseling/discussion    Palliative care encounter       Past Medical History  No past medical history on file.     Past Surgical History  Past Surgical History:   Procedure Laterality Date    IR CEREBRAL ANGIOGRAPHY / INTERVENTION  7/13/2024         Subjective:  Patient awake and alert, but restless in bed.     Objective:  The patient is seen for f/u dysphagia therapy. He continues with NGT for nutrition. Patient is very restless. Oral care is provided with oral swabs and suction kit. Patient tolerated well. Oral cavity is dry. The patient is then assessed with ~8 tsp nectar thick liquids. Oral closure for tsp is adequate. Transfer appears timely with good laryngeal rise upon palpation. Cough x2 observed during session. O2 remains stable at 93-98%. Patient quickly looses interest in PO trials due to discomfort and restlessness.     Assessment:  Patient appeared to tolerate small amounts of nectar thick liquids well.     Plan/Recommendations:  Continue with NGT for nutrition. Trial puree diet with nectar thick liquids in upcoming sessions. Continue ST.

## 2024-07-26 NOTE — ASSESSMENT & PLAN NOTE
Tmax 102.9 7/24, currently afebrile  WBC continues to downtrend, now 15.47  On bromocriptine for possible storming  Sputum 3+ gram + cocci in chains and clusters  Blood cx NTD  CXR with RUL consolidation - on Ancef 2 g q8h through   Exit CSF sent 7/26 - follow up with results

## 2024-07-27 ENCOUNTER — APPOINTMENT (INPATIENT)
Dept: NON INVASIVE DIAGNOSTICS | Facility: HOSPITAL | Age: 69
DRG: 020 | End: 2024-07-27
Payer: MEDICARE

## 2024-07-27 ENCOUNTER — APPOINTMENT (INPATIENT)
Dept: RADIOLOGY | Facility: HOSPITAL | Age: 69
DRG: 020 | End: 2024-07-27
Payer: MEDICARE

## 2024-07-27 LAB
ALBUMIN SERPL BCG-MCNC: 3.4 G/DL (ref 3.5–5)
ALP SERPL-CCNC: 136 U/L (ref 34–104)
ALT SERPL W P-5'-P-CCNC: 30 U/L (ref 7–52)
ANION GAP SERPL CALCULATED.3IONS-SCNC: 10 MMOL/L (ref 4–13)
APTT PPP: 34 SECONDS (ref 23–37)
AST SERPL W P-5'-P-CCNC: 43 U/L (ref 13–39)
BACTERIA BLD CULT: NORMAL
BACTERIA BLD CULT: NORMAL
BASOPHILS # BLD AUTO: 0.03 THOUSANDS/ÂΜL (ref 0–0.1)
BASOPHILS NFR BLD AUTO: 0 % (ref 0–1)
BILIRUB SERPL-MCNC: 0.52 MG/DL (ref 0.2–1)
BUN SERPL-MCNC: 19 MG/DL (ref 5–25)
CA-I BLD-SCNC: 1.16 MMOL/L (ref 1.12–1.32)
CALCIUM ALBUM COR SERPL-MCNC: 9.6 MG/DL (ref 8.3–10.1)
CALCIUM SERPL-MCNC: 9.1 MG/DL (ref 8.4–10.2)
CHLORIDE SERPL-SCNC: 105 MMOL/L (ref 96–108)
CO2 SERPL-SCNC: 25 MMOL/L (ref 21–32)
CORTIS SERPL-MCNC: 26.2 UG/DL
CREAT SERPL-MCNC: 0.67 MG/DL (ref 0.6–1.3)
EOSINOPHIL # BLD AUTO: 0.24 THOUSAND/ÂΜL (ref 0–0.61)
EOSINOPHIL NFR BLD AUTO: 2 % (ref 0–6)
ERYTHROCYTE [DISTWIDTH] IN BLOOD BY AUTOMATED COUNT: 15.3 % (ref 11.6–15.1)
GFR SERPL CREATININE-BSD FRML MDRD: 98 ML/MIN/1.73SQ M
GLUCOSE SERPL-MCNC: 138 MG/DL (ref 65–140)
GLUCOSE SERPL-MCNC: 150 MG/DL (ref 65–140)
GLUCOSE SERPL-MCNC: 151 MG/DL (ref 65–140)
GLUCOSE SERPL-MCNC: 164 MG/DL (ref 65–140)
GLUCOSE SERPL-MCNC: 166 MG/DL (ref 65–140)
HCT VFR BLD AUTO: 30.3 % (ref 36.5–49.3)
HGB BLD-MCNC: 10.1 G/DL (ref 12–17)
IMM GRANULOCYTES # BLD AUTO: 0.16 THOUSAND/UL (ref 0–0.2)
IMM GRANULOCYTES NFR BLD AUTO: 1 % (ref 0–2)
LYMPHOCYTES # BLD AUTO: 1.41 THOUSANDS/ÂΜL (ref 0.6–4.47)
LYMPHOCYTES NFR BLD AUTO: 10 % (ref 14–44)
MAGNESIUM SERPL-MCNC: 2.2 MG/DL (ref 1.9–2.7)
MCH RBC QN AUTO: 31.3 PG (ref 26.8–34.3)
MCHC RBC AUTO-ENTMCNC: 33.3 G/DL (ref 31.4–37.4)
MCV RBC AUTO: 94 FL (ref 82–98)
MONOCYTES # BLD AUTO: 0.71 THOUSAND/ÂΜL (ref 0.17–1.22)
MONOCYTES NFR BLD AUTO: 5 % (ref 4–12)
NEUTROPHILS # BLD AUTO: 12.18 THOUSANDS/ÂΜL (ref 1.85–7.62)
NEUTS SEG NFR BLD AUTO: 82 % (ref 43–75)
NRBC BLD AUTO-RTO: 0 /100 WBCS
PHOSPHATE SERPL-MCNC: 3.3 MG/DL (ref 2.3–4.1)
PLATELET # BLD AUTO: 585 THOUSANDS/UL (ref 149–390)
PMV BLD AUTO: 9.7 FL (ref 8.9–12.7)
POTASSIUM SERPL-SCNC: 4.2 MMOL/L (ref 3.5–5.3)
PROT SERPL-MCNC: 7.3 G/DL (ref 6.4–8.4)
RBC # BLD AUTO: 3.23 MILLION/UL (ref 3.88–5.62)
SODIUM SERPL-SCNC: 140 MMOL/L (ref 135–147)
WBC # BLD AUTO: 14.73 THOUSAND/UL (ref 4.31–10.16)

## 2024-07-27 PROCEDURE — 70496 CT ANGIOGRAPHY HEAD: CPT

## 2024-07-27 PROCEDURE — 99291 CRITICAL CARE FIRST HOUR: CPT | Performed by: EMERGENCY MEDICINE

## 2024-07-27 PROCEDURE — 82330 ASSAY OF CALCIUM: CPT

## 2024-07-27 PROCEDURE — 85025 COMPLETE CBC W/AUTO DIFF WBC: CPT

## 2024-07-27 PROCEDURE — 83735 ASSAY OF MAGNESIUM: CPT

## 2024-07-27 PROCEDURE — 85730 THROMBOPLASTIN TIME PARTIAL: CPT

## 2024-07-27 PROCEDURE — 99232 SBSQ HOSP IP/OBS MODERATE 35: CPT | Performed by: NEUROLOGICAL SURGERY

## 2024-07-27 PROCEDURE — 84100 ASSAY OF PHOSPHORUS: CPT

## 2024-07-27 PROCEDURE — 82533 TOTAL CORTISOL: CPT | Performed by: STUDENT IN AN ORGANIZED HEALTH CARE EDUCATION/TRAINING PROGRAM

## 2024-07-27 PROCEDURE — 93886 INTRACRANIAL COMPLETE STUDY: CPT | Performed by: SURGERY

## 2024-07-27 PROCEDURE — 93886 INTRACRANIAL COMPLETE STUDY: CPT

## 2024-07-27 PROCEDURE — 80053 COMPREHEN METABOLIC PANEL: CPT

## 2024-07-27 PROCEDURE — 82948 REAGENT STRIP/BLOOD GLUCOSE: CPT

## 2024-07-27 RX ORDER — HEPARIN SODIUM 5000 [USP'U]/ML
5000 INJECTION, SOLUTION INTRAVENOUS; SUBCUTANEOUS EVERY 8 HOURS SCHEDULED
Status: DISCONTINUED | OUTPATIENT
Start: 2024-07-27 | End: 2024-08-10 | Stop reason: HOSPADM

## 2024-07-27 RX ORDER — MIDODRINE HYDROCHLORIDE 5 MG/1
10 TABLET ORAL
Status: DISCONTINUED | OUTPATIENT
Start: 2024-07-27 | End: 2024-08-10 | Stop reason: HOSPADM

## 2024-07-27 RX ADMIN — Medication 30 MG: at 05:51

## 2024-07-27 RX ADMIN — CHLORHEXIDINE GLUCONATE 0.12% ORAL RINSE 15 ML: 1.2 LIQUID ORAL at 08:25

## 2024-07-27 RX ADMIN — Medication 30 MG: at 14:11

## 2024-07-27 RX ADMIN — CEFAZOLIN SODIUM 2000 MG: 2 SOLUTION INTRAVENOUS at 05:56

## 2024-07-27 RX ADMIN — Medication 30 MG: at 17:59

## 2024-07-27 RX ADMIN — NOREPINEPHRINE BITARTRATE 11 MCG/MIN: 1 SOLUTION INTRAVENOUS at 03:45

## 2024-07-27 RX ADMIN — MIDODRINE HYDROCHLORIDE 10 MG: 5 TABLET ORAL at 12:04

## 2024-07-27 RX ADMIN — ASPIRIN 81 MG CHEWABLE TABLET 81 MG: 81 TABLET CHEWABLE at 08:25

## 2024-07-27 RX ADMIN — Medication 30 MG: at 21:30

## 2024-07-27 RX ADMIN — Medication 30 MG: at 08:26

## 2024-07-27 RX ADMIN — NOREPINEPHRINE BITARTRATE 6 MCG/MIN: 1 SOLUTION INTRAVENOUS at 16:00

## 2024-07-27 RX ADMIN — HEPARIN SODIUM 5000 UNITS: 5000 INJECTION INTRAVENOUS; SUBCUTANEOUS at 16:05

## 2024-07-27 RX ADMIN — Medication 30 MG: at 10:26

## 2024-07-27 RX ADMIN — Medication 30 MG: at 12:04

## 2024-07-27 RX ADMIN — INSULIN LISPRO 1 UNITS: 100 INJECTION, SOLUTION INTRAVENOUS; SUBCUTANEOUS at 05:58

## 2024-07-27 RX ADMIN — CEFAZOLIN SODIUM 2000 MG: 2 SOLUTION INTRAVENOUS at 21:20

## 2024-07-27 RX ADMIN — OXYCODONE HYDROCHLORIDE AND ACETAMINOPHEN 1 TABLET: 5; 325 TABLET ORAL at 08:25

## 2024-07-27 RX ADMIN — LEVETIRACETAM 750 MG: 100 SOLUTION ORAL at 08:26

## 2024-07-27 RX ADMIN — GABAPENTIN 600 MG: 300 CAPSULE ORAL at 20:59

## 2024-07-27 RX ADMIN — BROMOCRIPTINE MESYLATE 5 MG: 2.5 TABLET ORAL at 08:26

## 2024-07-27 RX ADMIN — IOHEXOL 85 ML: 350 INJECTION, SOLUTION INTRAVENOUS at 04:22

## 2024-07-27 RX ADMIN — THIAMINE HCL TAB 100 MG 100 MG: 100 TAB at 08:25

## 2024-07-27 RX ADMIN — OXYCODONE HYDROCHLORIDE AND ACETAMINOPHEN 1 TABLET: 5; 325 TABLET ORAL at 20:56

## 2024-07-27 RX ADMIN — OXYCODONE HYDROCHLORIDE AND ACETAMINOPHEN 1 TABLET: 5; 325 TABLET ORAL at 18:34

## 2024-07-27 RX ADMIN — CHLORHEXIDINE GLUCONATE 0.12% ORAL RINSE 15 ML: 1.2 LIQUID ORAL at 20:58

## 2024-07-27 RX ADMIN — NOREPINEPHRINE BITARTRATE 11 MCG/MIN: 1 SOLUTION INTRAVENOUS at 09:22

## 2024-07-27 RX ADMIN — MIDODRINE HYDROCHLORIDE 10 MG: 5 TABLET ORAL at 16:02

## 2024-07-27 RX ADMIN — CEFAZOLIN SODIUM 2000 MG: 2 SOLUTION INTRAVENOUS at 14:15

## 2024-07-27 RX ADMIN — DEXMEDETOMIDINE HYDROCHLORIDE 0.1 MCG/KG/HR: 4 INJECTION, SOLUTION INTRAVENOUS at 03:30

## 2024-07-27 RX ADMIN — Medication 30 MG: at 04:37

## 2024-07-27 RX ADMIN — Medication 30 MG: at 02:46

## 2024-07-27 RX ADMIN — HEPARIN SODIUM 5000 UNITS: 5000 INJECTION INTRAVENOUS; SUBCUTANEOUS at 20:58

## 2024-07-27 RX ADMIN — FOLIC ACID TAB 400 MCG 400 MCG: 400 TAB at 08:25

## 2024-07-27 RX ADMIN — LEVETIRACETAM 750 MG: 100 SOLUTION ORAL at 20:58

## 2024-07-27 RX ADMIN — ATORVASTATIN CALCIUM 80 MG: 80 TABLET, FILM COATED ORAL at 08:25

## 2024-07-27 RX ADMIN — Medication 20 MG: at 08:25

## 2024-07-27 RX ADMIN — GABAPENTIN 600 MG: 300 CAPSULE ORAL at 16:02

## 2024-07-27 RX ADMIN — GABAPENTIN 600 MG: 300 CAPSULE ORAL at 08:25

## 2024-07-27 RX ADMIN — EZETIMIBE 10 MG: 10 TABLET ORAL at 08:25

## 2024-07-27 RX ADMIN — LIDOCAINE 5% 1 PATCH: 700 PATCH TOPICAL at 10:26

## 2024-07-27 RX ADMIN — INSULIN LISPRO 1 UNITS: 100 INJECTION, SOLUTION INTRAVENOUS; SUBCUTANEOUS at 14:13

## 2024-07-27 RX ADMIN — Medication 30 MG: at 16:06

## 2024-07-27 RX ADMIN — Medication 30 MG: at 19:56

## 2024-07-27 RX ADMIN — OXYCODONE HYDROCHLORIDE AND ACETAMINOPHEN 1 TABLET: 5; 325 TABLET ORAL at 12:03

## 2024-07-27 NOTE — PROGRESS NOTES
Progress Note - Neurosurgery   Federico Bowen 68 y.o. male MRN: [id number]  Unit/Bed#: ICU 07 Encounter: [id number]    Assessment/Plan:    PPD#14 right PCOM coil embolization (Dr. Mcneal 7/13)  PPD#14 R frontal EVD placement (Dr. Goldberg, 7/13)  aSAH s/p R PCOM aneurysm rupture  Bleed day 14, HH 5, MF 4  TCD LR R 1.37, L 1.55  EVD removed 7/26; exit CSF obtained  Cx NG prelim  Recommend CTh in 1 week (ordered)  Will follow from periphery; call with questions    History:    all current active meds have been reviewed and current meds:   Current Facility-Administered Medications   Medication Dose Route Frequency    acetaminophen (TYLENOL) oral suspension 975 mg  975 mg Oral Q8H PRN    albuterol (PROVENTIL HFA,VENTOLIN HFA) inhaler 2 puff  2 puff Inhalation Q4H PRN    aspirin chewable tablet 81 mg  81 mg Oral Daily    atorvastatin (LIPITOR) tablet 80 mg  80 mg Oral Daily    bisacodyl (DULCOLAX) rectal suppository 10 mg  10 mg Rectal Daily PRN    bromocriptine (PARLODEL) tablet 5 mg  5 mg Oral TID    butalbital-acetaminophen-caffeine (FIORICET,ESGIC) -40 mg per tablet 1 tablet  1 tablet Oral Q4H PRN    ceFAZolin (ANCEF) IVPB (premix in dextrose) 2,000 mg 50 mL  2,000 mg Intravenous Q8H    chlorhexidine (PERIDEX) 0.12 % oral rinse 15 mL  15 mL Mouth/Throat Q12H MATTY    dexmedeTOMIDine (Precedex) 400 mcg in sodium chloride 0.9% 100 mL  0.1-0.7 mcg/kg/hr Intravenous Titrated    ezetimibe (ZETIA) tablet 10 mg  10 mg Oral QAM    folic acid (FOLVITE) tablet 400 mcg  400 mcg Oral Daily    gabapentin (NEURONTIN) capsule 600 mg  600 mg Oral TID    heparin (porcine) 25,000 units in 0.45% NaCl 250 mL infusion (premix)  12 Units/kg/hr (Order-Specific) Intravenous Titrated    insulin lispro (HumALOG/ADMELOG) 100 units/mL subcutaneous injection 1-5 Units  1-5 Units Subcutaneous Q6H MATTY    levETIRAcetam (KEPPRA) oral solution 750 mg  750 mg Oral Q12H MATTY    lidocaine (LIDODERM) 5 % patch 1 patch  1 patch Topical Daily     lidocaine (LIDODERM) 5 % patch 1 patch  1 patch Topical Daily PRN    niMODipine (NIMOTOP) oral solution 30 mg  30 mg Per NG Tube Q2H    norepinephrine (LEVOPHED) 4 mg (STANDARD CONCENTRATION) IV in sodium chloride 0.9% 250 mL  1-30 mcg/min Intravenous Titrated    omeprazole (PRILOSEC) suspension 2 mg/mL  20 mg Per NG Tube Daily    ondansetron (ZOFRAN) injection 4 mg  4 mg Intravenous Q6H PRN    oxyCODONE-acetaminophen (PERCOCET) 5-325 mg per tablet 1 tablet  1 tablet Oral Q4H PRN    saliva substitute (MOUTH KOTE) mucosal solution 5 spray  5 spray Mouth/Throat 4x Daily PRN    thiamine tablet 100 mg  100 mg Per NG Tube Daily       Objective:     Exam:     Vitals: Blood pressure 147/79, pulse (!) 108, temperature 97.5 °F (36.4 °C), temperature source Oral, resp. rate (!) 23, weight 49 kg (108 lb 0.4 oz), SpO2 98%.,There is no height or weight on file to calculate BMI.    Physical Exam    Neurologic Exam      MDM:    Lab Results:    Results from last 7 days   Lab Units 07/27/24  0548 07/26/24  0556 07/25/24  0544   WBC Thousand/uL 14.73* 15.47* 18.20*   HEMOGLOBIN g/dL 10.1* 10.0* 9.6*   HEMATOCRIT % 30.3* 30.1* 29.2*   PLATELETS Thousands/uL 585* 531* 478*   SEGS PCT % 82* 83* 83*   MONO PCT % 5 5 5   EOS PCT % 2 2 2     Results from last 7 days   Lab Units 07/27/24  0548 07/26/24  0556 07/25/24  1532 07/25/24  0544   SODIUM mmol/L 140 141  --  146   POTASSIUM mmol/L 4.2 4.1 4.2 3.4*   CHLORIDE mmol/L 105 109*  --  112*   CO2 mmol/L 25 23  --  24   BUN mg/dL 19 20  --  22   CREATININE mg/dL 0.67 0.74  --  0.67   CALCIUM mg/dL 9.1 8.7  --  8.8   ALK PHOS U/L 136* 128*  --  115*   ALT U/L 30 21  --  15   AST U/L 43* 32  --  21   GLUCOSE RANDOM mg/dL 150* 126  --  150*     Results from last 7 days   Lab Units 07/27/24  0548 07/26/24  0556 07/25/24  0544   PTT seconds 34 38* 38*           Imaging Studies:

## 2024-07-27 NOTE — PROGRESS NOTES
[facility]  Progress Note: Critical Care  Name: Federico Bowen 68 y.o. 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 Possible tiny aneurysm in left anterior communicating artery region. Mild narrowing of bilateral MCA M1 and bilateral M2 segmental branch vessels, likely due to vasospasm.   7/11 CTH-Acute diffuse thickened large-volume SAH throughout the b/l parafalcine regions, b/l cerebral sulci (R worse than L), basilar cisterns, prepontine cistern, premedullary cistern, and visualized upper cervical spinal canal. Acute small volume IVH with mild hydrocephalus.   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   7/14 CTH -No significant interval change in extensive bilateral SAH and IVH.   7/14 vEEG no seizures  7/16 CTA/CTH: Expected postoperative appearance following embolization of right posterior communicating artery aneurysm . Redemonstrated subarachnoid hemorrhage overlying the right greater than left convexities and concentrated within the right sylvian cistern and fissure. . Subacute infarct involving the right anterior temporal region and medial occipital region.  7/27 CTH - stable, Small volume subarachnoid hemorrhage persists      Plan:   Nsx following  ASA 81mg   EVD removed 07/26  AM CTH stable  SBP goal 100-180  Wean levo as able   Keppra 750mg BID  Nimodipine 30mg Q2 hrs x 21 days  IV Heparin infusion d/c   Start Heparin SQ for DVT ppx  Daily TCDs  Euvolemia   Normonatremia, Na goal > 140  On Free Water Flushes 60cc q4   Stat CTH with any change in GCS > 2 pts   Palliative following  D/c provigil given was getting agitated and req zyprexa  07/25     Diagnosis: Seizure  2/2 above and presented w/ seizures   vEEG no seizures  Plan:   Neuro following   Keppra 750mg BID   Diagnosis: Headache  Continue 600mg TID gabapentin   Lidoderm patch for neck   PRN Fioricet/Percocet/Tylenol     CV:   Diagnosis: Hx CAD, Hx HTN  Had vascular stent placed > 1 year ago   Plan:   Continue ASA 81mg daily  Holding home entresto and metoprolol    Given allowing for increased -220   Diagnosis: HLD  Continue Lipitor 80mg daily and Zetia 10mg qhs  Diagnosis: Borderline Hypotension, improving   Suspect in the setting of resolving infectious state  Holding home Entresto and Metoprolol   Continue Nimodipine (decreased to 30mg Q2 hrs 7/26)  Continue Midodrine 10mg TID  PRN fluid resuscitation   Wean levo for SBP goal 100-180     Pulm:  Diagnosis: Aspiration pneumonia   Continue Ancef (day 6/7)  Continue suctioning, chest PT, nebs/resp protocol   Wean O2 as able for goal O2 sat >92%  Diagnosis: Hx COPD  Plan:   IS  Xopenex TID  Imecldinum/albuterol inhaler   Respiratory protocol  GI:   Diagnosis: Dysphagia  Failed VBS 7/23  Continue TF vis NG  Consider repeat VBS  Continue GOC discussion with family; PEG  Diagnosis: GERD  Omeprazole 40mg daily   Bowel regimen  Hold 2/2 multiple BM's  C diff negative   Banana flakes PRN    :  No acute issues   Monitor renal indices and I/Os  Goal euvolemia     F/E/N:    F: none  E: Replete as needed, goal Mg >2, Phos >3, K>4  N: TF Jevity 1.2 vinny 50cc/hr               - thiamine, folic acid, and multivitamin     Heme/Onc:   Diagnosis: Anemia  Plan:   Continue to monitor am CBC     Endo:   SSI   Hypoglycemia protocol   POC glucose checks     ID:   Diagnosis: Leukocytosis and febrile, improving  7/22 Sputum cx: 3+gram positive cocci in chains and clusters    Plan   Ancef 2000mg q8h based on sensitivities (Day 6/7)  D/c Bromocriptine   Tylenol PRN    MSK/Skin:   PT/OT/PMR  Diagnosis: Penile lesion  Plan: note from outpt derm inflamed seborrheic  keratosis       Line/Drains/Foleys: R IJ central line      Disposition: Critical care    ICU Core Measures     A: Assess, Prevent, and Manage Pain Has pain been assessed? Yes  Need for changes to pain regimen? No   B: Both SAT/SAT  N/A   C: Choice of Sedation RASS Goal: 0 Alert and Calm  Need for changes to sedation or analgesia regimen? No   D: Delirium CAM-ICU: Negative   E: Early Mobility  Plan for early mobility? Yes   F: Family Engagement Plan for family engagement today? Yes       Antibiotic Review: Patient on appropriate coverage based on culture data.     Review of Invasive Devices:    Central access plan: Medications requiring central line      Prophylaxis:  VTE VTE covered by:  heparin (porcine), Intravenous, 12 Units/kg/hr at 07/27/24 0600       Stress Ulcer  covered byomeprazole (PRILOSEC) suspension 2 mg/mL [[id number]], omeprazole (PriLOSEC) 40 MG capsule [[id number]] (Long-Term Med)        Significant 24hr Events     24hr events: Yesterday, EVD was removed. No acute events overnight. This AM, patient reports continued headache, otherwise denies CP, SOB, N/V, abd pain.      Subjective      Objective                            Vitals I/O      Most Recent Min/Max in 24hrs   Temp 99.2 °F (37.3 °C) Temp  Min: 97.5 °F (36.4 °C)  Max: 99.5 °F (37.5 °C)   Pulse (!) 108 Pulse  Min: 88  Max: 114   Resp (!) 23 Resp  Min: 11  Max: 36   /79 BP  Min: 114/66  Max: 180/96   O2 Sat 98 % SpO2  Min: 96 %  Max: 100 %      Intake/Output Summary (Last 24 hours) at 7/27/2024 0722  Last data filed at 7/27/2024 0600  Gross per 24 hour   Intake 3942.59 ml   Output 2818 ml   Net 1124.59 ml       Diet Enteral/Parenteral; Tube Feeding No Oral Diet; Jevity 1.2 Josh; Cyclic; 65; 22 hours; Banatrol Plus Banana Flakes - One Packet; 60; Water; Every 4 hours    Invasive Monitoring           Physical Exam   Physical Exam  Vitals and nursing note reviewed.   Eyes:      Comments: Pupils reactive, R>L   Skin:     General: Skin is warm  and dry.      Capillary Refill: Capillary refill takes less than 2 seconds.   HENT:      Head: Normocephalic and atraumatic.      Nose: Nasogastric tube present. No rhinorrhea.      Mouth/Throat:      Mouth: Mucous membranes are dry.   Cardiovascular:      Rate and Rhythm: Normal rate and regular rhythm.      Pulses: Normal pulses.      Heart sounds: Normal heart sounds.   Musculoskeletal:      Right lower leg: No edema.      Left lower leg: No edema.   Abdominal: General: Bowel sounds are normal.      Palpations: Abdomen is soft.      Tenderness: There is no abdominal tenderness.   Constitutional:       Appearance: He is underweight. He is ill-appearing.   Pulmonary:      Effort: Pulmonary effort is normal.      Breath sounds: Normal breath sounds.   Neurological:      Mental Status: He is oriented to person, place and time. He is lethargic.      Motor: Strength full and intact in all extremities.            Diagnostic Studies    Imaging:  I have personally reviewed pertinent films in PACS     Medications:  Scheduled PRN   aspirin, 81 mg, Daily  atorvastatin, 80 mg, Daily  bromocriptine, 5 mg, TID  cefazolin, 2,000 mg, Q8H  chlorhexidine, 15 mL, Q12H MATTY  ezetimibe, 10 mg, QAM  folic acid, 400 mcg, Daily  gabapentin, 600 mg, TID  insulin lispro, 1-5 Units, Q6H MATTY  levETIRAcetam, 750 mg, Q12H MATTY  lidocaine, 1 patch, Daily  niMODipine, 30 mg, Q2H  omeprazole (PRILOSEC) suspension 2 mg/mL, 20 mg, Daily  thiamine, 100 mg, Daily      acetaminophen, 975 mg, Q8H PRN  albuterol, 2 puff, Q4H PRN  bisacodyl, 10 mg, Daily PRN  butalbital-acetaminophen-caffeine, 1 tablet, Q4H PRN  lidocaine, 1 patch, Daily PRN  ondansetron, 4 mg, Q6H PRN  oxyCODONE-acetaminophen, 1 tablet, Q4H PRN  saliva substitute, 5 spray, 4x Daily PRN       Continuous    dexmedetomidine, 0.1-0.7 mcg/kg/hr, Last Rate: Stopped (07/27/24 0438)  heparin (porcine), 12 Units/kg/hr (Order-Specific), Last Rate: 12 Units/kg/hr (07/27/24 0600)  norepinephrine,  1-30 mcg/min, Last Rate: 11 mcg/min (07/27/24 0600)         Labs:  CBC    Recent Labs     07/26/24  0556 07/27/24  0548   WBC 15.47* 14.73*   HGB 10.0* 10.1*   HCT 30.1* 30.3*   * 585*     BMP    Recent Labs     07/26/24  0556 07/27/24  0548   SODIUM 141 140   K 4.1 4.2   * 105   CO2 23 25   AGAP 9 10   BUN 20 19   CREATININE 0.74 0.67   CALCIUM 8.7 9.1       Coags    Recent Labs     07/26/24  0556 07/27/24  0548   PTT 38* 34        Additional Electrolytes  Recent Labs     07/26/24  0556 07/27/24  0548   MG 2.2 2.2   PHOS 3.1 3.3   CAIONIZED 1.15 1.16          Blood Gas    No recent results  No recent results LFTs  Recent Labs     07/26/24  0556 07/27/24  0548   ALT 21 30   AST 32 43*   ALKPHOS 128* 136*   ALB 3.3* 3.4*   TBILI 0.50 0.52       Infectious  No recent results  Glucose  Recent Labs     07/26/24  0556 07/27/24  0548   GLUC 126 150*               Bret Gray PA-C

## 2024-07-27 NOTE — PLAN OF CARE
Problem: Activity Intolerance/Impaired Mobility  Goal: Mobility/activity is maintained at optimum level for patient  Description: Interventions:  - Assess and monitor patient  barriers to mobility and need for assistive/adaptive devices.  - Assess patient's emotional response to limitations.  - Collaborate with interdisciplinary team and initiate plans and interventions as ordered.  - Encourage independent activity per ability.  - Maintain proper body alignment.  - Perform active/passive rom as tolerated/ordered.  - Plan activities to conserve energy.  - Turn patient as appropriate  Outcome: Progressing     Problem: Potential for Aspiration  Goal: Non-ventilated patient's risk of aspiration is minimized  Description: Assess and monitor vital signs, respiratory status, and labs (WBC).  Monitor for signs of aspiration (tachypnea, cough, rales, wheezing, cyanosis, fever).    - Assess and monitor patient's ability to swallow.  - Place patient up in chair to eat if possible.  - HOB up at 90 degrees to eat if unable to get patient up into chair.  - Supervise patient during oral intake.   - Instruct patient/ family to take small bites.  - Instruct patient/ family to take small single sips when taking liquids.  - Follow patient-specific strategies generated by speech pathologist.  Outcome: Progressing

## 2024-07-27 NOTE — PLAN OF CARE
Problem: Prexisting or High Potential for Compromised Skin Integrity  Goal: Skin integrity is maintained or improved  Description: INTERVENTIONS:  - Identify patients at risk for skin breakdown  - Assess and monitor skin integrity  - Assess and monitor nutrition and hydration status  - Monitor labs   - Assess for incontinence   - Turn and reposition patient  - Assist with mobility/ambulation  - Relieve pressure over bony prominences  - Avoid friction and shearing  - Provide appropriate hygiene as needed including keeping skin clean and dry  - Evaluate need for skin moisturizer/barrier cream  - Collaborate with interdisciplinary team   - Patient/family teaching  - Consider wound care consult   Outcome: Progressing     Problem: Neurological Deficit  Goal: Neurological status is stable or improving  Description: Interventions:  - Monitor and assess patient's level of consciousness, motor function, sensory function, and level of assistance needed for ADLs.   - Monitor and report changes from baseline. Collaborate with interdisciplinary team to initiate plan and implement interventions as ordered.   - Provide and maintain a safe environment.  - Consider seizure precautions.  - Consider fall precautions.  - Consider aspiration precautions.  - Consider bleeding precautions.  Outcome: Progressing     Problem: Activity Intolerance/Impaired Mobility  Goal: Mobility/activity is maintained at optimum level for patient  Description: Interventions:  - Assess and monitor patient  barriers to mobility and need for assistive/adaptive devices.  - Assess patient's emotional response to limitations.  - Collaborate with interdisciplinary team and initiate plans and interventions as ordered.  - Encourage independent activity per ability.  - Maintain proper body alignment.  - Perform active/passive rom as tolerated/ordered.  - Plan activities to conserve energy.  - Turn patient as appropriate  Outcome: Progressing     Problem:  Communication Impairment  Goal: Ability to express needs and understand communication  Description: Assess patient's communication skills and ability to understand information.  Patient will demonstrate use of effective communication techniques, alternative methods of communication and understanding even if not able to speak.     - Encourage communication and provide alternate methods of communication as needed.  - Collaborate with case management/ for discharge needs.  - Include patient/family/caregiver in decisions related to communication.  Outcome: Progressing     Problem: Potential for Aspiration  Goal: Non-ventilated patient's risk of aspiration is minimized  Description: Assess and monitor vital signs, respiratory status, and labs (WBC).  Monitor for signs of aspiration (tachypnea, cough, rales, wheezing, cyanosis, fever).    - Assess and monitor patient's ability to swallow.  - Place patient up in chair to eat if possible.  - HOB up at 90 degrees to eat if unable to get patient up into chair.  - Supervise patient during oral intake.   - Instruct patient/ family to take small bites.  - Instruct patient/ family to take small single sips when taking liquids.  - Follow patient-specific strategies generated by speech pathologist.  Outcome: Progressing     Problem: Nutrition  Goal: Nutrition/Hydration status is improving  Description: Monitor and assess patient's nutrition/hydration status for malnutrition (ex- brittle hair, bruises, dry skin, pale skin and conjunctiva, muscle wasting, smooth red tongue, and disorientation). Collaborate with interdisciplinary team and initiate plan and interventions as ordered.  Monitor patient's weight and dietary intake as ordered or per policy. Utilize nutrition screening tool and intervene per policy. Determine patient's food preferences and provide high-protein, high-caloric foods as appropriate.   - Monitor nutrition parameters, recommending adjustments to  enteral nutrition orders at indicated.   - Assist patient with eating.  - Allow adequate time for meals.  - Encourage patient to take dietary supplement as ordered.  - Collaborate with interdisciplinary team.   - Include patient/family/caregiver in decisions related to nutrition.  Outcome: Progressing     Problem: PAIN - ADULT  Goal: Verbalizes/displays adequate comfort level or baseline comfort level  Description: Interventions:  - Encourage patient to monitor pain and request assistance  - Assess pain using appropriate pain scale  - Administer analgesics based on type and severity of pain and evaluate response  - Implement non-pharmacological measures as appropriate and evaluate response  - Consider cultural and social influences on pain and pain management  - Notify physician/advanced practitioner if interventions unsuccessful or patient reports new pain  Outcome: Progressing     Problem: INFECTION - ADULT  Goal: Absence or prevention of progression during hospitalization  Description: INTERVENTIONS:  - Assess and monitor for signs and symptoms of infection  - Monitor lab/diagnostic results  - Monitor all insertion sites, i.e. indwelling lines, tubes, and drains  - Monitor endotracheal if appropriate and nasal secretions for changes in amount and color  - [place] appropriate cooling/warming therapies per order  - Administer medications as ordered  - Instruct and encourage patient and family to use good hand hygiene technique  - Identify and instruct in appropriate isolation precautions for identified infection/condition  Outcome: Progressing  Goal: Absence of fever/infection during neutropenic period  Description: INTERVENTIONS:  - Monitor WBC    Outcome: Progressing     Problem: SAFETY ADULT  Goal: Patient will remain free of falls  Description: INTERVENTIONS:  - Educate patient/family on patient safety including physical limitations  - Instruct patient to call for assistance with activity   - Consult OT/PT to  assist with strengthening/mobility   - Keep Call bell within reach  - Keep bed low and locked with side rails adjusted as appropriate  - Keep care items and personal belongings within reach  - Initiate and maintain comfort rounds  - Make Fall Risk Sign visible to staff  - Offer Toileting every 2 Hours, in advance of need  - Initiate/Maintain bed alarm  - Obtain necessary fall risk management equipment: bed alarm  - Apply yellow socks and bracelet for high fall risk patients  - Consider moving patient to room near nurses station  Outcome: Progressing  Goal: Maintain or return to baseline ADL function  Description: INTERVENTIONS:  -  Assess patient's ability to carry out ADLs; assess patient's baseline for ADL function and identify physical deficits which impact ability to perform ADLs (bathing, care of mouth/teeth, toileting, grooming, dressing, etc.)  - Assess/evaluate cause of self-care deficits   - Assess range of motion  - Assess patient's mobility; develop plan if impaired  - Assess patient's need for assistive devices and provide as appropriate  - Encourage maximum independence but intervene and supervise when necessary  - Involve family in performance of ADLs  - Assess for home care needs following discharge   - Consider OT consult to assist with ADL evaluation and planning for discharge  - Provide patient education as appropriate  Outcome: Progressing  Goal: Maintains/Returns to pre admission functional level  Description: INTERVENTIONS:  - Perform AM-PAC 6 Click Basic Mobility/ Daily Activity assessment daily.  - Set and communicate daily mobility goal to care team and patient/family/caregiver.   - Collaborate with rehabilitation services on mobility goals if consulted  - Perform Range of Motion 3 times a day.  - Reposition patient every 2 hours.  - Dangle patient 2 times a day  - Stand patient 2 times a day  - Ambulate patient 2 times a day  - Out of bed to chair 2 times a day   - Out of bed for meals 3  times a day  - Out of bed for toileting  - Record patient progress and toleration of activity level   Outcome: Progressing     Problem: DISCHARGE PLANNING  Goal: Discharge to home or other facility with appropriate resources  Description: INTERVENTIONS:  - Identify barriers to discharge w/patient and caregiver  - Arrange for needed discharge resources and transportation as appropriate  - Identify discharge learning needs (meds, wound care, etc.)  - Arrange for interpretive services to assist at discharge as needed  - Refer to Case Management Department for coordinating discharge planning if the patient needs post-hospital services based on physician/advanced practitioner order or complex needs related to functional status, cognitive ability, or social support system  Outcome: Progressing     Problem: Knowledge Deficit  Goal: Patient/family/caregiver demonstrates understanding of disease process, treatment plan, medications, and discharge instructions  Description: Complete learning assessment and assess knowledge base.  Interventions:  - Provide teaching at level of understanding  - Provide teaching via preferred learning methods  Outcome: Progressing     Problem: Nutrition/Hydration-ADULT  Goal: Nutrient/Hydration intake appropriate for improving, restoring or maintaining nutritional needs  Description: Monitor and assess patient's nutrition/hydration status for malnutrition. Collaborate with interdisciplinary team and initiate plan and interventions as ordered.  Monitor patient's weight and dietary intake as ordered or per policy. Utilize nutrition screening tool and intervene as necessary. Determine patient's food preferences and provide high-protein, high-caloric foods as appropriate.     INTERVENTIONS:  - Monitor oral intake, urinary output, labs, and treatment plans  - Assess nutrition and hydration status and recommend course of action  - Evaluate amount of meals eaten  - Assist patient with eating if  necessary   - Allow adequate time for meals  - Recommend/ encourage appropriate diets, oral nutritional supplements, and vitamin/mineral supplements  - Order, calculate, and assess calorie counts as needed  - Recommend, monitor, and adjust tube feeding based on assessed needs  - Assess need for intravenous fluids  - Provide nutrition/hydration education as appropriate  - Include patient/family/caregiver in decisions related to nutrition  Outcome: Progressing     Problem: NEUROSENSORY - ADULT  Goal: Achieves stable or improved neurological status  Description: INTERVENTIONS  - Monitor and report changes in neurological status  - Monitor vital signs such as temperature, blood pressure, glucose, and any other labs ordered   - Initiate measures to prevent increased intracranial pressure  - Monitor for seizure activity and implement precautions if appropriate      Outcome: Progressing  Goal: Remains free of injury related to seizures activity  Description: INTERVENTIONS  - Maintain airway, patient safety  and administer oxygen as ordered  - Monitor patient for seizure activity, document and report duration and description of seizure to physician/advanced practitioner  - If seizure occurs,  ensure patient safety during seizure  - Reorient patient post seizure  - Seizure pads on all 4 side rails  - Instruct patient/family to notify RN of any seizure activity including if an aura is experienced  - Instruct patient/family to call for assistance with activity based on nursing assessment  - Administer anti-seizure medications if ordered    Outcome: Progressing  Goal: Achieves maximal functionality and self care  Description: INTERVENTIONS  - Monitor swallowing and airway patency with patient fatigue and changes in neurological status  - Encourage and assist patient to increase activity and self care.   - Encourage visually impaired, hearing impaired and aphasic patients to use assistive/communication devices  Outcome:  Progressing     Problem: CARDIOVASCULAR - ADULT  Goal: Maintains optimal cardiac output and hemodynamic stability  Description: INTERVENTIONS:  - Monitor I/O, vital signs and rhythm  - Monitor for S/S and trends of decreased cardiac output  - Administer and titrate ordered vasoactive medications to optimize hemodynamic stability  - Assess quality of pulses, skin color and temperature  - Assess for signs of decreased coronary artery perfusion  - Instruct patient to report change in severity of symptoms  Outcome: Progressing  Goal: Absence of cardiac dysrhythmias or at baseline rhythm  Description: INTERVENTIONS:  - Continuous cardiac monitoring, vital signs, obtain 12 lead EKG if ordered  - Administer antiarrhythmic and heart rate control medications as ordered  - Monitor electrolytes and administer replacement therapy as ordered  Outcome: Progressing     Problem: RESPIRATORY - ADULT  Goal: Achieves optimal ventilation and oxygenation  Description: INTERVENTIONS:  - Assess for changes in respiratory status  - Assess for changes in mentation and behavior  - Position to facilitate oxygenation and minimize respiratory effort  - Oxygen administered by appropriate delivery if ordered  - Initiate smoking cessation education as indicated  - Encourage broncho-pulmonary hygiene including cough, deep breathe, Incentive Spirometry  - Assess the need for suctioning and aspirate as needed  - Assess and instruct to report SOB or any respiratory difficulty  - Respiratory Therapy support as indicated  Outcome: Progressing     Problem: GASTROINTESTINAL - ADULT  Goal: Maintains or returns to baseline bowel function  Description: INTERVENTIONS:  - Assess bowel function  - Encourage oral fluids to ensure adequate hydration  - Administer IV fluids if ordered to ensure adequate hydration  - Administer ordered medications as needed  - Encourage mobilization and activity  - Consider nutritional services referral to assist patient with  adequate nutrition and appropriate food choices  Outcome: Progressing  Goal: Maintains adequate nutritional intake  Description: INTERVENTIONS:  - Monitor percentage of each meal consumed  - Identify factors contributing to decreased intake, treat as appropriate  - Assist with meals as needed  - Monitor I&O, weight, and lab values if indicated  - Obtain nutrition services referral as needed  Outcome: Progressing     Problem: GENITOURINARY - ADULT  Goal: Maintains or returns to baseline urinary function  Description: INTERVENTIONS:  - Assess urinary function  - Encourage oral fluids to ensure adequate hydration if ordered  - Administer IV fluids as ordered to ensure adequate hydration  - Administer ordered medications as needed  - Offer frequent toileting  - Follow urinary retention protocol if ordered  Outcome: Progressing  Goal: Absence of urinary retention  Description: INTERVENTIONS:  - Assess patient’s ability to void and empty bladder  - Monitor I/O  - Bladder scan as needed  - Discuss with physician/AP medications to alleviate retention as needed  - Discuss catheterization for long term situations as appropriate  Outcome: Progressing     Problem: METABOLIC, FLUID AND ELECTROLYTES - ADULT  Goal: Electrolytes maintained within normal limits  Description: INTERVENTIONS:  - Monitor labs and assess patient for signs and symptoms of electrolyte imbalances  - Administer electrolyte replacement as ordered  - Monitor response to electrolyte replacements, including repeat lab results as appropriate  - Instruct patient on fluid and nutrition as appropriate  Outcome: Progressing  Goal: Fluid balance maintained  Description: INTERVENTIONS:  - Monitor labs   - Monitor I/O and WT  - Instruct patient on fluid and nutrition as appropriate  - Assess for signs & symptoms of volume excess or deficit  Outcome: Progressing  Goal: Glucose maintained within target range  Description: INTERVENTIONS:  - Monitor Blood Glucose as  ordered  - Assess for signs and symptoms of hyperglycemia and hypoglycemia  - Administer ordered medications to maintain glucose within target range  - Assess nutritional intake and initiate nutrition service referral as needed  Outcome: Progressing     Problem: SKIN/TISSUE INTEGRITY - ADULT  Goal: Skin Integrity remains intact(Skin Breakdown Prevention)  Description: Assess:  -Perform [place] assessment every shift  -Clean and moisturize skin every shift  -Inspect skin when repositioning, toileting, and assisting with ADLS  -Assess under medical devices such as bracelets every shift  -Assess extremities for adequate circulation and sensation     Bed Management:  -Have minimal linens on bed & keep smooth, unwrinkled  -Change linens as needed when moist or perspiring  -Avoid sitting or lying in one position for more than 2 hours while in bed  -Keep HOB at 30 degrees     Toileting:  -Offer bedside commode  -Assess for incontinence every shift  -Use incontinent care products after each incontinent episode such as foaming skin cleanser    Activity:  -Mobilize patient 3 times a day  -Encourage activity and walks on unit  -Encourage or provide ROM exercises   -Turn and reposition patient every 2 Hours  -Use appropriate equipment to lift or move patient in bed  -Instruct/ Assist with weight shifting every 2 hours when out of bed in chair  -Consider limitation of chair time 4 hour intervals    Skin Care:  -Avoid use of baby powder, tape, friction and shearing, hot water or constrictive clothing  -Relieve pressure over bony prominences using allevyns  -Do not massage red bony areas      Outcome: Progressing  Goal: Incision(s), wounds(s) or drain site(s) healing without S/S of infection  Description: INTERVENTIONS  - Assess and document dressing, incision, wound bed, drain sites and surrounding tissue  - Provide patient and family education  - Perform skin care/dressing changes every shift  Outcome: Progressing     Problem:  HEMATOLOGIC - ADULT  Goal: Maintains hematologic stability  Description: INTERVENTIONS  - Assess for signs and symptoms of bleeding or hemorrhage  - Monitor labs  - Administer supportive blood products/factors as ordered and appropriate  Outcome: Progressing     Problem: MUSCULOSKELETAL - ADULT  Goal: Maintain or return mobility to safest level of function  Description: INTERVENTIONS:  - Assess patient's ability to carry out ADLs; assess patient's baseline for ADL function and identify physical deficits which impact ability to perform ADLs (bathing, care of mouth/teeth, toileting, grooming, dressing, etc.)  - Assess/evaluate cause of self-care deficits   - Assess range of motion  - Assess patient's mobility  - Assess patient's need for assistive devices and provide as appropriate  - Encourage maximum independence but intervene and supervise when necessary  - Involve family in performance of ADLs  - Assess for home care needs following discharge   - Consider OT consult to assist with ADL evaluation and planning for discharge  - Provide patient education as appropriate  Outcome: Progressing     Problem: COPING  Goal: Pt/Family able to verbalize concerns and demonstrate effective coping strategies  Description: INTERVENTIONS:  - Assist patient/family to identify coping skills, available support systems and cultural and spiritual values  - Provide emotional support, including active listening and acknowledgement of concerns of patient and caregivers  - Reduce environmental stimuli, as able  - Provide patient education  - Assess for spiritual pain/suffering and initiate spiritual care, including notification of Pastoral Care or halie based community as needed  - Assess effectiveness of coping strategies  Outcome: Progressing  Goal: Will report anxiety at manageable levels  Description: INTERVENTIONS:  - Administer medication as ordered  - Teach and encourage coping skills  - Provide emotional support  - Assess  patient/family for anxiety and ability to cope  Outcome: Progressing     Problem: Potential for Falls  Goal: Patient will remain free of falls  Description: INTERVENTIONS:  - Educate patient/family on patient safety including physical limitations  - Instruct patient to call for assistance with activity   - Consult OT/PT to assist with strengthening/mobility   - Keep Call bell within reach  - Keep bed low and locked with side rails adjusted as appropriate  - Keep care items and personal belongings within reach  - Initiate and maintain comfort rounds  - Make Fall Risk Sign visible to staff  - Offer Toileting every 2 Hours, in advance of need  - Initiate/Maintain bed alarm  - Obtain necessary fall risk management equipment: bed alarm  - Apply yellow socks and bracelet for high fall risk patients  - Consider moving patient to room near nurses station  Outcome: Progressing     Problem: SAFETY,RESTRAINT: NV/NON-SELF DESTRUCTIVE BEHAVIOR  Goal: Remains free of harm/injury (restraint for non violent/non self-detsructive behavior)  Description: INTERVENTIONS:  - Instruct patient/family regarding restraint use   - Assess and monitor physiologic and psychological status   - Provide interventions and comfort measures to meet assessed patient needs   - Identify and implement measures to help patient regain control  - Assess readiness for release of restraint   Outcome: Progressing  Goal: Returns to optimal restraint-free functioning  Description: INTERVENTIONS:  - Assess the patient's behavior and symptoms that indicate continued need for restraint  - Identify and implement measures to help patient regain control  - Assess readiness for release of restraint   Outcome: Progressing

## 2024-07-28 ENCOUNTER — APPOINTMENT (INPATIENT)
Dept: NON INVASIVE DIAGNOSTICS | Facility: HOSPITAL | Age: 69
DRG: 020 | End: 2024-07-28
Payer: MEDICARE

## 2024-07-28 LAB
ANION GAP SERPL CALCULATED.3IONS-SCNC: 9 MMOL/L (ref 4–13)
APTT PPP: 31 SECONDS (ref 23–37)
ATRIAL RATE: 94 BPM
ATRIAL RATE: 95 BPM
ATRIAL RATE: 98 BPM
BASOPHILS # BLD AUTO: 0.04 THOUSANDS/ÂΜL (ref 0–0.1)
BASOPHILS NFR BLD AUTO: 0 % (ref 0–1)
BUN SERPL-MCNC: 22 MG/DL (ref 5–25)
CA-I BLD-SCNC: 1.19 MMOL/L (ref 1.12–1.32)
CALCIUM SERPL-MCNC: 9 MG/DL (ref 8.4–10.2)
CHLORIDE SERPL-SCNC: 106 MMOL/L (ref 96–108)
CO2 SERPL-SCNC: 26 MMOL/L (ref 21–32)
CREAT SERPL-MCNC: 0.77 MG/DL (ref 0.6–1.3)
EOSINOPHIL # BLD AUTO: 0.43 THOUSAND/ÂΜL (ref 0–0.61)
EOSINOPHIL NFR BLD AUTO: 3 % (ref 0–6)
ERYTHROCYTE [DISTWIDTH] IN BLOOD BY AUTOMATED COUNT: 15.2 % (ref 11.6–15.1)
GFR SERPL CREATININE-BSD FRML MDRD: 92 ML/MIN/1.73SQ M
GLUCOSE SERPL-MCNC: 117 MG/DL (ref 65–140)
GLUCOSE SERPL-MCNC: 125 MG/DL (ref 65–140)
GLUCOSE SERPL-MCNC: 134 MG/DL (ref 65–140)
GLUCOSE SERPL-MCNC: 145 MG/DL (ref 65–140)
GLUCOSE SERPL-MCNC: 148 MG/DL (ref 65–140)
HCT VFR BLD AUTO: 30.6 % (ref 36.5–49.3)
HGB BLD-MCNC: 10.1 G/DL (ref 12–17)
IMM GRANULOCYTES # BLD AUTO: 0.12 THOUSAND/UL (ref 0–0.2)
IMM GRANULOCYTES NFR BLD AUTO: 1 % (ref 0–2)
LYMPHOCYTES # BLD AUTO: 2.28 THOUSANDS/ÂΜL (ref 0.6–4.47)
LYMPHOCYTES NFR BLD AUTO: 16 % (ref 14–44)
MAGNESIUM SERPL-MCNC: 2.4 MG/DL (ref 1.9–2.7)
MCH RBC QN AUTO: 31.2 PG (ref 26.8–34.3)
MCHC RBC AUTO-ENTMCNC: 33 G/DL (ref 31.4–37.4)
MCV RBC AUTO: 94 FL (ref 82–98)
MONOCYTES # BLD AUTO: 0.67 THOUSAND/ÂΜL (ref 0.17–1.22)
MONOCYTES NFR BLD AUTO: 5 % (ref 4–12)
NEUTROPHILS # BLD AUTO: 10.9 THOUSANDS/ÂΜL (ref 1.85–7.62)
NEUTS SEG NFR BLD AUTO: 75 % (ref 43–75)
NRBC BLD AUTO-RTO: 0 /100 WBCS
P AXIS: 42 DEGREES
P AXIS: 70 DEGREES
P AXIS: 83 DEGREES
PHOSPHATE SERPL-MCNC: 2.9 MG/DL (ref 2.3–4.1)
PLATELET # BLD AUTO: 612 THOUSANDS/UL (ref 149–390)
PMV BLD AUTO: 10.3 FL (ref 8.9–12.7)
POTASSIUM SERPL-SCNC: 4.2 MMOL/L (ref 3.5–5.3)
PR INTERVAL: 108 MS
PR INTERVAL: 128 MS
PR INTERVAL: 129 MS
QRS AXIS: -5 DEGREES
QRS AXIS: 38 DEGREES
QRS AXIS: 56 DEGREES
QRSD INTERVAL: 75 MS
QRSD INTERVAL: 78 MS
QRSD INTERVAL: 78 MS
QT INTERVAL: 321 MS
QT INTERVAL: 344 MS
QT INTERVAL: 346 MS
QTC INTERVAL: 410 MS
QTC INTERVAL: 430 MS
QTC INTERVAL: 434 MS
RBC # BLD AUTO: 3.24 MILLION/UL (ref 3.88–5.62)
SODIUM SERPL-SCNC: 141 MMOL/L (ref 135–147)
T WAVE AXIS: 37 DEGREES
T WAVE AXIS: 62 DEGREES
T WAVE AXIS: 77 DEGREES
VENTRICULAR RATE: 94 BPM
VENTRICULAR RATE: 95 BPM
VENTRICULAR RATE: 98 BPM
WBC # BLD AUTO: 14.44 THOUSAND/UL (ref 4.31–10.16)

## 2024-07-28 PROCEDURE — 83735 ASSAY OF MAGNESIUM: CPT

## 2024-07-28 PROCEDURE — 84100 ASSAY OF PHOSPHORUS: CPT

## 2024-07-28 PROCEDURE — 93010 ELECTROCARDIOGRAM REPORT: CPT | Performed by: INTERNAL MEDICINE

## 2024-07-28 PROCEDURE — 80048 BASIC METABOLIC PNL TOTAL CA: CPT

## 2024-07-28 PROCEDURE — 85025 COMPLETE CBC W/AUTO DIFF WBC: CPT

## 2024-07-28 PROCEDURE — 82948 REAGENT STRIP/BLOOD GLUCOSE: CPT

## 2024-07-28 PROCEDURE — 93005 ELECTROCARDIOGRAM TRACING: CPT

## 2024-07-28 PROCEDURE — 93886 INTRACRANIAL COMPLETE STUDY: CPT

## 2024-07-28 PROCEDURE — 82330 ASSAY OF CALCIUM: CPT

## 2024-07-28 PROCEDURE — 99291 CRITICAL CARE FIRST HOUR: CPT | Performed by: EMERGENCY MEDICINE

## 2024-07-28 PROCEDURE — 85730 THROMBOPLASTIN TIME PARTIAL: CPT

## 2024-07-28 RX ORDER — NITROGLYCERIN 0.1MG/HR
0.1 PATCH, TRANSDERMAL 24 HOURS TRANSDERMAL DAILY PRN
Status: CANCELLED | OUTPATIENT
Start: 2024-07-28

## 2024-07-28 RX ADMIN — Medication 20 MG: at 08:29

## 2024-07-28 RX ADMIN — Medication 30 MG: at 10:44

## 2024-07-28 RX ADMIN — Medication 30 MG: at 12:33

## 2024-07-28 RX ADMIN — CEFAZOLIN SODIUM 2000 MG: 2 SOLUTION INTRAVENOUS at 21:19

## 2024-07-28 RX ADMIN — Medication 30 MG: at 01:57

## 2024-07-28 RX ADMIN — Medication 30 MG: at 17:11

## 2024-07-28 RX ADMIN — HEPARIN SODIUM 5000 UNITS: 5000 INJECTION INTRAVENOUS; SUBCUTANEOUS at 05:55

## 2024-07-28 RX ADMIN — ATORVASTATIN CALCIUM 80 MG: 80 TABLET, FILM COATED ORAL at 08:29

## 2024-07-28 RX ADMIN — Medication 30 MG: at 14:23

## 2024-07-28 RX ADMIN — BUTALBITAL, ACETAMINOPHEN AND CAFFEINE 1 TABLET: 325; 50; 40 TABLET ORAL at 17:10

## 2024-07-28 RX ADMIN — LIDOCAINE 5% 1 PATCH: 700 PATCH TOPICAL at 08:29

## 2024-07-28 RX ADMIN — CHLORHEXIDINE GLUCONATE 0.12% ORAL RINSE 15 ML: 1.2 LIQUID ORAL at 08:29

## 2024-07-28 RX ADMIN — Medication 30 MG: at 00:45

## 2024-07-28 RX ADMIN — CEFAZOLIN SODIUM 2000 MG: 2 SOLUTION INTRAVENOUS at 05:56

## 2024-07-28 RX ADMIN — Medication 30 MG: at 08:30

## 2024-07-28 RX ADMIN — FOLIC ACID TAB 400 MCG 400 MCG: 400 TAB at 08:29

## 2024-07-28 RX ADMIN — POTASSIUM & SODIUM PHOSPHATES POWDER PACK 280-160-250 MG 2 PACKET: 280-160-250 PACK at 08:35

## 2024-07-28 RX ADMIN — Medication 30 MG: at 22:53

## 2024-07-28 RX ADMIN — EZETIMIBE 10 MG: 10 TABLET ORAL at 08:29

## 2024-07-28 RX ADMIN — OXYCODONE HYDROCHLORIDE AND ACETAMINOPHEN 1 TABLET: 5; 325 TABLET ORAL at 13:17

## 2024-07-28 RX ADMIN — Medication 30 MG: at 20:36

## 2024-07-28 RX ADMIN — CEFAZOLIN SODIUM 2000 MG: 2 SOLUTION INTRAVENOUS at 13:14

## 2024-07-28 RX ADMIN — LEVETIRACETAM 750 MG: 100 SOLUTION ORAL at 20:36

## 2024-07-28 RX ADMIN — MIDODRINE HYDROCHLORIDE 10 MG: 5 TABLET ORAL at 06:30

## 2024-07-28 RX ADMIN — Medication 30 MG: at 04:00

## 2024-07-28 RX ADMIN — HEPARIN SODIUM 5000 UNITS: 5000 INJECTION INTRAVENOUS; SUBCUTANEOUS at 13:16

## 2024-07-28 RX ADMIN — LEVETIRACETAM 750 MG: 100 SOLUTION ORAL at 08:30

## 2024-07-28 RX ADMIN — SODIUM CHLORIDE 500 ML: 0.9 INJECTION, SOLUTION INTRAVENOUS at 14:21

## 2024-07-28 RX ADMIN — THIAMINE HCL TAB 100 MG 100 MG: 100 TAB at 08:29

## 2024-07-28 RX ADMIN — BUTALBITAL, ACETAMINOPHEN AND CAFFEINE 1 TABLET: 325; 50; 40 TABLET ORAL at 10:46

## 2024-07-28 RX ADMIN — GABAPENTIN 600 MG: 300 CAPSULE ORAL at 17:10

## 2024-07-28 RX ADMIN — GABAPENTIN 600 MG: 300 CAPSULE ORAL at 21:19

## 2024-07-28 RX ADMIN — OXYCODONE HYDROCHLORIDE AND ACETAMINOPHEN 1 TABLET: 5; 325 TABLET ORAL at 01:57

## 2024-07-28 RX ADMIN — ASPIRIN 81 MG CHEWABLE TABLET 81 MG: 81 TABLET CHEWABLE at 08:29

## 2024-07-28 RX ADMIN — Medication 30 MG: at 05:56

## 2024-07-28 RX ADMIN — OXYCODONE HYDROCHLORIDE AND ACETAMINOPHEN 1 TABLET: 5; 325 TABLET ORAL at 08:34

## 2024-07-28 RX ADMIN — MIDODRINE HYDROCHLORIDE 10 MG: 5 TABLET ORAL at 10:44

## 2024-07-28 RX ADMIN — CHLORHEXIDINE GLUCONATE 0.12% ORAL RINSE 15 ML: 1.2 LIQUID ORAL at 20:35

## 2024-07-28 RX ADMIN — HEPARIN SODIUM 5000 UNITS: 5000 INJECTION INTRAVENOUS; SUBCUTANEOUS at 21:19

## 2024-07-28 RX ADMIN — GABAPENTIN 600 MG: 300 CAPSULE ORAL at 08:29

## 2024-07-28 NOTE — PLAN OF CARE
Problem: Prexisting or High Potential for Compromised Skin Integrity  Goal: Skin integrity is maintained or improved  Description: INTERVENTIONS:  - Identify patients at risk for skin breakdown  - Assess and monitor skin integrity  - Assess and monitor nutrition and hydration status  - Monitor labs   - Assess for incontinence   - Turn and reposition patient  - Assist with mobility/ambulation  - Relieve pressure over bony prominences  - Avoid friction and shearing  - Provide appropriate hygiene as needed including keeping skin clean and dry  - Evaluate need for skin moisturizer/barrier cream  - Collaborate with interdisciplinary team   - Patient/family teaching  - Consider wound care consult   Outcome: Progressing     Problem: Neurological Deficit  Goal: Neurological status is stable or improving  Description: Interventions:  - Monitor and assess patient's level of consciousness, motor function, sensory function, and level of assistance needed for ADLs.   - Monitor and report changes from baseline. Collaborate with interdisciplinary team to initiate plan and implement interventions as ordered.   - Provide and maintain a safe environment.  - Consider seizure precautions.  - Consider fall precautions.  - Consider aspiration precautions.  - Consider bleeding precautions.  Outcome: Progressing     Problem: Activity Intolerance/Impaired Mobility  Goal: Mobility/activity is maintained at optimum level for patient  Description: Interventions:  - Assess and monitor patient  barriers to mobility and need for assistive/adaptive devices.  - Assess patient's emotional response to limitations.  - Collaborate with interdisciplinary team and initiate plans and interventions as ordered.  - Encourage independent activity per ability.  - Maintain proper body alignment.  - Perform active/passive rom as tolerated/ordered.  - Plan activities to conserve energy.  - Turn patient as appropriate  Outcome: Progressing     Problem:  Communication Impairment  Goal: Ability to express needs and understand communication  Description: Assess patient's communication skills and ability to understand information.  Patient will demonstrate use of effective communication techniques, alternative methods of communication and understanding even if not able to speak.     - Encourage communication and provide alternate methods of communication as needed.  - Collaborate with case management/ for discharge needs.  - Include patient/family/caregiver in decisions related to communication.  Outcome: Progressing     Problem: Potential for Aspiration  Goal: Non-ventilated patient's risk of aspiration is minimized  Description: Assess and monitor vital signs, respiratory status, and labs (WBC).  Monitor for signs of aspiration (tachypnea, cough, rales, wheezing, cyanosis, fever).    - Assess and monitor patient's ability to swallow.  - Place patient up in chair to eat if possible.  - HOB up at 90 degrees to eat if unable to get patient up into chair.  - Supervise patient during oral intake.   - Instruct patient/ family to take small bites.  - Instruct patient/ family to take small single sips when taking liquids.  - Follow patient-specific strategies generated by speech pathologist.  Outcome: Progressing     Problem: Nutrition  Goal: Nutrition/Hydration status is improving  Description: Monitor and assess patient's nutrition/hydration status for malnutrition (ex- brittle hair, bruises, dry skin, pale skin and conjunctiva, muscle wasting, smooth red tongue, and disorientation). Collaborate with interdisciplinary team and initiate plan and interventions as ordered.  Monitor patient's weight and dietary intake as ordered or per policy. Utilize nutrition screening tool and intervene per policy. Determine patient's food preferences and provide high-protein, high-caloric foods as appropriate.   - Monitor nutrition parameters, recommending adjustments to  enteral nutrition orders at indicated.   - Assist patient with eating.  - Allow adequate time for meals.  - Encourage patient to take dietary supplement as ordered.  - Collaborate with interdisciplinary team.   - Include patient/family/caregiver in decisions related to nutrition.  Outcome: Progressing     Problem: PAIN - ADULT  Goal: Verbalizes/displays adequate comfort level or baseline comfort level  Description: Interventions:  - Encourage patient to monitor pain and request assistance  - Assess pain using appropriate pain scale  - Administer analgesics based on type and severity of pain and evaluate response  - Implement non-pharmacological measures as appropriate and evaluate response  - Consider cultural and social influences on pain and pain management  - Notify physician/advanced practitioner if interventions unsuccessful or patient reports new pain  Outcome: Progressing     Problem: INFECTION - ADULT  Goal: Absence or prevention of progression during hospitalization  Description: INTERVENTIONS:  - Assess and monitor for signs and symptoms of infection  - Monitor lab/diagnostic results  - Monitor all insertion sites, i.e. indwelling lines, tubes, and drains  - Monitor endotracheal if appropriate and nasal secretions for changes in amount and color  - Antoine appropriate cooling/warming therapies per order  - Administer medications as ordered  - Instruct and encourage patient and family to use good hand hygiene technique  - Identify and instruct in appropriate isolation precautions for identified infection/condition  Outcome: Progressing  Goal: Absence of fever/infection during neutropenic period  Description: INTERVENTIONS:  - Monitor WBC    Outcome: Progressing     Problem: SAFETY ADULT  Goal: Patient will remain free of falls  Description: INTERVENTIONS:  - Educate patient/family on patient safety including physical limitations  - Instruct patient to call for assistance with activity   - Consult OT/PT to  assist with strengthening/mobility   - Keep Call bell within reach  - Keep bed low and locked with side rails adjusted as appropriate  - Keep care items and personal belongings within reach  - Initiate and maintain comfort rounds  - Make Fall Risk Sign visible to staff  - Offer Toileting every hours, in advance of need  - Initiate/Maintain alarm  - Obtain necessary fall risk management equipment:   - Apply yellow socks and bracelet for high fall risk patients  - Consider moving patient to room near nurses station  Outcome: Progressing  Goal: Maintain or return to baseline ADL function  Description: INTERVENTIONS:  -  Assess patient's ability to carry out ADLs; assess patient's baseline for ADL function and identify physical deficits which impact ability to perform ADLs (bathing, care of mouth/teeth, toileting, grooming, dressing, etc.)  - Assess/evaluate cause of self-care deficits   - Assess range of motion  - Assess patient's mobility; develop plan if impaired  - Assess patient's need for assistive devices and provide as appropriate  - Encourage maximum independence but intervene and supervise when necessary  - Involve family in performance of ADLs  - Assess for home care needs following discharge   - Consider OT consult to assist with ADL evaluation and planning for discharge  - Provide patient education as appropriate  Outcome: Progressing  Goal: Maintains/Returns to pre admission functional level  Description: INTERVENTIONS:  - Perform AM-PAC 6 Click Basic Mobility/ Daily Activity assessment daily.  - Set and communicate daily mobility goal to care team and patient/family/caregiver.   - Collaborate with rehabilitation services on mobility goals if consulted  - Perform Range of Motion  times a day.  - Reposition patient every  hours.  - Dangle patient  times a day  - Stand patient  times a day  - Ambulate patient  times a day  - Out of bed to chair  times a day   - Out of bed for meals  times a day  - Out of bed  for toileting  - Record patient progress and toleration of activity level   Outcome: Progressing     Problem: DISCHARGE PLANNING  Goal: Discharge to home or other facility with appropriate resources  Description: INTERVENTIONS:  - Identify barriers to discharge w/patient and caregiver  - Arrange for needed discharge resources and transportation as appropriate  - Identify discharge learning needs (meds, wound care, etc.)  - Arrange for interpretive services to assist at discharge as needed  - Refer to Case Management Department for coordinating discharge planning if the patient needs post-hospital services based on physician/advanced practitioner order or complex needs related to functional status, cognitive ability, or social support system  Outcome: Progressing     Problem: Knowledge Deficit  Goal: Patient/family/caregiver demonstrates understanding of disease process, treatment plan, medications, and discharge instructions  Description: Complete learning assessment and assess knowledge base.  Interventions:  - Provide teaching at level of understanding  - Provide teaching via preferred learning methods  Outcome: Progressing     Problem: Nutrition/Hydration-ADULT  Goal: Nutrient/Hydration intake appropriate for improving, restoring or maintaining nutritional needs  Description: Monitor and assess patient's nutrition/hydration status for malnutrition. Collaborate with interdisciplinary team and initiate plan and interventions as ordered.  Monitor patient's weight and dietary intake as ordered or per policy. Utilize nutrition screening tool and intervene as necessary. Determine patient's food preferences and provide high-protein, high-caloric foods as appropriate.     INTERVENTIONS:  - Monitor oral intake, urinary output, labs, and treatment plans  - Assess nutrition and hydration status and recommend course of action  - Evaluate amount of meals eaten  - Assist patient with eating if necessary   - Allow adequate time  for meals  - Recommend/ encourage appropriate diets, oral nutritional supplements, and vitamin/mineral supplements  - Order, calculate, and assess calorie counts as needed  - Recommend, monitor, and adjust tube feeding based on assessed needs  - Assess need for intravenous fluids  - Provide nutrition/hydration education as appropriate  - Include patient/family/caregiver in decisions related to nutrition  Outcome: Progressing     Problem: NEUROSENSORY - ADULT  Goal: Achieves stable or improved neurological status  Description: INTERVENTIONS  - Monitor and report changes in neurological status  - Monitor vital signs such as temperature, blood pressure, glucose, and any other labs ordered   - Initiate measures to prevent increased intracranial pressure  - Monitor for seizure activity and implement precautions if appropriate      Outcome: Progressing  Goal: Remains free of injury related to seizures activity  Description: INTERVENTIONS  - Maintain airway, patient safety  and administer oxygen as ordered  - Monitor patient for seizure activity, document and report duration and description of seizure to physician/advanced practitioner  - If seizure occurs,  ensure patient safety during seizure  - Reorient patient post seizure  - Seizure pads on all 4 side rails  - Instruct patient/family to notify RN of any seizure activity including if an aura is experienced  - Instruct patient/family to call for assistance with activity based on nursing assessment  - Administer anti-seizure medications if ordered    Outcome: Progressing  Goal: Achieves maximal functionality and self care  Description: INTERVENTIONS  - Monitor swallowing and airway patency with patient fatigue and changes in neurological status  - Encourage and assist patient to increase activity and self care.   - Encourage visually impaired, hearing impaired and aphasic patients to use assistive/communication devices  Outcome: Progressing     Problem: CARDIOVASCULAR -  ADULT  Goal: Maintains optimal cardiac output and hemodynamic stability  Description: INTERVENTIONS:  - Monitor I/O, vital signs and rhythm  - Monitor for S/S and trends of decreased cardiac output  - Administer and titrate ordered vasoactive medications to optimize hemodynamic stability  - Assess quality of pulses, skin color and temperature  - Assess for signs of decreased coronary artery perfusion  - Instruct patient to report change in severity of symptoms  Outcome: Progressing  Goal: Absence of cardiac dysrhythmias or at baseline rhythm  Description: INTERVENTIONS:  - Continuous cardiac monitoring, vital signs, obtain 12 lead EKG if ordered  - Administer antiarrhythmic and heart rate control medications as ordered  - Monitor electrolytes and administer replacement therapy as ordered  Outcome: Progressing     Problem: RESPIRATORY - ADULT  Goal: Achieves optimal ventilation and oxygenation  Description: INTERVENTIONS:  - Assess for changes in respiratory status  - Assess for changes in mentation and behavior  - Position to facilitate oxygenation and minimize respiratory effort  - Oxygen administered by appropriate delivery if ordered  - Initiate smoking cessation education as indicated  - Encourage broncho-pulmonary hygiene including cough, deep breathe, Incentive Spirometry  - Assess the need for suctioning and aspirate as needed  - Assess and instruct to report SOB or any respiratory difficulty  - Respiratory Therapy support as indicated  Outcome: Progressing     Problem: GASTROINTESTINAL - ADULT  Goal: Maintains or returns to baseline bowel function  Description: INTERVENTIONS:  - Assess bowel function  - Encourage oral fluids to ensure adequate hydration  - Administer IV fluids if ordered to ensure adequate hydration  - Administer ordered medications as needed  - Encourage mobilization and activity  - Consider nutritional services referral to assist patient with adequate nutrition and appropriate food  choices  Outcome: Progressing  Goal: Maintains adequate nutritional intake  Description: INTERVENTIONS:  - Monitor percentage of each meal consumed  - Identify factors contributing to decreased intake, treat as appropriate  - Assist with meals as needed  - Monitor I&O, weight, and lab values if indicated  - Obtain nutrition services referral as needed  Outcome: Progressing     Problem: GENITOURINARY - ADULT  Goal: Maintains or returns to baseline urinary function  Description: INTERVENTIONS:  - Assess urinary function  - Encourage oral fluids to ensure adequate hydration if ordered  - Administer IV fluids as ordered to ensure adequate hydration  - Administer ordered medications as needed  - Offer frequent toileting  - Follow urinary retention protocol if ordered  Outcome: Progressing  Goal: Absence of urinary retention  Description: INTERVENTIONS:  - Assess patient’s ability to void and empty bladder  - Monitor I/O  - Bladder scan as needed  - Discuss with physician/AP medications to alleviate retention as needed  - Discuss catheterization for long term situations as appropriate  Outcome: Progressing     Problem: METABOLIC, FLUID AND ELECTROLYTES - ADULT  Goal: Electrolytes maintained within normal limits  Description: INTERVENTIONS:  - Monitor labs and assess patient for signs and symptoms of electrolyte imbalances  - Administer electrolyte replacement as ordered  - Monitor response to electrolyte replacements, including repeat lab results as appropriate  - Instruct patient on fluid and nutrition as appropriate  Outcome: Progressing  Goal: Fluid balance maintained  Description: INTERVENTIONS:  - Monitor labs   - Monitor I/O and WT  - Instruct patient on fluid and nutrition as appropriate  - Assess for signs & symptoms of volume excess or deficit  Outcome: Progressing  Goal: Glucose maintained within target range  Description: INTERVENTIONS:  - Monitor Blood Glucose as ordered  - Assess for signs and symptoms of  hyperglycemia and hypoglycemia  - Administer ordered medications to maintain glucose within target range  - Assess nutritional intake and initiate nutrition service referral as needed  Outcome: Progressing     Problem: SKIN/TISSUE INTEGRITY - ADULT  Goal: Skin Integrity remains intact(Skin Breakdown Prevention)  Description: Assess:  -Perform Martin assessment every   -Clean and moisturize skin every   -Inspect skin when repositioning, toileting, and assisting with ADLS  -Assess under medical devices such as  every   -Assess extremities for adequate circulation and sensation     Bed Management:  -Have minimal linens on bed & keep smooth, unwrinkled  -Change linens as needed when moist or perspiring  -Avoid sitting or lying in one position for more than  hours while in bed  -Keep HOB at degrees     Toileting:  -Offer bedside commode  -Assess for incontinence every   -Use incontinent care products after each incontinent episode such as     Activity:  -Mobilize patient  times a day  -Encourage activity and walks on unit  -Encourage or provide ROM exercises   -Turn and reposition patient every  Hours  -Use appropriate equipment to lift or move patient in bed  -Instruct/ Assist with weight shifting every  when out of bed in chair  -Consider limitation of chair time  hour intervals    Skin Care:  -Avoid use of baby powder, tape, friction and shearing, hot water or constrictive clothing  -Relieve pressure over bony prominences using   -Do not massage red bony areas    Next Steps:  -Teach patient strategies to minimize risks such as    -Consider consults to  interdisciplinary teams such as   Outcome: Progressing  Goal: Incision(s), wounds(s) or drain site(s) healing without S/S of infection  Description: INTERVENTIONS  - Assess and document dressing, incision, wound bed, drain sites and surrounding tissue  - Provide patient and family education  - Perform skin care/dressing changes every   Outcome: Progressing     Problem:  HEMATOLOGIC - ADULT  Goal: Maintains hematologic stability  Description: INTERVENTIONS  - Assess for signs and symptoms of bleeding or hemorrhage  - Monitor labs  - Administer supportive blood products/factors as ordered and appropriate  Outcome: Progressing     Problem: MUSCULOSKELETAL - ADULT  Goal: Maintain or return mobility to safest level of function  Description: INTERVENTIONS:  - Assess patient's ability to carry out ADLs; assess patient's baseline for ADL function and identify physical deficits which impact ability to perform ADLs (bathing, care of mouth/teeth, toileting, grooming, dressing, etc.)  - Assess/evaluate cause of self-care deficits   - Assess range of motion  - Assess patient's mobility  - Assess patient's need for assistive devices and provide as appropriate  - Encourage maximum independence but intervene and supervise when necessary  - Involve family in performance of ADLs  - Assess for home care needs following discharge   - Consider OT consult to assist with ADL evaluation and planning for discharge  - Provide patient education as appropriate  Outcome: Progressing     Problem: COPING  Goal: Pt/Family able to verbalize concerns and demonstrate effective coping strategies  Description: INTERVENTIONS:  - Assist patient/family to identify coping skills, available support systems and cultural and spiritual values  - Provide emotional support, including active listening and acknowledgement of concerns of patient and caregivers  - Reduce environmental stimuli, as able  - Provide patient education  - Assess for spiritual pain/suffering and initiate spiritual care, including notification of Pastoral Care or halie based community as needed  - Assess effectiveness of coping strategies  Outcome: Progressing  Goal: Will report anxiety at manageable levels  Description: INTERVENTIONS:  - Administer medication as ordered  - Teach and encourage coping skills  - Provide emotional support  - Assess  patient/family for anxiety and ability to cope  Outcome: Progressing     Problem: Potential for Falls  Goal: Patient will remain free of falls  Description: INTERVENTIONS:  - Educate patient/family on patient safety including physical limitations  - Instruct patient to call for assistance with activity   - Consult OT/PT to assist with strengthening/mobility   - Keep Call bell within reach  - Keep bed low and locked with side rails adjusted as appropriate  - Keep care items and personal belongings within reach  - Initiate and maintain comfort rounds  - Make Fall Risk Sign visible to staff  - Offer Toileting every  Hours, in advance of need  - Initiate/Maintain alarm  - Obtain necessary fall risk management equipment:   - Apply yellow socks and bracelet for high fall risk patients  - Consider moving patient to room near nurses station  Outcome: Progressing     Problem: SAFETY,RESTRAINT: NV/NON-SELF DESTRUCTIVE BEHAVIOR  Goal: Remains free of harm/injury (restraint for non violent/non self-detsructive behavior)  Description: INTERVENTIONS:  - Instruct patient/family regarding restraint use   - Assess and monitor physiologic and psychological status   - Provide interventions and comfort measures to meet assessed patient needs   - Identify and implement measures to help patient regain control  - Assess readiness for release of restraint   Outcome: Progressing  Goal: Returns to optimal restraint-free functioning  Description: INTERVENTIONS:  - Assess the patient's behavior and symptoms that indicate continued need for restraint  - Identify and implement measures to help patient regain control  - Assess readiness for release of restraint   Outcome: Progressing

## 2024-07-28 NOTE — QUICK NOTE
Spoke with patient's daughter and updated them on the patient's current condition, care management plan, and goals. All questions were answered to their satisfaction.

## 2024-07-28 NOTE — PROGRESS NOTES
[facility]  Progress Note: Critical Care  Name: Federico Bowen 68 y.o. male I MRN: [id number]  Unit/Bed#: ICU 07 I Date of Admission: 7/12/2024   Date of Service: 7/28/2024 I Hospital Day: 16    Assessment & Plan       Neuro:   Diagnosis: SAH with IVH and mild hydro s/p coil embolization of R PCOM aneurysm  BD 18 HH5, MF 4  7/11 CTAH/N-0.3 cm aneurysm in expected origin of right posterior communicating artery. Possible tiny aneurysm in left anterior communicating artery region. Mild narrowing of bilateral MCA M1 and bilateral M2 segmental branch vessels, likely due to vasospasm.   7/11 CTH-Acute diffuse thickened large-volume SAH throughout the b/l parafalcine regions, b/l cerebral sulci (R worse than L), basilar cisterns, prepontine cistern, premedullary cistern, and visualized upper cervical spinal canal. Acute small volume IVH with mild hydrocephalus.   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   7/14 CTH -No significant interval change in extensive bilateral SAH and IVH.   7/14 vEEG no seizures  7/16 CTA/CTH: Expected postoperative appearance following embolization of right posterior communicating artery aneurysm . Redemonstrated subarachnoid hemorrhage overlying the right greater than left convexities and concentrated within the right sylvian cistern and fissure. . Subacute infarct involving the right anterior temporal region and medial occipital region.  7/27 CTH - stable, Small volume subarachnoid hemorrhage persists      Plan:   Nsx following  ASA 81mg   EVD removed 07/26  AM CTH stable  SBP goal 110-180  Wean levo as able   Keppra 750mg BID  Nimodipine 30mg Q2 hrs x 21 days  Heparin SQ for DVT ppx  Daily TCDs  Euvolemia   Normonatremia, Na goal > 140  On Free Water Flushes 60cc q4   Stat CTH with any change in GCS > 2 pts   Palliative following       Diagnosis: Seizure  2/2 above and presented w/ seizures   vEEG no  seizures  Plan:   Neuro following   Keppra 750mg BID     Diagnosis: Headache  Continue 600mg TID gabapentin   Lidoderm patch for neck   PRN Fioricet/Percocet/Tylenol      CV:   Diagnosis: Hx CAD, Hx HTN  Had vascular stent placed > 1 year ago   Plan:   Continue ASA 81mg daily  Holding home entresto and metoprolol    Given allowing for increased -220   Diagnosis: HLD  Continue Lipitor 80mg daily and Zetia 10mg qhs  Diagnosis: Borderline Hypotension, improving   Suspect in the setting of resolving infectious state  Holding home Entresto and Metoprolol   Continue Nimodipine (decreased to 30mg Q2 hrs 7/26)  Continue Midodrine 10mg TID  PRN fluid resuscitation    SBP goal 100-180  Off levo     Pulm:  Diagnosis: Aspiration pneumonia   Continue Ancef (day 7/7)  Continue suctioning, chest PT, nebs/resp protocol   Wean O2 as able for goal O2 sat >92%  Diagnosis: Hx COPD  Plan:   IS  Xopenex TID  Imecldinum/albuterol inhaler   Respiratory protocol  GI:   Diagnosis: Dysphagia  Failed VBS 7/23  Continue TF vis NG  Consider repeat VBS  Continue GOC discussion with family; PEG  Diagnosis: GERD  Omeprazole 40mg daily   Bowel regimen  Hold 2/2 multiple BM's  C diff negative   Banana flakes PRN     :  No acute issues   Monitor renal indices and I/Os  Goal euvolemia      F/E/N:    F: none  E: Replete as needed, goal Mg >2, Phos >3, K>4  N: TF Jevity 1.2 vinny 50cc/hr               - thiamine, folic acid, and multivitamin     Heme/Onc:   Diagnosis: Anemia  Plan:   Continue to monitor am CBC     Endo:   SSI   Hypoglycemia protocol   POC glucose checks     ID:   Diagnosis: Leukocytosis and febrile, improving  7/22 Sputum cx: 3+gram positive cocci in chains and clusters    Plan   Ancef 2000mg q8h based on sensitivities (Day 7/7)  Tylenol PRN     MSK/Skin:   PT/OT/PMR  Diagnosis: Penile lesion  Plan: note from outpt derm inflamed seborrheic keratosis        Line/Drains/Foleys: R IJ central line (can be d/c once off levo and  stable)      Disposition: Stepdown Level 1     ICU Core Measures     A: Assess, Prevent, and Manage Pain Has pain been assessed? Yes  Need for changes to pain regimen? No   B: Both SAT/SAT  N/A   C: Choice of Sedation RASS Goal: 0 Alert and Calm  Need for changes to sedation or analgesia regimen? Yes   D: Delirium CAM-ICU: Negative   E: Early Mobility  Plan for early mobility? Yes   F: Family Engagement Plan for family engagement today? Yes       Antibiotic Review: Patient on appropriate coverage based on culture data.     Review of Invasive Devices:      Central access plan: Medications requiring central line      Prophylaxis:  VTE VTE covered by:  heparin (porcine), Subcutaneous, 5,000 Units at 07/28/24 0555       Stress Ulcer  covered byomeprazole (PRILOSEC) suspension 2 mg/mL [[id number]], omeprazole (PriLOSEC) 40 MG capsule [[id number]] (Long-Term Med)        Significant 24hr Events     24hr events: NAON     Subjective     Review of Systems: See HPI for Review of Systems     Objective                            Vitals I/O      Most Recent Min/Max in 24hrs   Temp 98.4 °F (36.9 °C) Temp  Min: 97.5 °F (36.4 °C)  Max: 99.2 °F (37.3 °C)   Pulse 94 Pulse  Min: 78  Max: 110   Resp 20 Resp  Min: 11  Max: 41   /79 BP  Min: 91/59  Max: 143/77   O2 Sat 97 % SpO2  Min: 93 %  Max: 99 %      Intake/Output Summary (Last 24 hours) at 7/28/2024 0611  Last data filed at 7/28/2024 0400  Gross per 24 hour   Intake 2726.19 ml   Output 1895 ml   Net 831.19 ml       Diet Enteral/Parenteral; Tube Feeding No Oral Diet; Jevity 1.2 Josh; Cyclic; 65; 22 hours; Banatrol Plus Banana Flakes - One Packet; 60; Water; Every 4 hours    Invasive Monitoring   Arterial Line  Esme /72  No data recorded    mmHg  No data recorded           Physical Exam   Physical Exam  Vitals and nursing note reviewed.   HENT:      Head: Normocephalic and atraumatic.      Nose:      Comments: NGT in place  Cardiovascular:      Rate and Rhythm: Normal  rate.   Abdominal:      Palpations: Abdomen is soft.   Constitutional:       Appearance: He is ill-appearing.   Pulmonary:      Effort: Pulmonary effort is normal.   Neurological:      Mental Status: He is alert.      Cranial Nerves: Cranial nerves 2-12 are intact.      Sensory: Sensation is intact.      Coordination: Coordination is intact.        Reflexes are normal and symmetric.      Comments: AO to person and place  Pupils: L 2mm, R 3mm sluggish.            Diagnostic Studies      EKG: reviewed  Imaging:  I have personally reviewed pertinent reports.       Medications:  Scheduled PRN   aspirin, 81 mg, Daily  atorvastatin, 80 mg, Daily  cefazolin, 2,000 mg, Q8H  chlorhexidine, 15 mL, Q12H MATTY  ezetimibe, 10 mg, QAM  folic acid, 400 mcg, Daily  gabapentin, 600 mg, TID  heparin (porcine), 5,000 Units, Q8H MATTY  insulin lispro, 1-5 Units, Q6H MATTY  levETIRAcetam, 750 mg, Q12H MATTY  lidocaine, 1 patch, Daily  midodrine, 10 mg, TID AC  niMODipine, 30 mg, Q2H  omeprazole (PRILOSEC) suspension 2 mg/mL, 20 mg, Daily  thiamine, 100 mg, Daily      acetaminophen, 975 mg, Q8H PRN  albuterol, 2 puff, Q4H PRN  bisacodyl, 10 mg, Daily PRN  butalbital-acetaminophen-caffeine, 1 tablet, Q4H PRN  lidocaine, 1 patch, Daily PRN  ondansetron, 4 mg, Q6H PRN  oxyCODONE-acetaminophen, 1 tablet, Q4H PRN  saliva substitute, 5 spray, 4x Daily PRN       Continuous    norepinephrine, 1-30 mcg/min, Last Rate: 4 mcg/min (07/28/24 0400)         Labs:    CBC    Recent Labs     07/27/24  0548 07/28/24  0510   WBC 14.73* 14.44*   HGB 10.1* 10.1*   HCT 30.3* 30.6*   * 612*     BMP    Recent Labs     07/27/24  0548   SODIUM 140   K 4.2      CO2 25   AGAP 10   BUN 19   CREATININE 0.67   CALCIUM 9.1       Coags    Recent Labs     07/27/24  0548   PTT 34        Additional Electrolytes  Recent Labs     07/27/24  0548 07/28/24  0510   MG 2.2  --    PHOS 3.3  --    CAIONIZED 1.16 1.19          Blood Gas    No recent results  No recent results  LFTs  Recent Labs     07/27/24  0548   ALT 30   AST 43*   ALKPHOS 136*   ALB 3.4*   TBILI 0.52       Infectious  No recent results  Glucose  Recent Labs     07/27/24  0548   GLUC 150*                     07/27 TCD  Unilateral    Mean Velocity  Pulsatility Index    Prox Basilar          33.00               1.17       Right            Mean Velocity  Pulsatility Index    Mid. ICA                 51.00               0.99    Mid Cerebral             70.00               1.03    Dist. Vertebral          32.00                       Ant. Cerebral            22.00               1.63    Post. Cerebral           20.00               1.75       Left             Mean Velocity  Pulsatility Index    Mid. ICA                 44.00               1.27    Mid Cerebral             68.00               1.28    Dist. Vertebral          36.00                       Ant. Cerebral            33.00               1.04    Post. Cerebral           21.00               1.77             Hadley Eric, DO

## 2024-07-29 ENCOUNTER — APPOINTMENT (INPATIENT)
Dept: NON INVASIVE DIAGNOSTICS | Facility: HOSPITAL | Age: 69
DRG: 020 | End: 2024-07-29
Payer: MEDICARE

## 2024-07-29 ENCOUNTER — APPOINTMENT (INPATIENT)
Dept: RADIOLOGY | Facility: HOSPITAL | Age: 69
DRG: 020 | End: 2024-07-29
Payer: MEDICARE

## 2024-07-29 LAB
ALBUMIN SERPL BCG-MCNC: 3.5 G/DL (ref 3.5–5)
ALP SERPL-CCNC: 132 U/L (ref 34–104)
ALT SERPL W P-5'-P-CCNC: 15 U/L (ref 7–52)
ANION GAP SERPL CALCULATED.3IONS-SCNC: 7 MMOL/L (ref 4–13)
AST SERPL W P-5'-P-CCNC: 25 U/L (ref 13–39)
BACTERIA CSF CULT: NO GROWTH
BASOPHILS # BLD AUTO: 0.05 THOUSANDS/ÂΜL (ref 0–0.1)
BASOPHILS NFR BLD AUTO: 0 % (ref 0–1)
BILIRUB SERPL-MCNC: 0.72 MG/DL (ref 0.2–1)
BUN SERPL-MCNC: 20 MG/DL (ref 5–25)
CA-I BLD-SCNC: 1.2 MMOL/L (ref 1.12–1.32)
CALCIUM SERPL-MCNC: 9.2 MG/DL (ref 8.4–10.2)
CHLORIDE SERPL-SCNC: 103 MMOL/L (ref 96–108)
CO2 SERPL-SCNC: 27 MMOL/L (ref 21–32)
CREAT SERPL-MCNC: 0.73 MG/DL (ref 0.6–1.3)
EOSINOPHIL # BLD AUTO: 0.37 THOUSAND/ÂΜL (ref 0–0.61)
EOSINOPHIL NFR BLD AUTO: 3 % (ref 0–6)
ERYTHROCYTE [DISTWIDTH] IN BLOOD BY AUTOMATED COUNT: 15.3 % (ref 11.6–15.1)
GFR SERPL CREATININE-BSD FRML MDRD: 94 ML/MIN/1.73SQ M
GLUCOSE SERPL-MCNC: 101 MG/DL (ref 65–140)
GLUCOSE SERPL-MCNC: 108 MG/DL (ref 65–140)
GLUCOSE SERPL-MCNC: 129 MG/DL (ref 65–140)
GLUCOSE SERPL-MCNC: 185 MG/DL (ref 65–140)
GLUCOSE SERPL-MCNC: 96 MG/DL (ref 65–140)
HCT VFR BLD AUTO: 32.1 % (ref 36.5–49.3)
HGB BLD-MCNC: 10.6 G/DL (ref 12–17)
IMM GRANULOCYTES # BLD AUTO: 0.12 THOUSAND/UL (ref 0–0.2)
IMM GRANULOCYTES NFR BLD AUTO: 1 % (ref 0–2)
LYMPHOCYTES # BLD AUTO: 1.79 THOUSANDS/ÂΜL (ref 0.6–4.47)
LYMPHOCYTES NFR BLD AUTO: 13 % (ref 14–44)
MAGNESIUM SERPL-MCNC: 2.3 MG/DL (ref 1.9–2.7)
MCH RBC QN AUTO: 31.6 PG (ref 26.8–34.3)
MCHC RBC AUTO-ENTMCNC: 33 G/DL (ref 31.4–37.4)
MCV RBC AUTO: 96 FL (ref 82–98)
MONOCYTES # BLD AUTO: 0.58 THOUSAND/ÂΜL (ref 0.17–1.22)
MONOCYTES NFR BLD AUTO: 4 % (ref 4–12)
NEUTROPHILS # BLD AUTO: 11.4 THOUSANDS/ÂΜL (ref 1.85–7.62)
NEUTS SEG NFR BLD AUTO: 79 % (ref 43–75)
NRBC BLD AUTO-RTO: 0 /100 WBCS
PHOSPHATE SERPL-MCNC: 3.7 MG/DL (ref 2.3–4.1)
PLATELET # BLD AUTO: 618 THOUSANDS/UL (ref 149–390)
PMV BLD AUTO: 10.2 FL (ref 8.9–12.7)
POTASSIUM SERPL-SCNC: 4.6 MMOL/L (ref 3.5–5.3)
PROT SERPL-MCNC: 8 G/DL (ref 6.4–8.4)
RBC # BLD AUTO: 3.35 MILLION/UL (ref 3.88–5.62)
SODIUM SERPL-SCNC: 137 MMOL/L (ref 135–147)
WBC # BLD AUTO: 14.31 THOUSAND/UL (ref 4.31–10.16)

## 2024-07-29 PROCEDURE — 84100 ASSAY OF PHOSPHORUS: CPT

## 2024-07-29 PROCEDURE — 83735 ASSAY OF MAGNESIUM: CPT

## 2024-07-29 PROCEDURE — 99291 CRITICAL CARE FIRST HOUR: CPT | Performed by: EMERGENCY MEDICINE

## 2024-07-29 PROCEDURE — 82948 REAGENT STRIP/BLOOD GLUCOSE: CPT

## 2024-07-29 PROCEDURE — 82330 ASSAY OF CALCIUM: CPT

## 2024-07-29 PROCEDURE — 85025 COMPLETE CBC W/AUTO DIFF WBC: CPT

## 2024-07-29 PROCEDURE — 93886 INTRACRANIAL COMPLETE STUDY: CPT | Performed by: SURGERY

## 2024-07-29 PROCEDURE — 92611 MOTION FLUOROSCOPY/SWALLOW: CPT

## 2024-07-29 PROCEDURE — 74230 X-RAY XM SWLNG FUNCJ C+: CPT

## 2024-07-29 PROCEDURE — 93886 INTRACRANIAL COMPLETE STUDY: CPT

## 2024-07-29 PROCEDURE — 80053 COMPREHEN METABOLIC PANEL: CPT

## 2024-07-29 RX ADMIN — Medication 30 MG: at 04:10

## 2024-07-29 RX ADMIN — Medication 30 MG: at 16:25

## 2024-07-29 RX ADMIN — EZETIMIBE 10 MG: 10 TABLET ORAL at 08:17

## 2024-07-29 RX ADMIN — Medication 20 MG: at 09:28

## 2024-07-29 RX ADMIN — LEVETIRACETAM 750 MG: 100 SOLUTION ORAL at 20:40

## 2024-07-29 RX ADMIN — GABAPENTIN 600 MG: 300 CAPSULE ORAL at 08:17

## 2024-07-29 RX ADMIN — Medication 30 MG: at 22:23

## 2024-07-29 RX ADMIN — LEVETIRACETAM 750 MG: 100 SOLUTION ORAL at 08:17

## 2024-07-29 RX ADMIN — HEPARIN SODIUM 5000 UNITS: 5000 INJECTION INTRAVENOUS; SUBCUTANEOUS at 16:18

## 2024-07-29 RX ADMIN — Medication 30 MG: at 01:54

## 2024-07-29 RX ADMIN — GABAPENTIN 600 MG: 300 CAPSULE ORAL at 16:17

## 2024-07-29 RX ADMIN — MIDODRINE HYDROCHLORIDE 10 MG: 5 TABLET ORAL at 16:17

## 2024-07-29 RX ADMIN — Medication 30 MG: at 20:39

## 2024-07-29 RX ADMIN — GABAPENTIN 600 MG: 300 CAPSULE ORAL at 20:39

## 2024-07-29 RX ADMIN — FOLIC ACID TAB 400 MCG 400 MCG: 400 TAB at 08:18

## 2024-07-29 RX ADMIN — HEPARIN SODIUM 5000 UNITS: 5000 INJECTION INTRAVENOUS; SUBCUTANEOUS at 22:23

## 2024-07-29 RX ADMIN — CHLORHEXIDINE GLUCONATE 0.12% ORAL RINSE 15 ML: 1.2 LIQUID ORAL at 08:18

## 2024-07-29 RX ADMIN — OXYCODONE HYDROCHLORIDE AND ACETAMINOPHEN 1 TABLET: 5; 325 TABLET ORAL at 18:02

## 2024-07-29 RX ADMIN — ATORVASTATIN CALCIUM 80 MG: 80 TABLET, FILM COATED ORAL at 08:17

## 2024-07-29 RX ADMIN — MIDODRINE HYDROCHLORIDE 10 MG: 5 TABLET ORAL at 06:00

## 2024-07-29 RX ADMIN — MIDODRINE HYDROCHLORIDE 10 MG: 5 TABLET ORAL at 11:14

## 2024-07-29 RX ADMIN — Medication 30 MG: at 08:17

## 2024-07-29 RX ADMIN — LIDOCAINE 5% 1 PATCH: 700 PATCH TOPICAL at 08:17

## 2024-07-29 RX ADMIN — HEPARIN SODIUM 5000 UNITS: 5000 INJECTION INTRAVENOUS; SUBCUTANEOUS at 06:00

## 2024-07-29 RX ADMIN — Medication 30 MG: at 10:30

## 2024-07-29 RX ADMIN — THIAMINE HCL TAB 100 MG 100 MG: 100 TAB at 08:17

## 2024-07-29 RX ADMIN — CHLORHEXIDINE GLUCONATE 0.12% ORAL RINSE 15 ML: 1.2 LIQUID ORAL at 20:40

## 2024-07-29 RX ADMIN — Medication 30 MG: at 17:31

## 2024-07-29 RX ADMIN — Medication 30 MG: at 06:00

## 2024-07-29 RX ADMIN — INSULIN LISPRO 1 UNITS: 100 INJECTION, SOLUTION INTRAVENOUS; SUBCUTANEOUS at 17:58

## 2024-07-29 RX ADMIN — ASPIRIN 81 MG CHEWABLE TABLET 81 MG: 81 TABLET CHEWABLE at 08:17

## 2024-07-29 RX ADMIN — OXYCODONE HYDROCHLORIDE AND ACETAMINOPHEN 1 TABLET: 5; 325 TABLET ORAL at 11:31

## 2024-07-29 RX ADMIN — Medication 30 MG: at 00:07

## 2024-07-29 NOTE — QUICK NOTE
Spoke to [facility] Transfer provider via SecureChat, patient will be transferred to [facility] under Dr. Cortez with LOC SD2.

## 2024-07-29 NOTE — PLAN OF CARE
Problem: Prexisting or High Potential for Compromised Skin Integrity  Goal: Skin integrity is maintained or improved  Description: INTERVENTIONS:  - Identify patients at risk for skin breakdown  - Assess and monitor skin integrity  - Assess and monitor nutrition and hydration status  - Monitor labs   - Assess for incontinence   - Turn and reposition patient  - Assist with mobility/ambulation  - Relieve pressure over bony prominences  - Avoid friction and shearing  - Provide appropriate hygiene as needed including keeping skin clean and dry  - Evaluate need for skin moisturizer/barrier cream  - Collaborate with interdisciplinary team   - Patient/family teaching  - Consider wound care consult   Outcome: Progressing     Problem: Neurological Deficit  Goal: Neurological status is stable or improving  Description: Interventions:  - Monitor and assess patient's level of consciousness, motor function, sensory function, and level of assistance needed for ADLs.   - Monitor and report changes from baseline. Collaborate with interdisciplinary team to initiate plan and implement interventions as ordered.   - Provide and maintain a safe environment.  - Consider seizure precautions.  - Consider fall precautions.  - Consider aspiration precautions.  - Consider bleeding precautions.  Outcome: Progressing     Problem: Communication Impairment  Goal: Ability to express needs and understand communication  Description: Assess patient's communication skills and ability to understand information.  Patient will demonstrate use of effective communication techniques, alternative methods of communication and understanding even if not able to speak.     - Encourage communication and provide alternate methods of communication as needed.  - Collaborate with case management/ for discharge needs.  - Include patient/family/caregiver in decisions related to communication.  Outcome: Progressing     Problem: Activity  Intolerance/Impaired Mobility  Goal: Mobility/activity is maintained at optimum level for patient  Description: Interventions:  - Assess and monitor patient  barriers to mobility and need for assistive/adaptive devices.  - Assess patient's emotional response to limitations.  - Collaborate with interdisciplinary team and initiate plans and interventions as ordered.  - Encourage independent activity per ability.  - Maintain proper body alignment.  - Perform active/passive rom as tolerated/ordered.  - Plan activities to conserve energy.  - Turn patient as appropriate  Outcome: Progressing     Problem: Potential for Aspiration  Goal: Non-ventilated patient's risk of aspiration is minimized  Description: Assess and monitor vital signs, respiratory status, and labs (WBC).  Monitor for signs of aspiration (tachypnea, cough, rales, wheezing, cyanosis, fever).    - Assess and monitor patient's ability to swallow.  - Place patient up in chair to eat if possible.  - HOB up at 90 degrees to eat if unable to get patient up into chair.  - Supervise patient during oral intake.   - Instruct patient/ family to take small bites.  - Instruct patient/ family to take small single sips when taking liquids.  - Follow patient-specific strategies generated by speech pathologist.  Outcome: Progressing     Problem: Nutrition  Goal: Nutrition/Hydration status is improving  Description: Monitor and assess patient's nutrition/hydration status for malnutrition (ex- brittle hair, bruises, dry skin, pale skin and conjunctiva, muscle wasting, smooth red tongue, and disorientation). Collaborate with interdisciplinary team and initiate plan and interventions as ordered.  Monitor patient's weight and dietary intake as ordered or per policy. Utilize nutrition screening tool and intervene per policy. Determine patient's food preferences and provide high-protein, high-caloric foods as appropriate.   - Monitor nutrition parameters, recommending adjustments  to enteral nutrition orders at indicated.   - Assist patient with eating.  - Allow adequate time for meals.  - Encourage patient to take dietary supplement as ordered.  - Collaborate with interdisciplinary team.   - Include patient/family/caregiver in decisions related to nutrition.  Outcome: Progressing     Problem: PAIN - ADULT  Goal: Verbalizes/displays adequate comfort level or baseline comfort level  Description: Interventions:  - Encourage patient to monitor pain and request assistance  - Assess pain using appropriate pain scale  - Administer analgesics based on type and severity of pain and evaluate response  - Implement non-pharmacological measures as appropriate and evaluate response  - Consider cultural and social influences on pain and pain management  - Notify physician/advanced practitioner if interventions unsuccessful or patient reports new pain  Outcome: Progressing     Problem: INFECTION - ADULT  Goal: Absence or prevention of progression during hospitalization  Description: INTERVENTIONS:  - Assess and monitor for signs and symptoms of infection  - Monitor lab/diagnostic results  - Monitor all insertion sites, i.e. indwelling lines, tubes, and drains  - Monitor endotracheal if appropriate and nasal secretions for changes in amount and color  - [place] appropriate cooling/warming therapies per order  - Administer medications as ordered  - Instruct and encourage patient and family to use good hand hygiene technique  - Identify and instruct in appropriate isolation precautions for identified infection/condition  Outcome: Progressing  Goal: Absence of fever/infection during neutropenic period  Description: INTERVENTIONS:  - Monitor WBC    Outcome: Progressing     Problem: DISCHARGE PLANNING  Goal: Discharge to home or other facility with appropriate resources  Description: INTERVENTIONS:  - Identify barriers to discharge w/patient and caregiver  - Arrange for needed discharge resources and  transportation as appropriate  - Identify discharge learning needs (meds, wound care, etc.)  - Arrange for interpretive services to assist at discharge as needed  - Refer to Case Management Department for coordinating discharge planning if the patient needs post-hospital services based on physician/advanced practitioner order or complex needs related to functional status, cognitive ability, or social support system  Outcome: Progressing     Problem: Knowledge Deficit  Goal: Patient/family/caregiver demonstrates understanding of disease process, treatment plan, medications, and discharge instructions  Description: Complete learning assessment and assess knowledge base.  Interventions:  - Provide teaching at level of understanding  - Provide teaching via preferred learning methods  Outcome: Progressing     Problem: NEUROSENSORY - ADULT  Goal: Achieves stable or improved neurological status  Description: INTERVENTIONS  - Monitor and report changes in neurological status  - Monitor vital signs such as temperature, blood pressure, glucose, and any other labs ordered   - Initiate measures to prevent increased intracranial pressure  - Monitor for seizure activity and implement precautions if appropriate      Outcome: Progressing  Goal: Remains free of injury related to seizures activity  Description: INTERVENTIONS  - Maintain airway, patient safety  and administer oxygen as ordered  - Monitor patient for seizure activity, document and report duration and description of seizure to physician/advanced practitioner  - If seizure occurs,  ensure patient safety during seizure  - Reorient patient post seizure  - Seizure pads on all 4 side rails  - Instruct patient/family to notify RN of any seizure activity including if an aura is experienced  - Instruct patient/family to call for assistance with activity based on nursing assessment  - Administer anti-seizure medications if ordered    Outcome: Progressing  Goal: Achieves maximal  functionality and self care  Description: INTERVENTIONS  - Monitor swallowing and airway patency with patient fatigue and changes in neurological status  - Encourage and assist patient to increase activity and self care.   - Encourage visually impaired, hearing impaired and aphasic patients to use assistive/communication devices  Outcome: Progressing     Problem: MUSCULOSKELETAL - ADULT  Goal: Maintain or return mobility to safest level of function  Description: INTERVENTIONS:  - Assess patient's ability to carry out ADLs; assess patient's baseline for ADL function and identify physical deficits which impact ability to perform ADLs (bathing, care of mouth/teeth, toileting, grooming, dressing, etc.)  - Assess/evaluate cause of self-care deficits   - Assess range of motion  - Assess patient's mobility  - Assess patient's need for assistive devices and provide as appropriate  - Encourage maximum independence but intervene and supervise when necessary  - Involve family in performance of ADLs  - Assess for home care needs following discharge   - Consider OT consult to assist with ADL evaluation and planning for discharge  - Provide patient education as appropriate  Outcome: Progressing     Problem: COPING  Goal: Pt/Family able to verbalize concerns and demonstrate effective coping strategies  Description: INTERVENTIONS:  - Assist patient/family to identify coping skills, available support systems and cultural and spiritual values  - Provide emotional support, including active listening and acknowledgement of concerns of patient and caregivers  - Reduce environmental stimuli, as able  - Provide patient education  - Assess for spiritual pain/suffering and initiate spiritual care, including notification of Pastoral Care or halie based community as needed  - Assess effectiveness of coping strategies  Outcome: Progressing  Goal: Will report anxiety at manageable levels  Description: INTERVENTIONS:  - Administer medication as  ordered  - Teach and encourage coping skills  - Provide emotional support  - Assess patient/family for anxiety and ability to cope  Outcome: Progressing     Problem: Potential for Falls  Goal: Patient will remain free of falls  Description: INTERVENTIONS:  - Educate patient/family on patient safety including physical limitations  - Instruct patient to call for assistance with activity   - Consult OT/PT to assist with strengthening/mobility   - Keep Call bell within reach  - Keep bed low and locked with side rails adjusted as appropriate  - Keep care items and personal belongings within reach  - Initiate and maintain comfort rounds  - Make Fall Risk Sign visible to staff  - Offer Toileting every 2 Hours, in advance of need  - Initiate/Maintain bed alarm  - Obtain necessary fall risk management equipment: bed alarm   - Apply yellow socks and bracelet for high fall risk patients  - Consider moving patient to room near nurses station  Outcome: Progressing

## 2024-07-29 NOTE — QUICK NOTE
Progress Note - Palliative & Supportive Care       7/29/2024 10:30 AM -  Federico Bowen's chart and case were reviewed by Alejandra Rodriguez DO.  Mode of review included electronic chart check.    Per review, symptoms remain controlled on current regimen and no changes are made at this time.  Please continue the regimen in place, and review our last note for details.  For dispo plan, please review Case Management notes.     Plan for PEG placement. As goals are aligned Palliative will sign off at this time.      Patient is appropriate for outpatient PSC follow-up.  We will make arrangements through our office.        For urgent issues or any questions/concerns, please notify on-call provider via [employer] Secure Chat.  You may also call our answering service 24/7 at [phone].    Alejandra Rodriguez DO  Palliative and Supportive Care  Clinic/Answering Service: [phone]  You can find me on [employer] Chat!

## 2024-07-29 NOTE — PHYSICAL THERAPY NOTE
PHYSICAL THERAPY Cancel NOTE          Patient Name: Federico Bowen  Today's Date: 7/29/2024 07/29/24 1009   PT Last Visit   PT Visit Date 07/29/24   Note Type   Note Type Cancelled Session   Cancel Reasons Medical status  (per RN pt desated and is now on NC, not appropriate to see at this time. Will continue to monitor and follow up as pt is appropriate.)     Michelle Dobbins, SPT

## 2024-07-29 NOTE — PROGRESS NOTES
Critical Care Interval Transfer Note:    Brief Hospital Summary:   Federico Bowen is a 67 yo M with PMHx significant for HTN, HLD, CAD, COPD, cocaine use who presented on 7/12 with complaints of headache and right leg pain. During transport by EMS patient had 2 witnessed seizures (30 seconds and 6 seconds respectively). Patient was unresponsive in the ED and then intubated. CT head was done which showed large diffuse SAH, IVH, and hydrocephalus. CTA was then done which showed 0.3 cm aneurysm in expected origin of right posterior communicating artery. On 7/11, patient had an EVD placed which was then removed on 7/26. On 7/13, patient underwent angio-coil embolization of a 3.5 mm right PCOM aneurysm - that same done he was extubated. Patient became febrile with a leukocytosis on 7/22; infectious workup was done and patient was started on empiric ceftriaxone 1g daily. Patient was then switched to ancef 2000mg q8h based on sensitivities for a 7 day course. Patient finished course prior to transfer. All pressors have been weaned off. Patient is otherwise stable and ready for transfer.      Barriers to discharge:   Clinical condition  Palliative/hospice discussion and follow up  Oral Intake  Swallow eval and possible PEG     Consults: IP CONSULT TO NEUROSURGERY  IP CONSULT TO NEUROLOGY  IP CONSULT TO PHYSICAL MEDICINE REHAB  IP CONSULT TO CASE MANAGEMENT  IP CONSULT TO NUTRITION SERVICES  IP CONSULT TO NUTRITION SERVICES  IP CONSULT TO PALLIATIVE CARE    Recommended to review admission imaging for incidental findings and document in discharge navigator: Chart reviewed, no known incidental findings noted at this time.      Discharge Plan: Anticipate discharge in 48-72 hrs to rehab facility.      Patient seen and evaluated by Critical Care today and deemed to be appropriate for transfer to Stepdown Level 2. Spoke to Dr. Powers from [facility] to accept transfer. Critical care can be contacted via [employer] with any  questions or concerns.

## 2024-07-29 NOTE — PLAN OF CARE
Problem: Neurological Deficit  Goal: Neurological status is stable or improving  Description: Interventions:  - Monitor and assess patient's level of consciousness, motor function, sensory function, and level of assistance needed for ADLs.   - Monitor and report changes from baseline. Collaborate with interdisciplinary team to initiate plan and implement interventions as ordered.   - Provide and maintain a safe environment.  - Consider seizure precautions.  - Consider fall precautions.  - Consider aspiration precautions.  - Consider bleeding precautions.  Outcome: Progressing     Problem: Nutrition  Goal: Nutrition/Hydration status is improving  Description: Monitor and assess patient's nutrition/hydration status for malnutrition (ex- brittle hair, bruises, dry skin, pale skin and conjunctiva, muscle wasting, smooth red tongue, and disorientation). Collaborate with interdisciplinary team and initiate plan and interventions as ordered.  Monitor patient's weight and dietary intake as ordered or per policy. Utilize nutrition screening tool and intervene per policy. Determine patient's food preferences and provide high-protein, high-caloric foods as appropriate.   - Monitor nutrition parameters, recommending adjustments to enteral nutrition orders at indicated.   - Assist patient with eating.  - Allow adequate time for meals.  - Encourage patient to take dietary supplement as ordered.  - Collaborate with interdisciplinary team.   - Include patient/family/caregiver in decisions related to nutrition.  Outcome: Progressing     Problem: PAIN - ADULT  Goal: Verbalizes/displays adequate comfort level or baseline comfort level  Description: Interventions:  - Encourage patient to monitor pain and request assistance  - Assess pain using appropriate pain scale  - Administer analgesics based on type and severity of pain and evaluate response  - Implement non-pharmacological measures as appropriate and evaluate response  -  Consider cultural and social influences on pain and pain management  - Notify physician/advanced practitioner if interventions unsuccessful or patient reports new pain  Outcome: Progressing     Problem: INFECTION - ADULT  Goal: Absence or prevention of progression during hospitalization  Description: INTERVENTIONS:  - Assess and monitor for signs and symptoms of infection  - Monitor lab/diagnostic results  - Monitor all insertion sites, i.e. indwelling lines, tubes, and drains  - Monitor endotracheal if appropriate and nasal secretions for changes in amount and color  - [place] appropriate cooling/warming therapies per order  - Administer medications as ordered  - Instruct and encourage patient and family to use good hand hygiene technique  - Identify and instruct in appropriate isolation precautions for identified infection/condition  Outcome: Progressing     Problem: SAFETY ADULT  Goal: Patient will remain free of falls  Description: INTERVENTIONS:  - Educate patient/family on patient safety including physical limitations  - Instruct patient to call for assistance with activity   - Consult OT/PT to assist with strengthening/mobility   - Keep Call bell within reach  - Keep bed low and locked with side rails adjusted as appropriate  - Keep care items and personal belongings within reach  - Initiate and maintain comfort rounds  - Make Fall Risk Sign visible to staff  - Apply yellow socks and bracelet for high fall risk patients  - Consider moving patient to room near nurses station  Outcome: Progressing     Problem: Knowledge Deficit  Goal: Patient/family/caregiver demonstrates understanding of disease process, treatment plan, medications, and discharge instructions  Description: Complete learning assessment and assess knowledge base.  Interventions:  - Provide teaching at level of understanding  - Provide teaching via preferred learning methods  Outcome: Progressing     Problem: NEUROSENSORY - ADULT  Goal:  Achieves stable or improved neurological status  Description: INTERVENTIONS  - Monitor and report changes in neurological status  - Monitor vital signs such as temperature, blood pressure, glucose, and any other labs ordered   - Initiate measures to prevent increased intracranial pressure  - Monitor for seizure activity and implement precautions if appropriate      Outcome: Progressing

## 2024-07-29 NOTE — QUICK NOTE
Called and spoke to daughter, Aleena.  Discussed patient doing well and ready for downgrade to stepdown. Will be moving to the seventh floor, however still followed by the neurocritical care team.  Daughter was very appreciative of call and understanding of plan.

## 2024-07-29 NOTE — PROGRESS NOTES
[facility]  Progress Note: Critical Care  Name: Federico Bowen 68 y.o. 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 Possible tiny aneurysm in left anterior communicating artery region. Mild narrowing of bilateral MCA M1 and bilateral M2 segmental branch vessels, likely due to vasospasm.   7/11 CTH-Acute diffuse thickened large-volume SAH throughout the b/l parafalcine regions, b/l cerebral sulci (R worse than L), basilar cisterns, prepontine cistern, premedullary cistern, and visualized upper cervical spinal canal. Acute small volume IVH with mild hydrocephalus.   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   7/14 CTH -No significant interval change in extensive bilateral SAH and IVH.   7/14 vEEG no seizures  7/16 CTA/CTH: Expected postoperative appearance following embolization of right posterior communicating artery aneurysm . Redemonstrated subarachnoid hemorrhage overlying the right greater than left convexities and concentrated within the right sylvian cistern and fissure. . Subacute infarct involving the right anterior temporal region and medial occipital region.  7/27 CTH - stable, Small volume subarachnoid hemorrhage persists      Plan:   Nsx following  ASA 81mg   EVD removed 07/26  AM CTH stable  SBP goal 110-180  Levo d/c 7/28  Keppra 750mg BID  Neuro checks Q2h?  Nimodipine 30mg Q2 hrs x 21 days  Heparin SQ for DVT ppx  Daily TCDs  Euvolemia   Normonatremia, Na goal > 140  On Free Water Flushes 60cc q4   Stat CTH with any change in GCS > 2 pts   Palliative following  D/c NG tube and evaluate swallow with speech.   If failed, discuss  for PEG placement.  PT/OT consult - recommendations appreciated.        Diagnosis: Seizure  2/2 above and presented w/ seizures   vEEG no seizures  Plan:   Neuro following   Keppra 750mg BID      Diagnosis: Headache  Continue 600mg TID gabapentin   Lidoderm patch for neck   PRN Fioricet/Percocet/Tylenol      CV:   Diagnosis: Hx CAD, Hx HTN  Had vascular stent placed > 1 year ago   Plan:   Continue ASA 81mg daily  Holding home entresto and metoprolol    Given allowing for increased -220   Diagnosis: HLD  Continue Lipitor 80mg daily and Zetia 10mg qhs  Diagnosis: Borderline Hypotension, improving   Suspect in the setting of resolving infectious state  Holding home Entresto and Metoprolol   Continue Nimodipine (decreased to 30mg Q2 hrs 7/26)  Continue Midodrine 10mg TID  PRN fluid resuscitation    SBP goal 100-180  Off levo     Pulm:  Diagnosis: Aspiration pneumonia   Completed Ancef  CSF culture negative  Continue suctioning, chest PT, nebs/resp protocol   Wean O2 as able for goal O2 sat >92%  Diagnosis: Hx COPD  Plan:   IS  Xopenex TID  Imecldinum/albuterol inhaler   Respiratory protocol  GI:   Diagnosis: Dysphagia  Failed VBS 7/23  Continue TF vis NG  Consider repeat VBS  Continue GOC discussion with family; PEG  Diagnosis: GERD  Omeprazole 40mg daily   Bowel regimen  Hold 2/2 multiple BM's  C diff negative   Banana flakes PRN     :  No acute issues   Monitor renal indices and I/Os  Goal euvolemia      F/E/N:    F: none  E: Replete as needed, goal Mg >2, Phos >3, K>4  N: TF Jevity 1.2 vinny 50cc/hr               - thiamine, folic acid, and multivitamin     Heme/Onc:   Diagnosis: Anemia  Plan:   Continue to monitor am CBC     Endo:   SSI   Hypoglycemia protocol   POC glucose checks     ID:   Diagnosis: Leukocytosis and febrile, improving  7/22 Sputum cx: 3+gram positive cocci in chains and clusters    Plan   Ancef course finished.  Tylenol PRN     MSK/Skin:   PT/OT/PMR  Diagnosis: Penile lesion  Plan: note  from outpt derm inflamed seborrheic keratosis        Line/Drains/Foleys: R IJ central line (can be d/c once off levo and stable)     Disposition: Stepdown Level 1    ICU Core Measures     A: Assess, Prevent, and Manage Pain Has pain been assessed? Yes  Need for changes to pain regimen? No   B: Both SAT/SAT  N/A   C: Choice of Sedation RASS Goal: 0 Alert and Calm  Need for changes to sedation or analgesia regimen? No   D: Delirium CAM-ICU: Negative   E: Early Mobility  Plan for early mobility? Yes   F: Family Engagement Plan for family engagement today? Yes       Review of Invasive Devices:            Prophylaxis:  VTE VTE covered by:  heparin (porcine), Subcutaneous, 5,000 Units at 07/29/24 0600       Stress Ulcer  covered byomeprazole (PRILOSEC) suspension 2 mg/mL [[id number]], omeprazole (PriLOSEC) 40 MG capsule [[id number]] (Long-Term Med)        Significant 24hr Events     24hr events: no events     Subjective     Review of Systems: See HPI for Review of Systems     Objective                            Vitals I/O      Most Recent Min/Max in 24hrs   Temp 98.7 °F (37.1 °C) Temp  Min: 98.4 °F (36.9 °C)  Max: 98.8 °F (37.1 °C)   Pulse 100 Pulse  Min: 82  Max: 108   Resp 21 Resp  Min: 19  Max: 44   /78 BP  Min: 100/72  Max: 145/79   O2 Sat 94 % SpO2  Min: 94 %  Max: 97 %      Intake/Output Summary (Last 24 hours) at 7/29/2024 0787  Last data filed at 7/29/2024 0413  Gross per 24 hour   Intake 1337.44 ml   Output 2100 ml   Net -762.56 ml       Diet Enteral/Parenteral; Tube Feeding No Oral Diet; Jevity 1.2 Josh; Cyclic; 65; 22 hours; Banatrol Plus Banana Flakes - One Packet; 60; Water; Every 4 hours    Invasive Monitoring           Physical Exam   Physical Exam  Eyes:      General: No visual field deficit or scleral icterus.     Extraocular Movements: Extraocular movements intact.      Pupils: Pupils are equal, round, and reactive to light.   Skin:     General: Skin is warm and dry.   HENT:      Head:      Comments:  Postop - incision clean dry intact  Cardiovascular:      Rate and Rhythm: Normal rate and regular rhythm.      Pulses: Normal pulses.      Heart sounds: Normal heart sounds.   Musculoskeletal:      Right lower leg: No edema.      Left lower leg: No edema.   Constitutional:       Interventions: He is not intubated and not restrained.     Comments: Malnourished   Pulmonary:      Effort: Pulmonary effort is normal. No respiratory distress (Saturating 94% on room air). He is not intubated.   Neurological:      Mental Status: He is somnolent and disoriented to time.      Cranial Nerves: No facial asymmetry.      Sensory: Sensation is intact.      Motor: Strength full and intact in all extremities. No motor deficit.            Diagnostic Studies      EKG: none  Imaging: none new today.     Medications:  Scheduled PRN   aspirin, 81 mg, Daily  atorvastatin, 80 mg, Daily  chlorhexidine, 15 mL, Q12H MATTY  ezetimibe, 10 mg, QAM  folic acid, 400 mcg, Daily  gabapentin, 600 mg, TID  heparin (porcine), 5,000 Units, Q8H MATTY  insulin lispro, 1-5 Units, Q6H MATTY  levETIRAcetam, 750 mg, Q12H MATTY  lidocaine, 1 patch, Daily  midodrine, 10 mg, TID AC  niMODipine, 30 mg, Q2H  omeprazole (PRILOSEC) suspension 2 mg/mL, 20 mg, Daily  thiamine, 100 mg, Daily      acetaminophen, 975 mg, Q8H PRN  albuterol, 2 puff, Q4H PRN  bisacodyl, 10 mg, Daily PRN  butalbital-acetaminophen-caffeine, 1 tablet, Q4H PRN  lidocaine, 1 patch, Daily PRN  ondansetron, 4 mg, Q6H PRN  oxyCODONE-acetaminophen, 1 tablet, Q4H PRN  saliva substitute, 5 spray, 4x Daily PRN       Continuous          Labs:    CBC    Recent Labs     07/28/24  0510 07/29/24  0638   WBC 14.44* 14.31*   HGB 10.1* 10.6*   HCT 30.6* 32.1*   * 618*     BMP    Recent Labs     07/28/24  0510   SODIUM 141   K 4.2      CO2 26   AGAP 9   BUN 22   CREATININE 0.77   CALCIUM 9.0       Coags    Recent Labs     07/28/24  0510   PTT 31        Additional Electrolytes  Recent Labs      07/28/24  0510 07/29/24  0638   MG 2.4  --    PHOS 2.9  --    CAIONIZED 1.19 1.20          Blood Gas    No recent results  No recent results LFTs  No recent results    Infectious  No recent results  Glucose  Recent Labs     07/28/24  0510   GLUC 145*               Eleuterio Cook

## 2024-07-29 NOTE — SPEECH THERAPY NOTE
Attempted to see patient bedside for PO trials. RN reports patient experienced decrease in O2 and is now on 5L. Patient not appropriate for PO trials at this time. Did text medical team. Patient may benefit from repeat VBS prior to PEG placement, but ideally with NGT removed. Will await plan and be in touch with medical team.

## 2024-07-29 NOTE — UTILIZATION REVIEW
Continued Stay Review    Date: 7-27-24                           Current Patient Class: Inpatient  Current Level of Care: critical care    HPI:68 y.o. male initially admitted on 7-12-24  SAH with IVH and mild hydro s/p coil embolization of R PCOM aneurysm     PPD#14 right PCOM coil embolization (Dr. Mcneal 7/13)  PPD#14 R frontal EVD placement (Dr. Goldberg, 7/13)  aSAH s/p R PCOM aneurysm rupture  Bleed day 14, HH 5, MF 4  TCD LR R 1.37, L 1.55  EVD removed 7/26; exit CSF obtained  Cx NG prelim    Assessment/Plan:   WBC 14.73.  Afebrile, tachycardia, tachypnea, GCS 14-15, headache pain 10/10. Tube feeds.  Continue levophed gtt, heparin gtt. Continue  iv ancef q8 hr for aspiration. .   Oral keppra, neurontin, nimotop,folic acid and thiamine.   Prn Oxycodone x4 and lidocaine patch for pain. Repeat CTA head today and transcranial doppler.  Consider video barium swallow.  Currently NPO. Likely need Peg.  Wean O2 as able, continue suctioning, chest PT and nebs.     Vital Signs (last 3 days)       Date/Time Temp Pulse Resp BP MAP  SpO2 Nasal Cannula O2 Flow Rate  ICP Mean  CPP Cuff-  Calculated (mmHg) [place] Coma Scale Score Pain    07/27/24 2300 -- 94 20 118/76 94 95 % -- -- -- -- --    07/27/24 2230 -- 96 22 101/63 71 95 % -- -- -- -- --    07/27/24 2200 -- 92 20 91/59 68 94 % -- -- -- -- --    07/27/24 2100 -- 100 15 101/65 76 96 % -- -- -- -- --    07/27/24 2000 99.1 °F (37.3 °C) 98 12 93/63 71 96 % -- -- -- 15 7    07/27/24 1900 -- 98 16 97/62 71 94 % -- -- -- -- --    07/27/24 1834 -- -- -- -- -- -- -- -- -- -- 10 - Worst Possible Pain    07/27/24 1800 -- 92 26 141/78 95 97 % -- -- -- -- --    07/27/24 1759 -- -- -- 143/83 -- -- -- -- -- -- --    07/27/24 1700 -- 94 31 139/81 101 95 % -- -- -- -- --    07/27/24 1606 -- -- -- 133/83 -- -- -- -- -- -- --    07/27/24 1600 99.2 °F (37.3 °C) 92 40 133/83 101 97 % 1 L/min -- -- 15 9    07/27/24 1500 -- 84 15 130/69 91 97 % -- -- -- -- --    07/27/24 1422 -- -- --  -- -- -- 1 L/min -- -- -- --    07/27/24 1411 -- -- -- 116/76 -- -- -- -- -- -- --    07/27/24 1400 -- 78 11 116/76 89 98 % 2 L/min -- -- 15 --    07/27/24 1331 98.9 °F (37.2 °C) -- -- -- -- -- -- -- -- -- --    07/27/24 1300 -- 86 40 116/66 82 -- -- -- -- -- --    07/27/24 1204 -- -- -- 138/40 -- -- -- -- -- -- --    07/27/24 1203 -- -- -- -- -- -- -- -- -- -- 10 - Worst Possible Pain    07/27/24 1200 -- 92 41 138/41 100 99 % 4 L/min -- -- 15 --    07/27/24 1100 -- 90 36 106/63 75 96 % 4 L/min -- -- -- --    07/27/24 1000 -- 96 32 112/68 80 99 % 4 L/min -- -- 15 --    07/27/24 0900 -- 104 39 105/68 81 99 % 4 L/min -- -- -- --    07/27/24 0825 -- -- -- -- -- -- -- -- -- -- 10 - Worst Possible Pain    07/27/24 0800 97.5 °F (36.4 °C) 110 25 141/83 102 96 % 4 L/min -- -- 15 10 - Worst Possible Pain    07/27/24 0600 -- 108 23 147/79 99 98 % 4 L/min -- -- 14 --    07/27/24 0500 -- 106 14 160/86 108 100 % 6 L/min -- -- -- --    07/27/24 0400 99.2 °F (37.3 °C) 108 31 156/86 107 98 % 4 L/min -- -- 14 4    07/27/24 0300 -- 102 17 157/89 114 98 % 4 L/min -- -- -- --    07/27/24 0200 -- 100 23 160/90 113 98 % 4 L/min -- -- 14 --    07/27/24 0100 -- 88 13 119/73 89 99 % 4 L/min -- -- -- --    07/27/24 0000 98.6 °F   (37 °C) 92 36 122/69 82 100 % 4 L/min -- -- 14 6    07/26/24 2300 -- 100 24 131/74 91 99 % 4 L/min -- -- -- --    07/26/24 2200 -- 108 26 156/78 102 99 % 4 L/min -- -- 14 --    07/26/24 2100 -- 102 19 171/96 121 99 % 4 L/min -- -- -- --    07/26/24 2000 98.4 °F (36.9 °C) 104 24 170/94 127 98 % 4 L/min -- -- 14 10 - Worst Possible Pain    07/26/24 1900 -- 90 12 151/82 105 99 % 4 L/min -- -- -- --    07/26/24 1800 -- 98 15 154/85 107 99 % 4 L/min -- -- 14 --    07/26/24 1700 -- 98 11 114/66 80 99 % 4 L/min -- -- -- --    07/26/24 1613 -- -- -- -- -- -- -- -- -- -- 10 - Worst Possible Pain    07/26/24 1600 97.5 °F (36.4 °C) 100 17 180/96 118 99 % 4 L/min -- -- 14 10 - Worst Possible Pain    07/26/24 1500 -- 94 15  154/82 106 99 % 4 L/min -- -- -- --    07/26/24 1400 -- 100 18 119/87 100 99 % 4 L/min -- -- 14 --    07/26/24 1300 -- 96 13 135/75 90 99 % 4 L/min -- -- -- --    07/26/24 1200 97.6 °F (36.4 °C) 100 15 126/74 88 99 % 4 L/min -- -- 14 10 - Worst Possible Pain    07/26/24 1100 -- 108 19 148/80 100 97 % 4 L/min -- -- -- --    07/26/24 1000 -- 106 18 158/89 118 99 % 4 L/min -- -- 14 --    07/26/24 0900 -- 114 26 138/75 94 96 % 4 L/min 4 mmHg 90 -- --    07/26/24 0800 99.5 °F (37.5 °C) 112 26 150/94 110 99 % 4 L/min 3 mmHg 107 14 10 - Worst Possible Pain    07/26/24 0700 -- 110 16 172/89 115 99 % -- 0 mmHg 115 -- --    07/26/24 0630 -- 104 12 149/77 105 99 % -- 0 mmHg 105 -- --    07/26/24 0600 -- 114 16 139/73 92 99 % -- 1 mmHg 91 -- --    07/26/24 0545 -- 116 21 155/94 111 98 % -- 0 mmHg 111 -- 10 - Worst Possible Pain    07/26/24 0430 -- 114 28 140/67 83 99 % -- 4 mmHg 79 -- --    07/26/24 0400 98.3 °F (36.8 °C) 108 23 158/85 109 100 % 4 L/min 4 mmHg 105 15 No Pain    07/26/24 0330 -- 104 23 153/82 110 100 % -- 4 mmHg 106 -- --    07/26/24 0300 -- 110 23 154/82 108 99 % -- 4 mmHg 104 -- --    07/26/24 0230 -- 108 20 140/74 92 99 % -- 5 mmHg 87 -- --    07/26/24 0200 -- 108 17 124/64 80 98 % -- 6 mmHg 74 -- --    07/26/24 0130 -- 100 15 148/75 99 100 % -- 8 mmHg 91 -- --    07/26/24 0100 -- 102 14 134/71 92 100 % -- 6 mmHg 86 -- --    07/26/24 0030 -- 106 14 130/66 92 98 % -- 7 mmHg 85 -- --    07/26/24 0000 100 °F (37.8 °C) 102 14 135/75 94 100 % 4 L/min 8 mmHg 86 15 No Pain          Weight (last 2 days)       Date/Time Weight    07/29/24 0600 44 (97)              Pertinent Labs/Diagnostic Results:   Radiology:  VAS transcranial doppler, complete study   Final (07/27 1633)      CTA head w wo contrast   Final  (07/27 0813)      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 No residual aneurysmal filling identified.      Small volume subarachnoid hemorrhage persists in the posterior  aspects of the sylvian fissures and layering in the occipital horns of the lateral ventricles.      Low-density right temporal lobe likely represents evolving infarct.      No CTA findings suspicious for vasospasm.         VAS transcranial doppler, complete study   Final (07/26 1005)      CTA head w wo contrast   Final    (07/26 0839)      1. Improved subarachnoid hemorrhage and intraventricular hemorrhage as described above. Ventricles are unchanged in size.   2. Stable areas of low-attenuation involving the right temporal lobe and right occipital lobe which represent evolving infarcts.   3. Nondiagnostic CT angiogram of the head due to the extensive motion artifact. Postoperative changes again administrated from prior coil embolization of a right posterior communicating artery aneurysm..              Cardiology:    GI:  No orders to display       Results from last 7 days   Lab Units 07/24/24  1238   SARS-COV-2  Negative     Results from last 7 days   Lab Units 07/27/24  0548 07/26/24  0556 07/25/24  0544   WBC Thousand/uL 14.73* 15.47* 18.20*   HEMOGLOBIN g/dL 10.1* 10.0* 9.6*   HEMATOCRIT % 30.3* 30.1* 29.2*   PLATELETS Thousands/uL 585* 531* 478*   TOTAL NEUT ABS Thousands/µL 12.18* 12.92* 15.19*         Results from last 7 days   Lab Units 07/27/24  0548 07/26/24  0556 07/25/24  1532 07/25/24  0544 07/24/24  0509   SODIUM mmol/L 140 141  --  146 147   POTASSIUM mmol/L 4.2 4.1 4.2 3.4* 3.5   CHLORIDE mmol/L 105 109*  --  112* 112*   CO2 mmol/L 25 23  --  24 26   ANION GAP mmol/L 10 9  --  10 9   BUN mg/dL 19 20  --  22 29*   CREATININE mg/dL 0.67 0.74  --  0.67 0.80   EGFR ml/min/1.73sq m 98 94  --  98 91   CALCIUM mg/dL 9.1 8.7  --  8.8 8.9   CALCIUM, IONIZED mmol/L 1.16 1.15  --  1.17 1.18   MAGNESIUM mg/dL 2.2 2.2  --  2.1 2.2   PHOSPHORUS mg/dL 3.3 3.1 2.5 2.7 2.3     Results from last 7 days   Lab Units 07/27/24  0548 07/26/24  0556 07/25/24  0544 07/23/24  0454   AST U/L 43* 32 21 21   ALT U/L 30 21 15 16    ALK PHOS U/L 136* 128* 115* 118*   TOTAL PROTEIN g/dL 7.3 7.2 6.7 7.5   ALBUMIN g/dL 3.4* 3.3* 3.2* 3.6   TOTAL BILIRUBIN mg/dL 0.52 0.50 0.42 0.43   BILIRUBIN DIRECT mg/dL  --   --   --  0.07     Results from last 7 days   Lab Units 07/27/24  1749 07/27/24  1344 07/27/24  0716 07/27/24  0558 07/26/24  2332   POC GLUCOSE mg/dl 138 164* 166* 151* 158*     Results from last 7 days   Lab Units 07/27/24  0548 07/26/24  0556 07/25/24  0544 07/24/24  0509 07/23/24  0454   GLUCOSE RANDOM mg/dL 150* 126 150* 183* 166*       Results from last 7 days   Lab Units 07/27/24  0548 07/26/24  0556   PTT seconds 34 38*       Results from last 7 days   Lab Units 07/24/24  1238   INFLUENZA A PCR  Negative   INFLUENZA B PCR  Negative   RSV PCR  Negative       Results from last 7 days   Lab Units 07/25/24  1000   C DIFF TOXIN B BY PCR  Negative             Results from last 7 days   Lab Units 07/26/24  1013 07/22/24  1233   SPUTUM CULTURE   --  4+ Growth of Staphylococcus aureus*  4+ Growth of   GRAM STAIN RESULT  No Polys or Bacteria seen 3+ Gram positive cocci in chains and clusters*  2+ Gram variable rods*  2+ Polys*         Results from last 7 days   Lab Units 07/26/24  1013   APPEARANCE CSF  Cloudy   WBC CSF /uL 0   XANTHOCHROMIA  Yes*   GLUCOSE CSF mg/dL 56   PROTEIN CSF mg/dL 64*   RBC CSF uL 0   CSF CULTURE  No growth     Medication Dose Route Frequency    acetaminophen (TYLENOL) oral suspension 975 mg  975 mg Oral Q8H PRN    albuterol (PROVENTIL HFA,VENTOLIN HFA) inhaler 2 puff  2 puff Inhalation Q4H PRN    aspirin chewable tablet 81 mg  81 mg Oral Daily    atorvastatin (LIPITOR) tablet 80 mg  80 mg Oral Daily    bisacodyl (DULCOLAX) rectal suppository 10 mg  10 mg Rectal Daily PRN    bromocriptine (PARLODEL) tablet 5 mg  5 mg Oral TID    butalbital-acetaminophen-caffeine (FIORICET,ESGIC) -40 mg per tablet 1 tablet  1 tablet Oral Q4H PRN    ceFAZolin (ANCEF) IVPB (premix in dextrose) 2,000 mg 50 mL  2,000 mg  Intravenous Q8H    chlorhexidine (PERIDEX) 0.12 % oral rinse 15 mL  15 mL Mouth/Throat Q12H MATTY    dexmedeTOMIDine (Precedex) 400 mcg in sodium chloride 0.9% 100 mL  0.1-0.7 mcg/kg/hr Intravenous Titrated    ezetimibe (ZETIA) tablet 10 mg  10 mg Oral QAM    folic acid (FOLVITE) tablet 400 mcg  400 mcg Oral Daily    gabapentin (NEURONTIN) capsule 600 mg  600 mg Oral TID    heparin (porcine) 25,000 units in 0.45% NaCl 250 mL infusion (premix)  12 Units/kg/hr (Order-Specific) Intravenous Titrated    insulin lispro (HumALOG/ADMELOG) 100 units/mL subcutaneous injection 1-5 Units  1-5 Units Subcutaneous Q6H MATTY    levETIRAcetam (KEPPRA) oral solution 750 mg  750 mg Oral Q12H MATTY    lidocaine (LIDODERM) 5 % patch 1 patch  1 patch Topical Daily    lidocaine (LIDODERM) 5 % patch 1 patch  1 patch Topical Daily PRN    niMODipine (NIMOTOP) oral solution 30 mg  30 mg Per NG Tube Q2H    norepinephrine (LEVOPHED) 4 mg (STANDARD CONCENTRATION) IV in sodium chloride 0.9% 250 mL  1-30 mcg/min Intravenous Titrated    omeprazole (PRILOSEC) suspension 2 mg/mL  20 mg Per NG Tube Daily    ondansetron (ZOFRAN) injection 4 mg  4 mg Intravenous Q6H PRN    oxyCODONE-acetaminophen (PERCOCET) 5-325 mg per tablet 1 tablet  1 tablet Oral Q4H PRN    saliva substitute (MOUTH KOTE) mucosal solution 5 spray  5 spray Mouth/Throat 4x Daily PRN    thiamine tablet 100 mg  100 mg Per NG Tube Daily     Discharge Plan: to be determined     Network Utilization Review Department  ATTENTION: Please call with any questions or concerns to [phone] and carefully listen to the prompts so that you are directed to the right person. All voicemails are confidential.   For Discharge needs, contact Care Management DC Support Team at [phone] opt. 2  Send all requests for admission clinical reviews, approved or denied determinations and any other requests to dedicated fax number below belonging to the campus where the patient is receiving treatment. List of  dedicated fax numbers for the Facilities:  FACILITY NAME UR FAX NUMBER   ADMISSION DENIALS (Administrative/Medical Necessity) [phone]   DISCHARGE SUPPORT TEAM (NETWORK) [phone]   PARENT CHILD HEALTH (Maternity/NICU/Pediatrics) [phone]   [facility] [phone]   [facility] [phone]   [facility] [phone]   [facility] [phone]   [facility] [phone]   [facility] [phone]   [facility] [phone]   [facility] [phone]   [facility] [phone]   [facility] [phone]   [facility] [phone]   [facility] [phone]

## 2024-07-29 NOTE — PROCEDURES
Modified (Video) Barium Swallow Study    Summary:  Images are on PACS for review.   Results are compared to previous VBS study from 7/23/24. NGT is removed. Swallow function appears somewhat improved. The patient presents with a mild-moderate oropharyngeal dysphagia. He continues with prolonged transfer time, with oral residue across trials. The patient independently uses double swallows to clear oral residue. Swallow initiation time is intermittently delayed to the pyriforms. Pharyngeal constriction continues to be reduced, with a moderate amount of pharyngeal retention. Occasional epiglottic undercoating is observed. Trace and transient aspiration is suspected on thin liquids x1. Otherwise, no penetration or aspiration events are observed in study.      Recommendations:  Diet: puree  Liquids: nectar   Meds: crush in puree   Strategies: small bites/sips  Frequent oral care  Upright position    F/u ST tx: yes  Therapy Prognosis: fair  Prognosis considerations: overall medical status  Full Supervision  Aspiration Precautions  Reflux Precautions  Consider consult with: dietary for supplements to increase PO intake  Results reviewed with: nursing, physician    Repeat MBS as necessary  If a dedicated assessment of the esophagus is desired, consider esophagram/barium swallow or EGD.    Goals:  Pt will tolerate least restrictive diet w/out s/s aspiration or oral/pharyngeal difficulties.    Patient's goal: none stated     H&P/pertinent provider notes: (PMH noted above)  HPI: Federico Bowen is a 68 y.o. with PMH HTN, HLD, CAD, COPD, cocaine use who presents with H/A and R leg pain to [facility]. In EMS pt had 2 witnessed seizures (30 sec & 6 seconds).    On arrival to ER pt required intubation 2/2 mental status. Stroke alert called. CTH showed SAH with IVH and mild hydro with R PCA aneurysm. Given Ativan, Keppra, DDVAP. Tx to [facility] for Neuro surgery evaluation    Previous VBS:  7/23/24:   Assessment Summary:    Pt presents with  "moderate-severe oropharyngeal dysphagia characterized by reduced bolus control, manipulation, and AP transfer, as well as delayed swallow initiation, reduced base of tongue retraction and pharyngeal stripping wave/contraction, and reduced airway closure with absent epiglottic inversion. Penetration was seen across all consistencies, with trace aspiration seen on puree bolus. Pt has significantly reduced pharyngeal constriction resulting in gross retention throughout the pharynx. Increased viscosity increases retention. May consider initiating thin liquids (full liquid diet vs thins in addition to TF). ST will continue to follow. Note: Images are available for review in PACS as desired. Se:14 should read \"Thin - AP\"   Recommendations:   Recommended Diet: thin liquids - consider continuing NGT for now for nutrition and meds  Recommended Form of Medications: non-oral if possible   Aspiration precautions and compensatory swallowing strategies: upright posture, only feed when fully alert, and small sips  SLP Dysphagia therapy recommended: yes    Swallow Mechanism Exam  Respiratory Status: on 6 L O2      Swallow Information   Current Risks for Dysphagia & Aspiration: known history of dysphagia and known history of aspiration  Current Diet: NPO with tube feeds     Consistencies Administered:  Pt was viewed sitting upright in the lateral and AP positions. Due to concerns for patient safety / patient refusal, trials provided deviated from the MBSImP Validated Protocol. Trials administered included thin liquids via tsp and straw, nectar thick liquids via tsp and straw, honey thick liquids via tsp and pudding.     Oral Impairment:  Lip Closure: wfl  Tongue Control During Bolus Hold: wfl  Bolus Preparation/Mastication: n/a (regular solid not given)  Bolus Transport/Lingual Motion: prolonged  Oral Residue: yes on tongue and palate  Initiation of the Pharyngeal Swallow: delayed to the pyriforms intermittently     Pharyngeal " Impairment:  Soft Palate Elevation: wfl  Laryngeal Elevation: wfl  Anterior Hyoid Excursion: wfl  Epiglottic Movement: wfl  Laryngeal Vestibular Closure: wfl (possible posterior aspiration event x1 with thin liquids)  Pharyngeal Stripping Wave: reduced   Pharyngeal Contraction: reduced  PES Opening: reduced  Tongue Base Retraction: reduced  Pharyngeal Residue: yes with all, more with increased viscosity     Screening of Esophageal Impairment   Esophageal Clearance: not assessed in study     Penetration/Aspiration:  Thin: 6  Nectar: 1  Honey: 1  Puree: 1  Solid: n/a  Response to Aspiration: (trace/transient aspiration possible with thin and silent aspiration)  Strategies/Efficacy: n/a    8-Point Penetration-Aspiration Scale   1 Material does not enter the airway   2 Material enters the airway, remains above the vocal folds, and is ejected  from the  airway    3 Material enters the airway, remains above the vocal folds, and is not ejected from the airway   4 Material enters the airway, contacts the vocal folds, and is ejected from the airway   5 Material enters the airway, contacts the vocal folds, and is not ejected from the airway    6 Material enters the airway, passes below the vocal folds and is ejected into the larynx or out of the airway    7 Material enters the airway, passes below the vocal folds, and is not ejected from the trachea despite effort    8 Material enters the airway, passes below the vocal folds, and no effort is made to eject

## 2024-07-30 ENCOUNTER — APPOINTMENT (INPATIENT)
Dept: NON INVASIVE DIAGNOSTICS | Facility: HOSPITAL | Age: 69
DRG: 020 | End: 2024-07-30
Payer: MEDICARE

## 2024-07-30 LAB
ANION GAP SERPL CALCULATED.3IONS-SCNC: 11 MMOL/L (ref 4–13)
BASOPHILS # BLD AUTO: 0.07 THOUSANDS/ÂΜL (ref 0–0.1)
BASOPHILS NFR BLD AUTO: 0 % (ref 0–1)
BUN SERPL-MCNC: 23 MG/DL (ref 5–25)
CALCIUM SERPL-MCNC: 9.2 MG/DL (ref 8.4–10.2)
CHLORIDE SERPL-SCNC: 102 MMOL/L (ref 96–108)
CO2 SERPL-SCNC: 26 MMOL/L (ref 21–32)
CREAT SERPL-MCNC: 0.81 MG/DL (ref 0.6–1.3)
EOSINOPHIL # BLD AUTO: 0.52 THOUSAND/ÂΜL (ref 0–0.61)
EOSINOPHIL NFR BLD AUTO: 3 % (ref 0–6)
ERYTHROCYTE [DISTWIDTH] IN BLOOD BY AUTOMATED COUNT: 15.3 % (ref 11.6–15.1)
GFR SERPL CREATININE-BSD FRML MDRD: 90 ML/MIN/1.73SQ M
GLUCOSE SERPL-MCNC: 121 MG/DL (ref 65–140)
GLUCOSE SERPL-MCNC: 121 MG/DL (ref 65–140)
GLUCOSE SERPL-MCNC: 124 MG/DL (ref 65–140)
GLUCOSE SERPL-MCNC: 127 MG/DL (ref 65–140)
GLUCOSE SERPL-MCNC: 128 MG/DL (ref 65–140)
GLUCOSE SERPL-MCNC: 136 MG/DL (ref 65–140)
HCT VFR BLD AUTO: 32 % (ref 36.5–49.3)
HGB BLD-MCNC: 10.5 G/DL (ref 12–17)
IMM GRANULOCYTES # BLD AUTO: 0.15 THOUSAND/UL (ref 0–0.2)
IMM GRANULOCYTES NFR BLD AUTO: 1 % (ref 0–2)
LYMPHOCYTES # BLD AUTO: 2.02 THOUSANDS/ÂΜL (ref 0.6–4.47)
LYMPHOCYTES NFR BLD AUTO: 12 % (ref 14–44)
MAGNESIUM SERPL-MCNC: 2.4 MG/DL (ref 1.9–2.7)
MCH RBC QN AUTO: 31.1 PG (ref 26.8–34.3)
MCHC RBC AUTO-ENTMCNC: 32.8 G/DL (ref 31.4–37.4)
MCV RBC AUTO: 95 FL (ref 82–98)
MONOCYTES # BLD AUTO: 0.61 THOUSAND/ÂΜL (ref 0.17–1.22)
MONOCYTES NFR BLD AUTO: 4 % (ref 4–12)
NEUTROPHILS # BLD AUTO: 13.39 THOUSANDS/ÂΜL (ref 1.85–7.62)
NEUTS SEG NFR BLD AUTO: 80 % (ref 43–75)
NRBC BLD AUTO-RTO: 0 /100 WBCS
PHOSPHATE SERPL-MCNC: 3.6 MG/DL (ref 2.3–4.1)
PLATELET # BLD AUTO: 677 THOUSANDS/UL (ref 149–390)
PMV BLD AUTO: 10 FL (ref 8.9–12.7)
POTASSIUM SERPL-SCNC: 4.5 MMOL/L (ref 3.5–5.3)
RBC # BLD AUTO: 3.38 MILLION/UL (ref 3.88–5.62)
SODIUM SERPL-SCNC: 139 MMOL/L (ref 135–147)
WBC # BLD AUTO: 16.76 THOUSAND/UL (ref 4.31–10.16)

## 2024-07-30 PROCEDURE — 93886 INTRACRANIAL COMPLETE STUDY: CPT

## 2024-07-30 PROCEDURE — 84100 ASSAY OF PHOSPHORUS: CPT

## 2024-07-30 PROCEDURE — 99291 CRITICAL CARE FIRST HOUR: CPT | Performed by: EMERGENCY MEDICINE

## 2024-07-30 PROCEDURE — 85025 COMPLETE CBC W/AUTO DIFF WBC: CPT

## 2024-07-30 PROCEDURE — 93886 INTRACRANIAL COMPLETE STUDY: CPT | Performed by: SURGERY

## 2024-07-30 PROCEDURE — 80048 BASIC METABOLIC PNL TOTAL CA: CPT

## 2024-07-30 PROCEDURE — 97116 GAIT TRAINING THERAPY: CPT

## 2024-07-30 PROCEDURE — NC001 PR NO CHARGE: Performed by: EMERGENCY MEDICINE

## 2024-07-30 PROCEDURE — 97530 THERAPEUTIC ACTIVITIES: CPT

## 2024-07-30 PROCEDURE — 97110 THERAPEUTIC EXERCISES: CPT

## 2024-07-30 PROCEDURE — 82948 REAGENT STRIP/BLOOD GLUCOSE: CPT

## 2024-07-30 PROCEDURE — 83735 ASSAY OF MAGNESIUM: CPT

## 2024-07-30 RX ADMIN — MIDODRINE HYDROCHLORIDE 10 MG: 5 TABLET ORAL at 12:06

## 2024-07-30 RX ADMIN — LEVETIRACETAM 750 MG: 100 SOLUTION ORAL at 08:14

## 2024-07-30 RX ADMIN — Medication 20 MG: at 08:13

## 2024-07-30 RX ADMIN — MIDODRINE HYDROCHLORIDE 10 MG: 5 TABLET ORAL at 06:00

## 2024-07-30 RX ADMIN — HEPARIN SODIUM 5000 UNITS: 5000 INJECTION INTRAVENOUS; SUBCUTANEOUS at 14:20

## 2024-07-30 RX ADMIN — CHLORHEXIDINE GLUCONATE 0.12% ORAL RINSE 15 ML: 1.2 LIQUID ORAL at 20:02

## 2024-07-30 RX ADMIN — ATORVASTATIN CALCIUM 80 MG: 80 TABLET, FILM COATED ORAL at 08:13

## 2024-07-30 RX ADMIN — Medication 30 MG: at 23:53

## 2024-07-30 RX ADMIN — Medication 30 MG: at 02:07

## 2024-07-30 RX ADMIN — LEVETIRACETAM 750 MG: 100 SOLUTION ORAL at 20:01

## 2024-07-30 RX ADMIN — Medication 30 MG: at 18:10

## 2024-07-30 RX ADMIN — Medication 30 MG: at 06:01

## 2024-07-30 RX ADMIN — HEPARIN SODIUM 5000 UNITS: 5000 INJECTION INTRAVENOUS; SUBCUTANEOUS at 06:01

## 2024-07-30 RX ADMIN — FOLIC ACID TAB 400 MCG 400 MCG: 400 TAB at 08:13

## 2024-07-30 RX ADMIN — BUTALBITAL, ACETAMINOPHEN AND CAFFEINE 1 TABLET: 325; 50; 40 TABLET ORAL at 06:00

## 2024-07-30 RX ADMIN — Medication 30 MG: at 22:20

## 2024-07-30 RX ADMIN — OXYCODONE HYDROCHLORIDE AND ACETAMINOPHEN 1 TABLET: 5; 325 TABLET ORAL at 15:31

## 2024-07-30 RX ADMIN — Medication 30 MG: at 00:12

## 2024-07-30 RX ADMIN — ASPIRIN 81 MG CHEWABLE TABLET 81 MG: 81 TABLET CHEWABLE at 08:13

## 2024-07-30 RX ADMIN — Medication 30 MG: at 19:52

## 2024-07-30 RX ADMIN — EZETIMIBE 10 MG: 10 TABLET ORAL at 08:13

## 2024-07-30 RX ADMIN — GABAPENTIN 600 MG: 300 CAPSULE ORAL at 20:02

## 2024-07-30 RX ADMIN — Medication 30 MG: at 14:20

## 2024-07-30 RX ADMIN — LIDOCAINE 5% 1 PATCH: 700 PATCH TOPICAL at 08:13

## 2024-07-30 RX ADMIN — HEPARIN SODIUM 5000 UNITS: 5000 INJECTION INTRAVENOUS; SUBCUTANEOUS at 21:06

## 2024-07-30 RX ADMIN — Medication 30 MG: at 12:06

## 2024-07-30 RX ADMIN — MIDODRINE HYDROCHLORIDE 10 MG: 5 TABLET ORAL at 16:13

## 2024-07-30 RX ADMIN — Medication 30 MG: at 04:02

## 2024-07-30 RX ADMIN — OXYCODONE HYDROCHLORIDE AND ACETAMINOPHEN 1 TABLET: 5; 325 TABLET ORAL at 19:52

## 2024-07-30 RX ADMIN — THIAMINE HCL TAB 100 MG 100 MG: 100 TAB at 08:13

## 2024-07-30 RX ADMIN — GABAPENTIN 600 MG: 300 CAPSULE ORAL at 16:13

## 2024-07-30 RX ADMIN — GABAPENTIN 600 MG: 300 CAPSULE ORAL at 08:13

## 2024-07-30 NOTE — PLAN OF CARE
Problem: Prexisting or High Potential for Compromised Skin Integrity  Goal: Skin integrity is maintained or improved  Description: INTERVENTIONS:  - Identify patients at risk for skin breakdown  - Assess and monitor skin integrity  - Assess and monitor nutrition and hydration status  - Monitor labs   - Assess for incontinence   - Turn and reposition patient  - Assist with mobility/ambulation  - Relieve pressure over bony prominences  - Avoid friction and shearing  - Provide appropriate hygiene as needed including keeping skin clean and dry  - Evaluate need for skin moisturizer/barrier cream  - Collaborate with interdisciplinary team   - Patient/family teaching  - Consider wound care consult   Outcome: Progressing     Problem: Neurological Deficit  Goal: Neurological status is stable or improving  Description: Interventions:  - Monitor and assess patient's level of consciousness, motor function, sensory function, and level of assistance needed for ADLs.   - Monitor and report changes from baseline. Collaborate with interdisciplinary team to initiate plan and implement interventions as ordered.   - Provide and maintain a safe environment.  - Consider seizure precautions.  - Consider fall precautions.  - Consider aspiration precautions.  - Consider bleeding precautions.  Outcome: Progressing     Problem: Nutrition  Goal: Nutrition/Hydration status is improving  Description: Monitor and assess patient's nutrition/hydration status for malnutrition (ex- brittle hair, bruises, dry skin, pale skin and conjunctiva, muscle wasting, smooth red tongue, and disorientation). Collaborate with interdisciplinary team and initiate plan and interventions as ordered.  Monitor patient's weight and dietary intake as ordered or per policy. Utilize nutrition screening tool and intervene per policy. Determine patient's food preferences and provide high-protein, high-caloric foods as appropriate.   - Monitor nutrition parameters,  recommending adjustments to enteral nutrition orders at indicated.   - Assist patient with eating.  - Allow adequate time for meals.  - Encourage patient to take dietary supplement as ordered.  - Collaborate with interdisciplinary team.   - Include patient/family/caregiver in decisions related to nutrition.  Outcome: Progressing     Problem: PAIN - ADULT  Goal: Verbalizes/displays adequate comfort level or baseline comfort level  Description: Interventions:  - Encourage patient to monitor pain and request assistance  - Assess pain using appropriate pain scale  - Administer analgesics based on type and severity of pain and evaluate response  - Implement non-pharmacological measures as appropriate and evaluate response  - Consider cultural and social influences on pain and pain management  - Notify physician/advanced practitioner if interventions unsuccessful or patient reports new pain  Outcome: Progressing     Problem: INFECTION - ADULT  Goal: Absence or prevention of progression during hospitalization  Description: INTERVENTIONS:  - Assess and monitor for signs and symptoms of infection  - Monitor lab/diagnostic results  - Monitor all insertion sites, i.e. indwelling lines, tubes, and drains  - Monitor endotracheal if appropriate and nasal secretions for changes in amount and color  - [place] appropriate cooling/warming therapies per order  - Administer medications as ordered  - Instruct and encourage patient and family to use good hand hygiene technique  - Identify and instruct in appropriate isolation precautions for identified infection/condition  Outcome: Progressing     Problem: SAFETY ADULT  Goal: Patient will remain free of falls  Description: INTERVENTIONS:  - Educate patient/family on patient safety including physical limitations  - Instruct patient to call for assistance with activity   - Consult OT/PT to assist with strengthening/mobility   - Keep Call bell within reach  - Keep bed low and locked with  side rails adjusted as appropriate  - Keep care items and personal belongings within reach  - Initiate and maintain comfort rounds  - Make Fall Risk Sign visible to staff  - Apply yellow socks and bracelet for high fall risk patients  - Consider moving patient to room near nurses station  Outcome: Progressing     Problem: Knowledge Deficit  Goal: Patient/family/caregiver demonstrates understanding of disease process, treatment plan, medications, and discharge instructions  Description: Complete learning assessment and assess knowledge base.  Interventions:  - Provide teaching at level of understanding  - Provide teaching via preferred learning methods  Outcome: Progressing     Problem: NEUROSENSORY - ADULT  Goal: Achieves stable or improved neurological status  Description: INTERVENTIONS  - Monitor and report changes in neurological status  - Monitor vital signs such as temperature, blood pressure, glucose, and any other labs ordered   - Initiate measures to prevent increased intracranial pressure  - Monitor for seizure activity and implement precautions if appropriate      Outcome: Progressing     Problem: CARDIOVASCULAR - ADULT  Goal: Maintains optimal cardiac output and hemodynamic stability  Description: INTERVENTIONS:  - Monitor I/O, vital signs and rhythm  - Monitor for S/S and trends of decreased cardiac output  - Administer and titrate ordered vasoactive medications to optimize hemodynamic stability  - Assess quality of pulses, skin color and temperature  - Assess for signs of decreased coronary artery perfusion  - Instruct patient to report change in severity of symptoms  Outcome: Progressing

## 2024-07-30 NOTE — PLAN OF CARE
Problem: OCCUPATIONAL THERAPY ADULT  Goal: Performs self-care activities at highest level of function for planned discharge setting.  See evaluation for individualized goals.  Description: Treatment Interventions: ADL retraining, Functional transfer training, UE strengthening/ROM, Endurance training, Cognitive reorientation, Patient/family training, Equipment evaluation/education, Compensatory technique education, Continued evaluation, Energy conservation, Activityengagement          See flowsheet documentation for full assessment, interventions and recommendations.   Note: Limitation: Decreased ADL status, Decreased UE ROM, Decreased UE strength, Decreased Safe judgement during ADL, Decreased cognition, Decreased endurance, Decreased self-care trans, Decreased high-level ADLs  Prognosis: Fair  Assessment: Pt was seen for OT treatment on 7/30/2024 with interventions consisting of ADL retraining with the use of correct body mechanics, energy conservation techniques, safety awareness and fall prevention techniques.  Pt was greeted in supine and willing to participate in skilled OT treatment session. Pt performed LB dressing (MAX A). Patient requiring verbal cues for safety and redirection. The patient's raw score on the -PAC Daily Activity Inpatient Short Form is 14. A raw score of less than 19 suggests the patient may benefit from discharge to post-acute rehabilitation services. Please refer to the recommendation of the Occupational Therapist for safe discharge planning.From Ot standpoint, recommendation at time of d/c would be Level I Maximum Resource Intensity. Patient to benefit from continue occupational therapy while in hospital to address deficits.  Recommendation: Physiatry Consult  Rehab Resource Intensity Level, OT: I (Maximum Resource Intensity)

## 2024-07-30 NOTE — PROGRESS NOTES
Daily Progress Note - Critical Care   Federico Bowen 68 y.o. male MRN: [id number]  [facility] Encounter: [id number]        ----------------------------------------------------------------------------------------  HPI/24hr events: No overnight events  ---------------------------------------------------------------------------------------  SUBJECTIVE  Patient is a 68-year-old male with past medical history hypertension, hemic coronary artery disease, COPD, cocaine use who presented with headache and right leg pain to [facility].  Patient had 2 witnessed seizure events per EMS.  On arrival to hospital patient regarding the patient secondary to mental status change.  Stroke alert was called.  CT head demonstrated subarachnoid hemorrhage with intraventricular hemorrhage and mild hydrocephalus with right PCA aneurysm.  Patient was given lorazepam, levetiracetam, DDAVP, and transferred to [facility] for neurosurgery evaluation.  Patient subsequently had right PCOM coil embolization, and in setting of subarachnoid hemorrhage patient was placed on vasospasm watch with nimodipine, levetiracetam, sodium goals, and serial neurologic examination.  Patient required right frontal EVD placement due to hydrocephalus and CSF CSF studies were obtained.  EVD was removed on 7/26.    Imaging review:  - 7/11 CTAH/N-0.3 cm aneurysm in expected origin of right posterior communicating artery. Possible tiny aneurysm in left anterior communicating artery region. Mild narrowing of bilateral MCA M1 and bilateral M2 segmental branch vessels, likely due to vasospasm.   - 7/11 CTH-Acute diffuse thickened large-volume SAH throughout the b/l parafalcine regions, b/l cerebral sulci (R worse than L), basilar cisterns, prepontine cistern, premedullary cistern, and visualized upper cervical spinal canal. Acute small volume IVH with mild hydrocephalus.   - 7/11 EVD placed  - 7/13 Angio-: Embolization of a 3.5 mm right  P-comm aneurysm.   - 7/14 CTh-no significant interval change and extensive bilateral SAH and IVH  - 7/14 vEEG no seizures  - 7/16 CTA/CTH: Expected postoperative appearance following embolization of right posterior communicating artery aneurysm . Redemonstrated subarachnoid hemorrhage overlying the right greater than left convexities and concentrated within the right sylvian cistern and fissure. . Subacute infarct involving the right anterior temporal region and medial occipital region.  - 7/27 CTH - stable, Small volume subarachnoid hemorrhage persists    TCDs:  7/29:  RIGHT SIDE:  Mean velocities in the middle cerebral artery, anterior cerebral artery,  posterior cerebral artery.  The Lindegaard ratio is   2.03,  (Prior: 2.47 )  The Pulsatility Index ranged from 1.09 to 1.51     LEFT SIDE:  Mean velocities in the middle cerebral artery, anterior cerebral artery,  posterior cerebral artery.  The Lindegaard ratio is   1.11,  (Prior: 2.77 )  The Pulsatility Index ranged from 1.05 to 1.25    Review of Systems  Review of systems was reviewed and negative unless stated above in HPI/24-hour events   ---------------------------------------------------------------------------------------  Assessment and Plan:    Neuro:   Diagnosis: SAH with IVH and mild hydro s/p coil embolization of R PCOM aneurysm  BD 19 (7/12/2024) HH5, MF 4     Plan:   Neurosurgery  Recommended repeat CT head, ordered  EVD removed on 7/26  SBP goal 110-180  Continue aspirin 80 mg p.o. daily  Continue levetiracetam 750 mg every 12 hours  Neuro checks Q4H  Nimodipine 30mg Q2 hrs x 21 days  Heparin SQ for DVT ppx  Daily TCDs  Euvolemia   Normonatremia, Na goal > 140  Stat CTH with any change in GCS > 2 pts  PT/OT consults appreciated     Diagnosis: Seizure  2/2 above and presented w/ seizures   vEEG no seizures  Plan:   Neuro following   Continue levetiracetam 750mg Q12H      Diagnosis: Headache  Continue 600mg TID gabapentin   Lidoderm patch for neck    PRN Fioricet/Percocet/Tylenol      CV:   Diagnosis: Hx CAD, Hx HTN (vascular stent placed > 1 year ago)  Plan:   Continue ASA 81mg daily  Holding home entresto and metoprolol    Given allowing for increased -180   Diagnosis: HLD  Continue Lipitor 80mg daily and Zetia 10mg qhs  Diagnosis: Borderline Hypotension, improving   Suspect in the setting of resolving infectious state  Holding home Entresto and Metoprolol   Continue Nimodipine (decreased to 30mg Q2 hrs 7/26)  Continue Midodrine 10mg TID  PRN fluid resuscitation     Pulm:  Diagnosis: Aspiration pneumonia   Completed Ancef 7/29  CSF culture negative  Continue suctioning, chest PT, nebs/resp protocol   Wean O2 as able for goal O2 sat >92%  Diagnosis: Hx COPD  Plan:   IS  Xopenex TID  Imecldinum/albuterol inhaler   Respiratory protocol  GI:   Diagnosis: Dysphagia  Repeat VBS completed on 7/29/2024  Dysphagia 1, nectar thick liquid diet  NGT removed  Diagnosis: GERD  Omeprazole 40mg daily   Bowel regimen  Hold 2/2 multiple BM's  C diff negative   Banana flakes PRN     :  - No acute issues   - Monitor renal indices and I/Os  - Goal euvolemia      F/E/N:    F: none, PO intake encouraged  E: Replete as needed, goal Mg >2, Phos >3, K>4  N: Dysphagia 1, nectar thick liquid diet     Heme/Onc:   Diagnosis: Anemia  Plan:   Continue to monitor am CBC     Endo:   SSI   Hypoglycemia protocol   POC glucose checks     ID:   Diagnosis: Leukocytosis and febrile, improving  7/22 Sputum cx: 3+gram positive cocci in chains and clusters    Plan   Abx completed on 7/29  Tylenol PRN     MSK/Skin:   PT/OT/PMR  Diagnosis: Penile lesion  Plan: note from outpt derm inflamed seborrheic keratosis        Line/Drains:  - Peripheral IV, right  - External urinary catheter    Disposition: Continue Critical Care   Code Status: Level 1 - Full Code  ---------------------------------------------------------------------------------------  ICU CORE MEASURES    Prophylaxis   VTE  Pharmacologic Prophylaxis: Heparin  VTE Mechanical Prophylaxis: sequential compression device  Stress Ulcer Prophylaxis: Omeprazole PO    ABCDE Protocol (if indicated)  Plan to perform spontaneous awakening trial today? Not applicable  Plan to perform spontaneous breathing trial today? Not applicable  Obvious barriers to extubation? Not applicable  CAM-ICU: Negative    Invasive Devices Review  Invasive Devices       Peripheral Intravenous Line  Duration             Peripheral IV 07/28/24 Proximal;Right;Ventral (anterior) Forearm 1 day              Drain  Duration             External Urinary Catheter Small 16 days                  Can any invasive devices be discontinued today? No (provide explanation): Continued need for peripheral access  ---------------------------------------------------------------------------------------  OBJECTIVE    Physical Exam  Vitals and nursing note reviewed.   Constitutional:       General: He is not in acute distress.  HENT:      Head: Normocephalic.      Mouth/Throat:      Mouth: Mucous membranes are moist.      Pharynx: Oropharynx is clear.   Eyes:      Pupils: Pupils are equal, round, and reactive to light.   Cardiovascular:      Rate and Rhythm: Normal rate.   Pulmonary:      Effort: No respiratory distress.   Musculoskeletal:      Right lower leg: No edema.      Left lower leg: No edema.   Skin:     General: Skin is warm and dry.      Findings: Bruising present.   Neurological:      Mental Status: He is alert.      Coordination: Finger-Nose-Finger Test normal.      Deep Tendon Reflexes:      Reflex Scores:       Bicep reflexes are 2+ on the right side and 2+ on the left side.       Brachioradialis reflexes are 2+ on the right side and 2+ on the left side.       Patellar reflexes are 1+ on the right side and 1+ on the left side.       Achilles reflexes are 2+ on the right side and 2+ on the left side.  Psychiatric:         Mood and Affect: Mood normal.         Neurologic Exam  "    Mental Status   Patient was alert and oriented to name, reports location to be \"[place]\", reported the year to  be 2024, reported the month of July, reported \"I am thinking\" when asked to complete simple calculations, was unable to spell world forwards and backwards.     Cranial Nerves     CN II   Right visual field deficit: none  Left visual field deficit: none     CN III, IV, VI   Pupils are equal, round, and reactive to light.  Right pupil: Size: 3 mm. Shape: regular. Reactivity: brisk.   Left pupil: Size: 3 mm. Shape: regular. Reactivity: brisk.   CN III: no CN III palsy  CN VI: no CN VI palsy  Nystagmus: none     CN V   Right facial sensation deficit: none  Left facial sensation deficit: none    CN VII   Right facial weakness: none    CN VIII   Hearing: intact    CN IX, X   Palate: symmetric    CN XI   Right sternocleidomastoid strength: weak  Left sternocleidomastoid strength: weak  Right trapezius strength: weak  Left trapezius strength: weak    CN XII   Tongue: not atrophic  Fasciculations: absent  Tongue deviation: none    Motor Exam   Muscle bulk: decreased  Overall muscle tone: decreasedDemonstrate antigravity in bilateral upper extremities, was able to provide 4+/5 in proximal and distal compartments in flexion and extension.  Patient is able to demonstrate antigravity in bilateral lower extremities, was able to provide 4/5 in proximal and distal compartments in flexion and extension.     Sensory Exam   Right arm light touch: normal  Left arm light touch: normal  Right leg light touch: normal  Left leg light touch: normal    Gait, Coordination, and Reflexes     Coordination   Finger to nose coordination: normal    Tremor   Resting tremor: absent    Reflexes   Right brachioradialis: 2+  Left brachioradialis: 2+  Right biceps: 2+  Left biceps: 2+  Right patellar: 1+  Left patellar: 1+  Right achilles: 2+  Left achilles: 2+  Right plantar: equivocal  Left plantar: equivocal  Right Shin: absent  Left " Shin: absent  Right ankle clonus: absent  Left ankle clonus: absent    Vitals   Vitals:    24 0700 24 0800 24 1000 24 1200   BP: 101/67  102/73 130/89   BP Location: Right arm  Right arm Right arm   Pulse: 98  101 101   Resp: 19      Temp: 97.8 °F (36.6 °C) 97.8 °F (36.6 °C)     TempSrc: Oral Oral     SpO2: 99%      Weight:         Temp (24hrs), Av.6 °F (37 °C), Min:97.8 °F (36.6 °C), Max:99.5 °F (37.5 °C)  Current: Temperature: 97.8 °F (36.6 °C)  HR: 98  BP: 101/67  RR: 16  SpO2: 100% on NC    Respiratory:  SpO2: SpO2: 99 %  O2 Flow Rate (L/min): 8 L/min    Invasive/non-invasive ventilation settings   Respiratory      Lab Data (Last 4 hours)      None           O2/Vent Data (Last 4 hours)      None                  Height and Weights         There is no height or weight on file to calculate BMI.  Weight (last 2 days)       Date/Time Weight    24 0548 44 (97)    24 0600 44 (97)          Intake and Output  I/O          0701   0700  0701   07    I.V. (mL/kg) 72.4 (1.6)     NG/     IV Piggyback 500     Feedings 585     Total Intake(mL/kg) 1337.4 (30.4)     Urine (mL/kg/hr) 2100 (2) 950 (0.9)    Total Output 2100 950    Net -762.6 -950                Nutrition       Diet Orders   (From admission, onward)                 Start     Ordered    24 1556  Diet Dysphagia/Modified Consistency; Dysphagia 1-Pureed; Nectar Thick Liquid  Diet effective now        References:    Adult Nutrition Support Algorithm    RD Therapeutic Diet Order Protocol   Question Answer Comment   Diet Type Dysphagia/Modified Consistency    Dysphagia/Modified Consistency Dysphagia 1-Pureed    Liquid Modifier Nectar Thick Liquid    RD to adjust diet per protocol? No        24 1555                  Laboratory and Diagnostics:  Results from last 7 days   Lab Units 24  0553 24  0638 24  0510 24  0548 24  0556 24  0544 24  0509   WBC  Thousand/uL 16.76* 14.31* 14.44* 14.73* 15.47* 18.20* 21.87*   HEMOGLOBIN g/dL 10.5* 10.6* 10.1* 10.1* 10.0* 9.6* 9.4*   HEMATOCRIT % 32.0* 32.1* 30.6* 30.3* 30.1* 29.2* 27.9*   PLATELETS Thousands/uL 677* 618* 612* 585* 531* 478* 416*   SEGS PCT % 80* 79* 75 82* 83* 83*  --    MONO PCT % 4 4 5 5 5 5  --    EOS PCT % 3 3 3 2 2 2  --      Results from last 7 days   Lab Units 07/30/24  0553 07/29/24  1139 07/28/24  0510 07/27/24  0548 07/26/24  0556 07/25/24  1532 07/25/24  0544 07/24/24  0509   SODIUM mmol/L 139 137 141 140 141  --  146 147   POTASSIUM mmol/L 4.5 4.6 4.2 4.2 4.1 4.2 3.4* 3.5   CHLORIDE mmol/L 102 103 106 105 109*  --  112* 112*   CO2 mmol/L 26 27 26 25 23  --  24 26   ANION GAP mmol/L 11 7 9 10 9  --  10 9   BUN mg/dL 23 20 22 19 20  --  22 29*   CREATININE mg/dL 0.81 0.73 0.77 0.67 0.74  --  0.67 0.80   CALCIUM mg/dL 9.2 9.2 9.0 9.1 8.7  --  8.8 8.9   GLUCOSE RANDOM mg/dL 121 108 145* 150* 126  --  150* 183*   ALT U/L  --  15  --  30 21  --  15  --    AST U/L  --  25  --  43* 32  --  21  --    ALK PHOS U/L  --  132*  --  136* 128*  --  115*  --    ALBUMIN g/dL  --  3.5  --  3.4* 3.3*  --  3.2*  --    TOTAL BILIRUBIN mg/dL  --  0.72  --  0.52 0.50  --  0.42  --      Results from last 7 days   Lab Units 07/30/24  0553 07/29/24  1139 07/28/24  0510 07/27/24  0548 07/26/24  0556 07/25/24  1532 07/25/24  0544 07/24/24  0509   MAGNESIUM mg/dL 2.4 2.3 2.4 2.2 2.2  --  2.1 2.2   PHOSPHORUS mg/dL 3.6 3.7 2.9 3.3 3.1 2.5 2.7 2.3      Results from last 7 days   Lab Units 07/28/24  0510 07/27/24  0548 07/26/24  0556 07/25/24  0544 07/24/24  0509   PTT seconds 31 34 38* 38* 32              ABG:    VBG:          Micro  Results from last 7 days   Lab Units 07/26/24  1013 07/25/24  1000   GRAM STAIN RESULT  No Polys or Bacteria seen  --    C DIFF TOXIN B BY PCR   --  Negative     Imaging:  I have personally reviewed pertinent reports.      Active Medications  Scheduled Meds:  Current Facility-Administered  Medications   Medication Dose Route Frequency Provider Last Rate    acetaminophen  975 mg Oral Q8H PRN Mike Christine, DO      albuterol  2 puff Inhalation Q4H PRN Mike Christine, DO      aspirin  81 mg Oral Daily Mike Christine, DO      atorvastatin  80 mg Oral Daily Mike Christine, DO      bisacodyl  10 mg Rectal Daily PRN Mike Christine, DO      butalbital-acetaminophen-caffeine  1 tablet Oral Q4H PRN Mike Christine, DO      chlorhexidine  15 mL Mouth/Throat Q12H [facility] Mike Christine, DO      ezetimibe  10 mg Oral QAM Mike Christine, DO      folic acid  400 mcg Oral Daily Mike Christine, DO      gabapentin  600 mg Oral TID Mike Christine, DO      heparin (porcine)  5,000 Units Subcutaneous Q8H [facility] Mike Christine, DO      insulin lispro  1-5 Units Subcutaneous Q6H [facility] Mike Christine, DO      levETIRAcetam  750 mg Oral Q12H [facility] Mike Christine, DO      lidocaine  1 patch Topical Daily Mike Christine, DO      lidocaine  1 patch Topical Daily PRN Mike Christine, DO      midodrine  10 mg Oral TID  Mike Christine, DO      niMODipine  30 mg Per NG Tube Q2H Mike Christine, DO      omeprazole (PRILOSEC) suspension 2 mg/mL  20 mg Per NG Tube Daily Mike Christine, DO      ondansetron  4 mg Intravenous Q6H PRN Mike Christine, DO      oxyCODONE-acetaminophen  1 tablet Oral Q4H PRN Mike Christine, DO      saliva substitute  5 spray Mouth/Throat 4x Daily PRN Mike Christine, DO      thiamine  100 mg Per NG Tube Daily Mike Christine,        Continuous Infusions:     PRN Meds:   acetaminophen, 975 mg, Q8H PRN  albuterol, 2 puff, Q4H PRN  bisacodyl, 10 mg, Daily PRN  butalbital-acetaminophen-caffeine, 1 tablet, Q4H PRN  lidocaine, 1 patch, Daily PRN  ondansetron, 4 mg, Q6H PRN  oxyCODONE-acetaminophen, 1 tablet, Q4H PRN  saliva substitute, 5 spray, 4x Daily PRN        Allergies   No Known Allergies    Advance Directive and Living Will:      Power of :    POLST:      Counseling / Coordination of Care  Please see attending's attestation      Portions of the record may  "have been created with voice recognition software.  Occasional wrong word or \"sound a like\" substitutions may have occurred due to the inherent limitations of voice recognition software.  Read the chart carefully and recognize, using context, where substitutions have occurred  "

## 2024-07-30 NOTE — OCCUPATIONAL THERAPY NOTE
Occupational Therapy Progress Note     Patient Name: Federico Bowen  Today's Date: 7/30/2024  Problem List  Principal Problem:    Subarachnoid hemorrhage (HCC)  Active Problems:    HTN (hypertension)    Hyperlipidemia    Seizures (HCC)    Moderate protein-calorie malnutrition (HCC)    Fever    Severe protein-calorie malnutrition (HCC)    Goals of care, counseling/discussion    Palliative care encounter              07/30/24 0920   OT Last Visit   OT Visit Date 07/30/24   Note Type   Note Type Treatment   Pain Assessment   Pain Assessment Tool 0-10   Pain Score No Pain   Restrictions/Precautions   Weight Bearing Precautions Per Order No   Other Precautions Fall Risk;Multiple lines;Telemetry;Chair Alarm;Bed Alarm;Cognitive   Lifestyle   Autonomy I w/ ADLS/IADLS, transfers and functional mobility PTA   Reciprocal Relationships Pt lives w/ his dght   Service to Others retired   Intrinsic Gratification gardening and spending time w/ his grandkids   ADL   LB Dressing Assistance 2  Maximal Assistance   LB Dressing Deficit Increased time to complete;Don/doff R sock;Don/doff L sock;Steadying   Bed Mobility   Supine to Sit 2  Maximal assistance   Additional items Assist x 2;Increased time required;Verbal cues   Transfers   Sit to Stand 3  Moderate assistance   Additional items Assist x 2;Increased time required;Verbal cues   Stand to Sit 3  Moderate assistance   Additional items Assist x 2;Increased time required;Verbal cues   Additional Comments Pt required B/L HHA for transfers.   Functional Mobility   Functional Mobility 3  Moderate assistance   Additional Comments Pt performed a few steps of functional mobility from bed to chair with MOD A x 2 with B/L HHA.   Additional items Hand hold assistance   Cognition   Overall Cognitive Status Impaired   Arousal/Participation Arousable;Lethargic   Attention Difficulty attending to directions   Orientation Level Oriented to person;Oriented to place;Disoriented to  situation;Oriented to time   Memory Decreased recall of precautions;Decreased short term memory;Decreased recall of recent events   Following Commands Follows one step commands with increased time or repetition   Comments Pt requires increased cues for safety and needs increased cues to redirect.   Activity Tolerance   Activity Tolerance Patient limited by fatigue;Patient limited by pain   Medical Staff Made Aware OT Quynh, PT Aravind, LISA Nathan   Assessment   Assessment Pt was seen for OT treatment on 7/30/2024 with interventions consisting of ADL retraining with the use of correct body mechanics, energy conservation techniques, safety awareness and fall prevention techniques.  Pt was greeted in supine and willing to participate in skilled OT treatment session. Pt performed LB dressing (MAX A). Patient requiring verbal cues for safety and redirection. The patient's raw score on the AM-PAC Daily Activity Inpatient Short Form is 14. A raw score of less than 19 suggests the patient may benefit from discharge to post-acute rehabilitation services. Please refer to the recommendation of the Occupational Therapist for safe discharge planning.From Ot standpoint, recommendation at time of d/c would be Level I Maximum Resource Intensity. Patient to benefit from continue occupational therapy while in hospital to address deficits.   Plan   Treatment Interventions ADL retraining;Functional transfer training;UE strengthening/ROM;Endurance training;Cognitive reorientation;Patient/family training;Equipment evaluation/education;Compensatory technique education;Continued evaluation;Energy conservation;Activityengagement   Goal Expiration Date 08/13/24   OT Treatment Day 2   OT Frequency 3-5x/wk   Discharge Recommendation   Rehab Resource Intensity Level, OT I (Maximum Resource Intensity)   AM-PAC Daily Activity Inpatient   Lower Body Dressing 2   Bathing 2   Toileting 2   Upper Body Dressing 2   Grooming 3   Eating 3   Daily Activity  Raw Score 14   Daily Activity Standardized Score (Calc for Raw Score >=11) 33.39   AM-PAC Applied Cognition Inpatient   Following a Speech/Presentation 2   Understanding Ordinary Conversation 3   Taking Medications 2   Remembering Where Things Are Placed or Put Away 2   Remembering List of 4-5 Errands 2   Taking Care of Complicated Tasks 1   Applied Cognition Raw Score 12   Applied Cognition Standardized Score 28.82   End of Consult   Patient Position at End of Consult Bedside chair;Bed/Chair alarm activated;All needs within reach   Nurse Communication Nurse aware of consult     H[facility] [place]

## 2024-07-30 NOTE — PHYSICAL THERAPY NOTE
PHYSICAL THERAPY NOTE          Patient Name: Federico Bowen  Today's Date: 7/30/2024 07/30/24 0921   PT Last Visit   PT Visit Date 07/30/24   Note Type   Note Type Treatment   Pain Assessment   Pain Assessment Tool 0-10   Pain Score No Pain   Restrictions/Precautions   Weight Bearing Precautions Per Order No   Other Precautions Cognitive;Chair Alarm;Bed Alarm;Multiple lines;Telemetry;O2;Fall Risk   General   Chart Reviewed Yes   Response to Previous Treatment Patient with no complaints from previous session.   Family/Caregiver Present Yes  (daughter)   Cognition   Overall Cognitive Status Impaired   Arousal/Participation Arousable;Responsive   Attention Attends with cues to redirect   Orientation Level Oriented to person;Oriented to place;Oriented to time  (correct year)   Memory Decreased short term memory;Decreased recall of recent events;Decreased recall of precautions   Following Commands Follows one step commands with increased time or repetition   Comments pt requires motivation to participate in PT session   Subjective   Subjective pt reports that he is cold   Bed Mobility   Supine to Sit 2  Maximal assistance   Additional items Assist x 2;HOB elevated;Increased time required;Verbal cues;LE management   Sit to Supine Unable to assess   Transfers   Sit to Stand 3  Moderate assistance   Additional items Assist x 2;Increased time required;Verbal cues   Stand to Sit 3  Moderate assistance   Additional items Assist x 2;Verbal cues;Increased time required   Stand pivot 2  Maximal assistance   Additional items Assist x 2   Additional Comments b/l HHA   Ambulation/Elevation   Gait pattern Improper Weight shift;Narrow BERNADINE;Decreased foot clearance;Shuffling;Short stride;Excessively slow   Gait Assistance 3  Moderate assist   Additional items Assist x 2   Assistive Device Other (Comment)  (b/l HHA)   Distance 2'   Stair  Management Assistance Not tested   Ambulation/Elevation Additional Comments decreased foot clearance during side steps, pt worried about foot placement and required max Ax2 to pivot into chair   Balance   Static Sitting Fair -   Dynamic Sitting Poor +   Static Standing Poor   Dynamic Standing Poor -   Ambulatory Poor -   Endurance Deficit   Endurance Deficit Yes   Endurance Deficit Description pt limited by decreased strength and activity tolerance   Activity Tolerance   Activity Tolerance Patient limited by fatigue   Medical Staff Made Aware PT OPAL Nazario Quynh, SOT Kimmy   Nurse Made Aware yes-cleared   Exercises   Heelslides   (educated pt regarding heels slides in bed and in the chair)   Knee AROM Long Arc Quad Sitting;15 reps;AAROM;Bilateral   Assessment   Prognosis Fair   Problem List Decreased strength;Decreased endurance;Impaired balance;Decreased mobility;Decreased coordination;Decreased cognition   Assessment Pt agreeable to participate in PT session, required motivation from daughter and staff. Pt completes mobility and therex as outlined above. Pt reports dizziness sitting EOB,  however BP stable throughout session and dizziness subsided with ankle pumps. Pt able to stand at EOB and initiate 2 side steps however became worried about stepping on therapists feet and required max Ax2 to pivot into recliner. Pt educated on importance of mobility, therex and being out of bed in recliner chair. Continue to motivate pt to work on mobility and function. Pt left in chair, chair alarm donned, call bell and personal items within reach and all needs met.   Barriers to Discharge Inaccessible home environment;Decreased caregiver support   Goals   Patient Goals to go back to bed   STG Expiration Date 08/13/24  (goals extended, remain appropriate)   PT Treatment Day 2   Plan   Treatment/Interventions Functional transfer training;LE strengthening/ROM;Therapeutic exercise;Endurance training;Cognitive  reorientation;Patient/family training;Bed mobility;Gait training;Spoke to nursing;OT;Equipment eval/education   Progress Slow progress, multiple tests/procedures   PT Frequency 3-5x/wk   Discharge Recommendation   Rehab Resource Intensity Level, PT II (Moderate Resource Intensity)   Equipment Recommended Other (Comment)  (TBD)   AM-PAC Basic Mobility Inpatient   Turning in Flat Bed Without Bedrails 2   Lying on Back to Sitting on Edge of Flat Bed Without Bedrails 2   Moving Bed to Chair 1   Standing Up From Chair Using Arms 2   Walk in Room 1   Climb 3-5 Stairs With Railing 1   Basic Mobility Inpatient Raw Score 9   Turning Head Towards Sound 3   Follow Simple Instructions 3   Low Function Basic Mobility Raw Score  15   Low Function Basic Mobility Standardized Score  23.9   [facility] Highest Level Of Mobility   -HLM Goal 3: Sit at edge of bed   -HLM Achieved 5: Stand (1 or more minutes)   Michelle Dobbins, SPT

## 2024-07-30 NOTE — CASE MANAGEMENT
Case Management Discharge Planning Note    Patient name Federico Bowen  Location [facility] 715/[facility] 715-01 MRN [id number]  : 1955 Date 2024       Current Admission Date: 2024  Current Admission Diagnosis:Subarachnoid hemorrhage (HCC)   Patient Active Problem List    Diagnosis Date Noted Date Diagnosed    Goals of care, counseling/discussion 2024     Palliative care encounter 2024     Severe protein-calorie malnutrition (HCC) 2024     Fever 2024     Moderate protein-calorie malnutrition (HCC) 2024     HTN (hypertension) 2024     Hyperlipidemia 2024     Seizures (HCC) 2024     Subarachnoid hemorrhage (HCC) 2024       LOS (days): 18  Geometric Mean LOS (GMLOS) (days): 10.2  Days to GMLOS:-7.7     OBJECTIVE:  Risk of Unplanned Readmission Score: 17.28         Current admission status: Inpatient   Preferred Pharmacy:   [id number] - [place] - [address]  [address]  [place] [address]  Phone: [phone] Fax: [phone]    Primary Care Provider: No primary care provider on file.    Primary Insurance: [employer]  Secondary Insurance: [facility]    DISCHARGE DETAILS:       Other Referral/Resources/Interventions Provided:  Referral Comments: Secure chat with provider regarding disposition of patient- resident stated he will ask attending if patient needs TBI program.  PM&R consult requested.

## 2024-07-30 NOTE — PLAN OF CARE
Problem: PHYSICAL THERAPY ADULT  Goal: Performs mobility at highest level of function for planned discharge setting.  See evaluation for individualized goals.  Description: Treatment/Interventions: OT, Spoke to case management, Gait training, Bed mobility, Patient/family training, Endurance training, LE strengthening/ROM, Functional transfer training          See flowsheet documentation for full assessment, interventions and recommendations.  Outcome: Progressing  Note: Prognosis: Fair  Problem List: Decreased strength, Decreased endurance, Impaired balance, Decreased mobility, Decreased coordination, Decreased cognition  Assessment: Pt agreeable to participate in PT session, required motivation from daughter and staff. Pt completes mobility and therex as outlined above. Pt reports dizziness sitting EOB,  however BP stable throughout session and dizziness subsided with ankle pumps. Pt able to stand at EOB and initiate 2 side steps however became worried about stepping on therapists feet and required max Ax2 to pivot into recliner. Pt educated on importance of mobility, therex and being out of bed in recliner chair. Continue to motivate pt to work on mobility and function. Pt left in chair, chair alarm donned, call bell and personal items within reach and all needs met.  Barriers to Discharge: Inaccessible home environment, Decreased caregiver support     Rehab Resource Intensity Level, PT: II (Moderate Resource Intensity)    See flowsheet documentation for full assessment.

## 2024-07-30 NOTE — PLAN OF CARE
Problem: Prexisting or High Potential for Compromised Skin Integrity  Goal: Skin integrity is maintained or improved  Description: INTERVENTIONS:  - Identify patients at risk for skin breakdown  - Assess and monitor skin integrity  - Assess and monitor nutrition and hydration status  - Monitor labs   - Assess for incontinence   - Turn and reposition patient  - Assist with mobility/ambulation  - Relieve pressure over bony prominences  - Avoid friction and shearing  - Provide appropriate hygiene as needed including keeping skin clean and dry  - Evaluate need for skin moisturizer/barrier cream  - Collaborate with interdisciplinary team   - Patient/family teaching  - Consider wound care consult   Outcome: Progressing     Problem: Neurological Deficit  Goal: Neurological status is stable or improving  Description: Interventions:  - Monitor and assess patient's level of consciousness, motor function, sensory function, and level of assistance needed for ADLs.   - Monitor and report changes from baseline. Collaborate with interdisciplinary team to initiate plan and implement interventions as ordered.   - Provide and maintain a safe environment.  - Consider seizure precautions.  - Consider fall precautions.  - Consider aspiration precautions.  - Consider bleeding precautions.  Outcome: Progressing     Problem: Activity Intolerance/Impaired Mobility  Goal: Mobility/activity is maintained at optimum level for patient  Description: Interventions:  - Assess and monitor patient  barriers to mobility and need for assistive/adaptive devices.  - Assess patient's emotional response to limitations.  - Collaborate with interdisciplinary team and initiate plans and interventions as ordered.  - Encourage independent activity per ability.  - Maintain proper body alignment.  - Perform active/passive rom as tolerated/ordered.  - Plan activities to conserve energy.  - Turn patient as appropriate  Outcome: Progressing     Problem:  Communication Impairment  Goal: Ability to express needs and understand communication  Description: Assess patient's communication skills and ability to understand information.  Patient will demonstrate use of effective communication techniques, alternative methods of communication and understanding even if not able to speak.     - Encourage communication and provide alternate methods of communication as needed.  - Collaborate with case management/ for discharge needs.  - Include patient/family/caregiver in decisions related to communication.  Outcome: Progressing     Problem: Potential for Aspiration  Goal: Non-ventilated patient's risk of aspiration is minimized  Description: Assess and monitor vital signs, respiratory status, and labs (WBC).  Monitor for signs of aspiration (tachypnea, cough, rales, wheezing, cyanosis, fever).    - Assess and monitor patient's ability to swallow.  - Place patient up in chair to eat if possible.  - HOB up at 90 degrees to eat if unable to get patient up into chair.  - Supervise patient during oral intake.   - Instruct patient/ family to take small bites.  - Instruct patient/ family to take small single sips when taking liquids.  - Follow patient-specific strategies generated by speech pathologist.  Outcome: Progressing     Problem: Nutrition  Goal: Nutrition/Hydration status is improving  Description: Monitor and assess patient's nutrition/hydration status for malnutrition (ex- brittle hair, bruises, dry skin, pale skin and conjunctiva, muscle wasting, smooth red tongue, and disorientation). Collaborate with interdisciplinary team and initiate plan and interventions as ordered.  Monitor patient's weight and dietary intake as ordered or per policy. Utilize nutrition screening tool and intervene per policy. Determine patient's food preferences and provide high-protein, high-caloric foods as appropriate.   - Monitor nutrition parameters, recommending adjustments to  enteral nutrition orders at indicated.   - Assist patient with eating.  - Allow adequate time for meals.  - Encourage patient to take dietary supplement as ordered.  - Collaborate with interdisciplinary team.   - Include patient/family/caregiver in decisions related to nutrition.  Outcome: Progressing     Problem: PAIN - ADULT  Goal: Verbalizes/displays adequate comfort level or baseline comfort level  Description: Interventions:  - Encourage patient to monitor pain and request assistance  - Assess pain using appropriate pain scale  - Administer analgesics based on type and severity of pain and evaluate response  - Implement non-pharmacological measures as appropriate and evaluate response  - Consider cultural and social influences on pain and pain management  - Notify physician/advanced practitioner if interventions unsuccessful or patient reports new pain  Outcome: Progressing     Problem: INFECTION - ADULT  Goal: Absence or prevention of progression during hospitalization  Description: INTERVENTIONS:  - Assess and monitor for signs and symptoms of infection  - Monitor lab/diagnostic results  - Monitor all insertion sites, i.e. indwelling lines, tubes, and drains  - Monitor endotracheal if appropriate and nasal secretions for changes in amount and color  - [place] appropriate cooling/warming therapies per order  - Administer medications as ordered  - Instruct and encourage patient and family to use good hand hygiene technique  - Identify and instruct in appropriate isolation precautions for identified infection/condition  Outcome: Progressing  Goal: Absence of fever/infection during neutropenic period  Description: INTERVENTIONS:  - Monitor WBC    Outcome: Progressing     Problem: SAFETY ADULT  Goal: Patient will remain free of falls  Description: INTERVENTIONS:  - Educate patient/family on patient safety including physical limitations  - Instruct patient to call for assistance with activity   - Consult OT/PT to  assist with strengthening/mobility   - Keep Call bell within reach  - Keep bed low and locked with side rails adjusted as appropriate  - Keep care items and personal belongings within reach  - Initiate and maintain comfort rounds  - Make Fall Risk Sign visible to staff  - Offer Toileting every 2 Hours, in advance of need  - Initiate/Maintain bed/chair alarm  - Obtain necessary fall risk management equipment:   - Apply yellow socks and bracelet for high fall risk patients  - Consider moving patient to room near nurses station  Outcome: Progressing  Goal: Maintain or return to baseline ADL function  Description: INTERVENTIONS:  -  Assess patient's ability to carry out ADLs; assess patient's baseline for ADL function and identify physical deficits which impact ability to perform ADLs (bathing, care of mouth/teeth, toileting, grooming, dressing, etc.)  - Assess/evaluate cause of self-care deficits   - Assess range of motion  - Assess patient's mobility; develop plan if impaired  - Assess patient's need for assistive devices and provide as appropriate  - Encourage maximum independence but intervene and supervise when necessary  - Involve family in performance of ADLs  - Assess for home care needs following discharge   - Consider OT consult to assist with ADL evaluation and planning for discharge  - Provide patient education as appropriate  Outcome: Progressing  Goal: Maintains/Returns to pre admission functional level  Description: INTERVENTIONS:  - Perform AM-PAC 6 Click Basic Mobility/ Daily Activity assessment daily.  - Set and communicate daily mobility goal to care team and patient/family/caregiver.   - Collaborate with rehabilitation services on mobility goals if consulted  - Perform Range of Motion 3 times a day.  - Reposition patient every 2 hours.  - Dangle patient 3 times a day  - Stand patient 3 times a day  - Ambulate patient 3 times a day  - Out of bed to chair 3 times a day   - Out of bed for meals 3 times  a day  - Out of bed for toileting  - Record patient progress and toleration of activity level   Outcome: Progressing     Problem: DISCHARGE PLANNING  Goal: Discharge to home or other facility with appropriate resources  Description: INTERVENTIONS:  - Identify barriers to discharge w/patient and caregiver  - Arrange for needed discharge resources and transportation as appropriate  - Identify discharge learning needs (meds, wound care, etc.)  - Arrange for interpretive services to assist at discharge as needed  - Refer to Case Management Department for coordinating discharge planning if the patient needs post-hospital services based on physician/advanced practitioner order or complex needs related to functional status, cognitive ability, or social support system  Outcome: Progressing     Problem: Knowledge Deficit  Goal: Patient/family/caregiver demonstrates understanding of disease process, treatment plan, medications, and discharge instructions  Description: Complete learning assessment and assess knowledge base.  Interventions:  - Provide teaching at level of understanding  - Provide teaching via preferred learning methods  Outcome: Progressing     Problem: Potential for Falls  Goal: Patient will remain free of falls  Description: INTERVENTIONS:  - Educate patient/family on patient safety including physical limitations  - Instruct patient to call for assistance with activity   - Consult OT/PT to assist with strengthening/mobility   - Keep Call bell within reach  - Keep bed low and locked with side rails adjusted as appropriate  - Keep care items and personal belongings within reach  - Initiate and maintain comfort rounds  - Make Fall Risk Sign visible to staff  - Offer Toileting every 2 Hours, in advance of need  - Initiate/Maintain bed/chair alarm  - Obtain necessary fall risk management equipment:   - Apply yellow socks and bracelet for high fall risk patients  - Consider moving patient to room near nurses  station  Outcome: Progressing

## 2024-07-31 ENCOUNTER — APPOINTMENT (INPATIENT)
Dept: NON INVASIVE DIAGNOSTICS | Facility: HOSPITAL | Age: 69
DRG: 020 | End: 2024-07-31
Payer: MEDICARE

## 2024-07-31 PROBLEM — I25.10 CORONARY ARTERY DISEASE: Status: ACTIVE | Noted: 2024-07-31

## 2024-07-31 PROBLEM — R50.9 FEVER: Status: RESOLVED | Noted: 2024-07-18 | Resolved: 2024-07-31

## 2024-07-31 PROBLEM — J44.9 COPD (CHRONIC OBSTRUCTIVE PULMONARY DISEASE) (HCC): Status: ACTIVE | Noted: 2024-07-31

## 2024-07-31 LAB
ALBUMIN SERPL BCG-MCNC: 3.6 G/DL (ref 3.5–5)
ALP SERPL-CCNC: 144 U/L (ref 34–104)
ALT SERPL W P-5'-P-CCNC: 23 U/L (ref 7–52)
ANION GAP SERPL CALCULATED.3IONS-SCNC: 9 MMOL/L (ref 4–13)
AST SERPL W P-5'-P-CCNC: 26 U/L (ref 13–39)
BILIRUB SERPL-MCNC: 0.81 MG/DL (ref 0.2–1)
BUN SERPL-MCNC: 26 MG/DL (ref 5–25)
CALCIUM SERPL-MCNC: 9.3 MG/DL (ref 8.4–10.2)
CHLORIDE SERPL-SCNC: 103 MMOL/L (ref 96–108)
CHOLEST SERPL-MCNC: 70 MG/DL
CO2 SERPL-SCNC: 28 MMOL/L (ref 21–32)
CREAT SERPL-MCNC: 0.78 MG/DL (ref 0.6–1.3)
ERYTHROCYTE [DISTWIDTH] IN BLOOD BY AUTOMATED COUNT: 14.8 % (ref 11.6–15.1)
GFR SERPL CREATININE-BSD FRML MDRD: 92 ML/MIN/1.73SQ M
GLUCOSE SERPL-MCNC: 115 MG/DL (ref 65–140)
GLUCOSE SERPL-MCNC: 130 MG/DL (ref 65–140)
GLUCOSE SERPL-MCNC: 134 MG/DL (ref 65–140)
HCT VFR BLD AUTO: 34.4 % (ref 36.5–49.3)
HDLC SERPL-MCNC: 21 MG/DL
HGB BLD-MCNC: 11.1 G/DL (ref 12–17)
LDLC SERPL CALC-MCNC: 29 MG/DL (ref 0–100)
MAGNESIUM SERPL-MCNC: 2.5 MG/DL (ref 1.9–2.7)
MCH RBC QN AUTO: 31.6 PG (ref 26.8–34.3)
MCHC RBC AUTO-ENTMCNC: 32.3 G/DL (ref 31.4–37.4)
MCV RBC AUTO: 98 FL (ref 82–98)
PHOSPHATE SERPL-MCNC: 3.4 MG/DL (ref 2.3–4.1)
PLATELET # BLD AUTO: 718 THOUSANDS/UL (ref 149–390)
PMV BLD AUTO: 10 FL (ref 8.9–12.7)
POTASSIUM SERPL-SCNC: 4.5 MMOL/L (ref 3.5–5.3)
PROT SERPL-MCNC: 8.1 G/DL (ref 6.4–8.4)
RBC # BLD AUTO: 3.51 MILLION/UL (ref 3.88–5.62)
SODIUM SERPL-SCNC: 140 MMOL/L (ref 135–147)
TRIGL SERPL-MCNC: 98 MG/DL
WBC # BLD AUTO: 16.54 THOUSAND/UL (ref 4.31–10.16)

## 2024-07-31 PROCEDURE — 82948 REAGENT STRIP/BLOOD GLUCOSE: CPT

## 2024-07-31 PROCEDURE — 80061 LIPID PANEL: CPT

## 2024-07-31 PROCEDURE — 84100 ASSAY OF PHOSPHORUS: CPT

## 2024-07-31 PROCEDURE — NC001 PR NO CHARGE: Performed by: INTERNAL MEDICINE

## 2024-07-31 PROCEDURE — 99233 SBSQ HOSP IP/OBS HIGH 50: CPT

## 2024-07-31 PROCEDURE — 93886 INTRACRANIAL COMPLETE STUDY: CPT | Performed by: SURGERY

## 2024-07-31 PROCEDURE — 85027 COMPLETE CBC AUTOMATED: CPT

## 2024-07-31 PROCEDURE — 80053 COMPREHEN METABOLIC PANEL: CPT

## 2024-07-31 PROCEDURE — 83735 ASSAY OF MAGNESIUM: CPT

## 2024-07-31 PROCEDURE — 99233 SBSQ HOSP IP/OBS HIGH 50: CPT | Performed by: EMERGENCY MEDICINE

## 2024-07-31 PROCEDURE — NC001 PR NO CHARGE

## 2024-07-31 PROCEDURE — NC001 PR NO CHARGE: Performed by: EMERGENCY MEDICINE

## 2024-07-31 PROCEDURE — 93886 INTRACRANIAL COMPLETE STUDY: CPT

## 2024-07-31 RX ORDER — GABAPENTIN 400 MG/1
400 CAPSULE ORAL 2 TIMES DAILY
Status: DISCONTINUED | OUTPATIENT
Start: 2024-07-31 | End: 2024-08-02

## 2024-07-31 RX ORDER — GABAPENTIN 300 MG/1
600 CAPSULE ORAL
Status: DISCONTINUED | OUTPATIENT
Start: 2024-07-31 | End: 2024-08-02

## 2024-07-31 RX ORDER — LANOLIN ALCOHOL/MO/W.PET/CERES
100 CREAM (GRAM) TOPICAL DAILY
Status: DISCONTINUED | OUTPATIENT
Start: 2024-08-01 | End: 2024-08-10 | Stop reason: HOSPADM

## 2024-07-31 RX ADMIN — Medication 30 MG: at 20:08

## 2024-07-31 RX ADMIN — Medication 30 MG: at 02:12

## 2024-07-31 RX ADMIN — SODIUM CHLORIDE 500 ML: 0.9 INJECTION, SOLUTION INTRAVENOUS at 08:55

## 2024-07-31 RX ADMIN — Medication 30 MG: at 13:54

## 2024-07-31 RX ADMIN — LEVETIRACETAM 750 MG: 100 SOLUTION ORAL at 21:21

## 2024-07-31 RX ADMIN — CHLORHEXIDINE GLUCONATE 0.12% ORAL RINSE 15 ML: 1.2 LIQUID ORAL at 08:49

## 2024-07-31 RX ADMIN — CHLORHEXIDINE GLUCONATE 0.12% ORAL RINSE 15 ML: 1.2 LIQUID ORAL at 21:21

## 2024-07-31 RX ADMIN — ASPIRIN 81 MG CHEWABLE TABLET 81 MG: 81 TABLET CHEWABLE at 08:50

## 2024-07-31 RX ADMIN — OXYCODONE HYDROCHLORIDE AND ACETAMINOPHEN 1 TABLET: 5; 325 TABLET ORAL at 02:16

## 2024-07-31 RX ADMIN — MIDODRINE HYDROCHLORIDE 10 MG: 5 TABLET ORAL at 16:19

## 2024-07-31 RX ADMIN — THIAMINE HCL TAB 100 MG 100 MG: 100 TAB at 08:50

## 2024-07-31 RX ADMIN — MIDODRINE HYDROCHLORIDE 10 MG: 5 TABLET ORAL at 07:49

## 2024-07-31 RX ADMIN — FOLIC ACID TAB 400 MCG 400 MCG: 400 TAB at 08:50

## 2024-07-31 RX ADMIN — LIDOCAINE 5% 1 PATCH: 700 PATCH TOPICAL at 08:49

## 2024-07-31 RX ADMIN — HEPARIN SODIUM 5000 UNITS: 5000 INJECTION INTRAVENOUS; SUBCUTANEOUS at 21:21

## 2024-07-31 RX ADMIN — GABAPENTIN 600 MG: 300 CAPSULE ORAL at 21:21

## 2024-07-31 RX ADMIN — Medication 30 MG: at 18:00

## 2024-07-31 RX ADMIN — HEPARIN SODIUM 5000 UNITS: 5000 INJECTION INTRAVENOUS; SUBCUTANEOUS at 13:54

## 2024-07-31 RX ADMIN — MIDODRINE HYDROCHLORIDE 10 MG: 5 TABLET ORAL at 12:15

## 2024-07-31 RX ADMIN — Medication 30 MG: at 10:54

## 2024-07-31 RX ADMIN — HEPARIN SODIUM 5000 UNITS: 5000 INJECTION INTRAVENOUS; SUBCUTANEOUS at 05:58

## 2024-07-31 RX ADMIN — GABAPENTIN 600 MG: 300 CAPSULE ORAL at 08:50

## 2024-07-31 RX ADMIN — EZETIMIBE 10 MG: 10 TABLET ORAL at 08:50

## 2024-07-31 RX ADMIN — Medication 30 MG: at 04:06

## 2024-07-31 RX ADMIN — OXYCODONE HYDROCHLORIDE AND ACETAMINOPHEN 1 TABLET: 5; 325 TABLET ORAL at 21:21

## 2024-07-31 RX ADMIN — OXYCODONE HYDROCHLORIDE AND ACETAMINOPHEN 1 TABLET: 5; 325 TABLET ORAL at 17:00

## 2024-07-31 RX ADMIN — GABAPENTIN 400 MG: 400 CAPSULE ORAL at 17:55

## 2024-07-31 RX ADMIN — LEVETIRACETAM 750 MG: 100 SOLUTION ORAL at 08:55

## 2024-07-31 RX ADMIN — ATORVASTATIN CALCIUM 80 MG: 80 TABLET, FILM COATED ORAL at 08:50

## 2024-07-31 RX ADMIN — Medication 30 MG: at 12:21

## 2024-07-31 RX ADMIN — Medication 30 MG: at 05:58

## 2024-07-31 RX ADMIN — Medication 30 MG: at 08:43

## 2024-07-31 RX ADMIN — Medication 20 MG: at 08:55

## 2024-07-31 RX ADMIN — Medication 30 MG: at 23:56

## 2024-07-31 RX ADMIN — Medication 30 MG: at 16:16

## 2024-07-31 NOTE — PROGRESS NOTES
PHYSICAL MEDICINE AND REHABILITATION CONSULT NOTE  Federico Bowen 68 y.o. 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  Agree with trial of oral diet for a couple of days, recommend calorie count. Patient may require PEG placement for supplemental nutritional source is oral intake is not sufficient.   Consider reducing gabapentin to 400 BID and 600 at night to see if it improve patient alertness during the day.   The patient would most benefit from dedicated brain injury inpatient rehabilitation once medically stable.     Medical Co-morbidities Plan:  Acute on chronic back/neck pain   Headache with photophobia and phonophobia   Seizure   CAD  Hypertension   Hyperlipidemia   Vascular stent   COPD  Anemia   Leukocytosis  Severe protein calorie malnutrition   Bowel plan: LBM 7/30/2024 incontinent   Bladder plan: incontinent   DVT ppx: heparin SQ and SCD    Thank you for this consultation.  Do not hesitate to contact service with further questions.      CHRISTINA Cali  PM&R      I have spent a total time of 15 minutes on 07/31/24 in caring for this patient including Patient and family education, Counseling / Coordination of care, Documenting in the medical record, Reviewing / ordering tests, medicine, procedures  , Obtaining or reviewing history  , and Communicating with other healthcare professionals .      History of Present Illness:  Federico Bowen is a 68 y.o. male with a PMH of HTN, HLD, CAD, COPD who  presented to the [facility] on 7/12/2024 with headache and right leg pain. EMS reported two seizure en route and he was intubated in the ER. CT head showed a large diffuse SAH, IVH, hydrocephalus and CTA showed an aneurysm in the right posterior communicating artery. Possible tiny aneurysm in left anterior communicating artery region. EVD was placed and he was loaded with keppra and placed on vEEG. He underwent angio-coil embolization of the right PCOM aneurysm. UDS was positive for benzos. Repeat CTA/CTH on 7/16/2024 showed expected post-operative appearance following embolization of right posterior communicating artery aneurysm. Re-demonstrated SAH overlying the right greater than left convexities and concentrated within the right sylvian cistern and fissure. Subacute infarct involving the right anterior temporal region and medial occipital region. EEG was negative for seizures. He was extubated and EVD was discontinued on 7/26/2024. CT head on 7/27/2024 was stable. VBS was done and he was started on an oral diet with pureed and nectar thick liquids. PEG has been discussed and he may still require PEG placement for nutritional support. PM&R are consulted for rehabilitation recommendations.     Subjective/interval history:    The patient was seen in his room with his daughters at bedside. Per daughters has was awake and alert for a few hours yesterday. He was talking with them as well. He is fatigued and keeps his eyes closed most of the time. He will open them to command and can follow most simple commands. Daughter reports a hx of sciatic pain and he is on gabapentin for pain. He reports headache with photo and phonophobia. He has back pain as well. He denies any radiation for pain down his arms. He was recently started on a pureed diet and daughter wants to see how he does on it for a couple of days. She is concerned about risk of complication from the procedure given how frail  patient/thin patient is and the fact that he had swelling in the back of his throat that effected his swallowing as well after being intubated.     Review of Systems: 10 point ROS negative except for what is noted in HPI    Function:  Prior level of function and living situation: The patient lives in a two story home with his daughter with [address] the home. He was independent.     Current level of function:  Physical Therapy: Moderate assist x 2 for bed mobility, transfers and ambulation.   Occupational Therapy: Total assist for ADLs  Speech Therapy: dysphagia level 1 with nectar thick liquids      Physical Exam:  /59   Pulse 90   Temp (!) 97.2 °F (36.2 °C)   Resp 20   Wt 44.3 kg (97 lb 9.6 oz)   SpO2 100%        Intake/Output Summary (Last 24 hours) at 7/31/2024 1448  Last data filed at 7/31/2024 1401  Gross per 24 hour   Intake --   Output 950 ml   Net -950 ml       There is no height or weight on file to calculate BMI.      Physical Exam   Gen: No acute distress, frail keeps eyes closed most of exam.  HEENT:  Normocephalic/Atraumatic  Extremities: No edema  Pulmonary: Non-labored breathing.   GI: Soft, non-tender, non-distended.   MSK: ROM is WFL in all extremities, able to lift arms above shoulders and legs off the chair.  Integumentary: Skin is warm, dry. No rashes or ulcers.  Neuro: did not know month date or year, disoriented   Psych: Impaired.       Social History:    Social History     Socioeconomic History    Marital status: /Civil Union     Spouse name: Not on file    Number of children: Not on file    Years of education: Not on file    Highest education level: Not on file   Occupational History    Not on file   Tobacco Use    Smoking status: Not on file    Smokeless tobacco: Not on file   Substance and Sexual Activity    Alcohol use: Not on file    Drug use: Not on file    Sexual activity: Not on file   Other Topics Concern    Not on file   Social History Narrative    Not on file      Social Determinants of Health     Financial Resource Strain: Not on file   Food Insecurity: No Food Insecurity (7/13/2024)    Hunger Vital Sign     Worried About Running Out of Food in the Last Year: Never true     Ran Out of Food in the Last Year: Never true   Transportation Needs: No Transportation Needs (7/13/2024)    PRAPARE - Transportation     Lack of Transportation (Medical): No     Lack of Transportation (Non-Medical): No   Physical Activity: Not on file   Stress: Not on file   Social Connections: Not on file   Intimate Partner Violence: Not on file   Housing Stability: Low Risk  (7/13/2024)    Housing Stability Vital Sign     Unable to Pay for Housing in the Last Year: No     Number of Times Moved in the Last Year: 0     Homeless in the Last Year: No        Family History:    No family history on file.      Medications:     Current Facility-Administered Medications:     acetaminophen (TYLENOL) oral suspension 975 mg, 975 mg, Oral, Q8H PRN, Mike Christine DO    albuterol (PROVENTIL HFA,VENTOLIN HFA) inhaler 2 puff, 2 puff, Inhalation, Q4H PRN, Mike Christine DO    aspirin chewable tablet 81 mg, 81 mg, Oral, Daily, Mike Christine DO, 81 mg at 07/31/24 0850    atorvastatin (LIPITOR) tablet 80 mg, 80 mg, Oral, Daily, Mike Christine DO, 80 mg at 07/31/24 0850    bisacodyl (DULCOLAX) rectal suppository 10 mg, 10 mg, Rectal, Daily PRN, Mike Christine DO    butalbital-acetaminophen-caffeine (FIORICET,ESGIC) -40 mg per tablet 1 tablet, 1 tablet, Oral, Q4H PRN, Mike Christine DO, 1 tablet at 07/30/24 0600    chlorhexidine (PERIDEX) 0.12 % oral rinse 15 mL, 15 mL, Mouth/Throat, Q12H MATTY, Mike Christine DO, 15 mL at 07/31/24 0849    ezetimibe (ZETIA) tablet 10 mg, 10 mg, Oral, QAM, Mike Christine DO, 10 mg at 07/31/24 0850    folic acid (FOLVITE) tablet 400 mcg, 400 mcg, Oral, Daily, Mike Christine DO, 400 mcg at 07/31/24 0850    gabapentin (NEURONTIN) capsule 600 mg, 600 mg, Oral, TID, Mike Christine DO, 600 mg at  07/31/24 0850    heparin (porcine) subcutaneous injection 5,000 Units, 5,000 Units, Subcutaneous, Q8H MATTY, Mike Christine DO, 5,000 Units at 07/31/24 1354    insulin lispro (HumALOG/ADMELOG) 100 units/mL subcutaneous injection 1-5 Units, 1-5 Units, Subcutaneous, Q6H MATTY, 1 Units at 07/29/24 1758 **AND** Fingerstick Glucose (POCT), , , Q6H, Mike Christine DO    levETIRAcetam (KEPPRA) oral solution 750 mg, 750 mg, Oral, Q12H MATTY, Mike Christine DO, 750 mg at 07/31/24 0855    lidocaine (LIDODERM) 5 % patch 1 patch, 1 patch, Topical, Daily, Mike Christine DO, 1 patch at 07/31/24 0849    lidocaine (LIDODERM) 5 % patch 1 patch, 1 patch, Topical, Daily PRN, Mike Christine DO    midodrine (PROAMATINE) tablet 10 mg, 10 mg, Oral, TID AC, Mike Christine DO, 10 mg at 07/31/24 1215    niMODipine (NIMOTOP) oral solution 30 mg, 30 mg, Per NG Tube, Q2H, Mike Christine DO, 30 mg at 07/31/24 1354    [START ON 8/1/2024] omeprazole (PRILOSEC) suspension 2 mg/mL, 20 mg, Oral, Daily Before Breakfast, Julia Chappell DO    ondansetron (ZOFRAN) injection 4 mg, 4 mg, Intravenous, Q6H PRN, Mike Christine DO, 4 mg at 07/14/24 1733    oxyCODONE-acetaminophen (PERCOCET) 5-325 mg per tablet 1 tablet, 1 tablet, Oral, Q4H PRN, Mike Christine DO, 1 tablet at 07/31/24 0216    saliva substitute (MOUTH KOTE) mucosal solution 5 spray, 5 spray, Mouth/Throat, 4x Daily PRN, Mike Christine DO, 5 spray at 07/20/24 1808    [START ON 8/1/2024] thiamine tablet 100 mg, 100 mg, Oral, Daily, Julia Chappell DO    Past Medical History:     No past medical history on file.     Past Surgical History:     Past Surgical History:   Procedure Laterality Date    IR CEREBRAL ANGIOGRAPHY / INTERVENTION  7/13/2024         Allergies:     No Known Allergies        LABORATORY RESULTS:      Lab Results   Component Value Date    HGB 11.1 (L) 07/31/2024    HCT 34.4 (L) 07/31/2024    WBC 16.54 (H) 07/31/2024     Lab Results   Component Value Date    BUN 26 (H) 07/31/2024    K 4.5 07/31/2024      07/31/2024    CREATININE 0.78 07/31/2024     Lab Results   Component Value Date    PROTIME 15.2 (H) 07/14/2024    INR 1.21 (H) 07/14/2024        DIAGNOSTIC STUDIES: Reviewed  CTA head and neck w wo contrast    Result Date: 7/16/2024  Impression: CT Brain: Redemonstrated subarachnoid hemorrhage overlying the right greater than left convexities and concentrated within the right sylvian cistern and fissure. . Subacute infarct involving the right anterior temporal region and medial occipital region.. 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 Workstation performed: [id number]     IR cerebral angiography / intervention    Result Date: 7/15/2024  Impression: Successful balloon assisted coil embolization of a 4.5 mm right P-comm aneurysm. Workstation performed: [id number]     CT head wo contrast    Result Date: 7/14/2024  Impression: No significant interval change in extensive bilateral subarachnoid hemorrhage and intraventricular hemorrhage. Workstation performed: [id number]     CT head wo contrast    Result Date: 7/13/2024  Impression: Interval placement of right-sided shunt catheter. Stable ventricular size with slight interval increase in intraventricular hemorrhage layering in the occipital horns bilaterally. Diffuse subarachnoid hemorrhage again noted. Workstation performed: [id number]     XR chest portable ICU    Result Date: 7/12/2024  Impression: No acute cardiopulmonary disease. ET tube 6 cm above the landon. Workstation performed: [id number]     CTA stroke alert (head/neck)    Result Date: 7/12/2024  Impression: 0.3 cm aneurysm in expected origin of right posterior communicating artery. Possible tiny aneurysm in left anterior communicating artery region. These could be potential sources of diffuse subarachnoid hemorrhage. Recommend neurovascular surgery consultation for further  evaluation. Mild narrowing of bilateral MCA M1 and bilateral M2 segmental branch vessels, likely due to vasospasm. Patent stent in left proximal subclavian artery. Endotracheal tube in trachea. Additional chronic/incidental findings as detailed above. Findings were directly discussed with Emmanuel Lion at approximately 4:34 p.m. on 7/12/2024. Workstation performed: [id number]     CT stroke alert brain    Result Date: 7/12/2024  Impression: Acute diffuse thickened large-volume subarachnoid hemorrhage throughout the bilateral parafalcine regions, bilateral cerebral sulci (right worse than left), basilar cisterns, prepontine cistern, premedullary cistern, and visualized upper cervical spinal canal. Acute small volume intraventricular hemorrhage with mild hydrocephalus. No acute infarction. Findings were directly discussed with Emmanuel Lion at approximately 4:34 p.m. on 7/12/2024. Workstation performed: [id number]

## 2024-07-31 NOTE — ASSESSMENT & PLAN NOTE
Malnutrition Findings:   Adult Malnutrition type: Acute illness  Adult Degree of Malnutrition: Other severe protein calorie malnutrition  Malnutrition Characteristics: Fat loss, Muscle loss, Inadequate energy, Weight loss                  360 Statement: Acute severe pro, vinny malnutrition d/t condition as evidence by signficant weight loss, 7/13/24 54kg, 7/19/24 49kg, 5kg/9% wt. loss x <1 week, moderate to severe signs of muscle/ fat loss at temples, orbitals, calvicles, triceps, shoulders, treated with TF.    BMI Findings:           There is no height or weight on file to calculate BMI.     Patient found to be eating more s/p mirtazapine.     Plan:  Patient's nutritional status improved over the course of admission as well as his dysphagia  Encourage ongoing oral intake pending rehab course  Continue Remeron for appetite stimulation  Continue nutritional supplementation as needed

## 2024-07-31 NOTE — ASSESSMENT & PLAN NOTE
Documented h/o of COPD although no PFTs on file    Plan:  No concerns for exacerbation at this time  Continue prn albuterol

## 2024-07-31 NOTE — PROGRESS NOTES
I have personally seen and examined patient and reviewed all data with resident. Agree with note, assessment and plan. Critical care time 0 minutes. Please refer to attending comments below. Critical care time does not include procedures, family meeting or teaching.     Subarachnoid hemorrhage with intraventricular hemorrhage and hydrocephalus status post coil embolization of right PCOM 7/12/2024 initial HH 5 and MF 4  Seizure-continue Keppra  Cephalgia  Coronary artery disease  Hypertension  Hyperlipidemia-continue statin  COPD without exacerbation-albuterol as needed  Dysphagia  GERD-continue PPI  Protein calorie malnutrition severe    Exam:  Patient is frail appearing and cachectic, he maintains his right eye closed but will open it with prompting, extraocular movements intact, pupils are equal bilateral, face appears to be symmetric, patient moves all extremities to verbal request, generalized weakness is appreciated, no respiratory distress, skin is warm and dry    Goal systolic blood pressure:  Normotensive  Midodrine 10 mg p.o. 3 times daily    I/O-not accurate    Devices:  7/14/2024 external urinary catheter    Subarachnoid interventions post coiling:  Aspirin 81 mg daily p.o.  Nimodipine 30 mg p.o. every 2 hours.    AED regimen:  Keppra 750 mg p.o. every 12 hours    Glycemic control regimen: Insulin sliding scale      Vital signs reviewed from overnight without significant abnormalities.  No new imaging to review.  Laboratory data reviewed with WBC count of 16.54 this is slightly lower than yesterday of 16.76.  No other significant abnormalities on laboratory data appreciated.  No new micro to follow-up.  All glucoses less than 180.    Continue to monitor patient's neurologic exam every 4 hours and allowed to sleep at night.  Patient is bleed a 21 as of tomorrow.  Complete nimodipine.  Plan to discontinue TCD's today as patient is challenging to obtain TCD windows due to movement and agitation.  Patient has  not shown any signs of vasospasm.  Allow blood pressure to normalize.  Continue to monitor clinical volume status.    Encourage p.o. diet.    Maintain normotension.  If able attempt to wean midodrine once patient has completed his nimodipine.    Patient is clinically stable from subarachnoid and medical standpoint.  Plan to transition level of care from stepdown 1 to [facility].

## 2024-07-31 NOTE — PROGRESS NOTES
Pastoral Care Progress Note    2024  Patient: Federico Bowen : 1955  Admission Date & Time: 2024 1843  MRN: [id number] [facility]: [id number]      Pt sleeping in recliner, woke up long enough to say he wanted prayer. Dtr present.  provided emotional and spiritual support through: encouraging self care, facilitating story telling of the pt's illness, dtr's desire to see the pt get better, prayer affirming God's love and presence. Chaplains remain available.               Chaplaincy Interventions Utilized:   Empowerment: Encouraged self-care and Provided chaplaincy education    Exploration: Explored hope, Explored spiritual needs & resources, and Facilitated story telling    Relationship Building: Listened empathically    Ritual: Provided prayer    Chaplaincy Outcomes Achieved:  Catharsis, Expressed humor, Expressed ultimate hope, Identified priorities, and Verbally processed emotions    Spiritual Coping Strategies Utilized:   Spiritual practices and Spiritual comfort       24 1300   Clinical Encounter Type   Visited With Patient and family together  (Dtr present)   Rastafarian Encounters   Rastafarian Needs Prayer

## 2024-07-31 NOTE — ASSESSMENT & PLAN NOTE
Malnutrition Findings:   Adult Malnutrition type: Acute illness  Adult Degree of Malnutrition: Other severe protein calorie malnutrition  Malnutrition Characteristics: Fat loss, Muscle loss, Inadequate energy, Weight loss                  360 Statement: Acute severe pro, vinny malnutrition d/t condition as evidence by signficant weight loss, 7/13/24 54kg, 7/19/24 49kg, 5kg/9% wt. loss x <1 week, moderate to severe signs of muscle/ fat loss at temples, orbitals, calvicles, triceps, shoulders, treated with TF.    BMI Findings:          There is no height or weight on file to calculate BMI.

## 2024-07-31 NOTE — ASSESSMENT & PLAN NOTE
Hospital course complicated by aspiration pneumonia s/p extubation in N-ICU. Completed abx therapy. Otherwise fevers of central origin given SAH and IVH on presentation. Continuing to monitor.

## 2024-07-31 NOTE — PLAN OF CARE
Problem: Prexisting or High Potential for Compromised Skin Integrity  Goal: Skin integrity is maintained or improved  Description: INTERVENTIONS:  - Identify patients at risk for skin breakdown  - Assess and monitor skin integrity  - Assess and monitor nutrition and hydration status  - Monitor labs   - Assess for incontinence   - Turn and reposition patient  - Assist with mobility/ambulation  - Relieve pressure over bony prominences  - Avoid friction and shearing  - Provide appropriate hygiene as needed including keeping skin clean and dry  - Evaluate need for skin moisturizer/barrier cream  - Collaborate with interdisciplinary team   - Patient/family teaching  - Consider wound care consult   Outcome: Progressing     Problem: Neurological Deficit  Goal: Neurological status is stable or improving  Description: Interventions:  - Monitor and assess patient's level of consciousness, motor function, sensory function, and level of assistance needed for ADLs.   - Monitor and report changes from baseline. Collaborate with interdisciplinary team to initiate plan and implement interventions as ordered.   - Provide and maintain a safe environment.  - Consider seizure precautions.  - Consider fall precautions.  - Consider aspiration precautions.  - Consider bleeding precautions.  Outcome: Progressing     Problem: Activity Intolerance/Impaired Mobility  Goal: Mobility/activity is maintained at optimum level for patient  Description: Interventions:  - Assess and monitor patient  barriers to mobility and need for assistive/adaptive devices.  - Assess patient's emotional response to limitations.  - Collaborate with interdisciplinary team and initiate plans and interventions as ordered.  - Encourage independent activity per ability.  - Maintain proper body alignment.  - Perform active/passive rom as tolerated/ordered.  - Plan activities to conserve energy.  - Turn patient as appropriate  Outcome: Progressing     Problem:  Communication Impairment  Goal: Ability to express needs and understand communication  Description: Assess patient's communication skills and ability to understand information.  Patient will demonstrate use of effective communication techniques, alternative methods of communication and understanding even if not able to speak.     - Encourage communication and provide alternate methods of communication as needed.  - Collaborate with case management/ for discharge needs.  - Include patient/family/caregiver in decisions related to communication.  Outcome: Progressing     Problem: Potential for Aspiration  Goal: Non-ventilated patient's risk of aspiration is minimized  Description: Assess and monitor vital signs, respiratory status, and labs (WBC).  Monitor for signs of aspiration (tachypnea, cough, rales, wheezing, cyanosis, fever).    - Assess and monitor patient's ability to swallow.  - Place patient up in chair to eat if possible.  - HOB up at 90 degrees to eat if unable to get patient up into chair.  - Supervise patient during oral intake.   - Instruct patient/ family to take small bites.  - Instruct patient/ family to take small single sips when taking liquids.  - Follow patient-specific strategies generated by speech pathologist.  Outcome: Progressing     Problem: Nutrition  Goal: Nutrition/Hydration status is improving  Description: Monitor and assess patient's nutrition/hydration status for malnutrition (ex- brittle hair, bruises, dry skin, pale skin and conjunctiva, muscle wasting, smooth red tongue, and disorientation). Collaborate with interdisciplinary team and initiate plan and interventions as ordered.  Monitor patient's weight and dietary intake as ordered or per policy. Utilize nutrition screening tool and intervene per policy. Determine patient's food preferences and provide high-protein, high-caloric foods as appropriate.   - Monitor nutrition parameters, recommending adjustments to  enteral nutrition orders at indicated.   - Assist patient with eating.  - Allow adequate time for meals.  - Encourage patient to take dietary supplement as ordered.  - Collaborate with interdisciplinary team.   - Include patient/family/caregiver in decisions related to nutrition.  Outcome: Progressing     Problem: PAIN - ADULT  Goal: Verbalizes/displays adequate comfort level or baseline comfort level  Description: Interventions:  - Encourage patient to monitor pain and request assistance  - Assess pain using appropriate pain scale  - Administer analgesics based on type and severity of pain and evaluate response  - Implement non-pharmacological measures as appropriate and evaluate response  - Consider cultural and social influences on pain and pain management  - Notify physician/advanced practitioner if interventions unsuccessful or patient reports new pain  Outcome: Progressing     Problem: INFECTION - ADULT  Goal: Absence or prevention of progression during hospitalization  Description: INTERVENTIONS:  - Assess and monitor for signs and symptoms of infection  - Monitor lab/diagnostic results  - Monitor all insertion sites, i.e. indwelling lines, tubes, and drains  - Monitor endotracheal if appropriate and nasal secretions for changes in amount and color  - [place] appropriate cooling/warming therapies per order  - Administer medications as ordered  - Instruct and encourage patient and family to use good hand hygiene technique  - Identify and instruct in appropriate isolation precautions for identified infection/condition  Outcome: Progressing  Goal: Absence of fever/infection during neutropenic period  Description: INTERVENTIONS:  - Monitor WBC    Outcome: Progressing     Problem: SAFETY ADULT  Goal: Patient will remain free of falls  Description: INTERVENTIONS:  - Educate patient/family on patient safety including physical limitations  - Instruct patient to call for assistance with activity   - Consult OT/PT to  assist with strengthening/mobility   - Keep Call bell within reach  - Keep bed low and locked with side rails adjusted as appropriate  - Keep care items and personal belongings within reach  - Initiate and maintain comfort rounds  - Make Fall Risk Sign visible to staff  - Offer Toileting every 2 Hours, in advance of need  - Initiate/Maintain bed alarm  - Obtain necessary fall risk management equipment: non skid footwear  - Apply yellow socks and bracelet for high fall risk patients  - Consider moving patient to room near nurses station  Outcome: Progressing  Goal: Maintain or return to baseline ADL function  Description: INTERVENTIONS:  -  Assess patient's ability to carry out ADLs; assess patient's baseline for ADL function and identify physical deficits which impact ability to perform ADLs (bathing, care of mouth/teeth, toileting, grooming, dressing, etc.)  - Assess/evaluate cause of self-care deficits   - Assess range of motion  - Assess patient's mobility; develop plan if impaired  - Assess patient's need for assistive devices and provide as appropriate  - Encourage maximum independence but intervene and supervise when necessary  - Involve family in performance of ADLs  - Assess for home care needs following discharge   - Consider OT consult to assist with ADL evaluation and planning for discharge  - Provide patient education as appropriate  Outcome: Progressing  Goal: Maintains/Returns to pre admission functional level  Description: INTERVENTIONS:  - Perform AM-PAC 6 Click Basic Mobility/ Daily Activity assessment daily.  - Set and communicate daily mobility goal to care team and patient/family/caregiver.   - Collaborate with rehabilitation services on mobility goals if consulted  - Perform Range of Motion 3 times a day.  - Reposition patient every 2 hours.  - Dangle patient 3 times a day  - Stand patient 3 times a day  - Out of bed to chair 1 times a day   - Out of bed for meals 1 times a day  - Out of bed for  toileting  - Record patient progress and toleration of activity level   Outcome: Progressing     Problem: DISCHARGE PLANNING  Goal: Discharge to home or other facility with appropriate resources  Description: INTERVENTIONS:  - Identify barriers to discharge w/patient and caregiver  - Arrange for needed discharge resources and transportation as appropriate  - Identify discharge learning needs (meds, wound care, etc.)  - Arrange for interpretive services to assist at discharge as needed  - Refer to Case Management Department for coordinating discharge planning if the patient needs post-hospital services based on physician/advanced practitioner order or complex needs related to functional status, cognitive ability, or social support system  Outcome: Progressing     Problem: Knowledge Deficit  Goal: Patient/family/caregiver demonstrates understanding of disease process, treatment plan, medications, and discharge instructions  Description: Complete learning assessment and assess knowledge base.  Interventions:  - Provide teaching at level of understanding  - Provide teaching via preferred learning methods  Outcome: Progressing     Problem: Nutrition/Hydration-ADULT  Goal: Nutrient/Hydration intake appropriate for improving, restoring or maintaining nutritional needs  Description: Monitor and assess patient's nutrition/hydration status for malnutrition. Collaborate with interdisciplinary team and initiate plan and interventions as ordered.  Monitor patient's weight and dietary intake as ordered or per policy. Utilize nutrition screening tool and intervene as necessary. Determine patient's food preferences and provide high-protein, high-caloric foods as appropriate.     INTERVENTIONS:  - Monitor oral intake, urinary output, labs, and treatment plans  - Assess nutrition and hydration status and recommend course of action  - Evaluate amount of meals eaten  - Assist patient with eating if necessary   - Allow adequate time for  meals  - Recommend/ encourage appropriate diets, oral nutritional supplements, and vitamin/mineral supplements  - Order, calculate, and assess calorie counts as needed  - Recommend, monitor, and adjust tube feeding based on assessed needs  - Assess need for intravenous fluids  - Provide nutrition/hydration education as appropriate  - Include patient/family/caregiver in decisions related to nutrition  Outcome: Progressing     Problem: NEUROSENSORY - ADULT  Goal: Achieves stable or improved neurological status  Description: INTERVENTIONS  - Monitor and report changes in neurological status  - Monitor vital signs such as temperature, blood pressure, glucose, and any other labs ordered   - Initiate measures to prevent increased intracranial pressure  - Monitor for seizure activity and implement precautions if appropriate      Outcome: Progressing  Goal: Remains free of injury related to seizures activity  Description: INTERVENTIONS  - Maintain airway, patient safety  and administer oxygen as ordered  - Monitor patient for seizure activity, document and report duration and description of seizure to physician/advanced practitioner  - If seizure occurs,  ensure patient safety during seizure  - Reorient patient post seizure  - Seizure pads on all 4 side rails  - Instruct patient/family to notify RN of any seizure activity including if an aura is experienced  - Instruct patient/family to call for assistance with activity based on nursing assessment  - Administer anti-seizure medications if ordered    Outcome: Progressing  Goal: Achieves maximal functionality and self care  Description: INTERVENTIONS  - Monitor swallowing and airway patency with patient fatigue and changes in neurological status  - Encourage and assist patient to increase activity and self care.   - Encourage visually impaired, hearing impaired and aphasic patients to use assistive/communication devices  Outcome: Progressing     Problem: CARDIOVASCULAR -  ADULT  Goal: Maintains optimal cardiac output and hemodynamic stability  Description: INTERVENTIONS:  - Monitor I/O, vital signs and rhythm  - Monitor for S/S and trends of decreased cardiac output  - Administer and titrate ordered vasoactive medications to optimize hemodynamic stability  - Assess quality of pulses, skin color and temperature  - Assess for signs of decreased coronary artery perfusion  - Instruct patient to report change in severity of symptoms  Outcome: Progressing  Goal: Absence of cardiac dysrhythmias or at baseline rhythm  Description: INTERVENTIONS:  - Continuous cardiac monitoring, vital signs, obtain 12 lead EKG if ordered  - Administer antiarrhythmic and heart rate control medications as ordered  - Monitor electrolytes and administer replacement therapy as ordered  Outcome: Progressing     Problem: RESPIRATORY - ADULT  Goal: Achieves optimal ventilation and oxygenation  Description: INTERVENTIONS:  - Assess for changes in respiratory status  - Assess for changes in mentation and behavior  - Position to facilitate oxygenation and minimize respiratory effort  - Oxygen administered by appropriate delivery if ordered  - Initiate smoking cessation education as indicated  - Encourage broncho-pulmonary hygiene including cough, deep breathe, Incentive Spirometry  - Assess the need for suctioning and aspirate as needed  - Assess and instruct to report SOB or any respiratory difficulty  - Respiratory Therapy support as indicated  Outcome: Progressing     Problem: GASTROINTESTINAL - ADULT  Goal: Maintains or returns to baseline bowel function  Description: INTERVENTIONS:  - Assess bowel function  - Encourage oral fluids to ensure adequate hydration  - Administer IV fluids if ordered to ensure adequate hydration  - Administer ordered medications as needed  - Encourage mobilization and activity  - Consider nutritional services referral to assist patient with adequate nutrition and appropriate food  choices  Outcome: Progressing  Goal: Maintains adequate nutritional intake  Description: INTERVENTIONS:  - Monitor percentage of each meal consumed  - Identify factors contributing to decreased intake, treat as appropriate  - Assist with meals as needed  - Monitor I&O, weight, and lab values if indicated  - Obtain nutrition services referral as needed  Outcome: Progressing     Problem: GENITOURINARY - ADULT  Goal: Maintains or returns to baseline urinary function  Description: INTERVENTIONS:  - Assess urinary function  - Encourage oral fluids to ensure adequate hydration if ordered  - Administer IV fluids as ordered to ensure adequate hydration  - Administer ordered medications as needed  - Offer frequent toileting  - Follow urinary retention protocol if ordered  Outcome: Progressing  Goal: Absence of urinary retention  Description: INTERVENTIONS:  - Assess patient’s ability to void and empty bladder  - Monitor I/O  - Bladder scan as needed  - Discuss with physician/AP medications to alleviate retention as needed  - Discuss catheterization for long term situations as appropriate  Outcome: Progressing     Problem: METABOLIC, FLUID AND ELECTROLYTES - ADULT  Goal: Electrolytes maintained within normal limits  Description: INTERVENTIONS:  - Monitor labs and assess patient for signs and symptoms of electrolyte imbalances  - Administer electrolyte replacement as ordered  - Monitor response to electrolyte replacements, including repeat lab results as appropriate  - Instruct patient on fluid and nutrition as appropriate  Outcome: Progressing  Goal: Fluid balance maintained  Description: INTERVENTIONS:  - Monitor labs   - Monitor I/O and WT  - Instruct patient on fluid and nutrition as appropriate  - Assess for signs & symptoms of volume excess or deficit  Outcome: Progressing  Goal: Glucose maintained within target range  Description: INTERVENTIONS:  - Monitor Blood Glucose as ordered  - Assess for signs and symptoms of  hyperglycemia and hypoglycemia  - Administer ordered medications to maintain glucose within target range  - Assess nutritional intake and initiate nutrition service referral as needed  Outcome: Progressing     Problem: HEMATOLOGIC - ADULT  Goal: Maintains hematologic stability  Description: INTERVENTIONS  - Assess for signs and symptoms of bleeding or hemorrhage  - Monitor labs  - Administer supportive blood products/factors as ordered and appropriate  Outcome: Progressing     Problem: Potential for Falls  Goal: Patient will remain free of falls  Description: INTERVENTIONS:  - Educate patient/family on patient safety including physical limitations  - Instruct patient to call for assistance with activity   - Consult OT/PT to assist with strengthening/mobility   - Keep Call bell within reach  - Keep bed low and locked with side rails adjusted as appropriate  - Keep care items and personal belongings within reach  - Initiate and maintain comfort rounds  - Make Fall Risk Sign visible to staff  - Offer Toileting every 2 Hours, in advance of need  - Initiate/Maintain bed alarm  - Obtain necessary fall risk management equipment: non skid footwear  - Apply yellow socks and bracelet for high fall risk patients  - Consider moving patient to room near nurses station  Outcome: Progressing

## 2024-07-31 NOTE — ASSESSMENT & PLAN NOTE
History of hypertension. Home Anti-HTN meds include metoprolol and entresto, both of which have not been restarted    Plan:  -Plan to resume Entresto/metoprolol on d/c; however, recommend reestablishing with Cardiology on discharge

## 2024-07-31 NOTE — ASSESSMENT & PLAN NOTE
F/u final results of lipid panel, none documented this admission.     Continue home zetia 10mg  Continue lipitor 80mg

## 2024-07-31 NOTE — RESTORATIVE TECHNICIAN NOTE
Restorative Technician Note      Patient Name: Feedrico Bowen     Note Type: Mobility  Patient Position Upon Consult: Supine  Activity Performed: Ambulated; Dangled; Stood  Assistive Device: Other (Comment) (A x2)  Education Provided: Yes  Patient Position at End of Consult: Bedside chair; All needs within reach; Bed/Chair alarm activated      Anya SALEH, Restorative Technician,

## 2024-07-31 NOTE — PROGRESS NOTES
Critical Care Interval Transfer Note:    Brief Hospital Summary:   69yo male w/ a PMH CAD, HTN, HLD, tobacco use, COPD, history of cocaine use coming in for R sided MSK pain for which EMS was called for on 7/12. Patient was complaining of headache and R leg pain. En route to [facility] ED, patient had 2 witnessed seizures. He was unresponsive when arrived to the ED and was deemed a stroke alert. Patient was intubated and given DDAVP for home ASA usage and also given mannitol. He was started on cardene gtt and given Keppra load. CTH showed a large SAH with IVH s/p transfer to [facility] for coil embolization of R PCOM aneurysm and EVD on 7/13. Extubated 7/13. EVD removed 7/26. Course complicated by aspiration pneumonia for which he has already completed abx therapy. Followed on SAH pathway up here in Neuro ICU and is medically stable for med-surg level of care. TCDs have been discontinued, and he will need outpatient neurosurgery f/u after discharge. CTH has been ordered for tomorrow morning. SBP goal normotensive. Otherwise notably on ASA 81mg, heparin for VTE ppx, and Keppra 750mg BID.     Barriers to discharge:   Nutrition -- adequate oral intake sufficient to meet caloric needs  PT/OT/speech recommendations  PMR recommendations  Repeat CTH 8/1 stability     Consults: IP CONSULT TO NEUROSURGERY  IP CONSULT TO NEUROLOGY  IP CONSULT TO PHYSICAL MEDICINE REHAB  IP CONSULT TO CASE MANAGEMENT  IP CONSULT TO NUTRITION SERVICES  IP CONSULT TO NUTRITION SERVICES  IP CONSULT TO PALLIATIVE CARE  IP CONSULT TO PHYSICAL MEDICINE REHAB    Recommended to review admission imaging for incidental findings and document in discharge navigator: Chart reviewed, no known incidental findings noted at this time.       COPD (chronic obstructive pulmonary disease) (HCC)  Assessment & Plan  Documented h/o of COPD although no PFTs on file    Plan:  No concerns for exacerbation at this time  Continue prn albuterol    Coronary artery  disease  Assessment & Plan  Patient with little documentation in medical record but appears to have non-obstructive CAD with history of unstable angina component and previously was on nitro prn and Ranexa. Home medications noted metoprolol and Entresto however last echo found to be in 2015 with EF 77% and most recent echo of 55% on 7/13/24 and thus Entresto was not restarted prior to downgrade from N-ICU.    Plan:  Continue ASA 81mg qd  Recommend for patient to re-establish care with Cardiology after discharge  Home metoprolol held given concurrent nimodipine and stable HR. Unclear if patient was taking prior to admission  Similarly as above, Brilinta noted as home medication on admission. Currently not continued in setting of SAH this admission. Unable to find documented history of VIRA, outpatient f/u with cardiology.     Severe protein-calorie malnutrition (HCC)  Assessment & Plan  Malnutrition Findings:   Adult Malnutrition type: Acute illness  Adult Degree of Malnutrition: Other severe protein calorie malnutrition  Malnutrition Characteristics: Fat loss, Muscle loss, Inadequate energy, Weight loss                  360 Statement: Acute severe pro, vinny malnutrition d/t condition as evidence by signficant weight loss, 7/13/24 54kg, 7/19/24 49kg, 5kg/9% wt. loss x <1 week, moderate to severe signs of muscle/ fat loss at temples, orbitals, calvicles, triceps, shoulders, treated with TF.    BMI Findings:           There is no height or weight on file to calculate BMI.     Plan:  Nutrition consult placed earlier 7/18 while patient required tube feeds  Calorie count in place  Trialing oral diet for now and then may have to consider PEG tube if oral intake is not sufficient for adequate caloric intake  Can re-engage Nutrition and consider adding Ensure supplements, magic cup, etc as diet restrictions tolerate    Moderate protein-calorie malnutrition (HCC)  Assessment & Plan  Malnutrition Findings:   Adult Malnutrition  type: Acute illness  Adult Degree of Malnutrition: Other severe protein calorie malnutrition  Malnutrition Characteristics: Fat loss, Muscle loss, Inadequate energy, Weight loss                  360 Statement: Acute severe pro, vinny malnutrition d/t condition as evidence by signficant weight loss, 7/13/24 54kg, 7/19/24 49kg, 5kg/9% wt. loss x <1 week, moderate to severe signs of muscle/ fat loss at temples, orbitals, calvicles, triceps, shoulders, treated with TF.    BMI Findings:          There is no height or weight on file to calculate BMI.       Seizures (HCC)  Assessment & Plan  New-onset this admission. En route to [facility] ED, patient had 2 witnessed seizures while being transported as stroke alert via EMS. Loaded on Keppra upon arrival to [facility]    Continue Keppra 750mg q12H    Hyperlipidemia  Assessment & Plan  F/u final results of lipid panel, none documented this admission.     Continue home zetia 10mg  Continue lipitor 80mg     HTN (hypertension)  Assessment & Plan  History of hypertension. Home Anti-HTN meds include metoprolol and entresto, both of which have not been restarted    Plan as reiterated from SAH A/P:  Midodrine 10mg PO TID. Ok to begin weaning midodrine once patient has completed nimodipine  SBP goal normotensive  Continue Nimodipine 30mg q2H through 8/2 12am    * Subarachnoid hemorrhage (HCC)  Assessment & Plan  Patient was complaining of headache and R leg pain. En route to [facility] ED, patient had 2 witnessed seizures. He was unresponsive when arrived to the ED and was a stroke alert. Patient was intubated and given DDAVP for home ASA usage and also given mannitol. He was started on cardene gtt and Keppra load. EMS called and pt brought to [facility] in setting of R MSK pain. CTH showed a large SAH with IVH s/p transfer to [facility] for coil embolization of R PCOM aneurysm and EVD on 7/13. Extubated 7/13. EVD removed 7/26. Course complicated by aspiration pneumonia for which he has already  completed abx therapy. Followed on SAH pathway in Neuro ICU since transfer to [facility].     Plan:  Deemed medically stable by NCC team for downgrade to [facility]  Repeat CTH tomorrow, 8/1, has been ordered  Midodrine 10mg PO TID. Ok to begin weaning midodrine once patient has completed nimodipine  SBP goal normotensive  Continue ASA 81mg qd  Continue Nimodipine 30mg q2H through 8/2 12am  AED ppx: Keppra 750mg q12H  PT/OT/speech evjudith, CM consult  PMR consult placed and are following  Continue q4H neuro checks, ok to let sleep at night  Will need neurosurgery referral and outpatient f/u after discharge  Pain regimen for headache/MSK sequelae of SAH:  Gabapentin -[id number]  PRN Fioricet, percocet, tylenol, lidocaine patch    Fever-resolved as of 7/31/2024  Assessment & Plan  Hospital course complicated by aspiration pneumonia s/p extubation in N-ICU. Completed abx therapy. Otherwise fevers of central origin given SAH and IVH on presentation. Continuing to monitor.              Patient seen and evaluated by Critical Care today and deemed to be appropriate for transfer to Med Surg. Spoke to Karissa Powers DO from [facility] to accept transfer. Critical care can be contacted via [employer] with any questions or concerns.

## 2024-07-31 NOTE — PROGRESS NOTES
INTERNAL MEDICINE RESIDENCY TRANSFER ACCEPTANCE NOTE     Name: Federico Bowen   Age & Sex: 68 y.o. male   MRN: [id number]  Unit/Bed#: [facility] 715-01   Encounter: [id number]  Hospital Stay Days: 19    Accepting team: [facility]  Code Status: Level 1 - Full Code  Disposition: Patient requires Med/Surg    ASSESSMENT/PLAN     Principal Problem:    Subarachnoid hemorrhage (HCC)  Active Problems:    HTN (hypertension)    Hyperlipidemia    Seizures (HCC)    Severe protein-calorie malnutrition (HCC)    Goals of care, counseling/discussion    Palliative care encounter    Coronary artery disease    COPD (chronic obstructive pulmonary disease) (HCC)      * Subarachnoid hemorrhage (HCC)  Assessment & Plan  Patient was complaining of headache and R leg pain. En route to [facility] ED, patient had 2 witnessed seizures. He was unresponsive when arrived to the ED and was a stroke alert. Patient was intubated and given DDAVP for home ASA usage and also given mannitol. He was started on cardene gtt and Keppra load. EMS called and pt brought to [place] in setting of R MSK pain. CTH showed a large SAH with IVH s/p transfer to [facility] for coil embolization of R PCOM aneurysm and EVD on 7/13. Extubated 7/13. EVD removed 7/26. Course complicated by aspiration pneumonia for which he has already completed abx therapy. Followed on SAH pathway in Neuro ICU since transfer to [facility].     Plan:  Deemed medically stable by NCC team for downgrade to [facility]  Repeat CTH tomorrow, 8/1, has been ordered  Midodrine 10mg PO TID. Ok to begin weaning midodrine once patient has completed nimodipine  SBP goal normotensive  Continue ASA 81mg qd  Continue Nimodipine 30mg q2H through 8/2 12am  AED ppx: Keppra 750mg q12H  PT/OT/speech evals, CM consult  PMR consult placed and are following  Continue q4H neuro checks, ok to let sleep at night  Will need neurosurgery referral and outpatient f/u after discharge  Pain regimen for headache/MSK sequelae of  SAH:  Gabapentin -[id number]  PRN Fioricet, percocet, tylenol, lidocaine patch    COPD (chronic obstructive pulmonary disease) ([facility])  Assessment & Plan  Documented h/o of COPD although no PFTs on file    Plan:  No concerns for exacerbation at this time  Continue prn albuterol    Coronary artery disease  Assessment & Plan  Patient with little documentation in medical record but appears to have non-obstructive CAD with history of unstable angina component and previously was on nitro prn and Ranexa. Home medications noted metoprolol and Entresto however last echo found to be in 2015 with EF 77% and most recent echo of 55% on 7/13/24 and thus Entresto was not restarted prior to downgrade from N-ICU.    Plan:  Continue ASA 81mg qd  Recommend for patient to re-establish care with Cardiology after discharge  Home metoprolol held given concurrent nimodipine and stable HR. Unclear if patient was taking prior to admission  Similarly as above, Brilinta noted as home medication on admission. Currently not continued in setting of SAH this admission. Unable to find documented history of VIRA, outpatient f/u with cardiology.     Severe protein-calorie malnutrition (HCC)  Assessment & Plan  Malnutrition Findings:   Adult Malnutrition type: Acute illness  Adult Degree of Malnutrition: Other severe protein calorie malnutrition  Malnutrition Characteristics: Fat loss, Muscle loss, Inadequate energy, Weight loss                  360 Statement: Acute severe pro, vinny malnutrition d/t condition as evidence by signficant weight loss, 7/13/24 54kg, 7/19/24 49kg, 5kg/9% wt. loss x <1 week, moderate to severe signs of muscle/ fat loss at temples, orbitals, calvicles, triceps, shoulders, treated with TF.    BMI Findings:           There is no height or weight on file to calculate BMI.     Plan:  Nutrition consult placed earlier 7/18 while patient required tube feeds  Calorie count in place  Trialing oral diet for now and then may have to  consider PEG tube if oral intake is not sufficient for adequate caloric intake  Can re-engage Nutrition and consider adding Ensure supplements, magic cup, etc as diet restrictions tolerate    Moderate protein-calorie malnutrition (HCC)  Assessment & Plan  Malnutrition Findings:   Adult Malnutrition type: Acute illness  Adult Degree of Malnutrition: Other severe protein calorie malnutrition  Malnutrition Characteristics: Fat loss, Muscle loss, Inadequate energy, Weight loss                  360 Statement: Acute severe pro, vinny malnutrition d/t condition as evidence by signficant weight loss, 7/13/24 54kg, 7/19/24 49kg, 5kg/9% wt. loss x <1 week, moderate to severe signs of muscle/ fat loss at temples, orbitals, calvicles, triceps, shoulders, treated with TF.    BMI Findings:          There is no height or weight on file to calculate BMI.       Seizures (HCC)  Assessment & Plan  New-onset this admission. En route to [facility] ED, patient had 2 witnessed seizures while being transported as stroke alert via EMS. Loaded on Keppra upon arrival to [facility]    Continue Keppra 750mg q12H    Hyperlipidemia  Assessment & Plan  F/u final results of lipid panel, none documented this admission.     Continue home zetia 10mg  Continue lipitor 80mg     HTN (hypertension)  Assessment & Plan  History of hypertension. Home Anti-HTN meds include metoprolol and entresto, both of which have not been restarted    Plan as reiterated from [facility] A/P:  Midodrine 10mg PO TID. Ok to begin weaning midodrine once patient has completed nimodipine  SBP goal normotensive  Continue Nimodipine 30mg q2H through 8/2 12am    Fever-resolved as of 7/31/2024  Assessment & Plan  Hospital course complicated by aspiration pneumonia s/p extubation in N-ICU. Completed abx therapy. Otherwise fevers of central origin given SAH and IVH on presentation. Continuing to monitor.         VTE Pharmacologic Prophylaxis: Heparin  VTE Mechanical Prophylaxis: sequential  "compression device    HOSPITAL COURSE     Hospital course per Mike Christine: \"69yo male w/ a PMH CAD, HTN, HLD, tobacco use, COPD, history of cocaine use coming in for R sided MSK pain for which EMS was called for on 7/12. Patient was complaining of headache and R leg pain. En route to [facility] ED, patient had 2 witnessed seizures. He was unresponsive when arrived to the ED and was deemed a stroke alert. Patient was intubated and given DDAVP for home ASA usage and also given mannitol. He was started on cardene gtt and given Keppra load. CTH showed a large SAH with IVH s/p transfer to [facility] for coil embolization of R PCOM aneurysm and EVD on 7/13. Extubated 7/13. EVD removed 7/26. Course complicated by aspiration pneumonia for which he has already completed abx therapy. Followed on SAH pathway up here in Neuro ICU and is medically stable for [facility]-surg level of care. TCDs have been discontinued, and he will need outpatient neurosurgery f/u after discharge. CTH has been ordered for tomorrow morning. SBP goal normotensive. Otherwise notably on ASA 81mg, heparin for VTE ppx, and Keppra 750mg BID.\"     SUBJECTIVE     Pt seen and examined at bedside. Pt feeling well. Only complaints are headache and back pain. Denies fevers, chills, nausea, vomiting, LH, dizziness, chest pain, SOB, or palpitations.     OBJECTIVE     Vitals:    07/31/24 1100 07/31/24 1221 07/31/24 1300 07/31/24 1354   BP: 123/74 117/71 111/68 121/59   BP Location: Right arm      Pulse:   90    Resp:   20    Temp: (!) 97.2 °F (36.2 °C)      TempSrc:       SpO2:       Weight:         I/O last 24 hours:  In: -   Out: 950 [Urine:950]    Physical Exam  Constitutional:       Appearance: Normal appearance.      Comments: Generalized weakness -- unable to sit up    HENT:      Head: Normocephalic and atraumatic.   Eyes:      Extraocular Movements: Extraocular movements intact.      Conjunctiva/sclera: Conjunctivae normal.   Cardiovascular:      Rate and Rhythm: Normal " rate and regular rhythm.      Pulses: Normal pulses.      Heart sounds: No murmur heard.  Pulmonary:      Effort: Pulmonary effort is normal. No respiratory distress.      Breath sounds: Normal breath sounds. No wheezing.   Abdominal:      General: Bowel sounds are normal. There is no distension.      Palpations: Abdomen is soft.      Tenderness: There is no abdominal tenderness.   Musculoskeletal:         General: No swelling or tenderness.      Cervical back: Normal range of motion and neck supple.   Skin:     General: Skin is warm and dry.   Neurological:      General: No focal deficit present.      Mental Status: He is alert and oriented to person, place, and time.      Cranial Nerves: Cranial nerves 2-12 are intact.      Sensory: Sensation is intact.      Motor: Motor function is intact.   Psychiatric:         Mood and Affect: Mood normal.         Behavior: Behavior normal.       LABORATORY DATA     Labs: I have personally reviewed pertinent reports.    Results from last 7 days   Lab Units 07/31/24  0543 07/30/24  0553 07/29/24  0638 07/28/24  0510   WBC Thousand/uL 16.54* 16.76* 14.31* 14.44*   HEMOGLOBIN g/dL 11.1* 10.5* 10.6* 10.1*   HEMATOCRIT % 34.4* 32.0* 32.1* 30.6*   PLATELETS Thousands/uL 718* 677* 618* 612*   SEGS PCT %  --  80* 79* 75   MONO PCT %  --  4 4 5   EOS PCT %  --  3 3 3      Results from last 7 days   Lab Units 07/31/24  0543 07/30/24  0553 07/29/24  1139 07/28/24  0510 07/27/24  0548   POTASSIUM mmol/L 4.5 4.5 4.6   < > 4.2   CHLORIDE mmol/L 103 102 103   < > 105   CO2 mmol/L 28 26 27   < > 25   BUN mg/dL 26* 23 20   < > 19   CREATININE mg/dL 0.78 0.81 0.73   < > 0.67   CALCIUM mg/dL 9.3 9.2 9.2   < > 9.1   ALK PHOS U/L 144*  --  132*  --  136*   ALT U/L 23  --  15  --  30   AST U/L 26  --  25  --  43*    < > = values in this interval not displayed.     Results from last 7 days   Lab Units 07/31/24  0543 07/30/24  0553 07/29/24  1139   MAGNESIUM mg/dL 2.5 2.4 2.3     Results from last 7  days   Lab Units 07/31/24  0543 07/30/24  0553 07/29/24  1139   PHOSPHORUS mg/dL 3.4 3.6 3.7      Results from last 7 days   Lab Units 07/28/24  0510 07/27/24  0548 07/26/24  0556   PTT seconds 31 34 38*             Micro:  Lab Results   Component Value Date    BLOODCX No Growth After 5 Days. 07/22/2024    BLOODCX No Growth After 5 Days. 07/22/2024    BLOODCX No Growth After 5 Days. 07/17/2024    BLOODCX No Growth After 5 Days. 07/17/2024    SPUTUMCULTUR 4+ Growth of Staphylococcus aureus (A) 07/22/2024    SPUTUMCULTUR 4+ Growth of 07/22/2024     IMAGING & DIAGNOSTIC TESTING     Imaging: I have personally reviewed pertinent reports.    CTA head and neck w wo contrast    Result Date: 7/16/2024  Impression: CT Brain: Redemonstrated subarachnoid hemorrhage overlying the right greater than left convexities and concentrated within the right sylvian cistern and fissure. . Subacute infarct involving the right anterior temporal region and medial occipital region.. 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 Workstation performed: [id number]     IR cerebral angiography / intervention    Result Date: 7/15/2024  Impression: Successful balloon assisted coil embolization of a 4.5 mm right P-comm aneurysm. Workstation performed: [id number]     CT head wo contrast    Result Date: 7/14/2024  Impression: No significant interval change in extensive bilateral subarachnoid hemorrhage and intraventricular hemorrhage. Workstation performed: [id number]     CT head wo contrast    Result Date: 7/13/2024  Impression: Interval placement of right-sided shunt catheter. Stable ventricular size with slight interval increase in intraventricular hemorrhage layering in the occipital horns bilaterally. Diffuse subarachnoid hemorrhage again noted. Workstation performed: [id number]     XR chest portable ICU    Result Date:  7/12/2024  Impression: No acute cardiopulmonary disease. ET tube 6 cm above the landon. Workstation performed: [id number]     CTA stroke alert (head/neck)    Result Date: 7/12/2024  Impression: 0.3 cm aneurysm in expected origin of right posterior communicating artery. Possible tiny aneurysm in left anterior communicating artery region. These could be potential sources of diffuse subarachnoid hemorrhage. Recommend neurovascular surgery consultation for further evaluation. Mild narrowing of bilateral MCA M1 and bilateral M2 segmental branch vessels, likely due to vasospasm. Patent stent in left proximal subclavian artery. Endotracheal tube in trachea. Additional chronic/incidental findings as detailed above. Findings were directly discussed with Emmanuel Lion at approximately 4:34 p.m. on 7/12/2024. Workstation performed: [id number]     CT stroke alert brain    Result Date: 7/12/2024  Impression: Acute diffuse thickened large-volume subarachnoid hemorrhage throughout the bilateral parafalcine regions, bilateral cerebral sulci (right worse than left), basilar cisterns, prepontine cistern, premedullary cistern, and visualized upper cervical spinal canal. Acute small volume intraventricular hemorrhage with mild hydrocephalus. No acute infarction. Findings were directly discussed with Emmanuel Lion at approximately 4:34 p.m. on 7/12/2024. Workstation performed: [id number]     EKG, Pathology, and Other Studies: I have personally reviewed pertinent reports.     ALLERGIES   No Known Allergies  MEDICATIONS     Current Facility-Administered Medications   Medication Dose Route Frequency Provider Last Rate    acetaminophen  975 mg Oral Q8H PRN Mike Christine,       albuterol  2 puff Inhalation Q4H PRN Mike Christine,       aspirin  81 mg Oral Daily Mike Christine, DO      atorvastatin  80 mg Oral Daily Mike Christine, DO      bisacodyl  10 mg Rectal Daily PRN Mike Christine,       butalbital-acetaminophen-caffeine  1 tablet Oral Q4H  "PRN Mike Christine, DO      chlorhexidine  15 mL Mouth/Throat Q12H MATTY Mike Christine, DO      ezetimibe  10 mg Oral QAM Mike Christine, DO      folic acid  400 mcg Oral Daily Mike Christine, DO      gabapentin  600 mg Oral TID Mike Christine DO      heparin (porcine)  5,000 Units Subcutaneous Q8H [facility] Mike Christine, DO      insulin lispro  1-5 Units Subcutaneous Q6H [facility] Mike Christine, DO      levETIRAcetam  750 mg Oral Q12H [facility] Mike Christine, DO      lidocaine  1 patch Topical Daily Mike Christine, DO      lidocaine  1 patch Topical Daily PRN Mike Christine, DO      midodrine  10 mg Oral TID AC Mike Christine,       niMODipine  30 mg Per NG Tube Q2H Mike Christine DO      [START ON 8/1/2024] omeprazole (PRILOSEC) suspension 2 mg/mL  20 mg Oral Daily Before Breakfast Julia Pattochalino, DO      ondansetron  4 mg Intravenous Q6H PRN Mike Christine, DO      oxyCODONE-acetaminophen  1 tablet Oral Q4H PRN Mike Christine,       saliva substitute  5 spray Mouth/Throat 4x Daily PRN Mike Christine DO      [START ON 8/1/2024] thiamine  100 mg Oral Daily Julia Pattochalino, DO          acetaminophen, 975 mg, Q8H PRN  albuterol, 2 puff, Q4H PRN  bisacodyl, 10 mg, Daily PRN  butalbital-acetaminophen-caffeine, 1 tablet, Q4H PRN  lidocaine, 1 patch, Daily PRN  ondansetron, 4 mg, Q6H PRN  oxyCODONE-acetaminophen, 1 tablet, Q4H PRN  saliva substitute, 5 spray, 4x Daily PRN        Portions of the record may have been created with voice recognition software.  Occasional wrong word or \"sound a like\" substitutions may have occurred due to the inherent limitations of voice recognition software.  Read the chart carefully and recognize, using context, where substitutions have occurred.    ==  Karissa Powers DO  [facility]  Internal Medicine Residency PGY-2   "

## 2024-07-31 NOTE — ASSESSMENT & PLAN NOTE
Patient with little documentation in medical record but appears to have non-obstructive CAD with history of unstable angina component and previously was on nitro prn and Ranexa. Home medications noted metoprolol and Entresto however last echo found to be in 2015 with EF 77% and most recent echo of 55% on 7/13/24 and thus Entresto was not restarted prior to downgrade from N-ICU.    Per patient, he has a history of a stent that was placed, but he is unable to provide more details.    Plan:  Continue ASA 81mg qd  Recommend for patient to re-establish care with Cardiology after discharge  Resume reported metoprolol on d/c; hold Brilinta in setting of SAH until cleared by Neurosurgery

## 2024-07-31 NOTE — ASSESSMENT & PLAN NOTE
New-onset this admission. En route to [facility] ED, patient had 2 witnessed seizures while being transported as stroke alert via EMS. Loaded on Keppra upon arrival to [facility]    Continue Keppra 750mg q12H  Outpatient follow up with Neurology; contact info provided

## 2024-07-31 NOTE — ASSESSMENT & PLAN NOTE
Patient was complaining of headache and R leg pain. En route to [facility] ED, patient had 2 witnessed seizures. He was unresponsive when arrived to the ED and was a stroke alert. Patient was intubated and given DDAVP for home ASA usage and also given mannitol. He was started on cardene gtt and Keppra load. EMS called and pt brought to [place] in setting of R MSK pain. CTH showed a large SAH with IVH s/p transfer to [facility] for coil embolization of R PCOM aneurysm and EVD on 7/13. Extubated 7/13. EVD removed 7/26. Course complicated by aspiration pneumonia for which he has already completed abx therapy. Followed on SAH pathway in Neuro ICU since transfer to [facility].     CTH 8/1 showing stable pathology. Patient off nimodipine and no longer receiving TCDs as of 8/2. Patient continues to have a headache, likely 2/2 to SAH. Continuing to monitor for changes in neuro status.    Plan:  Discharge to [facility]  Follow-up with PEDRO LUIS on 8/18; follow-up MRA ordered to be done around that time  Will need post-hospital follow-up with Neurology; office info provided  Discharge on gabapentin for headaches, Keppra for seizures, and with oxycodone for breakthrough pain; scripts to be taken over by [facility] physicians  Hold Brilinta until cleared by NSGY

## 2024-07-31 NOTE — UTILIZATION REVIEW
Continued Stay Review    Date: 7-31-24                           Current Patient Class: Inpatient  Current Level of Care: step down    HPI:68 y.o. 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 Patient with extensive rehab needs due to functional deficits. Improving daily. Interactive. Alert and oriented to name location and year.  Unable to perform calculations or spell world forwards and backwards. Tolerating PO.  Nimodipine 30 mg q2 hr until day 21 post bleed. Banana flakes as needed for diarrhea. Goal - 140.  Continue transcranial dopplers. Continue keppra, neuro checks q4 hr.    Vital Signs (last 3 days)       Date/Time Temp Pulse Resp BP MAP (mmHg) SpO2 O2 Device [place] Coma Scale Score Pain    07/31/24 1354 -- -- -- 121/59 -- -- -- -- --    07/31/24 1300 -- 90 20 111/68 84 -- -- -- --    07/31/24 1221 -- -- -- 117/71 -- -- -- -- --    07/31/24 1100 97.2 °F (36.2 °C) -- -- 123/74 92 -- -- -- --    07/31/24 1054 -- -- -- 113/69 -- -- -- -- --    07/31/24 0843 -- -- -- 114/83 -- -- -- -- --    07/31/24 0800 -- -- -- -- -- -- -- 15 3    07/31/24 0700 97.8 °F (36.6 °C) 100 20 107/73 85 -- -- -- --    07/31/24 0400 -- 100 18 125/75 88 100 % -- 15 --    07/31/24 0216 -- -- -- -- -- -- -- -- 5    07/31/24 0213 98.8 °F (37.1 °C) 105 22 119/72 90 98 % -- -- --    07/31/24 0000 -- -- -- -- -- -- -- 15 --    07/30/24 2354 -- 102 20 107/66 80 99 % -- -- --    07/30/24 2100 98.5 °F (36.9 °C) 97 21 112/72 87 99 % -- -- --    07/30/24 2038 -- -- -- -- -- -- -- -- No Pain    07/30/24 2000 -- -- -- -- -- -- -- 15 --    07/30/24 1952 -- -- -- -- -- -- -- -- 7     07/30/24 1900 98.9 °F (37.2 °C) 100 20 124/74 94 100 % None (Room air) -- --    07/30/24 1700 -- 97 17 115/79 93 -- -- -- --    07/30/24 1612 -- 99 16 98/68 79 -- -- -- --    07/30/24 1600 -- -- -- -- -- -- -- 15 --    07/30/24 1531 99.3 °F (37.4 °C) 100 18 100/63 77 99 % None (Room air) -- 7    07/30/24 1420 -- -- -- 112/66 -- -- -- -- --    07/30/24 1200 -- 101 -- 130/89 105 -- -- 15 --    07/30/24 1000 -- 101 -- 102/73 80 -- -- -- --    07/30/24 0921 -- -- -- -- -- -- -- -- No Pain    07/30/24 0920 -- -- -- -- -- -- -- -- No Pain    07/30/24 0800 97.8 °F (36.6 °C) -- -- -- -- -- -- 15 No Pain    07/30/24 0700 97.8 °F (36.6 °C) 98 19 101/67 78 99 % None (Room air) -- --    07/30/24 0600 -- -- -- 113/70 -- -- -- -- --    07/30/24 0402 -- -- -- 112/62 -- -- -- -- --    07/30/24 0400 -- -- -- -- -- -- -- 15 --    07/30/24 0207 -- -- -- 119/72 -- -- -- -- --    07/30/24 0012 -- -- -- 110/67 -- -- -- -- --    07/30/24 0000 -- -- -- -- -- -- -- 15 --    07/29/24 2223 -- -- -- 111/68 -- -- -- -- --    07/29/24 2100 99.5 °F (37.5 °C) 99 17 101/65 77 100 % -- -- --    07/29/24 2039 -- -- -- 118/65 -- -- -- -- --    07/29/24 2000 -- -- -- -- -- -- -- 15 No Pain    07/29/24 1900 99.3 °F (37.4 °C) -- 17 106/72 -- -- -- -- --    07/29/24 1802 -- -- -- -- -- -- -- -- 9    07/29/24 1731 -- -- -- 110/70 -- -- -- -- --    07/29/24 1625 -- -- -- 122/80 -- -- -- -- --    07/29/24 1415 -- -- -- 109/70 -- -- -- -- --    07/29/24 1300 -- 99 22 117/73 91 -- -- -- --    07/29/24 1238 -- -- -- 105/72 -- -- -- -- --    07/29/24 1200 -- -- -- -- -- -- -- 15 --    07/29/24 1131 -- -- -- -- -- -- -- -- 5    07/29/24 1030 -- -- -- 130/78 -- -- -- -- --    07/29/24 1000 -- -- -- -- -- -- -- 15 --    07/29/24 0800 -- -- -- -- -- -- -- 15 6    07/29/24 0642 98.7 °F (37.1 °C) 100 -- 141/78 -- 94 % None (Room air) -- --    07/29/24 0600 -- -- -- 136/77 -- -- -- -- --    07/29/24 0410 -- -- -- 126/68 -- -- -- -- --    07/29/24 0400 -- -- -- -- --  -- -- 15 --    07/29/24 0154 -- -- -- 125/68 -- -- -- -- --    07/29/24 0007 -- -- -- 119/72 -- -- -- -- --    07/29/24 0000 98.8 °F (37.1 °C) 108 21 119/72 91 -- -- 15 --    07/28/24 2253 -- -- -- 122/69 -- -- -- -- --    07/28/24 2036 -- -- -- 120/75 -- -- -- -- --    07/28/24 2000 -- 97 19 122/72 93 -- None (Room air) 15 No Pain    07/28/24 1900 -- -- -- 111/65 83 -- -- -- --    07/28/24 1830 -- -- -- 115/68 87 -- -- -- --    07/28/24 1800 -- -- -- 110/69 -- -- -- -- --    07/28/24 1742 -- -- -- -- -- -- None (Room air) 15 --    07/28/24 1736 -- -- -- 137/67 -- -- -- -- --    07/28/24 1711 -- -- -- 145/79 -- -- -- -- --    07/28/24 1710 -- -- -- -- -- -- -- -- 10 - Worst Possible Pain    07/28/24 1600 -- 90 26 113/80 91 97 % -- -- --    07/28/24 1500 -- 86 44 111/58 74 96 % -- -- --    07/28/24 1400 -- 82 19 118/69 83 95 % -- -- --    07/28/24 1317 -- -- -- -- -- -- -- -- 8    07/28/24 1300 -- 88 34 105/68 79 96 % -- -- --    07/28/24 1200 98.6 °F (37 °C) 90 33 111/66 80 95 % -- 15 4    07/28/24 1100 -- 92 33 102/63 73 96 % -- -- --    07/28/24 1046 -- -- -- -- -- -- -- -- 7    07/28/24 1000 -- 94 28 100/72 79 97 % -- -- --    07/28/24 0900 -- 106 19 140/84 100 96 % -- -- --    07/28/24 0834 -- -- -- -- -- -- -- -- 10 - Worst Possible Pain    07/28/24 0800 98.4 °F (36.9 °C) 98 25 144/89 107 97 % -- 15 --    07/28/24 0745 -- -- -- -- -- -- None (Room air) -- --    07/28/24 0600 -- 96 20 125/74 88 96 % None (Room air) -- --    07/28/24 0500 -- 94 20 133/79 92 97 % None (Room air) -- --    07/28/24 0400 98.4 °F (36.9 °C) 90 20 111/71 81 94 % None (Room air) 15 No Pain    07/28/24 0300 -- 94 17 101/64 73 93 % None (Room air) -- --    07/28/24 0200 -- 102 20 138/80 101 94 % None (Room air) -- --    07/28/24 0157 -- 102 -- 134/79 96 -- -- -- 10 - Worst Possible Pain    07/28/24 0100 -- 98 16 143/77 98 96 % None (Room air) -- --    07/28/24 0000 98.9 °F (37.2 °C) 94 20 120/79 95 96 % None (Room air) 15 6          Weight  (last 2 days)       Date/Time Weight    07/31/24 0600 44.3 (97.6)    07/30/24 0548 44 (97)    07/29/24 0600 44 (97)              Pertinent Labs/Diagnostic Results:   Radiology:  VAS transcranial doppler, complete study   Final  (07/31 1431)      VAS transcranial doppler, complete study   Final  (07/30 1923)      FL barium swallow video w speech   Final(07/29 1438)   NGT is removed. Swallow function appears somewhat improved. The patient presents with a mild-moderate oropharyngeal dysphagia. Recommend puree diet, crushed meds, nectal thick liquids.   VAS transcranial doppler, complete study   Final (07/29 1311)      VAS transcranial doppler, complete study   Final (07/29 0742)      VAS transcranial doppler, complete study   Final  (07/27 1633)      CTA head w wo contrast   Final (07/27 0813)      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 No residual aneurysmal filling identified.      Small volume subarachnoid hemorrhage persists in the posterior aspects of the sylvian fissures and layering in the occipital horns of the lateral ventricles.      Low-density right temporal lobe likely represents evolving infarct.      No CTA findings suspicious for vasospasm.         VAS transcranial doppler, complete study   Final (07/26 1005)      CTA head w wo contrast   Final(07/26 0839)      1. Improved subarachnoid hemorrhage and intraventricular hemorrhage as described above. Ventricles are unchanged in size.   2. Stable areas of low-attenuation involving the right temporal lobe and right occipital lobe which represent evolving infarcts.   3. Nondiagnostic CT angiogram of the head due to the extensive motion artifact. Postoperative changes again administrated from prior coil embolization of a right posterior communicating artery aneurysm..         Cardiology:    GI:  No orders to display           Results from last 7 days   Lab Units 07/31/24  0543 07/30/24  0553 07/29/24  0638  07/28/24  0510 07/27/24  0548   WBC Thousand/uL 16.54* 16.76* 14.31* 14.44* 14.73*   HEMOGLOBIN g/dL 11.1* 10.5* 10.6* 10.1* 10.1*   HEMATOCRIT % 34.4* 32.0* 32.1* 30.6* 30.3*   PLATELETS Thousands/uL 718* 677* 618* 612* 585*   TOTAL NEUT ABS Thousands/µL  --  13.39* 11.40* 10.90* 12.18*         Results from last 7 days   Lab Units 07/31/24  0543 07/30/24  0553 07/29/24  1139 07/29/24  0638 07/28/24  0510 07/27/24  0548 07/26/24  0556 07/25/24  1532 07/25/24  0544   SODIUM mmol/L 140 139 137  --  141 140 141  --  146   POTASSIUM mmol/L 4.5 4.5 4.6  --  4.2 4.2 4.1   < > 3.4*   CHLORIDE mmol/L 103 102 103  --  106 105 109*  --  112*   CO2 mmol/L 28 26 27  --  26 25 23  --  24   ANION GAP mmol/L 9 11 7  --  9 10 9  --  10   BUN mg/dL 26* 23 20  --  22 19 20  --  22   CREATININE mg/dL 0.78 0.81 0.73  --  0.77 0.67 0.74  --  0.67   EGFR ml/min/1.73sq m 92 90 94  --  92 98 94  --  98   CALCIUM mg/dL 9.3 9.2 9.2  --  9.0 9.1 8.7  --  8.8   CALCIUM, IONIZED mmol/L  --   --   --  1.20 1.19 1.16 1.15  --  1.17   MAGNESIUM mg/dL 2.5 2.4 2.3  --  2.4 2.2 2.2  --  2.1   PHOSPHORUS mg/dL 3.4 3.6 3.7  --  2.9 3.3 3.1   < > 2.7    < > = values in this interval not displayed.     Results from last 7 days   Lab Units 07/31/24  0543 07/29/24  1139 07/27/24  0548 07/26/24  0556 07/25/24  0544   AST U/L 26 25 43* 32 21   ALT U/L 23 15 30 21 15   ALK PHOS U/L 144* 132* 136* 128* 115*   TOTAL PROTEIN g/dL 8.1 8.0 7.3 7.2 6.7   ALBUMIN g/dL 3.6 3.5 3.4* 3.3* 3.2*   TOTAL BILIRUBIN mg/dL 0.81 0.72 0.52 0.50 0.42     Results from last 7 days   Lab Units 07/31/24  1126 07/30/24  2333 07/30/24  1806 07/30/24  1220 07/30/24  0556 07/30/24  0006 07/29/24  1749 07/29/24  1127 07/29/24  0622 07/29/24  0009 07/28/24  1739 07/28/24  1237   POC GLUCOSE mg/dl 134 136 127 121 128 124 185* 101 129 96 125 134     Results from last 7 days   Lab Units 07/31/24  0543 07/30/24  0553 07/29/24  1139 07/28/24  0510 07/27/24  0548 07/26/24  0556 07/25/24  0544    GLUCOSE RANDOM mg/dL 115 121 108 145* 150* 126 150*           Results from last 7 days   Lab Units 07/28/24  0510 07/27/24  0548 07/26/24  0556   PTT seconds 31 34 38*           Results from last 7 days   Lab Units 07/25/24  1000   C DIFF TOXIN B BY PCR  Negative             Results from last 7 days   Lab Units 07/26/24  1013   GRAM STAIN RESULT  No Polys or Bacteria seen         Results from last 7 days   Lab Units 07/26/24  1013   APPEARANCE CSF  Cloudy   WBC CSF /uL 0   XANTHOCHROMIA  Yes*   GLUCOSE CSF mg/dL 56   PROTEIN CSF mg/dL 64*   RBC CSF uL 0   CSF CULTURE  No growth           Medications:   Scheduled Medications:  aspirin, 81 mg, Oral, Daily  atorvastatin, 80 mg, Oral, Daily  chlorhexidine, 15 mL, Mouth/Throat, Q12H MATTY  ezetimibe, 10 mg, Oral, QAM  folic acid, 400 mcg, Oral, Daily  gabapentin, 600 mg, Oral, TID  heparin (porcine), 5,000 Units, Subcutaneous, Q8H MATTY  insulin lispro, 1-5 Units, Subcutaneous, Q6H MATTY  levETIRAcetam, 750 mg, Oral, Q12H MATTY  lidocaine, 1 patch, Topical, Daily  midodrine, 10 mg, Oral, TID AC  niMODipine, 30 mg, Per NG Tube, Q2H  [START ON 8/1/2024] omeprazole (PRILOSEC) suspension 2 mg/mL, 20 mg, Oral, Daily Before Breakfast  [START ON 8/1/2024] thiamine, 100 mg, Oral, Daily      Continuous IV Infusions:     PRN Meds:  acetaminophen, 975 mg, Oral, Q8H PRN  albuterol, 2 puff, Inhalation, Q4H PRN  bisacodyl, 10 mg, Rectal, Daily PRN  butalbital-acetaminophen-caffeine, 1 tablet, Oral, Q4H PRN  lidocaine, 1 patch, Topical, Daily PRN  ondansetron, 4 mg, Intravenous, Q6H PRN  oxyCODONE-acetaminophen, 1 tablet, Oral, Q4H PRN  saliva substitute, 5 spray, Mouth/Throat, 4x Daily PRN        Discharge Plan: to be determined     Network Utilization Review Department  ATTENTION: Please call with any questions or concerns to [phone] and carefully listen to the prompts so that you are directed to the right person. All voicemails are confidential.   For Discharge needs, contact Care  Management DC Support Team at [phone] opt. [phone]  Send all requests for admission clinical reviews, approved or denied determinations and any other requests to dedicated fax number below belonging to the campus where the patient is receiving treatment. List of dedicated fax numbers for the Facilities:  FACILITY NAME UR FAX NUMBER   ADMISSION DENIALS (Administrative/Medical Necessity) [phone]   DISCHARGE SUPPORT TEAM (NETWORK) [phone]   PARENT CHILD HEALTH (Maternity/NICU/Pediatrics) [phone]   [facility] [phone]   [facility] [phone]   [facility] [phone]   [facility] [phone]   Critical access hospital [phone]   [facility] [phone]   [facility] [phone]   [facility] [phone]   [facility] [phone]   [facility] [phone]   [facility] [phone]   [facility] [phone]

## 2024-07-31 NOTE — PROGRESS NOTES
[facility]  Progress Note: Critical Care  Name: Federico Bowen 68 y.o. male I MRN: [id number]  Unit/Bed#: [facility] 715-01 I Date of Admission: 7/12/2024   Date of Service: 7/31/2024 I Hospital Day: 19    Assessment & Plan   Neuro:   Diagnosis: SAH with IVH and mild hydro s/p coil embolization of R PCOM aneurysm  BD 20 (7/12/2024) HH5, MF 4     Plan:   Continue to follow with neurosurgery outpatient  Noncontrast CT head ordered for 8/1  EVD removed on 7/26  SBP goal normotensive, 110 to <150  Continue aspirin 80 mg p.o. daily  Continue levetiracetam 750 mg every 12 hours  Neuro checks Q4H  Nimodipine 30mg Q2 hrs for total 21 days  Heparin SQ for DVT ppx  Euvolemia   Normonatremia, Na goal > 140  Stat CTH with any change in GCS > 2 pts  Continue thiamine supplementation  PT/OT consults appreciated       Diagnosis: Seizure  2/2 above and presented w/ seizures   vEEG no seizures  Plan:   Neuro following   Continue levetiracetam 750mg Q12H      Diagnosis: Headache  Continue gabapentin 400-400-600 dosing   Lidoderm patch for neck   PRN Fioricet/Percocet/Tylenol      CV:   Diagnosis: Hx CAD, Hx HTN (vascular stent placed > 1 year ago)  Plan:   Continue ASA 81mg daily  Holding home metoprolol    Given allowing for increased -150  Continue midodrine 10 mg p.o. 3 times daily  Entresto not reinitiated, recent ejection fraction noted to be 55%   Diagnosis: HLD  Continue Lipitor 80mg daily and Zetia 10mg qhs  Diagnosis: Borderline Hypotension, improving   Suspect in the setting of resolving infectious state  Holding home Entresto and Metoprolol   Continue Nimodipine (decreased to 30mg Q2 hrs 7/26)  Continue Midodrine 10mg TID  PRN fluid resuscitation     Pulm:  Diagnosis: Aspiration pneumonia   Completed Ancef 7/29  CSF culture negative  Continue suctioning, chest PT, nebs/resp protocol   Wean O2 as able for goal O2 sat >92%  Diagnosis: Hx COPD  Plan:   IS  Xopenex  TID  Imecldinum/albuterol inhaler   Respiratory protocol  GI:   Diagnosis: Dysphagia  Repeat VBS completed on 7/29/2024  Dysphagia 1, nectar thick liquid diet  NGT removed  Diagnosis: GERD  Omeprazole 40mg daily   Bowel regimen  Hold 2/2 multiple BM's  C diff negative   Banana flakes PRN  Diagnosis: Fragility  Calorie count     :  - No acute issues   - Monitor renal indices and I/Os  - Goal euvolemia      F/E/N:    F: none, PO intake encouraged  E: Replete as needed, goal Mg >2, Phos >3, K>4  N: Dysphagia 1, nectar thick liquid diet     Heme/Onc:   Diagnosis: Anemia  Plan:   Continue to monitor am CBC     Endo:   SSI   Hypoglycemia protocol   POC glucose checks     ID:   Diagnosis: Leukocytosis and febrile, improving  7/22 Sputum cx: 3+gram positive cocci in chains and clusters    Plan   Abx completed on 7/29  Tylenol PRN     MSK/Skin:   PT/OT/PMR  Appreciate PMR recommendations   Diagnosis: Penile lesion  Plan: note from outpt derm inflamed seborrheic keratosis     Line/Drains:  - Peripheral IV, right forearm  - External urinary catheter    Disposition: Med Surg    ICU Core Measures     A: Assess, Prevent, and Manage Pain Has pain been assessed? Yes  Need for changes to pain regimen? No   B: Both SAT/SAT  N/A   C: Choice of Sedation RASS Goal: N/A patient not on sedation  Need for changes to sedation or analgesia regimen? NA   D: Delirium CAM-ICU: Negative   E: Early Mobility  Plan for early mobility? Yes   F: Family Engagement Plan for family engagement today? Yes     Review of Invasive Devices:      Prophylaxis:  VTE VTE covered by:  heparin (porcine), Subcutaneous, 5,000 Units at 07/31/24 1354       Stress Ulcer  covered byomeprazole (PRILOSEC) suspension 2 mg/mL [[id number]], omeprazole (PriLOSEC) 40 MG capsule [[id number]] (Long-Term Med)        Significant 24hr Events     24hr events:  No overnight events     Subjective   Patient is a 68-year-old male with past medical history hypertension, hemic coronary  artery disease, COPD, cocaine use who presented with headache and right leg pain to [facility].  Patient had 2 witnessed seizure events per EMS.  On arrival to hospital patient regarding the patient secondary to mental status change.  Stroke alert was called.  CT head demonstrated subarachnoid hemorrhage with intraventricular hemorrhage and mild hydrocephalus with right PCA aneurysm.  Patient was given lorazepam, levetiracetam, DDAVP, and transferred to [facility] for neurosurgery evaluation.  Patient subsequently had right PCOM coil embolization, and in setting of subarachnoid hemorrhage patient was placed on vasospasm watch with nimodipine, levetiracetam, sodium goals, and serial neurologic examination.  Patient required right frontal EVD placement due to hydrocephalus and CSF CSF studies were obtained.  EVD was removed on 7/26.     Imaging review:  - 7/11 CTAH/N-0.3 cm aneurysm in expected origin of right posterior communicating artery. Possible tiny aneurysm in left anterior communicating artery region. Mild narrowing of bilateral MCA M1 and bilateral M2 segmental branch vessels, likely due to vasospasm.   - 7/11 CTH-Acute diffuse thickened large-volume SAH throughout the b/l parafalcine regions, b/l cerebral sulci (R worse than L), basilar cisterns, prepontine cistern, premedullary cistern, and visualized upper cervical spinal canal. Acute small volume IVH with mild hydrocephalus.   - 7/11 EVD placed  - 7/13 Angio-: Embolization of a 3.5 mm right P-comm aneurysm.   - 7/14 CTh-no significant interval change and extensive bilateral SAH and IVH  - 7/14 vEEG no seizures  - 7/16 CTA/CTH: Expected postoperative appearance following embolization of right posterior communicating artery aneurysm . Redemonstrated subarachnoid hemorrhage overlying the right greater than left convexities and concentrated within the right sylvian cistern and fissure. . Subacute infarct involving the right anterior temporal  region and medial occipital region.  - 7/27 CTH - stable, Small volume subarachnoid hemorrhage persists     TCDs:  7/30:  RIGHT SIDE:  Mean velocities were obtained in the middle cerebral artery, anterior cerebral  artery, and posterior cerebral artery.  The Lindegaard ratio is 1.42.  Prior:    2.03,  The Pulsatility Index ranged from 0.80 to 0.89.   Prior:  1.09 to 1.51.     LEFT SIDE:  Mean velocities were obtained in the middle cerebral artery, anterior cerebral  artery, and posterior cerebral artery.  The Lindegaard ratio is  2.0.  Prior:   1.11,  The Pulsatility Index ranged from  1.10 to 2.50.   Prior:  1.05 to 1.2     Mean velocities were obtained in the right vertebral artery, left vertebral  artery, and basilar artery.  Proximal basilar artery mean velocity 24, pulsatility index 0.94.  Right distal radial artery mean velocity 24.  Left distal vertebral artery mean velocity 25.    7/29:  RIGHT SIDE:  Mean velocities in the middle cerebral artery, anterior cerebral artery,  posterior cerebral artery.  The Lindegaard ratio is   2.03,  (Prior: 2.47 )  The Pulsatility Index ranged from 1.09 to 1.51     LEFT SIDE:  Mean velocities in the middle cerebral artery, anterior cerebral artery,  posterior cerebral artery.  The Lindegaard ratio is   1.11,  (Prior: 2.77 )  The Pulsatility Index ranged from 1.05 to 1.25    Review of Systems: See HPI for Review of Systems     Objective                          Vitals I/O      Most Recent Min/Max in 24hrs   Temp 97.7 °F (36.5 °C) Temp  Min: 97.2 °F (36.2 °C)  Max: 98.9 °F (37.2 °C)   Pulse 90 Pulse  Min: 90  Max: 105   Resp 15 Resp  Min: 15  Max: 22   /72 BP  Min: 98/68  Max: 125/75   O2 Sat 100 % SpO2  Min: 98 %  Max: 100 %      Intake/Output Summary (Last 24 hours) at 7/31/2024 1610  Last data filed at 7/31/2024 1401  Gross per 24 hour   Intake --   Output 950 ml   Net -950 ml       Diet Dysphagia/Modified Consistency; Dysphagia 1-Pureed; Nectar Thick  Liquid    Invasive Monitoring   None        Physical Exam   Physical Exam  Vitals and nursing note reviewed.   Eyes:      Extraocular Movements: Extraocular movements intact.      Pupils: Pupils are equal, round, and reactive to light.   Skin:     General: Skin is warm.      Coloration: Skin is not jaundiced or pale.   HENT:      Head: Normocephalic.      Mouth/Throat:      Mouth: Mucous membranes are moist.      Pharynx: No oropharyngeal exudate.   Cardiovascular:      Rate and Rhythm: Normal rate.      Pulses: Normal pulses.   Abdominal:      Tenderness: There is no abdominal tenderness.   Constitutional:       General: He is not in acute distress.     Appearance: He is ill-appearing.   Pulmonary:      Effort: No respiratory distress.   Neurological:      Mental Status: Mental status is at baseline.      Cranial Nerves: No facial asymmetry.      Motor: gross motor function is at baseline for patient. No motor deficit.            Diagnostic Studies      EK24, normal sinus rhythm with short UT, rate 95, QTc 434  Imaging:  I have personally reviewed pertinent reports.       Medications:  Scheduled PRN   aspirin, 81 mg, Daily  atorvastatin, 80 mg, Daily  chlorhexidine, 15 mL, Q12H MATTY  ezetimibe, 10 mg, QAM  folic acid, 400 mcg, Daily  gabapentin, 400 mg, BID  gabapentin, 600 mg, HS  heparin (porcine), 5,000 Units, Q8H MATTY  insulin lispro, 1-5 Units, Q6H MATTY  levETIRAcetam, 750 mg, Q12H MATTY  lidocaine, 1 patch, Daily  midodrine, 10 mg, TID AC  niMODipine, 30 mg, Q2H  [START ON 2024] omeprazole (PRILOSEC) suspension 2 mg/mL, 20 mg, Daily Before Breakfast  [START ON 2024] thiamine, 100 mg, Daily      acetaminophen, 975 mg, Q8H PRN  albuterol, 2 puff, Q4H PRN  bisacodyl, 10 mg, Daily PRN  butalbital-acetaminophen-caffeine, 1 tablet, Q4H PRN  lidocaine, 1 patch, Daily PRN  ondansetron, 4 mg, Q6H PRN  oxyCODONE-acetaminophen, 1 tablet, Q4H PRN  saliva substitute, 5 spray, 4x Daily PRN       Continuous           Labs:    CBC    Recent Labs     07/30/24  0553 07/31/24  0543   WBC 16.76* 16.54*   HGB 10.5* 11.1*   HCT 32.0* 34.4*   * 718*     BMP    Recent Labs     07/30/24  0553 07/31/24  0543   SODIUM 139 140   K 4.5 4.5    103   CO2 26 28   AGAP 11 9   BUN 23 26*   CREATININE 0.81 0.78   CALCIUM 9.2 9.3       Coags    No recent results     Additional Electrolytes  Recent Labs     07/30/24  0553 07/31/24  0543   MG 2.4 2.5   PHOS 3.6 3.4          Blood Gas    No recent results  No recent results LFTs  Recent Labs     07/31/24  0543   ALT 23   AST 26   ALKPHOS 144*   ALB 3.6   TBILI 0.81       Infectious  No recent results  Glucose  Recent Labs     07/30/24  0553 07/31/24  0543   GLUC 121 115             This note was completed in part utilizing Dragon Dictation Software. Grammatical errors, random word insertions, spelling mistakes, and incomplete sentences may be an occasional consequence of this system secondary to software limitations, ambient noise, and hardware issues.  If you have any questions or concerns about the content, text, or information contained within the body of this dictation, please contact the provider for clarification.

## 2024-08-01 ENCOUNTER — APPOINTMENT (INPATIENT)
Dept: RADIOLOGY | Facility: HOSPITAL | Age: 69
DRG: 020 | End: 2024-08-01
Payer: MEDICARE

## 2024-08-01 ENCOUNTER — TELEPHONE (OUTPATIENT)
Dept: NEUROSURGERY | Facility: CLINIC | Age: 69
End: 2024-08-01

## 2024-08-01 ENCOUNTER — APPOINTMENT (INPATIENT)
Dept: NON INVASIVE DIAGNOSTICS | Facility: HOSPITAL | Age: 69
DRG: 020 | End: 2024-08-01
Payer: MEDICARE

## 2024-08-01 PROBLEM — I69.991 DYSPHAGIA AS LATE EFFECT OF CEREBROVASCULAR DISEASE: Status: ACTIVE | Noted: 2024-08-01

## 2024-08-01 LAB
ALBUMIN SERPL BCG-MCNC: 3.7 G/DL (ref 3.5–5)
ALP SERPL-CCNC: 159 U/L (ref 34–104)
ALT SERPL W P-5'-P-CCNC: 27 U/L (ref 7–52)
ANION GAP SERPL CALCULATED.3IONS-SCNC: 7 MMOL/L (ref 4–13)
AST SERPL W P-5'-P-CCNC: 29 U/L (ref 13–39)
BILIRUB SERPL-MCNC: 0.65 MG/DL (ref 0.2–1)
BUN SERPL-MCNC: 26 MG/DL (ref 5–25)
CALCIUM SERPL-MCNC: 9.4 MG/DL (ref 8.4–10.2)
CHLORIDE SERPL-SCNC: 106 MMOL/L (ref 96–108)
CO2 SERPL-SCNC: 27 MMOL/L (ref 21–32)
CREAT SERPL-MCNC: 0.79 MG/DL (ref 0.6–1.3)
ERYTHROCYTE [DISTWIDTH] IN BLOOD BY AUTOMATED COUNT: 14.6 % (ref 11.6–15.1)
GFR SERPL CREATININE-BSD FRML MDRD: 91 ML/MIN/1.73SQ M
GLUCOSE SERPL-MCNC: 107 MG/DL (ref 65–140)
GLUCOSE SERPL-MCNC: 123 MG/DL (ref 65–140)
GLUCOSE SERPL-MCNC: 138 MG/DL (ref 65–140)
GLUCOSE SERPL-MCNC: 150 MG/DL (ref 65–140)
GLUCOSE SERPL-MCNC: 154 MG/DL (ref 65–140)
GLUCOSE SERPL-MCNC: 159 MG/DL (ref 65–140)
HCT VFR BLD AUTO: 34.5 % (ref 36.5–49.3)
HGB BLD-MCNC: 10.8 G/DL (ref 12–17)
MAGNESIUM SERPL-MCNC: 2.4 MG/DL (ref 1.9–2.7)
MCH RBC QN AUTO: 31.1 PG (ref 26.8–34.3)
MCHC RBC AUTO-ENTMCNC: 31.3 G/DL (ref 31.4–37.4)
MCV RBC AUTO: 99 FL (ref 82–98)
PHOSPHATE SERPL-MCNC: 3.5 MG/DL (ref 2.3–4.1)
PLATELET # BLD AUTO: 677 THOUSANDS/UL (ref 149–390)
PMV BLD AUTO: 9.9 FL (ref 8.9–12.7)
POTASSIUM SERPL-SCNC: 4.5 MMOL/L (ref 3.5–5.3)
PROT SERPL-MCNC: 8.1 G/DL (ref 6.4–8.4)
RBC # BLD AUTO: 3.47 MILLION/UL (ref 3.88–5.62)
SODIUM SERPL-SCNC: 140 MMOL/L (ref 135–147)
WBC # BLD AUTO: 11.96 THOUSAND/UL (ref 4.31–10.16)

## 2024-08-01 PROCEDURE — 99232 SBSQ HOSP IP/OBS MODERATE 35: CPT | Performed by: INTERNAL MEDICINE

## 2024-08-01 PROCEDURE — 84100 ASSAY OF PHOSPHORUS: CPT

## 2024-08-01 PROCEDURE — 99232 SBSQ HOSP IP/OBS MODERATE 35: CPT | Performed by: PHYSICIAN ASSISTANT

## 2024-08-01 PROCEDURE — 92526 ORAL FUNCTION THERAPY: CPT

## 2024-08-01 PROCEDURE — 80053 COMPREHEN METABOLIC PANEL: CPT

## 2024-08-01 PROCEDURE — 82948 REAGENT STRIP/BLOOD GLUCOSE: CPT

## 2024-08-01 PROCEDURE — 83735 ASSAY OF MAGNESIUM: CPT

## 2024-08-01 PROCEDURE — 93886 INTRACRANIAL COMPLETE STUDY: CPT

## 2024-08-01 PROCEDURE — 70450 CT HEAD/BRAIN W/O DYE: CPT

## 2024-08-01 PROCEDURE — 85027 COMPLETE CBC AUTOMATED: CPT

## 2024-08-01 PROCEDURE — 93886 INTRACRANIAL COMPLETE STUDY: CPT | Performed by: SURGERY

## 2024-08-01 RX ADMIN — LIDOCAINE 5% 1 PATCH: 700 PATCH TOPICAL at 09:58

## 2024-08-01 RX ADMIN — FOLIC ACID TAB 400 MCG 400 MCG: 400 TAB at 09:57

## 2024-08-01 RX ADMIN — MIDODRINE HYDROCHLORIDE 10 MG: 5 TABLET ORAL at 06:07

## 2024-08-01 RX ADMIN — Medication 30 MG: at 15:51

## 2024-08-01 RX ADMIN — GABAPENTIN 600 MG: 300 CAPSULE ORAL at 21:06

## 2024-08-01 RX ADMIN — GABAPENTIN 400 MG: 400 CAPSULE ORAL at 17:56

## 2024-08-01 RX ADMIN — Medication 30 MG: at 12:17

## 2024-08-01 RX ADMIN — INSULIN LISPRO 1 UNITS: 100 INJECTION, SOLUTION INTRAVENOUS; SUBCUTANEOUS at 18:00

## 2024-08-01 RX ADMIN — THIAMINE HCL TAB 100 MG 100 MG: 100 TAB at 09:57

## 2024-08-01 RX ADMIN — HEPARIN SODIUM 5000 UNITS: 5000 INJECTION INTRAVENOUS; SUBCUTANEOUS at 15:51

## 2024-08-01 RX ADMIN — Medication 30 MG: at 04:10

## 2024-08-01 RX ADMIN — Medication 30 MG: at 08:14

## 2024-08-01 RX ADMIN — Medication 30 MG: at 06:07

## 2024-08-01 RX ADMIN — OXYCODONE HYDROCHLORIDE AND ACETAMINOPHEN 1 TABLET: 5; 325 TABLET ORAL at 06:07

## 2024-08-01 RX ADMIN — MIDODRINE HYDROCHLORIDE 10 MG: 5 TABLET ORAL at 12:17

## 2024-08-01 RX ADMIN — MIDODRINE HYDROCHLORIDE 10 MG: 5 TABLET ORAL at 15:51

## 2024-08-01 RX ADMIN — Medication 20 MG: at 06:40

## 2024-08-01 RX ADMIN — Medication 30 MG: at 17:55

## 2024-08-01 RX ADMIN — LEVETIRACETAM 750 MG: 100 SOLUTION ORAL at 21:05

## 2024-08-01 RX ADMIN — HEPARIN SODIUM 5000 UNITS: 5000 INJECTION INTRAVENOUS; SUBCUTANEOUS at 06:07

## 2024-08-01 RX ADMIN — Medication 30 MG: at 02:07

## 2024-08-01 RX ADMIN — LEVETIRACETAM 750 MG: 100 SOLUTION ORAL at 09:58

## 2024-08-01 RX ADMIN — Medication 30 MG: at 22:35

## 2024-08-01 RX ADMIN — INSULIN LISPRO 1 UNITS: 100 INJECTION, SOLUTION INTRAVENOUS; SUBCUTANEOUS at 12:17

## 2024-08-01 RX ADMIN — Medication 30 MG: at 10:00

## 2024-08-01 RX ADMIN — EZETIMIBE 10 MG: 10 TABLET ORAL at 09:57

## 2024-08-01 RX ADMIN — ASPIRIN 81 MG CHEWABLE TABLET 81 MG: 81 TABLET CHEWABLE at 09:57

## 2024-08-01 RX ADMIN — Medication 30 MG: at 20:59

## 2024-08-01 RX ADMIN — GABAPENTIN 400 MG: 400 CAPSULE ORAL at 09:57

## 2024-08-01 RX ADMIN — ATORVASTATIN CALCIUM 80 MG: 80 TABLET, FILM COATED ORAL at 09:57

## 2024-08-01 RX ADMIN — HEPARIN SODIUM 5000 UNITS: 5000 INJECTION INTRAVENOUS; SUBCUTANEOUS at 21:08

## 2024-08-01 NOTE — TELEPHONE ENCOUNTER
8/1/24 - PT IN [facility]  2-3 WK HFU W/MRA HEAD W/MO  **WORKING ON SCHEDULING APT**    From: Isabel Cormier PA-C   Sent: 8/1/2024   1:45 PM EDT   To: Neurosurgical [facility]   Subject: HFU                                              2-3 follow up with [place] and MRA head w wo contrast for aSAH

## 2024-08-01 NOTE — ASSESSMENT & PLAN NOTE
PPD#19 right PCOM coil embolization (Dr. Mcneal 7/13)  PPD#19 R frontal EVD placement (Dr. Goldberg, 7/13)  aSAH s/p R PCOM aneurysm rupture  Bleed day 21, HH 5, MF 4  P/w headache and right leg pain, 2 witnessed seizures by EMS was ultimately intubated.  Upon arrival to the ED patient was posturing.  Imaging revealed a large amount of SAH and a right PCOM aneurysm.  Per chart review he was previously on aspirin reversed with DDAVP.    Imaging:  CT head wo contrast 8/1/2024: Status post coiling of right P-comm aneurysm. Stable ventricles. No hydrocephalus. Small volume of residual subarachnoid and intraventricular hemorrhage is mildly decreased. Stable hypodensities in the right temporal lobe, right occipital lobe and right caudate head likely due to evolving subacute infarcts.     Plan:  Continue frequent neurological checks  Repeat CTA/CT head w/wo STAT if GCS declines more than 2 points in 1 hour  Given patient is BD 21 today, can likely start backing off of SAH pathway   TCDs pending for today, no need to continue this tomorrow  Continue Nimodipine 30 mg Q2hrs through today and stop tomorrow  Continue Keppra 750 mg BID per neurology  Normonatremia, 140 today  Euvolemia, -510/24 hr  Hemoglobin > 8, 10.8 today  EVD removed 7/26   SBP < 220   Continue aspirin 81 mg daily  Medical management and pain control per primary team  DVT PPX: SCDs     Neurosurgery will follow peripherally.  Plan to work towards dispo at this time.  Neurosurgery will see patient back in the office in 2-3 weeks with MRA head w wo contrast with Dr. Mcneal, call with any further questions or concerns.

## 2024-08-01 NOTE — PLAN OF CARE
Problem: Neurological Deficit  Goal: Neurological status is stable or improving  Description: Interventions:  - Monitor and assess patient's level of consciousness, motor function, sensory function, and level of assistance needed for ADLs.   - Monitor and report changes from baseline. Collaborate with interdisciplinary team to initiate plan and implement interventions as ordered.   - Provide and maintain a safe environment.  - Consider seizure precautions.  - Consider fall precautions.  - Consider aspiration precautions.  - Consider bleeding precautions.  Outcome: Progressing         Problem: Potential for Aspiration  Goal: Non-ventilated patient's risk of aspiration is minimized  Description: Assess and monitor vital signs, respiratory status, and labs (WBC).  Monitor for signs of aspiration (tachypnea, cough, rales, wheezing, cyanosis, fever).    - Assess and monitor patient's ability to swallow.  - Place patient up in chair to eat if possible.  - HOB up at 90 degrees to eat if unable to get patient up into chair.  - Supervise patient during oral intake.   - Instruct patient/ family to take small bites.  - Instruct patient/ family to take small single sips when taking liquids.  - Follow patient-specific strategies generated by speech pathologist.  Outcome: Progressing        Problem: PAIN - ADULT  Goal: Verbalizes/displays adequate comfort level or baseline comfort level  Description: Interventions:  - Encourage patient to monitor pain and request assistance  - Assess pain using appropriate pain scale  - Administer analgesics based on type and severity of pain and evaluate response  - Implement non-pharmacological measures as appropriate and evaluate response  - Consider cultural and social influences on pain and pain management  - Notify physician/advanced practitioner if interventions unsuccessful or patient reports new pain  Outcome: Progressing        Problem: Nutrition  Goal: Nutrition/Hydration status is  improving  Description: Monitor and assess patient's nutrition/hydration status for malnutrition (ex- brittle hair, bruises, dry skin, pale skin and conjunctiva, muscle wasting, smooth red tongue, and disorientation). Collaborate with interdisciplinary team and initiate plan and interventions as ordered.  Monitor patient's weight and dietary intake as ordered or per policy. Utilize nutrition screening tool and intervene per policy. Determine patient's food preferences and provide high-protein, high-caloric foods as appropriate.   - Monitor nutrition parameters, recommending adjustments to enteral nutrition orders at indicated.   - Assist patient with eating.  - Allow adequate time for meals.  - Encourage patient to take dietary supplement as ordered.  - Collaborate with interdisciplinary team.   - Include patient/family/caregiver in decisions related to nutrition.  Outcome: Progressing         Problem: SAFETY ADULT  Goal: Maintain or return to baseline ADL function  Description: INTERVENTIONS:  -  Assess patient's ability to carry out ADLs; assess patient's baseline for ADL function and identify physical deficits which impact ability to perform ADLs (bathing, care of mouth/teeth, toileting, grooming, dressing, etc.)  - Assess/evaluate cause of self-care deficits   - Assess range of motion  - Assess patient's mobility; develop plan if impaired  - Assess patient's need for assistive devices and provide as appropriate  - Encourage maximum independence but intervene and supervise when necessary  - Involve family in performance of ADLs  - Assess for home care needs following discharge   - Consider OT consult to assist with ADL evaluation and planning for discharge  - Provide patient education as appropriate  Outcome: Progressing     Problem: SAFETY ADULT  Goal: Patient will remain free of falls  Description: INTERVENTIONS:  - Educate patient/family on patient safety including physical limitations  - Instruct patient to  call for assistance with activity   - Consult OT/PT to assist with strengthening/mobility   - Keep Call bell within reach  - Keep bed low and locked with side rails adjusted as appropriate  - Keep care items and personal belongings within reach  - Initiate and maintain comfort rounds  - Make Fall Risk Sign visible to staff  - Offer Toileting every 2 Hours, in advance of need  - Initiate/Maintain bed alarm  - Obtain necessary fall risk management equipment: non skid footwear  - Apply yellow socks and bracelet for high fall risk patients  - Consider moving patient to room near nurses station  Outcome: Progressing           Problem: NEUROSENSORY - ADULT  Goal: Achieves stable or improved neurological status  Description: INTERVENTIONS  - Monitor and report changes in neurological status  - Monitor vital signs such as temperature, blood pressure, glucose, and any other labs ordered   - Initiate measures to prevent increased intracranial pressure  - Monitor for seizure activity and implement precautions if appropriate      Outcome: Progressing  Goal: Remains free of injury related to seizures activity  Description: INTERVENTIONS  - Maintain airway, patient safety  and administer oxygen as ordered  - Monitor patient for seizure activity, document and report duration and description of seizure to physician/advanced practitioner  - If seizure occurs,  ensure patient safety during seizure  - Reorient patient post seizure  - Seizure pads on all 4 side rails  - Instruct patient/family to notify RN of any seizure activity including if an aura is experienced  - Instruct patient/family to call for assistance with activity based on nursing assessment  - Administer anti-seizure medications if ordered    Outcome: Progressing  Goal: Achieves maximal functionality and self care  Description: INTERVENTIONS  - Monitor swallowing and airway patency with patient fatigue and changes in neurological status  - Encourage and assist patient to  increase activity and self care.   - Encourage visually impaired, hearing impaired and aphasic patients to use assistive/communication devices  Outcome: Progressing                  Melolabial Interpolation Flap Text: A decision was made to reconstruct the defect utilizing an interpolation axial flap and a staged reconstruction.  A telfa template was made of the defect.  This telfa template was then used to outline the melolabial interpolation flap.  The donor area for the pedicle flap was then injected with anesthesia.  The flap was excised through the skin and subcutaneous tissue down to the layer of the underlying musculature.  The pedicle flap was carefully excised within this deep plane to maintain its blood supply.  The edges of the donor site were undermined.   The donor site was closed in a primary fashion.  The pedicle was then rotated into position and sutured.  Once the tube was sutured into place, adequate blood supply was confirmed with blanching and refill.  The pedicle was then wrapped with xeroform gauze and dressed appropriately with a telfa and gauze bandage to ensure continued blood supply and protect the attached pedicle.

## 2024-08-01 NOTE — ASSESSMENT & PLAN NOTE
-SAH 7/12  -Modified barium swallow 7/29- mild-moderate dysphagia   - regular texture w/ thin liquid diet in place after speech therapy evaluation. Patient eating better

## 2024-08-01 NOTE — PLAN OF CARE
Problem: Prexisting or High Potential for Compromised Skin Integrity  Goal: Skin integrity is maintained or improved  Description: INTERVENTIONS:  - Identify patients at risk for skin breakdown  - Assess and monitor skin integrity  - Assess and monitor nutrition and hydration status  - Monitor labs   - Assess for incontinence   - Turn and reposition patient  - Assist with mobility/ambulation  - Relieve pressure over bony prominences  - Avoid friction and shearing  - Provide appropriate hygiene as needed including keeping skin clean and dry  - Evaluate need for skin moisturizer/barrier cream  - Collaborate with interdisciplinary team   - Patient/family teaching  - Consider wound care consult   Outcome: Progressing     Problem: Neurological Deficit  Goal: Neurological status is stable or improving  Description: Interventions:  - Monitor and assess patient's level of consciousness, motor function, sensory function, and level of assistance needed for ADLs.   - Monitor and report changes from baseline. Collaborate with interdisciplinary team to initiate plan and implement interventions as ordered.   - Provide and maintain a safe environment.  - Consider seizure precautions.  - Consider fall precautions.  - Consider aspiration precautions.  - Consider bleeding precautions.  Outcome: Progressing     Problem: Activity Intolerance/Impaired Mobility  Goal: Mobility/activity is maintained at optimum level for patient  Description: Interventions:  - Assess and monitor patient  barriers to mobility and need for assistive/adaptive devices.  - Assess patient's emotional response to limitations.  - Collaborate with interdisciplinary team and initiate plans and interventions as ordered.  - Encourage independent activity per ability.  - Maintain proper body alignment.  - Perform active/passive rom as tolerated/ordered.  - Plan activities to conserve energy.  - Turn patient as appropriate  Outcome: Progressing     Problem:  Communication Impairment  Goal: Ability to express needs and understand communication  Description: Assess patient's communication skills and ability to understand information.  Patient will demonstrate use of effective communication techniques, alternative methods of communication and understanding even if not able to speak.     - Encourage communication and provide alternate methods of communication as needed.  - Collaborate with case management/ for discharge needs.  - Include patient/family/caregiver in decisions related to communication.  Outcome: Progressing     Problem: Potential for Aspiration  Goal: Non-ventilated patient's risk of aspiration is minimized  Description: Assess and monitor vital signs, respiratory status, and labs (WBC).  Monitor for signs of aspiration (tachypnea, cough, rales, wheezing, cyanosis, fever).    - Assess and monitor patient's ability to swallow.  - Place patient up in chair to eat if possible.  - HOB up at 90 degrees to eat if unable to get patient up into chair.  - Supervise patient during oral intake.   - Instruct patient/ family to take small bites.  - Instruct patient/ family to take small single sips when taking liquids.  - Follow patient-specific strategies generated by speech pathologist.  Outcome: Progressing     Problem: Nutrition  Goal: Nutrition/Hydration status is improving  Description: Monitor and assess patient's nutrition/hydration status for malnutrition (ex- brittle hair, bruises, dry skin, pale skin and conjunctiva, muscle wasting, smooth red tongue, and disorientation). Collaborate with interdisciplinary team and initiate plan and interventions as ordered.  Monitor patient's weight and dietary intake as ordered or per policy. Utilize nutrition screening tool and intervene per policy. Determine patient's food preferences and provide high-protein, high-caloric foods as appropriate.   - Monitor nutrition parameters, recommending adjustments to  enteral nutrition orders at indicated.   - Assist patient with eating.  - Allow adequate time for meals.  - Encourage patient to take dietary supplement as ordered.  - Collaborate with interdisciplinary team.   - Include patient/family/caregiver in decisions related to nutrition.  Outcome: Progressing     Problem: PAIN - ADULT  Goal: Verbalizes/displays adequate comfort level or baseline comfort level  Description: Interventions:  - Encourage patient to monitor pain and request assistance  - Assess pain using appropriate pain scale  - Administer analgesics based on type and severity of pain and evaluate response  - Implement non-pharmacological measures as appropriate and evaluate response  - Consider cultural and social influences on pain and pain management  - Notify physician/advanced practitioner if interventions unsuccessful or patient reports new pain  Outcome: Progressing     Problem: INFECTION - ADULT  Goal: Absence or prevention of progression during hospitalization  Description: INTERVENTIONS:  - Assess and monitor for signs and symptoms of infection  - Monitor lab/diagnostic results  - Monitor all insertion sites, i.e. indwelling lines, tubes, and drains  - Monitor endotracheal if appropriate and nasal secretions for changes in amount and color  - [place] appropriate cooling/warming therapies per order  - Administer medications as ordered  - Instruct and encourage patient and family to use good hand hygiene technique  - Identify and instruct in appropriate isolation precautions for identified infection/condition  Outcome: Progressing  Goal: Absence of fever/infection during neutropenic period  Description: INTERVENTIONS:  - Monitor WBC    Outcome: Progressing     Problem: SAFETY ADULT  Goal: Patient will remain free of falls  Description: INTERVENTIONS:  - Educate patient/family on patient safety including physical limitations  - Instruct patient to call for assistance with activity   - Consult OT/PT to  assist with strengthening/mobility   - Keep Call bell within reach  - Keep bed low and locked with side rails adjusted as appropriate  - Keep care items and personal belongings within reach  - Initiate and maintain comfort rounds  - Make Fall Risk Sign visible to staff  - Offer Toileting every 2 Hours, in advance of need  - Initiate/Maintain bed alarm  - Obtain necessary fall risk management equipment: non skid footwear  - Apply yellow socks and bracelet for high fall risk patients  - Consider moving patient to room near nurses station  Outcome: Progressing  Goal: Maintain or return to baseline ADL function  Description: INTERVENTIONS:  -  Assess patient's ability to carry out ADLs; assess patient's baseline for ADL function and identify physical deficits which impact ability to perform ADLs (bathing, care of mouth/teeth, toileting, grooming, dressing, etc.)  - Assess/evaluate cause of self-care deficits   - Assess range of motion  - Assess patient's mobility; develop plan if impaired  - Assess patient's need for assistive devices and provide as appropriate  - Encourage maximum independence but intervene and supervise when necessary  - Involve family in performance of ADLs  - Assess for home care needs following discharge   - Consider OT consult to assist with ADL evaluation and planning for discharge  - Provide patient education as appropriate  Outcome: Progressing  Goal: Maintains/Returns to pre admission functional level  Description: INTERVENTIONS:  - Perform AM-PAC 6 Click Basic Mobility/ Daily Activity assessment daily.  - Set and communicate daily mobility goal to care team and patient/family/caregiver.   - Collaborate with rehabilitation services on mobility goals if consulted  - Perform Range of Motion 3 times a day.  - Reposition patient every 2 hours.  - Dangle patient 3 times a day  - Stand patient 3 times a day  - Out of bed to chair 1 times a day   - Out of bed for meals 1 times a day  - Out of bed for  toileting  - Record patient progress and toleration of activity level   Outcome: Progressing     Problem: DISCHARGE PLANNING  Goal: Discharge to home or other facility with appropriate resources  Description: INTERVENTIONS:  - Identify barriers to discharge w/patient and caregiver  - Arrange for needed discharge resources and transportation as appropriate  - Identify discharge learning needs (meds, wound care, etc.)  - Arrange for interpretive services to assist at discharge as needed  - Refer to Case Management Department for coordinating discharge planning if the patient needs post-hospital services based on physician/advanced practitioner order or complex needs related to functional status, cognitive ability, or social support system  Outcome: Progressing     Problem: Knowledge Deficit  Goal: Patient/family/caregiver demonstrates understanding of disease process, treatment plan, medications, and discharge instructions  Description: Complete learning assessment and assess knowledge base.  Interventions:  - Provide teaching at level of understanding  - Provide teaching via preferred learning methods  Outcome: Progressing     Problem: Nutrition/Hydration-ADULT  Goal: Nutrient/Hydration intake appropriate for improving, restoring or maintaining nutritional needs  Description: Monitor and assess patient's nutrition/hydration status for malnutrition. Collaborate with interdisciplinary team and initiate plan and interventions as ordered.  Monitor patient's weight and dietary intake as ordered or per policy. Utilize nutrition screening tool and intervene as necessary. Determine patient's food preferences and provide high-protein, high-caloric foods as appropriate.     INTERVENTIONS:  - Monitor oral intake, urinary output, labs, and treatment plans  - Assess nutrition and hydration status and recommend course of action  - Evaluate amount of meals eaten  - Assist patient with eating if necessary   - Allow adequate time for  meals  - Recommend/ encourage appropriate diets, oral nutritional supplements, and vitamin/mineral supplements  - Order, calculate, and assess calorie counts as needed  - Recommend, monitor, and adjust tube feeding based on assessed needs  - Assess need for intravenous fluids  - Provide nutrition/hydration education as appropriate  - Include patient/family/caregiver in decisions related to nutrition  Outcome: Progressing     Problem: NEUROSENSORY - ADULT  Goal: Achieves stable or improved neurological status  Description: INTERVENTIONS  - Monitor and report changes in neurological status  - Monitor vital signs such as temperature, blood pressure, glucose, and any other labs ordered   - Initiate measures to prevent increased intracranial pressure  - Monitor for seizure activity and implement precautions if appropriate      Outcome: Progressing  Goal: Remains free of injury related to seizures activity  Description: INTERVENTIONS  - Maintain airway, patient safety  and administer oxygen as ordered  - Monitor patient for seizure activity, document and report duration and description of seizure to physician/advanced practitioner  - If seizure occurs,  ensure patient safety during seizure  - Reorient patient post seizure  - Seizure pads on all 4 side rails  - Instruct patient/family to notify RN of any seizure activity including if an aura is experienced  - Instruct patient/family to call for assistance with activity based on nursing assessment  - Administer anti-seizure medications if ordered    Outcome: Progressing  Goal: Achieves maximal functionality and self care  Description: INTERVENTIONS  - Monitor swallowing and airway patency with patient fatigue and changes in neurological status  - Encourage and assist patient to increase activity and self care.   - Encourage visually impaired, hearing impaired and aphasic patients to use assistive/communication devices  Outcome: Progressing     Problem: CARDIOVASCULAR -  ADULT  Goal: Maintains optimal cardiac output and hemodynamic stability  Description: INTERVENTIONS:  - Monitor I/O, vital signs and rhythm  - Monitor for S/S and trends of decreased cardiac output  - Administer and titrate ordered vasoactive medications to optimize hemodynamic stability  - Assess quality of pulses, skin color and temperature  - Assess for signs of decreased coronary artery perfusion  - Instruct patient to report change in severity of symptoms  Outcome: Progressing  Goal: Absence of cardiac dysrhythmias or at baseline rhythm  Description: INTERVENTIONS:  - Continuous cardiac monitoring, vital signs, obtain 12 lead EKG if ordered  - Administer antiarrhythmic and heart rate control medications as ordered  - Monitor electrolytes and administer replacement therapy as ordered  Outcome: Progressing     Problem: RESPIRATORY - ADULT  Goal: Achieves optimal ventilation and oxygenation  Description: INTERVENTIONS:  - Assess for changes in respiratory status  - Assess for changes in mentation and behavior  - Position to facilitate oxygenation and minimize respiratory effort  - Oxygen administered by appropriate delivery if ordered  - Initiate smoking cessation education as indicated  - Encourage broncho-pulmonary hygiene including cough, deep breathe, Incentive Spirometry  - Assess the need for suctioning and aspirate as needed  - Assess and instruct to report SOB or any respiratory difficulty  - Respiratory Therapy support as indicated  Outcome: Progressing     Problem: GASTROINTESTINAL - ADULT  Goal: Maintains or returns to baseline bowel function  Description: INTERVENTIONS:  - Assess bowel function  - Encourage oral fluids to ensure adequate hydration  - Administer IV fluids if ordered to ensure adequate hydration  - Administer ordered medications as needed  - Encourage mobilization and activity  - Consider nutritional services referral to assist patient with adequate nutrition and appropriate food  choices  Outcome: Progressing  Goal: Maintains adequate nutritional intake  Description: INTERVENTIONS:  - Monitor percentage of each meal consumed  - Identify factors contributing to decreased intake, treat as appropriate  - Assist with meals as needed  - Monitor I&O, weight, and lab values if indicated  - Obtain nutrition services referral as needed  Outcome: Progressing     Problem: GENITOURINARY - ADULT  Goal: Maintains or returns to baseline urinary function  Description: INTERVENTIONS:  - Assess urinary function  - Encourage oral fluids to ensure adequate hydration if ordered  - Administer IV fluids as ordered to ensure adequate hydration  - Administer ordered medications as needed  - Offer frequent toileting  - Follow urinary retention protocol if ordered  Outcome: Progressing  Goal: Absence of urinary retention  Description: INTERVENTIONS:  - Assess patient’s ability to void and empty bladder  - Monitor I/O  - Bladder scan as needed  - Discuss with physician/AP medications to alleviate retention as needed  - Discuss catheterization for long term situations as appropriate  Outcome: Progressing     Problem: METABOLIC, FLUID AND ELECTROLYTES - ADULT  Goal: Electrolytes maintained within normal limits  Description: INTERVENTIONS:  - Monitor labs and assess patient for signs and symptoms of electrolyte imbalances  - Administer electrolyte replacement as ordered  - Monitor response to electrolyte replacements, including repeat lab results as appropriate  - Instruct patient on fluid and nutrition as appropriate  Outcome: Progressing  Goal: Fluid balance maintained  Description: INTERVENTIONS:  - Monitor labs   - Monitor I/O and WT  - Instruct patient on fluid and nutrition as appropriate  - Assess for signs & symptoms of volume excess or deficit  Outcome: Progressing  Goal: Glucose maintained within target range  Description: INTERVENTIONS:  - Monitor Blood Glucose as ordered  - Assess for signs and symptoms of  hyperglycemia and hypoglycemia  - Administer ordered medications to maintain glucose within target range  - Assess nutritional intake and initiate nutrition service referral as needed  Outcome: Progressing     Problem: HEMATOLOGIC - ADULT  Goal: Maintains hematologic stability  Description: INTERVENTIONS  - Assess for signs and symptoms of bleeding or hemorrhage  - Monitor labs  - Administer supportive blood products/factors as ordered and appropriate  Outcome: Progressing     Problem: MUSCULOSKELETAL - ADULT  Goal: Maintain or return mobility to safest level of function  Description: INTERVENTIONS:  - Assess patient's ability to carry out ADLs; assess patient's baseline for ADL function and identify physical deficits which impact ability to perform ADLs (bathing, care of mouth/teeth, toileting, grooming, dressing, etc.)  - Assess/evaluate cause of self-care deficits   - Assess range of motion  - Assess patient's mobility  - Assess patient's need for assistive devices and provide as appropriate  - Encourage maximum independence but intervene and supervise when necessary  - Involve family in performance of ADLs  - Assess for home care needs following discharge   - Consider OT consult to assist with ADL evaluation and planning for discharge  - Provide patient education as appropriate  Outcome: Progressing     Problem: COPING  Goal: Pt/Family able to verbalize concerns and demonstrate effective coping strategies  Description: INTERVENTIONS:  - Assist patient/family to identify coping skills, available support systems and cultural and spiritual values  - Provide emotional support, including active listening and acknowledgement of concerns of patient and caregivers  - Reduce environmental stimuli, as able  - Provide patient education  - Assess for spiritual pain/suffering and initiate spiritual care, including notification of Pastoral Care or halie based community as needed  - Assess effectiveness of coping  strategies  Outcome: Progressing  Goal: Will report anxiety at manageable levels  Description: INTERVENTIONS:  - Administer medication as ordered  - Teach and encourage coping skills  - Provide emotional support  - Assess patient/family for anxiety and ability to cope  Outcome: Progressing     Problem: Potential for Falls  Goal: Patient will remain free of falls  Description: INTERVENTIONS:  - Educate patient/family on patient safety including physical limitations  - Instruct patient to call for assistance with activity   - Consult OT/PT to assist with strengthening/mobility   - Keep Call bell within reach  - Keep bed low and locked with side rails adjusted as appropriate  - Keep care items and personal belongings within reach  - Initiate and maintain comfort rounds  - Make Fall Risk Sign visible to staff  - Offer Toileting every 2 Hours, in advance of need  - Initiate/Maintain bed alarm  - Obtain necessary fall risk management equipment: non skid footwear  - Apply yellow socks and bracelet for high fall risk patients  - Consider moving patient to room near nurses station  Outcome: Progressing

## 2024-08-01 NOTE — TELEPHONE ENCOUNTER
[facility]  2-3 follow up with [place] and [facility] head w wo contrast for aSAH- I receieved message via inbox I sent message to arelis for apt awaiting response -BA

## 2024-08-01 NOTE — PROGRESS NOTES
INTERNAL MEDICINE RESIDENCY PROGRESS NOTE     Name: Federico Bowen   Age & Sex: 68 y.o. male   MRN: [id number]  Unit/Bed#: [facility] 715-01   Encounter: [id number]  Team: SOD Team A    PATIENT INFORMATION     Name: Federico Bowen   Age & Sex: 68 y.o. male   MRN: [id number]  Hospital Stay Days: 20    ASSESSMENT/PLAN     Principal Problem:    Subarachnoid hemorrhage (HCC)  Active Problems:    HTN (hypertension)    Hyperlipidemia    Seizures (HCC)    Severe protein-calorie malnutrition (HCC)    Goals of care, counseling/discussion    Palliative care encounter    Coronary artery disease    COPD (chronic obstructive pulmonary disease) ([facility])    Dysphagia as late effect of cerebrovascular disease      Dysphagia as late effect of cerebrovascular disease  Assessment & Plan  -SAH 7/12  -Modified barium swallow 7/29- mild-moderate dysphagia   -Pureed diet recommended    COPD (chronic obstructive pulmonary disease) ([facility])  Assessment & Plan  Documented h/o of COPD although no PFTs on file    Plan:  No concerns for exacerbation at this time  Continue prn albuterol    Coronary artery disease  Assessment & Plan  Patient with little documentation in medical record but appears to have non-obstructive CAD with history of unstable angina component and previously was on nitro prn and Ranexa. Home medications noted metoprolol and Entresto however last echo found to be in 2015 with EF 77% and most recent echo of 55% on 7/13/24 and thus Entresto was not restarted prior to downgrade from N-ICU.    Per patient, he has a history of a stent that was placed, but he is unable to provide more details.    Plan:  Continue ASA 81mg qd  Recommend for patient to re-establish care with Cardiology after discharge  Home metoprolol held given concurrent nimodipine and stable HR. Unclear if patient was taking prior to admission  Similarly as above, Brilinta noted as home medication on admission. Currently not continued in setting of SAH this  admission. Unable to find documented history of VIRA, outpatient f/u with cardiology.     Severe protein-calorie malnutrition (HCC)  Assessment & Plan  Malnutrition Findings:   Adult Malnutrition type: Acute illness  Adult Degree of Malnutrition: Other severe protein calorie malnutrition  Malnutrition Characteristics: Fat loss, Muscle loss, Inadequate energy, Weight loss                  360 Statement: Acute severe pro, vinny malnutrition d/t condition as evidence by signficant weight loss, 7/13/24 54kg, 7/19/24 49kg, 5kg/9% wt. loss x <1 week, moderate to severe signs of muscle/ fat loss at temples, orbitals, calvicles, triceps, shoulders, treated with TF.    BMI Findings:           There is no height or weight on file to calculate BMI.     Plan:  Nutrition consult placed earlier 7/18 while patient required tube feeds  Calorie count in place  Trialing oral diet for now and then may have to consider PEG tube if oral intake is not sufficient for adequate caloric intake  Can re-engage Nutrition and consider adding Ensure supplements, magic cup, etc as diet restrictions tolerate    Moderate protein-calorie malnutrition (HCC)  Assessment & Plan  Malnutrition Findings:   Adult Malnutrition type: Acute illness  Adult Degree of Malnutrition: Other severe protein calorie malnutrition  Malnutrition Characteristics: Fat loss, Muscle loss, Inadequate energy, Weight loss                  360 Statement: Acute severe pro, vinny malnutrition d/t condition as evidence by signficant weight loss, 7/13/24 54kg, 7/19/24 49kg, 5kg/9% wt. loss x <1 week, moderate to severe signs of muscle/ fat loss at temples, orbitals, calvicles, triceps, shoulders, treated with TF.    BMI Findings:          There is no height or weight on file to calculate BMI.       Seizures (HCC)  Assessment & Plan  New-onset this admission. En route to [facility] ED, patient had 2 witnessed seizures while being transported as stroke alert via EMS. Loaded on Keppra upon  arrival to [facility]    Continue Keppra 750mg q12H    Hyperlipidemia  Assessment & Plan  F/u final results of lipid panel, none documented this admission.     Continue home zetia 10mg  Continue lipitor 80mg     HTN (hypertension)  Assessment & Plan  History of hypertension. Home Anti-HTN meds include metoprolol and entresto, both of which have not been restarted    Plan as reiterated from SAH A/P:  Midodrine 10mg PO TID. Ok to begin weaning midodrine once patient has completed nimodipine  SBP goal normotensive  Continue Nimodipine 30mg q2H through 8/2 12am  Consider adding patient's home metoprolol and entresto    * Subarachnoid hemorrhage (HCC)  Assessment & Plan  Patient was complaining of headache and R leg pain. En route to [facility] ED, patient had 2 witnessed seizures. He was unresponsive when arrived to the ED and was a stroke alert. Patient was intubated and given DDAVP for home ASA usage and also given mannitol. He was started on cardene gtt and Keppra load. EMS called and pt brought to [place] in setting of R MSK pain. CTH showed a large SAH with IVH s/p transfer to [facility] for coil embolization of R PCOM aneurysm and EVD on 7/13. Extubated 7/13. EVD removed 7/26. Course complicated by aspiration pneumonia for which he has already completed abx therapy. Followed on SAH pathway in Neuro ICU since transfer to [facility].     Plan:  Deemed medically stable by NCC team for downgrade to [facility] today 8/1-  Status post coiling of right P-comm aneurysm. Stable ventricles. No hydrocephalus. Small volume of residual subarachnoid and intraventricular hemorrhage is mildly decreased. Stable hypodensities in the right temporal lobe, right occipital lobe and right caudate head likely due to evolving subacute infarcts.  Midodrine 10mg PO TID. Ok to begin weaning midodrine once patient has completed nimodipine  SBP goal normotensive  Continue ASA 81mg qd  Continue Nimodipine 30mg q2H through 8/2 12am  AED ppx: Keppra 750mg  q12H  PT/OT/speech ELVIS dominguez consult  PMR consult placed and are following  Continue q4H neuro checks, ok to let sleep at night  Will need neurosurgery referral and outpatient f/u after discharge  Pain regimen for headache/MSK sequelae of SAH:  Gabapentin -[id number]  PRN Fioricet, percocet, tylenol, lidocaine patch  - day 21 of SAH treatment- per neuro, PT able to discontinue TCDs and nimodipine as of .  Patient complaining of headache 10/10 today . He is disoriented, distractible and slow to answer questions on exam. GCS 14. Likely 2/2 to SAH. Patient can take his pain medications more frequently. Received CTH today showing stable pathology. Will continue to monitor for changing in neuro status.    Fever-resolved as of 2024  Assessment & Plan  Hospital course complicated by aspiration pneumonia s/p extubation in N-ICU. Completed abx therapy. Otherwise fevers of central origin given SAH and IVH on presentation. Continuing to monitor.         Disposition: Home pending improvement     SUBJECTIVE     Patient seen and examined. No acute events overnight. Patient appear uncomfortable and is moving around in the bed. He is able to open his eyes to command but keeps his eyes closed for most of the encounter. Patient is complaining of pain in the occipital region of his head 10/10. He is unable to tell me any more details about the pain. He is distractible and slow to answer some questions. Speech is intact. He denies any chest pain, SOB, or abdominal pain.     OBJECTIVE     Vitals:    24 0712 24 0813 24 1000 24 1211   BP: 98/66 110/71 114/78 109/70   Pulse: 92 90  82   Resp:       Temp: (!) 97.3 °F (36.3 °C)      TempSrc:       SpO2: 95% 95%  96%   Weight:          Temperature:   Temp (24hrs), Av.7 °F (36.5 °C), Min:96.9 °F (36.1 °C), Max:98.5 °F (36.9 °C)    Temperature: (!) 97.3 °F (36.3 °C)  Intake & Output:  I/O          0701  07  0701  08/02 0700    P.O.  440     Total Intake(mL/kg)  440 (10.3)     Urine (mL/kg/hr) 600 (0.6) 950 (0.9)     Stool 0 0     Total Output 600 950     Net -600 -510            Unmeasured Stool Occurrence 1 x 1 x           Weights:        There is no height or weight on file to calculate BMI.  Weight (last 2 days)       Date/Time Weight    08/01/24 0600 42.7 (94.14)    07/31/24 0600 44.3 (97.6)    07/30/24 0548 44 (97)          Physical Exam  HENT:      Head: Normocephalic.   Eyes:      Extraocular Movements: Extraocular movements intact.      Pupils: Pupils are equal, round, and reactive to light.   Cardiovascular:      Rate and Rhythm: Normal rate and regular rhythm.      Pulses: Normal pulses.      Heart sounds: Normal heart sounds.   Pulmonary:      Effort: Pulmonary effort is normal.      Breath sounds: Normal breath sounds.   Abdominal:      General: Abdomen is flat. Bowel sounds are normal.      Palpations: Abdomen is soft.   Neurological:      Mental Status: He is alert. He is disoriented.      GCS: GCS eye subscore is 3. GCS verbal subscore is 5. GCS motor subscore is 6.      Cranial Nerves: No cranial nerve deficit.      Sensory: No sensory deficit.      Motor: Motor function is intact.      Comments: Alert to person and year. Not oriented to place or events leading up to today.   Psychiatric:      Comments: Distractible, slow to answer questions        LABORATORY DATA     Labs: I have personally reviewed pertinent reports.  Results from last 7 days   Lab Units 08/01/24  0515 07/31/24  0543 07/30/24  0553 07/29/24  0638 07/28/24  0510   WBC Thousand/uL 11.96* 16.54* 16.76* 14.31* 14.44*   HEMOGLOBIN g/dL 10.8* 11.1* 10.5* 10.6* 10.1*   HEMATOCRIT % 34.5* 34.4* 32.0* 32.1* 30.6*   PLATELETS Thousands/uL 677* 718* 677* 618* 612*   SEGS PCT %  --   --  80* 79* 75   MONO PCT %  --   --  4 4 5   EOS PCT %  --   --  3 3 3      Results from last 7 days   Lab Units 08/01/24  0515 07/31/24  0543 07/30/24  0553  07/29/24  1139   POTASSIUM mmol/L 4.5 4.5 4.5 4.6   CHLORIDE mmol/L 106 103 102 103   CO2 mmol/L 27 28 26 27   BUN mg/dL 26* 26* 23 20   CREATININE mg/dL 0.79 0.78 0.81 0.73   CALCIUM mg/dL 9.4 9.3 9.2 9.2   ALK PHOS U/L 159* 144*  --  132*   ALT U/L 27 23  --  15   AST U/L 29 26  --  25     Results from last 7 days   Lab Units 08/01/24  0515 07/31/24  0543 07/30/24  0553   MAGNESIUM mg/dL 2.4 2.5 2.4     Results from last 7 days   Lab Units 08/01/24  0515 07/31/24  0543 07/30/24  0553   PHOSPHORUS mg/dL 3.5 3.4 3.6      Results from last 7 days   Lab Units 07/28/24  0510 07/27/24  0548 07/26/24  0556   PTT seconds 31 34 38*               IMAGING & DIAGNOSTIC TESTING     Radiology Results: I have personally reviewed pertinent reports.  CTA head and neck w wo contrast    Result Date: 7/16/2024  Impression: CT Brain: Redemonstrated subarachnoid hemorrhage overlying the right greater than left convexities and concentrated within the right sylvian cistern and fissure. . Subacute infarct involving the right anterior temporal region and medial occipital region.. 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 Workstation performed: [id number]     IR cerebral angiography / intervention    Result Date: 7/15/2024  Impression: Successful balloon assisted coil embolization of a 4.5 mm right P-comm aneurysm. Workstation performed: [id number]     CT head wo contrast    Result Date: 7/14/2024  Impression: No significant interval change in extensive bilateral subarachnoid hemorrhage and intraventricular hemorrhage. Workstation performed: [id number]     CT head wo contrast    Result Date: 7/13/2024  Impression: Interval placement of right-sided shunt catheter. Stable ventricular size with slight interval increase in intraventricular hemorrhage layering in the occipital horns bilaterally. Diffuse subarachnoid  hemorrhage again noted. Workstation performed: [id number]     XR chest portable ICU    Result Date: 7/12/2024  Impression: No acute cardiopulmonary disease. ET tube 6 cm above the landon. Workstation performed: [id number]     CTA stroke alert (head/neck)    Result Date: 7/12/2024  Impression: 0.3 cm aneurysm in expected origin of right posterior communicating artery. Possible tiny aneurysm in left anterior communicating artery region. These could be potential sources of diffuse subarachnoid hemorrhage. Recommend neurovascular surgery consultation for further evaluation. Mild narrowing of bilateral MCA M1 and bilateral M2 segmental branch vessels, likely due to vasospasm. Patent stent in left proximal subclavian artery. Endotracheal tube in trachea. Additional chronic/incidental findings as detailed above. Findings were directly discussed with Emmanuel Lion at approximately 4:34 p.m. on 7/12/2024. Workstation performed: [id number]     CT stroke alert brain    Result Date: 7/12/2024  Impression: Acute diffuse thickened large-volume subarachnoid hemorrhage throughout the bilateral parafalcine regions, bilateral cerebral sulci (right worse than left), basilar cisterns, prepontine cistern, premedullary cistern, and visualized upper cervical spinal canal. Acute small volume intraventricular hemorrhage with mild hydrocephalus. No acute infarction. Findings were directly discussed with Emmanuel Lion at approximately 4:34 p.m. on 7/12/2024. Workstation performed: [id number]     Other Diagnostic Testing: I have personally reviewed pertinent reports.    ACTIVE MEDICATIONS     Current Facility-Administered Medications   Medication Dose Route Frequency    acetaminophen (TYLENOL) oral suspension 975 mg  975 mg Oral Q8H PRN    albuterol (PROVENTIL HFA,VENTOLIN HFA) inhaler 2 puff  2 puff Inhalation Q4H PRN    aspirin chewable tablet 81 mg  81 mg Oral Daily    atorvastatin (LIPITOR) tablet 80 mg  80 mg Oral Daily    bisacodyl  "(DULCOLAX) rectal suppository 10 mg  10 mg Rectal Daily PRN    butalbital-acetaminophen-caffeine (FIORICET,ESGIC) -40 mg per tablet 1 tablet  1 tablet Oral Q4H PRN    ezetimibe (ZETIA) tablet 10 mg  10 mg Oral QAM    folic acid (FOLVITE) tablet 400 mcg  400 mcg Oral Daily    gabapentin (NEURONTIN) capsule 400 mg  400 mg Oral BID    gabapentin (NEURONTIN) capsule 600 mg  600 mg Oral HS    heparin (porcine) subcutaneous injection 5,000 Units  5,000 Units Subcutaneous Q8H [facility]    insulin lispro (HumALOG/ADMELOG) 100 units/mL subcutaneous injection 1-5 Units  1-5 Units Subcutaneous Q6H MATTY    levETIRAcetam (KEPPRA) oral solution 750 mg  750 mg Oral Q12H MATTY    lidocaine (LIDODERM) 5 % patch 1 patch  1 patch Topical Daily    lidocaine (LIDODERM) 5 % patch 1 patch  1 patch Topical Daily PRN    midodrine (PROAMATINE) tablet 10 mg  10 mg Oral TID AC    niMODipine (NIMOTOP) oral solution 30 mg  30 mg Per NG Tube Q2H    omeprazole (PRILOSEC) suspension 2 mg/mL  20 mg Oral Daily Before Breakfast    ondansetron (ZOFRAN) injection 4 mg  4 mg Intravenous Q6H PRN    oxyCODONE-acetaminophen (PERCOCET) 5-325 mg per tablet 1 tablet  1 tablet Oral Q4H PRN    saliva substitute (MOUTH KOTE) mucosal solution 5 spray  5 spray Mouth/Throat 4x Daily PRN    thiamine tablet 100 mg  100 mg Oral Daily       VTE Pharmacologic Prophylaxis: Heparin  VTE Mechanical Prophylaxis: sequential compression device    Portions of the record may have been created with voice recognition software.  Occasional wrong word or \"sound a like\" substitutions may have occurred due to the inherent limitations of voice recognition software.  Read the chart carefully and recognize, using context, where substitutions have occurred.  ==  Sean Christianson MD  [facility]  Internal Medicine Residency PGY-1      "

## 2024-08-01 NOTE — PROGRESS NOTES
[facility]  Progress Note  Name: Federico Bowen I  MRN: [id number]  [facility] I Date of Admission: 7/12/2024   Date of Service: 8/1/2024 I Hospital Day: 20    Assessment & Plan   * Subarachnoid hemorrhage (HCC)  Assessment & Plan  PPD#19 right PCOM coil embolization (Dr. Mcneal 7/13)  PPD#19 R frontal EVD placement (Dr. Goldberg, 7/13)  aSAH s/p R PCOM aneurysm rupture  Bleed day 21, HH 5, MF 4  P/w headache and right leg pain, 2 witnessed seizures by EMS was ultimately intubated.  Upon arrival to the ED patient was posturing.  Imaging revealed a large amount of SAH and a right PCOM aneurysm.  Per chart review he was previously on aspirin reversed with DDAVP.    Imaging:  CT head wo contrast 8/1/2024: Status post coiling of right P-comm aneurysm. Stable ventricles. No hydrocephalus. Small volume of residual subarachnoid and intraventricular hemorrhage is mildly decreased. Stable hypodensities in the right temporal lobe, right occipital lobe and right caudate head likely due to evolving subacute infarcts.     Plan:  Continue frequent neurological checks  Repeat CTA/CT head w/wo STAT if GCS declines more than 2 points in 1 hour  Given patient is BD 21 today, can likely start backing off of SAH pathway   TCDs pending for today, no need to continue this tomorrow  Continue Nimodipine 30 mg Q2hrs through today and stop tomorrow  Continue Keppra 750 mg BID per neurology  Normonatremia, 140 today  Euvolemia, -510/24 hr  Hemoglobin > 8, 10.8 today  EVD removed 7/26   SBP < 220   Continue aspirin 81 mg daily  Medical management and pain control per primary team  DVT PPX: SCDs     Neurosurgery will follow peripherally.  Plan to work towards dispo at this time.  Neurosurgery will see patient back in the office in 2-3 weeks with MRA head w wo contrast with Dr. Mcneal, call with any further questions or concerns.    Seizures (HCC)  Assessment & Plan  Neurology  following  2 witnessed seizures on presentation at [facility]  Patient currently on Keppra 750 mg BID per neurology  vEEG 7/14 no seizure activity         Subjective/Objective   Chief Complaint: follow up [facility]     Subjective: patient doing well today. Family at bedside.  No complaints.    Objective:  sitting in chair, NAD    I/O         07/30 0701 07/31 0700 07/31 0701 08/01 0700 08/01 0701 08/02 0700    P.O.  440     Total Intake(mL/kg)  440 (10.3)     Urine (mL/kg/hr) 600 (0.6) 950 (0.9)     Stool 0 0     Total Output 600 950     Net -600 -510            Unmeasured Stool Occurrence 1 x 1 x             Invasive Devices       Peripheral Intravenous Line  Duration             Peripheral IV 08/01/24 Dorsal (posterior);Left Forearm <1 day              Drain  Duration             External Urinary Catheter Small 18 days                    Physical Exam:  Vitals: Blood pressure 109/70, pulse 82, temperature (!) 97.3 °F (36.3 °C), resp. rate 16, weight 42.7 kg (94 lb 2.2 oz), SpO2 96%.,There is no height or weight on file to calculate BMI.      General appearance: alert, appears stated age, cooperative and no distress  Head: EVD sites dry  Eyes: conjugate gaze  Neck: supple, symmetrical, trachea midline   Lungs: non labored breathing  Heart: regular heart rate  Neurologic:   Mental status: Alert, oriented x3, follows commands, thought content appropriate  Cranial nerves: grossly intact (Cranial nerves II-XII)  Motor: moving all extremities without focal weakness   Coordination: finger to nose normal bilaterally, no drift bilaterally      Lab Results:  Results from last 7 days   Lab Units 08/01/24  0515 07/31/24  0543 07/30/24  0553 07/29/24  0638 07/28/24  0510   WBC Thousand/uL 11.96* 16.54* 16.76* 14.31* 14.44*   HEMOGLOBIN g/dL 10.8* 11.1* 10.5* 10.6* 10.1*   HEMATOCRIT % 34.5* 34.4* 32.0* 32.1* 30.6*   PLATELETS Thousands/uL 677* 718* 677* 618* 612*   SEGS PCT %  --   --  80* 79* 75   MONO PCT %  --   --  4 4 5   EOS PCT  "%  --   --  3 3 3     Results from last 7 days   Lab Units 08/01/24  0515 07/31/24  0543 07/30/24  0553 07/29/24  1139   SODIUM mmol/L 140 140 139 137   POTASSIUM mmol/L 4.5 4.5 4.5 4.6   CHLORIDE mmol/L 106 103 102 103   CO2 mmol/L 27 28 26 27   BUN mg/dL 26* 26* 23 20   CREATININE mg/dL 0.79 0.78 0.81 0.73   CALCIUM mg/dL 9.4 9.3 9.2 9.2   ALK PHOS U/L 159* 144*  --  132*   ALT U/L 27 23  --  15   AST U/L 29 26  --  25     Results from last 7 days   Lab Units 08/01/24  0515 07/31/24  0543 07/30/24  0553   MAGNESIUM mg/dL 2.4 2.5 2.4     Results from last 7 days   Lab Units 08/01/24  0515 07/31/24  0543 07/30/24  0553   PHOSPHORUS mg/dL 3.5 3.4 3.6     Results from last 7 days   Lab Units 07/28/24  0510 07/27/24  0548 07/26/24  0556   PTT seconds 31 34 38*     No results found for: \"TROPONINT\"  ABG:  Lab Results   Component Value Date    PHART 7.314 (L) 07/12/2024    LOG7QUY 44.1 (H) 07/12/2024    PO2ART 138.2 (H) 07/12/2024    NOT0JAD 21.9 (L) 07/12/2024    BEART -4.2 07/12/2024    SOURCE Line, Arterial 07/12/2024       Imaging Studies: I have personally reviewed pertinent reports.   and I have personally reviewed pertinent films in PACS    CTA head and neck w wo contrast    Result Date: 7/16/2024  Impression: CT Brain: Redemonstrated subarachnoid hemorrhage overlying the right greater than left convexities and concentrated within the right sylvian cistern and fissure. . Subacute infarct involving the right anterior temporal region and medial occipital region.. 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 Workstation performed: [id number]     IR cerebral angiography / intervention    Result Date: 7/15/2024  Impression: Successful balloon assisted coil embolization of a 4.5 mm right P-comm aneurysm. Workstation performed: [id number]     CT head wo contrast    Result Date: " 7/14/2024  Impression: No significant interval change in extensive bilateral subarachnoid hemorrhage and intraventricular hemorrhage. Workstation performed: [id number]     CT head wo contrast    Result Date: 7/13/2024  Impression: Interval placement of right-sided shunt catheter. Stable ventricular size with slight interval increase in intraventricular hemorrhage layering in the occipital horns bilaterally. Diffuse subarachnoid hemorrhage again noted. Workstation performed: [id number]     XR chest portable ICU    Result Date: 7/12/2024  Impression: No acute cardiopulmonary disease. ET tube 6 cm above the landon. Workstation performed: [id number]     CTA stroke alert (head/neck)    Result Date: 7/12/2024  Impression: 0.3 cm aneurysm in expected origin of right posterior communicating artery. Possible tiny aneurysm in left anterior communicating artery region. These could be potential sources of diffuse subarachnoid hemorrhage. Recommend neurovascular surgery consultation for further evaluation. Mild narrowing of bilateral MCA M1 and bilateral M2 segmental branch vessels, likely due to vasospasm. Patent stent in left proximal subclavian artery. Endotracheal tube in trachea. Additional chronic/incidental findings as detailed above. Findings were directly discussed with Emmanuel Lion at approximately 4:34 p.m. on 7/12/2024. Workstation performed: [id number]     CT stroke alert brain    Result Date: 7/12/2024  Impression: Acute diffuse thickened large-volume subarachnoid hemorrhage throughout the bilateral parafalcine regions, bilateral cerebral sulci (right worse than left), basilar cisterns, prepontine cistern, premedullary cistern, and visualized upper cervical spinal canal. Acute small volume intraventricular hemorrhage with mild hydrocephalus. No acute infarction. Findings were directly discussed with Emmanuel Lion at approximately 4:34 p.m. on 7/12/2024. Workstation performed: [id number]       EKG, Pathology, and Other  Studies: I have personally reviewed pertinent reports.      VTE Pharmacologic Prophylaxis: Heparin    VTE Mechanical Prophylaxis: sequential compression device

## 2024-08-01 NOTE — SPEECH THERAPY NOTE
Speech-Language Pathology Progress Note      Patient Name: Federico Bowen    Today's Date: 8/1/2024    Subjective:  Pt was awake and alert. He was sitting up in chair at bedside. Pillows used for additional support and to keep head in more neutral position rather than hyperextended. Pt complained of back pain, RN aware. Family present at bedside    Objective:  Pt was seen today for dysphagia therapy. Current diet is puree with nectar thick liquids. Pt was on room air. Focus of today's session was to maximize PO intake safety and optimize nutrition. Textures offered today included puree and nectar thick liquid via straw.  Swallow function:   Bolus retrieval and control were adequate. Manipulation and AP transfer were delayed. Pharyngeal swallow initiation was mildly delayed and hyolaryngeal excursion was weak. No s/s aspiration occurred during session today.    Assessment:  Swallow function is stable with current diet. Diet texture and liquid consistency remains appropriate at this time.  Pt has a poor appetite did not want to eat much of his lunch. Family offered encouragement, and we discussed the goal of meeting nutritional needs in order to avoid possible feeding tube. Pt was receptive to this and did continue to eat his lunch after discussion.  Voice and overall strength are improving. Drains removed, and pt was moved from ICU to [place] [id number].  Will likely repeat MBS next week for potential diet and liquid advancement.    Plan:  Continue puree and nectar thick liquids. Continue ST follow up. Subsequent sessions to focus on maximizing PO intake safety and determining potential for diet texture advancement.  Plan for repeat MBS next week.

## 2024-08-01 NOTE — ASSESSMENT & PLAN NOTE
Neurology following  2 witnessed seizures on presentation at [facility]  Patient currently on Keppra 750 mg BID per neurology  vEEG 7/14 no seizure activity

## 2024-08-01 NOTE — PROGRESS NOTES
Calorie count hung for 8/1/24-8/2/24. PCA notified. Recommend adding magic cup TID. Pended order reflective of recommendation.

## 2024-08-02 PROBLEM — E44.0 MODERATE PROTEIN-CALORIE MALNUTRITION (HCC): Status: RESOLVED | Noted: 2024-07-16 | Resolved: 2024-08-02

## 2024-08-02 LAB
ANION GAP SERPL CALCULATED.3IONS-SCNC: 9 MMOL/L (ref 4–13)
BASOPHILS # BLD AUTO: 0.07 THOUSANDS/ÂΜL (ref 0–0.1)
BASOPHILS NFR BLD AUTO: 1 % (ref 0–1)
BUN SERPL-MCNC: 23 MG/DL (ref 5–25)
CALCIUM SERPL-MCNC: 9.3 MG/DL (ref 8.4–10.2)
CHLORIDE SERPL-SCNC: 105 MMOL/L (ref 96–108)
CO2 SERPL-SCNC: 26 MMOL/L (ref 21–32)
CREAT SERPL-MCNC: 0.74 MG/DL (ref 0.6–1.3)
EOSINOPHIL # BLD AUTO: 0.28 THOUSAND/ÂΜL (ref 0–0.61)
EOSINOPHIL NFR BLD AUTO: 2 % (ref 0–6)
ERYTHROCYTE [DISTWIDTH] IN BLOOD BY AUTOMATED COUNT: 14.6 % (ref 11.6–15.1)
GFR SERPL CREATININE-BSD FRML MDRD: 94 ML/MIN/1.73SQ M
GLUCOSE SERPL-MCNC: 109 MG/DL (ref 65–140)
GLUCOSE SERPL-MCNC: 114 MG/DL (ref 65–140)
GLUCOSE SERPL-MCNC: 131 MG/DL (ref 65–140)
GLUCOSE SERPL-MCNC: 199 MG/DL (ref 65–140)
HCT VFR BLD AUTO: 31.8 % (ref 36.5–49.3)
HGB BLD-MCNC: 10.3 G/DL (ref 12–17)
IMM GRANULOCYTES # BLD AUTO: 0.15 THOUSAND/UL (ref 0–0.2)
IMM GRANULOCYTES NFR BLD AUTO: 1 % (ref 0–2)
LYMPHOCYTES # BLD AUTO: 2.88 THOUSANDS/ÂΜL (ref 0.6–4.47)
LYMPHOCYTES NFR BLD AUTO: 22 % (ref 14–44)
MCH RBC QN AUTO: 31.1 PG (ref 26.8–34.3)
MCHC RBC AUTO-ENTMCNC: 32.4 G/DL (ref 31.4–37.4)
MCV RBC AUTO: 96 FL (ref 82–98)
MONOCYTES # BLD AUTO: 0.68 THOUSAND/ÂΜL (ref 0.17–1.22)
MONOCYTES NFR BLD AUTO: 5 % (ref 4–12)
NEUTROPHILS # BLD AUTO: 8.79 THOUSANDS/ÂΜL (ref 1.85–7.62)
NEUTS SEG NFR BLD AUTO: 69 % (ref 43–75)
PLATELET # BLD AUTO: 716 THOUSANDS/UL (ref 149–390)
PMV BLD AUTO: 9.6 FL (ref 8.9–12.7)
POTASSIUM SERPL-SCNC: 4.5 MMOL/L (ref 3.5–5.3)
RBC # BLD AUTO: 3.31 MILLION/UL (ref 3.88–5.62)
SODIUM SERPL-SCNC: 140 MMOL/L (ref 135–147)
WBC # BLD AUTO: 12.85 THOUSAND/UL (ref 4.31–10.16)

## 2024-08-02 PROCEDURE — 80048 BASIC METABOLIC PNL TOTAL CA: CPT

## 2024-08-02 PROCEDURE — 97535 SELF CARE MNGMENT TRAINING: CPT

## 2024-08-02 PROCEDURE — 99232 SBSQ HOSP IP/OBS MODERATE 35: CPT | Performed by: INTERNAL MEDICINE

## 2024-08-02 PROCEDURE — 82948 REAGENT STRIP/BLOOD GLUCOSE: CPT

## 2024-08-02 PROCEDURE — 85025 COMPLETE CBC W/AUTO DIFF WBC: CPT

## 2024-08-02 PROCEDURE — 97530 THERAPEUTIC ACTIVITIES: CPT

## 2024-08-02 RX ORDER — GABAPENTIN 300 MG/1
600 CAPSULE ORAL
Status: DISCONTINUED | OUTPATIENT
Start: 2024-08-02 | End: 2024-08-10 | Stop reason: HOSPADM

## 2024-08-02 RX ORDER — GABAPENTIN 300 MG/1
600 CAPSULE ORAL 3 TIMES DAILY
Status: DISCONTINUED | OUTPATIENT
Start: 2024-08-02 | End: 2024-08-02

## 2024-08-02 RX ORDER — GABAPENTIN 400 MG/1
400 CAPSULE ORAL 2 TIMES DAILY
Status: DISCONTINUED | OUTPATIENT
Start: 2024-08-02 | End: 2024-08-10 | Stop reason: HOSPADM

## 2024-08-02 RX ORDER — MIRTAZAPINE 15 MG/1
7.5 TABLET, FILM COATED ORAL
Status: DISCONTINUED | OUTPATIENT
Start: 2024-08-02 | End: 2024-08-06

## 2024-08-02 RX ADMIN — HEPARIN SODIUM 5000 UNITS: 5000 INJECTION INTRAVENOUS; SUBCUTANEOUS at 06:53

## 2024-08-02 RX ADMIN — BUTALBITAL, ACETAMINOPHEN AND CAFFEINE 1 TABLET: 325; 50; 40 TABLET ORAL at 02:56

## 2024-08-02 RX ADMIN — OXYCODONE HYDROCHLORIDE AND ACETAMINOPHEN 1 TABLET: 5; 325 TABLET ORAL at 09:42

## 2024-08-02 RX ADMIN — LIDOCAINE 5% 1 PATCH: 700 PATCH TOPICAL at 09:22

## 2024-08-02 RX ADMIN — HEPARIN SODIUM 5000 UNITS: 5000 INJECTION INTRAVENOUS; SUBCUTANEOUS at 21:35

## 2024-08-02 RX ADMIN — ACETAMINOPHEN 975 MG: 650 SUSPENSION ORAL at 15:52

## 2024-08-02 RX ADMIN — HEPARIN SODIUM 5000 UNITS: 5000 INJECTION INTRAVENOUS; SUBCUTANEOUS at 13:57

## 2024-08-02 RX ADMIN — LEVETIRACETAM 750 MG: 100 SOLUTION ORAL at 21:35

## 2024-08-02 RX ADMIN — FOLIC ACID TAB 400 MCG 400 MCG: 400 TAB at 09:21

## 2024-08-02 RX ADMIN — GABAPENTIN 400 MG: 400 CAPSULE ORAL at 09:21

## 2024-08-02 RX ADMIN — BUTALBITAL, ACETAMINOPHEN AND CAFFEINE 1 TABLET: 325; 50; 40 TABLET ORAL at 20:25

## 2024-08-02 RX ADMIN — LEVETIRACETAM 750 MG: 100 SOLUTION ORAL at 10:40

## 2024-08-02 RX ADMIN — Medication 20 MG: at 06:48

## 2024-08-02 RX ADMIN — Medication 30 MG: at 00:33

## 2024-08-02 RX ADMIN — INSULIN LISPRO 1 UNITS: 100 INJECTION, SOLUTION INTRAVENOUS; SUBCUTANEOUS at 00:33

## 2024-08-02 RX ADMIN — THIAMINE HCL TAB 100 MG 100 MG: 100 TAB at 09:22

## 2024-08-02 RX ADMIN — MIRTAZAPINE 7.5 MG: 15 TABLET, FILM COATED ORAL at 21:34

## 2024-08-02 RX ADMIN — ATORVASTATIN CALCIUM 80 MG: 80 TABLET, FILM COATED ORAL at 09:22

## 2024-08-02 RX ADMIN — ASPIRIN 81 MG CHEWABLE TABLET 81 MG: 81 TABLET CHEWABLE at 09:21

## 2024-08-02 RX ADMIN — MIDODRINE HYDROCHLORIDE 10 MG: 5 TABLET ORAL at 12:00

## 2024-08-02 RX ADMIN — ACETAMINOPHEN 975 MG: 650 SUSPENSION ORAL at 06:47

## 2024-08-02 RX ADMIN — MIDODRINE HYDROCHLORIDE 10 MG: 5 TABLET ORAL at 15:52

## 2024-08-02 RX ADMIN — INSULIN LISPRO 1 UNITS: 100 INJECTION, SOLUTION INTRAVENOUS; SUBCUTANEOUS at 17:36

## 2024-08-02 RX ADMIN — MIDODRINE HYDROCHLORIDE 10 MG: 5 TABLET ORAL at 06:48

## 2024-08-02 RX ADMIN — OXYCODONE HYDROCHLORIDE AND ACETAMINOPHEN 1 TABLET: 5; 325 TABLET ORAL at 18:17

## 2024-08-02 RX ADMIN — GABAPENTIN 600 MG: 300 CAPSULE ORAL at 21:34

## 2024-08-02 RX ADMIN — EZETIMIBE 10 MG: 10 TABLET ORAL at 09:21

## 2024-08-02 RX ADMIN — GABAPENTIN 400 MG: 300 CAPSULE ORAL at 17:36

## 2024-08-02 NOTE — RESTORATIVE TECHNICIAN NOTE
Restorative Technician Note      Patient Name: Federico Bowen     Note Type: Mobility  Patient Position Upon Consult: Supine  Activity Performed: Ambulated; Dangled; Stood  Assistive Device: Other (Comment) (Assist x1-2, Assisted PT Jing)  Education Provided: Yes  Patient Position at End of Consult: Bedside chair; Bed/Chair alarm activated; All needs within reach    Anya SALEH, Restorative Technician,

## 2024-08-02 NOTE — CASE MANAGEMENT
Case Management Discharge Planning Note    Patient name Federico Bowen  Location [facility] 715/[facility] 715-01 MRN [id number]  : 1955 Date 2024       Current Admission Date: 2024  Current Admission Diagnosis:Subarachnoid hemorrhage (HCC)   Patient Active Problem List    Diagnosis Date Noted Date Diagnosed    Dysphagia as late effect of cerebrovascular disease 2024     Coronary artery disease 2024     COPD (chronic obstructive pulmonary disease) (HCC) 2024     Goals of care, counseling/discussion 2024     Palliative care encounter 2024     Severe protein-calorie malnutrition (HCC) 2024     HTN (hypertension) 2024     Hyperlipidemia 2024     Seizures (HCC) 2024     Subarachnoid hemorrhage (HCC) 2024       LOS (days): 21  Geometric Mean LOS (GMLOS) (days): 10.2  Days to GMLOS:-10.7     OBJECTIVE:  Risk of Unplanned Readmission Score: 18.53         Current admission status: Inpatient   Preferred Pharmacy:   [id number] - [facility] [address] - [address]  [address]  [place] [address]  Phone: [phone] Fax: [phone]    Primary Care Provider: No primary care provider on file.    Primary Insurance: [employer] [facility]  Secondary Insurance: [facility]    DISCHARGE DETAILS:          Additional Comments: Patient not medically ready for DC- [facility] vs. STR at DC

## 2024-08-02 NOTE — TELEPHONE ENCOUNTER
08/02/2024- PT IS IN [facility]  08/16/2024- 2-3 WK HFU W/ [place] W/ MRA head     Isabel Cormier PA-C  [facility]  2-3 follow up with [place] and MRA head w wo contrast for aSAH

## 2024-08-02 NOTE — PLAN OF CARE
Problem: Neurological Deficit  Goal: Neurological status is stable or improving  Description: Interventions:  - Monitor and assess patient's level of consciousness, motor function, sensory function, and level of assistance needed for ADLs.   - Monitor and report changes from baseline. Collaborate with interdisciplinary team to initiate plan and implement interventions as ordered.   - Provide and maintain a safe environment.  - Consider seizure precautions.  - Consider fall precautions.  - Consider aspiration precautions.  - Consider bleeding precautions.  Outcome: Progressing     Problem: Potential for Aspiration  Goal: Non-ventilated patient's risk of aspiration is minimized  Description: Assess and monitor vital signs, respiratory status, and labs (WBC).  Monitor for signs of aspiration (tachypnea, cough, rales, wheezing, cyanosis, fever).    - Assess and monitor patient's ability to swallow.  - Place patient up in chair to eat if possible.  - HOB up at 90 degrees to eat if unable to get patient up into chair.  - Supervise patient during oral intake.   - Instruct patient/ family to take small bites.  - Instruct patient/ family to take small single sips when taking liquids.  - Follow patient-specific strategies generated by speech pathologist.  Outcome: Progressing     Problem: DISCHARGE PLANNING  Goal: Discharge to home or other facility with appropriate resources  Description: INTERVENTIONS:  - Identify barriers to discharge w/patient and caregiver  - Arrange for needed discharge resources and transportation as appropriate  - Identify discharge learning needs (meds, wound care, etc.)  - Arrange for interpretive services to assist at discharge as needed  - Refer to Case Management Department for coordinating discharge planning if the patient needs post-hospital services based on physician/advanced practitioner order or complex needs related to functional status, cognitive ability, or social support  system  Outcome: Progressing

## 2024-08-02 NOTE — PLAN OF CARE
Problem: Neurological Deficit  Goal: Neurological status is stable or improving  Description: Interventions:  - Monitor and assess patient's level of consciousness, motor function, sensory function, and level of assistance needed for ADLs.   - Monitor and report changes from baseline. Collaborate with interdisciplinary team to initiate plan and implement interventions as ordered.   - Provide and maintain a safe environment.  - Consider seizure precautions.  - Consider fall precautions.  - Consider aspiration precautions.  - Consider bleeding precautions.  Outcome: Progressing     Problem: NEUROSENSORY - ADULT  Goal: Achieves stable or improved neurological status  Description: INTERVENTIONS  - Monitor and report changes in neurological status  - Monitor vital signs such as temperature, blood pressure, glucose, and any other labs ordered   - Initiate measures to prevent increased intracranial pressure  - Monitor for seizure activity and implement precautions if appropriate      Outcome: Progressing     Problem: NEUROSENSORY - ADULT  Goal: Achieves maximal functionality and self care  Description: INTERVENTIONS  - Monitor swallowing and airway patency with patient fatigue and changes in neurological status  - Encourage and assist patient to increase activity and self care.   - Encourage visually impaired, hearing impaired and aphasic patients to use assistive/communication devices  Outcome: Progressing

## 2024-08-02 NOTE — OCCUPATIONAL THERAPY NOTE
Occupational Therapy Progress Note     Patient Name: Federico Bowen  Today's Date: 8/2/2024  Problem List  Principal Problem:    Subarachnoid hemorrhage (HCC)  Active Problems:    HTN (hypertension)    Hyperlipidemia    Seizures (HCC)    Severe protein-calorie malnutrition (HCC)    Goals of care, counseling/discussion    Palliative care encounter    Coronary artery disease    COPD (chronic obstructive pulmonary disease) (HCC)    Dysphagia as late effect of cerebrovascular disease         Occupational Therapy Treatment Note     08/02/24 1048   OT Last Visit   OT Visit Date 08/02/24   Note Type   Note Type Treatment   Pain Assessment   Pain Assessment Tool 0-10   Pain Rating: FLACC (Rest) - Face 1   Pain Rating: FLACC (Rest) - Legs 1   Pain Rating: FLACC (Rest) - Activity 1   Pain Rating: FLACC (Rest) - Cry 1   Pain Rating: FLACC (Rest) - Consolability 1   Score: FLACC (Rest) 5   Pain Rating: FLACC (Activity) - Face 1   Pain Rating: FLACC (Activity) - Legs 1   Pain Rating: FLACC (Activity) - Activity 1   Pain Rating: FLACC (Activity) - Cry 1   Pain Rating: FLACC (Activity) - Consolability 1   Score: FLACC (Activity) 5   Restrictions/Precautions   Weight Bearing Precautions Per Order No   Other Precautions Cognitive;Chair Alarm   Lifestyle   Autonomy I w/ ADLS/IADLS, transfers and functional mobility PTA   Reciprocal Relationships Pt lives w/ his dght   Service to Others retired   Intrinsic Gratification gardening and spending time w/ his grandkids   ADL   Where Assessed Chair   Grooming Assistance 3  Moderate Assistance   UB Bathing Assistance 2  Maximal Assistance   LB Bathing Assistance 2  Maximal Assistance   UB Dressing Assistance 2  Maximal Assistance   LB Dressing Assistance 2  Maximal Assistance   LB Dressing Deficit Don/doff R sock;Don/doff L sock;Thread RLE into pants;Thread LLE into pants;Pull up over hips   Bed Mobility   Supine to Sit 2  Maximal assistance   Additional items Assist x 2   Transfers  "  Sit to Stand 3  Moderate assistance   Additional items Assist x 2   Stand to Sit 3  Moderate assistance   Additional items Assist x 2   Stand pivot 3  Moderate assistance   Additional items Assist x 2   Subjective   Subjective pt stated \" I don't want to\"   Cognition   Overall Cognitive Status Impaired   Arousal/Participation Arousable;Responsive   Attention Attends with cues to redirect   Orientation Level Oriented to person;Oriented to place;Oriented to time   Memory Decreased short term memory;Decreased recall of recent events;Decreased recall of precautions   Following Commands Follows one step commands with increased time or repetition   Activity Tolerance   Activity Tolerance Patient limited by fatigue;Patient limited by pain   Assessment   Assessment Pt seen for Occupational Therapy session with focus on activity tolerance, bed mob, functional transfers/mob, sitting balance and tolerance and standing tolerance and balance for pt engagement in UB/LB self-care tasks. Pt cleared by FESTUS/Yane for pt participated in OT session. Pt presented supine/HOB raised pt awake/alert and pt identifiers confirme. Pt required max encouragement to participate in therapy this session 2* pt fearful of pain. Pt did not report a therapy goal this session however pt daughter reported that pt would like to get out of the hospital. Pt daughter supportive of pt.  Pt required assist for LB dressing and functional transfers  2* deconditioning and decreased overall strength.  Pt remains appropriate for II (Moderate Resources) Pt set up to bedside chair post session, chair alarm activated and all needs within pt reach   Plan   Treatment Interventions ADL retraining   Goal Expiration Date 08/13/24   OT Treatment Day 3   OT Frequency 3-5x/wk   Discharge Recommendation   Rehab Resource Intensity Level, OT II (Moderate Resource Intensity)   AM-PAC Daily Activity Inpatient   Lower Body Dressing 2   Bathing 2   Toileting 2   Upper Body " Dressing 2   Grooming 2   Eating 2   Daily Activity Raw Score 12   Daily Activity Standardized Score (Calc for Raw Score >=11) 30.6   AM-PAC Applied Cognition Inpatient   Following a Speech/Presentation 2   Understanding Ordinary Conversation 3   Taking Medications 2   Remembering Where Things Are Placed or Put Away 2   Remembering List of 4-5 Errands 2   Taking Care of Complicated Tasks 1   Applied Cognition Raw Score 12   Applied Cognition Standardized Score 28.82       Darcy ACOSTA/DANIELLE

## 2024-08-02 NOTE — PLAN OF CARE
Problem: OCCUPATIONAL THERAPY ADULT  Goal: Performs self-care activities at highest level of function for planned discharge setting.  See evaluation for individualized goals.  Description: Treatment Interventions: ADL retraining, Functional transfer training, UE strengthening/ROM, Endurance training, Cognitive reorientation, Patient/family training, Equipment evaluation/education, Compensatory technique education, Continued evaluation, Energy conservation, Activityengagement          See flowsheet documentation for full assessment, interventions and recommendations.   Outcome: Progressing  Note: Limitation: Decreased ADL status, Decreased UE ROM, Decreased UE strength, Decreased Safe judgement during ADL, Decreased cognition, Decreased endurance, Decreased self-care trans, Decreased high-level ADLs  Prognosis: Fair  Assessment: Pt seen for Occupational Therapy session with focus on activity tolerance, bed mob, functional transfers/mob, sitting balance and tolerance and standing tolerance and balance for pt engagement in UB/LB self-care tasks. Pt cleared by FESTUS/Yane for pt participated in OT session. Pt presented supine/HOB raised pt awake/alert and pt identifiers confirme. Pt required max encouragement to participate in therapy this session 2* pt fearful of pain. Pt did not report a therapy goal this session however pt daughter reported that pt would like to get out of the hospital. Pt daughter supportive of pt.  Pt required assist for LB dressing and functional transfers  2* deconditioning and decreased overall strength.  Pt remains appropriate for II (Moderate Resources) Pt set up to bedside chair post session, chair alarm activated and all needs within pt reach  Recommendation: Physiatry Consult  Rehab Resource Intensity Level, OT: II (Moderate Resource Intensity)

## 2024-08-02 NOTE — PLAN OF CARE
Problem: PHYSICAL THERAPY ADULT  Goal: Performs mobility at highest level of function for planned discharge setting.  See evaluation for individualized goals.  Description: Treatment/Interventions: OT, Spoke to case management, Gait training, Bed mobility, Patient/family training, Endurance training, LE strengthening/ROM, Functional transfer training          See flowsheet documentation for full assessment, interventions and recommendations.  Outcome: Not Progressing  Note: Prognosis: Fair  Problem List: Decreased strength, Decreased endurance, Impaired balance, Decreased mobility, Decreased coordination, Decreased cognition  Assessment: Patient treatment session focused on education, bed mobility, and functional transfers. Session was significantly limited by patient's poor participation. Required max verbal cues and encouragement to participate in PT session. Daughter present in the room and assisted with encouragement to participate in mobility. Mild improvement in bed mobility as he required mod A x 2 instead of max A x 2. He continues to required mod A x 2 for transfers and ambulation. Pt refused to complete further mobility besides transferring into the bed. Continue with PLOC to improve mobility and increase independence in the household. Recommend STR.  Barriers to Discharge: Inaccessible home environment, Decreased caregiver support     Rehab Resource Intensity Level, PT: II (Moderate Resource Intensity)    See flowsheet documentation for full assessment.

## 2024-08-02 NOTE — PROGRESS NOTES
Day 1 calorie count results  Breakfast: 150 calories, 12g protein, Lunch: 431 calories, 22g protein, Dinner: 230 calories, 9g protein. Total intake for 8/1/24= 811 calories, 43g protein. Met 47% estimated energy needs, 56% estimated protein needs on the low end.    Continue diet and ONS as ordered. Please obtain a height as able.

## 2024-08-02 NOTE — PHYSICAL THERAPY NOTE
"                                                      Physical Therapy Treatment     Patient Name: Federico Bowen    Today's Date: 8/2/2024     Problem List  Principal Problem:    Subarachnoid hemorrhage (HCC)  Active Problems:    HTN (hypertension)    Hyperlipidemia    Seizures (HCC)    Severe protein-calorie malnutrition (HCC)    Goals of care, counseling/discussion    Palliative care encounter    Coronary artery disease    COPD (chronic obstructive pulmonary disease) (HCC)    Dysphagia as late effect of cerebrovascular disease       Past Medical History  No past medical history on file.     Past Surgical History  Past Surgical History:   Procedure Laterality Date    IR CEREBRAL ANGIOGRAPHY / INTERVENTION  7/13/2024 08/02/24 1040   PT Last Visit   PT Visit Date 08/02/24   Note Type   Note Type Treatment   End of Consult   Patient Position at End of Consult Bedside chair;Bed/Chair alarm activated;All needs within reach  (Rn and daughter present in room)   Pain Assessment   Pain Assessment Tool 0-10   Pain Score 9   Pain Location/Orientation Location: Back   Restrictions/Precautions   Weight Bearing Precautions Per Order No   Other Precautions Cognitive;Bed Alarm;Chair Alarm;Fall Risk;Pain   General   Chart Reviewed Yes   Family/Caregiver Present Yes  (daughter)   Cognition   Overall Cognitive Status Impaired   Arousal/Participation Arousable;Responsive   Attention Attends with cues to redirect   Orientation Level Oriented to person;Oriented to place   Following Commands Follows one step commands with increased time or repetition   Comments Required significant motivation to particiapte in PT session. Daughter assisted with encourgment. Pt agitated at times.   Subjective   Subjective \"nothing helps me\"   Bed Mobility   Supine to Sit 2  Maximal assistance   Additional items Assist x 2;Increased time required   Additional Comments Supervision for seated balance   Transfers   Sit to Stand 3  Moderate " "assistance   Additional items Assist x 2;Increased time required;Impulsive;Verbal cues   Stand to Sit 3  Moderate assistance   Additional items Assist x 2;Increased time required   Additional Comments bilateral HHA   Ambulation/Elevation   Gait pattern Improper Weight shift;Shuffling;Decreased foot clearance;Step to;Ataxia;Short stride   Gait Assistance 3  Moderate assist   Additional items Assist x 2   Assistive Device Other (Comment)   Distance 2\"   Stair Management Assistance Not tested   Ambulation/Elevation Additional Comments decreased foot clearance   Balance   Static Sitting Fair -   Dynamic Sitting Poor +   Static Standing Poor   Dynamic Standing Poor -   Ambulatory Poor -   Endurance Deficit   Endurance Deficit Yes   Activity Tolerance   Activity Tolerance Patient limited by fatigue   Nurse Made Aware Yes - cleared   Assessment   Prognosis Fair   Problem List Decreased strength;Decreased endurance;Impaired balance;Decreased mobility;Decreased coordination;Decreased cognition   Assessment Patient treatment session focused on education, bed mobility, and functional transfers. Session was significantly limited by patient's poor participation. Required max verbal cues and encouragement to participate in PT session. Daughter present in the room and assisted with encouragement to participate in mobility. Mild improvement in bed mobility as he required mod A x 2 instead of max A x 2. He continues to required mod A x 2 for transfers and ambulation. Pt refused to complete further mobility besides transferring into the bed. Continue with PLOC to improve mobility and increase independence in the household. Recommend STR.   Barriers to Discharge Inaccessible home environment;Decreased caregiver support   Goals   Patient Goals To only go to the chair   STG Expiration Date 08/13/24   PT Treatment Day 3   Plan   Treatment/Interventions Functional transfer training;LE strengthening/ROM;Elevations;Therapeutic " exercise;Endurance training;Cognitive reorientation;Patient/family training;Gait training;Bed mobility   Progress Slow progress, decreased activity tolerance   PT Frequency 3-5x/wk   Discharge Recommendation   Rehab Resource Intensity Level, PT II (Moderate Resource Intensity)   AM-PAC Basic Mobility Inpatient   Turning in Flat Bed Without Bedrails 2   Lying on Back to Sitting on Edge of Flat Bed Without Bedrails 2   Moving Bed to Chair 1   Standing Up From Chair Using Arms 1   Walk in Room 1   Climb 3-5 Stairs With Railing 1   Basic Mobility Inpatient Raw Score 8   [facility] Highest Level Of Mobility   -HLM Goal 3: Sit at edge of bed   -HLM Achieved 4: Move to chair/commode   Education   Education Provided Mobility training   Patient Reinforcement needed   End of Consult   Patient Position at End of Consult Bedside chair;Bed/Chair alarm activated;All needs within reach       Jing To PT, DPT

## 2024-08-02 NOTE — PROGRESS NOTES
INTERNAL MEDICINE RESIDENCY PROGRESS NOTE     Name: Federico Bowen   Age & Sex: 68 y.o. male   MRN: [id number]  Unit/Bed#: [facility] 715-01   Encounter: [id number]  Team: SOD Team A    PATIENT INFORMATION     Name: Federico Bowen   Age & Sex: 68 y.o. male   MRN: [id number]  Hospital Stay Days: 21    ASSESSMENT/PLAN     Principal Problem:    Subarachnoid hemorrhage (HCC)  Active Problems:    HTN (hypertension)    Hyperlipidemia    Seizures (HCC)    Severe protein-calorie malnutrition (HCC)    Goals of care, counseling/discussion    Palliative care encounter    Coronary artery disease    COPD (chronic obstructive pulmonary disease) (HCC)    Dysphagia as late effect of cerebrovascular disease      Dysphagia as late effect of cerebrovascular disease  Assessment & Plan  -SAH 7/12  -Modified barium swallow 7/29- mild-moderate dysphagia   -Speech recommends repeat MBS next week  -Pureed diet recommended    COPD (chronic obstructive pulmonary disease) (HCC)  Assessment & Plan  Documented h/o of COPD although no PFTs on file    Plan:  No concerns for exacerbation at this time  Continue prn albuterol    Coronary artery disease  Assessment & Plan  Patient with little documentation in medical record but appears to have non-obstructive CAD with history of unstable angina component and previously was on nitro prn and Ranexa. Home medications noted metoprolol and Entresto however last echo found to be in 2015 with EF 77% and most recent echo of 55% on 7/13/24 and thus Entresto was not restarted prior to downgrade from N-ICU.    Per patient, he has a history of a stent that was placed, but he is unable to provide more details.    Plan:  Continue ASA 81mg qd  Recommend for patient to re-establish care with Cardiology after discharge  Home metoprolol held given concurrent nimodipine and stable HR. Unclear if patient was taking prior to admission  Similarly as above, Brilinta noted as home medication on admission. Currently  not continued in setting of SAH this admission. Unable to find documented history of VIRA, outpatient f/u with cardiology.     Severe protein-calorie malnutrition (HCC)  Assessment & Plan  Malnutrition Findings:   Adult Malnutrition type: Acute illness  Adult Degree of Malnutrition: Other severe protein calorie malnutrition  Malnutrition Characteristics: Fat loss, Muscle loss, Inadequate energy, Weight loss                  360 Statement: Acute severe pro, vinny malnutrition d/t condition as evidence by signficant weight loss, 7/13/24 54kg, 7/19/24 49kg, 5kg/9% wt. loss x <1 week, moderate to severe signs of muscle/ fat loss at temples, orbitals, calvicles, triceps, shoulders, treated with TF.    BMI Findings:           There is no height or weight on file to calculate BMI.     Plan:  Nutrition consult placed earlier 7/18 while patient required tube feeds  Calorie count in place  Trialing oral diet for now and then may have to consider PEG tube if oral intake is not sufficient for adequate caloric intake  Patient on Pureed diet due to dysphagia. Encouraged to eat more to avoid having to place a PEG tube  Can re-engage Nutrition and consider adding Ensure supplements, magic cup, etc as diet restrictions tolerate    Seizures (HCC)  Assessment & Plan  New-onset this admission. En route to [facility] ED, patient had 2 witnessed seizures while being transported as stroke alert via EMS. Loaded on Keppra upon arrival to [facility]    Continue Keppra 750mg q12H    Hyperlipidemia  Assessment & Plan  F/u final results of lipid panel, none documented this admission.     Continue home zetia 10mg  Continue lipitor 80mg     HTN (hypertension)  Assessment & Plan  History of hypertension. Home Anti-HTN meds include metoprolol and entresto, both of which have not been restarted    Plan as reiterated from SAH A/P:  Midodrine 10mg PO TID. Ok to begin weaning midodrine once patient has completed nimodipine  SBP goal normotensive  Continue  Nimodipine 30mg q2H through 8/2 12am  Consider adding patient's home metoprolol and entresto    * Subarachnoid hemorrhage (HCC)  Assessment & Plan  Patient was complaining of headache and R leg pain. En route to [facility] ED, patient had 2 witnessed seizures. He was unresponsive when arrived to the ED and was a stroke alert. Patient was intubated and given DDAVP for home ASA usage and also given mannitol. He was started on cardene gtt and Keppra load. EMS called and pt brought to [facility] in setting of R MSK pain. CTH showed a large SAH with IVH s/p transfer to [facility] for coil embolization of R PCOM aneurysm and EVD on 7/13. Extubated 7/13. EVD removed 7/26. Course complicated by aspiration pneumonia for which he has already completed abx therapy. Followed on SAH pathway in Neuro ICU since transfer to [facility].     CTH 8/1 showing stable pathology. Patient off nimodipine and no longer receiving TCDs as of 8/2. Patient continues to have a headache, likely 2/2 to SAH. Continuing to monitor for changes in neuro status.    Plan:  Deemed medically stable by NCC team for downgrade to [facility]  CT 8/1-  Status post coiling of right P-comm aneurysm. Stable ventricles. No hydrocephalus. Small volume of residual subarachnoid and intraventricular hemorrhage is mildly decreased. Stable hypodensities in the right temporal lobe, right occipital lobe and right caudate head likely due to evolving subacute infarcts.  Midodrine 10mg PO TID. Ok to begin weaning midodrine now that he is off nimodrine  SBP goal normotensive  Continue ASA 81mg qd  Continue Nimodipine 30mg q2H through 8/2 12am  AED ppx: Keppra 750mg q12H  PT/OT/speech evals, CM consult  PMR consult placed and are following  Continue q4H neuro checks, ok to let sleep at night  Will need neurosurgery referral and outpatient f/u after discharge  Pain regimen for headache/MSK sequelae of SAH:  Gabapentin -[id number]  PRN Fioricet, percocet, tylenol, lidocaine patch  Nimodipine  D/C  TCDs D/C  Patient advised that he can ask for his pain medications more frequently than he has been getting them for his headaches    Fever-resolved as of 7/31/2024  Assessment & Plan  Hospital course complicated by aspiration pneumonia s/p extubation in N-ICU. Completed abx therapy. Otherwise fevers of central origin given SAH and IVH on presentation. Continuing to monitor.     Moderate protein-calorie malnutrition (HCC)-resolved as of 8/2/2024  Assessment & Plan  Malnutrition Findings:   Adult Malnutrition type: Acute illness  Adult Degree of Malnutrition: Other severe protein calorie malnutrition  Malnutrition Characteristics: Fat loss, Muscle loss, Inadequate energy, Weight loss                  360 Statement: Acute severe pro, vinny malnutrition d/t condition as evidence by signficant weight loss, 7/13/24 54kg, 7/19/24 49kg, 5kg/9% wt. loss x <1 week, moderate to severe signs of muscle/ fat loss at temples, orbitals, calvicles, triceps, shoulders, treated with TF.    BMI Findings:          There is no height or weight on file to calculate BMI.           Disposition: [facility] following improvement     SUBJECTIVE     Patient seen and examined. No acute events overnight. Patient continues to have headaches. He states that the gabapentin has not been helping. He has not been taking his pain medications as frequently as they have been prescribed. He denies any chest pain, SOB, abdominal pain, nausea, vomiting, or diarrhea.     He is more alert than yesterday. He is oriented to himself, place, and time, but remains confused about events leading up to today and states that he is here because he fell. He is quicker to respond to questions and keeps his eyes open for most of the exam. He is still distractible and is preoccupied with seeing his daughters. No focal deficits appreciated.    OBJECTIVE     Vitals:    08/02/24 0313 08/02/24 0600 08/02/24 0845 08/02/24 0900   BP: 112/77      BP Location: Right arm       Pulse: 94  98 105   Resp: 20      Temp: (!) 97.3 °F (36.3 °C)  97.6 °F (36.4 °C)    TempSrc: Oral      SpO2: 95%  97% 96%   Weight:  42.8 kg (94 lb 5.7 oz)        Temperature:   Temp (24hrs), Av.1 °F (36.7 °C), Min:97.3 °F (36.3 °C), Max:99.3 °F (37.4 °C)    Temperature: 97.6 °F (36.4 °C)  Intake & Output:  I/O          07 07 07 07 07 07    P.O. 440 328 120    Total Intake(mL/kg) 440 (10.3) 328 (7.7) 120 (2.8)    Urine (mL/kg/hr) 950 (0.9) 575 (0.6)     Stool 0 0     Total Output 950 575     Net -510 -247 +120           Unmeasured Urine Occurrence   1 x    Unmeasured Stool Occurrence 1 x 1 x 1 x          Weights:        There is no height or weight on file to calculate BMI.  Weight (last 2 days)       Date/Time Weight    24 0600 42.8 (94.36)    24 0600 42.7 (94.14)    24 0600 44.3 (97.6)          Physical Exam  Constitutional:       Appearance: He is cachectic.   HENT:      Head: Normocephalic.      Comments: 2 scars from EVD appear to be healing well with stitches in place. Dried blood around the stitches with no drainage or erythema     Nose: Nose normal.      Mouth/Throat:      Mouth: Mucous membranes are moist.   Eyes:      Extraocular Movements: Extraocular movements intact.      Pupils: Pupils are equal, round, and reactive to light.   Cardiovascular:      Rate and Rhythm: Normal rate and regular rhythm.      Pulses: Normal pulses.      Heart sounds: Normal heart sounds. No murmur heard.     No friction rub. No gallop.   Pulmonary:      Effort: Pulmonary effort is normal. No respiratory distress.      Breath sounds: Normal breath sounds. No stridor. No wheezing, rhonchi or rales.   Abdominal:      General: Abdomen is flat. Bowel sounds are normal.      Palpations: Abdomen is soft.   Musculoskeletal:         General: Normal range of motion.   Skin:     General: Skin is warm.      Capillary Refill: Capillary refill takes less than 2  seconds.   Neurological:      General: No focal deficit present.      Mental Status: He is alert. He is disoriented.      Cranial Nerves: No cranial nerve deficit.   Psychiatric:         Behavior: Behavior is hyperactive.       LABORATORY DATA     Labs: I have personally reviewed pertinent reports.  Results from last 7 days   Lab Units 08/02/24  0547 08/01/24  0515 07/31/24  0543 07/30/24  0553 07/29/24  0638   WBC Thousand/uL 12.85* 11.96* 16.54* 16.76* 14.31*   HEMOGLOBIN g/dL 10.3* 10.8* 11.1* 10.5* 10.6*   HEMATOCRIT % 31.8* 34.5* 34.4* 32.0* 32.1*   PLATELETS Thousands/uL 716* 677* 718* 677* 618*   SEGS PCT % 69  --   --  80* 79*   MONO PCT % 5  --   --  4 4   EOS PCT % 2  --   --  3 3      Results from last 7 days   Lab Units 08/02/24  0547 08/01/24  0515 07/31/24  0543 07/30/24  0553 07/29/24  1139   POTASSIUM mmol/L 4.5 4.5 4.5   < > 4.6   CHLORIDE mmol/L 105 106 103   < > 103   CO2 mmol/L 26 27 28   < > 27   BUN mg/dL 23 26* 26*   < > 20   CREATININE mg/dL 0.74 0.79 0.78   < > 0.73   CALCIUM mg/dL 9.3 9.4 9.3   < > 9.2   ALK PHOS U/L  --  159* 144*  --  132*   ALT U/L  --  27 23  --  15   AST U/L  --  29 26  --  25    < > = values in this interval not displayed.     Results from last 7 days   Lab Units 08/01/24  0515 07/31/24  0543 07/30/24  0553   MAGNESIUM mg/dL 2.4 2.5 2.4     Results from last 7 days   Lab Units 08/01/24  0515 07/31/24  0543 07/30/24  0553   PHOSPHORUS mg/dL 3.5 3.4 3.6      Results from last 7 days   Lab Units 07/28/24  0510 07/27/24  0548   PTT seconds 31 34               IMAGING & DIAGNOSTIC TESTING     Radiology Results: I have personally reviewed pertinent reports.  CTA head and neck w wo contrast    Result Date: 7/16/2024  Impression: CT Brain: Redemonstrated subarachnoid hemorrhage overlying the right greater than left convexities and concentrated within the right sylvian cistern and fissure. . Subacute infarct involving the right anterior temporal region and medial occipital  region.. 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 Workstation performed: [id number]     IR cerebral angiography / intervention    Result Date: 7/15/2024  Impression: Successful balloon assisted coil embolization of a 4.5 mm right P-comm aneurysm. Workstation performed: [id number]     CT head wo contrast    Result Date: 7/14/2024  Impression: No significant interval change in extensive bilateral subarachnoid hemorrhage and intraventricular hemorrhage. Workstation performed: [id number]     CT head wo contrast    Result Date: 7/13/2024  Impression: Interval placement of right-sided shunt catheter. Stable ventricular size with slight interval increase in intraventricular hemorrhage layering in the occipital horns bilaterally. Diffuse subarachnoid hemorrhage again noted. Workstation performed: [id number]     XR chest portable ICU    Result Date: 7/12/2024  Impression: No acute cardiopulmonary disease. ET tube 6 cm above the landon. Workstation performed: [id number]     CTA stroke alert (head/neck)    Result Date: 7/12/2024  Impression: 0.3 cm aneurysm in expected origin of right posterior communicating artery. Possible tiny aneurysm in left anterior communicating artery region. These could be potential sources of diffuse subarachnoid hemorrhage. Recommend neurovascular surgery consultation for further evaluation. Mild narrowing of bilateral MCA M1 and bilateral M2 segmental branch vessels, likely due to vasospasm. Patent stent in left proximal subclavian artery. Endotracheal tube in trachea. Additional chronic/incidental findings as detailed above. Findings were directly discussed with Emmanuel Lion at approximately 4:34 p.m. on 7/12/2024. Workstation performed: [id number]     CT stroke alert brain    Result Date: 7/12/2024  Impression: Acute diffuse thickened large-volume subarachnoid hemorrhage  throughout the bilateral parafalcine regions, bilateral cerebral sulci (right worse than left), basilar cisterns, prepontine cistern, premedullary cistern, and visualized upper cervical spinal canal. Acute small volume intraventricular hemorrhage with mild hydrocephalus. No acute infarction. Findings were directly discussed with Emmanuel Lion at approximately 4:34 p.m. on 7/12/2024. Workstation performed: [id number]     Other Diagnostic Testing: I have personally reviewed pertinent reports.    ACTIVE MEDICATIONS     Current Facility-Administered Medications   Medication Dose Route Frequency    acetaminophen (TYLENOL) oral suspension 975 mg  975 mg Oral Q8H PRN    albuterol (PROVENTIL HFA,VENTOLIN HFA) inhaler 2 puff  2 puff Inhalation Q4H PRN    aspirin chewable tablet 81 mg  81 mg Oral Daily    atorvastatin (LIPITOR) tablet 80 mg  80 mg Oral Daily    bisacodyl (DULCOLAX) rectal suppository 10 mg  10 mg Rectal Daily PRN    butalbital-acetaminophen-caffeine (FIORICET,ESGIC) -40 mg per tablet 1 tablet  1 tablet Oral Q4H PRN    ezetimibe (ZETIA) tablet 10 mg  10 mg Oral QAM    folic acid (FOLVITE) tablet 400 mcg  400 mcg Oral Daily    gabapentin (NEURONTIN) capsule 400 mg  400 mg Oral BID    gabapentin (NEURONTIN) capsule 600 mg  600 mg Oral HS    heparin (porcine) subcutaneous injection 5,000 Units  5,000 Units Subcutaneous Q8H [facility]    insulin lispro (HumALOG/ADMELOG) 100 units/mL subcutaneous injection 1-5 Units  1-5 Units Subcutaneous Q6H MATTY    levETIRAcetam (KEPPRA) oral solution 750 mg  750 mg Oral Q12H MATTY    lidocaine (LIDODERM) 5 % patch 1 patch  1 patch Topical Daily    lidocaine (LIDODERM) 5 % patch 1 patch  1 patch Topical Daily PRN    midodrine (PROAMATINE) tablet 10 mg  10 mg Oral TID AC    mirtazapine (REMERON) tablet 7.5 mg  7.5 mg Oral HS    omeprazole (PRILOSEC) suspension 2 mg/mL  20 mg Oral Daily Before Breakfast    ondansetron (ZOFRAN) injection 4 mg  4 mg Intravenous Q6H PRN     "oxyCODONE-acetaminophen (PERCOCET) 5-325 mg per tablet 1 tablet  1 tablet Oral Q4H PRN    saliva substitute (MOUTH KOTE) mucosal solution 5 spray  5 spray Mouth/Throat 4x Daily PRN    thiamine tablet 100 mg  100 mg Oral Daily       VTE Pharmacologic Prophylaxis: Heparin  VTE Mechanical Prophylaxis: sequential compression device    Portions of the record may have been created with voice recognition software.  Occasional wrong word or \"sound a like\" substitutions may have occurred due to the inherent limitations of voice recognition software.  Read the chart carefully and recognize, using context, where substitutions have occurred.  ==  Sean Christianson MD  [facility]  Internal Medicine Residency PGY-1      "

## 2024-08-02 NOTE — QUICK NOTE
I spoke with patient's daughter Aleena and updated her about her father's condition. We discussed his neurological condition, pain management, and dispo planning. She stated that she had some concern that her father wasn't able to feed himself, especially when his daughters aren't there.

## 2024-08-03 LAB
ANION GAP SERPL CALCULATED.3IONS-SCNC: 8 MMOL/L (ref 4–13)
BASOPHILS # BLD AUTO: 0.06 THOUSANDS/ÂΜL (ref 0–0.1)
BASOPHILS NFR BLD AUTO: 1 % (ref 0–1)
BUN SERPL-MCNC: 20 MG/DL (ref 5–25)
CALCIUM SERPL-MCNC: 9.6 MG/DL (ref 8.4–10.2)
CHLORIDE SERPL-SCNC: 102 MMOL/L (ref 96–108)
CO2 SERPL-SCNC: 30 MMOL/L (ref 21–32)
CREAT SERPL-MCNC: 0.82 MG/DL (ref 0.6–1.3)
EOSINOPHIL # BLD AUTO: 0.32 THOUSAND/ÂΜL (ref 0–0.61)
EOSINOPHIL NFR BLD AUTO: 3 % (ref 0–6)
ERYTHROCYTE [DISTWIDTH] IN BLOOD BY AUTOMATED COUNT: 14.6 % (ref 11.6–15.1)
GFR SERPL CREATININE-BSD FRML MDRD: 90 ML/MIN/1.73SQ M
GLUCOSE SERPL-MCNC: 101 MG/DL (ref 65–140)
GLUCOSE SERPL-MCNC: 113 MG/DL (ref 65–140)
GLUCOSE SERPL-MCNC: 127 MG/DL (ref 65–140)
GLUCOSE SERPL-MCNC: 143 MG/DL (ref 65–140)
GLUCOSE SERPL-MCNC: 159 MG/DL (ref 65–140)
HCT VFR BLD AUTO: 32.3 % (ref 36.5–49.3)
HGB BLD-MCNC: 10.5 G/DL (ref 12–17)
IMM GRANULOCYTES # BLD AUTO: 0.09 THOUSAND/UL (ref 0–0.2)
IMM GRANULOCYTES NFR BLD AUTO: 1 % (ref 0–2)
LYMPHOCYTES # BLD AUTO: 2.24 THOUSANDS/ÂΜL (ref 0.6–4.47)
LYMPHOCYTES NFR BLD AUTO: 21 % (ref 14–44)
MCH RBC QN AUTO: 31.6 PG (ref 26.8–34.3)
MCHC RBC AUTO-ENTMCNC: 32.5 G/DL (ref 31.4–37.4)
MCV RBC AUTO: 97 FL (ref 82–98)
MONOCYTES # BLD AUTO: 0.55 THOUSAND/ÂΜL (ref 0.17–1.22)
MONOCYTES NFR BLD AUTO: 5 % (ref 4–12)
NEUTROPHILS # BLD AUTO: 7.47 THOUSANDS/ÂΜL (ref 1.85–7.62)
NEUTS SEG NFR BLD AUTO: 69 % (ref 43–75)
NRBC BLD AUTO-RTO: 0 /100 WBCS
PLATELET # BLD AUTO: 701 THOUSANDS/UL (ref 149–390)
PMV BLD AUTO: 9.5 FL (ref 8.9–12.7)
POTASSIUM SERPL-SCNC: 4.3 MMOL/L (ref 3.5–5.3)
RBC # BLD AUTO: 3.32 MILLION/UL (ref 3.88–5.62)
SODIUM SERPL-SCNC: 140 MMOL/L (ref 135–147)
WBC # BLD AUTO: 10.73 THOUSAND/UL (ref 4.31–10.16)

## 2024-08-03 PROCEDURE — 99232 SBSQ HOSP IP/OBS MODERATE 35: CPT | Performed by: INTERNAL MEDICINE

## 2024-08-03 PROCEDURE — 80048 BASIC METABOLIC PNL TOTAL CA: CPT

## 2024-08-03 PROCEDURE — 82948 REAGENT STRIP/BLOOD GLUCOSE: CPT

## 2024-08-03 PROCEDURE — 85025 COMPLETE CBC W/AUTO DIFF WBC: CPT

## 2024-08-03 RX ORDER — INSULIN LISPRO 100 [IU]/ML
1-5 INJECTION, SOLUTION INTRAVENOUS; SUBCUTANEOUS
Status: DISCONTINUED | OUTPATIENT
Start: 2024-08-03 | End: 2024-08-07

## 2024-08-03 RX ADMIN — MIDODRINE HYDROCHLORIDE 10 MG: 5 TABLET ORAL at 17:27

## 2024-08-03 RX ADMIN — BUTALBITAL, ACETAMINOPHEN AND CAFFEINE 1 TABLET: 325; 50; 40 TABLET ORAL at 07:47

## 2024-08-03 RX ADMIN — GABAPENTIN 400 MG: 300 CAPSULE ORAL at 17:25

## 2024-08-03 RX ADMIN — MIDODRINE HYDROCHLORIDE 10 MG: 5 TABLET ORAL at 12:19

## 2024-08-03 RX ADMIN — MIRTAZAPINE 7.5 MG: 15 TABLET, FILM COATED ORAL at 21:11

## 2024-08-03 RX ADMIN — LIDOCAINE 5% 1 PATCH: 700 PATCH TOPICAL at 08:21

## 2024-08-03 RX ADMIN — THIAMINE HCL TAB 100 MG 100 MG: 100 TAB at 08:22

## 2024-08-03 RX ADMIN — ATORVASTATIN CALCIUM 80 MG: 80 TABLET, FILM COATED ORAL at 08:22

## 2024-08-03 RX ADMIN — EZETIMIBE 10 MG: 10 TABLET ORAL at 08:22

## 2024-08-03 RX ADMIN — MIDODRINE HYDROCHLORIDE 10 MG: 5 TABLET ORAL at 06:16

## 2024-08-03 RX ADMIN — BUTALBITAL, ACETAMINOPHEN AND CAFFEINE 1 TABLET: 325; 50; 40 TABLET ORAL at 17:26

## 2024-08-03 RX ADMIN — FOLIC ACID TAB 400 MCG 400 MCG: 400 TAB at 08:22

## 2024-08-03 RX ADMIN — GABAPENTIN 600 MG: 300 CAPSULE ORAL at 21:11

## 2024-08-03 RX ADMIN — LEVETIRACETAM 750 MG: 100 SOLUTION ORAL at 08:22

## 2024-08-03 RX ADMIN — INSULIN LISPRO 1 UNITS: 100 INJECTION, SOLUTION INTRAVENOUS; SUBCUTANEOUS at 18:35

## 2024-08-03 RX ADMIN — ASPIRIN 81 MG CHEWABLE TABLET 81 MG: 81 TABLET CHEWABLE at 08:22

## 2024-08-03 RX ADMIN — HEPARIN SODIUM 5000 UNITS: 5000 INJECTION INTRAVENOUS; SUBCUTANEOUS at 14:31

## 2024-08-03 RX ADMIN — HEPARIN SODIUM 5000 UNITS: 5000 INJECTION INTRAVENOUS; SUBCUTANEOUS at 06:15

## 2024-08-03 RX ADMIN — Medication 20 MG: at 07:47

## 2024-08-03 RX ADMIN — OXYCODONE HYDROCHLORIDE AND ACETAMINOPHEN 1 TABLET: 5; 325 TABLET ORAL at 14:13

## 2024-08-03 RX ADMIN — HEPARIN SODIUM 5000 UNITS: 5000 INJECTION INTRAVENOUS; SUBCUTANEOUS at 21:11

## 2024-08-03 RX ADMIN — LEVETIRACETAM 750 MG: 100 SOLUTION ORAL at 21:11

## 2024-08-03 RX ADMIN — GABAPENTIN 400 MG: 300 CAPSULE ORAL at 08:22

## 2024-08-03 NOTE — PROGRESS NOTES
Nutrition Assessment:    Day 2 calorie count (8/2) showing ~1264kcals and ~46g protein consumed. This is meeting ~74% low end of est kcal needs and ~60% low end of est protein needs. Noted majority of kcals coming from nutrition supplement Magic Cup.     Nutrition Recommendations:    Continue with current diet and supplements as ordered. Continue with encouragement with meals.

## 2024-08-03 NOTE — PROGRESS NOTES
INTERNAL MEDICINE RESIDENCY PROGRESS NOTE     Name: Federico Bowen   Age & Sex: 68 y.o. male   MRN: [id number]  Unit/Bed#: [facility] 715-01   Encounter: [id number]  Team: SOD Team A    PATIENT INFORMATION     Name: Federico Bowen   Age & Sex: 68 y.o. male   MRN: [id number]  Hospital Stay Days: 22    ASSESSMENT/PLAN     Principal Problem:    Subarachnoid hemorrhage (HCC)  Active Problems:    HTN (hypertension)    Hyperlipidemia    Seizures (HCC)    Severe protein-calorie malnutrition (HCC)    Goals of care, counseling/discussion    Palliative care encounter    Coronary artery disease    COPD (chronic obstructive pulmonary disease) (HCC)    Dysphagia as late effect of cerebrovascular disease      Dysphagia as late effect of cerebrovascular disease  Assessment & Plan  -SAH 7/12  -Modified barium swallow 7/29- mild-moderate dysphagia   -Speech recommends repeat MBS next week  -Pureed diet recommended    COPD (chronic obstructive pulmonary disease) (HCC)  Assessment & Plan  Documented h/o of COPD although no PFTs on file    Plan:  No concerns for exacerbation at this time  Continue prn albuterol    Coronary artery disease  Assessment & Plan  Patient with little documentation in medical record but appears to have non-obstructive CAD with history of unstable angina component and previously was on nitro prn and Ranexa. Home medications noted metoprolol and Entresto however last echo found to be in 2015 with EF 77% and most recent echo of 55% on 7/13/24 and thus Entresto was not restarted prior to downgrade from N-ICU.    Per patient, he has a history of a stent that was placed, but he is unable to provide more details.    Plan:  Continue ASA 81mg qd  Recommend for patient to re-establish care with Cardiology after discharge  Home metoprolol held given concurrent nimodipine and stable HR. Unclear if patient was taking prior to admission  Similarly as above, Brilinta noted as home medication on admission. Currently  not continued in setting of SAH this admission. Unable to find documented history of VIRA, outpatient f/u with cardiology.     Severe protein-calorie malnutrition (HCC)  Assessment & Plan  Malnutrition Findings:   Adult Malnutrition type: Acute illness  Adult Degree of Malnutrition: Other severe protein calorie malnutrition  Malnutrition Characteristics: Fat loss, Muscle loss, Inadequate energy, Weight loss                  360 Statement: Acute severe pro, vinny malnutrition d/t condition as evidence by signficant weight loss, 7/13/24 54kg, 7/19/24 49kg, 5kg/9% wt. loss x <1 week, moderate to severe signs of muscle/ fat loss at temples, orbitals, calvicles, triceps, shoulders, treated with TF.    BMI Findings:           There is no height or weight on file to calculate BMI.     Patient found to be eating more s/p mirtazapine.     Plan:  Nutrition consult placed earlier 7/18 while patient required tube feeds  Calorie count in place  Trialing oral diet for now and then may have to consider PEG tube if oral intake is not sufficient for adequate caloric intake  Patient on Pureed diet due to dysphagia. Encouraged to eat more to avoid having to place a PEG tube  Home mirtazapine 7.5mg HS restarted to try to improve appetite- patient reports some improvement.   Can re-engage Nutrition and consider adding Ensure supplements, magic cup, etc as diet restrictions tolerate    Seizures (HCC)  Assessment & Plan  New-onset this admission. En route to [facility] ED, patient had 2 witnessed seizures while being transported as stroke alert via EMS. Loaded on Keppra upon arrival to [facility]    Continue Keppra 750mg q12H    Hyperlipidemia  Assessment & Plan  F/u final results of lipid panel, none documented this admission.     Continue home zetia 10mg  Continue lipitor 80mg     HTN (hypertension)  Assessment & Plan  History of hypertension. Home Anti-HTN meds include metoprolol and entresto, both of which have not been restarted    Plan  as reiterated from SAH A/P:  Midodrine 10mg PO TID. Ok to begin weaning midodrine once patient has completed nimodipine  SBP goal normotensive  Nimodopine D/C'd 8/2  Consider adding patient's home metoprolol and entresto    * Subarachnoid hemorrhage (HCC)  Assessment & Plan  Patient was complaining of headache and R leg pain. En route to [facility] ED, patient had 2 witnessed seizures. He was unresponsive when arrived to the ED and was a stroke alert. Patient was intubated and given DDAVP for home ASA usage and also given mannitol. He was started on cardene gtt and Keppra load. EMS called and pt brought to [place] in setting of R MSK pain. CTH showed a large SAH with IVH s/p transfer to [facility] for coil embolization of R PCOM aneurysm and EVD on 7/13. Extubated 7/13. EVD removed 7/26. Course complicated by aspiration pneumonia for which he has already completed abx therapy. Followed on SAH pathway in Neuro ICU since transfer to [facility].     CTH 8/1 showing stable pathology. Patient off nimodipine and no longer receiving TCDs as of 8/2. Patient continues to have a headache, likely 2/2 to SAH. Continuing to monitor for changes in neuro status.    Plan:  Deemed medically stable by NCC team for downgrade to [facility] 8/1-  Status post coiling of right P-comm aneurysm. Stable ventricles. No hydrocephalus. Small volume of residual subarachnoid and intraventricular hemorrhage is mildly decreased. Stable hypodensities in the right temporal lobe, right occipital lobe and right caudate head likely due to evolving subacute infarcts.  Midodrine 10mg PO TID. Ok to begin weaning midodrine now that he is off nimodrine  SBP goal normotensive  Continue ASA 81mg qd  AED ppx: Keppra 750mg q12H  PT/OT/speech evals, CM consult  PMR consult placed and are following  Continue q4H neuro checks, ok to let sleep at night  Will need neurosurgery referral and outpatient f/u after discharge  Pain regimen for headache/MSK sequelae of SAH:  Gabapentin  -[id number]  PRN Fioricet, percocet, tylenol, lidocaine patch  Nimodipine D/C  TCDs D/C  Patient advised that he can ask for his pain medications more frequently than he has been getting them for his headaches    Fever-resolved as of 7/31/2024  Assessment & Plan  Hospital course complicated by aspiration pneumonia s/p extubation in N-ICU. Completed abx therapy. Otherwise fevers of central origin given SAH and IVH on presentation. Continuing to monitor.     Moderate protein-calorie malnutrition (HCC)-resolved as of 8/2/2024  Assessment & Plan  Malnutrition Findings:   Adult Malnutrition type: Acute illness  Adult Degree of Malnutrition: Other severe protein calorie malnutrition  Malnutrition Characteristics: Fat loss, Muscle loss, Inadequate energy, Weight loss                  360 Statement: Acute severe pro, vinny malnutrition d/t condition as evidence by signficant weight loss, 7/13/24 54kg, 7/19/24 49kg, 5kg/9% wt. loss x <1 week, moderate to severe signs of muscle/ fat loss at temples, orbitals, calvicles, triceps, shoulders, treated with TF.    BMI Findings:          There is no height or weight on file to calculate BMI.           Disposition: Good Momin authorization send. Pending approval.     SUBJECTIVE     Patient seen and examined. No acute events overnight. Patient states that he is still experiencing headaches in the back of his head. He states that they are the same as in the past. He has not been taking his pain medications as frequently as he is prescribed. He was encouraged to ask his nurse for his pain medications when he is in pain.     He states that he feels like he has a little more of an appetite after starting mirtazapine. He had eaten more of his food today. He was encouraged to continue to eat more in order to meet calorie and protein goals and to avoid having to place a PEG tube. He states that he does not like the food here but states that he will try to eat more. He denies any  fevers/chills, chest pain, SOB, N/V, or abdominal pain.    OBJECTIVE     Vitals:    24 0900 24 2239 24 0559 24 0703   BP:  114/83  118/82   BP Location:  Right arm     Pulse: 105 99     Resp:  18     Temp:  98.4 °F (36.9 °C)  (!) 97.3 °F (36.3 °C)   TempSrc:  Oral     SpO2: 96% 97%     Weight:   43 kg (94 lb 12.8 oz)       Temperature:   Temp (24hrs), Av.9 °F (36.6 °C), Min:97.3 °F (36.3 °C), Max:98.4 °F (36.9 °C)    Temperature: (!) 97.3 °F (36.3 °C)  Intake & Output:  I/O          07 07 07 07 07 07    P.O. 328 120     Total Intake(mL/kg) 328 (7.7) 120 (2.8)     Urine (mL/kg/hr) 575 (0.6) 200 (0.2)     Stool 0 0     Total Output 575 200     Net -247 -80            Unmeasured Urine Occurrence  1 x     Unmeasured Stool Occurrence 1 x 1 x           Weights:        There is no height or weight on file to calculate BMI.  Weight (last 2 days)       Date/Time Weight    24 0559 43 (94.8)    24 0600 42.8 (94.36)    24 0600 42.7 (94.14)          Physical Exam  Constitutional:       Appearance: Normal appearance. He is cachectic.   HENT:      Head:      Comments: Well-healing scar from EVD on head w/ stitches     Mouth/Throat:      Mouth: Mucous membranes are moist.   Eyes:      Extraocular Movements: Extraocular movements intact.      Pupils: Pupils are equal, round, and reactive to light.   Cardiovascular:      Rate and Rhythm: Normal rate and regular rhythm.      Pulses: Normal pulses.      Heart sounds: Normal heart sounds. No murmur heard.     No friction rub. No gallop.   Pulmonary:      Effort: Pulmonary effort is normal. No respiratory distress.      Breath sounds: Normal breath sounds. No stridor. No wheezing or rales.   Abdominal:      General: Abdomen is flat. Bowel sounds are normal. There is no distension.      Palpations: Abdomen is soft.      Tenderness: There is no abdominal tenderness. There is no guarding.    Skin:     General: Skin is warm.      Capillary Refill: Capillary refill takes less than 2 seconds.   Neurological:      General: No focal deficit present.      Mental Status: He is alert and oriented to person, place, and time.      Cranial Nerves: No cranial nerve deficit.       LABORATORY DATA     Labs: I have personally reviewed pertinent reports.  Results from last 7 days   Lab Units 08/03/24  0500 08/02/24  0547 08/01/24  0515 07/31/24  0543 07/30/24  0553   WBC Thousand/uL 10.73* 12.85* 11.96*   < > 16.76*   HEMOGLOBIN g/dL 10.5* 10.3* 10.8*   < > 10.5*   HEMATOCRIT % 32.3* 31.8* 34.5*   < > 32.0*   PLATELETS Thousands/uL 701* 716* 677*   < > 677*   SEGS PCT % 69 69  --   --  80*   MONO PCT % 5 5  --   --  4   EOS PCT % 3 2  --   --  3    < > = values in this interval not displayed.      Results from last 7 days   Lab Units 08/03/24  0500 08/02/24  0547 08/01/24  0515 07/31/24  0543 07/30/24  0553 07/29/24  1139   POTASSIUM mmol/L 4.3 4.5 4.5 4.5   < > 4.6   CHLORIDE mmol/L 102 105 106 103   < > 103   CO2 mmol/L 30 26 27 28   < > 27   BUN mg/dL 20 23 26* 26*   < > 20   CREATININE mg/dL 0.82 0.74 0.79 0.78   < > 0.73   CALCIUM mg/dL 9.6 9.3 9.4 9.3   < > 9.2   ALK PHOS U/L  --   --  159* 144*  --  132*   ALT U/L  --   --  27 23  --  15   AST U/L  --   --  29 26  --  25    < > = values in this interval not displayed.     Results from last 7 days   Lab Units 08/01/24  0515 07/31/24  0543 07/30/24  0553   MAGNESIUM mg/dL 2.4 2.5 2.4     Results from last 7 days   Lab Units 08/01/24  0515 07/31/24  0543 07/30/24  0553   PHOSPHORUS mg/dL 3.5 3.4 3.6      Results from last 7 days   Lab Units 07/28/24  0510   PTT seconds 31               IMAGING & DIAGNOSTIC TESTING     Radiology Results: I have personally reviewed pertinent reports.  CTA head and neck w wo contrast    Result Date: 7/16/2024  Impression: CT Brain: Redemonstrated subarachnoid hemorrhage overlying the right greater than left convexities and  concentrated within the right sylvian cistern and fissure. . Subacute infarct involving the right anterior temporal region and medial occipital region.. 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 Workstation performed: [id number]     IR cerebral angiography / intervention    Result Date: 7/15/2024  Impression: Successful balloon assisted coil embolization of a 4.5 mm right P-comm aneurysm. Workstation performed: [id number]     CT head wo contrast    Result Date: 7/14/2024  Impression: No significant interval change in extensive bilateral subarachnoid hemorrhage and intraventricular hemorrhage. Workstation performed: [id number]     CT head wo contrast    Result Date: 7/13/2024  Impression: Interval placement of right-sided shunt catheter. Stable ventricular size with slight interval increase in intraventricular hemorrhage layering in the occipital horns bilaterally. Diffuse subarachnoid hemorrhage again noted. Workstation performed: [id number]     XR chest portable ICU    Result Date: 7/12/2024  Impression: No acute cardiopulmonary disease. ET tube 6 cm above the landon. Workstation performed: [id number]     CTA stroke alert (head/neck)    Result Date: 7/12/2024  Impression: 0.3 cm aneurysm in expected origin of right posterior communicating artery. Possible tiny aneurysm in left anterior communicating artery region. These could be potential sources of diffuse subarachnoid hemorrhage. Recommend neurovascular surgery consultation for further evaluation. Mild narrowing of bilateral MCA M1 and bilateral M2 segmental branch vessels, likely due to vasospasm. Patent stent in left proximal subclavian artery. Endotracheal tube in trachea. Additional chronic/incidental findings as detailed above. Findings were directly discussed with Emmanuel Lion at approximately 4:34 p.m. on 7/12/2024. Workstation  performed: [id number]     CT stroke alert brain    Result Date: 7/12/2024  Impression: Acute diffuse thickened large-volume subarachnoid hemorrhage throughout the bilateral parafalcine regions, bilateral cerebral sulci (right worse than left), basilar cisterns, prepontine cistern, premedullary cistern, and visualized upper cervical spinal canal. Acute small volume intraventricular hemorrhage with mild hydrocephalus. No acute infarction. Findings were directly discussed with Emmanuel Lion at approximately 4:34 p.m. on 7/12/2024. Workstation performed: [id number]     Other Diagnostic Testing: I have personally reviewed pertinent reports.    ACTIVE MEDICATIONS     Current Facility-Administered Medications   Medication Dose Route Frequency    acetaminophen (TYLENOL) oral suspension 975 mg  975 mg Oral Q8H PRN    albuterol (PROVENTIL HFA,VENTOLIN HFA) inhaler 2 puff  2 puff Inhalation Q4H PRN    aspirin chewable tablet 81 mg  81 mg Oral Daily    atorvastatin (LIPITOR) tablet 80 mg  80 mg Oral Daily    bisacodyl (DULCOLAX) rectal suppository 10 mg  10 mg Rectal Daily PRN    butalbital-acetaminophen-caffeine (FIORICET,ESGIC) -40 mg per tablet 1 tablet  1 tablet Oral Q4H PRN    ezetimibe (ZETIA) tablet 10 mg  10 mg Oral QAM    folic acid (FOLVITE) tablet 400 mcg  400 mcg Oral Daily    gabapentin (NEURONTIN) capsule 400 mg  400 mg Oral BID    gabapentin (NEURONTIN) capsule 600 mg  600 mg Oral HS    heparin (porcine) subcutaneous injection 5,000 Units  5,000 Units Subcutaneous Q8H [facility]    insulin lispro (HumALOG/ADMELOG) 100 units/mL subcutaneous injection 1-5 Units  1-5 Units Subcutaneous Q6H [facility]    levETIRAcetam (KEPPRA) oral solution 750 mg  750 mg Oral Q12H [facility]ATTY    lidocaine (LIDODERM) 5 % patch 1 patch  1 patch Topical Daily    lidocaine (LIDODERM) 5 % patch 1 patch  1 patch Topical Daily PRN    midodrine (PROAMATINE) tablet 10 mg  10 mg Oral TID AC    mirtazapine (REMERON) tablet 7.5 mg  7.5 mg Oral HS    omeprazole  "(PRILOSEC) suspension 2 mg/mL  20 mg Oral Daily Before Breakfast    ondansetron (ZOFRAN) injection 4 mg  4 mg Intravenous Q6H PRN    oxyCODONE-acetaminophen (PERCOCET) 5-325 mg per tablet 1 tablet  1 tablet Oral Q4H PRN    saliva substitute (MOUTH KOTE) mucosal solution 5 spray  5 spray Mouth/Throat 4x Daily PRN    thiamine tablet 100 mg  100 mg Oral Daily       VTE Pharmacologic Prophylaxis: Heparin  VTE Mechanical Prophylaxis: sequential compression device    Portions of the record may have been created with voice recognition software.  Occasional wrong word or \"sound a like\" substitutions may have occurred due to the inherent limitations of voice recognition software.  Read the chart carefully and recognize, using context, where substitutions have occurred.  ==  Sean Christianson MD  [facility]  Internal Medicine Residency PGY-1      "

## 2024-08-03 NOTE — PLAN OF CARE
Problem: Neurological Deficit  Goal: Neurological status is stable or improving  Description: Interventions:  - Monitor and assess patient's level of consciousness, motor function, sensory function, and level of assistance needed for ADLs.   - Monitor and report changes from baseline. Collaborate with interdisciplinary team to initiate plan and implement interventions as ordered.   - Provide and maintain a safe environment.  - Consider seizure precautions.  - Consider fall precautions.  - Consider aspiration precautions.  - Consider bleeding precautions.  Outcome: Progressing     Problem: Communication Impairment  Goal: Ability to express needs and understand communication  Description: Assess patient's communication skills and ability to understand information.  Patient will demonstrate use of effective communication techniques, alternative methods of communication and understanding even if not able to speak.     - Encourage communication and provide alternate methods of communication as needed.  - Collaborate with case management/ for discharge needs.  - Include patient/family/caregiver in decisions related to communication.  Outcome: Progressing     Problem: NEUROSENSORY - ADULT  Goal: Achieves stable or improved neurological status  Description: INTERVENTIONS  - Monitor and report changes in neurological status  - Monitor vital signs such as temperature, blood pressure, glucose, and any other labs ordered   - Initiate measures to prevent increased intracranial pressure  - Monitor for seizure activity and implement precautions if appropriate      Outcome: Progressing     Problem: NEUROSENSORY - ADULT  Goal: Achieves maximal functionality and self care  Description: INTERVENTIONS  - Monitor swallowing and airway patency with patient fatigue and changes in neurological status  - Encourage and assist patient to increase activity and self care.   - Encourage visually impaired, hearing impaired and  aphasic patients to use assistive/communication devices  Outcome: Progressing

## 2024-08-04 LAB
ANION GAP SERPL CALCULATED.3IONS-SCNC: 8 MMOL/L (ref 4–13)
BASOPHILS # BLD AUTO: 0.06 THOUSANDS/ÂΜL (ref 0–0.1)
BASOPHILS NFR BLD AUTO: 1 % (ref 0–1)
BUN SERPL-MCNC: 21 MG/DL (ref 5–25)
CALCIUM SERPL-MCNC: 9.7 MG/DL (ref 8.4–10.2)
CHLORIDE SERPL-SCNC: 101 MMOL/L (ref 96–108)
CO2 SERPL-SCNC: 31 MMOL/L (ref 21–32)
CREAT SERPL-MCNC: 0.81 MG/DL (ref 0.6–1.3)
EOSINOPHIL # BLD AUTO: 0.22 THOUSAND/ÂΜL (ref 0–0.61)
EOSINOPHIL NFR BLD AUTO: 3 % (ref 0–6)
ERYTHROCYTE [DISTWIDTH] IN BLOOD BY AUTOMATED COUNT: 14.4 % (ref 11.6–15.1)
GFR SERPL CREATININE-BSD FRML MDRD: 90 ML/MIN/1.73SQ M
GLUCOSE SERPL-MCNC: 104 MG/DL (ref 65–140)
GLUCOSE SERPL-MCNC: 113 MG/DL (ref 65–140)
GLUCOSE SERPL-MCNC: 124 MG/DL (ref 65–140)
GLUCOSE SERPL-MCNC: 141 MG/DL (ref 65–140)
GLUCOSE SERPL-MCNC: 150 MG/DL (ref 65–140)
HCT VFR BLD AUTO: 32.9 % (ref 36.5–49.3)
HGB BLD-MCNC: 10.6 G/DL (ref 12–17)
IMM GRANULOCYTES # BLD AUTO: 0.07 THOUSAND/UL (ref 0–0.2)
IMM GRANULOCYTES NFR BLD AUTO: 1 % (ref 0–2)
LYMPHOCYTES # BLD AUTO: 2.48 THOUSANDS/ÂΜL (ref 0.6–4.47)
LYMPHOCYTES NFR BLD AUTO: 28 % (ref 14–44)
MCH RBC QN AUTO: 31.5 PG (ref 26.8–34.3)
MCHC RBC AUTO-ENTMCNC: 32.2 G/DL (ref 31.4–37.4)
MCV RBC AUTO: 98 FL (ref 82–98)
MONOCYTES # BLD AUTO: 0.46 THOUSAND/ÂΜL (ref 0.17–1.22)
MONOCYTES NFR BLD AUTO: 5 % (ref 4–12)
NEUTROPHILS # BLD AUTO: 5.63 THOUSANDS/ÂΜL (ref 1.85–7.62)
NEUTS SEG NFR BLD AUTO: 62 % (ref 43–75)
NRBC BLD AUTO-RTO: 0 /100 WBCS
PLATELET # BLD AUTO: 664 THOUSANDS/UL (ref 149–390)
PMV BLD AUTO: 9.2 FL (ref 8.9–12.7)
POTASSIUM SERPL-SCNC: 4.3 MMOL/L (ref 3.5–5.3)
RBC # BLD AUTO: 3.36 MILLION/UL (ref 3.88–5.62)
SODIUM SERPL-SCNC: 140 MMOL/L (ref 135–147)
WBC # BLD AUTO: 8.92 THOUSAND/UL (ref 4.31–10.16)

## 2024-08-04 PROCEDURE — 80048 BASIC METABOLIC PNL TOTAL CA: CPT

## 2024-08-04 PROCEDURE — 85025 COMPLETE CBC W/AUTO DIFF WBC: CPT

## 2024-08-04 PROCEDURE — 82948 REAGENT STRIP/BLOOD GLUCOSE: CPT

## 2024-08-04 PROCEDURE — 99232 SBSQ HOSP IP/OBS MODERATE 35: CPT | Performed by: INTERNAL MEDICINE

## 2024-08-04 RX ORDER — HYDROMORPHONE HCL IN WATER/PF 6 MG/30 ML
0.2 PATIENT CONTROLLED ANALGESIA SYRINGE INTRAVENOUS
Status: DISCONTINUED | OUTPATIENT
Start: 2024-08-04 | End: 2024-08-06

## 2024-08-04 RX ADMIN — BUTALBITAL, ACETAMINOPHEN AND CAFFEINE 1 TABLET: 325; 50; 40 TABLET ORAL at 09:09

## 2024-08-04 RX ADMIN — MIDODRINE HYDROCHLORIDE 10 MG: 5 TABLET ORAL at 17:45

## 2024-08-04 RX ADMIN — GABAPENTIN 400 MG: 300 CAPSULE ORAL at 09:10

## 2024-08-04 RX ADMIN — OXYCODONE HYDROCHLORIDE AND ACETAMINOPHEN 1 TABLET: 5; 325 TABLET ORAL at 01:28

## 2024-08-04 RX ADMIN — BUTALBITAL, ACETAMINOPHEN AND CAFFEINE 1 TABLET: 325; 50; 40 TABLET ORAL at 17:41

## 2024-08-04 RX ADMIN — EZETIMIBE 10 MG: 10 TABLET ORAL at 09:09

## 2024-08-04 RX ADMIN — MIDODRINE HYDROCHLORIDE 10 MG: 5 TABLET ORAL at 09:12

## 2024-08-04 RX ADMIN — THIAMINE HCL TAB 100 MG 100 MG: 100 TAB at 09:09

## 2024-08-04 RX ADMIN — ATORVASTATIN CALCIUM 80 MG: 80 TABLET, FILM COATED ORAL at 09:09

## 2024-08-04 RX ADMIN — LEVETIRACETAM 750 MG: 100 SOLUTION ORAL at 09:10

## 2024-08-04 RX ADMIN — HYDROMORPHONE HYDROCHLORIDE 0.2 MG: 0.2 INJECTION, SOLUTION INTRAMUSCULAR; INTRAVENOUS; SUBCUTANEOUS at 10:00

## 2024-08-04 RX ADMIN — BUTALBITAL, ACETAMINOPHEN AND CAFFEINE 1 TABLET: 325; 50; 40 TABLET ORAL at 11:25

## 2024-08-04 RX ADMIN — GABAPENTIN 400 MG: 300 CAPSULE ORAL at 17:42

## 2024-08-04 RX ADMIN — MIRTAZAPINE 7.5 MG: 15 TABLET, FILM COATED ORAL at 22:19

## 2024-08-04 RX ADMIN — FOLIC ACID TAB 400 MCG 400 MCG: 400 TAB at 09:10

## 2024-08-04 RX ADMIN — INSULIN LISPRO 1 UNITS: 100 INJECTION, SOLUTION INTRAVENOUS; SUBCUTANEOUS at 11:30

## 2024-08-04 RX ADMIN — LEVETIRACETAM 750 MG: 100 SOLUTION ORAL at 22:36

## 2024-08-04 RX ADMIN — HEPARIN SODIUM 5000 UNITS: 5000 INJECTION INTRAVENOUS; SUBCUTANEOUS at 22:18

## 2024-08-04 RX ADMIN — HEPARIN SODIUM 5000 UNITS: 5000 INJECTION INTRAVENOUS; SUBCUTANEOUS at 17:45

## 2024-08-04 RX ADMIN — LIDOCAINE 5% 1 PATCH: 700 PATCH TOPICAL at 09:08

## 2024-08-04 RX ADMIN — ASPIRIN 81 MG CHEWABLE TABLET 81 MG: 81 TABLET CHEWABLE at 09:09

## 2024-08-04 RX ADMIN — ACETAMINOPHEN 975 MG: 650 SUSPENSION ORAL at 22:18

## 2024-08-04 RX ADMIN — Medication 20 MG: at 09:21

## 2024-08-04 RX ADMIN — HEPARIN SODIUM 5000 UNITS: 5000 INJECTION INTRAVENOUS; SUBCUTANEOUS at 05:04

## 2024-08-04 RX ADMIN — MIDODRINE HYDROCHLORIDE 10 MG: 5 TABLET ORAL at 11:27

## 2024-08-04 RX ADMIN — GABAPENTIN 600 MG: 300 CAPSULE ORAL at 22:18

## 2024-08-04 NOTE — PLAN OF CARE
Problem: Prexisting or High Potential for Compromised Skin Integrity  Goal: Skin integrity is maintained or improved  Description: INTERVENTIONS:  - Identify patients at risk for skin breakdown  - Assess and monitor skin integrity  - Assess and monitor nutrition and hydration status  - Monitor labs   - Assess for incontinence   - Turn and reposition patient  - Assist with mobility/ambulation  - Relieve pressure over bony prominences  - Avoid friction and shearing  - Provide appropriate hygiene as needed including keeping skin clean and dry  - Evaluate need for skin moisturizer/barrier cream  - Collaborate with interdisciplinary team   - Patient/family teaching  - Consider wound care consult   Outcome: Progressing     Problem: Nutrition  Goal: Nutrition/Hydration status is improving  Description: Monitor and assess patient's nutrition/hydration status for malnutrition (ex- brittle hair, bruises, dry skin, pale skin and conjunctiva, muscle wasting, smooth red tongue, and disorientation). Collaborate with interdisciplinary team and initiate plan and interventions as ordered.  Monitor patient's weight and dietary intake as ordered or per policy. Utilize nutrition screening tool and intervene per policy. Determine patient's food preferences and provide high-protein, high-caloric foods as appropriate.   - Monitor nutrition parameters, recommending adjustments to enteral nutrition orders at indicated.   - Assist patient with eating.  - Allow adequate time for meals.  - Encourage patient to take dietary supplement as ordered.  - Collaborate with interdisciplinary team.   - Include patient/family/caregiver in decisions related to nutrition.  Outcome: Progressing

## 2024-08-04 NOTE — PLAN OF CARE
Problem: Prexisting or High Potential for Compromised Skin Integrity  Goal: Skin integrity is maintained or improved  Description: INTERVENTIONS:  - Identify patients at risk for skin breakdown  - Assess and monitor skin integrity  - Assess and monitor nutrition and hydration status  - Monitor labs   - Assess for incontinence   - Turn and reposition patient  - Assist with mobility/ambulation  - Relieve pressure over bony prominences  - Avoid friction and shearing  - Provide appropriate hygiene as needed including keeping skin clean and dry  - Evaluate need for skin moisturizer/barrier cream  - Collaborate with interdisciplinary team   - Patient/family teaching  - Consider wound care consult   Outcome: Progressing     Problem: Neurological Deficit  Goal: Neurological status is stable or improving  Description: Interventions:  - Monitor and assess patient's level of consciousness, motor function, sensory function, and level of assistance needed for ADLs.   - Monitor and report changes from baseline. Collaborate with interdisciplinary team to initiate plan and implement interventions as ordered.   - Provide and maintain a safe environment.  - Consider seizure precautions.  - Consider fall precautions.  - Consider aspiration precautions.  - Consider bleeding precautions.  Outcome: Progressing     Problem: Activity Intolerance/Impaired Mobility  Goal: Mobility/activity is maintained at optimum level for patient  Description: Interventions:  - Assess and monitor patient  barriers to mobility and need for assistive/adaptive devices.  - Assess patient's emotional response to limitations.  - Collaborate with interdisciplinary team and initiate plans and interventions as ordered.  - Encourage independent activity per ability.  - Maintain proper body alignment.  - Perform active/passive rom as tolerated/ordered.  - Plan activities to conserve energy.  - Turn patient as appropriate  Outcome: Progressing     Problem:  Communication Impairment  Goal: Ability to express needs and understand communication  Description: Assess patient's communication skills and ability to understand information.  Patient will demonstrate use of effective communication techniques, alternative methods of communication and understanding even if not able to speak.     - Encourage communication and provide alternate methods of communication as needed.  - Collaborate with case management/ for discharge needs.  - Include patient/family/caregiver in decisions related to communication.  Outcome: Progressing     Problem: Nutrition  Goal: Nutrition/Hydration status is improving  Description: Monitor and assess patient's nutrition/hydration status for malnutrition (ex- brittle hair, bruises, dry skin, pale skin and conjunctiva, muscle wasting, smooth red tongue, and disorientation). Collaborate with interdisciplinary team and initiate plan and interventions as ordered.  Monitor patient's weight and dietary intake as ordered or per policy. Utilize nutrition screening tool and intervene per policy. Determine patient's food preferences and provide high-protein, high-caloric foods as appropriate.   - Monitor nutrition parameters, recommending adjustments to enteral nutrition orders at indicated.   - Assist patient with eating.  - Allow adequate time for meals.  - Encourage patient to take dietary supplement as ordered.  - Collaborate with interdisciplinary team.   - Include patient/family/caregiver in decisions related to nutrition.  Outcome: Progressing     Problem: PAIN - ADULT  Goal: Verbalizes/displays adequate comfort level or baseline comfort level  Description: Interventions:  - Encourage patient to monitor pain and request assistance  - Assess pain using appropriate pain scale  - Administer analgesics based on type and severity of pain and evaluate response  - Implement non-pharmacological measures as appropriate and evaluate response  -  Consider cultural and social influences on pain and pain management  - Notify physician/advanced practitioner if interventions unsuccessful or patient reports new pain  Outcome: Progressing

## 2024-08-04 NOTE — PLAN OF CARE
Problem: Prexisting or High Potential for Compromised Skin Integrity  Goal: Skin integrity is maintained or improved  Description: INTERVENTIONS:  - Identify patients at risk for skin breakdown  - Assess and monitor skin integrity  - Assess and monitor nutrition and hydration status  - Monitor labs   - Assess for incontinence   - Turn and reposition patient  - Assist with mobility/ambulation  - Relieve pressure over bony prominences  - Avoid friction and shearing  - Provide appropriate hygiene as needed including keeping skin clean and dry  - Evaluate need for skin moisturizer/barrier cream  - Collaborate with interdisciplinary team   - Patient/family teaching  - Consider wound care consult   Outcome: Progressing     Problem: Neurological Deficit  Goal: Neurological status is stable or improving  Description: Interventions:  - Monitor and assess patient's level of consciousness, motor function, sensory function, and level of assistance needed for ADLs.   - Monitor and report changes from baseline. Collaborate with interdisciplinary team to initiate plan and implement interventions as ordered.   - Provide and maintain a safe environment.  - Consider seizure precautions.  - Consider fall precautions.  - Consider aspiration precautions.  - Consider bleeding precautions.  8/4/2024 1938 by Mariluz Gaines RN  Outcome: Progressing  8/4/2024 1641 by Mariluz Gaines RN  Outcome: Progressing     Problem: Activity Intolerance/Impaired Mobility  Goal: Mobility/activity is maintained at optimum level for patient  Description: Interventions:  - Assess and monitor patient  barriers to mobility and need for assistive/adaptive devices.  - Assess patient's emotional response to limitations.  - Collaborate with interdisciplinary team and initiate plans and interventions as ordered.  - Encourage independent activity per ability.  - Maintain proper body alignment.  - Perform active/passive rom as tolerated/ordered.  - Plan activities  to conserve energy.  - Turn patient as appropriate  8/4/2024 1938 by Mariluz Gaines RN  Outcome: Progressing  8/4/2024 1641 by Mariluz Gaines RN  Outcome: Progressing     Problem: Communication Impairment  Goal: Ability to express needs and understand communication  Description: Assess patient's communication skills and ability to understand information.  Patient will demonstrate use of effective communication techniques, alternative methods of communication and understanding even if not able to speak.     - Encourage communication and provide alternate methods of communication as needed.  - Collaborate with case management/ for discharge needs.  - Include patient/family/caregiver in decisions related to communication.  Outcome: Progressing     Problem: Nutrition  Goal: Nutrition/Hydration status is improving  Description: Monitor and assess patient's nutrition/hydration status for malnutrition (ex- brittle hair, bruises, dry skin, pale skin and conjunctiva, muscle wasting, smooth red tongue, and disorientation). Collaborate with interdisciplinary team and initiate plan and interventions as ordered.  Monitor patient's weight and dietary intake as ordered or per policy. Utilize nutrition screening tool and intervene per policy. Determine patient's food preferences and provide high-protein, high-caloric foods as appropriate.   - Monitor nutrition parameters, recommending adjustments to enteral nutrition orders at indicated.   - Assist patient with eating.  - Allow adequate time for meals.  - Encourage patient to take dietary supplement as ordered.  - Collaborate with interdisciplinary team.   - Include patient/family/caregiver in decisions related to nutrition.  8/4/2024 1938 by Mariluz Gaines RN  Outcome: Progressing  8/4/2024 1641 by Mariluz Gaines RN  Outcome: Progressing     Problem: PAIN - ADULT  Goal: Verbalizes/displays adequate comfort level or baseline comfort level  Description:  Interventions:  - Encourage patient to monitor pain and request assistance  - Assess pain using appropriate pain scale  - Administer analgesics based on type and severity of pain and evaluate response  - Implement non-pharmacological measures as appropriate and evaluate response  - Consider cultural and social influences on pain and pain management  - Notify physician/advanced practitioner if interventions unsuccessful or patient reports new pain  8/4/2024 1938 by Mariluz Gaines RN  Outcome: Progressing  8/4/2024 1641 by Mariluz Gaines RN  Outcome: Progressing     Problem: SAFETY ADULT  Goal: Patient will remain free of falls  Description: INTERVENTIONS:  - Educate patient/family on patient safety including physical limitations  - Instruct patient to call for assistance with activity   - Consult OT/PT to assist with strengthening/mobility   - Keep Call bell within reach  - Keep bed low and locked with side rails adjusted as appropriate  - Keep care items and personal belongings within reach  - Initiate and maintain comfort rounds  - Make Fall Risk Sign visible to staff  - Offer Toileting every 2 Hours, in advance of need  - Initiate/Maintain bed alarm  - Obtain necessary fall risk management equipment: non skid footwear  - Apply yellow socks and bracelet for high fall risk patients  - Consider moving patient to room near nurses station  Outcome: Progressing     Problem: Potential for Falls  Goal: Patient will remain free of falls  Description: INTERVENTIONS:  - Educate patient/family on patient safety including physical limitations  - Instruct patient to call for assistance with activity   - Consult OT/PT to assist with strengthening/mobility   - Keep Call bell within reach  - Keep bed low and locked with side rails adjusted as appropriate  - Keep care items and personal belongings within reach  - Initiate and maintain comfort rounds  - Make Fall Risk Sign visible to staff  - Offer Toileting every 2 Hours, in  advance of need  - Initiate/Maintain bed alarm  - Obtain necessary fall risk management equipment: non skid footwear  - Apply yellow socks and bracelet for high fall risk patients  - Consider moving patient to room near nurses station  Outcome: Progressing

## 2024-08-04 NOTE — PROGRESS NOTES
INTERNAL MEDICINE RESIDENCY PROGRESS NOTE     Name: Federico Bowen   Age & Sex: 68 y.o. male   MRN: [id number]  Unit/Bed#: [facility] 732-01   Encounter: [id number]  Team: SOD Team A    PATIENT INFORMATION     Name: Federico Bowen   Age & Sex: 68 y.o. male   MRN: [id number]  Hospital Stay Days: 23    ASSESSMENT/PLAN     Principal Problem:    Subarachnoid hemorrhage (HCC)  Active Problems:    HTN (hypertension)    Hyperlipidemia    Seizures (HCC)    Severe protein-calorie malnutrition (HCC)    Goals of care, counseling/discussion    Palliative care encounter    Coronary artery disease    COPD (chronic obstructive pulmonary disease) (HCC)    Dysphagia as late effect of cerebrovascular disease      Dysphagia as late effect of cerebrovascular disease  Assessment & Plan  -SAH 7/12  -Modified barium swallow 7/29- mild-moderate dysphagia   -Speech recommends repeat MBS this coming week  -Pureed diet recommended    COPD (chronic obstructive pulmonary disease) (HCC)  Assessment & Plan  Documented h/o of COPD although no PFTs on file    Plan:  No concerns for exacerbation at this time  Continue prn albuterol    Coronary artery disease  Assessment & Plan  Patient with little documentation in medical record but appears to have non-obstructive CAD with history of unstable angina component and previously was on nitro prn and Ranexa. Home medications noted metoprolol and Entresto however last echo found to be in 2015 with EF 77% and most recent echo of 55% on 7/13/24 and thus Entresto was not restarted prior to downgrade from N-ICU.    Per patient, he has a history of a stent that was placed, but he is unable to provide more details.    Plan:  Continue ASA 81mg qd  Recommend for patient to re-establish care with Cardiology after discharge  Home metoprolol held given concurrent nimodipine and stable HR. Unclear if patient was taking prior to admission  Similarly as above, Brilinta noted as home medication on admission.  Currently not continued in setting of SAH this admission. Unable to find documented history of VIRA, outpatient f/u with cardiology.     Severe protein-calorie malnutrition (HCC)  Assessment & Plan  Malnutrition Findings:   Adult Malnutrition type: Acute illness  Adult Degree of Malnutrition: Other severe protein calorie malnutrition  Malnutrition Characteristics: Fat loss, Muscle loss, Inadequate energy, Weight loss                  360 Statement: Acute severe pro, vinny malnutrition d/t condition as evidence by signficant weight loss, 7/13/24 54kg, 7/19/24 49kg, 5kg/9% wt. loss x <1 week, moderate to severe signs of muscle/ fat loss at temples, orbitals, calvicles, triceps, shoulders, treated with TF.    BMI Findings:           There is no height or weight on file to calculate BMI.     Patient found to be eating more s/p mirtazapine.     Plan:  Nutrition consult placed earlier 7/18 while patient required tube feeds  Calorie count in place - met 74% and 60% of caloric and protein needs, respectively (slowly improving)  Trialing oral diet for now and then may have to consider PEG tube if oral intake is not sufficient for adequate caloric intake  Patient on Pureed diet due to dysphagia. Encouraged to eat more to avoid having to place a PEG tube  Home mirtazapine 7.5mg HS restarted to try to improve appetite- patient reports some improvement - consider increase to 15 mg today   Can re-engage Nutrition and consider adding Ensure supplements, magic cup, etc as diet restrictions tolerate    Seizures (HCC)  Assessment & Plan  New-onset this admission. En route to [facility] ED, patient had 2 witnessed seizures while being transported as stroke alert via EMS. Loaded on Keppra upon arrival to [facility]    Continue Keppra 750mg q12H    Hyperlipidemia  Assessment & Plan  F/u final results of lipid panel, none documented this admission.     Continue home zetia 10mg  Continue lipitor 80mg     HTN (hypertension)  Assessment &  Plan  History of hypertension. Home Anti-HTN meds include metoprolol and entresto, both of which have not been restarted    Plan as reiterated from SAH A/P:  Midodrine 10mg PO TID. Ok to begin weaning midodrine once patient has completed nimodipine  SBP goal normotensive  Nimodopine D/C'd 8/2  Consider adding patient's home metoprolol and entresto    * Subarachnoid hemorrhage (HCC)  Assessment & Plan  Patient was complaining of headache and R leg pain. En route to [facility] ED, patient had 2 witnessed seizures. He was unresponsive when arrived to the ED and was a stroke alert. Patient was intubated and given DDAVP for home ASA usage and also given mannitol. He was started on cardene gtt and Keppra load. EMS called and pt brought to [place] in setting of R MSK pain. CTH showed a large SAH with IVH s/p transfer to [facility] for coil embolization of R PCOM aneurysm and EVD on 7/13. Extubated 7/13. EVD removed 7/26. Course complicated by aspiration pneumonia for which he has already completed abx therapy. Followed on SAH pathway in Neuro ICU since transfer to [facility].     CTH 8/1 showing stable pathology. Patient off nimodipine and no longer receiving TCDs as of 8/2. Patient continues to have a headache, likely 2/2 to SAH. Continuing to monitor for changes in neuro status.    Plan:  Deemed medically stable by NCC team for downgrade to [facility]  CT 8/1-  Status post coiling of right P-comm aneurysm. Stable ventricles. No hydrocephalus. Small volume of residual subarachnoid and intraventricular hemorrhage is mildly decreased. Stable hypodensities in the right temporal lobe, right occipital lobe and right caudate head likely due to evolving subacute infarcts.  Midodrine 10mg PO TID. Ok to begin weaning midodrine now that he is off nimodrine  SBP goal normotensive  Continue ASA 81mg qd  AED ppx: Keppra 750mg q12H  PT/OT/speech evals, CM consult  PMR consult placed and are following  Continue q4H neuro checks, ok to let sleep at  night  Will need neurosurgery referral and outpatient f/u after discharge  Pain regimen for headache/MSK sequelae of SAH:  Gabapentin -[phone]  PRN Fioricet, percocet, tylenol, lidocaine patch; add dilaudid for breakthrough today  Nimodipine D/C  TCDs D/C  Patient advised that he can ask for his pain medications more frequently than he has been getting them for his headaches    Fever-resolved as of 7/31/2024  Assessment & Plan  Hospital course complicated by aspiration pneumonia s/p extubation in N-ICU. Completed abx therapy. Otherwise fevers of central origin given SAH and IVH on presentation. Continuing to monitor.     Moderate protein-calorie malnutrition (HCC)-resolved as of 8/2/2024  Assessment & Plan  Malnutrition Findings:   Adult Malnutrition type: Acute illness  Adult Degree of Malnutrition: Other severe protein calorie malnutrition  Malnutrition Characteristics: Fat loss, Muscle loss, Inadequate energy, Weight loss                  360 Statement: Acute severe pro, vinny malnutrition d/t condition as evidence by signficant weight loss, 7/13/24 54kg, 7/19/24 49kg, 5kg/9% wt. loss x <1 week, moderate to severe signs of muscle/ fat loss at temples, orbitals, calvicles, triceps, shoulders, treated with TF.    BMI Findings:          There is no height or weight on file to calculate BMI.           Disposition: remain inpatient for ongoing monitoring of caloric intake; working towards placement at brain injury-focused rehab     SUBJECTIVE     Patient seen and examined. No acute events overnight. This morning, patient continues to endorse headaches as his main complaint. These remain predominantly occipital/posterior cervical in origin, though he does also note some pain in the front of his head as well. He otherwise denies any chest pain, SOB, or other complaints. He knows the importance of continued caloric intake.    OBJECTIVE     Vitals:    08/04/24 1512 08/04/24 1605 08/04/24 1931 08/04/24 2159   BP:  115/78   113/75   BP Location:       Pulse: 101   96   Resp:    17   Temp: (!) 97.4 °F (36.3 °C)   97.8 °F (36.6 °C)   TempSrc:       SpO2: 96% 97% 96% 98%   Weight:          Temperature:   Temp (24hrs), Av.1 °F (36.7 °C), Min:97.4 °F (36.3 °C), Max:99.2 °F (37.3 °C)    Temperature: 97.8 °F (36.6 °C)  Intake & Output:  I/O          0701   0700  0701   0700    P.O. 120     Total Intake(mL/kg) 120 (2.8)     Urine (mL/kg/hr) 200 (0.2) 401 (0.4)    Stool 0     Total Output 200 401    Net -80 -401          Unmeasured Urine Occurrence 1 x     Unmeasured Stool Occurrence 1 x           Weights:        There is no height or weight on file to calculate BMI.  Weight (last 2 days)       Date/Time Weight    24 0600 43.2 (95.2)    24 0559 43 (94.8)    24 0600 42.8 (94.36)          Physical Exam  Vitals reviewed.   Constitutional:       General: He is not in acute distress.     Appearance: He is cachectic. He is ill-appearing (chronically ill-appearing).   HENT:      Head: Normocephalic.      Right Ear: External ear normal.      Left Ear: External ear normal.      Nose: Nose normal.      Mouth/Throat:      Mouth: Mucous membranes are moist.      Pharynx: Oropharynx is clear.      Comments: Poor dentition  Eyes:      Extraocular Movements: Extraocular movements intact.      Conjunctiva/sclera: Conjunctivae normal.      Pupils: Pupils are equal, round, and reactive to light.   Cardiovascular:      Rate and Rhythm: Regular rhythm. Tachycardia present.      Heart sounds: Normal heart sounds. No murmur heard.  Pulmonary:      Effort: Pulmonary effort is normal. No respiratory distress.      Breath sounds: Normal breath sounds.   Abdominal:      General: Abdomen is flat. Bowel sounds are normal. There is no distension.      Palpations: Abdomen is soft.      Tenderness: There is no abdominal tenderness.   Musculoskeletal:      Cervical back: Tenderness (Cervical paraspinal tenderness to  palpation) present.      Right lower leg: No edema.      Left lower leg: No edema.   Skin:     General: Skin is warm and dry.   Neurological:      Mental Status: He is alert. Mental status is at baseline.      Comments: AxO 2 (did not initially know month); no other focal deficits   Psychiatric:         Mood and Affect: Mood normal.         Behavior: Behavior normal.       LABORATORY DATA     Labs: I have personally reviewed pertinent reports.  Results from last 7 days   Lab Units 08/04/24 0518 08/03/24  0500 08/02/24  0547   WBC Thousand/uL 8.92 10.73* 12.85*   HEMOGLOBIN g/dL 10.6* 10.5* 10.3*   HEMATOCRIT % 32.9* 32.3* 31.8*   PLATELETS Thousands/uL 664* 701* 716*   SEGS PCT % 62 69 69   MONO PCT % 5 5 5   EOS PCT % 3 3 2      Results from last 7 days   Lab Units 08/04/24 0518 08/03/24  0500 08/02/24  0547 08/01/24  0515 07/31/24  0543 07/30/24  0553 07/29/24  1139   POTASSIUM mmol/L 4.3 4.3 4.5 4.5 4.5   < > 4.6   CHLORIDE mmol/L 101 102 105 106 103   < > 103   CO2 mmol/L 31 30 26 27 28   < > 27   BUN mg/dL 21 20 23 26* 26*   < > 20   CREATININE mg/dL 0.81 0.82 0.74 0.79 0.78   < > 0.73   CALCIUM mg/dL 9.7 9.6 9.3 9.4 9.3   < > 9.2   ALK PHOS U/L  --   --   --  159* 144*  --  132*   ALT U/L  --   --   --  27 23  --  15   AST U/L  --   --   --  29 26  --  25    < > = values in this interval not displayed.     Results from last 7 days   Lab Units 08/01/24  0515 07/31/24  0543 07/30/24  0553   MAGNESIUM mg/dL 2.4 2.5 2.4     Results from last 7 days   Lab Units 08/01/24  0515 07/31/24  0543 07/30/24  0553   PHOSPHORUS mg/dL 3.5 3.4 3.6                    IMAGING & DIAGNOSTIC TESTING     Radiology Results: I have personally reviewed pertinent reports.  CTA head and neck w wo contrast    Result Date: 7/16/2024  Impression: CT Brain: Redemonstrated subarachnoid hemorrhage overlying the right greater than left convexities and concentrated within the right sylvian cistern and fissure. . Subacute infarct involving the  right anterior temporal region and medial occipital region.. 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 Workstation performed: [id number]     IR cerebral angiography / intervention    Result Date: 7/15/2024  Impression: Successful balloon assisted coil embolization of a 4.5 mm right P-comm aneurysm. Workstation performed: [id number]     CT head wo contrast    Result Date: 7/14/2024  Impression: No significant interval change in extensive bilateral subarachnoid hemorrhage and intraventricular hemorrhage. Workstation performed: [id number]     CT head wo contrast    Result Date: 7/13/2024  Impression: Interval placement of right-sided shunt catheter. Stable ventricular size with slight interval increase in intraventricular hemorrhage layering in the occipital horns bilaterally. Diffuse subarachnoid hemorrhage again noted. Workstation performed: [id number]     XR chest portable ICU    Result Date: 7/12/2024  Impression: No acute cardiopulmonary disease. ET tube 6 cm above the landon. Workstation performed: [id number]     CTA stroke alert (head/neck)    Result Date: 7/12/2024  Impression: 0.3 cm aneurysm in expected origin of right posterior communicating artery. Possible tiny aneurysm in left anterior communicating artery region. These could be potential sources of diffuse subarachnoid hemorrhage. Recommend neurovascular surgery consultation for further evaluation. Mild narrowing of bilateral MCA M1 and bilateral M2 segmental branch vessels, likely due to vasospasm. Patent stent in left proximal subclavian artery. Endotracheal tube in trachea. Additional chronic/incidental findings as detailed above. Findings were directly discussed with Emmanuel Lion at approximately 4:34 p.m. on 7/12/2024. Workstation performed: [id number]     CT stroke alert brain    Result Date: 7/12/2024  Impression: Acute  diffuse thickened large-volume subarachnoid hemorrhage throughout the bilateral parafalcine regions, bilateral cerebral sulci (right worse than left), basilar cisterns, prepontine cistern, premedullary cistern, and visualized upper cervical spinal canal. Acute small volume intraventricular hemorrhage with mild hydrocephalus. No acute infarction. Findings were directly discussed with Emmanuel Lion at approximately 4:34 p.m. on 7/12/2024. Workstation performed: [id number]     Other Diagnostic Testing: I have personally reviewed pertinent reports.    ACTIVE MEDICATIONS     Current Facility-Administered Medications   Medication Dose Route Frequency    acetaminophen (TYLENOL) oral suspension 975 mg  975 mg Oral Q8H PRN    albuterol (PROVENTIL HFA,VENTOLIN HFA) inhaler 2 puff  2 puff Inhalation Q4H PRN    aspirin chewable tablet 81 mg  81 mg Oral Daily    atorvastatin (LIPITOR) tablet 80 mg  80 mg Oral Daily    bisacodyl (DULCOLAX) rectal suppository 10 mg  10 mg Rectal Daily PRN    butalbital-acetaminophen-caffeine (FIORICET,ESGIC) -40 mg per tablet 1 tablet  1 tablet Oral Q4H PRN    ezetimibe (ZETIA) tablet 10 mg  10 mg Oral QAM    folic acid (FOLVITE) tablet 400 mcg  400 mcg Oral Daily    gabapentin (NEURONTIN) capsule 400 mg  400 mg Oral BID    gabapentin (NEURONTIN) capsule 600 mg  600 mg Oral HS    heparin (porcine) subcutaneous injection 5,000 Units  5,000 Units Subcutaneous Q8H MATTY    HYDROmorphone HCl (DILAUDID) injection 0.2 mg  0.2 mg Intravenous Q3H PRN    insulin lispro (HumALOG/ADMELOG) 100 units/mL subcutaneous injection 1-5 Units  1-5 Units Subcutaneous 4x Daily (AC & HS)    levETIRAcetam (KEPPRA) oral solution 750 mg  750 mg Oral Q12H MATTY    lidocaine (LIDODERM) 5 % patch 1 patch  1 patch Topical Daily    lidocaine (LIDODERM) 5 % patch 1 patch  1 patch Topical Daily PRN    midodrine (PROAMATINE) tablet 10 mg  10 mg Oral TID AC    mirtazapine (REMERON) tablet 7.5 mg  7.5 mg Oral HS    omeprazole  "(PRILOSEC) suspension 2 mg/mL  20 mg Oral Daily Before Breakfast    ondansetron (ZOFRAN) injection 4 mg  4 mg Intravenous Q6H PRN    oxyCODONE-acetaminophen (PERCOCET) 5-325 mg per tablet 1 tablet  1 tablet Oral Q4H PRN    saliva substitute (MOUTH KOTE) mucosal solution 5 spray  5 spray Mouth/Throat 4x Daily PRN    thiamine tablet 100 mg  100 mg Oral Daily       VTE Pharmacologic Prophylaxis: Heparin  VTE Mechanical Prophylaxis: sequential compression device    Portions of the record may have been created with voice recognition software.  Occasional wrong word or \"sound a like\" substitutions may have occurred due to the inherent limitations of voice recognition software.  Read the chart carefully and recognize, using context, where substitutions have occurred.  ==  Nando Metz MD  [facility]  Internal Medicine Residency PGY-2      "

## 2024-08-05 LAB
ANION GAP SERPL CALCULATED.3IONS-SCNC: 11 MMOL/L (ref 4–13)
BASOPHILS # BLD AUTO: 0.06 THOUSANDS/ÂΜL (ref 0–0.1)
BASOPHILS NFR BLD AUTO: 1 % (ref 0–1)
BUN SERPL-MCNC: 21 MG/DL (ref 5–25)
CALCIUM SERPL-MCNC: 9.7 MG/DL (ref 8.4–10.2)
CHLORIDE SERPL-SCNC: 101 MMOL/L (ref 96–108)
CO2 SERPL-SCNC: 28 MMOL/L (ref 21–32)
CREAT SERPL-MCNC: 0.86 MG/DL (ref 0.6–1.3)
EOSINOPHIL # BLD AUTO: 0.28 THOUSAND/ÂΜL (ref 0–0.61)
EOSINOPHIL NFR BLD AUTO: 3 % (ref 0–6)
ERYTHROCYTE [DISTWIDTH] IN BLOOD BY AUTOMATED COUNT: 14.5 % (ref 11.6–15.1)
GFR SERPL CREATININE-BSD FRML MDRD: 88 ML/MIN/1.73SQ M
GLUCOSE SERPL-MCNC: 131 MG/DL (ref 65–140)
GLUCOSE SERPL-MCNC: 134 MG/DL (ref 65–140)
GLUCOSE SERPL-MCNC: 143 MG/DL (ref 65–140)
GLUCOSE SERPL-MCNC: 143 MG/DL (ref 65–140)
GLUCOSE SERPL-MCNC: 191 MG/DL (ref 65–140)
GLUCOSE SERPL-MCNC: 95 MG/DL (ref 65–140)
HCT VFR BLD AUTO: 32.8 % (ref 36.5–49.3)
HGB BLD-MCNC: 10.5 G/DL (ref 12–17)
IMM GRANULOCYTES # BLD AUTO: 0.06 THOUSAND/UL (ref 0–0.2)
IMM GRANULOCYTES NFR BLD AUTO: 1 % (ref 0–2)
LYMPHOCYTES # BLD AUTO: 2.15 THOUSANDS/ÂΜL (ref 0.6–4.47)
LYMPHOCYTES NFR BLD AUTO: 23 % (ref 14–44)
MCH RBC QN AUTO: 31.3 PG (ref 26.8–34.3)
MCHC RBC AUTO-ENTMCNC: 32 G/DL (ref 31.4–37.4)
MCV RBC AUTO: 98 FL (ref 82–98)
MONOCYTES # BLD AUTO: 0.53 THOUSAND/ÂΜL (ref 0.17–1.22)
MONOCYTES NFR BLD AUTO: 6 % (ref 4–12)
NEUTROPHILS # BLD AUTO: 6.16 THOUSANDS/ÂΜL (ref 1.85–7.62)
NEUTS SEG NFR BLD AUTO: 66 % (ref 43–75)
NRBC BLD AUTO-RTO: 0 /100 WBCS
PLATELET # BLD AUTO: 604 THOUSANDS/UL (ref 149–390)
PMV BLD AUTO: 9.5 FL (ref 8.9–12.7)
POTASSIUM SERPL-SCNC: 4.6 MMOL/L (ref 3.5–5.3)
RBC # BLD AUTO: 3.36 MILLION/UL (ref 3.88–5.62)
SODIUM SERPL-SCNC: 140 MMOL/L (ref 135–147)
WBC # BLD AUTO: 9.24 THOUSAND/UL (ref 4.31–10.16)

## 2024-08-05 PROCEDURE — 97112 NEUROMUSCULAR REEDUCATION: CPT

## 2024-08-05 PROCEDURE — 80048 BASIC METABOLIC PNL TOTAL CA: CPT

## 2024-08-05 PROCEDURE — 99232 SBSQ HOSP IP/OBS MODERATE 35: CPT | Performed by: STUDENT IN AN ORGANIZED HEALTH CARE EDUCATION/TRAINING PROGRAM

## 2024-08-05 PROCEDURE — 82948 REAGENT STRIP/BLOOD GLUCOSE: CPT

## 2024-08-05 PROCEDURE — 97530 THERAPEUTIC ACTIVITIES: CPT

## 2024-08-05 PROCEDURE — 85025 COMPLETE CBC W/AUTO DIFF WBC: CPT

## 2024-08-05 RX ADMIN — ATORVASTATIN CALCIUM 80 MG: 80 TABLET, FILM COATED ORAL at 09:19

## 2024-08-05 RX ADMIN — MIDODRINE HYDROCHLORIDE 10 MG: 5 TABLET ORAL at 06:05

## 2024-08-05 RX ADMIN — MIDODRINE HYDROCHLORIDE 10 MG: 5 TABLET ORAL at 18:16

## 2024-08-05 RX ADMIN — ASPIRIN 81 MG CHEWABLE TABLET 81 MG: 81 TABLET CHEWABLE at 09:19

## 2024-08-05 RX ADMIN — GABAPENTIN 600 MG: 300 CAPSULE ORAL at 21:16

## 2024-08-05 RX ADMIN — OXYCODONE HYDROCHLORIDE AND ACETAMINOPHEN 1 TABLET: 5; 325 TABLET ORAL at 09:20

## 2024-08-05 RX ADMIN — HEPARIN SODIUM 5000 UNITS: 5000 INJECTION INTRAVENOUS; SUBCUTANEOUS at 06:05

## 2024-08-05 RX ADMIN — FOLIC ACID TAB 400 MCG 400 MCG: 400 TAB at 09:19

## 2024-08-05 RX ADMIN — HEPARIN SODIUM 5000 UNITS: 5000 INJECTION INTRAVENOUS; SUBCUTANEOUS at 21:16

## 2024-08-05 RX ADMIN — MIDODRINE HYDROCHLORIDE 10 MG: 5 TABLET ORAL at 11:48

## 2024-08-05 RX ADMIN — THIAMINE HCL TAB 100 MG 100 MG: 100 TAB at 09:19

## 2024-08-05 RX ADMIN — INSULIN LISPRO 1 UNITS: 100 INJECTION, SOLUTION INTRAVENOUS; SUBCUTANEOUS at 18:17

## 2024-08-05 RX ADMIN — Medication 20 MG: at 06:04

## 2024-08-05 RX ADMIN — ACETAMINOPHEN 975 MG: 650 SUSPENSION ORAL at 20:04

## 2024-08-05 RX ADMIN — GABAPENTIN 400 MG: 300 CAPSULE ORAL at 18:16

## 2024-08-05 RX ADMIN — EZETIMIBE 10 MG: 10 TABLET ORAL at 09:19

## 2024-08-05 RX ADMIN — GABAPENTIN 400 MG: 300 CAPSULE ORAL at 09:19

## 2024-08-05 RX ADMIN — LEVETIRACETAM 750 MG: 100 SOLUTION ORAL at 09:21

## 2024-08-05 RX ADMIN — LEVETIRACETAM 750 MG: 100 SOLUTION ORAL at 21:16

## 2024-08-05 RX ADMIN — MIRTAZAPINE 7.5 MG: 15 TABLET, FILM COATED ORAL at 21:16

## 2024-08-05 RX ADMIN — LIDOCAINE 5% 1 PATCH: 700 PATCH TOPICAL at 09:19

## 2024-08-05 NOTE — PLAN OF CARE
Problem: PHYSICAL THERAPY ADULT  Goal: Performs mobility at highest level of function for planned discharge setting.  See evaluation for individualized goals.  Description: Treatment/Interventions: OT, Spoke to case management, Gait training, Bed mobility, Patient/family training, Endurance training, LE strengthening/ROM, Functional transfer training          See flowsheet documentation for full assessment, interventions and recommendations.  Outcome: Progressing  Note: Prognosis: Fair  Problem List: Decreased strength, Decreased endurance, Impaired balance, Decreased mobility, Decreased coordination, Decreased cognition  Assessment: The patient remains limited due to pain, and he required much motivation in order to attempt to ambulate. He ultimately was able to ambulate a few steps, but then he quickly returned to supine. He will benefit from continued therapy in order to maximize his functional mobility.  Barriers to Discharge: Inaccessible home environment, Decreased caregiver support     Rehab Resource Intensity Level, PT: II (Moderate Resource Intensity)    See flowsheet documentation for full assessment.

## 2024-08-05 NOTE — TELEPHONE ENCOUNTER
8/5/24 - PT STILL IN HOSPITAL    08/02/2024- PT IS IN [facility]  08/16/2024- 2-3 WK HFU W/ [facility] W/ MRA head     [facility]  2-3 follow up with [facility] and MRA head w wo contrast for aSAH

## 2024-08-05 NOTE — PHYSICAL THERAPY NOTE
Physical Therapy Progress Note     08/05/24 1445   PT Last Visit   PT Visit Date 08/05/24   Note Type   Note Type Treatment   Pain Assessment   Pain Assessment Tool 0-10   Pain Score 10 - Worst Possible Pain   Pain Location/Orientation Location: [facility]   Hospital Pain Intervention(s) Repositioned;Emotional support;Ambulation/increased activity   Restrictions/Precautions   Other Precautions Cognitive;Chair Alarm;Bed Alarm;Fall Risk;Pain  (Alarm active post session.)   Subjective   Subjective The patient notes continued pain, and he wants to stay in bed.   Bed Mobility   Supine to Sit 3  Moderate assistance   Additional items Assist x 1;Increased time required;Verbal cues;LE management   Sit to Supine 5  Supervision   Additional items Verbal cues   Transfers   Sit to Stand 3  Moderate assistance   Additional items Assist x 1;Increased time required;Verbal cues   Stand to Sit 3  Moderate assistance   Additional items Assist x 1;Increased time required;Verbal cues   Other 3  Moderate assistance   Additional items Assist x 1;Increased time required;Verbal cues   Additional Comments Lateral scoot along the edge of the bed.   Ambulation/Elevation   Gait pattern Excessively slow;Step to;Short stride;Inconsistent caty;Shuffling;Decreased foot clearance;Forward Flexion;Improper Weight shift   Gait Assistance 3  Moderate assist   Additional items Assist x 1;Verbal cues;Tactile cues   Assistive Device Rolling walker   Distance 6 feet, 5 feet.   Balance   Static Sitting Fair   Dynamic Sitting Fair -   Static Standing Poor +   Dynamic Standing Poor   Ambulatory Poor   Activity Tolerance   Activity Tolerance Patient limited by pain;Patient limited by fatigue   Assessment   Prognosis Fair   Problem List Decreased strength;Decreased endurance;Impaired balance;Decreased mobility;Decreased coordination;Decreased cognition   Assessment The patient remains limited due to pain, and he required much motivation in order to attempt  to ambulate. He ultimately was able to ambulate a few steps, but then he quickly returned to supine. He will benefit from continued therapy in order to maximize his functional mobility.   Barriers to Discharge Inaccessible home environment;Decreased caregiver support   Goals   Patient Goals To go back to bed.   STG Expiration Date 08/13/24   PT Treatment Day 4   Plan   Treatment/Interventions Functional transfer training;LE strengthening/ROM;Therapeutic exercise;Endurance training;Patient/family training;Cognitive reorientation;Bed mobility;Gait training;Elevations   Progress Slow progress, decreased activity tolerance   PT Frequency 3-5x/wk   Discharge Recommendation   Rehab Resource Intensity Level, PT II (Moderate Resource Intensity)   Equipment Recommended Walker   Walker Package Recommended Wheeled walker   AM-[facility] Basic Mobility Inpatient   Turning in Flat Bed Without Bedrails 3   Lying on Back to Sitting on Edge of Flat Bed Without Bedrails 3   Moving Bed to Chair 3   Standing Up From Chair Using Arms 2   Walk in Room 2   Climb 3-5 Stairs With Railing 1   Basic Mobility Inpatient Raw Score 14   Basic Mobility Standardized Score 35.55   [facility] Highest Level Of Mobility   -[facility] Goal 4: Move to chair/commode   -HLM Achieved 6: Walk 10 steps or more         An AM-PAC Basic Mobility raw score less than 16 suggests the patient may benefit from discharge to post-acute rehab services.    Sumeet Shine, PTA

## 2024-08-05 NOTE — RESTORATIVE TECHNICIAN NOTE
Restorative Technician Note      Patient Name: Federico Bowen     Note Type: Mobility  Patient Position Upon Consult: Supine  Activity Performed: Ambulated; Dangled; Stood  Assistive Device: Other (Comment) (Assist x1-2)  Education Provided: Yes  Patient Position at End of Consult: Bedside chair; All needs within reach; Bed/Chair alarm activated  Anya SALEH, Restorative Technician,

## 2024-08-05 NOTE — PLAN OF CARE
Problem: Prexisting or High Potential for Compromised Skin Integrity  Goal: Skin integrity is maintained or improved  Description: INTERVENTIONS:  - Identify patients at risk for skin breakdown  - Assess and monitor skin integrity  - Assess and monitor nutrition and hydration status  - Monitor labs   - Assess for incontinence   - Turn and reposition patient  - Assist with mobility/ambulation  - Relieve pressure over bony prominences  - Avoid friction and shearing  - Provide appropriate hygiene as needed including keeping skin clean and dry  - Evaluate need for skin moisturizer/barrier cream  - Collaborate with interdisciplinary team   - Patient/family teaching  - Consider wound care consult   Outcome: Progressing     Problem: Neurological Deficit  Goal: Neurological status is stable or improving  Description: Interventions:  - Monitor and assess patient's level of consciousness, motor function, sensory function, and level of assistance needed for ADLs.   - Monitor and report changes from baseline. Collaborate with interdisciplinary team to initiate plan and implement interventions as ordered.   - Provide and maintain a safe environment.  - Consider seizure precautions.  - Consider fall precautions.  - Consider aspiration precautions.  - Consider bleeding precautions.  Outcome: Progressing     Problem: Activity Intolerance/Impaired Mobility  Goal: Mobility/activity is maintained at optimum level for patient  Description: Interventions:  - Assess and monitor patient  barriers to mobility and need for assistive/adaptive devices.  - Assess patient's emotional response to limitations.  - Collaborate with interdisciplinary team and initiate plans and interventions as ordered.  - Encourage independent activity per ability.  - Maintain proper body alignment.  - Perform active/passive rom as tolerated/ordered.  - Plan activities to conserve energy.  - Turn patient as appropriate  Outcome: Progressing     Problem:  Communication Impairment  Goal: Ability to express needs and understand communication  Description: Assess patient's communication skills and ability to understand information.  Patient will demonstrate use of effective communication techniques, alternative methods of communication and understanding even if not able to speak.     - Encourage communication and provide alternate methods of communication as needed.  - Collaborate with case management/ for discharge needs.  - Include patient/family/caregiver in decisions related to communication.  Outcome: Progressing     Problem: Potential for Aspiration  Goal: Non-ventilated patient's risk of aspiration is minimized  Description: Assess and monitor vital signs, respiratory status, and labs (WBC).  Monitor for signs of aspiration (tachypnea, cough, rales, wheezing, cyanosis, fever).    - Assess and monitor patient's ability to swallow.  - Place patient up in chair to eat if possible.  - HOB up at 90 degrees to eat if unable to get patient up into chair.  - Supervise patient during oral intake.   - Instruct patient/ family to take small bites.  - Instruct patient/ family to take small single sips when taking liquids.  - Follow patient-specific strategies generated by speech pathologist.  Outcome: Progressing     Problem: Nutrition  Goal: Nutrition/Hydration status is improving  Description: Monitor and assess patient's nutrition/hydration status for malnutrition (ex- brittle hair, bruises, dry skin, pale skin and conjunctiva, muscle wasting, smooth red tongue, and disorientation). Collaborate with interdisciplinary team and initiate plan and interventions as ordered.  Monitor patient's weight and dietary intake as ordered or per policy. Utilize nutrition screening tool and intervene per policy. Determine patient's food preferences and provide high-protein, high-caloric foods as appropriate.   - Monitor nutrition parameters, recommending adjustments to  enteral nutrition orders at indicated.   - Assist patient with eating.  - Allow adequate time for meals.  - Encourage patient to take dietary supplement as ordered.  - Collaborate with interdisciplinary team.   - Include patient/family/caregiver in decisions related to nutrition.  Outcome: Progressing     Problem: PAIN - ADULT  Goal: Verbalizes/displays adequate comfort level or baseline comfort level  Description: Interventions:  - Encourage patient to monitor pain and request assistance  - Assess pain using appropriate pain scale  - Administer analgesics based on type and severity of pain and evaluate response  - Implement non-pharmacological measures as appropriate and evaluate response  - Consider cultural and social influences on pain and pain management  - Notify physician/advanced practitioner if interventions unsuccessful or patient reports new pain  Outcome: Progressing     Problem: INFECTION - ADULT  Goal: Absence or prevention of progression during hospitalization  Description: INTERVENTIONS:  - Assess and monitor for signs and symptoms of infection  - Monitor lab/diagnostic results  - Monitor all insertion sites, i.e. indwelling lines, tubes, and drains  - Monitor endotracheal if appropriate and nasal secretions for changes in amount and color  - [place] appropriate cooling/warming therapies per order  - Administer medications as ordered  - Instruct and encourage patient and family to use good hand hygiene technique  - Identify and instruct in appropriate isolation precautions for identified infection/condition  Outcome: Progressing  Goal: Absence of fever/infection during neutropenic period  Description: INTERVENTIONS:  - Monitor WBC    Outcome: Progressing     Problem: DISCHARGE PLANNING  Goal: Discharge to home or other facility with appropriate resources  Description: INTERVENTIONS:  - Identify barriers to discharge w/patient and caregiver  - Arrange for needed discharge resources and  transportation as appropriate  - Identify discharge learning needs (meds, wound care, etc.)  - Arrange for interpretive services to assist at discharge as needed  - Refer to Case Management Department for coordinating discharge planning if the patient needs post-hospital services based on physician/advanced practitioner order or complex needs related to functional status, cognitive ability, or social support system  Outcome: Progressing     Problem: Knowledge Deficit  Goal: Patient/family/caregiver demonstrates understanding of disease process, treatment plan, medications, and discharge instructions  Description: Complete learning assessment and assess knowledge base.  Interventions:  - Provide teaching at level of understanding  - Provide teaching via preferred learning methods  Outcome: Progressing     Problem: Nutrition/Hydration-ADULT  Goal: Nutrient/Hydration intake appropriate for improving, restoring or maintaining nutritional needs  Description: Monitor and assess patient's nutrition/hydration status for malnutrition. Collaborate with interdisciplinary team and initiate plan and interventions as ordered.  Monitor patient's weight and dietary intake as ordered or per policy. Utilize nutrition screening tool and intervene as necessary. Determine patient's food preferences and provide high-protein, high-caloric foods as appropriate.     INTERVENTIONS:  - Monitor oral intake, urinary output, labs, and treatment plans  - Assess nutrition and hydration status and recommend course of action  - Evaluate amount of meals eaten  - Assist patient with eating if necessary   - Allow adequate time for meals  - Recommend/ encourage appropriate diets, oral nutritional supplements, and vitamin/mineral supplements  - Order, calculate, and assess calorie counts as needed  - Recommend, monitor, and adjust tube feeding based on assessed needs  - Assess need for intravenous fluids  - Provide nutrition/hydration education as  appropriate  - Include patient/family/caregiver in decisions related to nutrition  Outcome: Progressing     Problem: NEUROSENSORY - ADULT  Goal: Achieves stable or improved neurological status  Description: INTERVENTIONS  - Monitor and report changes in neurological status  - Monitor vital signs such as temperature, blood pressure, glucose, and any other labs ordered   - Initiate measures to prevent increased intracranial pressure  - Monitor for seizure activity and implement precautions if appropriate      Outcome: Progressing  Goal: Remains free of injury related to seizures activity  Description: INTERVENTIONS  - Maintain airway, patient safety  and administer oxygen as ordered  - Monitor patient for seizure activity, document and report duration and description of seizure to physician/advanced practitioner  - If seizure occurs,  ensure patient safety during seizure  - Reorient patient post seizure  - Seizure pads on all 4 side rails  - Instruct patient/family to notify RN of any seizure activity including if an aura is experienced  - Instruct patient/family to call for assistance with activity based on nursing assessment  - Administer anti-seizure medications if ordered    Outcome: Progressing  Goal: Achieves maximal functionality and self care  Description: INTERVENTIONS  - Monitor swallowing and airway patency with patient fatigue and changes in neurological status  - Encourage and assist patient to increase activity and self care.   - Encourage visually impaired, hearing impaired and aphasic patients to use assistive/communication devices  Outcome: Progressing     Problem: RESPIRATORY - ADULT  Goal: Achieves optimal ventilation and oxygenation  Description: INTERVENTIONS:  - Assess for changes in respiratory status  - Assess for changes in mentation and behavior  - Position to facilitate oxygenation and minimize respiratory effort  - Oxygen administered by appropriate delivery if ordered  - Initiate smoking  cessation education as indicated  - Encourage broncho-pulmonary hygiene including cough, deep breathe, Incentive Spirometry  - Assess the need for suctioning and aspirate as needed  - Assess and instruct to report SOB or any respiratory difficulty  - Respiratory Therapy support as indicated  Outcome: Progressing     Problem: GASTROINTESTINAL - ADULT  Goal: Maintains or returns to baseline bowel function  Description: INTERVENTIONS:  - Assess bowel function  - Encourage oral fluids to ensure adequate hydration  - Administer IV fluids if ordered to ensure adequate hydration  - Administer ordered medications as needed  - Encourage mobilization and activity  - Consider nutritional services referral to assist patient with adequate nutrition and appropriate food choices  Outcome: Progressing  Goal: Maintains adequate nutritional intake  Description: INTERVENTIONS:  - Monitor percentage of each meal consumed  - Identify factors contributing to decreased intake, treat as appropriate  - Assist with meals as needed  - Monitor I&O, weight, and lab values if indicated  - Obtain nutrition services referral as needed  Outcome: Progressing     Problem: GENITOURINARY - ADULT  Goal: Maintains or returns to baseline urinary function  Description: INTERVENTIONS:  - Assess urinary function  - Encourage oral fluids to ensure adequate hydration if ordered  - Administer IV fluids as ordered to ensure adequate hydration  - Administer ordered medications as needed  - Offer frequent toileting  - Follow urinary retention protocol if ordered  Outcome: Progressing  Goal: Absence of urinary retention  Description: INTERVENTIONS:  - Assess patient’s ability to void and empty bladder  - Monitor I/O  - Bladder scan as needed  - Discuss with physician/AP medications to alleviate retention as needed  - Discuss catheterization for long term situations as appropriate  Outcome: Progressing     Problem: METABOLIC, FLUID AND ELECTROLYTES - ADULT  Goal:  Electrolytes maintained within normal limits  Description: INTERVENTIONS:  - Monitor labs and assess patient for signs and symptoms of electrolyte imbalances  - Administer electrolyte replacement as ordered  - Monitor response to electrolyte replacements, including repeat lab results as appropriate  - Instruct patient on fluid and nutrition as appropriate  Outcome: Progressing  Goal: Fluid balance maintained  Description: INTERVENTIONS:  - Monitor labs   - Monitor I/O and WT  - Instruct patient on fluid and nutrition as appropriate  - Assess for signs & symptoms of volume excess or deficit  Outcome: Progressing  Goal: Glucose maintained within target range  Description: INTERVENTIONS:  - Monitor Blood Glucose as ordered  - Assess for signs and symptoms of hyperglycemia and hypoglycemia  - Administer ordered medications to maintain glucose within target range  - Assess nutritional intake and initiate nutrition service referral as needed  Outcome: Progressing     Problem: HEMATOLOGIC - ADULT  Goal: Maintains hematologic stability  Description: INTERVENTIONS  - Assess for signs and symptoms of bleeding or hemorrhage  - Monitor labs  - Administer supportive blood products/factors as ordered and appropriate  Outcome: Progressing

## 2024-08-05 NOTE — UTILIZATION REVIEW
Continued Stay Review    Date: 8-4-24                           Current Patient Class: Inpatient  Current Level of Care: med surg    HPI:68 y.o. male initially admitted on 7-12-24 for SAH     Assessment/Plan:     Patient on pureed diet,  calorie count in progress- slowly improving.  Met 74% and 60 % or caloric and protein needs respectively.  Trial oral diet for now. Consider Peg if oral intake is insufficient.  Home mirtazapine restarted to improve appetite.  Consider increasing dosage. Patient continue to report severe headaches in the front. Continue neurontin and prn oxycodone. Alert and oriented to person and place. Trend BMP and CBC. Continue physical therapy. Continue keppra.      Vital Signs (last 3 days)       Date/Time Temp Pulse Resp BP MAP  SpO2 O2 Device [place] Coma Scale Score Pain    08/05/24 15:34:33 -- 90 16 103/73 83 97 % None (Room air) -- --    08/05/24 1445 -- -- -- -- -- -- -- -- 10 - Worst Possible Pain    08/05/24 0920 -- -- -- -- -- -- -- -- 10 - Worst Possible Pain    08/05/24 09:09:38 -- 104 -- 107/74 85 96 % -- -- --    08/05/24 07:14:44 97.5 °F (36.4 °C) 97 18 112/74 87 96 % None (Room air) -- --    08/05/24 0400 -- -- -- -- -- -- -- 15 --    08/05/24 03:18:19 98.1 °F (36.7 °C) 97 -- 113/75 88 96 % -- -- --    08/05/24 0000 -- -- -- -- -- -- -- 15 --    08/04/24 2218 -- -- -- -- -- -- -- -- 5    08/04/24 21:59:29 97.8 °F (36.6 °C) 96 17 113/75 88 98 % -- -- --    08/04/24 1931 -- -- -- -- -- 96 % None (Room air) 15 No Pain    08/04/24 1741 -- -- -- -- -- -- -- -- 8    08/04/24 1605 -- -- -- -- -- 97 % -- 15 No Pain    08/04/24 15:12:58 97.4 °F (36.3 °C) 101 -- 115/78 90 96 % -- -- --    08/04/24 1125 -- -- -- -- -- -- -- -- 10 - Worst Possible Pain    08/04/24 1100 -- -- -- -- -- -- -- 15 --    08/04/24 1000 -- -- -- -- -- -- -- -- 10 - Worst Possible Pain    08/04/24 0909 -- -- -- -- -- -- -- -- 10 - Worst Possible Pain    08/04/24 07:33:39 99.2 °F (37.3 °C) 111 -- 122/90 101 95 %  -- -- --    08/04/24 0730 -- -- -- -- -- -- None (Room air) 15 --    08/04/24 0602 -- -- -- -- -- -- -- -- 10 - Worst Possible Pain    08/04/24 0128 -- -- -- -- -- -- -- -- 8          Weight (last 2 days)       Date/Time Weight    08/05/24 0600 43.4 (95.6)    08/04/24 0600 43.2 (95.2)    08/03/24 0559 43 (94.8)              Pertinent Labs/Diagnostic Results:   Radiology:    Cardiology:    GI:  No orders to display           Results from last 7 days   Lab Units 08/05/24 0603 08/04/24 0518 08/03/24  0500 08/02/24  0547 08/01/24  0515   WBC Thousand/uL 9.24 8.92 10.73* 12.85* 11.96*   HEMOGLOBIN g/dL 10.5* 10.6* 10.5* 10.3* 10.8*   HEMATOCRIT % 32.8* 32.9* 32.3* 31.8* 34.5*   PLATELETS Thousands/uL 604* 664* 701* 716* 677*   TOTAL NEUT ABS Thousands/µL 6.16 5.63 7.47 8.79*  --          Results from last 7 days   Lab Units 08/05/24 0603 08/04/24  0518 08/03/24  0500 08/02/24  0547 08/01/24  0515 07/31/24  0543 07/30/24  0553   SODIUM mmol/L 140 140 140 140 140 140 139   POTASSIUM mmol/L 4.6 4.3 4.3 4.5 4.5 4.5 4.5   CHLORIDE mmol/L 101 101 102 105 106 103 102   CO2 mmol/L 28 31 30 26 27 28 26   ANION GAP mmol/L 11 8 8 9 7 9 11   BUN mg/dL 21 21 20 23 26* 26* 23   CREATININE mg/dL 0.86 0.81 0.82 0.74 0.79 0.78 0.81   EGFR ml/min/1.73sq m 88 90 90 94 91 92 90   CALCIUM mg/dL 9.7 9.7 9.6 9.3 9.4 9.3 9.2   MAGNESIUM mg/dL  --   --   --   --  2.4 2.5 2.4   PHOSPHORUS mg/dL  --   --   --   --  3.5 3.4 3.6     Results from last 7 days   Lab Units 08/01/24  0515 07/31/24  0543   AST U/L 29 26   ALT U/L 27 23   ALK PHOS U/L 159* 144*   TOTAL PROTEIN g/dL 8.1 8.1   ALBUMIN g/dL 3.7 3.6   TOTAL BILIRUBIN mg/dL 0.65 0.81     Results from last 7 days   Lab Units 08/05/24  1601 08/05/24  1059 08/05/24  0637 08/04/24  2102 08/04/24  1621 08/04/24  1111 08/04/24  0626 08/03/24  2135 08/03/24  1809 08/03/24  1211 08/03/24  0602 08/02/24  1710   POC GLUCOSE mg/dl 191* 143* 143* 113 141* 150* 124 143* 159* 127 113 199*     Results from  last 7 days   Lab Units 08/05/24  0603 08/04/24  0518 08/03/24  0500 08/02/24  0547 08/01/24  0515 07/31/24  0543 07/30/24  0553   GLUCOSE RANDOM mg/dL 95 104 101 109 107 115 121     Scheduled Medications:  aspirin, 81 mg, Oral, Daily  atorvastatin, 80 mg, Oral, Daily  ezetimibe, 10 mg, Oral, QAM  folic acid, 400 mcg, Oral, Daily  gabapentin, 400 mg, Oral, BID  gabapentin, 600 mg, Oral, HS  heparin (porcine), 5,000 Units, Subcutaneous, Q8H MATTY  insulin lispro, 1-5 Units, Subcutaneous, 4x Daily (AC & HS)  levETIRAcetam, 750 mg, Oral, Q12H MATTY  lidocaine, 1 patch, Topical, Daily  midodrine, 10 mg, Oral, TID AC  mirtazapine, 7.5 mg, Oral, HS  omeprazole (PRILOSEC) suspension 2 mg/mL, 20 mg, Oral, Daily Before Breakfast  thiamine, 100 mg, Oral, Daily      Continuous IV Infusions:     PRN Meds:  acetaminophen, 975 mg, Oral, Q8H PRN  albuterol, 2 puff, Inhalation, Q4H PRN  bisacodyl, 10 mg, Rectal, Daily PRN  butalbital-acetaminophen-caffeine, 1 tablet, Oral, Q4H PRN  HYDROmorphone, 0.2 mg, Intravenous, Q3H PRN  lidocaine, 1 patch, Topical, Daily PRN  ondansetron, 4 mg, Intravenous, Q6H PRN  oxyCODONE-acetaminophen, 1 tablet, Oral, Q4H PRN  saliva substitute, 5 spray, Mouth/Throat, 4x Daily PRN        Discharge Plan: acute rehab     Network Utilization Review Department  ATTENTION: Please call with any questions or concerns to [phone] and carefully listen to the prompts so that you are directed to the right person. All voicemails are confidential.   For Discharge needs, contact Care Management DC Support Team at [phone] opt. 2  Send all requests for admission clinical reviews, approved or denied determinations and any other requests to dedicated fax number below belonging to the campus where the patient is receiving treatment. List of dedicated fax numbers for the Facilities:  FACILITY NAME UR FAX NUMBER   ADMISSION DENIALS (Administrative/Medical Necessity) [phone]   DISCHARGE SUPPORT TEAM (NETWORK)  [phone]   PARENT CHILD HEALTH (Maternity/NICU/Pediatrics) [phone]   [facility] [phone]   [facility] [phone]   [facility] [phone]   [facility] [phone]   [facility] [phone]   [facility] [phone]   [facility] [phone]   [facility] [phone]   [facility] [phone]   Critical access hospital [phone]   [facility] [phone]   [facility] [phone]

## 2024-08-06 LAB
ANION GAP SERPL CALCULATED.3IONS-SCNC: 8 MMOL/L (ref 4–13)
BASOPHILS # BLD AUTO: 0.06 THOUSANDS/ÂΜL (ref 0–0.1)
BASOPHILS NFR BLD AUTO: 1 % (ref 0–1)
BUN SERPL-MCNC: 22 MG/DL (ref 5–25)
CALCIUM SERPL-MCNC: 9.3 MG/DL (ref 8.4–10.2)
CHLORIDE SERPL-SCNC: 103 MMOL/L (ref 96–108)
CO2 SERPL-SCNC: 30 MMOL/L (ref 21–32)
CREAT SERPL-MCNC: 0.85 MG/DL (ref 0.6–1.3)
EOSINOPHIL # BLD AUTO: 0.25 THOUSAND/ÂΜL (ref 0–0.61)
EOSINOPHIL NFR BLD AUTO: 3 % (ref 0–6)
ERYTHROCYTE [DISTWIDTH] IN BLOOD BY AUTOMATED COUNT: 14.8 % (ref 11.6–15.1)
GFR SERPL CREATININE-BSD FRML MDRD: 88 ML/MIN/1.73SQ M
GLUCOSE SERPL-MCNC: 122 MG/DL (ref 65–140)
GLUCOSE SERPL-MCNC: 132 MG/DL (ref 65–140)
GLUCOSE SERPL-MCNC: 135 MG/DL (ref 65–140)
GLUCOSE SERPL-MCNC: 138 MG/DL (ref 65–140)
GLUCOSE SERPL-MCNC: 163 MG/DL (ref 65–140)
HCT VFR BLD AUTO: 32.2 % (ref 36.5–49.3)
HGB BLD-MCNC: 10.3 G/DL (ref 12–17)
IMM GRANULOCYTES # BLD AUTO: 0.05 THOUSAND/UL (ref 0–0.2)
IMM GRANULOCYTES NFR BLD AUTO: 1 % (ref 0–2)
LYMPHOCYTES # BLD AUTO: 2.17 THOUSANDS/ÂΜL (ref 0.6–4.47)
LYMPHOCYTES NFR BLD AUTO: 26 % (ref 14–44)
MCH RBC QN AUTO: 31.6 PG (ref 26.8–34.3)
MCHC RBC AUTO-ENTMCNC: 32 G/DL (ref 31.4–37.4)
MCV RBC AUTO: 99 FL (ref 82–98)
MONOCYTES # BLD AUTO: 0.36 THOUSAND/ÂΜL (ref 0.17–1.22)
MONOCYTES NFR BLD AUTO: 4 % (ref 4–12)
NEUTROPHILS # BLD AUTO: 5.33 THOUSANDS/ÂΜL (ref 1.85–7.62)
NEUTS SEG NFR BLD AUTO: 65 % (ref 43–75)
NRBC BLD AUTO-RTO: 0 /100 WBCS
PLATELET # BLD AUTO: 563 THOUSANDS/UL (ref 149–390)
PMV BLD AUTO: 9.2 FL (ref 8.9–12.7)
POTASSIUM SERPL-SCNC: 4.1 MMOL/L (ref 3.5–5.3)
RBC # BLD AUTO: 3.26 MILLION/UL (ref 3.88–5.62)
SODIUM SERPL-SCNC: 141 MMOL/L (ref 135–147)
WBC # BLD AUTO: 8.22 THOUSAND/UL (ref 4.31–10.16)

## 2024-08-06 PROCEDURE — 99232 SBSQ HOSP IP/OBS MODERATE 35: CPT | Performed by: STUDENT IN AN ORGANIZED HEALTH CARE EDUCATION/TRAINING PROGRAM

## 2024-08-06 PROCEDURE — 82948 REAGENT STRIP/BLOOD GLUCOSE: CPT

## 2024-08-06 PROCEDURE — 92526 ORAL FUNCTION THERAPY: CPT

## 2024-08-06 PROCEDURE — 80048 BASIC METABOLIC PNL TOTAL CA: CPT

## 2024-08-06 PROCEDURE — 85025 COMPLETE CBC W/AUTO DIFF WBC: CPT

## 2024-08-06 RX ORDER — MIRTAZAPINE 15 MG/1
15 TABLET, FILM COATED ORAL
Status: DISCONTINUED | OUTPATIENT
Start: 2024-08-06 | End: 2024-08-10 | Stop reason: HOSPADM

## 2024-08-06 RX ORDER — ACETAMINOPHEN 160 MG/5ML
650 SUSPENSION ORAL EVERY 8 HOURS
Status: DISCONTINUED | OUTPATIENT
Start: 2024-08-06 | End: 2024-08-07

## 2024-08-06 RX ORDER — OXYCODONE HYDROCHLORIDE 5 MG/1
5 TABLET ORAL EVERY 4 HOURS PRN
Status: DISCONTINUED | OUTPATIENT
Start: 2024-08-06 | End: 2024-08-10 | Stop reason: HOSPADM

## 2024-08-06 RX ADMIN — Medication 20 MG: at 05:15

## 2024-08-06 RX ADMIN — ACETAMINOPHEN 650 MG: 650 SUSPENSION ORAL at 21:07

## 2024-08-06 RX ADMIN — MIDODRINE HYDROCHLORIDE 10 MG: 5 TABLET ORAL at 05:15

## 2024-08-06 RX ADMIN — GABAPENTIN 600 MG: 300 CAPSULE ORAL at 21:07

## 2024-08-06 RX ADMIN — HEPARIN SODIUM 5000 UNITS: 5000 INJECTION INTRAVENOUS; SUBCUTANEOUS at 21:07

## 2024-08-06 RX ADMIN — LEVETIRACETAM 750 MG: 100 SOLUTION ORAL at 08:34

## 2024-08-06 RX ADMIN — ACETAMINOPHEN 650 MG: 650 SUSPENSION ORAL at 07:22

## 2024-08-06 RX ADMIN — OXYCODONE HYDROCHLORIDE 5 MG: 5 TABLET ORAL at 07:22

## 2024-08-06 RX ADMIN — FOLIC ACID TAB 400 MCG 400 MCG: 400 TAB at 08:37

## 2024-08-06 RX ADMIN — OXYCODONE HYDROCHLORIDE 5 MG: 5 TABLET ORAL at 16:12

## 2024-08-06 RX ADMIN — MIDODRINE HYDROCHLORIDE 10 MG: 5 TABLET ORAL at 11:21

## 2024-08-06 RX ADMIN — LEVETIRACETAM 750 MG: 100 SOLUTION ORAL at 21:07

## 2024-08-06 RX ADMIN — ASPIRIN 81 MG CHEWABLE TABLET 81 MG: 81 TABLET CHEWABLE at 08:34

## 2024-08-06 RX ADMIN — HEPARIN SODIUM 5000 UNITS: 5000 INJECTION INTRAVENOUS; SUBCUTANEOUS at 05:15

## 2024-08-06 RX ADMIN — EZETIMIBE 10 MG: 10 TABLET ORAL at 08:38

## 2024-08-06 RX ADMIN — THIAMINE HCL TAB 100 MG 100 MG: 100 TAB at 08:34

## 2024-08-06 RX ADMIN — MIRTAZAPINE 15 MG: 15 TABLET, FILM COATED ORAL at 21:07

## 2024-08-06 RX ADMIN — INSULIN LISPRO 1 UNITS: 100 INJECTION, SOLUTION INTRAVENOUS; SUBCUTANEOUS at 07:23

## 2024-08-06 RX ADMIN — ATORVASTATIN CALCIUM 80 MG: 80 TABLET, FILM COATED ORAL at 08:48

## 2024-08-06 RX ADMIN — MIDODRINE HYDROCHLORIDE 10 MG: 5 TABLET ORAL at 16:12

## 2024-08-06 RX ADMIN — GABAPENTIN 400 MG: 300 CAPSULE ORAL at 08:34

## 2024-08-06 RX ADMIN — ACETAMINOPHEN 650 MG: 650 SUSPENSION ORAL at 14:53

## 2024-08-06 RX ADMIN — GABAPENTIN 400 MG: 300 CAPSULE ORAL at 17:00

## 2024-08-06 RX ADMIN — HEPARIN SODIUM 5000 UNITS: 5000 INJECTION INTRAVENOUS; SUBCUTANEOUS at 14:53

## 2024-08-06 RX ADMIN — LIDOCAINE 5% 1 PATCH: 700 PATCH TOPICAL at 08:47

## 2024-08-06 NOTE — SPEECH THERAPY NOTE
Speech-Language Pathology Progress Note    Patient Name: Federico Bowen    Today's Date: 8/6/2024    Subjective:  Pt was awake and alert. He was sitting upright in bed. Head was in a more neutral position today.     Objective:  Pt was seen today for dysphagia therapy. Current diet is puree with nectar thick liquids. Pt was on room air. Focus of today's session was to maximize PO intake safety, determine potential for diet texture advancement, and determine potential for liquid consistency advancement. Textures offered today included puree, regular solid, and thin liquid via straw.  Swallow function:   Bolus retrieval was adequate. Mastication and AP transfer were  functional with zach crackers . Pharyngeal swallow initiation was min delayed, and audible with multiple swallows noted. Hyolaryngeal excursion was adequate. No s/s aspiration occurred during session today.  SLP provided mod cueing/feedback throughout session for small sips. Pt was responsive to cueing and benefited from frequent reminders throughout session.    Assessment:  Swallow function is improving with current diet. Diet texture and Liquid consistency advancement is recommended at this time.  There was consideration for repeat MBS tomorrow, however pt appears much improved on bedside assessment. May be able to forgo MBS, will f/u tomorrow.     Plan:  Change diet texture and liquid consistency to dysphagia 3 dental soft and thin liquids. Continue ST follow up. Subsequent sessions to focus on maximizing PO intake safety, determining potential for diet texture advancement, and improving pt's self monitoring.

## 2024-08-06 NOTE — PROGRESS NOTES
INTERNAL MEDICINE RESIDENCY PROGRESS NOTE     Name: Federico Bowen   Age & Sex: 68 y.o. male   MRN: [id number]  Unit/Bed#: [facility] 732-01   Encounter: [id number]  Team: SOD Team A    PATIENT INFORMATION     Name: Federico Bowen   Age & Sex: 68 y.o. male   MRN: [id number]  Hospital Stay Days: 24    ASSESSMENT/PLAN     Principal Problem:    Subarachnoid hemorrhage (HCC)  Active Problems:    HTN (hypertension)    Hyperlipidemia    Seizures (HCC)    Severe protein-calorie malnutrition (HCC)    Goals of care, counseling/discussion    Palliative care encounter    Coronary artery disease    COPD (chronic obstructive pulmonary disease) (HCC)    Dysphagia as late effect of cerebrovascular disease      Dysphagia as late effect of cerebrovascular disease  Assessment & Plan  -SAH 7/12  -Modified barium swallow 7/29- mild-moderate dysphagia   -Speech recommends repeat MBS this week  -Pureed diet recommended    COPD (chronic obstructive pulmonary disease) (HCC)  Assessment & Plan  Documented h/o of COPD although no PFTs on file    Plan:  No concerns for exacerbation at this time  Continue prn albuterol    Coronary artery disease  Assessment & Plan  Patient with little documentation in medical record but appears to have non-obstructive CAD with history of unstable angina component and previously was on nitro prn and Ranexa. Home medications noted metoprolol and Entresto however last echo found to be in 2015 with EF 77% and most recent echo of 55% on 7/13/24 and thus Entresto was not restarted prior to downgrade from N-ICU.    Per patient, he has a history of a stent that was placed, but he is unable to provide more details.    Plan:  Continue ASA 81mg qd  Recommend for patient to re-establish care with Cardiology after discharge  Home metoprolol held given concurrent nimodipine and stable HR. Unclear if patient was taking prior to admission  Similarly as above, Brilinta noted as home medication on admission. Currently  not continued in setting of SAH this admission. Unable to find documented history of VIAR, outpatient f/u with cardiology.     Severe protein-calorie malnutrition (HCC)  Assessment & Plan  Malnutrition Findings:   Adult Malnutrition type: Acute illness  Adult Degree of Malnutrition: Other severe protein calorie malnutrition  Malnutrition Characteristics: Fat loss, Muscle loss, Inadequate energy, Weight loss                  360 Statement: Acute severe pro, vinny malnutrition d/t condition as evidence by signficant weight loss, 7/13/24 54kg, 7/19/24 49kg, 5kg/9% wt. loss x <1 week, moderate to severe signs of muscle/ fat loss at temples, orbitals, calvicles, triceps, shoulders, treated with TF.    BMI Findings:           There is no height or weight on file to calculate BMI.     Patient found to be eating more s/p mirtazapine.     Plan:  Nutrition consult placed earlier 7/18 while patient required tube feeds  Calorie count in place - met 74% and 60% of caloric and protein needs, respectively (slowly improving)  Trialing oral diet for now and then may have to consider PEG tube if oral intake is not sufficient for adequate caloric intake  Patient on Pureed diet due to dysphagia. Encouraged to eat more to avoid having to place a PEG tube  Home mirtazapine 7.5mg HS restarted to try to improve appetite- patient reports some improvement - consider increase to 15 mg today   Can re-engage Nutrition and consider adding Ensure supplements, magic cup, etc as diet restrictions tolerate    Seizures (HCC)  Assessment & Plan  New-onset this admission. En route to [facility] ED, patient had 2 witnessed seizures while being transported as stroke alert via EMS. Loaded on Keppra upon arrival to [facility]    Continue Keppra 750mg q12H    Hyperlipidemia  Assessment & Plan  F/u final results of lipid panel, none documented this admission.     Continue home zetia 10mg  Continue lipitor 80mg     HTN (hypertension)  Assessment & Plan  History of  hypertension. Home Anti-HTN meds include metoprolol and entresto, both of which have not been restarted    Plan as reiterated from SAH A/P:  Midodrine 10mg PO TID. Ok to begin weaning midodrine once patient has completed nimodipine  SBP goal normotensive  Nimodopine D/C'd 8/2  Consider adding patient's home metoprolol and entresto    * Subarachnoid hemorrhage (HCC)  Assessment & Plan  Patient was complaining of headache and R leg pain. En route to [facility] ED, patient had 2 witnessed seizures. He was unresponsive when arrived to the ED and was a stroke alert. Patient was intubated and given DDAVP for home ASA usage and also given mannitol. He was started on cardene gtt and Keppra load. EMS called and pt brought to [place] in setting of R MSK pain. CTH showed a large SAH with IVH s/p transfer to [facility] for coil embolization of R PCOM aneurysm and EVD on 7/13. Extubated 7/13. EVD removed 7/26. Course complicated by aspiration pneumonia for which he has already completed abx therapy. Followed on SAH pathway in Neuro ICU since transfer to [facility].     CTH 8/1 showing stable pathology. Patient off nimodipine and no longer receiving TCDs as of 8/2. Patient continues to have a headache, likely 2/2 to SAH. Continuing to monitor for changes in neuro status.    Plan:  Deemed medically stable by NCC team for downgrade to [facility]  CT 8/1-  Status post coiling of right P-comm aneurysm. Stable ventricles. No hydrocephalus. Small volume of residual subarachnoid and intraventricular hemorrhage is mildly decreased. Stable hypodensities in the right temporal lobe, right occipital lobe and right caudate head likely due to evolving subacute infarcts.  Midodrine 10mg PO TID. Ok to begin weaning midodrine now that he is off nimodrine  SBP goal normotensive  Continue ASA 81mg qd  AED ppx: Keppra 750mg q12H  PT/OT/speech evals, CM consult  PMR consult placed and are following  Continue q4H neuro checks, ok to let sleep at night  Will need  neurosurgery referral and outpatient f/u after discharge  Pain regimen for headache/MSK sequelae of SAH:  Gabapentin -[phone]  PRN Fioricet, percocet, tylenol, lidocaine patch; add dilaudid for breakthrough today  Nimodipine D/C  TCDs D/C  Patient advised that he can ask for his pain medications more frequently than he has been getting them for his headaches    Fever-resolved as of 7/31/2024  Assessment & Plan  Hospital course complicated by aspiration pneumonia s/p extubation in N-ICU. Completed abx therapy. Otherwise fevers of central origin given SAH and IVH on presentation. Continuing to monitor.     Moderate protein-calorie malnutrition (HCC)-resolved as of 8/2/2024  Assessment & Plan  Malnutrition Findings:   Adult Malnutrition type: Acute illness  Adult Degree of Malnutrition: Other severe protein calorie malnutrition  Malnutrition Characteristics: Fat loss, Muscle loss, Inadequate energy, Weight loss                  360 Statement: Acute severe pro, vinny malnutrition d/t condition as evidence by signficant weight loss, 7/13/24 54kg, 7/19/24 49kg, 5kg/9% wt. loss x <1 week, moderate to severe signs of muscle/ fat loss at temples, orbitals, calvicles, triceps, shoulders, treated with TF.    BMI Findings:          There is no height or weight on file to calculate BMI.           Disposition: [facility] pending authorization     SUBJECTIVE     Patient seen and examined. No acute events overnight. Patient is complaining of a posterior headache. This is the same headache that he has been having for several days. He is still not taking his pain medications as frequently as he is supposed to. He states that he feels like he has more of an appetite. It appears that he ate about 3/4 of his food for breakfast. Denies any chest pain, SOB, palpitations, nausea, vomiting, or abdominal pain.    OBJECTIVE     Vitals:    08/05/24 0600 08/05/24 0714 08/05/24 0909 08/05/24 1534   BP:  112/74 107/74 103/73   BP  Location:  Right arm  Right arm   Pulse:  97 104 90   Resp:  18  16   Temp:  97.5 °F (36.4 °C)     TempSrc:  Oral     SpO2:  96% 96% 97%   Weight: 43.4 kg (95 lb 9.6 oz)         Temperature:   Temp (24hrs), Av.8 °F (36.6 °C), Min:97.5 °F (36.4 °C), Max:98.1 °F (36.7 °C)    Temperature: 97.5 °F (36.4 °C)  Intake & Output:  I/O          0701   0700  07 0700    P.O. 240 360    I.V. (mL/kg) 10 (0.2)     Total Intake(mL/kg) 250 (5.8) 360 (8.3)    Urine (mL/kg/hr) 400 (0.4) 150 (0.3)    Total Output 400 150    Net -150 +210                Weights:        There is no height or weight on file to calculate BMI.  Weight (last 2 days)       Date/Time Weight    24 0600 43.4 (95.6)    24 0600 43.2 (95.2)    24 0559 43 (94.8)          Physical Exam  Vitals and nursing note reviewed.   Constitutional:       General: He is not in acute distress.     Appearance: Normal appearance. He is well-developed. He is cachectic.   HENT:      Head: Normocephalic and atraumatic.      Mouth/Throat:      Mouth: Mucous membranes are moist.   Eyes:      Conjunctiva/sclera: Conjunctivae normal.   Cardiovascular:      Rate and Rhythm: Normal rate and regular rhythm.      Pulses: Normal pulses.      Heart sounds: Normal heart sounds. No murmur heard.     No friction rub. No gallop.   Pulmonary:      Effort: Pulmonary effort is normal. No respiratory distress.      Breath sounds: Normal breath sounds. No wheezing or rhonchi.   Abdominal:      General: There is no distension.      Palpations: Abdomen is soft.      Tenderness: There is no abdominal tenderness. There is no guarding.   Musculoskeletal:         General: No swelling.      Cervical back: Neck supple.   Skin:     General: Skin is warm and dry.      Capillary Refill: Capillary refill takes less than 2 seconds.   Neurological:      General: No focal deficit present.      Mental Status: He is alert.   Psychiatric:         Mood and Affect: Mood normal.        LABORATORY DATA     Labs: I have personally reviewed pertinent reports.  Results from last 7 days   Lab Units 08/05/24  0603 08/04/24  0518 08/03/24  0500   WBC Thousand/uL 9.24 8.92 10.73*   HEMOGLOBIN g/dL 10.5* 10.6* 10.5*   HEMATOCRIT % 32.8* 32.9* 32.3*   PLATELETS Thousands/uL 604* 664* 701*   SEGS PCT % 66 62 69   MONO PCT % 6 5 5   EOS PCT % 3 3 3      Results from last 7 days   Lab Units 08/05/24  0603 08/04/24  0518 08/03/24  0500 08/02/24  0547 08/01/24  0515 07/31/24  0543   POTASSIUM mmol/L 4.6 4.3 4.3   < > 4.5 4.5   CHLORIDE mmol/L 101 101 102   < > 106 103   CO2 mmol/L 28 31 30   < > 27 28   BUN mg/dL 21 21 20   < > 26* 26*   CREATININE mg/dL 0.86 0.81 0.82   < > 0.79 0.78   CALCIUM mg/dL 9.7 9.7 9.6   < > 9.4 9.3   ALK PHOS U/L  --   --   --   --  159* 144*   ALT U/L  --   --   --   --  27 23   AST U/L  --   --   --   --  29 26    < > = values in this interval not displayed.     Results from last 7 days   Lab Units 08/01/24  0515 07/31/24  0543 07/30/24  0553   MAGNESIUM mg/dL 2.4 2.5 2.4     Results from last 7 days   Lab Units 08/01/24  0515 07/31/24  0543 07/30/24  0553   PHOSPHORUS mg/dL 3.5 3.4 3.6                    IMAGING & DIAGNOSTIC TESTING     Radiology Results: I have personally reviewed pertinent reports.  CTA head and neck w wo contrast    Result Date: 7/16/2024  Impression: CT Brain: Redemonstrated subarachnoid hemorrhage overlying the right greater than left convexities and concentrated within the right sylvian cistern and fissure. . Subacute infarct involving the right anterior temporal region and medial occipital region.. 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 Workstation performed: [id number]     IR cerebral angiography / intervention    Result Date: 7/15/2024  Impression: Successful balloon assisted coil embolization of a 4.5 mm right P-comm  aneurysm. Workstation performed: [id number]     CT head wo contrast    Result Date: 7/14/2024  Impression: No significant interval change in extensive bilateral subarachnoid hemorrhage and intraventricular hemorrhage. Workstation performed: [id number]     CT head wo contrast    Result Date: 7/13/2024  Impression: Interval placement of right-sided shunt catheter. Stable ventricular size with slight interval increase in intraventricular hemorrhage layering in the occipital horns bilaterally. Diffuse subarachnoid hemorrhage again noted. Workstation performed: [id number]     XR chest portable ICU    Result Date: 7/12/2024  Impression: No acute cardiopulmonary disease. ET tube 6 cm above the landon. Workstation performed: [id number]     CTA stroke alert (head/neck)    Result Date: 7/12/2024  Impression: 0.3 cm aneurysm in expected origin of right posterior communicating artery. Possible tiny aneurysm in left anterior communicating artery region. These could be potential sources of diffuse subarachnoid hemorrhage. Recommend neurovascular surgery consultation for further evaluation. Mild narrowing of bilateral MCA M1 and bilateral M2 segmental branch vessels, likely due to vasospasm. Patent stent in left proximal subclavian artery. Endotracheal tube in trachea. Additional chronic/incidental findings as detailed above. Findings were directly discussed with Emmanuel Lion at approximately 4:34 p.m. on 7/12/2024. Workstation performed: [id number]     CT stroke alert brain    Result Date: 7/12/2024  Impression: Acute diffuse thickened large-volume subarachnoid hemorrhage throughout the bilateral parafalcine regions, bilateral cerebral sulci (right worse than left), basilar cisterns, prepontine cistern, premedullary cistern, and visualized upper cervical spinal canal. Acute small volume intraventricular hemorrhage with mild hydrocephalus. No acute infarction. Findings were directly discussed with Emmanuel Lion at approximately  4:34 p.m. on 7/12/2024. Workstation performed: [id number]     Other Diagnostic Testing: I have personally reviewed pertinent reports.    ACTIVE MEDICATIONS     Current Facility-Administered Medications   Medication Dose Route Frequency    acetaminophen (TYLENOL) oral suspension 975 mg  975 mg Oral Q8H PRN    albuterol (PROVENTIL HFA,VENTOLIN HFA) inhaler 2 puff  2 puff Inhalation Q4H PRN    aspirin chewable tablet 81 mg  81 mg Oral Daily    atorvastatin (LIPITOR) tablet 80 mg  80 mg Oral Daily    bisacodyl (DULCOLAX) rectal suppository 10 mg  10 mg Rectal Daily PRN    butalbital-acetaminophen-caffeine (FIORICET,ESGIC) -40 mg per tablet 1 tablet  1 tablet Oral Q4H PRN    ezetimibe (ZETIA) tablet 10 mg  10 mg Oral QAM    folic acid (FOLVITE) tablet 400 mcg  400 mcg Oral Daily    gabapentin (NEURONTIN) capsule 400 mg  400 mg Oral BID    gabapentin (NEURONTIN) capsule 600 mg  600 mg Oral HS    heparin (porcine) subcutaneous injection 5,000 Units  5,000 Units Subcutaneous Q8H MATTY    HYDROmorphone HCl (DILAUDID) injection 0.2 mg  0.2 mg Intravenous Q3H PRN    insulin lispro (HumALOG/ADMELOG) 100 units/mL subcutaneous injection 1-5 Units  1-5 Units Subcutaneous 4x Daily (AC & HS)    levETIRAcetam (KEPPRA) oral solution 750 mg  750 mg Oral Q12H MATTY    lidocaine (LIDODERM) 5 % patch 1 patch  1 patch Topical Daily    lidocaine (LIDODERM) 5 % patch 1 patch  1 patch Topical Daily PRN    midodrine (PROAMATINE) tablet 10 mg  10 mg Oral TID AC    mirtazapine (REMERON) tablet 7.5 mg  7.5 mg Oral HS    omeprazole (PRILOSEC) suspension 2 mg/mL  20 mg Oral Daily Before Breakfast    ondansetron (ZOFRAN) injection 4 mg  4 mg Intravenous Q6H PRN    oxyCODONE-acetaminophen (PERCOCET) 5-325 mg per tablet 1 tablet  1 tablet Oral Q4H PRN    saliva substitute (MOUTH KOTE) mucosal solution 5 spray  5 spray Mouth/Throat 4x Daily PRN    thiamine tablet 100 mg  100 mg Oral Daily       VTE Pharmacologic Prophylaxis: Heparin  VTE Mechanical  "Prophylaxis: sequential compression device    Portions of the record may have been created with voice recognition software.  Occasional wrong word or \"sound a like\" substitutions may have occurred due to the inherent limitations of voice recognition software.  Read the chart carefully and recognize, using context, where substitutions have occurred.  ==  Sean Christianson MD  [facility]  Internal Medicine Residency PGY-1      "

## 2024-08-06 NOTE — PROGRESS NOTES
INTERNAL MEDICINE RESIDENCY PROGRESS NOTE     Name: Federico Bowen   Age & Sex: 68 y.o. male   MRN: [id number]  Unit/Bed#: [facility] 732-01   Encounter: [id number]  Team: SOD Team A    PATIENT INFORMATION     Name: Federico Bowen   Age & Sex: 68 y.o. male   MRN: [id number]  Hospital Stay Days: 25    ASSESSMENT/PLAN     Principal Problem:    Subarachnoid hemorrhage (HCC)  Active Problems:    HTN (hypertension)    Hyperlipidemia    Seizures (HCC)    Severe protein-calorie malnutrition (HCC)    Goals of care, counseling/discussion    Palliative care encounter    Coronary artery disease    COPD (chronic obstructive pulmonary disease) (HCC)    Dysphagia as late effect of cerebrovascular disease      Dysphagia as late effect of cerebrovascular disease  Assessment & Plan  -SAH 7/12  -Modified barium swallow 7/29- mild-moderate dysphagia   - repeat MBS tomorrow  -Pureed diet recommended    COPD (chronic obstructive pulmonary disease) (HCC)  Assessment & Plan  Documented h/o of COPD although no PFTs on file    Plan:  No concerns for exacerbation at this time  Continue prn albuterol    Coronary artery disease  Assessment & Plan  Patient with little documentation in medical record but appears to have non-obstructive CAD with history of unstable angina component and previously was on nitro prn and Ranexa. Home medications noted metoprolol and Entresto however last echo found to be in 2015 with EF 77% and most recent echo of 55% on 7/13/24 and thus Entresto was not restarted prior to downgrade from N-ICU.    Per patient, he has a history of a stent that was placed, but he is unable to provide more details.    Plan:  Continue ASA 81mg qd  Recommend for patient to re-establish care with Cardiology after discharge  Home metoprolol held given concurrent nimodipine and stable HR. Unclear if patient was taking prior to admission  Similarly as above, Brilinta noted as home medication on admission. Currently not continued in  setting of SAH this admission. Unable to find documented history of VIRA, outpatient f/u with cardiology.     Severe protein-calorie malnutrition (HCC)  Assessment & Plan  Malnutrition Findings:   Adult Malnutrition type: Acute illness  Adult Degree of Malnutrition: Other severe protein calorie malnutrition  Malnutrition Characteristics: Fat loss, Muscle loss, Inadequate energy, Weight loss                  360 Statement: Acute severe pro, vinny malnutrition d/t condition as evidence by signficant weight loss, 7/13/24 54kg, 7/19/24 49kg, 5kg/9% wt. loss x <1 week, moderate to severe signs of muscle/ fat loss at temples, orbitals, calvicles, triceps, shoulders, treated with TF.    BMI Findings:           There is no height or weight on file to calculate BMI.     Patient found to be eating more s/p mirtazapine.     Plan:  Nutrition consult placed earlier 7/18 while patient required tube feeds  Calorie count in place - met 74% and 60% of caloric and protein needs, respectively (slowly improving)  Trialing oral diet for now and then may have to consider PEG tube if oral intake is not sufficient for adequate caloric intake  Patient on Pureed diet due to dysphagia. Encouraged to eat more to avoid having to place a PEG tube  Home mirtazapine 7.5mg HS restarted to try to improve appetite- patient reports some improvement - increased to 15 mg today   Appetite continues to improve. Patient eating more today. Had eaten all of his breakfast.  Can re-engage Nutrition and consider adding Ensure supplements, magic cup, etc as diet restrictions tolerate    Seizures (HCC)  Assessment & Plan  New-onset this admission. En route to [facility] ED, patient had 2 witnessed seizures while being transported as stroke alert via EMS. Loaded on Keppra upon arrival to [facility]    Continue Keppra 750mg q12H    Hyperlipidemia  Assessment & Plan  F/u final results of lipid panel, none documented this admission.     Continue home zetia 10mg  Continue  lipitor 80mg     HTN (hypertension)  Assessment & Plan  History of hypertension. Home Anti-HTN meds include metoprolol and entresto, both of which have not been restarted    Plan as reiterated from SAH A/P:  Midodrine 10mg PO TID. Ok to begin weaning midodrine once patient has completed nimodipine  SBP goal normotensive  Nimodopine D/C'd 8/2  Consider adding patient's home metoprolol and entresto    * Subarachnoid hemorrhage (HCC)  Assessment & Plan  Patient was complaining of headache and R leg pain. En route to [facility] ED, patient had 2 witnessed seizures. He was unresponsive when arrived to the ED and was a stroke alert. Patient was intubated and given DDAVP for home ASA usage and also given mannitol. He was started on cardene gtt and Keppra load. EMS called and pt brought to [place] in setting of R MSK pain. CTH showed a large SAH with IVH s/p transfer to [facility] for coil embolization of R PCOM aneurysm and EVD on 7/13. Extubated 7/13. EVD removed 7/26. Course complicated by aspiration pneumonia for which he has already completed abx therapy. Followed on SAH pathway in Neuro ICU since transfer to [facility].     CTH 8/1 showing stable pathology. Patient off nimodipine and no longer receiving TCDs as of 8/2. Patient continues to have a headache, likely 2/2 to SAH. Continuing to monitor for changes in neuro status.    Plan:  Deemed medically stable by NCC team for downgrade to [facility]  CT 8/1-  Status post coiling of right P-comm aneurysm. Stable ventricles. No hydrocephalus. Small volume of residual subarachnoid and intraventricular hemorrhage is mildly decreased. Stable hypodensities in the right temporal lobe, right occipital lobe and right caudate head likely due to evolving subacute infarcts.  Midodrine 10mg PO TID. Ok to begin weaning midodrine now that he is off nimodrine  SBP goal normotensive  Continue ASA 81mg qd  AED ppx: Keppra 750mg q12H  PT/OT/speech evals, CM consult  PMR consult placed and are  following  Continue q4H neuro checks, ok to let sleep at night  Will need neurosurgery referral and outpatient f/u after discharge  Pain regimen for headache/MSK sequelae of SAH:  Gabapentin -[id number]  PRN lidocaine patch;   Fioricet and dilaudid D/C'd  Percocet D/C'd and started on scheduled tylenol and oxycodone PRN  Nimodipine D/C  TCDs D/C  Patient advised that he can ask for his pain medications more frequently than he has been getting them for his headaches    Fever-resolved as of 7/31/2024  Assessment & Plan  Hospital course complicated by aspiration pneumonia s/p extubation in N-ICU. Completed abx therapy. Otherwise fevers of central origin given SAH and IVH on presentation. Continuing to monitor.     Moderate protein-calorie malnutrition (HCC)-resolved as of 8/2/2024  Assessment & Plan  Malnutrition Findings:   Adult Malnutrition type: Acute illness  Adult Degree of Malnutrition: Other severe protein calorie malnutrition  Malnutrition Characteristics: Fat loss, Muscle loss, Inadequate energy, Weight loss                  360 Statement: Acute severe pro, vinny malnutrition d/t condition as evidence by signficant weight loss, 7/13/24 54kg, 7/19/24 49kg, 5kg/9% wt. loss x <1 week, moderate to severe signs of muscle/ fat loss at temples, orbitals, calvicles, triceps, shoulders, treated with TF.    BMI Findings:          There is no height or weight on file to calculate BMI.           Disposition: [facility] rehab pending authorization     SUBJECTIVE     Patient seen and examined. No acute events overnight. Patient still complaining of headache in the posterior part of his head. States that this is the same pain that he has been feeling for several days. He has not been taking his pain medications as frequently as he could be.     Today he endorses that his appetite has improved some. He was able to eat all of his breakfast today. He was encouraged to keep eating all of his food in order to meet  nutritional needs and avoid PEG tube placement.    ROS positive for blurry vision.    OBJECTIVE     Vitals:    24 1534 24 2000 24 2344 24 06   BP: 103/73  107/74    BP Location: Right arm  Right arm    Pulse: 90  98    Resp: 16  16    Temp:   97.8 °F (36.6 °C)    TempSrc:   Oral    SpO2: 97% 97% 96%    Weight:    44 kg (97 lb)      Temperature:   Temp (24hrs), Av.8 °F (36.6 °C), Min:97.8 °F (36.6 °C), Max:97.8 °F (36.6 °C)    Temperature: 97.8 °F (36.6 °C)  Intake & Output:  I/O          07 07 07    P.O. 240 480 210    I.V. (mL/kg) 10 (0.2)      Total Intake(mL/kg) 250 (5.8) 480 (10.9) 210 (4.8)    Urine (mL/kg/hr) 400 (0.4) 300 (0.3) 400 (1.3)    Stool  0 0    Total Output 400 300 400    Net -150 +180 -190           Unmeasured Stool Occurrence  0 x 1 x          Weights:        There is no height or weight on file to calculate BMI.  Weight (last 2 days)       Date/Time Weight    24 0600 44 (97)    24 06 43.4 (95.6)    24 06 43.2 (95.2)          Physical Exam  Constitutional:       Appearance: He is cachectic.   HENT:      Head: Normocephalic and atraumatic.   Eyes:      Extraocular Movements: Extraocular movements intact.      Pupils: Pupils are equal, round, and reactive to light.   Cardiovascular:      Rate and Rhythm: Normal rate and regular rhythm.      Pulses: Normal pulses.      Heart sounds: Normal heart sounds. No murmur heard.     No friction rub. No gallop.   Pulmonary:      Effort: Pulmonary effort is normal.      Breath sounds: Normal breath sounds. No stridor. No wheezing or rales.   Abdominal:      General: There is no distension.      Tenderness: There is no abdominal tenderness. There is no guarding.   Skin:     General: Skin is warm.      Capillary Refill: Capillary refill takes less than 2 seconds.      Comments: Well-healing surgical site on head from EVD w/ stitches   Neurological:       General: No focal deficit present.      Mental Status: He is alert.      Cranial Nerves: No cranial nerve deficit.      Sensory: No sensory deficit.      Motor: No weakness.     LABORATORY DATA     Labs: I have personally reviewed pertinent reports.  Results from last 7 days   Lab Units 08/06/24 0632 08/05/24 0603 08/04/24 0518   WBC Thousand/uL 8.22 9.24 8.92   HEMOGLOBIN g/dL 10.3* 10.5* 10.6*   HEMATOCRIT % 32.2* 32.8* 32.9*   PLATELETS Thousands/uL 563* 604* 664*   SEGS PCT % 65 66 62   MONO PCT % 4 6 5   EOS PCT % 3 3 3      Results from last 7 days   Lab Units 08/06/24  0632 08/05/24  0603 08/04/24 0518 08/02/24  0547 08/01/24 0515 07/31/24  0543   POTASSIUM mmol/L 4.1 4.6 4.3   < > 4.5 4.5   CHLORIDE mmol/L 103 101 101   < > 106 103   CO2 mmol/L 30 28 31   < > 27 28   BUN mg/dL 22 21 21   < > 26* 26*   CREATININE mg/dL 0.85 0.86 0.81   < > 0.79 0.78   CALCIUM mg/dL 9.3 9.7 9.7   < > 9.4 9.3   ALK PHOS U/L  --   --   --   --  159* 144*   ALT U/L  --   --   --   --  27 23   AST U/L  --   --   --   --  29 26    < > = values in this interval not displayed.     Results from last 7 days   Lab Units 08/01/24 0515 07/31/24  0543   MAGNESIUM mg/dL 2.4 2.5     Results from last 7 days   Lab Units 08/01/24  0515 07/31/24  0543   PHOSPHORUS mg/dL 3.5 3.4                    IMAGING & DIAGNOSTIC TESTING     Radiology Results: I have personally reviewed pertinent reports.  CTA head and neck w wo contrast    Result Date: 7/16/2024  Impression: CT Brain: Redemonstrated subarachnoid hemorrhage overlying the right greater than left convexities and concentrated within the right sylvian cistern and fissure. . Subacute infarct involving the right anterior temporal region and medial occipital region.. 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 Workstation performed: [id number]     IR cerebral angiography  / intervention    Result Date: 7/15/2024  Impression: Successful balloon assisted coil embolization of a 4.5 mm right P-comm aneurysm. Workstation performed: [id number]     CT head wo contrast    Result Date: 7/14/2024  Impression: No significant interval change in extensive bilateral subarachnoid hemorrhage and intraventricular hemorrhage. Workstation performed: [id number]     CT head wo contrast    Result Date: 7/13/2024  Impression: Interval placement of right-sided shunt catheter. Stable ventricular size with slight interval increase in intraventricular hemorrhage layering in the occipital horns bilaterally. Diffuse subarachnoid hemorrhage again noted. Workstation performed: [id number]     XR chest portable ICU    Result Date: 7/12/2024  Impression: No acute cardiopulmonary disease. ET tube 6 cm above the landon. Workstation performed: [id number]     CTA stroke alert (head/neck)    Result Date: 7/12/2024  Impression: 0.3 cm aneurysm in expected origin of right posterior communicating artery. Possible tiny aneurysm in left anterior communicating artery region. These could be potential sources of diffuse subarachnoid hemorrhage. Recommend neurovascular surgery consultation for further evaluation. Mild narrowing of bilateral MCA M1 and bilateral M2 segmental branch vessels, likely due to vasospasm. Patent stent in left proximal subclavian artery. Endotracheal tube in trachea. Additional chronic/incidental findings as detailed above. Findings were directly discussed with Emmanuel Lion at approximately 4:34 p.m. on 7/12/2024. Workstation performed: [id number]     CT stroke alert brain    Result Date: 7/12/2024  Impression: Acute diffuse thickened large-volume subarachnoid hemorrhage throughout the bilateral parafalcine regions, bilateral cerebral sulci (right worse than left), basilar cisterns, prepontine cistern, premedullary cistern, and visualized upper cervical spinal canal. Acute small volume intraventricular  hemorrhage with mild hydrocephalus. No acute infarction. Findings were directly discussed with Emmanuel Lion at approximately 4:34 p.m. on 7/12/2024. Workstation performed: [id number]     Other Diagnostic Testing: I have personally reviewed pertinent reports.    ACTIVE MEDICATIONS     Current Facility-Administered Medications   Medication Dose Route Frequency    acetaminophen (TYLENOL) oral suspension 650 mg  650 mg Oral Q8H    albuterol (PROVENTIL HFA,VENTOLIN HFA) inhaler 2 puff  2 puff Inhalation Q4H PRN    aspirin chewable tablet 81 mg  81 mg Oral Daily    atorvastatin (LIPITOR) tablet 80 mg  80 mg Oral Daily    bisacodyl (DULCOLAX) rectal suppository 10 mg  10 mg Rectal Daily PRN    ezetimibe (ZETIA) tablet 10 mg  10 mg Oral QAM    folic acid (FOLVITE) tablet 400 mcg  400 mcg Oral Daily    gabapentin (NEURONTIN) capsule 400 mg  400 mg Oral BID    gabapentin (NEURONTIN) capsule 600 mg  600 mg Oral HS    heparin (porcine) subcutaneous injection 5,000 Units  5,000 Units Subcutaneous Q8H MATTY    insulin lispro (HumALOG/ADMELOG) 100 units/mL subcutaneous injection 1-5 Units  1-5 Units Subcutaneous 4x Daily (AC & HS)    levETIRAcetam (KEPPRA) oral solution 750 mg  750 mg Oral Q12H MATTY    lidocaine (LIDODERM) 5 % patch 1 patch  1 patch Topical Daily    lidocaine (LIDODERM) 5 % patch 1 patch  1 patch Topical Daily PRN    midodrine (PROAMATINE) tablet 10 mg  10 mg Oral TID AC    mirtazapine (REMERON) tablet 15 mg  15 mg Oral HS    omeprazole (PRILOSEC) suspension 2 mg/mL  20 mg Oral Daily Before Breakfast    ondansetron (ZOFRAN) injection 4 mg  4 mg Intravenous Q6H PRN    oxyCODONE (ROXICODONE) split tablet 2.5 mg  2.5 mg Oral Q4H PRN    Or    oxyCODONE (ROXICODONE) IR tablet 5 mg  5 mg Oral Q4H PRN    saliva substitute (MOUTH KOTE) mucosal solution 5 spray  5 spray Mouth/Throat 4x Daily PRN    thiamine tablet 100 mg  100 mg Oral Daily       VTE Pharmacologic Prophylaxis: Sequential compression device (Venodyne)   VTE  "Mechanical Prophylaxis: sequential compression device    Portions of the record may have been created with voice recognition software.  Occasional wrong word or \"sound a like\" substitutions may have occurred due to the inherent limitations of voice recognition software.  Read the chart carefully and recognize, using context, where substitutions have occurred.  ==  Sean Christianson MD  [facility]  Internal Medicine Residency PGY-1      "

## 2024-08-07 LAB
GLUCOSE SERPL-MCNC: 126 MG/DL (ref 65–140)
GLUCOSE SERPL-MCNC: 151 MG/DL (ref 65–140)
GLUCOSE SERPL-MCNC: 84 MG/DL (ref 65–140)

## 2024-08-07 PROCEDURE — 97530 THERAPEUTIC ACTIVITIES: CPT

## 2024-08-07 PROCEDURE — 97112 NEUROMUSCULAR REEDUCATION: CPT

## 2024-08-07 PROCEDURE — 82948 REAGENT STRIP/BLOOD GLUCOSE: CPT

## 2024-08-07 PROCEDURE — 99232 SBSQ HOSP IP/OBS MODERATE 35: CPT | Performed by: STUDENT IN AN ORGANIZED HEALTH CARE EDUCATION/TRAINING PROGRAM

## 2024-08-07 PROCEDURE — 97535 SELF CARE MNGMENT TRAINING: CPT

## 2024-08-07 PROCEDURE — 92526 ORAL FUNCTION THERAPY: CPT

## 2024-08-07 RX ORDER — ACETAMINOPHEN 160 MG/5ML
975 SUSPENSION ORAL EVERY 8 HOURS
Status: DISCONTINUED | OUTPATIENT
Start: 2024-08-07 | End: 2024-08-10 | Stop reason: HOSPADM

## 2024-08-07 RX ADMIN — MIRTAZAPINE 15 MG: 15 TABLET, FILM COATED ORAL at 21:54

## 2024-08-07 RX ADMIN — Medication 20 MG: at 06:42

## 2024-08-07 RX ADMIN — ASPIRIN 81 MG CHEWABLE TABLET 81 MG: 81 TABLET CHEWABLE at 08:10

## 2024-08-07 RX ADMIN — ACETAMINOPHEN 975 MG: 650 SUSPENSION ORAL at 14:30

## 2024-08-07 RX ADMIN — OXYCODONE HYDROCHLORIDE 5 MG: 5 TABLET ORAL at 14:31

## 2024-08-07 RX ADMIN — HEPARIN SODIUM 5000 UNITS: 5000 INJECTION INTRAVENOUS; SUBCUTANEOUS at 06:42

## 2024-08-07 RX ADMIN — LEVETIRACETAM 750 MG: 100 SOLUTION ORAL at 08:15

## 2024-08-07 RX ADMIN — GABAPENTIN 400 MG: 300 CAPSULE ORAL at 17:12

## 2024-08-07 RX ADMIN — GABAPENTIN 600 MG: 300 CAPSULE ORAL at 21:54

## 2024-08-07 RX ADMIN — ACETAMINOPHEN 650 MG: 650 SUSPENSION ORAL at 06:42

## 2024-08-07 RX ADMIN — FOLIC ACID TAB 400 MCG 400 MCG: 400 TAB at 08:12

## 2024-08-07 RX ADMIN — EZETIMIBE 10 MG: 10 TABLET ORAL at 08:10

## 2024-08-07 RX ADMIN — OXYCODONE HYDROCHLORIDE 5 MG: 5 TABLET ORAL at 04:18

## 2024-08-07 RX ADMIN — GABAPENTIN 400 MG: 300 CAPSULE ORAL at 08:14

## 2024-08-07 RX ADMIN — LEVETIRACETAM 750 MG: 100 SOLUTION ORAL at 21:55

## 2024-08-07 RX ADMIN — MIDODRINE HYDROCHLORIDE 10 MG: 5 TABLET ORAL at 06:42

## 2024-08-07 RX ADMIN — HEPARIN SODIUM 5000 UNITS: 5000 INJECTION INTRAVENOUS; SUBCUTANEOUS at 14:31

## 2024-08-07 RX ADMIN — ATORVASTATIN CALCIUM 80 MG: 80 TABLET, FILM COATED ORAL at 08:10

## 2024-08-07 RX ADMIN — HEPARIN SODIUM 5000 UNITS: 5000 INJECTION INTRAVENOUS; SUBCUTANEOUS at 21:55

## 2024-08-07 RX ADMIN — LIDOCAINE 5% 1 PATCH: 700 PATCH TOPICAL at 16:17

## 2024-08-07 RX ADMIN — ACETAMINOPHEN 975 MG: 650 SUSPENSION ORAL at 21:54

## 2024-08-07 RX ADMIN — MIDODRINE HYDROCHLORIDE 10 MG: 5 TABLET ORAL at 16:17

## 2024-08-07 RX ADMIN — LIDOCAINE 5% 1 PATCH: 700 PATCH TOPICAL at 08:15

## 2024-08-07 RX ADMIN — THIAMINE HCL TAB 100 MG 100 MG: 100 TAB at 08:12

## 2024-08-07 RX ADMIN — INSULIN LISPRO 1 UNITS: 100 INJECTION, SOLUTION INTRAVENOUS; SUBCUTANEOUS at 07:24

## 2024-08-07 RX ADMIN — MIDODRINE HYDROCHLORIDE 10 MG: 5 TABLET ORAL at 11:07

## 2024-08-07 NOTE — OCCUPATIONAL THERAPY NOTE
Occupational Therapy Progress Note     Patient Name: Federico Bowen  Today's Date: 8/7/2024  Problem List  Principal Problem:    Subarachnoid hemorrhage (HCC)  Active Problems:    HTN (hypertension)    Hyperlipidemia    Seizures (HCC)    Severe protein-calorie malnutrition (HCC)    Goals of care, counseling/discussion    Palliative care encounter    Coronary artery disease    COPD (chronic obstructive pulmonary disease) (HCC)    Dysphagia as late effect of cerebrovascular disease              08/07/24 0217   OT Last Visit   OT Visit Date 08/07/24   Note Type   Note Type Treatment   Pain Assessment   Pain Assessment Tool 0-10   Pain Score 10 - Worst Possible Pain   Hospital Pain Intervention(s) Repositioned;Ambulation/increased activity   Restrictions/Precautions   Weight Bearing Precautions Per Order No   Other Precautions Pain;Cognitive;Fall Risk;Multiple lines;Bed Alarm;Impulsive   Lifestyle   Autonomy I w/ ADLS/IADLS, transfers and functional mobility PTA   Reciprocal Relationships Pt lives w/ his dght   Service to Others retired   Intrinsic Gratification gardening and spending time w/ his grandkids   ADL   UB Bathing Assistance 4  Minimal Assistance   UB Bathing Deficit Verbal cueing;Setup;Increased time to complete   UB Dressing Assistance 4  Minimal Assistance   UB Dressing Deficit Thread LUE;Setup;Steadying;Verbal cueing;Increased time to complete  (Performed supine.)   LB Dressing Assistance 4  Minimal Assistance   LB Dressing Deficit Setup;Steadying;Supervision/safety;Don/doff R sock;Don/doff L sock   Bed Mobility   Supine to Sit 5  Supervision   Additional items Increased time required;Verbal cues   Transfers   Sit to Stand 3  Moderate assistance   Additional items Increased time required;Impulsive;Verbal cues;Assist x 1   Stand to Sit 3  Moderate assistance   Additional items Increased time required;Impulsive;Verbal cues;Assist x 1   Additional Comments Pt required use of RW for transfers.    Functional Mobility   Functional Mobility 3  Moderate assistance   Additional Comments Pt performed short bedroom mobility with use of RW and MOD A x1, pt was impulsive.   Additional items Rolling walker   Cognition   Overall Cognitive Status Impaired   Arousal/Participation Arousable;Responsive   Attention Attends with cues to redirect   Orientation Level Oriented to person;Oriented to place;Disoriented to time;Disoriented to situation   Memory Decreased short term memory;Decreased recall of recent events;Decreased recall of precautions   Following Commands Follows one step commands with increased time or repetition   Comments Pt attends with cues to redirect, impulsive and needs cues for safety, and requires frequent redirection.   Activity Tolerance   Activity Tolerance Patient limited by fatigue;Patient limited by pain   Medical Staff Made Aware OT Quynh   Assessment   Assessment Pt was seen for skilled OT treatment on 8/7/2024 with interventions consisting of ADL retraining, energy conservation, deep breathing, safety awareness and fall prevention techniques. Pt greeted supine in bed and agreeable to therapy. Pt completed UB bathing (MIN A), UB dressing (MIN A) and LB dressing (MIN A). Pt performed short bedroom distance functional mobility with MOD A x1.  The patient's raw score on the -PAC Daily Activity Inpatient Short Form is 18. A raw score of less than 19 suggests the patient may benefit from discharge to post-acute rehabilitation services. Please refer to the recommendation of the Occupational Therapist for safe discharge planning. Ot recommends Level I Maximum intensity rehab. Pt to benefit from continued OT treatment while in the hospital to address deficits to increase independence in ADL's and functional tasks.   Plan   Treatment Interventions ADL retraining;Functional transfer training;UE strengthening/ROM;Endurance training;Cognitive reorientation;Patient/family training;Equipment  evaluation/education;Compensatory technique education;Energy conservation;Activityengagement   Goal Expiration Date 08/13/24   OT Treatment Day 3   Discharge Recommendation   Rehab Resource Intensity Level, OT I (Maximum Resource Intensity)   AM-PAC Daily Activity Inpatient   Lower Body Dressing 3   Bathing 3   Toileting 3   Upper Body Dressing 3   Grooming 3   Eating 3   Daily Activity Raw Score 18   Daily Activity Standardized Score (Calc for Raw Score >=11) 38.66   AM-PAC Applied Cognition Inpatient   Following a Speech/Presentation 2   Understanding Ordinary Conversation 2   Taking Medications 2   Remembering Where Things Are Placed or Put Away 2   Remembering List of 4-5 Errands 2   Taking Care of Complicated Tasks 1   Applied Cognition Raw Score 11   Applied Cognition Standardized Score 27.03   Barthel Index   Feeding 5   Bathing 0   Grooming Score 5   Dressing Score 5   Bladder Score 5   Bowels Score 5   Toilet Use Score 5   Transfers (Bed/Chair) Score 10   Mobility (Level Surface) Score 10   Stairs Score 0   Barthel Index Score 50   Modified Republic Scale   Modified Republic Scale 4   End of Consult   Patient Position at End of Consult Supine;Bed/Chair alarm activated;All needs within reach   Nurse Communication Nurse aware of consult       HAMMAD Matute

## 2024-08-07 NOTE — PLAN OF CARE
Problem: OCCUPATIONAL THERAPY ADULT  Goal: Performs self-care activities at highest level of function for planned discharge setting.  See evaluation for individualized goals.  Description: Treatment Interventions: ADL retraining, Functional transfer training, UE strengthening/ROM, Endurance training, Cognitive reorientation, Patient/family training, Equipment evaluation/education, Compensatory technique education, Continued evaluation, Energy conservation, Activityengagement          See flowsheet documentation for full assessment, interventions and recommendations.   Note: Limitation: Decreased ADL status, Decreased UE ROM, Decreased UE strength, Decreased Safe judgement during ADL, Decreased cognition, Decreased endurance, Decreased self-care trans, Decreased high-level ADLs  Prognosis: Fair  Assessment: Pt was seen for skilled OT treatment on 8/7/2024 with interventions consisting of ADL retraining, energy conservation, deep breathing, safety awareness and fall prevention techniques. Pt greeted supine in bed and agreeable to therapy. Pt completed UB bathing (MIN A), UB dressing (MIN A) and LB dressing (MIN A). Pt performed short bedroom distance functional mobility with MOD A x1.  The patient's raw score on the -PAC Daily Activity Inpatient Short Form is 18. A raw score of less than 19 suggests the patient may benefit from discharge to post-acute rehabilitation services. Please refer to the recommendation of the Occupational Therapist for safe discharge planning. Ot recommends Level I Maximum intensity rehab. Pt to benefit from continued OT treatment while in the hospital to address deficits to increase independence in ADL's and functional tasks.  Recommendation: Physiatry Consult  Rehab Resource Intensity Level, OT: I (Maximum Resource Intensity)

## 2024-08-07 NOTE — PHYSICAL THERAPY NOTE
Physical Therapy Progress Note     08/07/24 1500   PT Last Visit   PT Visit Date 08/07/24   Note Type   Note Type Treatment   Pain Assessment   Pain Assessment Tool 0-10   Pain Score 10 - Worst Possible Pain   Pain Location/Orientation Location: Head   Hospital Pain Intervention(s) Repositioned;Ambulation/increased activity;Emotional support   Restrictions/Precautions   Other Precautions Cognitive;Chair Alarm;Bed Alarm;Pain;Fall Risk  (Alarm active post session.)   Subjective   Subjective The patient notes continued pain and wanting to be left alone. He was ultimately amenable to therapy after much motivation and discussion.   Bed Mobility   Supine to Sit 5  Supervision   Additional items Increased time required;Verbal cues;LE management   Sit to Supine 5  Supervision   Additional items Impulsive   Transfers   Sit to Stand 3  Moderate assistance   Additional items Assist x 1;Increased time required;Verbal cues   Stand to Sit 3  Moderate assistance   Additional items Assist x 1;Increased time required;Verbal cues   Ambulation/Elevation   Gait pattern Excessively slow;Step to;Short stride;Inconsistent caty;Knees flexed;Decreased foot clearance;Forward Flexion   Gait Assistance 3  Moderate assist   Additional items Assist x 1;Verbal cues;Tactile cues   Assistive Device Rolling walker   Distance 7 feet, 4 feet x 2.   Balance   Static Sitting Fair   Dynamic Sitting Poor +   Static Standing Poor +   Dynamic Standing Poor   Ambulatory Poor   Activity Tolerance   Activity Tolerance Patient limited by fatigue;Patient limited by pain   Exercises   Knee AROM Long Arc Quad Sitting;10 reps;Bilateral;AROM   Assessment   Prognosis Fair   Problem List Decreased strength;Decreased endurance;Impaired balance;Decreased mobility;Decreased coordination;Decreased cognition   Assessment The patient required motivation in order to participate, but he was ultimately able to ambulate short distances with the walker. He required assistance  for dynamic sitting balance, but he did demonstrate some improvement in gait today. His knees were more flexed, but he did take more steps overall. He remains notably limited from his baseline, and continued therapies will assist in maximizing his functional mobility.   Barriers to Discharge Inaccessible home environment;Decreased caregiver support   Goals   Patient Goals To rest.   STG Expiration Date 08/13/24   PT Treatment Day 5   Plan   Treatment/Interventions Functional transfer training;LE strengthening/ROM;Therapeutic exercise;Endurance training;Cognitive reorientation;Patient/family training;Bed mobility;Gait training;Elevations   Progress Slow progress, decreased activity tolerance   PT Frequency 3-5x/wk   Discharge Recommendation   Rehab Resource Intensity Level, PT II (Moderate Resource Intensity)   Equipment Recommended Walker   Walker Package Recommended Wheeled walker   AM-PAC Basic Mobility Inpatient   Turning in Flat Bed Without Bedrails 3   Lying on Back to Sitting on Edge of Flat Bed Without Bedrails 3   Moving Bed to Chair 3   Standing Up From Chair Using Arms 2   Walk in Room 2   Climb 3-5 Stairs With Railing 1   Basic Mobility Inpatient Raw Score 14   Basic Mobility Standardized Score 35.55   [facility] Highest Level Of Mobility   -HLM Goal 4: Move to chair/commode   -HLM Achieved 6: Walk 10 steps or more         An AM-PAC Basic Mobility raw score less than 16 suggests the patient may benefit from discharge to post-acute rehab services.    Sumeet Shine, PTA

## 2024-08-07 NOTE — ARC ADMISSION
Referral received for consideration of patient for inpatient acute rehab.    Reviewed PM&R consult - patient is recommended for dedicated brain injury rehab at this time. CM has been updated. Please notify with any changes.

## 2024-08-07 NOTE — CASE MANAGEMENT
Case Management Discharge Planning Note    Patient name Federico Bowen  [facility] MRN [id number]  : 1955 Date 2024       Current Admission Date: 2024  Current Admission Diagnosis:Subarachnoid hemorrhage (HCC)   Patient Active Problem List    Diagnosis Date Noted Date Diagnosed    Dysphagia as late effect of cerebrovascular disease 2024     Coronary artery disease 2024     COPD (chronic obstructive pulmonary disease) (HCC) 2024     Goals of care, counseling/discussion 2024     Palliative care encounter 2024     Severe protein-calorie malnutrition (HCC) 2024     HTN (hypertension) 2024     Hyperlipidemia 2024     Seizures (HCC) 2024     Subarachnoid hemorrhage (HCC) 2024       LOS (days): 26  Geometric Mean LOS (GMLOS) (days): 10.2  Days to GMLOS:-15.5     OBJECTIVE:  Risk of Unplanned Readmission Score: 18.13         Current admission status: Inpatient   Preferred Pharmacy:   [id number] - [facility] [place] [place] - [address]  [address]  [facility] [place] [address]  Phone: [phone] Fax: [phone]    Primary Care Provider: No primary care provider on file.    Primary Insurance: [employer] [facility]  Secondary Insurance: [facility]    DISCHARGE DETAILS:     rounds- Stable for DC    Message to  Sissy for decision    Spoke with sister Aleena.  Would like referral [facility].  Referral placed in [facility].  They would like pt to close to their home so he has visitors.    Aware an insurance auth required    Notified by [facility] that recommendation is for brain injury program which [facility] provides.  Spoke with dghter in room who will reach out to Aleena.  Advised [facility] is 5 minutes from [facility] recommends brain injury program which [facility] provides    Spoke with 2 dghters re:  TBI program at [facility] and not avail at [facility] or a SNF.  Agreeable to [facility]    Notified updated PT/OT notes  needed for auth.  Per therapy, pt refused this AM    TCT Aleena, she will speak with her sister and pt that he must participate in therapy

## 2024-08-07 NOTE — PLAN OF CARE
Problem: PHYSICAL THERAPY ADULT  Goal: Performs mobility at highest level of function for planned discharge setting.  See evaluation for individualized goals.  Description: Treatment/Interventions: OT, Spoke to case management, Gait training, Bed mobility, Patient/family training, Endurance training, LE strengthening/ROM, Functional transfer training          See flowsheet documentation for full assessment, interventions and recommendations.  Outcome: Progressing  Note: Prognosis: Fair  Problem List: Decreased strength, Decreased endurance, Impaired balance, Decreased mobility, Decreased coordination, Decreased cognition  Assessment: The patient required motivation in order to participate, but he was ultimately able to ambulate short distances with the walker. He required assistance for dynamic sitting balance, but he did demonstrate some improvement in gait today. His knees were more flexed, but he did take more steps overall. He remains notably limited from his baseline, and continued therapies will assist in maximizing his functional mobility.  Barriers to Discharge: Inaccessible home environment, Decreased caregiver support     Rehab Resource Intensity Level, PT: II (Moderate Resource Intensity)    See flowsheet documentation for full assessment.

## 2024-08-07 NOTE — SPEECH THERAPY NOTE
Speech-Language Pathology Progress Note    Patient Name: Federico Bowen    Today's Date: 8/7/2024    Subjective:  Pt was awake and alert. He was sitting up in chair at bedside. He is pleased with diet advancement but still has a limited appetite.     Objective:  Pt was seen today for dysphagia therapy. Current diet is dysphagia 3 dental soft with thin liquids. Pt was on room air. Oral care was completed independently. Focus of today's session was  to assess tolerance of recently advanced diet texture . Textures offered today included chopped solid (roast beef/gravy) and thin liquids (water and coffee).  Swallow function:   Bolus retrieval was adequate. Mastication, formation, and AP transfer were prolonged but functional and complete. Pharyngeal swallow initiation was adequate, as was hyolaryngeal rise.    Assessment:  Swallow function is stable with current diet.   Discussed with physician, MBS no longer needed, will complete orders.   Current diet is appropriate, may be able to advance to regular with softer food selections if this will result in better overall intake.     Plan:  Continue dysphagia 3 dental soft and thin liquids for now. Continue ST follow up. No need for repeat MBS at this time.

## 2024-08-07 NOTE — RESTORATIVE TECHNICIAN NOTE
Restorative Technician Note      Patient Name: Federico Bowen     Note Type: Mobility  Patient Position Upon Consult: Supine  Activity Performed: Ambulated; Dangled; Stood  Assistive Device: Other (Comment) (Assist x1)  Education Provided: Yes  Patient Position at End of Consult: Bedside chair; All needs within reach; Bed/Chair alarm activated  Anya SALEH, Restorative Technician,

## 2024-08-07 NOTE — PROGRESS NOTES
INTERNAL MEDICINE RESIDENCY PROGRESS NOTE     Name: Federico Bowen   Age & Sex: 68 y.o. male   MRN: [id number]  Unit/Bed#: [facility] 732-01   Encounter: [id number]  Team: SOD Team A    PATIENT INFORMATION     Name: Federico Bowen   Age & Sex: 68 y.o. male   MRN: [id number]  Hospital Stay Days: 26    ASSESSMENT/PLAN     Principal Problem:    Subarachnoid hemorrhage (HCC)  Active Problems:    HTN (hypertension)    Hyperlipidemia    Seizures (HCC)    Severe protein-calorie malnutrition (HCC)    Goals of care, counseling/discussion    Palliative care encounter    Coronary artery disease    COPD (chronic obstructive pulmonary disease) (HCC)    Dysphagia as late effect of cerebrovascular disease      Dysphagia as late effect of cerebrovascular disease  Assessment & Plan  -SAH 7/12  -Modified barium swallow 7/29- mild-moderate dysphagia   - repeat MBS tomorrow  -Pureed diet recommended    COPD (chronic obstructive pulmonary disease) (HCC)  Assessment & Plan  Documented h/o of COPD although no PFTs on file    Plan:  No concerns for exacerbation at this time  Continue prn albuterol    Coronary artery disease  Assessment & Plan  Patient with little documentation in medical record but appears to have non-obstructive CAD with history of unstable angina component and previously was on nitro prn and Ranexa. Home medications noted metoprolol and Entresto however last echo found to be in 2015 with EF 77% and most recent echo of 55% on 7/13/24 and thus Entresto was not restarted prior to downgrade from N-ICU.    Per patient, he has a history of a stent that was placed, but he is unable to provide more details.    Plan:  Continue ASA 81mg qd  Recommend for patient to re-establish care with Cardiology after discharge  Home metoprolol held given concurrent nimodipine and stable HR. Unclear if patient was taking prior to admission  Similarly as above, Brilinta noted as home medication on admission. Currently not continued in  setting of SAH this admission. Unable to find documented history of VIRA, outpatient f/u with cardiology.     Severe protein-calorie malnutrition (HCC)  Assessment & Plan  Malnutrition Findings:   Adult Malnutrition type: Acute illness  Adult Degree of Malnutrition: Other severe protein calorie malnutrition  Malnutrition Characteristics: Fat loss, Muscle loss, Inadequate energy, Weight loss                  360 Statement: Acute severe pro, vinny malnutrition d/t condition as evidence by signficant weight loss, 7/13/24 54kg, 7/19/24 49kg, 5kg/9% wt. loss x <1 week, moderate to severe signs of muscle/ fat loss at temples, orbitals, calvicles, triceps, shoulders, treated with TF.    BMI Findings:           There is no height or weight on file to calculate BMI.     Patient found to be eating more s/p mirtazapine.     Plan:  Nutrition consult placed earlier 7/18 while patient required tube feeds  Calorie count in place - met 74% and 60% of caloric and protein needs, respectively (slowly improving)  Trialing oral diet for now and then may have to consider PEG tube if oral intake is not sufficient for adequate caloric intake  Patient on Pureed diet due to dysphagia. Encouraged to eat more to avoid having to place a PEG tube  Home mirtazapine 7.5mg HS restarted to try to improve appetite- patient reports some improvement - increased to 15 mg today   Appetite continues to improve. Patient eating more today. Had eaten all of his breakfast.  Can re-engage Nutrition and consider adding Ensure supplements, magic cup, etc as diet restrictions tolerate    Seizures (HCC)  Assessment & Plan  New-onset this admission. En route to [facility] ED, patient had 2 witnessed seizures while being transported as stroke alert via EMS. Loaded on Keppra upon arrival to [facility]    Continue Keppra 750mg q12H    Hyperlipidemia  Assessment & Plan  F/u final results of lipid panel, none documented this admission.     Continue home zetia 10mg  Continue  lipitor 80mg     HTN (hypertension)  Assessment & Plan  History of hypertension. Home Anti-HTN meds include metoprolol and entresto, both of which have not been restarted    Plan as reiterated from SAH A/P:  Midodrine 10mg PO TID. Ok to begin weaning midodrine once patient has completed nimodipine  SBP goal normotensive  Nimodopine D/C'd 8/2  Consider adding patient's home metoprolol and entresto    * Subarachnoid hemorrhage (HCC)  Assessment & Plan  Patient was complaining of headache and R leg pain. En route to [facility] ED, patient had 2 witnessed seizures. He was unresponsive when arrived to the ED and was a stroke alert. Patient was intubated and given DDAVP for home ASA usage and also given mannitol. He was started on cardene gtt and Keppra load. EMS called and pt brought to [place] in setting of R MSK pain. CTH showed a large SAH with IVH s/p transfer to [facility] for coil embolization of R PCOM aneurysm and EVD on 7/13. Extubated 7/13. EVD removed 7/26. Course complicated by aspiration pneumonia for which he has already completed abx therapy. Followed on SAH pathway in Neuro ICU since transfer to [facility].     CTH 8/1 showing stable pathology. Patient off nimodipine and no longer receiving TCDs as of 8/2. Patient continues to have a headache, likely 2/2 to SAH. Continuing to monitor for changes in neuro status.    Plan:  Deemed medically stable by NCC team for downgrade to [facility]  CT 8/1-  Status post coiling of right P-comm aneurysm. Stable ventricles. No hydrocephalus. Small volume of residual subarachnoid and intraventricular hemorrhage is mildly decreased. Stable hypodensities in the right temporal lobe, right occipital lobe and right caudate head likely due to evolving subacute infarcts.  Midodrine 10mg PO TID. Ok to begin weaning midodrine now that he is off nimodrine  SBP goal normotensive  Continue ASA 81mg qd  AED ppx: Keppra 750mg q12H  PT/OT/speech evals, CM consult  PMR consult placed and are  following  Continue q4H neuro checks, ok to let sleep at night  Will need neurosurgery referral and outpatient f/u after discharge  Pain regimen for headache/MSK sequelae of SAH:  Gabapentin -[id number]  PRN lidocaine patch;   Fioricet and dilaudid D/C'd  Percocet D/C'd and started on scheduled tylenol and oxycodone PRN  Aqua K started for headache  Nimodipine D/C  TCDs D/C  Patient advised that he can ask for his pain medications more frequently than he has been getting them for his headaches    Fever-resolved as of 7/31/2024  Assessment & Plan  Hospital course complicated by aspiration pneumonia s/p extubation in N-ICU. Completed abx therapy. Otherwise fevers of central origin given SAH and IVH on presentation. Continuing to monitor.     Moderate protein-calorie malnutrition (HCC)-resolved as of 8/2/2024  Assessment & Plan  Malnutrition Findings:   Adult Malnutrition type: Acute illness  Adult Degree of Malnutrition: Other severe protein calorie malnutrition  Malnutrition Characteristics: Fat loss, Muscle loss, Inadequate energy, Weight loss                  360 Statement: Acute severe pro, vinny malnutrition d/t condition as evidence by signficant weight loss, 7/13/24 54kg, 7/19/24 49kg, 5kg/9% wt. loss x <1 week, moderate to severe signs of muscle/ fat loss at temples, orbitals, calvicles, triceps, shoulders, treated with TF.    BMI Findings:          There is no height or weight on file to calculate BMI.           Disposition: Good can approval accepted. [facility] pending.     SUBJECTIVE     Patient seen and examined. No acute events overnight. Today patient states that he has been eating more. His diet was advanced yesterday after seeing speech. He appears to be tolerating the new diet well and has eaten a good amount of his breakfast today. He is still complaining of headache. No change in his headaches from prior exams. Denies chest pain, SOB, abdominal pain, nausea, vomiting, diarrhea, or  fevers.    OBJECTIVE     Vitals:    24 2221 24 0600 24 0720 24 1056   BP: 104/69  122/80 116/74   BP Location:   Right arm Right arm   Pulse: 95  98 90   Resp: 16  20 18   Temp: 98.8 °F (37.1 °C)  98.3 °F (36.8 °C) 98.3 °F (36.8 °C)   TempSrc:   Oral Oral   SpO2: 96%  98% 96%   Weight:  44.7 kg (98 lb 8.7 oz)        Temperature:   Temp (24hrs), Av.4 °F (36.9 °C), Min:98.1 °F (36.7 °C), Max:98.8 °F (37.1 °C)    Temperature: 98.3 °F (36.8 °C)  Intake & Output:  I/O          07 07 07 07 07 07    P.O. 480 810 300    I.V. (mL/kg)       Total Intake(mL/kg) 480 (10.9) 810 (18.1) 300 (6.7)    Urine (mL/kg/hr) 300 (0.3) 1100 (1) 177 (0.6)    Stool 0 0     Total Output 300 1100 177    Net +180 -290 +123           Unmeasured Stool Occurrence 0 x 3 x           Weights:        There is no height or weight on file to calculate BMI.  Weight (last 2 days)       Date/Time Weight    24 0600 44.7 (98.55)    24 0600 44 (97)    24 0600 43.4 (95.6)          Physical Exam  Constitutional:       Appearance: Normal appearance. He is cachectic.   HENT:      Head: Normocephalic.      Mouth/Throat:      Mouth: Mucous membranes are moist.   Eyes:      Extraocular Movements: Extraocular movements intact.      Pupils: Pupils are equal, round, and reactive to light.   Cardiovascular:      Rate and Rhythm: Normal rate and regular rhythm.      Pulses: Normal pulses.      Heart sounds: Normal heart sounds. No murmur heard.     No friction rub. No gallop.   Pulmonary:      Effort: Pulmonary effort is normal. No respiratory distress.      Breath sounds: Normal breath sounds. No stridor. No wheezing, rhonchi or rales.   Abdominal:      General: Abdomen is flat. Bowel sounds are normal. There is no distension.      Palpations: Abdomen is soft.      Tenderness: There is no abdominal tenderness. There is no guarding.   Skin:     Capillary Refill: Capillary  refill takes less than 2 seconds.      Comments: Well-healing surgical site for EVD w/ stitches in place   Neurological:      General: No focal deficit present.      Mental Status: He is alert.   Psychiatric:         Mood and Affect: Mood normal.         Behavior: Behavior normal.       LABORATORY DATA     Labs: I have personally reviewed pertinent reports.  Results from last 7 days   Lab Units 08/06/24  0632 08/05/24  0603 08/04/24  0518   WBC Thousand/uL 8.22 9.24 8.92   HEMOGLOBIN g/dL 10.3* 10.5* 10.6*   HEMATOCRIT % 32.2* 32.8* 32.9*   PLATELETS Thousands/uL 563* 604* 664*   SEGS PCT % 65 66 62   MONO PCT % 4 6 5   EOS PCT % 3 3 3      Results from last 7 days   Lab Units 08/06/24  0632 08/05/24  0603 08/04/24  0518 08/02/24  0547 08/01/24  0515   POTASSIUM mmol/L 4.1 4.6 4.3   < > 4.5   CHLORIDE mmol/L 103 101 101   < > 106   CO2 mmol/L 30 28 31   < > 27   BUN mg/dL 22 21 21   < > 26*   CREATININE mg/dL 0.85 0.86 0.81   < > 0.79   CALCIUM mg/dL 9.3 9.7 9.7   < > 9.4   ALK PHOS U/L  --   --   --   --  159*   ALT U/L  --   --   --   --  27   AST U/L  --   --   --   --  29    < > = values in this interval not displayed.     Results from last 7 days   Lab Units 08/01/24  0515   MAGNESIUM mg/dL 2.4     Results from last 7 days   Lab Units 08/01/24  0515   PHOSPHORUS mg/dL 3.5                    IMAGING & DIAGNOSTIC TESTING     Radiology Results: I have personally reviewed pertinent reports.  CTA head and neck w wo contrast    Result Date: 7/16/2024  Impression: CT Brain: Redemonstrated subarachnoid hemorrhage overlying the right greater than left convexities and concentrated within the right sylvian cistern and fissure. . Subacute infarct involving the right anterior temporal region and medial occipital region.. 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 Workstation performed: [id number]     IR  cerebral angiography / intervention    Result Date: 7/15/2024  Impression: Successful balloon assisted coil embolization of a 4.5 mm right P-comm aneurysm. Workstation performed: [id number]     CT head wo contrast    Result Date: 7/14/2024  Impression: No significant interval change in extensive bilateral subarachnoid hemorrhage and intraventricular hemorrhage. Workstation performed: [id number]     CT head wo contrast    Result Date: 7/13/2024  Impression: Interval placement of right-sided shunt catheter. Stable ventricular size with slight interval increase in intraventricular hemorrhage layering in the occipital horns bilaterally. Diffuse subarachnoid hemorrhage again noted. Workstation performed: [id number]     XR chest portable ICU    Result Date: 7/12/2024  Impression: No acute cardiopulmonary disease. ET tube 6 cm above the landon. Workstation performed: [id number]     CTA stroke alert (head/neck)    Result Date: 7/12/2024  Impression: 0.3 cm aneurysm in expected origin of right posterior communicating artery. Possible tiny aneurysm in left anterior communicating artery region. These could be potential sources of diffuse subarachnoid hemorrhage. Recommend neurovascular surgery consultation for further evaluation. Mild narrowing of bilateral MCA M1 and bilateral M2 segmental branch vessels, likely due to vasospasm. Patent stent in left proximal subclavian artery. Endotracheal tube in trachea. Additional chronic/incidental findings as detailed above. Findings were directly discussed with Emmanuel Lion at approximately 4:34 p.m. on 7/12/2024. Workstation performed: [id number]     CT stroke alert brain    Result Date: 7/12/2024  Impression: Acute diffuse thickened large-volume subarachnoid hemorrhage throughout the bilateral parafalcine regions, bilateral cerebral sulci (right worse than left), basilar cisterns, prepontine cistern, premedullary cistern, and visualized upper cervical spinal canal. Acute small  volume intraventricular hemorrhage with mild hydrocephalus. No acute infarction. Findings were directly discussed with Emmanuel Lion at approximately 4:34 p.m. on 7/12/2024. Workstation performed: [id number]     Other Diagnostic Testing: I have personally reviewed pertinent reports.    ACTIVE MEDICATIONS     Current Facility-Administered Medications   Medication Dose Route Frequency   • acetaminophen (TYLENOL) oral suspension 975 mg  975 mg Oral Q8H   • albuterol (PROVENTIL HFA,VENTOLIN HFA) inhaler 2 puff  2 puff Inhalation Q4H PRN   • aspirin chewable tablet 81 mg  81 mg Oral Daily   • atorvastatin (LIPITOR) tablet 80 mg  80 mg Oral Daily   • bisacodyl (DULCOLAX) rectal suppository 10 mg  10 mg Rectal Daily PRN   • ezetimibe (ZETIA) tablet 10 mg  10 mg Oral QAM   • folic acid (FOLVITE) tablet 400 mcg  400 mcg Oral Daily   • gabapentin (NEURONTIN) capsule 400 mg  400 mg Oral BID   • gabapentin (NEURONTIN) capsule 600 mg  600 mg Oral HS   • heparin (porcine) subcutaneous injection 5,000 Units  5,000 Units Subcutaneous Q8H MATTY   • levETIRAcetam (KEPPRA) oral solution 750 mg  750 mg Oral Q12H MATTY   • lidocaine (LIDODERM) 5 % patch 1 patch  1 patch Topical Daily   • lidocaine (LIDODERM) 5 % patch 1 patch  1 patch Topical Daily PRN   • midodrine (PROAMATINE) tablet 10 mg  10 mg Oral TID AC   • mirtazapine (REMERON) tablet 15 mg  15 mg Oral HS   • omeprazole (PRILOSEC) suspension 2 mg/mL  20 mg Oral Daily Before Breakfast   • ondansetron (ZOFRAN) injection 4 mg  4 mg Intravenous Q6H PRN   • oxyCODONE (ROXICODONE) split tablet 2.5 mg  2.5 mg Oral Q4H PRN    Or   • oxyCODONE (ROXICODONE) IR tablet 5 mg  5 mg Oral Q4H PRN   • saliva substitute (MOUTH KOTE) mucosal solution 5 spray  5 spray Mouth/Throat 4x Daily PRN   • thiamine tablet 100 mg  100 mg Oral Daily       VTE Pharmacologic Prophylaxis: Heparin  VTE Mechanical Prophylaxis: sequential compression device    Portions of the record may have been created with voice  "recognition software.  Occasional wrong word or \"sound a like\" substitutions may have occurred due to the inherent limitations of voice recognition software.  Read the chart carefully and recognize, using context, where substitutions have occurred.  ==  Sean Christianson MD  [facility]  Internal Medicine Residency PGY-1      "

## 2024-08-08 PROCEDURE — NC001 PR NO CHARGE: Performed by: STUDENT IN AN ORGANIZED HEALTH CARE EDUCATION/TRAINING PROGRAM

## 2024-08-08 PROCEDURE — 92526 ORAL FUNCTION THERAPY: CPT

## 2024-08-08 RX ADMIN — ACETAMINOPHEN 975 MG: 650 SUSPENSION ORAL at 06:12

## 2024-08-08 RX ADMIN — MIDODRINE HYDROCHLORIDE 10 MG: 5 TABLET ORAL at 12:03

## 2024-08-08 RX ADMIN — LEVETIRACETAM 750 MG: 100 SOLUTION ORAL at 20:43

## 2024-08-08 RX ADMIN — GABAPENTIN 400 MG: 300 CAPSULE ORAL at 08:08

## 2024-08-08 RX ADMIN — OXYCODONE HYDROCHLORIDE 5 MG: 5 TABLET ORAL at 13:58

## 2024-08-08 RX ADMIN — ASPIRIN 81 MG CHEWABLE TABLET 81 MG: 81 TABLET CHEWABLE at 08:08

## 2024-08-08 RX ADMIN — ATORVASTATIN CALCIUM 80 MG: 80 TABLET, FILM COATED ORAL at 08:08

## 2024-08-08 RX ADMIN — GABAPENTIN 400 MG: 300 CAPSULE ORAL at 17:32

## 2024-08-08 RX ADMIN — THIAMINE HCL TAB 100 MG 100 MG: 100 TAB at 08:08

## 2024-08-08 RX ADMIN — Medication 20 MG: at 06:12

## 2024-08-08 RX ADMIN — MIDODRINE HYDROCHLORIDE 10 MG: 5 TABLET ORAL at 17:32

## 2024-08-08 RX ADMIN — OXYCODONE HYDROCHLORIDE 5 MG: 5 TABLET ORAL at 08:08

## 2024-08-08 RX ADMIN — HEPARIN SODIUM 5000 UNITS: 5000 INJECTION INTRAVENOUS; SUBCUTANEOUS at 21:29

## 2024-08-08 RX ADMIN — MIDODRINE HYDROCHLORIDE 10 MG: 5 TABLET ORAL at 06:12

## 2024-08-08 RX ADMIN — EZETIMIBE 10 MG: 10 TABLET ORAL at 08:08

## 2024-08-08 RX ADMIN — MIRTAZAPINE 15 MG: 15 TABLET, FILM COATED ORAL at 21:29

## 2024-08-08 RX ADMIN — FOLIC ACID TAB 400 MCG 400 MCG: 400 TAB at 08:08

## 2024-08-08 RX ADMIN — ACETAMINOPHEN 975 MG: 650 SUSPENSION ORAL at 21:29

## 2024-08-08 RX ADMIN — HEPARIN SODIUM 5000 UNITS: 5000 INJECTION INTRAVENOUS; SUBCUTANEOUS at 06:12

## 2024-08-08 RX ADMIN — HEPARIN SODIUM 5000 UNITS: 5000 INJECTION INTRAVENOUS; SUBCUTANEOUS at 14:04

## 2024-08-08 RX ADMIN — GABAPENTIN 600 MG: 300 CAPSULE ORAL at 21:29

## 2024-08-08 RX ADMIN — ACETAMINOPHEN 975 MG: 650 SUSPENSION ORAL at 13:58

## 2024-08-08 RX ADMIN — LIDOCAINE 5% 1 PATCH: 700 PATCH TOPICAL at 08:08

## 2024-08-08 RX ADMIN — LEVETIRACETAM 750 MG: 100 SOLUTION ORAL at 08:09

## 2024-08-08 NOTE — SPEECH THERAPY NOTE
Speech-Language Pathology Progress Note    Patient Name: Federico Bowen    Today's Date: 8/8/2024    Subjective:  Pt was awake and alert. He was sitting upright in bed. Assistance offered for set up of lunch tray, and pt was then able to feed himself.    Objective:  Pt was seen today for dysphagia therapy. Current diet is dysphagia 3 dental soft with thin liquids. Pt was on room air. Focus of today's session was determine potential for diet texture advancement. [facility] soft solid, regular solid, and thin liquid via cup.  Swallow function:   Bolus retrieval and control were adequate.  Mastication and bolus formation  were  slow but functional . Pharyngeal swallow initiation was prompt. Hyolaryngeal excursion was present. No s/s aspiration occurred during session today.    Assessment:  Swallow function is adequate for advancement to regular with thin liquids.     Plan:  Change diet texture to regular. Continue thin liquids. No additional ST f/u needed at this time. Please re consult with any new changes or concerns.

## 2024-08-08 NOTE — CASE MANAGEMENT
[facility] received request for authorization from Care Manager.  Authorization request submitted for: Acute Rehab  Facility Name: [facility]  NPI:[id number]  Facility MD:  Dr. Kuhn    NPI: [id number]  Authorization initiated by contacting insurance:  [employer]   Via: [facility]   Clinicals submitted via Portal attachment   Pending Reference #:[id number]     Care Manager notified: giuliano mane    Updates to authorization status will be noted in chart. Please reach out to CM for updates on any clinical information.

## 2024-08-08 NOTE — PROGRESS NOTES
INTERNAL MEDICINE RESIDENCY PROGRESS NOTE     Name: Federico Bowen   Age & Sex: 68 y.o. male   MRN: [id number]  Unit/Bed#: [facility] 732-01   Encounter: [id number]  Team: SOD Team A    PATIENT INFORMATION     Name: Federico Bowen   Age & Sex: 68 y.o. male   MRN: [id number]  Hospital Stay Days: 27    ASSESSMENT/PLAN     Principal Problem:    Subarachnoid hemorrhage (HCC)  Active Problems:    HTN (hypertension)    Hyperlipidemia    Seizures (HCC)    Severe protein-calorie malnutrition (HCC)    Goals of care, counseling/discussion    Palliative care encounter    Coronary artery disease    COPD (chronic obstructive pulmonary disease) ([facility])    Dysphagia as late effect of cerebrovascular disease      Dysphagia as late effect of cerebrovascular disease  Assessment & Plan  -SAH 7/12  -Modified barium swallow 7/29- mild-moderate dysphagia   - regular texture w/ thin liquid diet in place after speech therapy evaluation. Patient eating better    COPD (chronic obstructive pulmonary disease) ([facility])  Assessment & Plan  Documented h/o of COPD although no PFTs on file    Plan:  No concerns for exacerbation at this time  Continue prn albuterol    Coronary artery disease  Assessment & Plan  Patient with little documentation in medical record but appears to have non-obstructive CAD with history of unstable angina component and previously was on nitro prn and Ranexa. Home medications noted metoprolol and Entresto however last echo found to be in 2015 with EF 77% and most recent echo of 55% on 7/13/24 and thus Entresto was not restarted prior to downgrade from N-ICU.    Per patient, he has a history of a stent that was placed, but he is unable to provide more details.    Plan:  Continue ASA 81mg qd  Recommend for patient to re-establish care with Cardiology after discharge  Home metoprolol held given concurrent nimodipine and stable HR. Unclear if patient was taking prior to admission  Similarly as above, Brilinta noted as home  medication on admission. Currently not continued in setting of SAH this admission. Unable to find documented history of VIRA, outpatient f/u with cardiology.     Severe protein-calorie malnutrition (HCC)  Assessment & Plan  Malnutrition Findings:   Adult Malnutrition type: Acute illness  Adult Degree of Malnutrition: Other severe protein calorie malnutrition  Malnutrition Characteristics: Fat loss, Muscle loss, Inadequate energy, Weight loss                  360 Statement: Acute severe pro, vinny malnutrition d/t condition as evidence by signficant weight loss, 7/13/24 54kg, 7/19/24 49kg, 5kg/9% wt. loss x <1 week, moderate to severe signs of muscle/ fat loss at temples, orbitals, calvicles, triceps, shoulders, treated with TF.    BMI Findings:           There is no height or weight on file to calculate BMI.     Patient found to be eating more s/p mirtazapine.     Plan:  Nutrition consult placed earlier 7/18 while patient required tube feeds  Calorie count in place - met 74% and 60% of caloric and protein needs, respectively (slowly improving)  Trialing oral diet for now and then may have to consider PEG tube if oral intake is not sufficient for adequate caloric intake  Patient on dysphagia 3 diet due to dysphagia. Encouraged to eat more to avoid having to place a PEG tube  Home mirtazapine 7.5mg increased to 15 mg - patient endorse improvement in his appetite  Appetite continues to improve. Patient eating more today. Had eaten all of his breakfast.  Can re-engage Nutrition and consider adding Ensure supplements, magic cup, etc as diet restrictions tolerate    Seizures (HCC)  Assessment & Plan  New-onset this admission. En route to [facility] ED, patient had 2 witnessed seizures while being transported as stroke alert via EMS. Loaded on Keppra upon arrival to [facility]    Continue Keppra 750mg q12H    Hyperlipidemia  Assessment & Plan  F/u final results of lipid panel, none documented this admission.     Continue home  zetia 10mg  Continue lipitor 80mg     HTN (hypertension)  Assessment & Plan  History of hypertension. Home Anti-HTN meds include metoprolol and entresto, both of which have not been restarted    Plan as reiterated from SAH A/P:  Midodrine 10mg PO TID. Ok to begin weaning midodrine once patient has completed nimodipine  SBP goal normotensive  Nimodopine D/C'd 8/2  Consider adding patient's home metoprolol and entresto    * Subarachnoid hemorrhage (HCC)  Assessment & Plan  Patient was complaining of headache and R leg pain. En route to [facility] ED, patient had 2 witnessed seizures. He was unresponsive when arrived to the ED and was a stroke alert. Patient was intubated and given DDAVP for home ASA usage and also given mannitol. He was started on cardene gtt and Keppra load. EMS called and pt brought to [place] in setting of R MSK pain. CTH showed a large SAH with IVH s/p transfer to [facility] for coil embolization of R PCOM aneurysm and EVD on 7/13. Extubated 7/13. EVD removed 7/26. Course complicated by aspiration pneumonia for which he has already completed abx therapy. Followed on SAH pathway in Neuro ICU since transfer to [facility].     CTH 8/1 showing stable pathology. Patient off nimodipine and no longer receiving TCDs as of 8/2. Patient continues to have a headache, likely 2/2 to SAH. Continuing to monitor for changes in neuro status.    Plan:  Deemed medically stable by NCC team for downgrade to [facility] 8/1-  Status post coiling of right P-comm aneurysm. Stable ventricles. No hydrocephalus. Small volume of residual subarachnoid and intraventricular hemorrhage is mildly decreased. Stable hypodensities in the right temporal lobe, right occipital lobe and right caudate head likely due to evolving subacute infarcts.  Midodrine 10mg PO TID. Ok to begin weaning midodrine now that he is off nimodrine  SBP goal normotensive  Continue ASA 81mg qd  AED ppx: Keppra 750mg q12H  PT/OT/speech evals, CM consult  PMR consult placed  and are following  Continue q4H neuro checks, ok to let sleep at night  Will need neurosurgery referral and outpatient f/u after discharge  Pain regimen for headache/MSK sequelae of SAH:  Gabapentin -[phone]  PRN lidocaine patch;   Fioricet and dilaudid D/C'd  Percocet D/C'd and started on scheduled tylenol and oxycodone PRN  Aqua K started for headache - patient states that it has been helping  Patient's daughter states that he sometimes gets headaches from caffeine withdrawal- restarted caffeine in his diet  Nimodipine D/C  TCDs D/C  Patient advised that he can ask for his pain medications more frequently than he has been getting them for his headaches    Fever-resolved as of 7/31/2024  Assessment & Plan  Hospital course complicated by aspiration pneumonia s/p extubation in N-ICU. Completed abx therapy. Otherwise fevers of central origin given SAH and IVH on presentation. Continuing to monitor.     Moderate protein-calorie malnutrition (HCC)-resolved as of 8/2/2024  Assessment & Plan  Malnutrition Findings:   Adult Malnutrition type: Acute illness  Adult Degree of Malnutrition: Other severe protein calorie malnutrition  Malnutrition Characteristics: Fat loss, Muscle loss, Inadequate energy, Weight loss                  360 Statement: Acute severe pro, vinny malnutrition d/t condition as evidence by signficant weight loss, 7/13/24 54kg, 7/19/24 49kg, 5kg/9% wt. loss x <1 week, moderate to severe signs of muscle/ fat loss at temples, orbitals, calvicles, triceps, shoulders, treated with TF.    BMI Findings:          There is no height or weight on file to calculate BMI.           Disposition: Good [place] rehab pending authorization     SUBJECTIVE     Patient seen and examined. No acute events overnight. Patient states that he currently has generalized pain all over his body. He has not taken his pain medications yet today. Denies chest pain, SOB, abdominal pain, fevers, nausea, vomiting, or  diarrhea.    OBJECTIVE     Vitals:    24 2201 24 0216 24 0752 24 1518   BP: 115/75 117/77 118/76 (!) 142/120   BP Location:       Pulse: 92 86 98 84   Resp:     Temp: 98.6 °F (37 °C) 98.5 °F (36.9 °C) 98.2 °F (36.8 °C) 99.1 °F (37.3 °C)   TempSrc:       SpO2: 97% 97% 98% 98%   Weight:          Temperature:   Temp (24hrs), Av.6 °F (37 °C), Min:98.2 °F (36.8 °C), Max:99.1 °F (37.3 °C)    Temperature: 99.1 °F (37.3 °C)  Intake & Output:  I/O          07 07 07 07 07 0700    P.O. 810 300     Total Intake(mL/kg) 810 (18.1) 300 (6.7)     Urine (mL/kg/hr) 1100 (1) 177 (0.2) 300 (0.8)    Stool 0      Total Output 1100 177 300    Net -290 +123 -300           Unmeasured Stool Occurrence 3 x            Weights:        There is no height or weight on file to calculate BMI.  Weight (last 2 days)       Date/Time Weight    24 0600 44.7 (98.55)    24 0600 44 (97)          Physical Exam  Vitals and nursing note reviewed.   Constitutional:       General: He is not in acute distress.     Appearance: He is well-developed. He is cachectic.   HENT:      Head: Normocephalic and atraumatic.   Eyes:      Conjunctiva/sclera: Conjunctivae normal.   Cardiovascular:      Rate and Rhythm: Normal rate and regular rhythm.      Pulses: Normal pulses.      Heart sounds: Normal heart sounds. No murmur heard.     No friction rub. No gallop.   Pulmonary:      Effort: Pulmonary effort is normal. No respiratory distress.      Breath sounds: Normal breath sounds. No wheezing or rales.   Abdominal:      General: Abdomen is flat. Bowel sounds are normal. There is no distension.      Palpations: Abdomen is soft.      Tenderness: There is no abdominal tenderness. There is no guarding.   Musculoskeletal:         General: No swelling.      Cervical back: Neck supple.   Skin:     General: Skin is warm and dry.      Capillary Refill: Capillary refill takes less than 2  seconds.      Comments: EVD site w/ dried blood and stitches. Appears well-healing   Neurological:      Mental Status: He is alert.   Psychiatric:         Mood and Affect: Mood normal.       LABORATORY DATA     Labs: I have personally reviewed pertinent reports.  Results from last 7 days   Lab Units 08/06/24  0632 08/05/24  0603 08/04/24  0518   WBC Thousand/uL 8.22 9.24 8.92   HEMOGLOBIN g/dL 10.3* 10.5* 10.6*   HEMATOCRIT % 32.2* 32.8* 32.9*   PLATELETS Thousands/uL 563* 604* 664*   SEGS PCT % 65 66 62   MONO PCT % 4 6 5   EOS PCT % 3 3 3      Results from last 7 days   Lab Units 08/06/24  0632 08/05/24  0603 08/04/24  0518   POTASSIUM mmol/L 4.1 4.6 4.3   CHLORIDE mmol/L 103 101 101   CO2 mmol/L 30 28 31   BUN mg/dL 22 21 21   CREATININE mg/dL 0.85 0.86 0.81   CALCIUM mg/dL 9.3 9.7 9.7                            IMAGING & DIAGNOSTIC TESTING     Radiology Results: I have personally reviewed pertinent reports.  CTA head and neck w wo contrast    Result Date: 7/16/2024  Impression: CT Brain: Redemonstrated subarachnoid hemorrhage overlying the right greater than left convexities and concentrated within the right sylvian cistern and fissure. . Subacute infarct involving the right anterior temporal region and medial occipital region.. 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 Workstation performed: [id number]     IR cerebral angiography / intervention    Result Date: 7/15/2024  Impression: Successful balloon assisted coil embolization of a 4.5 mm right P-comm aneurysm. Workstation performed: [id number]     CT head wo contrast    Result Date: 7/14/2024  Impression: No significant interval change in extensive bilateral subarachnoid hemorrhage and intraventricular hemorrhage. Workstation performed: [id number]     CT head wo contrast    Result Date: 7/13/2024  Impression: Interval placement of  right-sided shunt catheter. Stable ventricular size with slight interval increase in intraventricular hemorrhage layering in the occipital horns bilaterally. Diffuse subarachnoid hemorrhage again noted. Workstation performed: [id number]     XR chest portable ICU    Result Date: 7/12/2024  Impression: No acute cardiopulmonary disease. ET tube 6 cm above the landon. Workstation performed: [id number]     CTA stroke alert (head/neck)    Result Date: 7/12/2024  Impression: 0.3 cm aneurysm in expected origin of right posterior communicating artery. Possible tiny aneurysm in left anterior communicating artery region. These could be potential sources of diffuse subarachnoid hemorrhage. Recommend neurovascular surgery consultation for further evaluation. Mild narrowing of bilateral MCA M1 and bilateral M2 segmental branch vessels, likely due to vasospasm. Patent stent in left proximal subclavian artery. Endotracheal tube in trachea. Additional chronic/incidental findings as detailed above. Findings were directly discussed with Emmanuel Lion at approximately 4:34 p.m. on 7/12/2024. Workstation performed: [id number]     CT stroke alert brain    Result Date: 7/12/2024  Impression: Acute diffuse thickened large-volume subarachnoid hemorrhage throughout the bilateral parafalcine regions, bilateral cerebral sulci (right worse than left), basilar cisterns, prepontine cistern, premedullary cistern, and visualized upper cervical spinal canal. Acute small volume intraventricular hemorrhage with mild hydrocephalus. No acute infarction. Findings were directly discussed with Emmanuel Lion at approximately 4:34 p.m. on 7/12/2024. Workstation performed: [id number]     Other Diagnostic Testing: I have personally reviewed pertinent reports.    ACTIVE MEDICATIONS     Current Facility-Administered Medications   Medication Dose Route Frequency    acetaminophen (TYLENOL) oral suspension 975 mg  975 mg Oral Q8H    albuterol (PROVENTIL HFA,VENTOLIN  "HFA) inhaler 2 puff  2 puff Inhalation Q4H PRN    aspirin chewable tablet 81 mg  81 mg Oral Daily    atorvastatin (LIPITOR) tablet 80 mg  80 mg Oral Daily    bisacodyl (DULCOLAX) rectal suppository 10 mg  10 mg Rectal Daily PRN    ezetimibe (ZETIA) tablet 10 mg  10 mg Oral QAM    folic acid (FOLVITE) tablet 400 mcg  400 mcg Oral Daily    gabapentin (NEURONTIN) capsule 400 mg  400 mg Oral BID    gabapentin (NEURONTIN) capsule 600 mg  600 mg Oral HS    heparin (porcine) subcutaneous injection 5,000 Units  5,000 Units Subcutaneous Q8H MATTY    levETIRAcetam (KEPPRA) oral solution 750 mg  750 mg Oral Q12H MATTY    lidocaine (LIDODERM) 5 % patch 1 patch  1 patch Topical Daily    lidocaine (LIDODERM) 5 % patch 1 patch  1 patch Topical Daily PRN    midodrine (PROAMATINE) tablet 10 mg  10 mg Oral TID AC    mirtazapine (REMERON) tablet 15 mg  15 mg Oral HS    omeprazole (PRILOSEC) suspension 2 mg/mL  20 mg Oral Daily Before Breakfast    ondansetron (ZOFRAN) injection 4 mg  4 mg Intravenous Q6H PRN    oxyCODONE (ROXICODONE) split tablet 2.5 mg  2.5 mg Oral Q4H PRN    Or    oxyCODONE (ROXICODONE) IR tablet 5 mg  5 mg Oral Q4H PRN    saliva substitute (MOUTH KOTE) mucosal solution 5 spray  5 spray Mouth/Throat 4x Daily PRN    thiamine tablet 100 mg  100 mg Oral Daily       VTE Pharmacologic Prophylaxis: Heparin  VTE Mechanical Prophylaxis: sequential compression device    Portions of the record may have been created with voice recognition software.  Occasional wrong word or \"sound a like\" substitutions may have occurred due to the inherent limitations of voice recognition software.  Read the chart carefully and recognize, using context, where substitutions have occurred.  ==  Sean Christianson MD  [facility]  Internal Medicine Residency PGY-1      "

## 2024-08-08 NOTE — UTILIZATION REVIEW
Continued Stay Review    Date: 8/8/24                          Current Patient Class: Inpatient  Current Level of Care:  med surg    HPI:68 y.o. male initially admitted on  7/12/24 with  [facility]    Assessment/Plan:   8/8   Continue  PT/OT.   Continue  calorie  count.  Complains of  generalized pain  over entire body.  Not  had any pain meds thus far today.  Monitor labs.  Plan rehab at  d/c.   Continue current meds.    Vital Signs (last 3 days)       Date/Time Temp Pulse Resp BP MAP (mmHg) SpO2 O2 Device Patient Position - Orthostatic VS [place] Coma Scale Score Pain    08/08/24 15:18:08 99.1 °F (37.3 °C) 84 16 142/120 127 98 % -- -- -- --    08/08/24 1358 -- -- -- -- -- -- -- -- -- 10 - Worst Possible Pain    08/08/24 1300 -- -- -- -- -- -- -- -- -- No Pain    08/08/24 0752 98.2 °F (36.8 °C) 98 19 118/76 90 98 % -- -- -- --    08/08/24 0612 -- -- -- -- -- -- -- -- -- No Pain    08/08/24 0400 -- -- -- -- -- -- -- -- 14 --    08/08/24 0216 98.5 °F (36.9 °C) 86 16 117/77 90 97 % -- -- -- --    08/07/24 22:01:31 98.6 °F (37 °C) 92 -- 115/75 88 97 % -- -- -- --    08/07/24 2154 -- -- -- -- -- -- -- -- -- No Pain    08/07/24 1919 -- -- -- -- -- -- -- -- 14 --    08/07/24 16:13:51 -- 90 -- 100/64 76 98 % -- -- -- --    08/07/24 1530 -- -- -- -- -- -- -- -- 14 10 - Worst Possible Pain    08/07/24 1500 -- -- -- -- -- -- -- -- -- 10 - Worst Possible Pain    08/07/24 14:52:34 98 °F (36.7 °C) 97 12 113/69 84 99 % None (Room air) Lying -- --    08/07/24 1430 -- -- -- -- -- -- -- -- -- 10 - Worst Possible Pain    08/07/24 14:08:46 -- 95 -- 112/69 83 99 % -- -- -- --    08/07/24 1100 -- -- -- -- -- -- -- -- 15 No Pain    08/07/24 10:56:02 98.3 °F (36.8 °C) 90 18 116/74 88 96 % -- Lying -- --    08/07/24 07:20:30 98.3 °F (36.8 °C) 98 20 122/80 94 98 % None (Room air) Lying -- 10 - Worst Possible Pain    08/07/24 0642 -- -- -- -- -- -- -- -- -- 10 - Worst Possible Pain    08/07/24 0418 -- -- -- -- -- -- -- -- -- 10 - Worst Possible  Pain    08/07/24 0300 -- -- -- -- -- -- None (Room air) -- 15 10 - Worst Possible Pain    08/07/24 0217 -- -- -- -- -- -- -- -- -- 10 - Worst Possible Pain    08/06/24 2310 -- -- -- -- -- -- -- -- 15 --    08/06/24 22:21:13 98.8 °F (37.1 °C) 95 16 104/69 81 96 % -- -- -- --    08/06/24 2107 -- -- -- -- -- -- -- -- -- 10 - Worst Possible Pain    08/06/24 1900 -- -- -- -- -- -- -- -- 15 --    08/06/24 1612 -- -- -- -- -- -- -- -- -- 10 - Worst Possible Pain    08/06/24 1600 -- -- -- -- -- -- -- -- 15 --    08/06/24 14:42:08 98.1 °F (36.7 °C) 101 12 107/72 84 96 % None (Room air) Sitting -- --    08/06/24 1200 -- -- -- -- -- -- -- -- 15 --    08/06/24 0800 -- -- -- -- -- -- -- -- 15 No Pain    08/06/24 0722 -- -- -- -- -- -- -- -- -- 10 - Worst Possible Pain    08/05/24 23:44:32 97.8 °F (36.6 °C) 98 16 107/74 85 96 % None (Room air) Lying -- No Pain    08/05/24 2004 -- -- -- -- -- -- -- -- -- 10 - Worst Possible Pain    08/05/24 2000 -- -- -- -- -- 97 % None (Room air) -- -- 10 - Worst Possible Pain    08/05/24 15:34:33 -- 90 16 103/73 83 97 % None (Room air) Lying -- --    08/05/24 1445 -- -- -- -- -- -- -- -- -- 10 - Worst Possible Pain    08/05/24 0920 -- -- -- -- -- -- -- -- -- 10 - Worst Possible Pain    08/05/24 09:09:38 -- 104 -- 107/74 85 96 % -- -- -- --    08/05/24 07:14:44 97.5 °F (36.4 °C) 97 18 112/74 87 96 % None (Room air) Lying -- --    08/05/24 0400 -- -- -- -- -- -- -- -- 15 --    08/05/24 03:18:19 98.1 °F (36.7 °C) 97 -- 113/75 88 96 % -- -- -- --    08/05/24 0000 -- -- -- -- -- -- -- -- 15 --          Weight (last 2 days)       Date/Time Weight    08/07/24 0600 44.7 (98.55)    08/06/24 0600 44 (97)              Pertinent Labs/Diagnostic Results:   Radiology:  VAS transcranial doppler, complete study   Final Interpretation by Kameron Mistry MD (08/01 2646)      CT head wo contrast   Final Interpretation by E. Alec Schoenberger, MD (08/01 0952)   Status post coiling of right P-comm aneurysm    Stable ventricles. No hydrocephalus.   Small volume of residual subarachnoid and intraventricular hemorrhage is mildly decreased.   Stable hypodensities in the right temporal lobe, right occipital lobe and right caudate head likely due to evolving subacute infarcts.                  Workstation performed: [id number]         VAS transcranial doppler, complete study   Final Interpretation by John Conner DO (07/31 1431)      VAS transcranial doppler, complete study   Final Interpretation by Mick Last MD (07/30 1923)      FL barium swallow video w speech   Final Interpretation by KARLI CORREA (07/29 1438)      VAS transcranial doppler, complete study   Final Interpretation by Mick Last MD (07/29 1311)      VAS transcranial doppler, complete study   Final Interpretation by Mick Last MD (07/29 0742)      VAS transcranial doppler, complete study   Final Interpretation by Monisha Sanford DO (07/27 1633)      CTA head w wo contrast   Final Interpretation by Vahid Rodriguez DO (07/27 0813)      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 No residual aneurysmal filling identified.      Small volume subarachnoid hemorrhage persists in the posterior aspects of the sylvian fissures and layering in the occipital horns of the lateral ventricles.      Low-density right temporal lobe likely represents evolving infarct.      No CTA findings suspicious for vasospasm.                  Workstation performed: [id number]         VAS transcranial doppler, complete study   Final Interpretation by Rhoda Justin MD (07/26 1005)      CTA head w wo contrast   Final Interpretation by Chrissy Moseley MD (07/26 0839)         1. Improved subarachnoid hemorrhage and intraventricular hemorrhage as described above. Ventricles are unchanged in size.   2. Stable areas of low-attenuation involving the right temporal lobe and right  occipital lobe which represent evolving infarcts.   3. Nondiagnostic CT angiogram of the head due to the extensive motion artifact. Postoperative changes again administrated from prior coil embolization of a right posterior communicating artery aneurysm..      Workstation performed: [id number]         VAS transcranial doppler, complete study   Final Interpretation by Rhoda Justin MD (07/25 2049)      VAS transcranial doppler, complete study   Final Interpretation by Rhoda Justin MD (07/25 2025)      XR chest portable ICU   Final Interpretation by Mike Juárez MD (07/24 0848)      Retrocardiac consolidation and lobulated right lung density, both of which are new. Findings probably represent bilateral pneumonia. Some degree of atelectasis may also be present.      Suspected small left effusion            Workstation performed: [id number]         FL barium swallow video w speech   Final Interpretation by KARLI CORREA (07/23 1521)      VAS transcranial doppler, complete study   Final Interpretation by Monisha Sanford DO (07/23 1614)      CTA head and neck w wo contrast   Final Interpretation by Celia Daigle MD (07/23 0654)      CT Brain:   Improving subarachnoid and intraventricular hemorrhage.      Unchanged mild hydrocephalus. EVD in stable position.      Unchanged anterior right temporal edema, likely evolving infarct.      CT Angiography: No evidence of residual or recurrent right P-comm aneurysm. No acute vascular pathology in the head or neck.                     Workstation performed: [id number]         VAS transcranial doppler, complete study   Final Interpretation by Monisha Sanford DO (07/22 1934)      VAS transcranial doppler, complete study   Final Interpretation by Angelina Kennedy MD (07/21 1712)      XR chest portable ICU   Final Interpretation by Collins Prater MD (07/22 0537)      No acute cardiopulmonary disease.            Workstation performed: [id number]          VAS transcranial doppler, complete study   Final Interpretation by Angelina Kennedy MD (07/20 1834)      VAS transcranial doppler, complete study   Final Interpretation by Angelina Kennedy MD (07/20 1834)      XR chest portable ICU   Final Interpretation by Mark Singh MD (07/19 1418)      No acute cardiopulmonary disease.      Endotracheal tube tip near the landon.  Consider retraction and repeat radiograph.         Workstation performed: [id number]         VAS transcranial doppler, complete study   Final Interpretation by Kameron Mistry MD (07/18 1634)      FL barium swallow video w speech   Final Interpretation by KARLI CORREA (07/17 1258)      VAS transcranial doppler, complete study   Final Interpretation by Rhoda Justin MD (07/18 0055)      CTA head and neck w wo contrast   Final Interpretation by Noa Batista MD (07/16 2208)      CT Brain: Redemonstrated subarachnoid hemorrhage overlying the right greater than left convexities and concentrated within the right sylvian cistern and fissure. .      Subacute infarct involving the right anterior temporal region and medial occipital region..      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                  Workstation performed: [id number]         VAS transcranial doppler, complete study   Final Interpretation by John Conner DO (07/16 2239)      VAS transcranial doppler, complete study   Final Interpretation by Angelina Kennedy MD (07/15 1726)      VAS transcranial doppler, complete study   Final Interpretation by Mick Last MD (07/14 5936)      CT head wo contrast   Final Interpretation by Gm Huff MD (07/14 3573)      No significant interval change in extensive bilateral subarachnoid hemorrhage and intraventricular hemorrhage.               Workstation performed: [id number]         IR  cerebral angiography / intervention   Final Interpretation by Ruslan Mcneal MD (07/15 1436)      Successful balloon assisted coil embolization of a 4.5 mm right P-comm aneurysm.               Workstation performed: [id number]         CT head wo contrast   Final Interpretation by Vahid Rodriguez DO (07/13 0855)      Interval placement of right-sided shunt catheter. Stable ventricular size with slight interval increase in intraventricular hemorrhage layering in the occipital horns bilaterally. Diffuse subarachnoid hemorrhage again noted.                  Workstation performed: [id number]         XR chest portable ICU   Final Interpretation by Mamta Jaeger MD (07/12 2217)      No acute cardiopulmonary disease.      ET tube 6 cm above the landon.      Workstation performed: [id number]         MRA head w wo contrast    (Results Pending)     Cardiology:  ECG 12 lead   Final Result by Shlomo Barrera MD (07/28 2012)   Sinus rhythm with short ME   Minimal voltage criteria for LVH, may be normal variant   Borderline ECG   When compared with ECG of 28-JUL-2024 17:05,   Questionable change in QRS axis   Confirmed by Shlomo Barrera ([id number]) on 7/28/2024 8:12:29 PM      ECG 12 lead   Final Result by Shlomo Barrera MD (07/28 1728)   Normal sinus rhythm   Normal ECG   When compared with ECG of 28-JUL-2024 08:46,   No significant change was found   Confirmed by Shlomo Barrera ([id number]) on 7/28/2024 5:28:03 PM      ECG 12 lead   Final Result by Shlomo Barrera MD (07/28 1048)   Normal sinus rhythm   Normal ECG      Confirmed by Shlomo Barrera ([id number]) on 7/28/2024 10:48:09 AM      ECG 12 lead   Final Result by Mat Mobley MD (07/19 1634)   Sinus tachycardia   Poor anterior R wave progression is noted   Abnormal ECG   When compared with ECG of 18-JUL-2024 07:56,   Premature ectopic complexes are no longer Present   Confirmed by Mat Mobley ([id number]) on 7/19/2024 4:34:24 PM      ECG  12 lead   Final Result by Farnaz Chacon DO (07/18 1632)   Sinus tachycardia with occasional Premature ectopic complexes   Poor anterior R wave progression is noted   When compared with ECG of 14-JUL-2024 17:50,   Premature ectopic complexes are now Present   Confirmed by Farnaz Chacon ([id number]) on 7/18/2024 4:32:33 PM      ECG 12 lead   Final Result by Mat Mobley MD (07/15 2902)   Normal sinus rhythm   Poor anterior R wave progression is noted   Abnormal ECG   No previous ECGs available   Confirmed by Mat Mobley ([id number]) on 7/15/2024 11:08:37 PM      Echo complete w/ contrast if indicated   Final Result by Dmitry Sanchez MD (07/13 8624)        Technically difficult study     Left Ventricle: Left ventricular cavity size is normal. Wall thickness    is normal. The left ventricular ejection fraction is 55% by visual    estimation. Systolic function is normal.     Right Ventricle: Right ventricular cavity size is normal. Systolic    function is normal.     Mitral Valve: There is mild regurgitation.                 Results from last 7 days   Lab Units 08/06/24  0632 08/05/24  0603 08/04/24  0518 08/03/24  0500 08/02/24  0547   WBC Thousand/uL 8.22 9.24 8.92 10.73* 12.85*   HEMOGLOBIN g/dL 10.3* 10.5* 10.6* 10.5* 10.3*   HEMATOCRIT % 32.2* 32.8* 32.9* 32.3* 31.8*   PLATELETS Thousands/uL 563* 604* 664* 701* 716*   TOTAL NEUT ABS Thousands/µL 5.33 6.16 5.63 7.47 8.79*         Results from last 7 days   Lab Units 08/06/24  0632 08/05/24  0603 08/04/24  0518 08/03/24  0500 08/02/24  0547   SODIUM mmol/L 141 140 140 140 140   POTASSIUM mmol/L 4.1 4.6 4.3 4.3 4.5   CHLORIDE mmol/L 103 101 101 102 105   CO2 mmol/L 30 28 31 30 26   ANION GAP mmol/L 8 11 8 8 9   BUN mg/dL 22 21 21 20 23   CREATININE mg/dL 0.85 0.86 0.81 0.82 0.74   EGFR ml/min/1.73sq m 88 88 90 90 94   CALCIUM mg/dL 9.3 9.7 9.7 9.6 9.3         Results from last 7 days   Lab Units 08/07/24  1116 08/07/24  1047 08/07/24  0718 08/06/24  2038 08/06/24  1630  08/06/24  1107 08/06/24  0639 08/05/24  2134 08/05/24  2116 08/05/24  1601 08/05/24  1059 08/05/24  0637   POC GLUCOSE mg/dl 126 84 151* 135 122 132 163* 131 134 191* 143* 143*     Results from last 7 days   Lab Units 08/06/24  0632 08/05/24  0603 08/04/24  0518 08/03/24  0500 08/02/24  0547   GLUCOSE RANDOM mg/dL 138 95 104 101 109                 Medications:   Scheduled Medications:  acetaminophen, 975 mg, Oral, Q8H  aspirin, 81 mg, Oral, Daily  atorvastatin, 80 mg, Oral, Daily  ezetimibe, 10 mg, Oral, QAM  folic acid, 400 mcg, Oral, Daily  gabapentin, 400 mg, Oral, BID  gabapentin, 600 mg, Oral, HS  heparin (porcine), 5,000 Units, Subcutaneous, Q8H MATTY  levETIRAcetam, 750 mg, Oral, Q12H MATTY  lidocaine, 1 patch, Topical, Daily  midodrine, 10 mg, Oral, TID AC  mirtazapine, 15 mg, Oral, HS  omeprazole (PRILOSEC) suspension 2 mg/mL, 20 mg, Oral, Daily Before Breakfast  thiamine, 100 mg, Oral, Daily      Continuous IV Infusions:     PRN Meds:  albuterol, 2 puff, Inhalation, Q4H PRN  bisacodyl, 10 mg, Rectal, Daily PRN  lidocaine, 1 patch, Topical, Daily PRN  ondansetron, 4 mg, Intravenous, Q6H PRN  oxyCODONE, 2.5 mg, Oral, Q4H PRN   Or  oxyCODONE, 5 mg, Oral, Q4H PRN  saliva substitute, 5 spray, Mouth/Throat, 4x Daily PRN        Discharge Plan:   D    Network Utilization Review Department  ATTENTION: Please call with any questions or concerns to [phone] and carefully listen to the prompts so that you are directed to the right person. All voicemails are confidential.   For Discharge needs, contact Care Management DC Support Team at [phone] opt. [phone]  Send all requests for admission clinical reviews, approved or denied determinations and any other requests to dedicated fax number below belonging to the campus where the patient is receiving treatment. List of dedicated fax numbers for the Facilities:  FACILITY NAME UR FAX NUMBER   ADMISSION DENIALS (Administrative/Medical Necessity) [phone]   DISCHARGE  SUPPORT TEAM (NETWORK) [phone]   PARENT CHILD HEALTH (Maternity/NICU/Pediatrics) [phone]   [facility] [phone]   [facility] [phone]   [facility] [phone]   [facility] [phone]   [facility] [phone]   [facility] [phone]   [facility] [phone]   [facility] [phone]   [facility] [phone]   [facility] [phone]   [facility] [phone]   [facility] [phone]

## 2024-08-08 NOTE — PLAN OF CARE
Problem: Neurological Deficit  Goal: Neurological status is stable or improving  Description: Interventions:  - Monitor and assess patient's level of consciousness, motor function, sensory function, and level of assistance needed for ADLs.   - Monitor and report changes from baseline. Collaborate with interdisciplinary team to initiate plan and implement interventions as ordered.   - Provide and maintain a safe environment.  - Consider seizure precautions.  - Consider fall precautions.  - Consider aspiration precautions.  - Consider bleeding precautions.  Outcome: Progressing     Problem: Potential for Aspiration  Goal: Non-ventilated patient's risk of aspiration is minimized  Description: Assess and monitor vital signs, respiratory status, and labs (WBC).  Monitor for signs of aspiration (tachypnea, cough, rales, wheezing, cyanosis, fever).    - Assess and monitor patient's ability to swallow.  - Place patient up in chair to eat if possible.  - HOB up at 90 degrees to eat if unable to get patient up into chair.  - Supervise patient during oral intake.   - Instruct patient/ family to take small bites.  - Instruct patient/ family to take small single sips when taking liquids.  - Follow patient-specific strategies generated by speech pathologist.  Outcome: Progressing     Problem: Nutrition  Goal: Nutrition/Hydration status is improving  Description: Monitor and assess patient's nutrition/hydration status for malnutrition (ex- brittle hair, bruises, dry skin, pale skin and conjunctiva, muscle wasting, smooth red tongue, and disorientation). Collaborate with interdisciplinary team and initiate plan and interventions as ordered.  Monitor patient's weight and dietary intake as ordered or per policy. Utilize nutrition screening tool and intervene per policy. Determine patient's food preferences and provide high-protein, high-caloric foods as appropriate.   - Monitor nutrition parameters, recommending adjustments to  enteral nutrition orders at indicated.   - Assist patient with eating.  - Allow adequate time for meals.  - Encourage patient to take dietary supplement as ordered.  - Collaborate with interdisciplinary team.   - Include patient/family/caregiver in decisions related to nutrition.  Outcome: Progressing     Problem: PAIN - ADULT  Goal: Verbalizes/displays adequate comfort level or baseline comfort level  Description: Interventions:  - Encourage patient to monitor pain and request assistance  - Assess pain using appropriate pain scale  - Administer analgesics based on type and severity of pain and evaluate response  - Implement non-pharmacological measures as appropriate and evaluate response  - Consider cultural and social influences on pain and pain management  - Notify physician/advanced practitioner if interventions unsuccessful or patient reports new pain  Outcome: Progressing

## 2024-08-08 NOTE — PLAN OF CARE
Problem: Neurological Deficit  Goal: Neurological status is stable or improving  Description: Interventions:  - Monitor and assess patient's level of consciousness, motor function, sensory function, and level of assistance needed for ADLs.   - Monitor and report changes from baseline. Collaborate with interdisciplinary team to initiate plan and implement interventions as ordered.   - Provide and maintain a safe environment.  - Consider seizure precautions.  - Consider fall precautions.  - Consider aspiration precautions.  - Consider bleeding precautions.  Outcome: Progressing     Problem: Activity Intolerance/Impaired Mobility  Goal: Mobility/activity is maintained at optimum level for patient  Description: Interventions:  - Assess and monitor patient  barriers to mobility and need for assistive/adaptive devices.  - Assess patient's emotional response to limitations.  - Collaborate with interdisciplinary team and initiate plans and interventions as ordered.  - Encourage independent activity per ability.  - Maintain proper body alignment.  - Perform active/passive rom as tolerated/ordered.  - Plan activities to conserve energy.  - Turn patient as appropriate  Outcome: Progressing     Problem: Communication Impairment  Goal: Ability to express needs and understand communication  Description: Assess patient's communication skills and ability to understand information.  Patient will demonstrate use of effective communication techniques, alternative methods of communication and understanding even if not able to speak.     - Encourage communication and provide alternate methods of communication as needed.  - Collaborate with case management/ for discharge needs.  - Include patient/family/caregiver in decisions related to communication.  Outcome: Progressing     Problem: PAIN - ADULT  Goal: Verbalizes/displays adequate comfort level or baseline comfort level  Description: Interventions:  - Encourage  patient to monitor pain and request assistance  - Assess pain using appropriate pain scale  - Administer analgesics based on type and severity of pain and evaluate response  - Implement non-pharmacological measures as appropriate and evaluate response  - Consider cultural and social influences on pain and pain management  - Notify physician/advanced practitioner if interventions unsuccessful or patient reports new pain  Outcome: Progressing

## 2024-08-09 PROCEDURE — 97535 SELF CARE MNGMENT TRAINING: CPT

## 2024-08-09 PROCEDURE — 99232 SBSQ HOSP IP/OBS MODERATE 35: CPT | Performed by: STUDENT IN AN ORGANIZED HEALTH CARE EDUCATION/TRAINING PROGRAM

## 2024-08-09 RX ADMIN — Medication 20 MG: at 10:36

## 2024-08-09 RX ADMIN — MIDODRINE HYDROCHLORIDE 10 MG: 5 TABLET ORAL at 06:41

## 2024-08-09 RX ADMIN — MIDODRINE HYDROCHLORIDE 10 MG: 5 TABLET ORAL at 16:31

## 2024-08-09 RX ADMIN — THIAMINE HCL TAB 100 MG 100 MG: 100 TAB at 08:25

## 2024-08-09 RX ADMIN — OXYCODONE HYDROCHLORIDE 5 MG: 5 TABLET ORAL at 08:28

## 2024-08-09 RX ADMIN — HEPARIN SODIUM 5000 UNITS: 5000 INJECTION INTRAVENOUS; SUBCUTANEOUS at 06:41

## 2024-08-09 RX ADMIN — GABAPENTIN 400 MG: 300 CAPSULE ORAL at 08:24

## 2024-08-09 RX ADMIN — LEVETIRACETAM 750 MG: 100 SOLUTION ORAL at 08:24

## 2024-08-09 RX ADMIN — LEVETIRACETAM 750 MG: 100 SOLUTION ORAL at 22:15

## 2024-08-09 RX ADMIN — OXYCODONE HYDROCHLORIDE 5 MG: 5 TABLET ORAL at 13:23

## 2024-08-09 RX ADMIN — GABAPENTIN 400 MG: 300 CAPSULE ORAL at 17:01

## 2024-08-09 RX ADMIN — HEPARIN SODIUM 5000 UNITS: 5000 INJECTION INTRAVENOUS; SUBCUTANEOUS at 22:14

## 2024-08-09 RX ADMIN — ASPIRIN 81 MG CHEWABLE TABLET 81 MG: 81 TABLET CHEWABLE at 08:24

## 2024-08-09 RX ADMIN — ATORVASTATIN CALCIUM 80 MG: 80 TABLET, FILM COATED ORAL at 08:25

## 2024-08-09 RX ADMIN — GABAPENTIN 600 MG: 300 CAPSULE ORAL at 22:14

## 2024-08-09 RX ADMIN — ACETAMINOPHEN 975 MG: 650 SUSPENSION ORAL at 06:41

## 2024-08-09 RX ADMIN — MIRTAZAPINE 15 MG: 15 TABLET, FILM COATED ORAL at 22:14

## 2024-08-09 RX ADMIN — HEPARIN SODIUM 5000 UNITS: 5000 INJECTION INTRAVENOUS; SUBCUTANEOUS at 13:23

## 2024-08-09 RX ADMIN — ALBUTEROL SULFATE 2 PUFF: 90 AEROSOL, METERED RESPIRATORY (INHALATION) at 18:10

## 2024-08-09 RX ADMIN — MIDODRINE HYDROCHLORIDE 10 MG: 5 TABLET ORAL at 10:36

## 2024-08-09 RX ADMIN — ACETAMINOPHEN 975 MG: 650 SUSPENSION ORAL at 22:14

## 2024-08-09 RX ADMIN — ACETAMINOPHEN 975 MG: 650 SUSPENSION ORAL at 14:52

## 2024-08-09 RX ADMIN — LIDOCAINE 5% 1 PATCH: 700 PATCH TOPICAL at 08:25

## 2024-08-09 RX ADMIN — EZETIMIBE 10 MG: 10 TABLET ORAL at 08:24

## 2024-08-09 RX ADMIN — FOLIC ACID TAB 400 MCG 400 MCG: 400 TAB at 08:25

## 2024-08-09 NOTE — PLAN OF CARE
Problem: Prexisting or High Potential for Compromised Skin Integrity  Goal: Skin integrity is maintained or improved  Description: INTERVENTIONS:  - Identify patients at risk for skin breakdown  - Assess and monitor skin integrity  - Assess and monitor nutrition and hydration status  - Monitor labs   - Assess for incontinence   - Turn and reposition patient  - Assist with mobility/ambulation  - Relieve pressure over bony prominences  - Avoid friction and shearing  - Provide appropriate hygiene as needed including keeping skin clean and dry  - Evaluate need for skin moisturizer/barrier cream  - Collaborate with interdisciplinary team   - Patient/family teaching  - Consider wound care consult   Outcome: Progressing     Problem: Neurological Deficit  Goal: Neurological status is stable or improving  Description: Interventions:  - Monitor and assess patient's level of consciousness, motor function, sensory function, and level of assistance needed for ADLs.   - Monitor and report changes from baseline. Collaborate with interdisciplinary team to initiate plan and implement interventions as ordered.   - Provide and maintain a safe environment.  - Consider seizure precautions.  - Consider fall precautions.  - Consider aspiration precautions.  - Consider bleeding precautions.  Outcome: Progressing     Problem: Activity Intolerance/Impaired Mobility  Goal: Mobility/activity is maintained at optimum level for patient  Description: Interventions:  - Assess and monitor patient  barriers to mobility and need for assistive/adaptive devices.  - Assess patient's emotional response to limitations.  - Collaborate with interdisciplinary team and initiate plans and interventions as ordered.  - Encourage independent activity per ability.  - Maintain proper body alignment.  - Perform active/passive rom as tolerated/ordered.  - Plan activities to conserve energy.  - Turn patient as appropriate  Outcome: Progressing     Problem:  Communication Impairment  Goal: Ability to express needs and understand communication  Description: Assess patient's communication skills and ability to understand information.  Patient will demonstrate use of effective communication techniques, alternative methods of communication and understanding even if not able to speak.     - Encourage communication and provide alternate methods of communication as needed.  - Collaborate with case management/ for discharge needs.  - Include patient/family/caregiver in decisions related to communication.  Outcome: Progressing     Problem: Potential for Aspiration  Goal: Non-ventilated patient's risk of aspiration is minimized  Description: Assess and monitor vital signs, respiratory status, and labs (WBC).  Monitor for signs of aspiration (tachypnea, cough, rales, wheezing, cyanosis, fever).    - Assess and monitor patient's ability to swallow.  - Place patient up in chair to eat if possible.  - HOB up at 90 degrees to eat if unable to get patient up into chair.  - Supervise patient during oral intake.   - Instruct patient/ family to take small bites.  - Instruct patient/ family to take small single sips when taking liquids.  - Follow patient-specific strategies generated by speech pathologist.  Outcome: Progressing     Problem: Nutrition  Goal: Nutrition/Hydration status is improving  Description: Monitor and assess patient's nutrition/hydration status for malnutrition (ex- brittle hair, bruises, dry skin, pale skin and conjunctiva, muscle wasting, smooth red tongue, and disorientation). Collaborate with interdisciplinary team and initiate plan and interventions as ordered.  Monitor patient's weight and dietary intake as ordered or per policy. Utilize nutrition screening tool and intervene per policy. Determine patient's food preferences and provide high-protein, high-caloric foods as appropriate.   - Monitor nutrition parameters, recommending adjustments to  enteral nutrition orders at indicated.   - Assist patient with eating.  - Allow adequate time for meals.  - Encourage patient to take dietary supplement as ordered.  - Collaborate with interdisciplinary team.   - Include patient/family/caregiver in decisions related to nutrition.  Outcome: Progressing     Problem: PAIN - ADULT  Goal: Verbalizes/displays adequate comfort level or baseline comfort level  Description: Interventions:  - Encourage patient to monitor pain and request assistance  - Assess pain using appropriate pain scale  - Administer analgesics based on type and severity of pain and evaluate response  - Implement non-pharmacological measures as appropriate and evaluate response  - Consider cultural and social influences on pain and pain management  - Notify physician/advanced practitioner if interventions unsuccessful or patient reports new pain  Outcome: Progressing     Problem: INFECTION - ADULT  Goal: Absence or prevention of progression during hospitalization  Description: INTERVENTIONS:  - Assess and monitor for signs and symptoms of infection  - Monitor lab/diagnostic results  - Monitor all insertion sites, i.e. indwelling lines, tubes, and drains  - Monitor endotracheal if appropriate and nasal secretions for changes in amount and color  - [place] appropriate cooling/warming therapies per order  - Administer medications as ordered  - Instruct and encourage patient and family to use good hand hygiene technique  - Identify and instruct in appropriate isolation precautions for identified infection/condition  Outcome: Progressing  Goal: Absence of fever/infection during neutropenic period  Description: INTERVENTIONS:  - Monitor WBC    Outcome: Progressing     Problem: SAFETY ADULT  Goal: Patient will remain free of falls  Description: INTERVENTIONS:  - Educate patient/family on patient safety including physical limitations  - Instruct patient to call for assistance with activity   - Consult OT/PT to  assist with strengthening/mobility   - Keep Call bell within reach  - Keep bed low and locked with side rails adjusted as appropriate  - Keep care items and personal belongings within reach  - Initiate and maintain comfort rounds  - Make Fall Risk Sign visible to staff  - Offer Toileting every 2 Hours, in advance of need  - Initiate/Maintain bed alarm  - Obtain necessary fall risk management equipment: non skid footwear  - Apply yellow socks and bracelet for high fall risk patients  - Consider moving patient to room near nurses station  Outcome: Progressing  Goal: Maintain or return to baseline ADL function  Description: INTERVENTIONS:  -  Assess patient's ability to carry out ADLs; assess patient's baseline for ADL function and identify physical deficits which impact ability to perform ADLs (bathing, care of mouth/teeth, toileting, grooming, dressing, etc.)  - Assess/evaluate cause of self-care deficits   - Assess range of motion  - Assess patient's mobility; develop plan if impaired  - Assess patient's need for assistive devices and provide as appropriate  - Encourage maximum independence but intervene and supervise when necessary  - Involve family in performance of ADLs  - Assess for home care needs following discharge   - Consider OT consult to assist with ADL evaluation and planning for discharge  - Provide patient education as appropriate  Outcome: Progressing  Goal: Maintains/Returns to pre admission functional level  Description: INTERVENTIONS:  - Perform AM-PAC 6 Click Basic Mobility/ Daily Activity assessment daily.  - Set and communicate daily mobility goal to care team and patient/family/caregiver.   - Collaborate with rehabilitation services on mobility goals if consulted  - Perform Range of Motion 3 times a day.  - Reposition patient every 2 hours.  - Dangle patient 3 times a day  - Stand patient 3 times a day  - Out of bed to chair 1 times a day   - Out of bed for meals 1 times a day  - Out of bed for  toileting  - Record patient progress and toleration of activity level   Outcome: Progressing     Problem: NEUROSENSORY - ADULT  Goal: Achieves stable or improved neurological status  Description: INTERVENTIONS  - Monitor and report changes in neurological status  - Monitor vital signs such as temperature, blood pressure, glucose, and any other labs ordered   - Initiate measures to prevent increased intracranial pressure  - Monitor for seizure activity and implement precautions if appropriate      Outcome: Progressing  Goal: Remains free of injury related to seizures activity  Description: INTERVENTIONS  - Maintain airway, patient safety  and administer oxygen as ordered  - Monitor patient for seizure activity, document and report duration and description of seizure to physician/advanced practitioner  - If seizure occurs,  ensure patient safety during seizure  - Reorient patient post seizure  - Seizure pads on all 4 side rails  - Instruct patient/family to notify RN of any seizure activity including if an aura is experienced  - Instruct patient/family to call for assistance with activity based on nursing assessment  - Administer anti-seizure medications if ordered    Outcome: Progressing  Goal: Achieves maximal functionality and self care  Description: INTERVENTIONS  - Monitor swallowing and airway patency with patient fatigue and changes in neurological status  - Encourage and assist patient to increase activity and self care.   - Encourage visually impaired, hearing impaired and aphasic patients to use assistive/communication devices  Outcome: Progressing     Problem: CARDIOVASCULAR - ADULT  Goal: Maintains optimal cardiac output and hemodynamic stability  Description: INTERVENTIONS:  - Monitor I/O, vital signs and rhythm  - Monitor for S/S and trends of decreased cardiac output  - Administer and titrate ordered vasoactive medications to optimize hemodynamic stability  - Assess quality of pulses, skin color and  temperature  - Assess for signs of decreased coronary artery perfusion  - Instruct patient to report change in severity of symptoms  Outcome: Progressing  Goal: Absence of cardiac dysrhythmias or at baseline rhythm  Description: INTERVENTIONS:  - Continuous cardiac monitoring, vital signs, obtain 12 lead EKG if ordered  - Administer antiarrhythmic and heart rate control medications as ordered  - Monitor electrolytes and administer replacement therapy as ordered  Outcome: Progressing     Problem: RESPIRATORY - ADULT  Goal: Achieves optimal ventilation and oxygenation  Description: INTERVENTIONS:  - Assess for changes in respiratory status  - Assess for changes in mentation and behavior  - Position to facilitate oxygenation and minimize respiratory effort  - Oxygen administered by appropriate delivery if ordered  - Initiate smoking cessation education as indicated  - Encourage broncho-pulmonary hygiene including cough, deep breathe, Incentive Spirometry  - Assess the need for suctioning and aspirate as needed  - Assess and instruct to report SOB or any respiratory difficulty  - Respiratory Therapy support as indicated  Outcome: Progressing

## 2024-08-09 NOTE — QUICK NOTE
Attempted to call and update patient's daughter about current treatment course and pending status of insurance authorization, though was unable to get through.  Will plan to offer repeat update tomorrow.

## 2024-08-09 NOTE — PROGRESS NOTES
INTERNAL MEDICINE RESIDENCY PROGRESS NOTE     Name: Federico Bowen   Age & Sex: 68 y.o. male   MRN: [id number]  Unit/Bed#: [facility] 732-01   Encounter: [id number]  Team: SOD Team A    PATIENT INFORMATION     Name: Federico Bowen   Age & Sex: 68 y.o. male   MRN: [id number]  Hospital Stay Days: 28    ASSESSMENT/PLAN     Principal Problem:    Subarachnoid hemorrhage (HCC)  Active Problems:    HTN (hypertension)    Hyperlipidemia    Seizures (HCC)    Severe protein-calorie malnutrition (HCC)    Goals of care, counseling/discussion    Palliative care encounter    Coronary artery disease    COPD (chronic obstructive pulmonary disease) ([facility])    Dysphagia as late effect of cerebrovascular disease      Dysphagia as late effect of cerebrovascular disease  Assessment & Plan  -SAH 7/12  -Modified barium swallow 7/29- mild-moderate dysphagia   - regular texture w/ thin liquid diet in place after speech therapy evaluation. Patient eating better    COPD (chronic obstructive pulmonary disease) ([facility])  Assessment & Plan  Documented h/o of COPD although no PFTs on file    Plan:  No concerns for exacerbation at this time  Continue prn albuterol    Coronary artery disease  Assessment & Plan  Patient with little documentation in medical record but appears to have non-obstructive CAD with history of unstable angina component and previously was on nitro prn and Ranexa. Home medications noted metoprolol and Entresto however last echo found to be in 2015 with EF 77% and most recent echo of 55% on 7/13/24 and thus Entresto was not restarted prior to downgrade from N-ICU.    Per patient, he has a history of a stent that was placed, but he is unable to provide more details.    Plan:  Continue ASA 81mg qd  Recommend for patient to re-establish care with Cardiology after discharge  Home metoprolol held given concurrent nimodipine and stable HR. Unclear if patient was taking prior to admission  Similarly as above, Brilinta noted as home  medication on admission. Currently not continued in setting of SAH this admission. Unable to find documented history of VIRA, outpatient f/u with cardiology.     Severe protein-calorie malnutrition (HCC)  Assessment & Plan  Malnutrition Findings:   Adult Malnutrition type: Acute illness  Adult Degree of Malnutrition: Other severe protein calorie malnutrition  Malnutrition Characteristics: Fat loss, Muscle loss, Inadequate energy, Weight loss                  360 Statement: Acute severe pro, vinny malnutrition d/t condition as evidence by signficant weight loss, 7/13/24 54kg, 7/19/24 49kg, 5kg/9% wt. loss x <1 week, moderate to severe signs of muscle/ fat loss at temples, orbitals, calvicles, triceps, shoulders, treated with TF.    BMI Findings:           There is no height or weight on file to calculate BMI.     Patient found to be eating more s/p mirtazapine.     Plan:  Nutrition consult placed earlier 7/18 while patient required tube feeds  Calorie count in place - met 74% and 60% of caloric and protein needs, respectively (slowly improving)  Trialing oral diet for now and then may have to consider PEG tube if oral intake is not sufficient for adequate caloric intake  Patient on dysphagia 3 diet due to dysphagia. Encouraged to eat more to avoid having to place a PEG tube  Home mirtazapine 7.5mg increased to 15 mg - patient endorse improvement in his appetite  Appetite continues to improve. Patient eating more today. Had eaten all of his breakfast.  Can re-engage Nutrition and consider adding Ensure supplements, magic cup, etc as diet restrictions tolerate    Seizures (HCC)  Assessment & Plan  New-onset this admission. En route to [facility] ED, patient had 2 witnessed seizures while being transported as stroke alert via EMS. Loaded on Keppra upon arrival to [facility]    Continue Keppra 750mg q12H    Hyperlipidemia  Assessment & Plan  F/u final results of lipid panel, none documented this admission.     Continue home  zetia 10mg  Continue lipitor 80mg     HTN (hypertension)  Assessment & Plan  History of hypertension. Home Anti-HTN meds include metoprolol and entresto, both of which have not been restarted    Plan as reiterated from SAH A/P:  Midodrine 10mg PO TID. Ok to begin weaning midodrine once patient has completed nimodipine  SBP goal normotensive  Nimodopine D/C'd 8/2  Consider adding patient's home metoprolol and entresto    * Subarachnoid hemorrhage (HCC)  Assessment & Plan  Patient was complaining of headache and R leg pain. En route to [facility] ED, patient had 2 witnessed seizures. He was unresponsive when arrived to the ED and was a stroke alert. Patient was intubated and given DDAVP for home ASA usage and also given mannitol. He was started on cardene gtt and Keppra load. EMS called and pt brought to [place] in setting of R MSK pain. CTH showed a large SAH with IVH s/p transfer to [facility] for coil embolization of R PCOM aneurysm and EVD on 7/13. Extubated 7/13. EVD removed 7/26. Course complicated by aspiration pneumonia for which he has already completed abx therapy. Followed on SAH pathway in Neuro ICU since transfer to [facility].     CTH 8/1 showing stable pathology. Patient off nimodipine and no longer receiving TCDs as of 8/2. Patient continues to have a headache, likely 2/2 to SAH. Continuing to monitor for changes in neuro status.    Plan:  Deemed medically stable by NCC team for downgrade to [facility] 8/1-  Status post coiling of right P-comm aneurysm. Stable ventricles. No hydrocephalus. Small volume of residual subarachnoid and intraventricular hemorrhage is mildly decreased. Stable hypodensities in the right temporal lobe, right occipital lobe and right caudate head likely due to evolving subacute infarcts.  Midodrine 10mg PO TID. Ok to begin weaning midodrine now that he is off nimodrine  SBP goal normotensive  Continue ASA 81mg qd  AED ppx: Keppra 750mg q12H  PT/OT/speech evals, CM consult  PMR consult placed  and are following  Continue q4H neuro checks, ok to let sleep at night  Will need neurosurgery referral and outpatient f/u after discharge  Pain regimen for headache/MSK sequelae of SAH:  Gabapentin -[id number]  PRN lidocaine patch;   Fioricet and dilaudid D/C'd  Percocet D/C'd and started on scheduled tylenol and oxycodone PRN  Aqua K started for headache - patient states that it has been helping  Patient's daughter states that he sometimes gets headaches from caffeine withdrawal- restarted caffeine in his diet  Nimodipine D/C  TCDs D/C  Patient advised that he can ask for his pain medications more frequently than he has been getting them for his headaches    Fever-resolved as of 7/31/2024  Assessment & Plan  Hospital course complicated by aspiration pneumonia s/p extubation in N-ICU. Completed abx therapy. Otherwise fevers of central origin given SAH and IVH on presentation. Continuing to monitor.     Moderate protein-calorie malnutrition (HCC)-resolved as of 8/2/2024  Assessment & Plan  Malnutrition Findings:   Adult Malnutrition type: Acute illness  Adult Degree of Malnutrition: Other severe protein calorie malnutrition  Malnutrition Characteristics: Fat loss, Muscle loss, Inadequate energy, Weight loss                  360 Statement: Acute severe pro, vinny malnutrition d/t condition as evidence by signficant weight loss, 7/13/24 54kg, 7/19/24 49kg, 5kg/9% wt. loss x <1 week, moderate to severe signs of muscle/ fat loss at temples, orbitals, calvicles, triceps, shoulders, treated with TF.    BMI Findings:          There is no height or weight on file to calculate BMI.           Disposition: Inpatient pending insurance auth for [facility]    SUBJECTIVE     Patient seen and examined. No acute events overnight.  This morning, patient continues to endorse generalized pain.  He also reports feeling cold, though notes that he intermittently feels exceedingly warm as well.  He denies any fever/chills, chest pain,  shortness of breath, abdominal pain, or other symptoms at present.    OBJECTIVE     Vitals:    24 2153 24 0714 24 0800 24 1538   BP: 108/70 106/68  107/67   BP Location: Right arm Right arm     Pulse: 85 90  96   Resp: 16 17     Temp: 99.7 °F (37.6 °C)  98.1 °F (36.7 °C) 98.1 °F (36.7 °C)   TempSrc: Oral  Oral    SpO2: 98% 98%  96%   Weight:          Temperature:   Temp (24hrs), Av.6 °F (37 °C), Min:98.1 °F (36.7 °C), Max:99.7 °F (37.6 °C)    Temperature: 98.1 °F (36.7 °C)  Intake & Output:  I/O          07 07 0701   07 0701  08/10 0700    P.O. 300      Total Intake(mL/kg) 300 (6.7)      Urine (mL/kg/hr) 177 (0.2) 300 (0.3) 350 (0.9)    Stool   0    Total Output 177 300 350    Net +123 -300 -350           Unmeasured Stool Occurrence   1 x          Weights:        There is no height or weight on file to calculate BMI.  Weight (last 2 days)       Date/Time Weight    24 0600 44.7 (98.55)          Physical Exam  Vitals reviewed.   Constitutional:       General: He is not in acute distress.     Appearance: He is cachectic. He is not ill-appearing.   HENT:      Right Ear: External ear normal.      Left Ear: External ear normal.      Mouth/Throat:      Mouth: Mucous membranes are dry.      Pharynx: Oropharynx is clear.   Eyes:      Extraocular Movements: Extraocular movements intact.      Conjunctiva/sclera: Conjunctivae normal.      Pupils: Pupils are equal, round, and reactive to light.   Cardiovascular:      Rate and Rhythm: Normal rate and regular rhythm.      Heart sounds: Normal heart sounds. No murmur heard.  Pulmonary:      Effort: Pulmonary effort is normal. No respiratory distress.      Breath sounds: Normal breath sounds.   Abdominal:      General: Abdomen is flat. Bowel sounds are normal. There is no distension.      Palpations: Abdomen is soft.      Tenderness: There is no abdominal tenderness.   Musculoskeletal:      Right lower leg: No  edema.      Left lower leg: No edema.   Skin:     General: Skin is warm and dry.   Neurological:      Mental Status: He is alert. Mental status is at baseline.      Comments: A&O x 2   Psychiatric:         Mood and Affect: Mood normal.         Behavior: Behavior normal.       LABORATORY DATA     Labs: I have personally reviewed pertinent reports.  Results from last 7 days   Lab Units 08/06/24  0632 08/05/24  0603 08/04/24  0518   WBC Thousand/uL 8.22 9.24 8.92   HEMOGLOBIN g/dL 10.3* 10.5* 10.6*   HEMATOCRIT % 32.2* 32.8* 32.9*   PLATELETS Thousands/uL 563* 604* 664*   SEGS PCT % 65 66 62   MONO PCT % 4 6 5   EOS PCT % 3 3 3      Results from last 7 days   Lab Units 08/06/24  0632 08/05/24  0603 08/04/24  0518   POTASSIUM mmol/L 4.1 4.6 4.3   CHLORIDE mmol/L 103 101 101   CO2 mmol/L 30 28 31   BUN mg/dL 22 21 21   CREATININE mg/dL 0.85 0.86 0.81   CALCIUM mg/dL 9.3 9.7 9.7                            IMAGING & DIAGNOSTIC TESTING     Radiology Results: I have personally reviewed pertinent reports.  CTA head and neck w wo contrast    Result Date: 7/16/2024  Impression: CT Brain: Redemonstrated subarachnoid hemorrhage overlying the right greater than left convexities and concentrated within the right sylvian cistern and fissure. . Subacute infarct involving the right anterior temporal region and medial occipital region.. 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 Workstation performed: [id number]     IR cerebral angiography / intervention    Result Date: 7/15/2024  Impression: Successful balloon assisted coil embolization of a 4.5 mm right P-comm aneurysm. Workstation performed: [id number]     CT head wo contrast    Result Date: 7/14/2024  Impression: No significant interval change in extensive bilateral subarachnoid hemorrhage and intraventricular hemorrhage. Workstation performed: [id number]      CT head wo contrast    Result Date: 7/13/2024  Impression: Interval placement of right-sided shunt catheter. Stable ventricular size with slight interval increase in intraventricular hemorrhage layering in the occipital horns bilaterally. Diffuse subarachnoid hemorrhage again noted. Workstation performed: [id number]     XR chest portable ICU    Result Date: 7/12/2024  Impression: No acute cardiopulmonary disease. ET tube 6 cm above the landon. Workstation performed: [id number]     CTA stroke alert (head/neck)    Result Date: 7/12/2024  Impression: 0.3 cm aneurysm in expected origin of right posterior communicating artery. Possible tiny aneurysm in left anterior communicating artery region. These could be potential sources of diffuse subarachnoid hemorrhage. Recommend neurovascular surgery consultation for further evaluation. Mild narrowing of bilateral MCA M1 and bilateral M2 segmental branch vessels, likely due to vasospasm. Patent stent in left proximal subclavian artery. Endotracheal tube in trachea. Additional chronic/incidental findings as detailed above. Findings were directly discussed with Emmanuel Lion at approximately 4:34 p.m. on 7/12/2024. Workstation performed: [id number]     CT stroke alert brain    Result Date: 7/12/2024  Impression: Acute diffuse thickened large-volume subarachnoid hemorrhage throughout the bilateral parafalcine regions, bilateral cerebral sulci (right worse than left), basilar cisterns, prepontine cistern, premedullary cistern, and visualized upper cervical spinal canal. Acute small volume intraventricular hemorrhage with mild hydrocephalus. No acute infarction. Findings were directly discussed with Emmanuel Lion at approximately 4:34 p.m. on 7/12/2024. Workstation performed: [id number]     Other Diagnostic Testing: I have personally reviewed pertinent reports.    ACTIVE MEDICATIONS     Current Facility-Administered Medications   Medication Dose Route Frequency    acetaminophen  "(TYLENOL) oral suspension 975 mg  975 mg Oral Q8H    albuterol (PROVENTIL HFA,VENTOLIN HFA) inhaler 2 puff  2 puff Inhalation Q4H PRN    aspirin chewable tablet 81 mg  81 mg Oral Daily    atorvastatin (LIPITOR) tablet 80 mg  80 mg Oral Daily    bisacodyl (DULCOLAX) rectal suppository 10 mg  10 mg Rectal Daily PRN    ezetimibe (ZETIA) tablet 10 mg  10 mg Oral QAM    folic acid (FOLVITE) tablet 400 mcg  400 mcg Oral Daily    gabapentin (NEURONTIN) capsule 400 mg  400 mg Oral BID    gabapentin (NEURONTIN) capsule 600 mg  600 mg Oral HS    heparin (porcine) subcutaneous injection 5,000 Units  5,000 Units Subcutaneous Q8H MATTY    levETIRAcetam (KEPPRA) oral solution 750 mg  750 mg Oral Q12H MTATY    lidocaine (LIDODERM) 5 % patch 1 patch  1 patch Topical Daily    lidocaine (LIDODERM) 5 % patch 1 patch  1 patch Topical Daily PRN    midodrine (PROAMATINE) tablet 10 mg  10 mg Oral TID AC    mirtazapine (REMERON) tablet 15 mg  15 mg Oral HS    omeprazole (PRILOSEC) suspension 2 mg/mL  20 mg Oral Daily Before Breakfast    ondansetron (ZOFRAN) injection 4 mg  4 mg Intravenous Q6H PRN    oxyCODONE (ROXICODONE) split tablet 2.5 mg  2.5 mg Oral Q4H PRN    Or    oxyCODONE (ROXICODONE) IR tablet 5 mg  5 mg Oral Q4H PRN    saliva substitute (MOUTH KOTE) mucosal solution 5 spray  5 spray Mouth/Throat 4x Daily PRN    thiamine tablet 100 mg  100 mg Oral Daily       VTE Pharmacologic Prophylaxis: Heparin  VTE Mechanical Prophylaxis: sequential compression device    Portions of the record may have been created with voice recognition software.  Occasional wrong word or \"sound a like\" substitutions may have occurred due to the inherent limitations of voice recognition software.  Read the chart carefully and recognize, using context, where substitutions have occurred.  ==  Sean Christianson MD  [facility]  Internal Medicine Residency PGY-1       "

## 2024-08-09 NOTE — OCCUPATIONAL THERAPY NOTE
Occupational Therapy Progress Note     Patient Name: Federico Bowen  Today's Date: 8/9/2024  Problem List  Principal Problem:    Subarachnoid hemorrhage (HCC)  Active Problems:    HTN (hypertension)    Hyperlipidemia    Seizures (HCC)    Severe protein-calorie malnutrition (HCC)    Goals of care, counseling/discussion    Palliative care encounter    Coronary artery disease    COPD (chronic obstructive pulmonary disease) (HCC)    Dysphagia as late effect of cerebrovascular disease            08/09/24 1405   OT Last Visit   OT Visit Date 08/09/24   Note Type   Note Type Treatment   Pain Assessment   Pain Assessment Tool 0-10   Pain Score No Pain   Restrictions/Precautions   Weight Bearing Precautions Per Order No   Other Precautions Fall Risk;Multiple lines;Cognitive;Bed Alarm;Impulsive   Lifestyle   Autonomy I w/ ADLS/IADLS, transfers and functional mobility PTA   Reciprocal Relationships Pt lives w/ his dght   Service to Others retired   Intrinsic Gratification gardening and spending time w/ his grandkids   ADL   Grooming Assistance 5  Supervision/Setup   Grooming Deficit Wash/dry hands;Steadying   UB Dressing Assistance 5  Supervision/Setup   UB Dressing Deficit Setup;Increased time to complete;Steadying   LB Dressing Assistance 5  Supervision/Setup   LB Dressing Deficit Verbal cueing;Increased time to complete;Setup;Steadying   Toileting Assistance  2  Maximal Assistance   Toileting Deficit Setup;Steadying;Bedside commode;Perineal hygiene   Bed Mobility   Supine to Sit 4  Minimal assistance   Additional items Assist x 1;HOB elevated   Sit to Supine 4  Minimal assistance   Additional items Assist x 1;Increased time required   Transfers   Sit to Stand 4  Minimal assistance   Additional items Assist x 1;Increased time required;Impulsive;Verbal cues   Stand to Sit 4  Minimal assistance   Additional items Assist x 1;Increased time required;Impulsive;Verbal cues   Additional Comments RW   Functional Mobility    Functional Mobility 4  Minimal assistance   Additional Comments Pt performed bed to bedside commode mobility with use of RW and MIN A X1   Additional items Rolling walker   Cognition   Overall Cognitive Status Impaired   Arousal/Participation Arousable;Responsive   Attention Attends with cues to redirect   Orientation Level Oriented to person;Oriented to place;Disoriented to situation;Disoriented to time   Memory Decreased short term memory;Decreased recall of recent events;Decreased recall of precautions   Following Commands Follows one step commands with increased time or repetition   Comments Pt agitated at daughter and needs increased cues to redirect.   Activity Tolerance   Activity Tolerance Patient limited by fatigue;Patient limited by pain   Medical Staff Made Aware OT Quynh   Assessment   Assessment Patient participated in skilled OT session 8/9/2024 with interventions consisting of ADL retraining, energy conservation techniques, safety awareness and fall prevention techniques to increase independence in functional tasks. Patient agreeable to OT session, upon arrival pt was greeted in bed. Pt completed ADL tasks such UB dressing, toileting, LB dressing, and grooming. The patient's raw score on the -PAC Daily Activity Inpatient Short Form is 17. A raw score of less than 19 suggests the patient may benefit from discharge to post-acute rehabilitation services. Please refer to the recommendation of the Occupational Therapist for safe discharge planning.Please refer to the recommendation of the Occupational Therapist for safe discharge planning. From OT standpoint, recommendation at time of d/c would be Level I Maximum Resource Intensity. Pt to benefit from continue occupational therapy to address deficits and to maximize independence in functional tasks.   Plan   Treatment Interventions ADL retraining;Functional transfer training;Endurance training;Cognitive reorientation;Energy  conservation;Activityengagement   Goal Expiration Date 08/13/24   OT Treatment Day 3   OT Frequency 2-3x/wk   Discharge Recommendation   Rehab Resource Intensity Level, OT I (Maximum Resource Intensity)   AM-PAC Daily Activity Inpatient   Lower Body Dressing 3   Bathing 3   Toileting 2   Upper Body Dressing 3   Grooming 3   Eating 3   Daily Activity Raw Score 17   Daily Activity Standardized Score (Calc for Raw Score >=11) 37.26   AM-PAC Applied Cognition Inpatient   Following a Speech/Presentation 2   Understanding Ordinary Conversation 2   Taking Medications 2   Remembering Where Things Are Placed or Put Away 2   Remembering List of 4-5 Errands 2   Taking Care of Complicated Tasks 2   Applied Cognition Raw Score 12   Applied Cognition Standardized Score 28.82   End of Consult   Patient Position at End of Consult Supine;Bed/Chair alarm activated;All needs within reach   Nurse Communication Nurse aware of consult       HAMMAD Matute

## 2024-08-09 NOTE — CASE MANAGEMENT
Case Management Discharge Planning Note    Patient name Federico Bowen  Location [facility] 732/[facility] 732-01 MRN [id number]  : 1955 Date 2024       Current Admission Date: 2024  Current Admission Diagnosis:Subarachnoid hemorrhage (HCC)   Patient Active Problem List    Diagnosis Date Noted Date Diagnosed    Dysphagia as late effect of cerebrovascular disease 2024     Coronary artery disease 2024     COPD (chronic obstructive pulmonary disease) (HCC) 2024     Goals of care, counseling/discussion 2024     Palliative care encounter 2024     Severe protein-calorie malnutrition (HCC) 2024     HTN (hypertension) 2024     Hyperlipidemia 2024     Seizures (HCC) 2024     Subarachnoid hemorrhage (HCC) 2024       LOS (days): 28  Geometric Mean LOS (GMLOS) (days): 10.2  Days to GMLOS:-17.7     OBJECTIVE:  Risk of Unplanned Readmission Score: 16.92         Current admission status: Inpatient   Preferred Pharmacy:   [id number] - [facility] [address] - [address]  [facility]  [place] [address]  Phone: [phone] Fax: [phone]    Primary Care Provider: No primary care provider on file.    Primary Insurance: [employer]  Secondary Insurance: [facility]    DISCHARGE DETAILS:        Additional Comments: Pt will remain IP until insurance auth is received. Pt will DC to [facility]

## 2024-08-09 NOTE — PLAN OF CARE
Problem: OCCUPATIONAL THERAPY ADULT  Goal: Performs self-care activities at highest level of function for planned discharge setting.  See evaluation for individualized goals.  Description: Treatment Interventions: ADL retraining, Functional transfer training, UE strengthening/ROM, Endurance training, Cognitive reorientation, Patient/family training, Equipment evaluation/education, Compensatory technique education, Continued evaluation, Energy conservation, Activityengagement          See flowsheet documentation for full assessment, interventions and recommendations.   Note: Limitation: Decreased ADL status, Decreased UE ROM, Decreased UE strength, Decreased Safe judgement during ADL, Decreased cognition, Decreased endurance, Decreased self-care trans, Decreased high-level ADLs  Prognosis: Fair  Assessment: Patient participated in skilled OT session 8/9/2024 with interventions consisting of ADL retraining, energy conservation techniques, safety awareness and fall prevention techniques to increase independence in functional tasks. Patient agreeable to OT session, upon arrival pt was greeted in bed. Pt completed ADL tasks such UB dressing, toileting, LB dressing, and grooming. The patient's raw score on the -PAC Daily Activity Inpatient Short Form is 17. A raw score of less than 19 suggests the patient may benefit from discharge to post-acute rehabilitation services. Please refer to the recommendation of the Occupational Therapist for safe discharge planning.Please refer to the recommendation of the Occupational Therapist for safe discharge planning. From OT standpoint, recommendation at time of d/c would be Level I Maximum Resource Intensity. Pt to benefit from continue occupational therapy to address deficits and to maximize independence in functional tasks.  Recommendation: Physiatry Consult  Rehab Resource Intensity Level, OT: I (Maximum Resource Intensity)

## 2024-08-09 NOTE — RESTORATIVE TECHNICIAN NOTE
Restorative Technician Note      Patient Name: Federico Bowen     Note Type: Mobility  Patient Position Upon Consult: Supine  Activity Performed: Ambulated; Stood; Dangled  Assistive Device: Roller walker; Other (Comment) (Assist x1 with chair follow.)  Education Provided: Yes  Patient Position at End of Consult: Supine; All needs within reach; Bed/Chair alarm activated    Anya SALEH, Restorative Technician,

## 2024-08-10 VITALS
RESPIRATION RATE: 15 BRPM | HEART RATE: 81 BPM | DIASTOLIC BLOOD PRESSURE: 68 MMHG | SYSTOLIC BLOOD PRESSURE: 114 MMHG | OXYGEN SATURATION: 95 % | WEIGHT: 99.21 LBS | TEMPERATURE: 98.7 F

## 2024-08-10 PROBLEM — Z71.89 GOALS OF CARE, COUNSELING/DISCUSSION: Status: RESOLVED | Noted: 2024-07-25 | Resolved: 2024-08-10

## 2024-08-10 PROBLEM — I69.991 DYSPHAGIA AS LATE EFFECT OF CEREBROVASCULAR DISEASE: Status: RESOLVED | Noted: 2024-08-01 | Resolved: 2024-08-10

## 2024-08-10 PROBLEM — I60.9 SUBARACHNOID HEMORRHAGE (HCC): Status: RESOLVED | Noted: 2024-07-12 | Resolved: 2024-08-10

## 2024-08-10 PROBLEM — Z51.5 PALLIATIVE CARE ENCOUNTER: Status: RESOLVED | Noted: 2024-07-25 | Resolved: 2024-08-10

## 2024-08-10 PROCEDURE — NC001 PR NO CHARGE: Performed by: STUDENT IN AN ORGANIZED HEALTH CARE EDUCATION/TRAINING PROGRAM

## 2024-08-10 PROCEDURE — 99239 HOSP IP/OBS DSCHRG MGMT >30: CPT | Performed by: STUDENT IN AN ORGANIZED HEALTH CARE EDUCATION/TRAINING PROGRAM

## 2024-08-10 RX ORDER — LANOLIN ALCOHOL/MO/W.PET/CERES
400 CREAM (GRAM) TOPICAL DAILY
Start: 2024-08-11

## 2024-08-10 RX ORDER — LANOLIN ALCOHOL/MO/W.PET/CERES
100 CREAM (GRAM) TOPICAL DAILY
Start: 2024-08-11

## 2024-08-10 RX ORDER — OXYCODONE HYDROCHLORIDE 5 MG/1
5 TABLET ORAL EVERY 4 HOURS PRN
Status: SHIPPED | OUTPATIENT
Start: 2024-08-10 | End: 2024-08-20

## 2024-08-10 RX ORDER — LEVETIRACETAM 100 MG/ML
750 SOLUTION ORAL EVERY 12 HOURS SCHEDULED
Qty: 473 ML
Start: 2024-08-10

## 2024-08-10 RX ORDER — GABAPENTIN 300 MG/1
600 CAPSULE ORAL
Start: 2024-08-10

## 2024-08-10 RX ORDER — GABAPENTIN 400 MG/1
400 CAPSULE ORAL 2 TIMES DAILY
Start: 2024-08-10

## 2024-08-10 RX ADMIN — FOLIC ACID TAB 400 MCG 400 MCG: 400 TAB at 08:21

## 2024-08-10 RX ADMIN — EZETIMIBE 10 MG: 10 TABLET ORAL at 08:21

## 2024-08-10 RX ADMIN — HEPARIN SODIUM 5000 UNITS: 5000 INJECTION INTRAVENOUS; SUBCUTANEOUS at 07:05

## 2024-08-10 RX ADMIN — HEPARIN SODIUM 5000 UNITS: 5000 INJECTION INTRAVENOUS; SUBCUTANEOUS at 14:51

## 2024-08-10 RX ADMIN — ASPIRIN 81 MG CHEWABLE TABLET 81 MG: 81 TABLET CHEWABLE at 08:21

## 2024-08-10 RX ADMIN — MIDODRINE HYDROCHLORIDE 10 MG: 5 TABLET ORAL at 11:35

## 2024-08-10 RX ADMIN — ACETAMINOPHEN 975 MG: 650 SUSPENSION ORAL at 14:50

## 2024-08-10 RX ADMIN — ACETAMINOPHEN 975 MG: 650 SUSPENSION ORAL at 07:04

## 2024-08-10 RX ADMIN — MIDODRINE HYDROCHLORIDE 10 MG: 5 TABLET ORAL at 07:05

## 2024-08-10 RX ADMIN — GABAPENTIN 400 MG: 300 CAPSULE ORAL at 08:21

## 2024-08-10 RX ADMIN — MIDODRINE HYDROCHLORIDE 10 MG: 5 TABLET ORAL at 15:10

## 2024-08-10 RX ADMIN — THIAMINE HCL TAB 100 MG 100 MG: 100 TAB at 08:21

## 2024-08-10 RX ADMIN — ATORVASTATIN CALCIUM 80 MG: 80 TABLET, FILM COATED ORAL at 08:22

## 2024-08-10 RX ADMIN — LIDOCAINE 5% 1 PATCH: 700 PATCH TOPICAL at 08:22

## 2024-08-10 RX ADMIN — Medication 20 MG: at 07:05

## 2024-08-10 RX ADMIN — LEVETIRACETAM 750 MG: 100 SOLUTION ORAL at 08:23

## 2024-08-10 RX ADMIN — OXYCODONE HYDROCHLORIDE 5 MG: 5 TABLET ORAL at 08:23

## 2024-08-10 RX ADMIN — OXYCODONE HYDROCHLORIDE 5 MG: 5 TABLET ORAL at 04:18

## 2024-08-10 RX ADMIN — OXYCODONE HYDROCHLORIDE 5 MG: 5 TABLET ORAL at 14:51

## 2024-08-10 NOTE — DISCHARGE SUMMARY
INTERNAL MEDICINE RESIDENCY DISCHARGE SUMMARY     Federico Bowen   68 y.o. male  MRN: [id number]  Room/Bed: [facility] 732/[facility] 732-01     [facility] BE [facility] 7   Encounter: [id number]    Active Problems:    HTN (hypertension)    Hyperlipidemia    Seizures (HCC)    Severe protein-calorie malnutrition (HCC)    Coronary artery disease    COPD (chronic obstructive pulmonary disease) ([facility])      * Subarachnoid hemorrhage (HCC)-resolved as of 8/10/2024  Assessment & Plan  Patient was complaining of headache and R leg pain. En route to [facility] ED, patient had 2 witnessed seizures. He was unresponsive when arrived to the ED and was a stroke alert. Patient was intubated and given DDAVP for home ASA usage and also given mannitol. He was started on cardene gtt and Keppra load. EMS called and pt brought to [place] in setting of R MSK pain. CTH showed a large SAH with IVH s/p transfer to [facility] for coil embolization of R PCOM aneurysm and EVD on 7/13. Extubated 7/13. EVD removed 7/26. Course complicated by aspiration pneumonia for which he has already completed abx therapy. Followed on SAH pathway in Neuro ICU since transfer to [facility].     CTH 8/1 showing stable pathology. Patient off nimodipine and no longer receiving TCDs as of 8/2. Patient continues to have a headache, likely 2/2 to SAH. Continuing to monitor for changes in neuro status.    Plan:  Discharge to [facility]  Follow-up with PEDRO LUIS on 8/18; follow-up MRA ordered to be done around that time  Will need post-hospital follow-up with Neurology; office info provided  Discharge on gabapentin for headaches, Keppra for seizures, and with oxycodone for breakthrough pain; scripts to be taken over by [facility] physicians  Hold Brilinta until cleared by NSGY    COPD (chronic obstructive pulmonary disease) ([facility])  Assessment & Plan  Documented h/o of COPD although no PFTs on file    Plan:  No concerns for exacerbation at this time  Continue prn  albuterol    Coronary artery disease  Assessment & Plan  Patient with little documentation in medical record but appears to have non-obstructive CAD with history of unstable angina component and previously was on nitro prn and Ranexa. Home medications noted metoprolol and Entresto however last echo found to be in 2015 with EF 77% and most recent echo of 55% on 7/13/24 and thus Entresto was not restarted prior to downgrade from N-ICU.    Per patient, he has a history of a stent that was placed, but he is unable to provide more details.    Plan:  Continue ASA 81mg qd  Recommend for patient to re-establish care with Cardiology after discharge  Resume reported metoprolol on d/c; hold Brilinta in setting of SAH until cleared by Neurosurgery    Severe protein-calorie malnutrition (HCC)  Assessment & Plan  Malnutrition Findings:   Adult Malnutrition type: Acute illness  Adult Degree of Malnutrition: Other severe protein calorie malnutrition  Malnutrition Characteristics: Fat loss, Muscle loss, Inadequate energy, Weight loss                  360 Statement: Acute severe pro, vinny malnutrition d/t condition as evidence by signficant weight loss, 7/13/24 54kg, 7/19/24 49kg, 5kg/9% wt. loss x <1 week, moderate to severe signs of muscle/ fat loss at temples, orbitals, calvicles, triceps, shoulders, treated with TF.    BMI Findings:           There is no height or weight on file to calculate BMI.     Patient found to be eating more s/p mirtazapine.     Plan:  Patient's nutritional status improved over the course of admission as well as his dysphagia  Encourage ongoing oral intake pending rehab course  Continue Remeron for appetite stimulation  Continue nutritional supplementation as needed    Seizures ([facility])  Assessment & Plan  New-onset this admission. En route to [facility] ED, patient had 2 witnessed seizures while being transported as stroke alert via EMS. Loaded on Keppra upon arrival to [facility]    Continue Keppra 750mg  "q12H  Outpatient follow up with Neurology; contact info provided    Hyperlipidemia  Assessment & Plan  F/u final results of lipid panel, none documented this admission.     Continue home zetia 10mg  Continue lipitor 80mg     HTN (hypertension)  Assessment & Plan  History of hypertension. Home Anti-HTN meds include metoprolol and entresto, both of which have not been restarted    Plan:  -Plan to resume Entresto/metoprolol on d/c; however, recommend reestablishing with Cardiology on discharge    Dysphagia as late effect of cerebrovascular disease-resolved as of 8/10/2024  Assessment & Plan  -SAH 7/12  -Modified barium swallow 7/29- mild-moderate dysphagia   - regular texture w/ thin liquid diet in place after speech therapy evaluation. Patient eating better    Fever-resolved as of 7/31/2024  Assessment & Plan  Hospital course complicated by aspiration pneumonia s/p extubation in N-ICU. Completed abx therapy. Otherwise fevers of central origin given SAH and IVH on presentation. Continuing to monitor.     Moderate protein-calorie malnutrition (HCC)-resolved as of 8/2/2024  Assessment & Plan  Malnutrition Findings:   Adult Malnutrition type: Acute illness  Adult Degree of Malnutrition: Other severe protein calorie malnutrition  Malnutrition Characteristics: Fat loss, Muscle loss, Inadequate energy, Weight loss                  360 Statement: Acute severe pro, vinny malnutrition d/t condition as evidence by signficant weight loss, 7/13/24 54kg, 7/19/24 49kg, 5kg/9% wt. loss x <1 week, moderate to severe signs of muscle/ fat loss at temples, orbitals, calvicles, triceps, shoulders, treated with TF.    BMI Findings:          There is no height or weight on file to calculate BMI.           DETAILS OF HOSPITAL COURSE     Per original H&P by CHRISTINA Peterson: \"Federico Bowen is a 68 y.o. with PMH HTN, HLD, CAD, COPD, cocaine use who presents with H/A and R leg pain to [facility]. In EMS pt had 2 witnessed seizures (30 sec " "& 6 seconds). On arrival to ER pt required intubation 2/2 mental status. Stroke alert called. CTH showed SAH with IVH and mild hydro with R PCA aneurysm. Given Ativan, Keppra, DDVAP. Tx to [facility] for Neurosurgery evaluation.\"    Following arrival at [facility], the patient was evaluated by neurosurgery.  As above, he was started on Keppra and Cardene for aggressive blood pressure control and seizure prophylaxis.  He ultimately underwent coil embolization of a right P-comm aneurysm and EVD on 7/13 with subsequent removal of this on 7/26.  His ICU course was complicated by aspiration pneumonia for which he completed a course of antibiotic therapy.  While in the neuro ICU, he completed the 21-day SAH pathway with nimodipine and TCDs.  He was ultimately transferred out on to the general floors on 7/31.    Following his transfer to the floors, the patient was continually evaluated by PT/OT and SLP.  There were initially some concerns regarding his oral intake, in which case a PEG tube was being considered; however, this ultimately improved following the initiation of a calorie count as well as starting the patient on Remeron for appetite stimulation.  Neurosurgery ultimately elected to have the patient follow-up in the outpatient setting.  His main medical issue while admitted under the general medicine service was of ongoing posttraumatic headaches as well as generalized pain, for which his pain regimen was titrated gradually.  Of note, despite his reported complaints of severe pain, the patient did not routinely utilize his existing pain regimen and these complaints were largely assuaged with redirection.  It was ultimately decided to pursue aggressive physical rehabilitation on discharge, particularly with a focus on those with TBI history.  As such, referrals were sent to local [facility] facilities and the patient was ultimately approved and agreeable to attend [facility] rehabilitation.    On the day of " his discharge, the patient's only complaints were of ongoing diffuse pain (this is similar in character to what he is reported throughout his stay).  He otherwise denied any new complaints including fever/chills, chest pain, shortness of breath, abdominal pain, or other specific complaints.  He is in agreement with the plan to follow-up with neurosurgery in the outpatient setting with repeat imaging to obtain prior to that visit.  He is excited and motivated to begin his rehabilitation process.    Please see associated progress note from day of discharge for physical exam.    Follow-ups:  -Establish with a PCP on discharge; as patient is not local to this area, we will forego establishing at [facility]  -Outpatient follow-up with neurosurgery set for 8/16; patient would ideally have repeat MRA obtained prior to this appointment though this may not be feasible due to the timing of his discharge (order placed in system)  -Will need to call and schedule follow-up with Neurology  -Will hold Brilinta until cleared by Neurosurgery to resume  -Will discharge to [facility] for intensive rehabilitation  -Continue gabapentin for posttraumatic headaches and pain, Remeron for appetite stimulation, Keppra for seizure control  -Will provide short course of oxycodone for breakthrough pain based on patient's requirements over the last several days prior to discharge    DISCHARGE INFORMATION     PCP at Discharge: none; recommended to establish with a local PCP on discharge    Admitting Provider: Reba Hamilton MD  Admission Date: 7/12/2024    Discharge Provider: Pari Eaton DO  Discharge Date: 8/10/2024    Discharge Disposition: [facility]  Discharge Condition: stable  Discharge with Lines: no    Discharge Diet: regular diet  Activity Restrictions: none  Test Results Pending at Discharge: none    Discharge Diagnoses:  Principal Problem (Resolved):    Subarachnoid hemorrhage (HCC)  Active Problems:    HTN (hypertension)     Hyperlipidemia    Seizures (HCC)    Severe protein-calorie malnutrition (HCC)    Coronary artery disease    COPD (chronic obstructive pulmonary disease) (HCC)  Resolved Problems:    Fever    Goals of care, counseling/discussion    Palliative care encounter    Dysphagia as late effect of cerebrovascular disease      Consulting Providers:  Neurosurgery, critical care, neurology, PT/OT, SLP, PM&R    Diagnostic & Therapeutic Procedures Performed:  CTA head and neck w wo contrast    Result Date: 7/16/2024  Impression: CT Brain: Redemonstrated subarachnoid hemorrhage overlying the right greater than left convexities and concentrated within the right sylvian cistern and fissure. . Subacute infarct involving the right anterior temporal region and medial occipital region.. 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 Workstation performed: [id number]     IR cerebral angiography / intervention    Result Date: 7/15/2024  Impression: Successful balloon assisted coil embolization of a 4.5 mm right P-comm aneurysm. Workstation performed: [id number]     CT head wo contrast    Result Date: 7/14/2024  Impression: No significant interval change in extensive bilateral subarachnoid hemorrhage and intraventricular hemorrhage. Workstation performed: [id number]     CT head wo contrast    Result Date: 7/13/2024  Impression: Interval placement of right-sided shunt catheter. Stable ventricular size with slight interval increase in intraventricular hemorrhage layering in the occipital horns bilaterally. Diffuse subarachnoid hemorrhage again noted. Workstation performed: [id number]     XR chest portable ICU    Result Date: 7/12/2024  Impression: No acute cardiopulmonary disease. ET tube 6 cm above the landon. Workstation performed: [id number]     CTA stroke alert (head/neck)    Result Date: 7/12/2024  Impression: 0.3 cm  aneurysm in expected origin of right posterior communicating artery. Possible tiny aneurysm in left anterior communicating artery region. These could be potential sources of diffuse subarachnoid hemorrhage. Recommend neurovascular surgery consultation for further evaluation. Mild narrowing of bilateral MCA M1 and bilateral M2 segmental branch vessels, likely due to vasospasm. Patent stent in left proximal subclavian artery. Endotracheal tube in trachea. Additional chronic/incidental findings as detailed above. Findings were directly discussed with Emmanuel Lion at approximately 4:34 p.m. on 7/12/2024. Workstation performed: [id number]     CT stroke alert brain    Result Date: 7/12/2024  Impression: Acute diffuse thickened large-volume subarachnoid hemorrhage throughout the bilateral parafalcine regions, bilateral cerebral sulci (right worse than left), basilar cisterns, prepontine cistern, premedullary cistern, and visualized upper cervical spinal canal. Acute small volume intraventricular hemorrhage with mild hydrocephalus. No acute infarction. Findings were directly discussed with Emmanuel Lion at approximately 4:34 p.m. on 7/12/2024. Workstation performed: [id number]       Code Status: Level 1 - Full Code  Advance Directive & Living Will: <no information>  Power of :    POLST:      Medications:    STOP taking these medications    ticagrelor 90 MG  Commonly known as: BRILINTA         TAKE these medications    albuterol 90 mcg/act inhaler  Commonly known as: PROVENTIL HFA,VENTOLIN HFA  Inhale 2 puffs every 4 (four) hours as needed for wheezing  Refills: 0  Last time this was given: 2 puffs on August 9, 2024  6:10 PM    aspirin 81 mg chewable tablet  Chew 81 mg daily  Refills: 0  Last time this was given: 81 mg on August 10, 2024  8:21 AM    atorvastatin 80 mg tablet  Commonly known as: LIPITOR  Take 80 mg by mouth daily  Refills: 0  Last time this was given: 80 mg on August 10, 2024  8:22 AM    ezetimibe 10 mg  tablet  Commonly known as: ZETIA  Take 10 mg by mouth every morning  Refills: 0  Last time this was given: 10 mg on August 10, 2024  8:21 AM    folic acid 400 mcg tablet  Commonly known as: FOLVITE  Start taking on: August 11, 2024  Take 1 tablet (400 mcg total) by mouth daily  Refills: 0  Last time this was given: 400 mcg on August 10, 2024  8:21 AM    * gabapentin 400 mg capsule  Commonly known as: NEURONTIN  Take 1 capsule (400 mg total) by mouth 2 (two) times a day  Refills: 0  Last time this was given: Ask your nurse or doctor    * gabapentin 300 mg capsule  Commonly known as: NEURONTIN  Take 2 capsules (600 mg total) by mouth daily at bedtime  Refills: 0  Last time this was given: Ask your nurse or doctor    levETIRAcetam 100 mg/mL oral solution  Commonly known as: KEPPRA  Take 7.5 mL (750 mg total) by mouth every 12 (twelve) hours  Refills: 0  Last time this was given: 750 mg on August 10, 2024  8:23 AM    metoprolol succinate 50 mg 24 hr tablet  Commonly known as: TOPROL-XL  Take 50 mg by mouth daily at bedtime  Refills: 0    mirtazapine 15 mg tablet  Commonly known as: REMERON  Take 15 mg by mouth daily at bedtime  Refills: 0  Last time this was given: 15 mg on August 9, 2024 10:14 PM    nitroglycerin 0.4 mg SL tablet  Commonly known as: NITROSTAT  Place 0.4 mg under the tongue every 5 (five) minutes as needed for chest pain  Refills: 0    omeprazole 40 MG capsule  Commonly known as: PriLOSEC  Take 40 mg by mouth every morning  Refills: 0  Last time this was given: Ask your nurse or doctor    oxyCODONE 5 immediate release tablet  Commonly known as: ROXICODONE  Take 1 tablet (5 mg total) by mouth every 4 (four) hours as needed for severe pain for up to 10 days Max Daily Amount: 30 mg  Refills: 0  Last time this was given: 5 mg on August 10, 2024  8:23 AM    sacubitril-valsartan 24-26 MG Tabs  Commonly known as: ENTRESTO  Take 1 tablet by mouth 2 (two) times a day  Refills: 0    thiamine 100 MG tablet  Start  "taking on: August 11, 2024  Take 1 tablet (100 mg total) by mouth daily         Allergies:  No Known Allergies    FOLLOW-UP     PCP Outpatient Follow-up:  Yes, will need to establish with a PCP on discharge; welcome to choose any local provider    Consulting Providers Follow-up:  Yes, follow-up with neurosurgery as scheduled on 8/16; will also need follow-up with neurology scheduled at some point in the future (referral provided on discharge)    Active Issues Requiring Follow-up:   SAH status post neurosurgical intervention, posttraumatic headaches, seizures, hypertension, reported COPD, poor oral intake    Discharge Statement:   I spent 45 minutes minutes discharging the patient. This time was spent on the day of discharge. I had direct contact with the patient on the day of discharge. Additional documentation is required if more than 30 minutes were spent on discharge.    Portions of the record may have been created with voice recognition software.  Occasional wrong word or \"sound a like\" substitutions may have occurred due to the inherent limitations of voice recognition software.  Read the chart carefully and recognize, using context, where substitutions have occurred.    ==  Nando Metz MD  [facility]  Internal Medicine Resident PGY-2       "

## 2024-08-10 NOTE — DISCHARGE INSTR - AVS FIRST PAGE
-Please establish with a PCP of your choosing, we have provided you with an office close to your home (see attached information in after visit summary)  -Please be sure to follow-up with neurology and neurosurgery; their office contact information is provided below (you do already have a visit scheduled with neurosurgery on 8/16 and you will need to obtain repeat MRI sometime around that visit, which has been ordered)  -Please continue your existing medications with the exception of Brilinta (you will need to discuss resuming this with neurosurgery at your next visit); we will also send you with some supplemental vitamins as well as gabapentin to help with your ongoing headaches  -We will send you on Keppra, an anti-seizure medication; you will need to avoid driving until cleared by Neurology  -We will also provide you with several doses of oxycodone for breakthrough pain  -You will be discharged to [facility] for ongoing rehab; please participate to the fullest

## 2024-08-10 NOTE — QUICK NOTE
Updated patient's family members at bedside, including on plans for transfer later today as well as a basic review of follow-up obstructions.  All questions answered and concerns addressed.

## 2024-08-10 NOTE — PLAN OF CARE
Problem: Prexisting or High Potential for Compromised Skin Integrity  Goal: Skin integrity is maintained or improved  Description: INTERVENTIONS:  - Identify patients at risk for skin breakdown  - Assess and monitor skin integrity  - Assess and monitor nutrition and hydration status  - Monitor labs   - Assess for incontinence   - Turn and reposition patient  - Assist with mobility/ambulation  - Relieve pressure over bony prominences  - Avoid friction and shearing  - Provide appropriate hygiene as needed including keeping skin clean and dry  - Evaluate need for skin moisturizer/barrier cream  - Collaborate with interdisciplinary team   - Patient/family teaching  - Consider wound care consult   Outcome: Progressing     Problem: Communication Impairment  Goal: Ability to express needs and understand communication  Description: Assess patient's communication skills and ability to understand information.  Patient will demonstrate use of effective communication techniques, alternative methods of communication and understanding even if not able to speak.     - Encourage communication and provide alternate methods of communication as needed.  - Collaborate with case management/ for discharge needs.  - Include patient/family/caregiver in decisions related to communication.  Outcome: Progressing     Problem: Nutrition  Goal: Nutrition/Hydration status is improving  Description: Monitor and assess patient's nutrition/hydration status for malnutrition (ex- brittle hair, bruises, dry skin, pale skin and conjunctiva, muscle wasting, smooth red tongue, and disorientation). Collaborate with interdisciplinary team and initiate plan and interventions as ordered.  Monitor patient's weight and dietary intake as ordered or per policy. Utilize nutrition screening tool and intervene per policy. Determine patient's food preferences and provide high-protein, high-caloric foods as appropriate.   - Monitor nutrition  parameters, recommending adjustments to enteral nutrition orders at indicated.   - Assist patient with eating.  - Allow adequate time for meals.  - Encourage patient to take dietary supplement as ordered.  - Collaborate with interdisciplinary team.   - Include patient/family/caregiver in decisions related to nutrition.  Outcome: Progressing     Problem: Nutrition/Hydration-ADULT  Goal: Nutrient/Hydration intake appropriate for improving, restoring or maintaining nutritional needs  Description: Monitor and assess patient's nutrition/hydration status for malnutrition. Collaborate with interdisciplinary team and initiate plan and interventions as ordered.  Monitor patient's weight and dietary intake as ordered or per policy. Utilize nutrition screening tool and intervene as necessary. Determine patient's food preferences and provide high-protein, high-caloric foods as appropriate.     INTERVENTIONS:  - Monitor oral intake, urinary output, labs, and treatment plans  - Assess nutrition and hydration status and recommend course of action  - Evaluate amount of meals eaten  - Assist patient with eating if necessary   - Allow adequate time for meals  - Recommend/ encourage appropriate diets, oral nutritional supplements, and vitamin/mineral supplements  - Order, calculate, and assess calorie counts as needed  - Recommend, monitor, and adjust tube feeding based on assessed needs  - Assess need for intravenous fluids  - Provide nutrition/hydration education as appropriate  - Include patient/family/caregiver in decisions related to nutrition  Outcome: Progressing

## 2024-08-10 NOTE — PROGRESS NOTES
INTERNAL MEDICINE RESIDENCY PROGRESS NOTE     Name: Federico Bowen   Age & Sex: 68 y.o. male   MRN: [id number]  Unit/Bed#: [facility] 732-01   Encounter: [id number]  Team: SOD Team A    PATIENT INFORMATION     Name: Federico Bowen   Age & Sex: 68 y.o. male   MRN: [id number]  Hospital Stay Days: 29    ASSESSMENT/PLAN     Principal Problem:    Subarachnoid hemorrhage (HCC)  Active Problems:    HTN (hypertension)    Hyperlipidemia    Seizures (HCC)    Severe protein-calorie malnutrition (HCC)    Goals of care, counseling/discussion    Palliative care encounter    Coronary artery disease    COPD (chronic obstructive pulmonary disease) (HCC)      * Subarachnoid hemorrhage (HCC)  Assessment & Plan  Patient was complaining of headache and R leg pain. En route to [facility] ED, patient had 2 witnessed seizures. He was unresponsive when arrived to the ED and was a stroke alert. Patient was intubated and given DDAVP for home ASA usage and also given mannitol. He was started on cardene gtt and Keppra load. EMS called and pt brought to [place] in setting of R MSK pain. CTH showed a large SAH with IVH s/p transfer to [facility] for coil embolization of R PCOM aneurysm and EVD on 7/13. Extubated 7/13. EVD removed 7/26. Course complicated by aspiration pneumonia for which he has already completed abx therapy. Followed on SAH pathway in Neuro ICU since transfer to [facility].     CTH 8/1 showing stable pathology. Patient off nimodipine and no longer receiving TCDs as of 8/2. Patient continues to have a headache, likely 2/2 to SAH. Continuing to monitor for changes in neuro status.    Plan:  Deemed medically stable by NCC team for downgrade to [facility]  CT 8/1-  Status post coiling of right P-comm aneurysm. Stable ventricles. No hydrocephalus. Small volume of residual subarachnoid and intraventricular hemorrhage is mildly decreased. Stable hypodensities in the right temporal lobe, right occipital lobe and right caudate head likely due to  evolving subacute infarcts.  Midodrine 10mg PO TID. Ok to begin weaning midodrine now that he is off nimodrine  SBP goal normotensive  Continue ASA 81mg qd  AED ppx: Keppra 750mg q12H  PT/OT/speech angelica, CM consult  PMR consult placed and are following  Continue q4H neuro checks, ok to let sleep at night  Will need neurosurgery referral and outpatient f/u after discharge as well as repeat MRA  Pain regimen for headache/MSK sequelae of SAH:  Gabapentin -550-942ol  PRN lidocaine patch;   Fioricet and dilaudid D/C'd  Percocet D/C'd and started on scheduled tylenol and oxycodone PRN  Aqua K started for headache - patient states that it has been helping  Patient's daughter states that he sometimes gets headaches from caffeine withdrawal- restarted caffeine in his diet  Nimodipine D/C  TCDs D/C  Patient advised that he can ask for his pain medications more frequently than he has been getting them for his headaches  Pending insurance authorization to [facility]    COPD (chronic obstructive pulmonary disease) (HCC)  Assessment & Plan  Documented h/o of COPD although no PFTs on file    Plan:  No concerns for exacerbation at this time  Continue prn albuterol    Coronary artery disease  Assessment & Plan  Patient with little documentation in medical record but appears to have non-obstructive CAD with history of unstable angina component and previously was on nitro prn and Ranexa. Home medications noted metoprolol and Entresto however last echo found to be in 2015 with EF 77% and most recent echo of 55% on 7/13/24 and thus Entresto was not restarted prior to downgrade from N-ICU.    Per patient, he has a history of a stent that was placed, but he is unable to provide more details.    Plan:  Continue ASA 81mg qd  Recommend for patient to re-establish care with Cardiology after discharge  Home metoprolol held given concurrent nimodipine and stable HR. Unclear if patient was taking prior to admission  Similarly as above,  Brilinta noted as home medication on admission. Currently not continued in setting of SAH this admission. Unable to find documented history of VIRA, outpatient f/u with cardiology.     Severe protein-calorie malnutrition (HCC)  Assessment & Plan  Malnutrition Findings:   Adult Malnutrition type: Acute illness  Adult Degree of Malnutrition: Other severe protein calorie malnutrition  Malnutrition Characteristics: Fat loss, Muscle loss, Inadequate energy, Weight loss                  360 Statement: Acute severe pro, vinny malnutrition d/t condition as evidence by signficant weight loss, 7/13/24 54kg, 7/19/24 49kg, 5kg/9% wt. loss x <1 week, moderate to severe signs of muscle/ fat loss at temples, orbitals, calvicles, triceps, shoulders, treated with TF.    BMI Findings:           There is no height or weight on file to calculate BMI.     Patient found to be eating more s/p mirtazapine.     Plan:  Patient's nutritional status improved over the course of admission as well as his dysphagia  Encourage ongoing oral intake pending rehab course  Continue Remeron for appetite stimulation  Continue nutritional supplementation as needed    Seizures (HCC)  Assessment & Plan  New-onset this admission. En route to [facility] ED, patient had 2 witnessed seizures while being transported as stroke alert via EMS. Loaded on Keppra upon arrival to [facility]    Continue Keppra 750mg q12H    Hyperlipidemia  Assessment & Plan  F/u final results of lipid panel, none documented this admission.     Continue home zetia 10mg  Continue lipitor 80mg     HTN (hypertension)  Assessment & Plan  History of hypertension. Home Anti-HTN meds include metoprolol and entresto, both of which have not been restarted    Plan as reiterated from SAH A/P:  Midodrine 10mg PO TID. 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 Patient eating better    Fever-resolved as of 7/31/2024  Assessment & Plan  Hospital course complicated by aspiration pneumonia s/p extubation in N-ICU. Completed abx therapy. Otherwise fevers of central origin given SAH and IVH on presentation. Continuing to monitor.     Moderate protein-calorie malnutrition (HCC)-resolved as of 8/2/2024  Assessment & Plan  Malnutrition Findings:   Adult Malnutrition type: Acute illness  Adult Degree of Malnutrition: Other severe protein calorie malnutrition  Malnutrition Characteristics: Fat loss, Muscle loss, Inadequate energy, Weight loss                  360 Statement: Acute severe pro, vinny malnutrition d/t condition as evidence by signficant weight loss, 7/13/24 54kg, 7/19/24 49kg, 5kg/9% wt. loss x <1 week, moderate to severe signs of muscle/ fat loss at temples, orbitals, calvicles, triceps, shoulders, treated with TF.    BMI Findings:          There is no height or weight on file to calculate BMI.           Disposition: Pending placement/authorization for [facility]     SUBJECTIVE     Patient seen and examined. No acute events overnight.  This morning, patient continues to endorse generalized pain on awakening though is seen resting comfortably prior to this.  He otherwise denies any new symptomatic complaints at this time including fever/chills, chest pain, shortness of breath, abdominal pain, or other symptoms.  He was updated on the plans for rehab pending insurance authorization to [facility].    OBJECTIVE     Vitals:    08/09/24 2042 08/10/24 0313 08/10/24 0315 08/10/24 0600   BP: 107/66 104/66 104/66    BP Location: Right arm  Right arm    Pulse: 78 86 95    Resp: 16  16    Temp: 98 °F (36.7 °C) 98.5 °F (36.9 °C) 98.5 °F (36.9 °C)    TempSrc: Skin  Oral     SpO2: 97% 96% 95%    Weight:    45 kg (99 lb 3.3 oz)      Temperature:   Temp (24hrs), Av.3 °F (36.8 °C), Min:98 °F (36.7 °C), Max:98.5 °F (36.9 °C)    Temperature: 98.5 °F (36.9 °C)  Intake & Output:  I/O          0701   0700  0701  08/10 0700    Urine (mL/kg/hr) 300 (0.3) 350 (0.3)    Stool  0    Total Output 300 350    Net -300 -350          Unmeasured Stool Occurrence  1 x          Weights:        There is no height or weight on file to calculate BMI.  Weight (last 2 days)       Date/Time Weight    08/10/24 0600 45 (99.21)          Physical Exam  Vitals reviewed.   Constitutional:       General: He is not in acute distress.     Appearance: He is cachectic. He is ill-appearing (Chronically ill-appearing).   HENT:      Right Ear: External ear normal.      Left Ear: External ear normal.      Nose: Nose normal.      Mouth/Throat:      Mouth: Mucous membranes are dry.      Pharynx: Oropharynx is clear.   Eyes:      Extraocular Movements: Extraocular movements intact.      Conjunctiva/sclera: Conjunctivae normal.      Pupils: Pupils are equal, round, and reactive to light.   Cardiovascular:      Rate and Rhythm: Normal rate and regular rhythm.      Heart sounds: Normal heart sounds. No murmur heard.  Pulmonary:      Effort: Pulmonary effort is normal. No respiratory distress.      Breath sounds: Normal breath sounds.   Abdominal:      General: Abdomen is flat. Bowel sounds are normal. There is no distension.      Palpations: Abdomen is soft.      Tenderness: There is no abdominal tenderness.   Musculoskeletal:      Right lower leg: No edema.      Left lower leg: No edema.   Skin:     General: Skin is warm and dry.   Neurological:      Mental Status: He is alert. Mental status is at baseline. He is disoriented.      Comments: Alert and oriented x 2, 3 with prompting   Psychiatric:         Mood and Affect: Mood normal.         Behavior: Behavior normal.       LABORATORY DATA     Labs: I have  personally reviewed pertinent reports.  Results from last 7 days   Lab Units 08/06/24  0632 08/05/24  0603 08/04/24  0518   WBC Thousand/uL 8.22 9.24 8.92   HEMOGLOBIN g/dL 10.3* 10.5* 10.6*   HEMATOCRIT % 32.2* 32.8* 32.9*   PLATELETS Thousands/uL 563* 604* 664*   SEGS PCT % 65 66 62   MONO PCT % 4 6 5   EOS PCT % 3 3 3      Results from last 7 days   Lab Units 08/06/24  0632 08/05/24  0603 08/04/24  0518   POTASSIUM mmol/L 4.1 4.6 4.3   CHLORIDE mmol/L 103 101 101   CO2 mmol/L 30 28 31   BUN mg/dL 22 21 21   CREATININE mg/dL 0.85 0.86 0.81   CALCIUM mg/dL 9.3 9.7 9.7                            IMAGING & DIAGNOSTIC TESTING     Radiology Results: I have personally reviewed pertinent reports.  CTA head and neck w wo contrast    Result Date: 7/16/2024  Impression: CT Brain: Redemonstrated subarachnoid hemorrhage overlying the right greater than left convexities and concentrated within the right sylvian cistern and fissure. . Subacute infarct involving the right anterior temporal region and medial occipital region.. 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 Workstation performed: [id number]     IR cerebral angiography / intervention    Result Date: 7/15/2024  Impression: Successful balloon assisted coil embolization of a 4.5 mm right P-comm aneurysm. Workstation performed: [id number]     CT head wo contrast    Result Date: 7/14/2024  Impression: No significant interval change in extensive bilateral subarachnoid hemorrhage and intraventricular hemorrhage. Workstation performed: [id number]     CT head wo contrast    Result Date: 7/13/2024  Impression: Interval placement of right-sided shunt catheter. Stable ventricular size with slight interval increase in intraventricular hemorrhage layering in the occipital horns bilaterally. Diffuse subarachnoid hemorrhage again noted. Workstation performed:  [id number]     XR chest portable ICU    Result Date: 7/12/2024  Impression: No acute cardiopulmonary disease. ET tube 6 cm above the landon. Workstation performed: [id number]     CTA stroke alert (head/neck)    Result Date: 7/12/2024  Impression: 0.3 cm aneurysm in expected origin of right posterior communicating artery. Possible tiny aneurysm in left anterior communicating artery region. These could be potential sources of diffuse subarachnoid hemorrhage. Recommend neurovascular surgery consultation for further evaluation. Mild narrowing of bilateral MCA M1 and bilateral M2 segmental branch vessels, likely due to vasospasm. Patent stent in left proximal subclavian artery. Endotracheal tube in trachea. Additional chronic/incidental findings as detailed above. Findings were directly discussed with Emmanuel Lion at approximately 4:34 p.m. on 7/12/2024. Workstation performed: [id number]     CT stroke alert brain    Result Date: 7/12/2024  Impression: Acute diffuse thickened large-volume subarachnoid hemorrhage throughout the bilateral parafalcine regions, bilateral cerebral sulci (right worse than left), basilar cisterns, prepontine cistern, premedullary cistern, and visualized upper cervical spinal canal. Acute small volume intraventricular hemorrhage with mild hydrocephalus. No acute infarction. Findings were directly discussed with Emmanuel Lion at approximately 4:34 p.m. on 7/12/2024. Workstation performed: [id number]     Other Diagnostic Testing: I have personally reviewed pertinent reports.    ACTIVE MEDICATIONS     Current Facility-Administered Medications   Medication Dose Route Frequency    acetaminophen (TYLENOL) oral suspension 975 mg  975 mg Oral Q8H    albuterol (PROVENTIL HFA,VENTOLIN HFA) inhaler 2 puff  2 puff Inhalation Q4H PRN    aspirin chewable tablet 81 mg  81 mg Oral Daily    atorvastatin (LIPITOR) tablet 80 mg  80 mg Oral Daily    bisacodyl (DULCOLAX) rectal suppository 10 mg  10 mg Rectal Daily PRN  "   ezetimibe (ZETIA) tablet 10 mg  10 mg Oral QAM    folic acid (FOLVITE) tablet 400 mcg  400 mcg Oral Daily    gabapentin (NEURONTIN) capsule 400 mg  400 mg Oral BID    gabapentin (NEURONTIN) capsule 600 mg  600 mg Oral HS    heparin (porcine) subcutaneous injection 5,000 Units  5,000 Units Subcutaneous Q8H MATTY    levETIRAcetam (KEPPRA) oral solution 750 mg  750 mg Oral Q12H MATTY    lidocaine (LIDODERM) 5 % patch 1 patch  1 patch Topical Daily    lidocaine (LIDODERM) 5 % patch 1 patch  1 patch Topical Daily PRN    midodrine (PROAMATINE) tablet 10 mg  10 mg Oral TID AC    mirtazapine (REMERON) tablet 15 mg  15 mg Oral HS    omeprazole (PRILOSEC) suspension 2 mg/mL  20 mg Oral Daily Before Breakfast    ondansetron (ZOFRAN) injection 4 mg  4 mg Intravenous Q6H PRN    oxyCODONE (ROXICODONE) split tablet 2.5 mg  2.5 mg Oral Q4H PRN    Or    oxyCODONE (ROXICODONE) IR tablet 5 mg  5 mg Oral Q4H PRN    saliva substitute (MOUTH KOTE) mucosal solution 5 spray  5 spray Mouth/Throat 4x Daily PRN    thiamine tablet 100 mg  100 mg Oral Daily       VTE Pharmacologic Prophylaxis: Heparin  VTE Mechanical Prophylaxis: sequential compression device    Portions of the record may have been created with voice recognition software.  Occasional wrong word or \"sound a like\" substitutions may have occurred due to the inherent limitations of voice recognition software.  Read the chart carefully and recognize, using context, where substitutions have occurred.  ==  Nando Metz MD  [facility]  Internal Medicine Residency PGY-2      "

## 2024-08-10 NOTE — PLAN OF CARE
Problem: Prexisting or High Potential for Compromised Skin Integrity  Goal: Skin integrity is maintained or improved  Description: INTERVENTIONS:  - Identify patients at risk for skin breakdown  - Assess and monitor skin integrity  - Assess and monitor nutrition and hydration status  - Monitor labs   - Assess for incontinence   - Turn and reposition patient  - Assist with mobility/ambulation  - Relieve pressure over bony prominences  - Avoid friction and shearing  - Provide appropriate hygiene as needed including keeping skin clean and dry  - Evaluate need for skin moisturizer/barrier cream  - Collaborate with interdisciplinary team   - Patient/family teaching  - Consider wound care consult   Outcome: Progressing     Problem: Neurological Deficit  Goal: Neurological status is stable or improving  Description: Interventions:  - Monitor and assess patient's level of consciousness, motor function, sensory function, and level of assistance needed for ADLs.   - Monitor and report changes from baseline. Collaborate with interdisciplinary team to initiate plan and implement interventions as ordered.   - Provide and maintain a safe environment.  - Consider seizure precautions.  - Consider fall precautions.  - Consider aspiration precautions.  - Consider bleeding precautions.  Outcome: Progressing     Problem: Activity Intolerance/Impaired Mobility  Goal: Mobility/activity is maintained at optimum level for patient  Description: Interventions:  - Assess and monitor patient  barriers to mobility and need for assistive/adaptive devices.  - Assess patient's emotional response to limitations.  - Collaborate with interdisciplinary team and initiate plans and interventions as ordered.  - Encourage independent activity per ability.  - Maintain proper body alignment.  - Perform active/passive rom as tolerated/ordered.  - Plan activities to conserve energy.  - Turn patient as appropriate  Outcome: Progressing     Problem:  Communication Impairment  Goal: Ability to express needs and understand communication  Description: Assess patient's communication skills and ability to understand information.  Patient will demonstrate use of effective communication techniques, alternative methods of communication and understanding even if not able to speak.     - Encourage communication and provide alternate methods of communication as needed.  - Collaborate with case management/ for discharge needs.  - Include patient/family/caregiver in decisions related to communication.  Outcome: Progressing     Problem: Potential for Aspiration  Goal: Non-ventilated patient's risk of aspiration is minimized  Description: Assess and monitor vital signs, respiratory status, and labs (WBC).  Monitor for signs of aspiration (tachypnea, cough, rales, wheezing, cyanosis, fever).    - Assess and monitor patient's ability to swallow.  - Place patient up in chair to eat if possible.  - HOB up at 90 degrees to eat if unable to get patient up into chair.  - Supervise patient during oral intake.   - Instruct patient/ family to take small bites.  - Instruct patient/ family to take small single sips when taking liquids.  - Follow patient-specific strategies generated by speech pathologist.  Outcome: Progressing     Problem: Nutrition  Goal: Nutrition/Hydration status is improving  Description: Monitor and assess patient's nutrition/hydration status for malnutrition (ex- brittle hair, bruises, dry skin, pale skin and conjunctiva, muscle wasting, smooth red tongue, and disorientation). Collaborate with interdisciplinary team and initiate plan and interventions as ordered.  Monitor patient's weight and dietary intake as ordered or per policy. Utilize nutrition screening tool and intervene per policy. Determine patient's food preferences and provide high-protein, high-caloric foods as appropriate.   - Monitor nutrition parameters, recommending adjustments to  enteral nutrition orders at indicated.   - Assist patient with eating.  - Allow adequate time for meals.  - Encourage patient to take dietary supplement as ordered.  - Collaborate with interdisciplinary team.   - Include patient/family/caregiver in decisions related to nutrition.  Outcome: Progressing     Problem: PAIN - ADULT  Goal: Verbalizes/displays adequate comfort level or baseline comfort level  Description: Interventions:  - Encourage patient to monitor pain and request assistance  - Assess pain using appropriate pain scale  - Administer analgesics based on type and severity of pain and evaluate response  - Implement non-pharmacological measures as appropriate and evaluate response  - Consider cultural and social influences on pain and pain management  - Notify physician/advanced practitioner if interventions unsuccessful or patient reports new pain  Outcome: Progressing     Problem: INFECTION - ADULT  Goal: Absence or prevention of progression during hospitalization  Description: INTERVENTIONS:  - Assess and monitor for signs and symptoms of infection  - Monitor lab/diagnostic results  - Monitor all insertion sites, i.e. indwelling lines, tubes, and drains  - Monitor endotracheal if appropriate and nasal secretions for changes in amount and color  - [place] appropriate cooling/warming therapies per order  - Administer medications as ordered  - Instruct and encourage patient and family to use good hand hygiene technique  - Identify and instruct in appropriate isolation precautions for identified infection/condition  Outcome: Progressing  Goal: Absence of fever/infection during neutropenic period  Description: INTERVENTIONS:  - Monitor WBC    Outcome: Progressing

## 2024-08-10 NOTE — CASE MANAGEMENT
Case Management Discharge Planning Note    Patient name Federico Bowen  Location [facility] 732/[facility] 732-01 MRN [id number]  : 1955 Date 8/10/2024       Current Admission Date: 2024  Current Admission Diagnosis:HTN (hypertension)   Patient Active Problem List    Diagnosis Date Noted Date Diagnosed    Coronary artery disease 2024     COPD (chronic obstructive pulmonary disease) (HCC) 2024     Severe protein-calorie malnutrition (HCC) 2024     HTN (hypertension) 2024     Hyperlipidemia 2024     Seizures (HCC) 2024       LOS (days): 29  Geometric Mean LOS (GMLOS) (days): 10.2  Days to GMLOS:-18.6     OBJECTIVE:  Risk of Unplanned Readmission Score: 17.17         Current admission status: Inpatient   Preferred Pharmacy:   [id number] [place] - [address]  [address]  [facility] [place] [address]  Phone: [phone] Fax: [phone]    Primary Care Provider: No primary care provider on file.    Primary Insurance: [employer] [facility]  Secondary Insurance: [facility]    DISCHARGE DETAILS:    Discharge planning discussed with:: Pt and daughter  Freedom of Choice: Yes     CM contacted family/caregiver?: Yes  Were Treatment Team discharge recommendations reviewed with patient/caregiver?: Yes  Did patient/caregiver verbalize understanding of patient care needs?: N/A- going to facility  Were patient/caregiver advised of the risks associated with not following Treatment Team discharge recommendations?: Yes    Contacts  Patient Contacts: Aleena Harding (Daughter) [phone]  Relationship to Patient:: Family  Contact Method: Phone  Phone Number: [phone]  Reason/Outcome: Discharge Planning, Emergency Contact, Continuity of Care              Other Referral/Resources/Interventions Provided:  Interventions: Acute Rehab  Referral Comments: CM was notified by Debby Julian ([phone]) at [facility] that they received auth and pt  can admit today.  CM confirmed with provider that pt is cleared to d/c.  CM spoke to pt and daughter Aleena who are agreeable to discharge today.  CM arranged [facility] transport and was assigned a  of 1600 with [facility].   CM requested an imaging CD be made and tubed to P7, RN made aware. Pt needs to have disk delivered prior to transport.  CM provided RN with folder for transport as well as phone numbers and fax for report.         Treatment Team Recommendation: Acute Rehab  Discharge Destination Plan:: Acute Rehab  Transport at Discharge : [facility]        Transported by (Company and Unit #): [facility]           Accepting Facility Name, City & State : [facility]  Receiving Facility/Agency Phone Number: [phone]  Facility/Agency Fax Number: [phone]

## 2024-08-11 ENCOUNTER — APPOINTMENT (EMERGENCY)
Dept: RADIOLOGY | Facility: HOSPITAL | Age: 69
End: 2024-08-11
Payer: MEDICARE

## 2024-08-11 ENCOUNTER — HOSPITAL ENCOUNTER (EMERGENCY)
Facility: HOSPITAL | Age: 69
Discharge: HOME | End: 2024-08-11
Attending: EMERGENCY MEDICINE
Payer: MEDICARE

## 2024-08-11 VITALS
DIASTOLIC BLOOD PRESSURE: 72 MMHG | TEMPERATURE: 97.9 F | OXYGEN SATURATION: 96 % | HEART RATE: 95 BPM | RESPIRATION RATE: 18 BRPM | SYSTOLIC BLOOD PRESSURE: 116 MMHG

## 2024-08-11 DIAGNOSIS — G43.909 MIGRAINE: Primary | ICD-10-CM

## 2024-08-11 PROBLEM — I60.9 SAH (SUBARACHNOID HEMORRHAGE) (HCC): Status: ACTIVE | Noted: 2024-08-11

## 2024-08-11 LAB
ALBUMIN SERPL BCG-MCNC: 3.8 G/DL (ref 3.5–5)
ALP SERPL-CCNC: 174 U/L (ref 34–104)
ALT SERPL W P-5'-P-CCNC: 36 U/L (ref 7–52)
ANION GAP SERPL CALCULATED.3IONS-SCNC: 7 MMOL/L (ref 4–13)
APTT PPP: 26 SECONDS (ref 23–34)
AST SERPL W P-5'-P-CCNC: 29 U/L (ref 13–39)
BASOPHILS # BLD AUTO: 0.04 THOUSANDS/ÂΜL (ref 0–0.1)
BASOPHILS NFR BLD AUTO: 1 % (ref 0–1)
BILIRUB SERPL-MCNC: 0.36 MG/DL (ref 0.2–1)
BUN SERPL-MCNC: 10 MG/DL (ref 5–25)
CALCIUM SERPL-MCNC: 9.6 MG/DL (ref 8.4–10.2)
CHLORIDE SERPL-SCNC: 103 MMOL/L (ref 96–108)
CO2 SERPL-SCNC: 28 MMOL/L (ref 21–32)
CREAT SERPL-MCNC: 0.84 MG/DL (ref 0.6–1.3)
EOSINOPHIL # BLD AUTO: 0.27 THOUSAND/ÂΜL (ref 0–0.61)
EOSINOPHIL NFR BLD AUTO: 4 % (ref 0–6)
ERYTHROCYTE [DISTWIDTH] IN BLOOD BY AUTOMATED COUNT: 14.9 % (ref 11.6–15.1)
GFR SERPL CREATININE-BSD FRML MDRD: 89 ML/MIN/1.73SQ M
GLUCOSE SERPL-MCNC: 98 MG/DL (ref 65–140)
HCT VFR BLD AUTO: 34.6 % (ref 36.5–49.3)
HGB BLD-MCNC: 11.2 G/DL (ref 12–17)
IMM GRANULOCYTES # BLD AUTO: 0.01 THOUSAND/UL (ref 0–0.2)
IMM GRANULOCYTES NFR BLD AUTO: 0 % (ref 0–2)
INR PPP: 0.94 (ref 0.85–1.19)
LYMPHOCYTES # BLD AUTO: 2.09 THOUSANDS/ÂΜL (ref 0.6–4.47)
LYMPHOCYTES NFR BLD AUTO: 32 % (ref 14–44)
MCH RBC QN AUTO: 31.5 PG (ref 26.8–34.3)
MCHC RBC AUTO-ENTMCNC: 32.4 G/DL (ref 31.4–37.4)
MCV RBC AUTO: 98 FL (ref 82–98)
MONOCYTES # BLD AUTO: 0.33 THOUSAND/ÂΜL (ref 0.17–1.22)
MONOCYTES NFR BLD AUTO: 5 % (ref 4–12)
NEUTROPHILS # BLD AUTO: 3.81 THOUSANDS/ÂΜL (ref 1.85–7.62)
NEUTS SEG NFR BLD AUTO: 58 % (ref 43–75)
NRBC BLD AUTO-RTO: 0 /100 WBCS
PLATELET # BLD AUTO: 393 THOUSANDS/UL (ref 149–390)
PMV BLD AUTO: 9.6 FL (ref 8.9–12.7)
POTASSIUM SERPL-SCNC: 4.2 MMOL/L (ref 3.5–5.3)
PROT SERPL-MCNC: 8.2 G/DL (ref 6.4–8.4)
PROTHROMBIN TIME: 12.9 SECONDS (ref 12.3–15)
RBC # BLD AUTO: 3.55 MILLION/UL (ref 3.88–5.62)
SODIUM SERPL-SCNC: 138 MMOL/L (ref 135–147)
WBC # BLD AUTO: 6.55 THOUSAND/UL (ref 4.31–10.16)

## 2024-08-11 PROCEDURE — 96375 TX/PRO/DX INJ NEW DRUG ADDON: CPT

## 2024-08-11 PROCEDURE — 99285 EMERGENCY DEPT VISIT HI MDM: CPT | Performed by: EMERGENCY MEDICINE

## 2024-08-11 PROCEDURE — 85025 COMPLETE CBC W/AUTO DIFF WBC: CPT

## 2024-08-11 PROCEDURE — 96365 THER/PROPH/DIAG IV INF INIT: CPT

## 2024-08-11 PROCEDURE — 80053 COMPREHEN METABOLIC PANEL: CPT

## 2024-08-11 PROCEDURE — 70496 CT ANGIOGRAPHY HEAD: CPT

## 2024-08-11 PROCEDURE — 70498 CT ANGIOGRAPHY NECK: CPT

## 2024-08-11 PROCEDURE — 85730 THROMBOPLASTIN TIME PARTIAL: CPT

## 2024-08-11 PROCEDURE — 85610 PROTHROMBIN TIME: CPT

## 2024-08-11 PROCEDURE — 99284 EMERGENCY DEPT VISIT MOD MDM: CPT

## 2024-08-11 PROCEDURE — 96366 THER/PROPH/DIAG IV INF ADDON: CPT

## 2024-08-11 PROCEDURE — 36415 COLL VENOUS BLD VENIPUNCTURE: CPT

## 2024-08-11 PROCEDURE — 96361 HYDRATE IV INFUSION ADD-ON: CPT

## 2024-08-11 RX ORDER — DIPHENHYDRAMINE HYDROCHLORIDE 50 MG/ML
25 INJECTION INTRAMUSCULAR; INTRAVENOUS EVERY 8 HOURS SCHEDULED
Status: DISCONTINUED | OUTPATIENT
Start: 2024-08-11 | End: 2024-08-11 | Stop reason: HOSPADM

## 2024-08-11 RX ORDER — MAGNESIUM SULFATE HEPTAHYDRATE 40 MG/ML
2 INJECTION, SOLUTION INTRAVENOUS EVERY 24 HOURS
Status: DISCONTINUED | OUTPATIENT
Start: 2024-08-11 | End: 2024-08-11 | Stop reason: HOSPADM

## 2024-08-11 RX ORDER — HYDROMORPHONE HCL/PF 1 MG/ML
0.5 SYRINGE (ML) INJECTION ONCE
Status: COMPLETED | OUTPATIENT
Start: 2024-08-11 | End: 2024-08-11

## 2024-08-11 RX ORDER — OXYCODONE HYDROCHLORIDE 5 MG/1
5 TABLET ORAL ONCE
Status: COMPLETED | OUTPATIENT
Start: 2024-08-11 | End: 2024-08-11

## 2024-08-11 RX ORDER — DEXAMETHASONE SODIUM PHOSPHATE 10 MG/ML
10 INJECTION, SOLUTION INTRAMUSCULAR; INTRAVENOUS ONCE
Status: COMPLETED | OUTPATIENT
Start: 2024-08-11 | End: 2024-08-11

## 2024-08-11 RX ORDER — ACETAMINOPHEN 325 MG/1
650 TABLET ORAL ONCE
Status: COMPLETED | OUTPATIENT
Start: 2024-08-11 | End: 2024-08-11

## 2024-08-11 RX ORDER — METOCLOPRAMIDE HYDROCHLORIDE 5 MG/ML
10 INJECTION INTRAMUSCULAR; INTRAVENOUS EVERY 8 HOURS SCHEDULED
Status: DISCONTINUED | OUTPATIENT
Start: 2024-08-11 | End: 2024-08-11 | Stop reason: HOSPADM

## 2024-08-11 RX ADMIN — MAGNESIUM SULFATE HEPTAHYDRATE 2 G: 40 INJECTION, SOLUTION INTRAVENOUS at 12:15

## 2024-08-11 RX ADMIN — METOCLOPRAMIDE 10 MG: 5 INJECTION, SOLUTION INTRAMUSCULAR; INTRAVENOUS at 12:12

## 2024-08-11 RX ADMIN — OXYCODONE HYDROCHLORIDE 5 MG: 5 TABLET ORAL at 14:57

## 2024-08-11 RX ADMIN — SODIUM CHLORIDE 1000 ML: 0.9 INJECTION, SOLUTION INTRAVENOUS at 10:36

## 2024-08-11 RX ADMIN — IOHEXOL 80 ML: 350 INJECTION, SOLUTION INTRAVENOUS at 11:15

## 2024-08-11 RX ADMIN — DIPHENHYDRAMINE HYDROCHLORIDE 25 MG: 50 INJECTION, SOLUTION INTRAMUSCULAR; INTRAVENOUS at 12:11

## 2024-08-11 RX ADMIN — DEXAMETHASONE SODIUM PHOSPHATE 10 MG: 10 INJECTION, SOLUTION INTRAMUSCULAR; INTRAVENOUS at 12:08

## 2024-08-11 RX ADMIN — ACETAMINOPHEN 650 MG: 325 TABLET ORAL at 10:36

## 2024-08-11 RX ADMIN — HYDROMORPHONE HYDROCHLORIDE 0.5 MG: 1 INJECTION, SOLUTION INTRAMUSCULAR; INTRAVENOUS; SUBCUTANEOUS at 16:28

## 2024-08-11 NOTE — ED NOTES
Spoke with Chuy can for update, she has been updated no further questions or concerns at this time.      Lanette Cardona RN  08/11/24 2941

## 2024-08-11 NOTE — DISCHARGE INSTRUCTIONS
You have been seen in the emergency department for evaluation of a headache. Your workup today was normal and we are able to control your symptoms.  Please attempt to use oxycodone as prescribed as needed for headaches at home. Please follow up as directed previously. Please return to the ED if you develop new or worsening symptoms.

## 2024-08-11 NOTE — ED ATTENDING ATTESTATION
I saw and evaluated the patient. I have discussed the patient with the resident physician and agree with the resident's findings, assessment and plan as documented in the resident physician's note, unless otherwise documented below. All available laboratory and imaging studies were reviewed by myself.  I was present for key portions of any procedure(s) performed by the resident and I was immediately available to provide assistance.     I agree with the current assessment done in the Emergency Department. I have conducted an independent evaluation of this patient    Final Diagnosis:  1. Migraine             Chief Complaint   Patient presents with    Headache     Patient coming from [facility] with headache that started this morning 10/10 front. [facility] stated patient had unequal pupils, EMS and Nursing staff evaluated and pupils are equal and reactive.      This is a 68 y.o. male with history of HTN, HLD, recent SAH, presenting for evaluation of headache. Chart reviewed in Epic. Patient admitted 7/12/24-8/10/24 for SAH. Patient initially presented to [facility] with severe headache, seizures, unresponsiveness. Was intubated in the ED and transferred to [facility] after CT demonstrated large SAH and IVH. Underwent R PCOM coil embolization. Hospital course complicated by aspiration pneumonia for which he completed course of antibiotics.  Today patient woke up with severe 10/10 headache and nursing staff also noticed that he had unequal pupils. Unclear if pupils are asymmetric at baseline. Patient poor historian so history is limited. Denies fever, chills, cough, chest pain, SOB, n/v/d, abdominal pain, focal neuro deficits, any other complaints.      PMH:   has no past medical history on file.    PSH:   has a past surgical history that includes IR cerebral angiography / intervention (7/13/2024).    Social:  Social History     Substance and Sexual Activity   Alcohol Use None     Social History     Tobacco Use   Smoking  Status Former    Current packs/day: 0.00    Types: Cigarettes    Quit date: 2024    Years since quittin.1   Smokeless Tobacco Not on file     Social History     Substance and Sexual Activity   Drug Use Not on file     PE:  Vitals:    24 0933   BP: 116/72   BP Location: Right arm   Pulse: 95   Resp: 18   Temp: 97.9 °F (36.6 °C)   TempSrc: Oral   SpO2: 96%         Physical exam:  GENERAL APPEARANCE: Appears comfortable, no acute distress, calm and cooperative   NEURO: GCS 15, bilateral upper extremity tremor with arms extended, R>L, cranial nerves grossly intact, clear fluent speech, no facial asymmetry, 4+/5 symmetric strength in all four extremities    HEENT: Normocephalic, atraumatic, moist mucous membranes   Neck: Supple, full ROM  CV: RRR, no murmurs, rubs, or gallops  LUNGS: CTAB, no wheezing, rales, or rhonchi  GI: Abdomen soft, non-tender, no rebound or guarding   MSK: Extremities non-tender, no pitting edema  SKIN: Warm and dry      Assessment and plan: This is a 68 y.o. male with history of HTN, HLD, recent SAH, presenting for evaluation of headache. Within ddx consider ICH, tension headache, migraine, post-op pain, dehydration, metabolic abnormality. Will obtain CT and labs to evaluate.     Final assessment: Workup reassuring and case discussed with both neurosurgery and neurology. Neurosurgery does not feel LP or other workup indicated, and I agree. Patient feels improved with analgesics provided in the ED. Strict ED return precautions provided should symptoms worsen and patient can otherwise follow up outpatient. Patient expresses an understanding and agreement with the plan and remains in good condition for discharge.     Code Status: Prior  Advance Directive and Living Will:      Power of :    POLST:      Medications   acetaminophen (TYLENOL) tablet 650 mg (650 mg Oral Given 24 1036)   sodium chloride 0.9 % bolus 1,000 mL (0 mL Intravenous Stopped 24 1421)   iohexol  (OMNIPAQUE) 350 MG/ML injection (MULTI-DOSE) 80 mL (80 mL Intravenous Given 8/11/24 1115)   dexamethasone (PF) (DECADRON) injection 10 mg (10 mg Intravenous Given 8/11/24 1208)   oxyCODONE (ROXICODONE) IR tablet 5 mg (5 mg Oral Given 8/11/24 1457)   HYDROmorphone (DILAUDID) injection 0.5 mg (0.5 mg Intravenous Given 8/11/24 1628)     CTA head and neck with and without contrast   Final Result      CT Brain: No acute intracranial pathology.   Status post coiling of right P-comm aneurysm.   Resolution of hemorrhage. 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   No significant intracranial stenosis, large vessel occlusion   No change status post coil embolization of right P-comm aneurysm with minimal residual filling at the base of the aneurysm      Workstation performed: [id number]           Orders Placed This Encounter   Procedures    CTA head and neck with and without contrast    CBC and differential    Comprehensive metabolic panel    Protime-INR    APTT     Labs Reviewed   CBC AND DIFFERENTIAL - Abnormal       Result Value Ref Range Status    WBC 6.55  4.31 - 10.16 Thousand/uL Final    RBC 3.55 (*) 3.88 - 5.62 Million/uL Final    Hemoglobin 11.2 (*) 12.0 - 17.0 g/dL Final    Hematocrit 34.6 (*) 36.5 - 49.3 % Final    MCV 98  82 - 98 fL Final    MCH 31.5  26.8 - 34.3 pg Final    MCHC 32.4  31.4 - 37.4 g/dL Final    RDW 14.9  11.6 - 15.1 % Final    MPV 9.6  8.9 - 12.7 fL Final    Platelets 393 (*) 149 - 390 Thousands/uL Final    nRBC 0  /100 WBCs Final    Segmented % 58  43 - 75 % Final    Immature Grans % 0  0 - 2 % Final    Lymphocytes % 32  14 - 44 % Final    Monocytes % 5  4 - 12 % Final    Eosinophils Relative 4  0 - 6 % Final    Basophils Relative 1  0 - 1 % Final    Absolute Neutrophils 3.81  1.85 - 7.62  Thousands/µL Final    Absolute Immature Grans 0.01  0.00 - 0.20 Thousand/uL Final    Absolute Lymphocytes 2.09  0.60 - 4.47 Thousands/µL Final    Absolute Monocytes 0.33  0.17 - 1.22 Thousand/µL Final    Eosinophils Absolute 0.27  0.00 - 0.61 Thousand/µL Final    Basophils Absolute 0.04  0.00 - 0.10 Thousands/µL Final   COMPREHENSIVE METABOLIC PANEL - Abnormal    Sodium 138  135 - 147 mmol/L Final    Potassium 4.2  3.5 - 5.3 mmol/L Final    Chloride 103  96 - 108 mmol/L Final    CO2 28  21 - 32 mmol/L Final    ANION GAP 7  4 - 13 mmol/L Final    BUN 10  5 - 25 mg/dL Final    Creatinine 0.84  0.60 - 1.30 mg/dL Final    Comment: Standardized to IDMS reference method    Glucose 98  65 - 140 mg/dL Final    Comment: If the patient is fasting, the ADA then defines impaired fasting glucose as > 100 mg/dL and diabetes as > or equal to 123 mg/dL.    Calcium 9.6  8.4 - 10.2 mg/dL Final    AST 29  13 - 39 U/L Final    ALT 36  7 - 52 U/L Final    Comment: Specimen collection should occur prior to Sulfasalazine administration due to the potential for falsely depressed results.     Alkaline Phosphatase 174 (*) 34 - 104 U/L Final    Total Protein 8.2  6.4 - 8.4 g/dL Final    Albumin 3.8  3.5 - 5.0 g/dL Final    Total Bilirubin 0.36  0.20 - 1.00 mg/dL Final    Comment: Use of this assay is not recommended for patients undergoing treatment with eltrombopag due to the potential for falsely elevated results.  N-acetyl-p-benzoquinone imine (metabolite of Acetaminophen) will generate erroneously low results in samples for patients that have taken an overdose of Acetaminophen.    eGFR 89  ml/min/1.73sq m Final    Narrative:     National Kid[employer] Foundation guidelines for Chronic Kidney Disease (CKD):     Stage 1 with normal or high GFR (GFR > 90 mL/min/1.73 square meters)    Stage 2 Mild CKD (GFR = 60-89 mL/min/1.73 square meters)    Stage 3A Moderate CKD (GFR = 45-59 mL/min/1.73 square meters)    Stage 3B Moderate CKD (GFR = 30-44  mL/min/1.73 square meters)    Stage 4 Severe CKD (GFR = 15-29 mL/min/1.73 square meters)    Stage 5 End Stage CKD (GFR <15 mL/min/1.73 square meters)  Note: GFR calculation is accurate only with a steady state creatinine   PROTIME-INR - Normal    Protime 12.9  12.3 - 15.0 seconds Final    INR 0.94  0.85 - 1.19 Final    Narrative:     INR Therapeutic Range    Indication                                             INR Range      Atrial Fibrillation                                               2.0-3.0  Hypercoagulable State                                    2.0.2.3  Left Ventricular Asist Device                            2.0-3.0  Mechanical Heart Valve                                  -    Aortic(with afib, MI, embolism, HF, LA enlargement,    and/or coagulopathy)                                     2.0-3.0 (2.5-3.5)     Mitral                                                             2.5-3.5  Prosthetic/Bioprosthetic Heart Valve               2.0-3.0  Venous thromboembolism (VTE: VT, PE        2.0-3.0   APTT - Normal    PTT 26  23 - 34 seconds Final    Comment: Therapeutic Heparin Range =  60-90 seconds         Time reflects when diagnosis was documented in both MDM as applicable and the Disposition within this note       Time User Action Codes Description Comment    8/11/2024  5:06 PM Maria Esther Zavala Add [G43.909] Migraine           ED Disposition       ED Disposition   Discharge    Condition   Stable    Date/Time   Sun Aug 11, 2024  5:06 PM    Comment   Federico Bwoen discharge to home/self care.                   Follow-up Information    None       Discharge Medication List as of 8/11/2024  5:26 PM        CONTINUE these medications which have NOT CHANGED    Details   albuterol (PROVENTIL HFA,VENTOLIN HFA) 90 mcg/act inhaler Inhale 2 puffs every 4 (four) hours as needed for wheezing, Starting Tue 6/25/2024, Historical Med      aspirin 81 mg chewable tablet Chew 81 mg daily, Historical Med      atorvastatin  (LIPITOR) 80 mg tablet Take 80 mg by mouth daily, Starting Tue 6/25/2024, Historical Med      ezetimibe (ZETIA) 10 mg tablet Take 10 mg by mouth every morning, Starting Tue 6/25/2024, Historical Med      folic acid (FOLVITE) 400 mcg tablet Take 1 tablet (400 mcg total) by mouth daily, Starting Sun 8/11/2024, No Print      !! gabapentin (NEURONTIN) 300 mg capsule Take 2 capsules (600 mg total) by mouth daily at bedtime, Starting Sat 8/10/2024, No Print      !! gabapentin (NEURONTIN) 400 mg capsule Take 1 capsule (400 mg total) by mouth 2 (two) times a day, Starting Sat 8/10/2024, No Print      levETIRAcetam (KEPPRA) 100 mg/mL oral solution Take 7.5 mL (750 mg total) by mouth every 12 (twelve) hours, Starting Sat 8/10/2024, No Print      metoprolol succinate (TOPROL-XL) 50 mg 24 hr tablet Take 50 mg by mouth daily at bedtime, Starting Tue 6/25/2024, Historical Med      mirtazapine (REMERON) 15 mg tablet Take 15 mg by mouth daily at bedtime, Starting Mon 6/3/2024, Historical Med      nitroglycerin (NITROSTAT) 0.4 mg SL tablet Place 0.4 mg under the tongue every 5 (five) minutes as needed for chest pain, Historical Med      omeprazole (PriLOSEC) 40 MG capsule Take 40 mg by mouth every morning, Starting Tue 6/25/2024, Historical Med      oxyCODONE (ROXICODONE) 5 immediate release tablet Take 1 tablet (5 mg total) by mouth every 4 (four) hours as needed for severe pain for up to 10 days Max Daily Amount: 30 mg, Starting Sat 8/10/2024, Until Tue 8/20/2024 at 2359, Print      sacubitril-valsartan (ENTRESTO) 24-26 MG TABS Take 1 tablet by mouth 2 (two) times a day, Historical Med      thiamine 100 MG tablet Take 1 tablet (100 mg total) by mouth daily, Starting Sun 8/11/2024, No Print       !! - Potential duplicate medications found. Please discuss with provider.        No discharge procedures on file.  Prior to Admission Medications   Prescriptions Last Dose Informant Patient Reported? Taking?   albuterol (PROVENTIL  "HFA,VENTOLIN HFA) 90 mcg/act inhaler   Yes No   Sig: Inhale 2 puffs every 4 (four) hours as needed for wheezing   aspirin 81 mg chewable tablet   Yes No   Sig: Chew 81 mg daily   atorvastatin (LIPITOR) 80 mg tablet   Yes No   Sig: Take 80 mg by mouth daily   ezetimibe (ZETIA) 10 mg tablet   Yes No   Sig: Take 10 mg by mouth every morning   folic acid (FOLVITE) 400 mcg tablet   No No   Sig: Take 1 tablet (400 mcg total) by mouth daily   gabapentin (NEURONTIN) 300 mg capsule   No No   Sig: Take 2 capsules (600 mg total) by mouth daily at bedtime   gabapentin (NEURONTIN) 400 mg capsule   No No   Sig: Take 1 capsule (400 mg total) by mouth 2 (two) times a day   levETIRAcetam (KEPPRA) 100 mg/mL oral solution   No No   Sig: Take 7.5 mL (750 mg total) by mouth every 12 (twelve) hours   metoprolol succinate (TOPROL-XL) 50 mg 24 hr tablet   Yes No   Sig: Take 50 mg by mouth daily at bedtime   mirtazapine (REMERON) 15 mg tablet   Yes No   Sig: Take 15 mg by mouth daily at bedtime   nitroglycerin (NITROSTAT) 0.4 mg SL tablet   Yes No   Sig: Place 0.4 mg under the tongue every 5 (five) minutes as needed for chest pain   omeprazole (PriLOSEC) 40 MG capsule   Yes No   Sig: Take 40 mg by mouth every morning   oxyCODONE (ROXICODONE) 5 immediate release tablet   No No   Sig: Take 1 tablet (5 mg total) by mouth every 4 (four) hours as needed for severe pain for up to 10 days Max Daily Amount: 30 mg   sacubitril-valsartan (ENTRESTO) 24-26 MG TABS   Yes No   Sig: Take 1 tablet by mouth 2 (two) times a day   thiamine 100 MG tablet   No No   Sig: Take 1 tablet (100 mg total) by mouth daily      Facility-Administered Medications: None         Portions of the record may have been created with voice recognition software. Occasional wrong word or \"sound a like\" substitutions may have occurred due to the inherent limitations of voice recognition software. Read the chart carefully and recognize, using context, where substitutions have " occurred.    Electronically signed by:  Julia Rinaldi

## 2024-08-11 NOTE — ED PROVIDER NOTES
History  Chief Complaint   Patient presents with    Headache     Patient coming from [facility] with headache that started this morning 10/10 front. [facility] stated patient had unequal pupils, EMS and Nursing staff evaluated and pupils are equal and reactive.      Is a 68-year-old male, past medical history significant for subarachnoid hemorrhage, hypertension, hyperlipidemia, COPD presenting today for evaluation of a headache.  Patient states that he woke up this morning with a 10 out of 10 frontal headache, reports associated back pain as well as dizziness.  Patient denies any blurry vision or weakness/sensation changes, speech difficulty.  Patient states that symptoms were present when he woke up this morning, he is unsure what time that was.  He denies any recent falls, or trauma to the head or back.  He denies any otherwise recent illnesses or symptoms.  No chest pain, no shortness of breath.  No nausea, vomiting.  He recently was admitted to the ICU for subarachnoid hemorrhage status post coil embolization and EVD on 7/13, EVD was removed on 7/26, patient was discharged to [facility] rehab on 8/1.  Patient denies recent headaches besides today, he states that he has not been experiencing headaches since his discharge from the hospital.          Prior to Admission Medications   Prescriptions Last Dose Informant Patient Reported? Taking?   albuterol (PROVENTIL HFA,VENTOLIN HFA) 90 mcg/act inhaler   Yes No   Sig: Inhale 2 puffs every 4 (four) hours as needed for wheezing   aspirin 81 mg chewable tablet   Yes No   Sig: Chew 81 mg daily   atorvastatin (LIPITOR) 80 mg tablet   Yes No   Sig: Take 80 mg by mouth daily   ezetimibe (ZETIA) 10 mg tablet   Yes No   Sig: Take 10 mg by mouth every morning   folic acid (FOLVITE) 400 mcg tablet   No No   Sig: Take 1 tablet (400 mcg total) by mouth daily   gabapentin (NEURONTIN) 300 mg capsule   No No   Sig: Take 2 capsules (600 mg total) by mouth daily at bedtime    gabapentin (NEURONTIN) 400 mg capsule   No No   Sig: Take 1 capsule (400 mg total) by mouth 2 (two) times a day   levETIRAcetam (KEPPRA) 100 mg/mL oral solution   No No   Sig: Take 7.5 mL (750 mg total) by mouth every 12 (twelve) hours   metoprolol succinate (TOPROL-XL) 50 mg 24 hr tablet   Yes No   Sig: Take 50 mg by mouth daily at bedtime   mirtazapine (REMERON) 15 mg tablet   Yes No   Sig: Take 15 mg by mouth daily at bedtime   nitroglycerin (NITROSTAT) 0.4 mg SL tablet   Yes No   Sig: Place 0.4 mg under the tongue every 5 (five) minutes as needed for chest pain   omeprazole (PriLOSEC) 40 MG capsule   Yes No   Sig: Take 40 mg by mouth every morning   oxyCODONE (ROXICODONE) 5 immediate release tablet   No No   Sig: Take 1 tablet (5 mg total) by mouth every 4 (four) hours as needed for severe pain for up to 10 days Max Daily Amount: 30 mg   sacubitril-valsartan (ENTRESTO) 24-26 MG TABS   Yes No   Sig: Take 1 tablet by mouth 2 (two) times a day   thiamine 100 MG tablet   No No   Sig: Take 1 tablet (100 mg total) by mouth daily      Facility-Administered Medications: None       History reviewed. No pertinent past medical history.    Past Surgical History:   Procedure Laterality Date    IR CEREBRAL ANGIOGRAPHY / INTERVENTION  2024       History reviewed. No pertinent family history.  I have reviewed and agree with the history as documented.    E-Cigarette/Vaping     E-Cigarette/Vaping Substances     Social History     Tobacco Use    Smoking status: Former     Current packs/day: 0.00     Types: Cigarettes     Quit date: 2024     Years since quittin.1        Review of Systems    Physical Exam  ED Triage Vitals [24 0933]   Temperature Pulse Respirations Blood Pressure SpO2   97.9 °F (36.6 °C) 95 18 116/72 96 %      Temp Source Heart Rate Source Patient Position - Orthostatic VS BP Location FiO2 (%)   Oral Monitor Lying Right arm --      Pain Score       10 - Worst Possible Pain             Orthostatic  Vital Signs  Vitals:    08/11/24 0933   BP: 116/72   Pulse: 95   Patient Position - Orthostatic VS: Lying       Physical Exam  Vitals and nursing note reviewed.   Constitutional:       General: He is not in acute distress.     Appearance: Normal appearance. He is not ill-appearing or toxic-appearing.   HENT:      Head: Normocephalic and atraumatic.   Eyes:      General: No scleral icterus.     Extraocular Movements: Extraocular movements intact.      Pupils: Pupils are unequal (right 4+, left 2+).      Right eye: Pupil is round and reactive.      Left eye: Pupil is round and reactive.   Cardiovascular:      Rate and Rhythm: Normal rate and regular rhythm.      Pulses: Normal pulses.      Heart sounds: Normal heart sounds. No murmur heard.  Pulmonary:      Effort: Pulmonary effort is normal. No respiratory distress.      Breath sounds: Normal breath sounds. No wheezing or rhonchi.   Abdominal:      General: Abdomen is flat. There is no distension.      Palpations: Abdomen is soft.      Tenderness: There is no abdominal tenderness.   Musculoskeletal:         General: Normal range of motion.      Cervical back: Normal range of motion.   Skin:     Capillary Refill: Capillary refill takes less than 2 seconds.   Neurological:      General: No focal deficit present.      Mental Status: He is alert. He is disoriented.      GCS: GCS eye subscore is 4. GCS verbal subscore is 4. GCS motor subscore is 6.      Cranial Nerves: No cranial nerve deficit, dysarthria or facial asymmetry.      Sensory: Sensation is intact. No sensory deficit.      Motor: Motor function is intact. No weakness.      Coordination: Finger-Nose-Finger Test abnormal (patient with poor accuracy on finger to nose testing bilaterally. Possibly effort dependent).      Gait: Gait normal.      Comments: Oriented to self  Oriented to year and season, but not month and date  Not oriented to place    Similar to prior on chart check   Psychiatric:         Mood and  Affect: Mood normal.         Behavior: Behavior normal.         ED Medications  Medications   acetaminophen (TYLENOL) tablet 650 mg (650 mg Oral Given 8/11/24 1036)   sodium chloride 0.9 % bolus 1,000 mL (0 mL Intravenous Stopped 8/11/24 1421)   iohexol (OMNIPAQUE) 350 MG/ML injection (MULTI-DOSE) 80 mL (80 mL Intravenous Given 8/11/24 1115)   dexamethasone (PF) (DECADRON) injection 10 mg (10 mg Intravenous Given 8/11/24 1208)   oxyCODONE (ROXICODONE) IR tablet 5 mg (5 mg Oral Given 8/11/24 1457)   HYDROmorphone (DILAUDID) injection 0.5 mg (0.5 mg Intravenous Given 8/11/24 1628)       Diagnostic Studies  Results Reviewed       Procedure Component Value Units Date/Time    Comprehensive metabolic panel [[id number]]  (Abnormal) Collected: 08/11/24 1035    Lab Status: Final result Specimen: Blood from Arm, Right Updated: 08/11/24 1121     Sodium 138 mmol/L      Potassium 4.2 mmol/L      Chloride 103 mmol/L      CO2 28 mmol/L      ANION GAP 7 mmol/L      BUN 10 mg/dL      Creatinine 0.84 mg/dL      Glucose 98 mg/dL      Calcium 9.6 mg/dL      AST 29 U/L      ALT 36 U/L      Alkaline Phosphatase 174 U/L      Total Protein 8.2 g/dL      Albumin 3.8 g/dL      Total Bilirubin 0.36 mg/dL      eGFR 89 ml/min/1.73sq m     Narrative:      National Kidney Disease Foundation guidelines for Chronic Kidney Disease (CKD):     Stage 1 with normal or high GFR (GFR > 90 mL/min/1.73 square meters)    Stage 2 Mild CKD (GFR = 60-89 mL/min/1.73 square meters)    Stage 3A Moderate CKD (GFR = 45-59 mL/min/1.73 square meters)    Stage 3B Moderate CKD (GFR = 30-44 mL/min/1.73 square meters)    Stage 4 Severe CKD (GFR = 15-29 mL/min/1.73 square meters)    Stage 5 End Stage CKD (GFR <15 mL/min/1.73 square meters)  Note: GFR calculation is accurate only with a steady state creatinine    CBC and differential [[id number]]  (Abnormal) Collected: 08/11/24 1035    Lab Status: Final result Specimen: Blood from Arm, Right Updated: 08/11/24 1117     WBC  6.55 Thousand/uL      RBC 3.55 Million/uL      Hemoglobin 11.2 g/dL      Hematocrit 34.6 %      MCV 98 fL      MCH 31.5 pg      MCHC 32.4 g/dL      RDW 14.9 %      MPV 9.6 fL      Platelets 393 Thousands/uL      nRBC 0 /100 WBCs      Segmented % 58 %      Immature Grans % 0 %      Lymphocytes % 32 %      Monocytes % 5 %      Eosinophils Relative 4 %      Basophils Relative 1 %      Absolute Neutrophils 3.81 Thousands/µL      Absolute Immature Grans 0.01 Thousand/uL      Absolute Lymphocytes 2.09 Thousands/µL      Absolute Monocytes 0.33 Thousand/µL      Eosinophils Absolute 0.27 Thousand/µL      Basophils Absolute 0.04 Thousands/µL     Protime-INR [[id number]]  (Normal) Collected: 08/11/24 1035    Lab Status: Final result Specimen: Blood from Arm, Right Updated: 08/11/24 1114     Protime 12.9 seconds      INR 0.94    Narrative:      INR Therapeutic Range    Indication                                             INR Range      Atrial Fibrillation                                               2.0-3.0  Hypercoagulable State                                    2.0.2.3  Left Ventricular Asist Device                            2.0-3.0  Mechanical Heart Valve                                  -    Aortic(with afib, MI, embolism, HF, LA enlargement,    and/or coagulopathy)                                     2.0-3.0 (2.5-3.5)     Mitral                                                             2.5-3.5  Prosthetic/Bioprosthetic Heart Valve               2.0-3.0  Venous thromboembolism (VTE: VT, PE        2.0-3.0    APTT [[id number]]  (Normal) Collected: 08/11/24 1035    Lab Status: Final result Specimen: Blood from Arm, Right Updated: 08/11/24 1114     PTT 26 seconds                    CTA head and neck with and without contrast   Final Result by E. Alec Schoenberger, MD (08/11 1206)      CT Brain: No acute intracranial pathology.   Status post coiling of right P-comm aneurysm.   Resolution of hemorrhage. 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   No significant intracranial stenosis, large vessel occlusion   No change status post coil embolization of right P-comm aneurysm with minimal residual filling at the base of the aneurysm      Workstation performed: [id number]               Procedures  Procedures      ED Course  ED Course as of 08/11/24 2154   Sun Aug 11, 2024   0945 Patient seen and evaluated by me  Ddx: Headache likely chronic migraine from recent SAH. However, patient poor historian and poor participation in neurological examination and history alone increases concern for re-bleeding. Outside of the window for vasospasm. No recent reported trauma to raise concern for SDH or EDH but again poor historian.  Plan: CBC, CMP, Coags, CTA head and neck, pain control.   1122 CBC and differential(!)  Normal   1123 Hemoglobin(!): 11.2  Improved from baseline   1123 Comprehensive metabolic panel(!)  WNL   1123 Coags normal.   1211 CTA head and neck with and without contrast  IMPRESSION:     CT Brain: No acute intracranial pathology.  Status post coiling of right P-comm aneurysm.  Resolution of hemorrhage. No new hemorrhage or acute infarct  Stable evolving subacute/early chronic infarcts in the right temporal lobe, right occipital lobe and right caudate     CT Angiography: No significant stenosis of the cervical carotid or vertebral arteries.  No significant intracranial stenosis, large vessel occlusion  No change status post coil embolization of right P-comm aneurysm with minimal residual filling at the base of the aneurysm     1300 Spoke with Nsx who report patient's headache was difficult to control during admission as well. Low concern for acute pathology. Recommend neurology consult for migraine control.   1700 After several attempted  medications, discussion with neurosurgery and neurology, patient finally reports improvement of headache to 6/10 and is amendable to discharge.   1730 Patient discharged at this time, given return precautions and follow up information. Informed patient to use oxycodone previously prescribed at discharge and given follow up with neurosurgery as previously arranged. Patient and/or parent report understanding, all questions answered.                  Identification of Seniors at Risk      Flowsheet Row Most Recent Value   (ISAR) Identification of Seniors at Risk    Before the illness or injury that brought you to the Emergency, did you need someone to help you on a regular basis? 0 Filed at: 08/11/2024 0933   In the last 24 hours, have you needed more help than usual? 0 Filed at: 08/11/2024 0933   Have you been hospitalized for one or more nights during the past 6 months? 0 Filed at: 08/11/2024 0933   In general, do you see well? 0 Filed at: 08/11/2024 0933   In general, do you have serious problems with your memory? 0 Filed at: 08/11/2024 0933   Do you take more than three different medications every day? 1 Filed at: 08/11/2024 0933   ISAR Score 1 Filed at: 08/11/2024 0933                      SBIRT 20yo+      Flowsheet Row Most Recent Value   Initial Alcohol Screen: US AUDIT-C     1. How often do you have a drink containing alcohol? 0 Filed at: 08/11/2024 0933   2. How many drinks containing alcohol do you have on a typical day you are drinking?  0 Filed at: 08/11/2024 0933   3a. Male UNDER 65: How often do you have five or more drinks on one occasion? 0 Filed at: 08/11/2024 0933   3b. FEMALE Any Age, or MALE 65+: How often do you have 4 or more drinks on one occassion? 0 Filed at: 08/11/2024 0933   Audit-C Score 0 Filed at: 08/11/2024 0933   MAURA: How many times in the past year have you...    Used an illegal drug or used a prescription medication for non-medical reasons? Never Filed at: 08/11/2024 0933                   Medical Decision Making  Please see ED course above regarding details of the MDM.    Amount and/or Complexity of Data Reviewed  Labs: ordered. Decision-making details documented in ED Course.  Radiology: ordered. Decision-making details documented in ED Course.    Risk  OTC drugs.  Prescription drug management.    d      Disposition  Final diagnoses:   Migraine     Time reflects when diagnosis was documented in both MDM as applicable and the Disposition within this note       Time User Action Codes Description Comment    8/11/2024  5:06 PM Maria Esther Zavala Add [G43.909] Migraine           ED Disposition       ED Disposition   Discharge    Condition   Stable    Date/Time   Sun Aug 11, 2024 1706    Comment   Federioc Bowen discharge to home/self care.                   Follow-up Information    None         Discharge Medication List as of 8/11/2024  5:26 PM        CONTINUE these medications which have NOT CHANGED    Details   albuterol (PROVENTIL HFA,VENTOLIN HFA) 90 mcg/act inhaler Inhale 2 puffs every 4 (four) hours as needed for wheezing, Starting Tue 6/25/2024, Historical Med      aspirin 81 mg chewable tablet Chew 81 mg daily, Historical Med      atorvastatin (LIPITOR) 80 mg tablet Take 80 mg by mouth daily, Starting Tue 6/25/2024, Historical Med      ezetimibe (ZETIA) 10 mg tablet Take 10 mg by mouth every morning, Starting Tue 6/25/2024, Historical Med      folic acid (FOLVITE) 400 mcg tablet Take 1 tablet (400 mcg total) by mouth daily, Starting Sun 8/11/2024, No Print      !! gabapentin (NEURONTIN) 300 mg capsule Take 2 capsules (600 mg total) by mouth daily at bedtime, Starting Sat 8/10/2024, No Print      !! gabapentin (NEURONTIN) 400 mg capsule Take 1 capsule (400 mg total) by mouth 2 (two) times a day, Starting Sat 8/10/2024, No Print      levETIRAcetam (KEPPRA) 100 mg/mL oral solution Take 7.5 mL (750 mg total) by mouth every 12 (twelve) hours, Starting Sat 8/10/2024, No Print       metoprolol succinate (TOPROL-XL) 50 mg 24 hr tablet Take 50 mg by mouth daily at bedtime, Starting Tue 6/25/2024, Historical Med      mirtazapine (REMERON) 15 mg tablet Take 15 mg by mouth daily at bedtime, Starting Mon 6/3/2024, Historical Med      nitroglycerin (NITROSTAT) 0.4 mg SL tablet Place 0.4 mg under the tongue every 5 (five) minutes as needed for chest pain, Historical Med      omeprazole (PriLOSEC) 40 MG capsule Take 40 mg by mouth every morning, Starting Tue 6/25/2024, Historical Med      oxyCODONE (ROXICODONE) 5 immediate release tablet Take 1 tablet (5 mg total) by mouth every 4 (four) hours as needed for severe pain for up to 10 days Max Daily Amount: 30 mg, Starting Sat 8/10/2024, Until Tue 8/20/2024 at 2359, Print      sacubitril-valsartan (ENTRESTO) 24-26 MG TABS Take 1 tablet by mouth 2 (two) times a day, Historical Med      thiamine 100 MG tablet Take 1 tablet (100 mg total) by mouth daily, Starting Sun 8/11/2024, No Print       !! - Potential duplicate medications found. Please discuss with provider.        No discharge procedures on file.    PDMP Review       None             ED Provider  Attending physically available and evaluated Federico Bowen. I managed the patient along with the ED Attending.    Electronically Signed by           Maria Esther Zavala MD  08/11/24 5759

## 2024-08-12 ENCOUNTER — TELEPHONE (OUTPATIENT)
Dept: PALLIATIVE MEDICINE | Facility: CLINIC | Age: 69
End: 2024-08-12

## 2024-08-12 NOTE — UTILIZATION REVIEW
NOTIFICATION OF ADMISSION DISCHARGE   This is a Notification of Discharge from [facility]. Please be advised that this patient has been discharge from our facility. Below you will find the admission and discharge date and time including the patient’s disposition.   UTILIZATION REVIEW CONTACT:  Logan Cutler  Utilization   Network Utilization Review Department  Phone: [phone] x carefully listen to the prompts. All voicemails are confidential.  Email: [email or web address]     ADMISSION INFORMATION  PRESENTATION DATE: 7/12/2024  6:43 PM  OBERVATION ADMISSION DATE: N/A  INPATIENT ADMISSION DATE: 7/12/24  6:43 PM   DISCHARGE DATE: 8/10/2024  4:30 PM   DISPOSITION:Home/Self Care    Network Utilization Review Department  ATTENTION: Please call with any questions or concerns to [phone] and carefully listen to the prompts so that you are directed to the right person. All voicemails are confidential.   For Discharge needs, contact Care Management DC Support Team at [phone] opt. [phone]  Send all requests for admission clinical reviews, approved or denied determinations and any other requests to dedicated fax number below belonging to the campus where the patient is receiving treatment. List of dedicated fax numbers for the Facilities:  FACILITY NAME UR FAX NUMBER   ADMISSION DENIALS (Administrative/Medical Necessity) [phone]   DISCHARGE SUPPORT TEAM ([facility]) [phone]   PARENT CHILD HEALTH (Maternity/NICU/Pediatrics) [phone]   General acute hospital [phone]   [facility] [phone]   [facility] [phone]   [facility] [phone]   [facility] [phone]   [facility] [phone]   [facility] [phone]   [facility] [phone]   [facility]  [facility] [phone]   [facility] [phone]   [facility] [phone]   [facility] [phone]

## 2024-08-13 ENCOUNTER — NURSE TRIAGE (OUTPATIENT)
Dept: PALLIATIVE MEDICINE | Facility: CLINIC | Age: 69
End: 2024-08-13

## 2024-08-13 NOTE — TELEPHONE ENCOUNTER
Spoke with pts Daughter Aleena , regarding scheduling a HFU appointment with Palliative Care . Aleena declined to follow up with office .

## 2024-08-20 ENCOUNTER — TELEPHONE (OUTPATIENT)
Dept: NEUROSURGERY | Facility: CLINIC | Age: 69
End: 2024-08-20

## 2024-08-20 NOTE — TELEPHONE ENCOUNTER
8/20/24:      ROUTING TO [facility] SO SHE IS AWARE.    GOT NOTIFICATION FROM [facility] THAT WE ARE NON PAR WITH PATIENT INSURANCE ([facility] REP).    I CALLED SOUMYA RODRIGEZ [phone] AND SPOKE TO RICKIE TO ADVISE HER OF THIS. SHE SAID PATIENT IS BEING DISCHARGED ON 8/22/24.      SHE SAID SHE WOULD GIVE INFORMATION TO CASE MANAGEMENT.

## 2024-08-20 NOTE — TELEPHONE ENCOUNTER
Leta called from [facility]  to confirm that we are non par with Adriana and she is seeking a resolution for this pt at this time

## 2024-08-20 NOTE — TELEPHONE ENCOUNTER
8/20/24 - SEE TITI LILLY NOTE BELOW. WILL CALL PT AFTER DISCHARGED FROM [facility] TO CONFIRM APT/LET HIM KNOW THAT WE ARE NON PAR WITH HIS INSURANCE  9/9/24 2-3 WK HFU W/MRA HEAD W/MO

## 2024-08-23 NOTE — TELEPHONE ENCOUNTER
8/23/24 - CALLED [facility] AND SPOKE TO BETHANIE CONFIRMING PT WAS DISCHARGED TO HOME. BETHANIE INFORMED ME THAT THE PT IS AWARE THAT WE DO NOT [facility] AND GAVE THE PT A LIST OF NEUROSURGEONS IN HIS AREA THAT ARE PAR WITH HIS INSURANCE. SHE EXPLAINED TO HIM HOW IMPORTANT IT IS TO F/U W/ NEUROSX. I CALLED PT AND LMOM REQUESTING A CALL BACK TO LET US KNOW IF HE WAS ABLE TO HAVE HIS MRA SCHEDULED ELSEWHERE/IF SO, IF HE WILL BE KEEPING HIS APT W/MO. LEFT OUR PHONE NUMBER FOR PT TO CALL BACK  **WAITING FOR MRA TO BE SCHEDULED**

## 2024-08-29 ENCOUNTER — TELEPHONE (OUTPATIENT)
Age: 69
End: 2024-08-29

## 2024-08-29 ENCOUNTER — TELEPHONE (OUTPATIENT)
Dept: NEUROSURGERY | Facility: CLINIC | Age: 69
End: 2024-08-29

## 2024-08-29 NOTE — TELEPHONE ENCOUNTER
is not pt number, the person at that number called to advise they were getting calls from us for him and they do not know him. Could not reach pt at

## 2024-08-29 NOTE — TELEPHONE ENCOUNTER
Called left vm patients needs mra for follow up appointment on 09/09/2024 left central schedule number  and office number waiting for call back. Patient will need to reschedule office visit if mra is not completed

## 2024-12-19 ENCOUNTER — TELEPHONE (OUTPATIENT)
Age: 69
End: 2024-12-19

## 2024-12-19 NOTE — TELEPHONE ENCOUNTER
1ST ATTEMPT,     Called pt no answer, LMOM.    Thank you,     Radha KAT/ FLORENTINO KEHINDE/ SEIZURES    DC- 8/10/2024    Jaylon Andrés will need follow up in in 6 weeks with epilepsy attending. He will not require outpatient neurological testing.

## 2025-01-02 ENCOUNTER — HOSPITAL ENCOUNTER (EMERGENCY)
Facility: HOSPITAL | Age: 70
Discharge: HOME | End: 2025-01-02
Attending: EMERGENCY MEDICINE | Admitting: EMERGENCY MEDICINE
Payer: MEDICARE

## 2025-01-02 VITALS
WEIGHT: 133.82 LBS | TEMPERATURE: 97.7 F | DIASTOLIC BLOOD PRESSURE: 77 MMHG | OXYGEN SATURATION: 99 % | SYSTOLIC BLOOD PRESSURE: 141 MMHG | RESPIRATION RATE: 18 BRPM | HEART RATE: 58 BPM

## 2025-01-02 DIAGNOSIS — I25.10 CORONARY ARTERY DISEASE: ICD-10-CM

## 2025-01-02 DIAGNOSIS — I60.9 SAH (SUBARACHNOID HEMORRHAGE) (HCC): ICD-10-CM

## 2025-01-02 DIAGNOSIS — Z76.0 MEDICATION REFILL: Primary | ICD-10-CM

## 2025-01-02 DIAGNOSIS — I60.9 SUBARACHNOID HEMORRHAGE (HCC): ICD-10-CM

## 2025-01-02 DIAGNOSIS — R56.9 SEIZURES (HCC): ICD-10-CM

## 2025-01-02 PROCEDURE — 99284 EMERGENCY DEPT VISIT MOD MDM: CPT

## 2025-01-02 PROCEDURE — 99281 EMR DPT VST MAYX REQ PHY/QHP: CPT

## 2025-01-02 RX ORDER — LEVETIRACETAM 750 MG/1
750 TABLET ORAL 2 TIMES DAILY
COMMUNITY
End: 2025-01-02

## 2025-01-02 RX ORDER — EZETIMIBE 10 MG/1
10 TABLET ORAL EVERY MORNING
Qty: 30 TABLET | Refills: 0 | Status: SHIPPED | OUTPATIENT
Start: 2025-01-02

## 2025-01-02 RX ORDER — GABAPENTIN 400 MG/1
400 CAPSULE ORAL 2 TIMES DAILY
Qty: 60 CAPSULE | Refills: 0 | Status: SHIPPED | OUTPATIENT
Start: 2025-01-02

## 2025-01-02 RX ORDER — GABAPENTIN 300 MG/1
600 CAPSULE ORAL
Qty: 60 CAPSULE | Refills: 0 | Status: SHIPPED | OUTPATIENT
Start: 2025-01-02

## 2025-01-02 RX ORDER — ASPIRIN 81 MG/1
81 TABLET, CHEWABLE ORAL DAILY
Qty: 30 TABLET | Refills: 0 | Status: SHIPPED | OUTPATIENT
Start: 2025-01-02

## 2025-01-02 RX ORDER — LEVETIRACETAM 750 MG/1
750 TABLET ORAL 2 TIMES DAILY
Qty: 60 TABLET | Refills: 0 | Status: SHIPPED | OUTPATIENT
Start: 2025-01-02

## 2025-01-02 RX ORDER — MIDODRINE HYDROCHLORIDE 10 MG/1
10 TABLET ORAL
Qty: 90 TABLET | Refills: 0 | Status: SHIPPED | OUTPATIENT
Start: 2025-01-02

## 2025-01-02 RX ORDER — ATORVASTATIN CALCIUM 80 MG/1
80 TABLET, FILM COATED ORAL DAILY
Qty: 30 TABLET | Refills: 0 | Status: SHIPPED | OUTPATIENT
Start: 2025-01-02

## 2025-01-02 RX ORDER — MIRTAZAPINE 15 MG/1
15 TABLET, FILM COATED ORAL
Qty: 30 TABLET | Refills: 0 | Status: SHIPPED | OUTPATIENT
Start: 2025-01-02

## 2025-01-02 RX ORDER — METOPROLOL SUCCINATE 50 MG/1
50 TABLET, EXTENDED RELEASE ORAL
Qty: 30 TABLET | Refills: 0 | Status: SHIPPED | OUTPATIENT
Start: 2025-01-02

## 2025-01-02 RX ORDER — MIDODRINE HYDROCHLORIDE 10 MG/1
10 TABLET ORAL
COMMUNITY
End: 2025-01-02

## 2025-01-02 NOTE — DISCHARGE INSTRUCTIONS
Continue medications as prescribed.  Referral for PCP, cardiology, and neurology provided.  Call and schedule appointments to establish practice.  Return to ED for any worsening symptoms.

## 2025-01-06 NOTE — ED PROVIDER NOTES
Time reflects when diagnosis was documented in both MDM as applicable and the Disposition within this note       Time User Action Codes Description Comment    1/2/2025 12:39 PM Naradko, Mireya Add [Z76.0] Medication refill     1/2/2025 12:46 PM Naradko, Mireya Add [I60.9] Subarachnoid hemorrhage (HCC)     1/2/2025 12:49 PM Naradko, Mireya Add [I60.9] SAH (subarachnoid hemorrhage) (HCC)     1/2/2025 12:49 PM Naradko, Mireya Add [R56.9] Seizures (HCC)     1/2/2025 12:49 PM Naradko, Mireya Add [I25.10] Coronary artery disease           ED Disposition       ED Disposition   Discharge    Condition   Stable    Date/Time   Thu Jan 2, 2025 12:49 PM    Comment   Federico Bowen discharge to home/self care.                   Assessment & Plan       Medical Decision Making  Patient is a 69-year-old male presenting for medication refill.  Upon examination, patient is well-appearing and does not appear in any acute distress.  Vital signs are within normal limits.  Medications refilled and information for an [facility] PCP was provided on discharge paperwork in case there are any complications getting in to the [facility] provider they are planning to see.  Referrals also made for a neurologist and cardiologist through the [facility] in network for further medication management and follow-up.  Strict ED return precautions reviewed and patient verbalizes understanding of discharge instructions and follow-up care at this time.    Risk  OTC drugs.  Prescription drug management.             Medications - No data to display    ED Risk Strat Scores                          SBIRT 22yo+      Flowsheet Row Most Recent Value   Initial Alcohol Screen: US AUDIT-C     1. How often do you have a drink containing alcohol? 0 Filed at: 01/02/2025 1254   2. How many drinks containing alcohol do you have on a typical day you are drinking?  0 Filed at: 01/02/2025 1254   3a. Male UNDER 65: How often do you have five or more drinks on one occasion? 0 Filed  at: 2025 1254   Audit-C Score 0 Filed at: 2025 1254   MAURA: How many times in the past year have you...    Used an illegal drug or used a prescription medication for non-medical reasons? Never Filed at: 2025 1254                            History of Present Illness       Chief Complaint   Patient presents with    Medication Refill     Patient reports out of his medication and needs refill. Patient reports just moving here.       Past Medical History:   Diagnosis Date    Coronary artery disease     Diabetes mellitus (HCC)     Hypertension     Seizures (HCC)       Past Surgical History:   Procedure Laterality Date    CEREBRAL ANEURYSM REPAIR      IR CEREBRAL ANGIOGRAPHY / INTERVENTION  2024      History reviewed. No pertinent family history.   Social History     Tobacco Use    Smoking status: Former     Current packs/day: 0.00     Types: Cigarettes     Quit date: 2024     Years since quittin.6    Smokeless tobacco: Never   Vaping Use    Vaping status: Never Used   Substance Use Topics    Alcohol use: Never    Drug use: Never      E-Cigarette/Vaping    E-Cigarette Use Never User       E-Cigarette/Vaping Substances      I have reviewed and agree with the history as documented.     Patient is a 69-year-old male with a past medical history including coronary artery disease, diabetes mellitus, hypertension, and seizures. Presents today for medical refill. States that he recently moved to the area and does not yet have a PCP/someone to manage his medications. Only has a week or so left of his medications. Granddaughters have the name/contact information for a PCP through [facility] that they plan on using but he has not yet met with that PCP. He offers no complaints at this time.       Medication Refill      Review of Systems   Constitutional:  Negative for chills and fever.   Respiratory:  Negative for shortness of breath.    Cardiovascular:  Negative for chest pain.   Gastrointestinal:  Negative  for abdominal pain, diarrhea, nausea and vomiting.   All other systems reviewed and are negative.          Objective       ED Triage Vitals [01/02/25 1153]   Temperature Pulse Blood Pressure Respirations SpO2 Patient Position - Orthostatic VS   97.7 °F (36.5 °C) 58 141/77 18 99 % Sitting      Temp Source Heart Rate Source BP Location FiO2 (%) Pain Score    Oral Monitor Left arm -- --      Vitals      Date and Time Temp Pulse SpO2 Resp BP Pain Score FACES Pain Rating User   01/02/25 1153 97.7 °F (36.5 °C) 58 99 % 18 141/77 -- -- CT            Physical Exam  Vitals and nursing note reviewed.   Constitutional:       Appearance: Normal appearance.   Cardiovascular:      Rate and Rhythm: Normal rate.      Heart sounds: Normal heart sounds.   Pulmonary:      Effort: Pulmonary effort is normal.      Breath sounds: Normal breath sounds.   Musculoskeletal:         General: Normal range of motion.   Skin:     General: Skin is warm and dry.      Capillary Refill: Capillary refill takes less than 2 seconds.   Neurological:      General: No focal deficit present.      Mental Status: He is alert and oriented to person, place, and time.   Psychiatric:         Mood and Affect: Mood normal.         Behavior: Behavior normal.         Results Reviewed       None            No orders to display       Procedures    ED Medication and Procedure Management   Prior to Admission Medications   Prescriptions Last Dose Informant Patient Reported? Taking?   albuterol (PROVENTIL HFA,VENTOLIN HFA) 90 mcg/act inhaler   Yes Yes   Sig: Inhale 2 puffs every 4 (four) hours as needed for wheezing   aspirin 81 mg chewable tablet   Yes Yes   Sig: Chew 81 mg daily   aspirin 81 mg chewable tablet   No Yes   Sig: Chew 1 tablet (81 mg total) daily   atorvastatin (LIPITOR) 80 mg tablet   Yes Yes   Sig: Take 80 mg by mouth daily   atorvastatin (LIPITOR) 80 mg tablet   No Yes   Sig: Take 1 tablet (80 mg total) by mouth daily   ezetimibe (ZETIA) 10 mg tablet    Yes Yes   Sig: Take 10 mg by mouth every morning   ezetimibe (ZETIA) 10 mg tablet   No Yes   Sig: Take 1 tablet (10 mg total) by mouth every morning   folic acid (FOLVITE) 400 mcg tablet   No No   Sig: Take 1 tablet (400 mcg total) by mouth daily   gabapentin (NEURONTIN) 300 mg capsule   No Yes   Sig: Take 2 capsules (600 mg total) by mouth daily at bedtime   gabapentin (NEURONTIN) 300 mg capsule   No Yes   Sig: Take 2 capsules (600 mg total) by mouth daily at bedtime   gabapentin (NEURONTIN) 400 mg capsule   No Yes   Sig: Take 1 capsule (400 mg total) by mouth 2 (two) times a day   gabapentin (NEURONTIN) 400 mg capsule   No Yes   Sig: Take 1 capsule (400 mg total) by mouth 2 (two) times a day   levETIRAcetam (KEPPRA) 100 mg/mL oral solution   No No   Sig: Take 7.5 mL (750 mg total) by mouth every 12 (twelve) hours   levETIRAcetam (KEPPRA) 750 mg tablet   Yes Yes   Sig: Take 750 mg by mouth 2 (two) times a day   levETIRAcetam (KEPPRA) 750 mg tablet   No Yes   Sig: Take 1 tablet (750 mg total) by mouth 2 (two) times a day   metoprolol succinate (TOPROL-XL) 50 mg 24 hr tablet   Yes Yes   Sig: Take 50 mg by mouth daily at bedtime   metoprolol succinate (TOPROL-XL) 50 mg 24 hr tablet   No Yes   Sig: Take 1 tablet (50 mg total) by mouth daily at bedtime   midodrine (PROAMATINE) 10 MG tablet   Yes Yes   Sig: Take 10 mg by mouth 3 (three) times a day before meals   midodrine (PROAMATINE) 10 MG tablet   No Yes   Sig: Take 1 tablet (10 mg total) by mouth 3 (three) times a day before meals   mirtazapine (REMERON) 15 mg tablet   Yes Yes   Sig: Take 15 mg by mouth daily at bedtime   mirtazapine (REMERON) 15 mg tablet   No Yes   Sig: Take 1 tablet (15 mg total) by mouth daily at bedtime   nitroglycerin (NITROSTAT) 0.4 mg SL tablet   Yes No   Sig: Place 0.4 mg under the tongue every 5 (five) minutes as needed for chest pain   omeprazole (PriLOSEC) 40 MG capsule   Yes Yes   Sig: Take 40 mg by mouth every morning    sacubitril-valsartan (ENTRESTO) 24-26 MG TABS   Yes No   Sig: Take 1 tablet by mouth 2 (two) times a day   thiamine 100 MG tablet   No No   Sig: Take 1 tablet (100 mg total) by mouth daily      Facility-Administered Medications: None     Discharge Medication List as of 1/2/2025 12:50 PM        CONTINUE these medications which have CHANGED    Details   aspirin 81 mg chewable tablet Chew 1 tablet (81 mg total) daily, Starting Thu 1/2/2025, Normal      atorvastatin (LIPITOR) 80 mg tablet Take 1 tablet (80 mg total) by mouth daily, Starting Thu 1/2/2025, Normal      ezetimibe (ZETIA) 10 mg tablet Take 1 tablet (10 mg total) by mouth every morning, Starting Thu 1/2/2025, Normal      !! gabapentin (NEURONTIN) 300 mg capsule Take 2 capsules (600 mg total) by mouth daily at bedtime, Starting Thu 1/2/2025, Normal      !! gabapentin (NEURONTIN) 400 mg capsule Take 1 capsule (400 mg total) by mouth 2 (two) times a day, Starting Thu 1/2/2025, Normal      levETIRAcetam (KEPPRA) 750 mg tablet Take 1 tablet (750 mg total) by mouth 2 (two) times a day, Starting Thu 1/2/2025, Normal      metoprolol succinate (TOPROL-XL) 50 mg 24 hr tablet Take 1 tablet (50 mg total) by mouth daily at bedtime, Starting Thu 1/2/2025, Normal      midodrine (PROAMATINE) 10 MG tablet Take 1 tablet (10 mg total) by mouth 3 (three) times a day before meals, Starting Thu 1/2/2025, Normal      mirtazapine (REMERON) 15 mg tablet Take 1 tablet (15 mg total) by mouth daily at bedtime, Starting Thu 1/2/2025, Normal       !! - Potential duplicate medications found. Please discuss with provider.        CONTINUE these medications which have NOT CHANGED    Details   albuterol (PROVENTIL HFA,VENTOLIN HFA) 90 mcg/act inhaler Inhale 2 puffs every 4 (four) hours as needed for wheezing, Starting Tue 6/25/2024, Historical Med      omeprazole (PriLOSEC) 40 MG capsule Take 40 mg by mouth every morning, Starting Tue 6/25/2024, Historical Med      folic acid (FOLVITE) 400  mcg tablet Take 1 tablet (400 mcg total) by mouth daily, Starting Sun 8/11/2024, No Print      levETIRAcetam (KEPPRA) 100 mg/mL oral solution Take 7.5 mL (750 mg total) by mouth every 12 (twelve) hours, Starting Sat 8/10/2024, No Print      nitroglycerin (NITROSTAT) 0.4 mg SL tablet Place 0.4 mg under the tongue every 5 (five) minutes as needed for chest pain, Historical Med      sacubitril-valsartan (ENTRESTO) 24-26 MG TABS Take 1 tablet by mouth 2 (two) times a day, Historical Med      thiamine 100 MG tablet Take 1 tablet (100 mg total) by mouth daily, Starting Sun 8/11/2024, No Print             ED SEPSIS DOCUMENTATION   Time reflects when diagnosis was documented in both MDM as applicable and the Disposition within this note       Time User Action Codes Description Comment    1/2/2025 12:39 PM Mireya Carver Add [Z76.0] Medication refill     1/2/2025 12:46 PM Mireya Carver Add [I60.9] Subarachnoid hemorrhage ([facility])     1/2/2025 12:49 PM Mireya Carver Add [I60.9] SAH (subarachnoid hemorrhage) ([facility])     1/2/2025 12:49 PM Mireya Carver Add [R56.9] Seizures ([facility])     1/2/2025 12:49 PM Mireya Carver Add [I25.10] Coronary artery disease                  CHRISTINA Clarke  01/06/25 [id number]